# Patient Record
Sex: MALE | Race: WHITE | NOT HISPANIC OR LATINO | Employment: OTHER | URBAN - NONMETROPOLITAN AREA
[De-identification: names, ages, dates, MRNs, and addresses within clinical notes are randomized per-mention and may not be internally consistent; named-entity substitution may affect disease eponyms.]

---

## 2017-01-19 ENCOUNTER — OFFICE VISIT (OUTPATIENT)
Dept: FAMILY MEDICINE CLINIC | Facility: CLINIC | Age: 57
End: 2017-01-19

## 2017-01-19 VITALS
DIASTOLIC BLOOD PRESSURE: 70 MMHG | HEART RATE: 94 BPM | OXYGEN SATURATION: 96 % | BODY MASS INDEX: 29.78 KG/M2 | TEMPERATURE: 98.4 F | HEIGHT: 70 IN | WEIGHT: 208 LBS | SYSTOLIC BLOOD PRESSURE: 121 MMHG

## 2017-01-19 DIAGNOSIS — M15.9 PRIMARY OSTEOARTHRITIS INVOLVING MULTIPLE JOINTS: ICD-10-CM

## 2017-01-19 DIAGNOSIS — F41.1 GENERALIZED ANXIETY DISORDER: Primary | ICD-10-CM

## 2017-01-19 DIAGNOSIS — E53.8 VITAMIN B 12 DEFICIENCY: ICD-10-CM

## 2017-01-19 DIAGNOSIS — Z13.9 SCREENING: ICD-10-CM

## 2017-01-19 DIAGNOSIS — IMO0001 UNCONTROLLED TYPE 2 DIABETES MELLITUS WITHOUT COMPLICATION, WITHOUT LONG-TERM CURRENT USE OF INSULIN: ICD-10-CM

## 2017-01-19 DIAGNOSIS — I10 ESSENTIAL HYPERTENSION: ICD-10-CM

## 2017-01-19 DIAGNOSIS — I25.10 CORONARY ARTERY DISEASE INVOLVING NATIVE CORONARY ARTERY OF NATIVE HEART, ANGINA PRESENCE UNSPECIFIED: ICD-10-CM

## 2017-01-19 DIAGNOSIS — N40.0 BENIGN NON-NODULAR PROSTATIC HYPERPLASIA WITHOUT LOWER URINARY TRACT SYMPTOMS: ICD-10-CM

## 2017-01-19 DIAGNOSIS — R79.89 ABNORMAL THYROID BLOOD TEST: ICD-10-CM

## 2017-01-19 LAB
25(OH)D3 SERPL-MCNC: 17 NG/ML
ALBUMIN SERPL-MCNC: 4.6 G/DL (ref 3.5–5)
ALBUMIN UR-MCNC: 14.9 MG/L
ALBUMIN/GLOB SERPL: 1.5 G/DL (ref 1.5–2.5)
ALP SERPL-CCNC: 102 U/L (ref 46–116)
ALT SERPL W P-5'-P-CCNC: 25 U/L (ref 10–44)
ANION GAP SERPL CALCULATED.3IONS-SCNC: 11.3 MMOL/L (ref 3.6–11.2)
AST SERPL-CCNC: 23 U/L (ref 10–34)
BASOPHILS # BLD AUTO: 0.01 10*3/MM3 (ref 0–0.3)
BASOPHILS NFR BLD AUTO: 0.2 % (ref 0–2)
BILIRUB SERPL-MCNC: 0.7 MG/DL (ref 0.2–1.8)
BUN BLD-MCNC: 16 MG/DL (ref 7–21)
BUN/CREAT SERPL: 16 (ref 7–25)
CALCIUM SPEC-SCNC: 9.4 MG/DL (ref 7.7–10)
CHLORIDE SERPL-SCNC: 104 MMOL/L (ref 99–112)
CHOLEST SERPL-MCNC: 165 MG/DL (ref 0–200)
CO2 SERPL-SCNC: 29.7 MMOL/L (ref 24.3–31.9)
CREAT BLD-MCNC: 1 MG/DL (ref 0.43–1.29)
DEPRECATED RDW RBC AUTO: 42.5 FL (ref 37–54)
EOSINOPHIL # BLD AUTO: 0.29 10*3/MM3 (ref 0–0.7)
EOSINOPHIL NFR BLD AUTO: 4.8 % (ref 0–5)
ERYTHROCYTE [DISTWIDTH] IN BLOOD BY AUTOMATED COUNT: 13.6 % (ref 11.5–14.5)
GFR SERPL CREATININE-BSD FRML MDRD: 78 ML/MIN/1.73
GLOBULIN UR ELPH-MCNC: 3 GM/DL
GLUCOSE BLD-MCNC: 125 MG/DL (ref 70–110)
HAV IGM SERPL QL IA: NORMAL
HBA1C MFR BLD: 6.8 % (ref 4.5–5.7)
HBV CORE IGM SERPL QL IA: NORMAL
HBV SURFACE AG SERPL QL IA: NORMAL
HCT VFR BLD AUTO: 45.3 % (ref 42–52)
HCV AB SER DONR QL: NORMAL
HDLC SERPL-MCNC: 46 MG/DL (ref 60–100)
HGB BLD-MCNC: 14.8 G/DL (ref 14–18)
IMM GRANULOCYTES # BLD: 0.02 10*3/MM3 (ref 0–0.03)
IMM GRANULOCYTES NFR BLD: 0.3 % (ref 0–0.5)
LDLC SERPL CALC-MCNC: 92 MG/DL (ref 0–100)
LDLC/HDLC SERPL: 1.99 {RATIO}
LYMPHOCYTES # BLD AUTO: 1.74 10*3/MM3 (ref 1–3)
LYMPHOCYTES NFR BLD AUTO: 28.7 % (ref 21–51)
MCH RBC QN AUTO: 28.1 PG (ref 27–33)
MCHC RBC AUTO-ENTMCNC: 32.7 G/DL (ref 33–37)
MCV RBC AUTO: 86.1 FL (ref 80–94)
MONOCYTES # BLD AUTO: 0.88 10*3/MM3 (ref 0.1–0.9)
MONOCYTES NFR BLD AUTO: 14.5 % (ref 0–10)
NEUTROPHILS # BLD AUTO: 3.13 10*3/MM3 (ref 1.4–6.5)
NEUTROPHILS NFR BLD AUTO: 51.5 % (ref 30–70)
OSMOLALITY SERPL CALC.SUM OF ELEC: 291.4 MOSM/KG (ref 273–305)
PLATELET # BLD AUTO: 380 10*3/MM3 (ref 130–400)
PMV BLD AUTO: 10 FL (ref 6–10)
POTASSIUM BLD-SCNC: 3.6 MMOL/L (ref 3.5–5.3)
PROT SERPL-MCNC: 7.6 G/DL (ref 6–8)
PSA SERPL-MCNC: 0.32 NG/ML (ref 0–4)
RBC # BLD AUTO: 5.26 10*6/MM3 (ref 4.7–6.1)
SODIUM BLD-SCNC: 145 MMOL/L (ref 135–153)
TRIGL SERPL-MCNC: 137 MG/DL (ref 0–150)
TSH SERPL DL<=0.05 MIU/L-ACNC: <0.01 MIU/ML (ref 0.55–4.78)
VIT B12 BLD-MCNC: >2000 PG/ML (ref 211–911)
VLDLC SERPL-MCNC: 27.4 MG/DL
WBC NRBC COR # BLD: 6.07 10*3/MM3 (ref 4.5–12.5)

## 2017-01-19 PROCEDURE — 36415 COLL VENOUS BLD VENIPUNCTURE: CPT | Performed by: NURSE PRACTITIONER

## 2017-01-19 PROCEDURE — 96372 THER/PROPH/DIAG INJ SC/IM: CPT | Performed by: NURSE PRACTITIONER

## 2017-01-19 PROCEDURE — 80053 COMPREHEN METABOLIC PANEL: CPT | Performed by: NURSE PRACTITIONER

## 2017-01-19 PROCEDURE — 80061 LIPID PANEL: CPT | Performed by: NURSE PRACTITIONER

## 2017-01-19 PROCEDURE — G0103 PSA SCREENING: HCPCS | Performed by: NURSE PRACTITIONER

## 2017-01-19 PROCEDURE — 84443 ASSAY THYROID STIM HORMONE: CPT | Performed by: NURSE PRACTITIONER

## 2017-01-19 PROCEDURE — 99213 OFFICE O/P EST LOW 20 MIN: CPT | Performed by: NURSE PRACTITIONER

## 2017-01-19 PROCEDURE — 85025 COMPLETE CBC W/AUTO DIFF WBC: CPT | Performed by: NURSE PRACTITIONER

## 2017-01-19 PROCEDURE — 82607 VITAMIN B-12: CPT | Performed by: NURSE PRACTITIONER

## 2017-01-19 PROCEDURE — 83036 HEMOGLOBIN GLYCOSYLATED A1C: CPT | Performed by: NURSE PRACTITIONER

## 2017-01-19 PROCEDURE — 82043 UR ALBUMIN QUANTITATIVE: CPT | Performed by: NURSE PRACTITIONER

## 2017-01-19 PROCEDURE — 82306 VITAMIN D 25 HYDROXY: CPT | Performed by: NURSE PRACTITIONER

## 2017-01-19 PROCEDURE — 36415 COLL VENOUS BLD VENIPUNCTURE: CPT

## 2017-01-19 PROCEDURE — 80074 ACUTE HEPATITIS PANEL: CPT | Performed by: NURSE PRACTITIONER

## 2017-01-19 PROCEDURE — 99396 PREV VISIT EST AGE 40-64: CPT | Performed by: NURSE PRACTITIONER

## 2017-01-19 RX ORDER — FINASTERIDE 5 MG/1
5 TABLET, FILM COATED ORAL DAILY
Qty: 30 TABLET | Refills: 5 | Status: SHIPPED | OUTPATIENT
Start: 2017-01-19 | End: 2017-04-19 | Stop reason: SDUPTHER

## 2017-01-19 RX ORDER — CYANOCOBALAMIN 1000 UG/ML
1000 INJECTION, SOLUTION INTRAMUSCULAR; SUBCUTANEOUS
Status: DISCONTINUED | OUTPATIENT
Start: 2017-01-19 | End: 2020-12-12 | Stop reason: HOSPADM

## 2017-01-19 RX ORDER — ISOSORBIDE MONONITRATE 30 MG/1
30 TABLET, EXTENDED RELEASE ORAL 3 TIMES DAILY
Qty: 90 TABLET | Refills: 5
Start: 2017-01-19 | End: 2017-04-19 | Stop reason: SDUPTHER

## 2017-01-19 RX ORDER — RANITIDINE 150 MG/1
150 TABLET ORAL 2 TIMES DAILY
Qty: 60 TABLET | Refills: 5 | Status: SHIPPED | OUTPATIENT
Start: 2017-01-19 | End: 2017-07-19 | Stop reason: SDUPTHER

## 2017-01-19 RX ORDER — CARVEDILOL 12.5 MG/1
12.5 TABLET ORAL 2 TIMES DAILY
Qty: 60 TABLET | Refills: 5 | Status: SHIPPED | OUTPATIENT
Start: 2017-01-19 | End: 2017-04-19 | Stop reason: SDUPTHER

## 2017-01-19 RX ORDER — AMITRIPTYLINE HYDROCHLORIDE 50 MG/1
50 TABLET, FILM COATED ORAL DAILY
Qty: 30 TABLET | Refills: 5 | Status: SHIPPED | OUTPATIENT
Start: 2017-01-19 | End: 2017-04-19 | Stop reason: SDUPTHER

## 2017-01-19 RX ORDER — AMOXICILLIN 500 MG/1
500 CAPSULE ORAL 3 TIMES DAILY
Qty: 30 CAPSULE | Refills: 0 | Status: SHIPPED | OUTPATIENT
Start: 2017-01-19 | End: 2017-04-19

## 2017-01-19 RX ORDER — CITALOPRAM 40 MG/1
40 TABLET ORAL DAILY
Qty: 30 TABLET | Refills: 5 | Status: SHIPPED | OUTPATIENT
Start: 2017-01-19 | End: 2017-04-19 | Stop reason: SDUPTHER

## 2017-01-19 RX ORDER — TRAMADOL HYDROCHLORIDE 50 MG/1
50 TABLET ORAL EVERY 6 HOURS PRN
Qty: 90 TABLET | Refills: 2 | Status: SHIPPED | OUTPATIENT
Start: 2017-01-19 | End: 2017-04-19 | Stop reason: SDUPTHER

## 2017-01-19 RX ORDER — GABAPENTIN 100 MG/1
100 CAPSULE ORAL 2 TIMES DAILY
Qty: 60 CAPSULE | Refills: 5 | Status: SHIPPED | OUTPATIENT
Start: 2017-01-19 | End: 2017-04-19 | Stop reason: SDUPTHER

## 2017-01-19 RX ORDER — ASPIRIN 325 MG
325 TABLET ORAL DAILY
Qty: 30 TABLET | Refills: 5 | Status: SHIPPED | OUTPATIENT
Start: 2017-01-19 | End: 2017-04-19 | Stop reason: SDUPTHER

## 2017-01-19 RX ORDER — LANCETS
EACH MISCELLANEOUS
Qty: 100 EACH | Refills: 12 | Status: SHIPPED | OUTPATIENT
Start: 2017-01-19 | End: 2017-04-19 | Stop reason: SDUPTHER

## 2017-01-19 RX ORDER — SIMVASTATIN 40 MG
40 TABLET ORAL NIGHTLY
Qty: 30 TABLET | Refills: 5 | Status: SHIPPED | OUTPATIENT
Start: 2017-01-19 | End: 2017-07-19 | Stop reason: SDUPTHER

## 2017-01-19 RX ADMIN — CYANOCOBALAMIN 1000 MCG: 1000 INJECTION, SOLUTION INTRAMUSCULAR; SUBCUTANEOUS at 09:18

## 2017-01-19 NOTE — MR AVS SNAPSHOT
Damaso Gerberley   1/19/2017 8:40 AM   Office Visit    Dept Phone:  [phone]   Encounter #:  [id number]    Provider:  YOUNG Toscano   Department:  [facility]                Your Full Care Plan              Today's Medication Changes          These changes are accurate as of: 1/19/17  9:26 AM.  If you have any questions, ask your nurse or doctor.               New Medication(s)Ordered:     amoxicillin 500 MG capsule   Commonly known as:  AMOXIL   Take 1 capsule by mouth 3 (Three) Times a Day.   Started by:  YOUNG Toscano         Stop taking medication(s)listed here:     levothyroxine 75 MCG tablet   Commonly known as:  SYNTHROID   Stopped by:  YOUNG Toscano                Where to Get Your Medications      These medications were sent to [facility] [address] [place] [address] [phone] [facility] [phone] [place] [address]     Phone:  [phone]     amitriptyline 50 MG tablet    amoxicillin 500 MG capsule    aspirin 325 MG tablet    carvedilol 12.5 MG tablet    citalopram 40 MG tablet    finasteride 5 MG tablet    gabapentin 100 MG capsule    metFORMIN 500 MG tablet    onetouch ultrasoft lancets    raNITIdine 150 MG tablet    simvastatin 40 MG tablet         You can get these medications from any pharmacy     Bring a paper prescription for each of these medications     traMADol 50 MG tablet         Information about where to get these medications is not yet available     ! Ask your nurse or doctor about these medications     isosorbide mononitrate 30 MG 24 hr tablet                  Your Updated Medication List          This list is accurate as of: 1/19/17  9:26 AM.  Always use your most recent med list.                amitriptyline 50 MG tablet   Commonly known as:  ELAVIL   Take 1 tablet by mouth Daily.       amoxicillin 500 MG capsule   Commonly  known as:  AMOXIL   Take 1 capsule by mouth 3 (Three) Times a Day.       aspirin 325 MG tablet   Take 1 tablet by mouth Daily.       carvedilol 12.5 MG tablet   Commonly known as:  COREG   Take 1 tablet by mouth 2 (Two) Times a Day.       citalopram 40 MG tablet   Commonly known as:  CeleXA   Take 1 tablet by mouth Daily.       cyanocobalamin 1000 MCG/ML injection   Inject 1 mL into the shoulder, thigh, or buttocks Every 28 (Twenty-Eight) Days.       finasteride 5 MG tablet   Commonly known as:  PROSCAR   Take 1 tablet by mouth Daily.       gabapentin 100 MG capsule   Commonly known as:  NEURONTIN   Take 1 capsule by mouth 2 (Two) Times a Day.       glucose blood test strip   Use as instructed       glucose monitor monitoring kit   1 each 3 (Three) Times a Day As Needed (diabetes).       isosorbide mononitrate 30 MG 24 hr tablet   Commonly known as:  IMDUR   Take 1 tablet by mouth 3 (Three) Times a Day.       metFORMIN 500 MG tablet   Commonly known as:  GLUCOPHAGE   Take 1 tablet by mouth 2 (Two) Times a Day.       onetouch ultrasoft lancets   Use as instructed       raNITIdine 150 MG tablet   Commonly known as:  ZANTAC   Take 1 tablet by mouth 2 (Two) Times a Day.       simvastatin 40 MG tablet   Commonly known as:  ZOCOR   Take 1 tablet by mouth Every Night.       traMADol 50 MG tablet   Commonly known as:  ULTRAM   Take 1 tablet by mouth Every 6 (Six) Hours As Needed for moderate pain (4-6).               We Performed the Following     CBC & Differential     CBC Auto Differential     Comprehensive Metabolic Panel     Hemoglobin A1c     Hepatitis Panel, Acute     Lipid Panel     MicroAlbumin, Urine, Random     PSA Screen     TSH     Urine Drug Screen     Vitamin B12     Vitamin D 25 Hydroxy       You Were Diagnosed With        Codes Comments    Generalized anxiety disorder    -  Primary ICD-10-CM: F41.1  ICD-9-CM: 300.02     Primary osteoarthritis involving multiple joints     ICD-10-CM: M15.0  ICD-9-CM: 715.09      Essential hypertension     ICD-10-CM: I10  ICD-9-CM: 401.9     Uncontrolled type 2 diabetes mellitus without complication, without long-term current use of insulin     ICD-10-CM: E11.65  ICD-9-CM: 250.02     Coronary artery disease involving native coronary artery of native heart, angina presence unspecified     ICD-10-CM: I25.10  ICD-9-CM: 414.01     Benign non-nodular prostatic hyperplasia without lower urinary tract symptoms     ICD-10-CM: N40.0  ICD-9-CM: 600.90     Abnormal thyroid blood test     ICD-10-CM: R94.6  ICD-9-CM: 794.5     Screening     ICD-10-CM: Z13.9  ICD-9-CM: V82.9     Vitamin B 12 deficiency     ICD-10-CM: E53.8  ICD-9-CM: 266.2       Medications to be Given to You by a Medical Professional     Due       Frequency    (none) cyanocobalamin injection 1,000 mcg  Every 28 Days      Instructions     None    Patient Instructions History      Upcoming Appointments     Visit Type Date Time Department    OFFICE VISIT 1/19/2017  8:40 AM [facility]    FOLLOW UP 2/9/2017  2:20 PM [facility]ORE[facility]ARD[facility]IYA    OFFICE VISIT 4/12/2017  2:00 PM [facility]    OFFICE VISIT 4/19/2017  8:40 AM [facility]      MyChart Signup     Our records indicate that you have declined [facility] [employer] signup. If you would like to sign up for [employer], please email [email or web address] or call [phone] to obtain an activation code.             Other Info from Your Visit           Your Appointments     Feb 09, 2017  2:20 PM EST   Follow Up with Usman Drake DO   [facility] CARDIOLOGY (--)    [address]  [facility] [address] [phone]           Arrive 15 minutes prior to appointment.            Apr 12, 2017  2:00 PM EDT   Office Visit with YOUNG Sanches   [facility] FAM[facility] MEDICINE (--)    [address] [facility] [address] [phone]           Arrive 15 minutes prior to appointment.            Apr 19, 2017  8:40  "AM EDT   Office Visit with YOUNG Toscano   [facility] MEDICINE (--)    [address] [place] [address] [phone]           Please arrive 10 minutes early, bring a complete list of all medications and bring any previous records or diagnostic testing results.              Allergies     No Known Allergies      Reason for Visit     Hypertension     Diabetes     Depression     Osteoarthritis           Vital Signs     Blood Pressure Pulse Temperature Height Weight Oxygen Saturation    121/70 (BP Location: Right arm, Patient Position: Sitting, Cuff Size: Adult) 94 98.4 °F (36.9 °C) (Oral) 70\" (177.8 cm) 208 lb (94.3 kg) 96%    Body Mass Index Smoking Status                29.84 kg/m2 Never Smoker          Problems and Diagnoses Noted     Coronary artery disease involving native coronary artery of native heart    Uncontrolled diabetes mellitus type 2 without complications    High blood pressure    Generalized anxiety disorder    Primary osteoarthritis involving multiple joints        Benign non-nodular prostatic hyperplasia without lower urinary tract symptoms        Abnormal thyroid blood test        Screening        Vitamin B12 deficiency          Medications Administered     cyanocobalamin injection 1,000 mcg                    Results         "

## 2017-01-19 NOTE — PROGRESS NOTES
Subjective   Damaso Leonard is a 56 y.o. male.     History of Present Illness   Mr. Leonard is here today for a preventive visit. We will also be addressing his chronic conditions.   Diabetes mellitus Type II, under adequate control.   Discussed general issues about diabetes pathophysiology and management.  Reviewed ADA diet.  Counseling at today's visit: discussed the need for weight loss, set a weight loss goal of five pounds lbs over the next 3 months and discussed the advantages of a diet low in carbohydrates.  Discussed foot care.  Reminded to get yearly retinal exam.      Abnormal tsh   Pt had recent abnormal high tsh level. No recent hypothyroidism though it is dominant in his family. He was started on synthroid which he stopped due to abdominal pain and cramps.      CAD  Pt is under the are of cardiology for his CAD. He reports compliance with his medication and follow up visits.   [facility] care of [facility].     Hypertension  Stable, denies side effects from current medications. Monitors his blood pressure at home and community randomly with normal readings.      Osteoarthritis/ joint pain in his back, knees, right shoulder and hand  Damaso Leonard rates pain today as 5 on a scale of 0-10. Patient states that pain is reduced by at least one half at times with current Medication/Treatment. Pain does interfere with ability to perform daily activities as well as enjoyment of life. Patient reports quality of life and mobility has been stable as a result of current treatment. Patient describes pain as aching, stabbing, stiffness and tingling. Pain is made worse from going up and down stairs, lifting any weight, bending, rising after sitting and any activity with his right arm. Pain is eased by medication, rest, Tramadol and Neurontin      No history of substance abuse or addictions. Danny and UDS are on file as consistent.     The following portions of the patient's history were reviewed and updated  as appropriate: allergies, current medications, past family history, past medical history, past social history, past surgical history and problem list.    Review of Systems   Constitutional: Negative for activity change, appetite change, chills, fatigue and fever.   HENT: Positive for congestion and sinus pressure. Negative for ear pain, facial swelling, hearing loss, sore throat, trouble swallowing and voice change.    Eyes: Negative for pain, discharge and visual disturbance.   Respiratory: Positive for shortness of breath (climbing steps ). Negative for apnea, cough, chest tightness and wheezing.    Cardiovascular: Negative for chest pain, palpitations and leg swelling.   Gastrointestinal: Negative for abdominal pain, blood in stool, constipation, diarrhea, nausea and vomiting.   Endocrine: Negative.    Genitourinary: Negative for dysuria.   Musculoskeletal: Positive for arthralgias, back pain, myalgias and neck pain.   Skin: Negative for color change and rash.   Allergic/Immunologic: Negative for environmental allergies, food allergies and immunocompromised state.   Neurological: Negative.  Negative for headaches.   Hematological: Negative.    Psychiatric/Behavioral: Negative for confusion, self-injury and suicidal ideas. The patient is not nervous/anxious.    All other systems reviewed and are negative.      Objective   Physical Exam   Constitutional: He is oriented to person, place, and time. He appears well-developed and well-nourished. No distress.   HENT:   Head: Normocephalic and atraumatic.   Right Ear: External ear normal.   Left Ear: External ear normal.   Nose: Nose normal.   Mouth/Throat: Oropharynx is clear and moist. No oropharyngeal exudate.   Eyes: EOM are normal. Pupils are equal, round, and reactive to light.   Neck: Normal range of motion. Neck supple. No thyromegaly present.   Cardiovascular: Normal rate, regular rhythm, normal heart sounds and intact distal pulses.    No murmur  heard.  Pulmonary/Chest: Effort normal and breath sounds normal. No respiratory distress. He has no wheezes. He has no rales. He exhibits no tenderness.   Abdominal: Soft. Bowel sounds are normal. He exhibits no distension and no mass. There is no tenderness. There is no rebound and no guarding.   Musculoskeletal:        Lumbar back: He exhibits decreased range of motion, tenderness, pain and spasm.   Decreased lumbar curvature, there is pain with forward flexion. DTR's +2. No edema.   Ulnar deviation bilateral hands.  General stiffness in hands with weak  bilateral .    Lymphadenopathy:     He has no cervical adenopathy.   Neurological: He is alert and oriented to person, place, and time. He has normal reflexes.   Skin: Skin is warm and dry. No rash noted. No erythema. No pallor.   Psychiatric: He has a normal mood and affect. His speech is normal and behavior is normal. Judgment and thought content normal. His affect is not inappropriate. He is not actively hallucinating. Cognition and memory are normal.   Denies thoughts of hurting self or others. He is attentive.   Nursing note and vitals reviewed.  Depression screening is negative today.     Assessment/Plan   Damaso was seen today for hypertension, diabetes, depression and osteoarthritis.    Diagnoses and all orders for this visit:    Generalized anxiety disorder  -     citalopram (CeleXA) 40 MG tablet; Take 1 tablet by mouth Daily.  -     amitriptyline (ELAVIL) 50 MG tablet; Take 1 tablet by mouth Daily.    Primary osteoarthritis involving multiple joints  -     traMADol (ULTRAM) 50 MG tablet; Take 1 tablet by mouth Every 6 (Six) Hours As Needed for moderate pain (4-6).  -     Vitamin D 25 Hydroxy  -     Urine Drug Screen    Essential hypertension  -     aspirin 325 MG tablet; Take 1 tablet by mouth Daily.  -     carvedilol (COREG) 12.5 MG tablet; Take 1 tablet by mouth 2 (Two) Times a Day.  -     isosorbide mononitrate (IMDUR) 30 MG 24 hr tablet; Take 1  tablet by mouth 3 (Three) Times a Day.    Uncontrolled type 2 diabetes mellitus without complication, without long-term current use of insulin  -     Lancets (ONETOUCH ULTRASOFT) lancets; Use as instructed  -     gabapentin (NEURONTIN) 100 MG capsule; Take 1 capsule by mouth 2 (Two) Times a Day.  -     metFORMIN (GLUCOPHAGE) 500 MG tablet; Take 1 tablet by mouth 2 (Two) Times a Day.  -     simvastatin (ZOCOR) 40 MG tablet; Take 1 tablet by mouth Every Night.  -     Lipid Panel  -     CBC & Differential  -     Comprehensive Metabolic Panel  -     TSH  -     Hemoglobin A1c  -     MicroAlbumin, Urine, Random  -     CBC Auto Differential    Coronary artery disease involving native coronary artery of native heart, angina presence unspecified    Benign non-nodular prostatic hyperplasia without lower urinary tract symptoms  -     finasteride (PROSCAR) 5 MG tablet; Take 1 tablet by mouth Daily.    Abnormal thyroid blood test  -     isosorbide mononitrate (IMDUR) 30 MG 24 hr tablet; Take 1 tablet by mouth 3 (Three) Times a Day.    Screening  -     PSA Screen  -     Vitamin B12  -     Hepatitis Panel, Acute    Vitamin B 12 deficiency  -     cyanocobalamin injection 1,000 mcg; Inject 1 mL into the shoulder, thigh, or buttocks Every 28 (Twenty-Eight) Days.    Other orders  -     raNITIdine (ZANTAC) 150 MG tablet; Take 1 tablet by mouth 2 (Two) Times a Day.  -     amoxicillin (AMOXIL) 500 MG capsule; Take 1 capsule by mouth 3 (Three) Times a Day.      I have discussed diagnosis in detail today allowing time for questions and answers. Pt is aware of reasons to seek urgent or emergent medical care as well as reasons to return to the clinic for evaluation. Possible side effects, interactions and progression of symptoms discussed as well. Pt / family states understanding.   Will treat sinus drainage with Amoxicillin. RTC 2-5 days if not improved, sooner if condition worsens/changes. Symptomatic care advised as well as reasons for  urgent or emergent care. Pt / family state understanding.   Counseling / anticipatory guidance provided regarding nutritions, physical activity, healthy weight , injury prevention , tobacco avoidance, alcohol and drug avoidance, sexual behavior, dental health exam recommended yearly, Mental health screening , immunizations and screenings.   Labs: screening labs today, will repeat tsh. Colonoscopy is up to date.   Follow up in 3 months, sooner if needed.

## 2017-02-09 ENCOUNTER — OFFICE VISIT (OUTPATIENT)
Dept: CARDIOLOGY | Facility: CLINIC | Age: 57
End: 2017-02-09

## 2017-02-09 VITALS
SYSTOLIC BLOOD PRESSURE: 129 MMHG | HEIGHT: 70 IN | HEART RATE: 83 BPM | WEIGHT: 213.2 LBS | BODY MASS INDEX: 30.52 KG/M2 | OXYGEN SATURATION: 98 % | DIASTOLIC BLOOD PRESSURE: 90 MMHG

## 2017-02-09 DIAGNOSIS — I10 ESSENTIAL HYPERTENSION: Primary | ICD-10-CM

## 2017-02-09 DIAGNOSIS — I25.10 CORONARY ARTERY DISEASE INVOLVING NATIVE CORONARY ARTERY OF NATIVE HEART, ANGINA PRESENCE UNSPECIFIED: ICD-10-CM

## 2017-02-09 DIAGNOSIS — G47.33 OBSTRUCTIVE SLEEP APNEA SYNDROME: ICD-10-CM

## 2017-02-09 DIAGNOSIS — IMO0001 UNCONTROLLED TYPE 2 DIABETES MELLITUS WITHOUT COMPLICATION, WITHOUT LONG-TERM CURRENT USE OF INSULIN: ICD-10-CM

## 2017-02-09 DIAGNOSIS — G47.34 OXYGEN DESATURATION DURING SLEEP: ICD-10-CM

## 2017-02-09 PROCEDURE — 99213 OFFICE O/P EST LOW 20 MIN: CPT | Performed by: INTERNAL MEDICINE

## 2017-02-09 NOTE — PROGRESS NOTES
Subjective   NAME:    Damaso Leonard   :      1960  DATE:    2017    Damaso Leonard is a 56-year-old  male who I suspect has sleep apnea.  We are unable to get a sleep study done because his insurance company will not pay for it.  He does use oxygen at night and states that he's feeling well.  He has no active complaints at this time.    REASON FOR VISIT:  Chief Complaint   Patient presents with   • Follow-up   • Hypertension   • Coronary Artery Disease       HISTORY:  PAST MEDICAL HISTORY:   Past Medical History   Diagnosis Date   • Anxiety    • ASCVD (arteriosclerotic cardiovascular disease)    • DM (diabetes mellitus)    • GERD (gastroesophageal reflux disease)    • History of EKG 04/15/2015     NORMAL   • HTN (hypertension)    • Hyperlipidemia        SURGICAL HISTORY:   Past Surgical History   Procedure Laterality Date   • Shoulder surgery     • Tonsillectomy     • Tympanoplasty     • Cholecystectomy     • Hernia repair     • Cardiac surgery       stenting   • Vasectomy     • Upper gastrointestinal endoscopy     • Colonoscopy         SOCIAL HISTORY:   Social History     Social History   • Marital status:      Spouse name: N/A   • Number of children: N/A   • Years of education: N/A     Social History Main Topics   • Smoking status: Never Smoker   • Smokeless tobacco: Never Used   • Alcohol use No   • Drug use: No   • Sexual activity: Not Asked     Other Topics Concern   • None     Social History Narrative       FAMILY HISTORY:   Family History   Problem Relation Age of Onset   • Heart attack Father    • Heart disease Father    • Hypertension Father    • Heart attack Sister    • Diabetes Sister    • Heart disease Sister    • Hypertension Sister    • Thyroid disease Sister    • Heart attack Brother    • Diabetes Brother    • Thyroid disease Brother    • Arthritis Daughter    • COPD Daughter    • Asthma Daughter    • Depression Daughter    • Heart disease Brother    • Hypertension  Brother        REVIEW OF SYSTEMS:  Review of Systems   Constitutional: Negative for activity change, appetite change and fatigue.   HENT: Positive for tinnitus. Negative for congestion.    Eyes: Negative for visual disturbance.   Respiratory: Positive for shortness of breath. Negative for cough and chest tightness.    Cardiovascular: Negative for chest pain, palpitations and leg swelling.   Gastrointestinal: Negative for blood in stool, nausea and vomiting.   Endocrine: Positive for heat intolerance. Negative for cold intolerance and polyuria.   Genitourinary: Negative for dysuria.   Musculoskeletal: Positive for myalgias and neck pain.   Skin: Negative for rash.   Neurological: Negative for dizziness, syncope, weakness and light-headedness.   Hematological: Does not bruise/bleed easily.   Psychiatric/Behavioral: Negative for confusion and sleep disturbance.       Objective       PHYSICAL EXAMINATION:  Physical Exam   Constitutional: He is oriented to person, place, and time. He appears well-developed and well-nourished.   HENT:   Head: Normocephalic and atraumatic.   Eyes: Conjunctivae and EOM are normal. Pupils are equal, round, and reactive to light.   Neck: Normal range of motion. Neck supple. No JVD present. No tracheal deviation present. No thyromegaly present.   Cardiovascular: Normal rate, regular rhythm, normal heart sounds and intact distal pulses.  Exam reveals no gallop and no friction rub.    No murmur heard.  Pulmonary/Chest: Effort normal and breath sounds normal. No respiratory distress. He has no wheezes. He has no rales. He exhibits no tenderness.   Abdominal: Soft. Bowel sounds are normal. He exhibits no distension and no mass. There is no tenderness.   Musculoskeletal: Normal range of motion. He exhibits no edema, tenderness or deformity.   Lymphadenopathy:     He has no cervical adenopathy.   Neurological: He is alert and oriented to person, place, and time. He has normal reflexes. He displays  "normal reflexes. No cranial nerve deficit. He exhibits normal muscle tone. Coordination normal.   Skin: Skin is warm and dry.   Psychiatric: He has a normal mood and affect. His behavior is normal. Judgment and thought content normal.       VITAL SIGNS:   Visit Vitals   • /90 (BP Location: Left arm, Patient Position: Sitting)   • Pulse 83   • Ht 70\" (177.8 cm)   • Wt 213 lb 3.2 oz (96.7 kg)   • SpO2 98%   • BMI 30.59 kg/m2       Procedure   Procedures         Assessment/Plan     Problems Addressed this Visit        Cardiovascular and Mediastinum    Essential hypertension - Primary    Coronary artery disease involving native coronary artery of native heart       Endocrine    Diabetes mellitus type 2, uncontrolled, without complications       Other    Sleep apnea    Oxygen desaturation during sleep               Return in about 6 months (around 8/9/2017).    Current Outpatient Prescriptions:   •  amitriptyline (ELAVIL) 50 MG tablet, Take 1 tablet by mouth Daily., Disp: 30 tablet, Rfl: 5  •  amoxicillin (AMOXIL) 500 MG capsule, Take 1 capsule by mouth 3 (Three) Times a Day., Disp: 30 capsule, Rfl: 0  •  aspirin 325 MG tablet, Take 1 tablet by mouth Daily., Disp: 30 tablet, Rfl: 5  •  carvedilol (COREG) 12.5 MG tablet, Take 1 tablet by mouth 2 (Two) Times a Day., Disp: 60 tablet, Rfl: 5  •  citalopram (CeleXA) 40 MG tablet, Take 1 tablet by mouth Daily., Disp: 30 tablet, Rfl: 5  •  cyanocobalamin 1000 MCG/ML injection, Inject 1 mL into the shoulder, thigh, or buttocks Every 28 (Twenty-Eight) Days., Disp: 1 mL, Rfl: 3  •  finasteride (PROSCAR) 5 MG tablet, Take 1 tablet by mouth Daily., Disp: 30 tablet, Rfl: 5  •  gabapentin (NEURONTIN) 100 MG capsule, Take 1 capsule by mouth 2 (Two) Times a Day., Disp: 60 capsule, Rfl: 5  •  glucose blood test strip, Use as instructed, Disp: 100 each, Rfl: 12  •  glucose monitor monitoring kit, 1 each 3 (Three) Times a Day As Needed (diabetes)., Disp: 1 each, Rfl: 0  •  isosorbide " mononitrate (IMDUR) 30 MG 24 hr tablet, Take 1 tablet by mouth 3 (Three) Times a Day., Disp: 90 tablet, Rfl: 5  •  Lancets (ONETOUCH ULTRASOFT) lancets, Use as instructed, Disp: 100 each, Rfl: 12  •  metFORMIN (GLUCOPHAGE) 500 MG tablet, Take 1 tablet by mouth 2 (Two) Times a Day., Disp: 60 tablet, Rfl: 5  •  raNITIdine (ZANTAC) 150 MG tablet, Take 1 tablet by mouth 2 (Two) Times a Day., Disp: 60 tablet, Rfl: 5  •  simvastatin (ZOCOR) 40 MG tablet, Take 1 tablet by mouth Every Night., Disp: 30 tablet, Rfl: 5  •  traMADol (ULTRAM) 50 MG tablet, Take 1 tablet by mouth Every 6 (Six) Hours As Needed for moderate pain (4-6)., Disp: 90 tablet, Rfl: 2    Current Facility-Administered Medications:   •  cyanocobalamin injection 1,000 mcg, 1,000 mcg, Intramuscular, Q28 Days, YOUGN Toscano, 1,000 mcg at 01/19/17 0918                 Usman Drake DO, [facility], FACC, FACOI, FCCP

## 2017-04-19 ENCOUNTER — OFFICE VISIT (OUTPATIENT)
Dept: FAMILY MEDICINE CLINIC | Facility: CLINIC | Age: 57
End: 2017-04-19

## 2017-04-19 VITALS
WEIGHT: 215 LBS | HEART RATE: 84 BPM | BODY MASS INDEX: 30.78 KG/M2 | TEMPERATURE: 97.5 F | SYSTOLIC BLOOD PRESSURE: 140 MMHG | DIASTOLIC BLOOD PRESSURE: 100 MMHG | OXYGEN SATURATION: 96 % | HEIGHT: 70 IN

## 2017-04-19 DIAGNOSIS — G47.33 OBSTRUCTIVE SLEEP APNEA SYNDROME: ICD-10-CM

## 2017-04-19 DIAGNOSIS — R79.89 ABNORMAL THYROID BLOOD TEST: ICD-10-CM

## 2017-04-19 DIAGNOSIS — H61.23 EXCESSIVE CERUMEN IN BOTH EAR CANALS: ICD-10-CM

## 2017-04-19 DIAGNOSIS — I10 ESSENTIAL HYPERTENSION: ICD-10-CM

## 2017-04-19 DIAGNOSIS — M15.9 OSTEOARTHRITIS INVOLVING MULTIPLE JOINTS ON BOTH SIDES OF BODY: ICD-10-CM

## 2017-04-19 DIAGNOSIS — E55.9 VITAMIN D DEFICIENCY DISEASE: ICD-10-CM

## 2017-04-19 DIAGNOSIS — Z29.9 PREVENTIVE MEASURE: ICD-10-CM

## 2017-04-19 DIAGNOSIS — IMO0001 UNCONTROLLED TYPE 2 DIABETES MELLITUS WITHOUT COMPLICATION, WITHOUT LONG-TERM CURRENT USE OF INSULIN: Primary | ICD-10-CM

## 2017-04-19 DIAGNOSIS — E53.8 VITAMIN B 12 DEFICIENCY: ICD-10-CM

## 2017-04-19 DIAGNOSIS — N40.0 BENIGN NON-NODULAR PROSTATIC HYPERPLASIA WITHOUT LOWER URINARY TRACT SYMPTOMS: ICD-10-CM

## 2017-04-19 DIAGNOSIS — I25.10 CORONARY ARTERY DISEASE INVOLVING NATIVE CORONARY ARTERY OF NATIVE HEART WITHOUT ANGINA PECTORIS: ICD-10-CM

## 2017-04-19 DIAGNOSIS — M15.9 PRIMARY OSTEOARTHRITIS INVOLVING MULTIPLE JOINTS: ICD-10-CM

## 2017-04-19 DIAGNOSIS — F41.1 GENERALIZED ANXIETY DISORDER: ICD-10-CM

## 2017-04-19 PROCEDURE — 69210 REMOVE IMPACTED EAR WAX UNI: CPT | Performed by: NURSE PRACTITIONER

## 2017-04-19 PROCEDURE — 99214 OFFICE O/P EST MOD 30 MIN: CPT | Performed by: NURSE PRACTITIONER

## 2017-04-19 PROCEDURE — 90715 TDAP VACCINE 7 YRS/> IM: CPT | Performed by: NURSE PRACTITIONER

## 2017-04-19 RX ORDER — FINASTERIDE 5 MG/1
5 TABLET, FILM COATED ORAL DAILY
Qty: 30 TABLET | Refills: 5 | Status: SHIPPED | OUTPATIENT
Start: 2017-04-19 | End: 2017-07-19 | Stop reason: SDUPTHER

## 2017-04-19 RX ORDER — TRAMADOL HYDROCHLORIDE 50 MG/1
50 TABLET ORAL EVERY 6 HOURS PRN
Qty: 90 TABLET | Refills: 2 | Status: SHIPPED | OUTPATIENT
Start: 2017-04-19 | End: 2017-04-19 | Stop reason: DRUGHIGH

## 2017-04-19 RX ORDER — ISOSORBIDE MONONITRATE 30 MG/1
30 TABLET, EXTENDED RELEASE ORAL 2 TIMES DAILY
Qty: 60 TABLET | Refills: 5 | Status: SHIPPED | OUTPATIENT
Start: 2017-04-19 | End: 2017-07-19 | Stop reason: SDUPTHER

## 2017-04-19 RX ORDER — GABAPENTIN 100 MG/1
100 CAPSULE ORAL 2 TIMES DAILY
Qty: 60 CAPSULE | Refills: 2
Start: 2017-04-19 | End: 2017-07-19 | Stop reason: SDUPTHER

## 2017-04-19 RX ORDER — PANTOPRAZOLE SODIUM 40 MG/1
40 TABLET, DELAYED RELEASE ORAL DAILY
Qty: 30 TABLET | Refills: 5 | Status: SHIPPED | OUTPATIENT
Start: 2017-04-19 | End: 2017-07-19 | Stop reason: SDUPTHER

## 2017-04-19 RX ORDER — CITALOPRAM 40 MG/1
40 TABLET ORAL DAILY
Qty: 30 TABLET | Refills: 5 | Status: SHIPPED | OUTPATIENT
Start: 2017-04-19 | End: 2017-07-19 | Stop reason: SDUPTHER

## 2017-04-19 RX ORDER — ERGOCALCIFEROL 1.25 MG/1
50000 CAPSULE ORAL
Qty: 4 CAPSULE | Refills: 5 | Status: SHIPPED | OUTPATIENT
Start: 2017-04-19 | End: 2017-07-19 | Stop reason: SDUPTHER

## 2017-04-19 RX ORDER — POLYETHYLENE GLYCOL 3350 17 G/17G
17 POWDER, FOR SOLUTION ORAL DAILY
Qty: 1 EACH | Refills: 5 | Status: SHIPPED | OUTPATIENT
Start: 2017-04-19 | End: 2017-07-19 | Stop reason: SDUPTHER

## 2017-04-19 RX ORDER — AMITRIPTYLINE HYDROCHLORIDE 50 MG/1
50 TABLET, FILM COATED ORAL DAILY
Qty: 30 TABLET | Refills: 5 | Status: SHIPPED | OUTPATIENT
Start: 2017-04-19 | End: 2017-07-19 | Stop reason: SDUPTHER

## 2017-04-19 RX ORDER — ASPIRIN 325 MG
325 TABLET ORAL DAILY
Qty: 30 TABLET | Refills: 5 | Status: SHIPPED | OUTPATIENT
Start: 2017-04-19 | End: 2017-07-19 | Stop reason: SDUPTHER

## 2017-04-19 RX ORDER — CARVEDILOL 12.5 MG/1
12.5 TABLET ORAL 2 TIMES DAILY
Qty: 60 TABLET | Refills: 5 | Status: SHIPPED | OUTPATIENT
Start: 2017-04-19 | End: 2017-07-19 | Stop reason: SDUPTHER

## 2017-04-19 RX ORDER — LANCETS
EACH MISCELLANEOUS
Qty: 100 EACH | Refills: 12 | Status: SHIPPED | OUTPATIENT
Start: 2017-04-19 | End: 2018-01-29 | Stop reason: SDUPTHER

## 2017-04-19 NOTE — PROGRESS NOTES
Subjective   Damaso Leonard is a 56 y.o. male.     History of Present Illness   Follow up on multiple disease processes.     Hypertension  Home blood pressure readings: not doing. Associated signs and symptoms: none. Patient denies: chest pain, palpitations, dyspnea, orthopnea, paroxysmal nocturnal dyspnea and peripheral edema. Current antihypertensive medications includes carvedilol and Imdur. Medication compliance: not taking coreg as prescribed, reports he takes twice daily not three times daily as previously ordered by cardiology. Most recent creatinine   Lab Results   Component Value Date    CREATININE 1.00 01/19/2017     Not taken his blood pressure medication this morning.     GERD  Patient complains of heartburn. Symptoms have been present for several years . Symptoms include no other symptoms. The patient denies no other symptoms. Symptoms appear to be worsened by large meals, lying down, tomatoes and onions. Risk factors present for GERD include obesity. Risk factors absent for GERD are alcohol use, oral corticosteroid use, theophylline use and tight fitting clothing. Studies performed so far include upper GI, result: negative. Treatments tried so far include lifestyle change including caffeine reduction, dietary changes, elevate HOB, weight loss, H2 blocker zantac, proton pump inhibitor: Protonix in the past was helpful. Results of treatment: some improvement in symptom severity. Currently, the symptoms are moderate and occur approximately 3 times per week. Not currently on PPI, reports taking an extra Zantac at times as well as otc antacids.     CAD  Remains under the care of cardiology. He reports failure to take his blood pressure medication this morning. He does report that he takes his coreg twice daily not three times as directed and does not wish to increase due to fatigue with higher dose.     Diabetes  Current symptoms include paresthesia of the feet. Patient denies visual disturbances,  polydipsia, polyuria, hypoglycemia and foot ulcerations. Evaluation to date has been: fasting blood sugar, fasting lipid panel, hemoglobin A1C and microalbuminuria. Home sugars: BGs are running  consistent with Hgb A1C. Current treatments: metformin. Last dilated eye exam 2016. Most recent hemoglobin A1c   Lab Results   Component Value Date    HGBA1C 6.80 (H) 01/19/2017    HGBA1C 6.80 (H) 10/13/2016    HGBA1C 6.7 (H) 04/11/2016    HGBA1C 6.7 (H) 10/15/2015    HGBA1C 6.1 (H) 04/14/2015      Anxiety/Depression  Denies thoughts of hurting self or others. States that he feels symptoms are controlled with current medications.     Osteoarthritis  Pt states that he has daily stiffness in most of his joints. He has stopped the ultram as he does not wish to be on daily pain medication. Tries to remain active around his home.     Constipation  At times has constipation. Hard dry stools. Has tried miralax otc with good results.     Reports no complication from sleep apnea.     The following portions of the patient's history were reviewed and updated as appropriate: allergies, current medications, past family history, past medical history, past social history, past surgical history and problem list.    Review of Systems   Constitutional: Negative for activity change, appetite change, chills, fatigue and fever.   HENT: Negative for ear pain, facial swelling, hearing loss, sinus pressure, sore throat, trouble swallowing and voice change.    Eyes: Negative for pain, discharge and visual disturbance.   Respiratory: Negative for apnea, cough, chest tightness, shortness of breath and wheezing.    Cardiovascular: Negative for chest pain, palpitations and leg swelling.   Gastrointestinal: Positive for constipation. Negative for abdominal pain, blood in stool, diarrhea, nausea and vomiting.   Endocrine: Negative.    Genitourinary: Negative for dysuria.   Musculoskeletal: Positive for arthralgias, back pain and myalgias. Negative for  neck stiffness.   Skin: Negative for color change and rash.   Allergic/Immunologic: Negative.    Neurological: Negative for headaches.   Hematological: Negative.    Psychiatric/Behavioral: Negative for confusion and suicidal ideas. The patient is not nervous/anxious.    All other systems reviewed and are negative.      Objective   Physical Exam   Constitutional: He is oriented to person, place, and time. He appears well-developed and well-nourished. No distress.   HENT:   Head: Normocephalic.   Right Ear: Hearing and external ear normal. There is drainage.   Left Ear: Hearing, tympanic membrane and external ear normal. There is drainage.   Nose: Nose normal.   Mouth/Throat: Oropharynx is clear and moist.   Bilateral ear canals impacted with cerumen, removed via provider with flexi-loop. Honey colored cerumen removed, tolerated well.      Eyes: Pupils are equal, round, and reactive to light.   Neck: Normal range of motion. Neck supple. No tracheal deviation present. No thyromegaly present.   Cardiovascular: Normal rate, regular rhythm and normal heart sounds.  Exam reveals no gallop and no friction rub.    No murmur heard.  Pulmonary/Chest: Effort normal and breath sounds normal. No respiratory distress. He has no wheezes. He has no rales. He exhibits no tenderness.   Abdominal: Soft. Bowel sounds are normal. He exhibits no distension and no mass. There is no tenderness. There is no rebound and no guarding.   Musculoskeletal:        Right knee: Tenderness found.        Left knee: Tenderness found.        Lumbar back: He exhibits tenderness.        Right hand: He exhibits decreased range of motion and deformity.        Left hand: He exhibits decreased range of motion and deformity.   Neurological: He is alert and oriented to person, place, and time. He has normal reflexes.   CN 2-12 grossly intact    Skin: Skin is warm and dry. No rash noted. He is not diaphoretic. No erythema.   Psychiatric: He has a normal mood and  affect. His speech is normal and behavior is normal. Judgment and thought content normal. Cognition and memory are normal.   Vitals reviewed.      Assessment/Plan      Diagnoses and all orders for this visit:    Uncontrolled type 2 diabetes mellitus without complication, without long-term current use of insulin  -     gabapentin (NEURONTIN) 100 MG capsule; Take 1 capsule by mouth 2 (Two) Times a Day.  -     Lancets (ONETOUCH ULTRASOFT) lancets; Use as instructed  -     metFORMIN (GLUCOPHAGE) 500 MG tablet; Take 1 tablet by mouth 2 (Two) Times a Day.  -     CBC & Differential; Future  -     Comprehensive Metabolic Panel; Future  -     TSH; Future  -     Hemoglobin A1c; Future  -     Lipid Panel; Future    Primary osteoarthritis involving multiple joints  -     Discontinue: traMADol (ULTRAM) 50 MG tablet; Take 1 tablet by mouth Every 6 (Six) Hours As Needed for Moderate Pain (4-6).    Essential hypertension  -     aspirin 325 MG tablet; Take 1 tablet by mouth Daily.  -     carvedilol (COREG) 12.5 MG tablet; Take 1 tablet by mouth 2 (Two) Times a Day.  -     isosorbide mononitrate (IMDUR) 30 MG 24 hr tablet; Take 1 tablet by mouth 2 (Two) Times a Day.  -     MicroAlbumin, Urine, Random; Future    Coronary artery disease involving native coronary artery of native heart without angina pectoris    Osteoarthritis involving multiple joints on both sides of body    Obstructive sleep apnea syndrome    Vitamin D deficiency disease  -     vitamin D (ERGOCALCIFEROL) 75623 UNITS capsule capsule; Take 1 capsule by mouth Every 7 (Seven) Days.  -     Vitamin D 25 Hydroxy; Future    Generalized anxiety disorder  -     amitriptyline (ELAVIL) 50 MG tablet; Take 1 tablet by mouth Daily.  -     citalopram (CeleXA) 40 MG tablet; Take 1 tablet by mouth Daily.    Benign non-nodular prostatic hyperplasia without lower urinary tract symptoms  -     finasteride (PROSCAR) 5 MG tablet; Take 1 tablet by mouth Daily.    Abnormal thyroid blood  test  -     isosorbide mononitrate (IMDUR) 30 MG 24 hr tablet; Take 1 tablet by mouth 2 (Two) Times a Day.    Excessive cerumen in both ear canals    Preventive measure  -     Tdap Vaccine Greater Than or Equal To 6yo IM    Vitamin B 12 deficiency  -     Vitamin B12; Future    Other orders  -     polyethylene glycol (MIRALAX) packet; Take 17 g by mouth Daily.  -     pantoprazole (PROTONIX) 40 MG EC tablet; Take 1 tablet by mouth Daily.    Bilateral ear canals impacted with cerumen, removed via provider with flexi-loop. Honey colored cerumen removed, tolerated well.   Labs have been reviewed and discussed.   Pt had a b12 the day his most recent labs had been drawn. He continues to them monthly which helps with his energy level.  Discussed compliance with medication, risks of uncontrolled blood pressure and encouraged random in office blood pressure checks. Continue under the care of cardiology.  I have discussed diagnosis in detail today allowing time for questions and answers. Pt is aware of reasons to seek urgent or emergent medical care as well as reasons to return to the clinic for evaluation. Possible side effects, interactions and progression of symptoms discussed as well. Pt / family states understanding.   Stop ultram. Will continue neurontin as this has been helpful with his foot and leg pain. Written on green script today as will soon be controlled medication.  Follow up in 3 months, sooner if needed. Fasting labs one week prior then at least q 6 months.

## 2017-06-06 RX ORDER — CYANOCOBALAMIN 1000 UG/ML
INJECTION, SOLUTION INTRAMUSCULAR; SUBCUTANEOUS
Qty: 1 ML | Refills: 5 | Status: SHIPPED | OUTPATIENT
Start: 2017-06-06 | End: 2017-07-19 | Stop reason: SDUPTHER

## 2017-07-11 ENCOUNTER — LAB (OUTPATIENT)
Dept: FAMILY MEDICINE CLINIC | Facility: CLINIC | Age: 57
End: 2017-07-11

## 2017-07-11 DIAGNOSIS — M15.9 OSTEOARTHRITIS INVOLVING MULTIPLE JOINTS ON BOTH SIDES OF BODY: ICD-10-CM

## 2017-07-11 DIAGNOSIS — IMO0001 UNCONTROLLED TYPE 2 DIABETES MELLITUS WITHOUT COMPLICATION, WITHOUT LONG-TERM CURRENT USE OF INSULIN: ICD-10-CM

## 2017-07-11 DIAGNOSIS — E55.9 VITAMIN D DEFICIENCY: ICD-10-CM

## 2017-07-11 DIAGNOSIS — I10 ESSENTIAL HYPERTENSION: ICD-10-CM

## 2017-07-11 DIAGNOSIS — F41.1 GENERALIZED ANXIETY DISORDER: ICD-10-CM

## 2017-07-11 DIAGNOSIS — G47.33 OBSTRUCTIVE SLEEP APNEA SYNDROME: ICD-10-CM

## 2017-07-11 DIAGNOSIS — F32.9 REACTIVE DEPRESSION: ICD-10-CM

## 2017-07-11 DIAGNOSIS — E55.9 VITAMIN D DEFICIENCY DISEASE: ICD-10-CM

## 2017-07-11 DIAGNOSIS — I25.10 CORONARY ARTERY DISEASE INVOLVING NATIVE CORONARY ARTERY OF NATIVE HEART WITHOUT ANGINA PECTORIS: ICD-10-CM

## 2017-07-11 DIAGNOSIS — G47.34 OXYGEN DESATURATION DURING SLEEP: ICD-10-CM

## 2017-07-11 DIAGNOSIS — E53.8 VITAMIN B 12 DEFICIENCY: ICD-10-CM

## 2017-07-11 DIAGNOSIS — G47.34 IDIOPATHIC SLEEP RELATED NONOBSTRUCTIVE ALVEOLAR HYPOVENTILATION: ICD-10-CM

## 2017-07-11 LAB
25(OH)D3 SERPL-MCNC: 88 NG/ML
ALBUMIN SERPL-MCNC: 4.5 G/DL (ref 3.5–5)
ALBUMIN UR-MCNC: 19 MG/L
ALBUMIN/GLOB SERPL: 1.5 G/DL (ref 1.5–2.5)
ALP SERPL-CCNC: 113 U/L (ref 40–129)
ALT SERPL W P-5'-P-CCNC: 28 U/L (ref 10–44)
ANION GAP SERPL CALCULATED.3IONS-SCNC: 8.6 MMOL/L (ref 3.6–11.2)
AST SERPL-CCNC: 20 U/L (ref 10–34)
BILIRUB SERPL-MCNC: 0.6 MG/DL (ref 0.2–1.8)
BUN BLD-MCNC: 13 MG/DL (ref 7–21)
BUN/CREAT SERPL: 12.9 (ref 7–25)
CALCIUM SPEC-SCNC: 9.7 MG/DL (ref 7.7–10)
CHLORIDE SERPL-SCNC: 102 MMOL/L (ref 99–112)
CHOLEST SERPL-MCNC: 170 MG/DL (ref 0–200)
CO2 SERPL-SCNC: 28.4 MMOL/L (ref 24.3–31.9)
CREAT BLD-MCNC: 1.01 MG/DL (ref 0.43–1.29)
GFR SERPL CREATININE-BSD FRML MDRD: 77 ML/MIN/1.73
GLOBULIN UR ELPH-MCNC: 3.1 GM/DL
GLUCOSE BLD-MCNC: 122 MG/DL (ref 70–110)
HBA1C MFR BLD: 7.2 % (ref 4.5–5.7)
HDLC SERPL-MCNC: 45 MG/DL (ref 60–100)
LDLC SERPL CALC-MCNC: 92 MG/DL (ref 0–100)
LDLC/HDLC SERPL: 2.05 {RATIO}
OSMOLALITY SERPL CALC.SUM OF ELEC: 279 MOSM/KG (ref 273–305)
POTASSIUM BLD-SCNC: 4 MMOL/L (ref 3.5–5.3)
PROT SERPL-MCNC: 7.6 G/DL (ref 6–8)
SODIUM BLD-SCNC: 139 MMOL/L (ref 135–153)
TRIGL SERPL-MCNC: 163 MG/DL (ref 0–150)
TSH SERPL DL<=0.05 MIU/L-ACNC: 5.74 MIU/ML (ref 0.55–4.78)
VIT B12 BLD-MCNC: 376 PG/ML (ref 211–911)
VLDLC SERPL-MCNC: 32.6 MG/DL

## 2017-07-11 PROCEDURE — 80061 LIPID PANEL: CPT | Performed by: NURSE PRACTITIONER

## 2017-07-11 PROCEDURE — 83036 HEMOGLOBIN GLYCOSYLATED A1C: CPT | Performed by: NURSE PRACTITIONER

## 2017-07-11 PROCEDURE — 82607 VITAMIN B-12: CPT | Performed by: NURSE PRACTITIONER

## 2017-07-11 PROCEDURE — 84443 ASSAY THYROID STIM HORMONE: CPT | Performed by: NURSE PRACTITIONER

## 2017-07-11 PROCEDURE — 82306 VITAMIN D 25 HYDROXY: CPT | Performed by: NURSE PRACTITIONER

## 2017-07-11 PROCEDURE — 80053 COMPREHEN METABOLIC PANEL: CPT | Performed by: NURSE PRACTITIONER

## 2017-07-11 PROCEDURE — 82043 UR ALBUMIN QUANTITATIVE: CPT | Performed by: NURSE PRACTITIONER

## 2017-07-11 PROCEDURE — 36415 COLL VENOUS BLD VENIPUNCTURE: CPT | Performed by: NURSE PRACTITIONER

## 2017-07-19 ENCOUNTER — OFFICE VISIT (OUTPATIENT)
Dept: FAMILY MEDICINE CLINIC | Facility: CLINIC | Age: 57
End: 2017-07-19

## 2017-07-19 VITALS
HEART RATE: 83 BPM | WEIGHT: 220 LBS | HEIGHT: 70 IN | TEMPERATURE: 97.4 F | DIASTOLIC BLOOD PRESSURE: 90 MMHG | SYSTOLIC BLOOD PRESSURE: 120 MMHG | BODY MASS INDEX: 31.5 KG/M2 | OXYGEN SATURATION: 97 %

## 2017-07-19 DIAGNOSIS — N40.0 BENIGN NON-NODULAR PROSTATIC HYPERPLASIA WITHOUT LOWER URINARY TRACT SYMPTOMS: ICD-10-CM

## 2017-07-19 DIAGNOSIS — I25.10 CORONARY ARTERY DISEASE INVOLVING NATIVE CORONARY ARTERY OF NATIVE HEART WITHOUT ANGINA PECTORIS: Primary | ICD-10-CM

## 2017-07-19 DIAGNOSIS — E55.9 VITAMIN D DEFICIENCY: ICD-10-CM

## 2017-07-19 DIAGNOSIS — G47.33 OBSTRUCTIVE SLEEP APNEA SYNDROME: ICD-10-CM

## 2017-07-19 DIAGNOSIS — IMO0001 UNCONTROLLED TYPE 2 DIABETES MELLITUS WITHOUT COMPLICATION, WITHOUT LONG-TERM CURRENT USE OF INSULIN: ICD-10-CM

## 2017-07-19 DIAGNOSIS — F41.1 GENERALIZED ANXIETY DISORDER: ICD-10-CM

## 2017-07-19 DIAGNOSIS — I11.9: ICD-10-CM

## 2017-07-19 DIAGNOSIS — E08.59 DIABETES MELLITUS DUE TO UNDERLYING CONDITION WITH OTHER CIRCULATORY COMPLICATION, WITHOUT LONG-TERM CURRENT USE OF INSULIN (HCC): ICD-10-CM

## 2017-07-19 DIAGNOSIS — E53.8 VITAMIN B 12 DEFICIENCY: ICD-10-CM

## 2017-07-19 DIAGNOSIS — E55.9 VITAMIN D DEFICIENCY DISEASE: ICD-10-CM

## 2017-07-19 DIAGNOSIS — I10 ESSENTIAL HYPERTENSION: ICD-10-CM

## 2017-07-19 DIAGNOSIS — R79.89 ABNORMAL THYROID BLOOD TEST: ICD-10-CM

## 2017-07-19 DIAGNOSIS — H61.23 EXCESSIVE CERUMEN IN BOTH EAR CANALS: ICD-10-CM

## 2017-07-19 DIAGNOSIS — M15.9 OSTEOARTHRITIS INVOLVING MULTIPLE JOINTS ON BOTH SIDES OF BODY: ICD-10-CM

## 2017-07-19 DIAGNOSIS — F32.A DEPRESSION, UNSPECIFIED DEPRESSION TYPE: ICD-10-CM

## 2017-07-19 PROCEDURE — 99214 OFFICE O/P EST MOD 30 MIN: CPT | Performed by: NURSE PRACTITIONER

## 2017-07-19 PROCEDURE — 69210 REMOVE IMPACTED EAR WAX UNI: CPT | Performed by: NURSE PRACTITIONER

## 2017-07-19 RX ORDER — PANTOPRAZOLE SODIUM 40 MG/1
40 TABLET, DELAYED RELEASE ORAL DAILY
Qty: 30 TABLET | Refills: 5 | Status: SHIPPED | OUTPATIENT
Start: 2017-07-19 | End: 2018-01-29 | Stop reason: SDUPTHER

## 2017-07-19 RX ORDER — CARVEDILOL 12.5 MG/1
12.5 TABLET ORAL 2 TIMES DAILY
Qty: 60 TABLET | Refills: 5 | Status: SHIPPED | OUTPATIENT
Start: 2017-07-19 | End: 2018-01-29 | Stop reason: SDUPTHER

## 2017-07-19 RX ORDER — BLOOD-GLUCOSE METER
1 KIT MISCELLANEOUS 3 TIMES DAILY PRN
Qty: 1 EACH | Refills: 0 | Status: SHIPPED | OUTPATIENT
Start: 2017-07-19

## 2017-07-19 RX ORDER — RANITIDINE 150 MG/1
150 TABLET ORAL 2 TIMES DAILY
Qty: 60 TABLET | Refills: 5 | Status: SHIPPED | OUTPATIENT
Start: 2017-07-19 | End: 2018-01-29 | Stop reason: SDUPTHER

## 2017-07-19 RX ORDER — ASPIRIN 325 MG
325 TABLET ORAL DAILY
Qty: 30 TABLET | Refills: 5 | Status: SHIPPED | OUTPATIENT
Start: 2017-07-19 | End: 2018-01-29 | Stop reason: SDUPTHER

## 2017-07-19 RX ORDER — GABAPENTIN 100 MG/1
100 CAPSULE ORAL 2 TIMES DAILY
Qty: 60 CAPSULE | Refills: 2
Start: 2017-07-19 | End: 2017-07-19 | Stop reason: SDUPTHER

## 2017-07-19 RX ORDER — ERGOCALCIFEROL 1.25 MG/1
50000 CAPSULE ORAL
Qty: 4 CAPSULE | Refills: 5 | Status: SHIPPED | OUTPATIENT
Start: 2017-07-19 | End: 2018-01-29 | Stop reason: SDUPTHER

## 2017-07-19 RX ORDER — CITALOPRAM 40 MG/1
40 TABLET ORAL DAILY
Qty: 30 TABLET | Refills: 5 | Status: SHIPPED | OUTPATIENT
Start: 2017-07-19 | End: 2018-01-29 | Stop reason: SDUPTHER

## 2017-07-19 RX ORDER — POLYETHYLENE GLYCOL 3350 17 G/17G
17 POWDER, FOR SOLUTION ORAL DAILY
Qty: 1 EACH | Refills: 5 | Status: SHIPPED | OUTPATIENT
Start: 2017-07-19 | End: 2018-04-10 | Stop reason: SDUPTHER

## 2017-07-19 RX ORDER — SIMVASTATIN 40 MG
40 TABLET ORAL NIGHTLY
Qty: 30 TABLET | Refills: 5 | Status: SHIPPED | OUTPATIENT
Start: 2017-07-19 | End: 2018-01-29 | Stop reason: SDUPTHER

## 2017-07-19 RX ORDER — CYANOCOBALAMIN 1000 UG/ML
1000 INJECTION, SOLUTION INTRAMUSCULAR; SUBCUTANEOUS
Qty: 1 ML | Refills: 11 | Status: SHIPPED | OUTPATIENT
Start: 2017-07-19 | End: 2018-01-29 | Stop reason: SDUPTHER

## 2017-07-19 RX ORDER — AMITRIPTYLINE HYDROCHLORIDE 50 MG/1
50 TABLET, FILM COATED ORAL DAILY
Qty: 30 TABLET | Refills: 5 | Status: SHIPPED | OUTPATIENT
Start: 2017-07-19 | End: 2018-01-29 | Stop reason: SDUPTHER

## 2017-07-19 RX ORDER — OMEGA-3 FATTY ACIDS/FISH OIL 300-1000MG
CAPSULE ORAL
Qty: 120 EACH | Refills: 5 | Status: SHIPPED | OUTPATIENT
Start: 2017-07-19 | End: 2018-01-29 | Stop reason: SDUPTHER

## 2017-07-19 RX ORDER — FINASTERIDE 5 MG/1
5 TABLET, FILM COATED ORAL DAILY
Qty: 30 TABLET | Refills: 5 | Status: SHIPPED | OUTPATIENT
Start: 2017-07-19 | End: 2018-01-29 | Stop reason: SDUPTHER

## 2017-07-19 RX ORDER — ISOSORBIDE MONONITRATE 30 MG/1
30 TABLET, EXTENDED RELEASE ORAL 2 TIMES DAILY
Qty: 60 TABLET | Refills: 5 | Status: SHIPPED | OUTPATIENT
Start: 2017-07-19 | End: 2018-01-29 | Stop reason: SDUPTHER

## 2017-07-19 RX ORDER — GABAPENTIN 100 MG/1
100 CAPSULE ORAL 2 TIMES DAILY
Qty: 60 CAPSULE | Refills: 2 | Status: SHIPPED | OUTPATIENT
Start: 2017-07-19 | End: 2017-10-18 | Stop reason: SDUPTHER

## 2017-07-19 NOTE — PROGRESS NOTES
Subjective   Damaso Leonard is a 56 y.o. male.     Chief Complaint   Patient presents with   • Depression   • Hypertension   • Hyperlipidemia   • Heartburn   • Coronary Artery Disease   • Diabetes       History of Present Illness     Diabetes with circulatory problems - patient reports compliance with diet and medication.  He does monitor his blood sugar routinely with ranges in acceptable levels.  He does monitor his diet.  Denies any hypoglycemic or hyperglycemic episodes.  He has a diabetic foot and leg pain which is currently controlled with Neurontin.    Coronary artery disease - patient has been under the care of [facility] cardiology.  His cardiologist has moved away and he has not scheduled a follow-up.  Denies any exacerbation or new onset of symptoms.  Continues to be compliant with his cardiac maintenance.  He has been given twice this morning and to follow-up with a Dr. Jack who was in the office with his former cardiologist or with any cardiologist of his choice.    B12 deficiency - monthly B12 injections at home provided by family.  He reports she is helping with his fatigue.    Hypertension with circulatory complications - monitors his blood pressure randomly.  Denies any recent elevations.    GERD - symptoms are controlled with Protonix.     Hyperlipidemia - recent lab assessment on file to review today.  Patient is taking Zocor 40 mg nightly.  He is not currently on omega-3.    BPH - patient has been seen by urology for BPH.  He states that his symptoms are currently controlled.    Osteoarthritis - chronic joint pain in multiple sites.  Neck, low back, hips, knees and upper extremity pain.  Described as stiffness and aching.  Patient states that the Neurontin he is taking for his diabetic foot and leg pain is also helpful with his other pain.      The following portions of the patient's history were reviewed and updated as appropriate: allergies, current medications, past family history, past medical  "history, past social history, past surgical history and problem list.    Review of Systems   Constitutional: Negative for activity change, appetite change, chills, fever and unexpected weight change.   HENT: Positive for ear discharge. Negative for drooling, facial swelling, mouth sores, rhinorrhea, sinus pressure, sore throat, trouble swallowing and voice change.    Eyes: Positive for visual disturbance (Wears glasses, has routine eye exams).   Respiratory: Positive for apnea. Negative for cough, chest tightness, shortness of breath and wheezing.         Patient does have oxygen at home for when necessary use.  He wears at night due to apnea   Cardiovascular: Negative for chest pain, palpitations and leg swelling.   Gastrointestinal: Negative for abdominal pain, blood in stool, constipation, diarrhea, nausea and vomiting.   Endocrine: Negative.    Genitourinary: Negative for difficulty urinating, dysuria and urgency.   Musculoskeletal: Positive for arthralgias, back pain, myalgias and neck pain. Negative for neck stiffness.   Skin: Negative for color change.   Allergic/Immunologic: Negative.    Neurological: Negative.    Hematological: Negative.    Psychiatric/Behavioral: Negative for agitation, behavioral problems, dysphoric mood (Depression is controlled with current medication), self-injury, sleep disturbance and suicidal ideas. The patient is not nervous/anxious.    All other systems reviewed and are negative.      Objective     /90 (BP Location: Right arm, Patient Position: Sitting, Cuff Size: Adult)  Pulse 83  Temp 97.4 °F (36.3 °C) (Tympanic)   Ht 70\" (177.8 cm)  Wt 220 lb (99.8 kg)  SpO2 97%  BMI 31.57 kg/m2  Orders Only on 07/11/2017   Component Date Value Ref Range Status   • Glucose 07/11/2017 122* 70 - 110 mg/dL Final   • BUN 07/11/2017 13  7 - 21 mg/dL Final   • Creatinine 07/11/2017 1.01  0.43 - 1.29 mg/dL Final   • Sodium 07/11/2017 139  135 - 153 mmol/L Final   • Potassium 07/11/2017 4.0 "  3.5 - 5.3 mmol/L Final   • Chloride 07/11/2017 102  99 - 112 mmol/L Final   • CO2 07/11/2017 28.4  24.3 - 31.9 mmol/L Final   • Calcium 07/11/2017 9.7  7.7 - 10.0 mg/dL Final   • Total Protein 07/11/2017 7.6  6.0 - 8.0 g/dL Final   • Albumin 07/11/2017 4.50  3.50 - 5.00 g/dL Final   • ALT (SGPT) 07/11/2017 28  10 - 44 U/L Final   • AST (SGOT) 07/11/2017 20  10 - 34 U/L Final   • Alkaline Phosphatase 07/11/2017 113  40 - 129 U/L Final   • Total Bilirubin 07/11/2017 0.6  0.2 - 1.8 mg/dL Final   • eGFR Non African Amer 07/11/2017 77  >60 mL/min/1.73 Final   • Globulin 07/11/2017 3.1  gm/dL Final   • A/G Ratio 07/11/2017 1.5  1.5 - 2.5 g/dL Final   • BUN/Creatinine Ratio 07/11/2017 12.9  7.0 - 25.0 Final   • Anion Gap 07/11/2017 8.6  3.6 - 11.2 mmol/L Final   • TSH 07/11/2017 5.736* 0.550 - 4.780 mIU/mL Final   • Hemoglobin A1C 07/11/2017 7.20* 4.50 - 5.70 % Final   • 25 Hydroxy, Vitamin D 07/11/2017 88.0  ng/ml Final   • Total Cholesterol 07/11/2017 170  0 - 200 mg/dL Final   • Triglycerides 07/11/2017 163* 0 - 150 mg/dL Final   • HDL Cholesterol 07/11/2017 45* 60 - 100 mg/dL Final   • LDL Cholesterol  07/11/2017 92  0 - 100 mg/dL Final   • VLDL Cholesterol 07/11/2017 32.6  mg/dL Final   • LDL/HDL Ratio 07/11/2017 2.05   Final   • Microalbumin, Urine 07/11/2017 19.0  mg/L Final   • Vitamin B-12 07/11/2017 376  211 - 911 pg/mL Final   • Osmolality Calc 07/11/2017 279.0  273.0 - 305.0 mOsm/kg Final       Physical Exam   Constitutional: He is oriented to person, place, and time. He appears well-developed and well-nourished. No distress.   HENT:   Head: Normocephalic.   Right Ear: Hearing and external ear normal. There is drainage.   Left Ear: Hearing, tympanic membrane and external ear normal. There is drainage.   Nose: Nose normal.   Mouth/Throat: Oropharynx is clear and moist.   Bilateral ear canals impacted with cerumen, removed via provider with flexi-loop. Honey colored cerumen removed, tolerated well.      Eyes:  Pupils are equal, round, and reactive to light.   Neck: Normal range of motion. Neck supple. No tracheal deviation present. No thyromegaly present.   Cardiovascular: Normal rate, regular rhythm and normal heart sounds.  Exam reveals no gallop and no friction rub.    No murmur heard.  Pulmonary/Chest: Effort normal and breath sounds normal. No respiratory distress. He has no wheezes. He has no rales. He exhibits no tenderness.   Abdominal: Soft. Bowel sounds are normal. He exhibits no distension and no mass. There is no tenderness. There is no rebound and no guarding.   Musculoskeletal:        Right knee: Tenderness found.        Left knee: Tenderness found.        Cervical back: He exhibits spasm.        Lumbar back: He exhibits tenderness.        Right hand: He exhibits decreased range of motion and deformity.        Left hand: He exhibits decreased range of motion and deformity.   Neurological: He is alert and oriented to person, place, and time. He has normal reflexes.   CN 2-12 grossly intact    Skin: Skin is warm and dry. No rash noted. He is not diaphoretic. No erythema.   Psychiatric: He has a normal mood and affect. His speech is normal and behavior is normal. Judgment and thought content normal. Cognition and memory are normal.   Vitals reviewed.      Assessment/Plan     Problem List Items Addressed This Visit        Cardiovascular and Mediastinum    Malignant hypertension with heart disease, without congestive heart failure    Relevant Medications    isosorbide mononitrate (IMDUR) 30 MG 24 hr tablet    carvedilol (COREG) 12.5 MG tablet    aspirin 325 MG tablet    Coronary artery disease involving native coronary artery of native heart - Primary    Relevant Medications    isosorbide mononitrate (IMDUR) 30 MG 24 hr tablet    carvedilol (COREG) 12.5 MG tablet       Digestive    Vitamin D deficiency    Vitamin B 12 deficiency       Nervous and Auditory    Excessive cerumen in both ear canals       Other     Generalized anxiety disorder    Relevant Medications    amitriptyline (ELAVIL) 50 MG tablet    citalopram (CeleXA) 40 MG tablet    Depression    Relevant Medications    amitriptyline (ELAVIL) 50 MG tablet    citalopram (CeleXA) 40 MG tablet    Diabetes mellitus with circulatory complication, without long-term current use of insulin    Relevant Medications    metFORMIN (GLUCOPHAGE) 500 MG tablet    gabapentin (NEURONTIN) 100 MG capsule    glucose blood test strip    glucose monitor monitoring kit    simvastatin (ZOCOR) 40 MG tablet    Sleep apnea    Osteoarthritis involving multiple joints on both sides of body      Other Visit Diagnoses     Abnormal thyroid blood test        Relevant Medications    isosorbide mononitrate (IMDUR) 30 MG 24 hr tablet    Benign non-nodular prostatic hyperplasia without lower urinary tract symptoms        Relevant Medications    finasteride (PROSCAR) 5 MG tablet    Vitamin D deficiency disease        Relevant Medications    vitamin D (ERGOCALCIFEROL) 17771 UNITS capsule capsule        Most recent lab results have been reviewed and discussed.  We will add omega 3.  Continue Zocor.  Decrease fat in diet.  Refill routine medications  Cerumen has been removed bilateral ear canals by provider with a flexible loop instrument.  Tolerated well.  I have requested that our referral clerk call and schedule patient for follow-up with his cardiology office for routine follow-up.  He was seeing Dr. Drake prior to him leaving [facility] and just has not made a follow-up appointment.  We will continue Neurontin at this time for his diabetic foot pain and leg pain.  A UDS will be obtained today.  Danny and UDS are on file.  I have discussed diagnosis in detail today allowing time for questions and answers. Pt is aware of reasons to seek urgent or emergent medical care as well as reasons to return to the clinic for evaluation. Possible side effects, interactions and progression of symptoms discussed as  well. Pt / family states understanding.   Goal: Improved quality of life and reduction in pain as evidenced by pt report.   Follow-up in 3 months, sooner if needed.         This document has been electronically signed by:  YOUNG Conway, NP-C

## 2017-07-24 DIAGNOSIS — I25.10 CORONARY ARTERY DISEASE INVOLVING NATIVE CORONARY ARTERY OF NATIVE HEART WITHOUT ANGINA PECTORIS: ICD-10-CM

## 2017-07-24 DIAGNOSIS — I11.9: Primary | ICD-10-CM

## 2017-08-28 ENCOUNTER — OFFICE VISIT (OUTPATIENT)
Dept: CARDIOLOGY | Facility: CLINIC | Age: 57
End: 2017-08-28

## 2017-08-28 VITALS
HEART RATE: 87 BPM | OXYGEN SATURATION: 96 % | WEIGHT: 224.2 LBS | HEIGHT: 70 IN | DIASTOLIC BLOOD PRESSURE: 84 MMHG | BODY MASS INDEX: 32.1 KG/M2 | SYSTOLIC BLOOD PRESSURE: 122 MMHG

## 2017-08-28 DIAGNOSIS — I11.9: ICD-10-CM

## 2017-08-28 DIAGNOSIS — I20.9 ANGINAL PAIN (HCC): Primary | ICD-10-CM

## 2017-08-28 DIAGNOSIS — E08.59 DIABETES MELLITUS DUE TO UNDERLYING CONDITION WITH OTHER CIRCULATORY COMPLICATION, WITHOUT LONG-TERM CURRENT USE OF INSULIN (HCC): ICD-10-CM

## 2017-08-28 DIAGNOSIS — F41.1 GENERALIZED ANXIETY DISORDER: ICD-10-CM

## 2017-08-28 DIAGNOSIS — I25.10 CORONARY ARTERY DISEASE INVOLVING NATIVE CORONARY ARTERY OF NATIVE HEART WITHOUT ANGINA PECTORIS: ICD-10-CM

## 2017-08-28 PROBLEM — E78.00 HYPERCHOLESTEROLEMIA: Status: ACTIVE | Noted: 2017-08-28

## 2017-08-28 PROCEDURE — 99213 OFFICE O/P EST LOW 20 MIN: CPT | Performed by: NURSE PRACTITIONER

## 2017-08-28 NOTE — PROGRESS NOTES
"Subjective     Chief Complaint: Follow-up; Coronary Artery Disease; and Hypertension    History of Present Illness      Damaso Leonard is a 56 y.o. male who presents for follow up for CAD S/P stenting in 2006 and 2010 at [facility], hypertension, hyperlipidemia.  He also has Type 2 diabetes and generalized anxiety disorder.      He presents today with complaint of chest discomfort/chest pain which occurs \"about every other day\" now.  He reports a substernal sharp pain that lasts just a few seconds or up to 5 minutes.  It has radiated to his back; but has not done so for about two months.  The pain comes with and without exertion.  He states that the chest discomfort worsens because of the heat.   He rests and the pain resolves.  He reports that he has experienced palpitations with the chest discomfort, but does not always have the palpitations.  He reports that he gets dizzy sometimes when he raises up fast.  It does not happen daily.  He has not had any syncopal episodes.        See ROS    Current Outpatient Prescriptions:   •  amitriptyline (ELAVIL) 50 MG tablet, Take 1 tablet by mouth Daily., Disp: 30 tablet, Rfl: 5  •  aspirin 325 MG tablet, Take 1 tablet by mouth Daily., Disp: 30 tablet, Rfl: 5  •  carvedilol (COREG) 12.5 MG tablet, Take 1 tablet by mouth 2 (Two) Times a Day., Disp: 60 tablet, Rfl: 5  •  citalopram (CeleXA) 40 MG tablet, Take 1 tablet by mouth Daily., Disp: 30 tablet, Rfl: 5  •  cyanocobalamin 1000 MCG/ML injection, Inject 1 mL into the shoulder, thigh, or buttocks Every 28 (Twenty-Eight) Days., Disp: 1 mL, Rfl: 11  •  finasteride (PROSCAR) 5 MG tablet, Take 1 tablet by mouth Daily., Disp: 30 tablet, Rfl: 5  •  gabapentin (NEURONTIN) 100 MG capsule, Take 1 capsule by mouth 2 (Two) Times a Day., Disp: 60 capsule, Rfl: 2  •  glucose blood test strip, Use as instructed, Disp: 100 each, Rfl: 12  •  glucose monitor monitoring kit, 1 each 3 (Three) Times a Day As Needed (diabetes)., Disp: 1 " each, Rfl: 0  •  isosorbide mononitrate (IMDUR) 30 MG 24 hr tablet, Take 1 tablet by mouth 2 (Two) Times a Day., Disp: 60 tablet, Rfl: 5  •  Lancets (ONETOUCH ULTRASOFT) lancets, Use as instructed, Disp: 100 each, Rfl: 12  •  metFORMIN (GLUCOPHAGE) 500 MG tablet, Take 1 tablet by mouth 2 (Two) Times a Day., Disp: 60 tablet, Rfl: 5  •  Omega 3 1000 MG capsule, 2 twice daily, Disp: 120 each, Rfl: 5  •  pantoprazole (PROTONIX) 40 MG EC tablet, Take 1 tablet by mouth Daily., Disp: 30 tablet, Rfl: 5  •  polyethylene glycol (MIRALAX) packet, Take 17 g by mouth Daily., Disp: 1 each, Rfl: 5  •  raNITIdine (ZANTAC) 150 MG tablet, Take 1 tablet by mouth 2 (Two) Times a Day., Disp: 60 tablet, Rfl: 5  •  simvastatin (ZOCOR) 40 MG tablet, Take 1 tablet by mouth Every Night., Disp: 30 tablet, Rfl: 5  •  vitamin D (ERGOCALCIFEROL) 57165 UNITS capsule capsule, Take 1 capsule by mouth Every 7 (Seven) Days., Disp: 4 capsule, Rfl: 5    Current Facility-Administered Medications:   •  cyanocobalamin injection 1,000 mcg, 1,000 mcg, Intramuscular, Q28 Days, YOUNG Toscano, 1,000 mcg at 01/19/17 0918     The following portions of the patient's history were reviewed and updated as appropriate: allergies, current medications, past family history, past medical history, past social history, past surgical history and problem list.    Review of Systems   Constitutional: Positive for fatigue. Negative for activity change and appetite change.   HENT: Positive for tinnitus. Negative for congestion.    Eyes: Negative for visual disturbance.   Respiratory: Positive for cough and shortness of breath. Negative for chest tightness.    Cardiovascular: Positive for chest pain. Negative for palpitations and leg swelling.   Gastrointestinal: Negative for blood in stool, nausea and vomiting.   Endocrine: Positive for heat intolerance. Negative for cold intolerance and polyuria.   Genitourinary: Negative for dysuria.   Musculoskeletal:  "Positive for myalgias and neck pain.   Skin: Negative for rash.   Neurological: Positive for light-headedness. Negative for dizziness, syncope and weakness.   Hematological: Does not bruise/bleed easily.   Psychiatric/Behavioral: Positive for sleep disturbance. Negative for confusion.       Objective      /84 (BP Location: Right arm, Patient Position: Sitting)  Pulse 87  Ht 70\" (177.8 cm)  Wt 224 lb 3.2 oz (102 kg)  SpO2 96%  BMI 32.17 kg/m2    Physical Exam   Constitutional: He appears well-developed and well-nourished.   HENT:   Head: Normocephalic and atraumatic.   Eyes: Pupils are equal, round, and reactive to light.   Neck: No JVD present.   Cardiovascular: Normal rate, regular rhythm and intact distal pulses.  Exam reveals no gallop and no friction rub.    No murmur heard.  Pulmonary/Chest: Effort normal and breath sounds normal. No respiratory distress. He has no wheezes. He has no rales.   Abdominal: Soft. He exhibits no mass. There is no tenderness. No hernia.   Skin: Skin is warm and dry.   Psychiatric: He has a normal mood and affect.       Procedures     8/9/2017  Stress Test with Myocardial Perfusion    · Low probability Lexiscan nuclear stress test  · Myocardial perfusion shows a small to moderate defect at rest that nearly resolved with stress  · Normal wall motion is noted atrest and with exercise  · Left ventricular ejection fraction is hyperdynamic (Calculated EF > 70%).    Assessment/Plan       Damaso was seen today for follow-up, coronary artery disease and hypertension.    Diagnoses and all orders for this visit:    Anginal pain    Coronary artery disease involving native coronary artery of native heart without angina pectoris        In regard to Mr Leonard's anginal pain and coronary artery disease.  His symptoms have both typical and atypical components.  Since he is mostly sedentary, I have recommended that he participate in cardiac rehab.  Perhaps we can then determine if the chest " discomfort that he describes is exercise induced or more atypical.  If he does not have anginal symptoms with exercise, he will increase his overall physical conditioning.  If he does have anginal symptoms, Dr Jack will consider cardiac cath for evaluation.     Malignant hypertension with heart disease, without congestive heart failure          His hypertension is controlled at this time.  I have discussed orthostatic hypotension with him today and recommended that he raise from a sitting position slowly to decrease these episodes.  We will continue his current medication regimen.      Diabetes mellitus due to underlying condition with other circulatory complication, without long-term current use of insulin                   Mr Leonard's most recent A1c was 7.2.  I have talked with him regarding the need for good blood sugar control and his overall heart health.  If we can get him to exercise more regularly his control may improve.      Risk Factor Modification             I have discussed weight loss with .  His BMI is currently Body mass index is 32.17 kg/(m^2)..  I have recommended that he reduce the amount of saturated fat in their diet. I have encouraged him to participate in a regular exercise program.  I have referred him to cardiac rehab, recommended water aerobics.

## 2017-09-07 ENCOUNTER — TREATMENT (OUTPATIENT)
Dept: CARDIAC REHAB | Facility: HOSPITAL | Age: 57
End: 2017-09-07

## 2017-09-07 VITALS
DIASTOLIC BLOOD PRESSURE: 88 MMHG | RESPIRATION RATE: 20 BRPM | BODY MASS INDEX: 32.13 KG/M2 | WEIGHT: 224.4 LBS | SYSTOLIC BLOOD PRESSURE: 132 MMHG | OXYGEN SATURATION: 98 % | HEIGHT: 70 IN | HEART RATE: 71 BPM

## 2017-09-07 DIAGNOSIS — I20.8 STABLE ANGINA (HCC): Primary | ICD-10-CM

## 2017-09-07 PROCEDURE — 93798 PHYS/QHP OP CAR RHAB W/ECG: CPT

## 2017-09-07 NOTE — PROGRESS NOTES
"PHQ score 25.  Pt circled that he has \"thoughts that he would be better off dead, or hurting himself\" several days out of the last 2 weeks.  When questioned, pt clarified that he does not have thoughts about hurting himself, states that he would never do so.  States that he \"sometimes feels useless.\" Mainly due to the fact that he isn't able to work any longer due to an injury he sustained, per pt.  Pt does take anti depressants/anxiety medication, Denies that symptoms are getting worse, states that the medications helped initially and that he feels like he is just maintaining at this point.  Left message with Clare Quintero's MA in regards to PHQ results, contact information for our office also left.  Will fax PHQ and score card to her office for review.  Offered to get pt an appointment with our IOP program, pt declined, stated that he would like to see if CR helps with symptoms but will consider.    "

## 2017-09-07 NOTE — PATIENT INSTRUCTIONS
Pre-Exercise Meal  Eating the right foods and drinking fluids before exercising or physical activity can give you more energy and can help your muscles recover afterward. Your pre-exercise meal depends on your personal needs. Think about the length of time you will be exercising and any health conditions you have that may guide what you can and cannot eat. The number of calories in your pre-exercise meal should reflect the amount of time between the meal and your training session or performance. The earlier your meal, the more calories it should contain. If your pre-exercise meal does not have enough calories, you may become hungry, and your performance may decline.   WHEN SHOULD I EAT MY PRE-EXERCISE MEAL?  The best time to eat a pre-exercise meal will vary depending on your personal needs and the timing of the exercise. Again, the size of the meal should reflect the amount of time before your training session or performance begins. In general, you should eat your pre-exercise meal 3-4 hours before the exercise session. Within 30-60 minutes of the session, you may want to limit what you consume to fluids (such as water or sports drinks) or easily digestible carbohydrates (such as sports gels, fruit, or fruit juice).  WHAT SHOULD MY PRE-EXERCISE MEAL INCLUDE?  Your pre-exercise meal should include:  · Carbohydrates. Carbohydrates should be the primary type of food in your meal. Some examples of carbohydrate-rich meals include:      Pasta.    A bagel or toast with peanut butter and honey.    A turkey and Swiss cheese sandwich with fruit and skim milk.    A low-fat tuna melt sandwich with a fruit cup.    Oatmeal with fruit and almonds and skim milk.  · Fluids. Drink enough fluids to keep you well hydrated. Water, sports drinks, and juice are good choices.  · Lean protein. Include moderate amounts of lean protein foods. Examples of these foods include fish, poultry (such as chicken or turkey), and lean meat (such as  pork loin).    · Foods that you are familiar with and tolerate well.  WHAT SHOULD MY PRE-EXERCISE MEAL NOT INCLUDE?  Your pre-exercise meal should not include:  · Carbonated drinks, such as soda and sparkling water. These may cause stomach discomfort.  · Foods that are high in sugar. These foods may cause your blood sugar levels to quickly rise, followed by a rapid drop to very low levels. This rapid drop to low levels can leave you feeling tired, light-headed, anxious, or irritable. As a result, it can interfere with your exercise routine or athletic performance.  · Salty foods, such as chips, crackers, or soups. These foods can increase your thirst during exercise.  · Foods that are high in fiber, such as bran products, dried beans or legumes, and vegetables such as onions, cauliflower, or cabbage. These types of foods can cause bloating or gas.  · Foods that are high in fat, including fried foods. Because you cannot digest fat as quickly, it may cause bloating and stomach discomfort.  · Caffeinated drinks, if caffeine affects you negatively. For some people, caffeine can cause nausea and anxiousness. Drinking a large amount of caffeine can lead to dehydration, because it causes your body to lose water.     This information is not intended to replace advice given to you by your health care provider. Make sure you discuss any questions you have with your health care provider.     Document Released: 12/18/2006 Document Revised: 01/08/2016 Document Reviewed: 05/19/2015  [employer] Interactive Patient Education ©2017 [employer]    Diabetes and Exercise  Diabetes mellitus is a common, chronic disease in which a person's blood sugar (glucose) levels are often too high. Getting exercise on a regular basis is an important part of diabetes treatment.  WHY SHOULD I EXERCISE REGULARLY IF I HAVE DIABETES?  Exercising regularly provides many benefits for people who have diabetes. Some of these benefits include:  · Lowering  your blood glucose level.  · Maintaining a healthy body weight and structure.  · Reducing your risk of heart disease by:    Lowering your LDL cholesterol levels. LDL is sometimes called bad cholesterol.    Lowering your triglyceride levels.    Raising your HDL cholesterol levels. HDL is sometimes called good cholesterol.    Decreasing your blood pressure.  · Lowering your hemoglobin A1C levels. A1C is a blood test that determines your average blood glucose level over a 3-month period. Doing resistance exercises on a regular basis can help to lower these levels.  · Improving your body's ability to use insulin and glucose.  WHAT TYPES OF EXERCISE ARE RECOMMENDED FOR PEOPLE WHO HAVE DIABETES?  A combination of resistance exercises and aerobic exercise is recommended for people who have diabetes. Resistance exercises are those that you do with free weights or weight machines. Aerobic exercise includes any exercise that raises your heart rate and breathing rate for a sustained amount of time. This can include activities such as:  · Brisk walking.  · Water or dry-land aerobics.  · Bicycling or riding a stationary bike.  · Jogging.  · Dancing.  · Hiking.  · Moderate to vigorous gardening.  Ask your health care provider what types of exercise are safe for you.  HOW LONG AND HOW OFTEN SHOULD I EXERCISE?  The American Diabetes Association and the [place] Department of Health recommend the following:  · Children who have diabetes or are at risk of developing diabetes (have prediabetes) should get 1 hour or more of exercise every day.  · Adults who have diabetes should exercise with moderate intensity for 150 minutes or more per week. Moderate-intensity exercise is any exercise that raises your heart rate to 50-70% of your maximum heart rate. Ask your health care provider how to calculate this.  · Adults who have diabetes should spread exercise over at least 3 days per week, but they should not go more than 2 days in a row without  exercising.  · Adults who have diabetes should do resistance exercise at least 2 days per week.  · Adults who have diabetes should avoid sitting for more than 90 minutes at a time.  WHAT SHOULD I DO BEFORE I START AN EXERCISE PROGRAM?  · Talk with your health care provider before you start a new exercise program. If you have, or are at risk for, certain medical conditions, you may need to have a physical exam and testing. Testing may include:    A chest X-ray.    An electrocardiogram (ECG). This test checks the electrical functioning of your heart.    Blood tests.  · If you have type 1 diabetes, talk with your health care provider about managing your blood glucose levels with insulin before, during, and after exercise.  · Understand that exercise affects blood glucose levels differently depending on how long you exercise and depending on other factors such as whether you are sick. Be ready to treat high blood glucose (hyperglycemia) and low blood glucose (hypoglycemia) right away if either occurs during or after exercise.  · Talk with your health care provider about diabetes-related problems that can occur during exercise. Your health care provider can help you to decide on an exercise plan that allows you to avoid these problems. This plan might include instructions about choosing the proper footwear for exercise and staying well hydrated during and after exercise.  HOW CAN I MANAGE MY BLOOD GLUCOSE LEVEL WHILE I EXERCISE?  · Eat 1-3 hours before you exercise.  · Check your blood glucose level immediately before and after exercise.  · Do not start exercising if:    Your blood glucose is more than 250 mg/dL and you do not feel well.    You have ketones in your urine.    Your blood glucose is less than 100 mg/dL.  · If you do not feel well while exercising, take a break and check your blood glucose.  · Stop exercising if you find that your blood glucose has dropped below 100 mg/dL. If this occurs, do the following:     Eat a 15-gram to 20-gram carbohydrate-rich snack.    Recheck your blood glucose level.    If your blood glucose level does not rise above 100 mg/dL, have another 15-gram to 20-gram carbohydrate snack.  · Stop exercising if you find that your blood glucose has risen above 250 mg/dL while exercising.  · Do not go on with your workout until:    Your blood glucose level rises above 100 mg/dL if it was below this level.    Your blood glucose level drops below 250 mg/dL if it was above this level.  · If you take insulin, you may need to alter your insulin schedule on the days that you exercise. This depends on how your blood glucose responds to exercise. Ask your health care provider how to do this.  · Drink fluids during and after exercise to avoid dehydration. For exercise that lasts a long time, use a sports drink to maintain your glucose level.  SEEK MEDICAL CARE IF:  · You have stopped exercising because of hypoglycemia and your blood glucose does not rise above 100 mg/dL after you have two 15-gram to 20-gram carbohydrate-rich snacks.  · You notice a loss of sensation in your feet during or after exercise.  · You have increased numbness, tingling, or pins-and-needles sensations after exercise.  · You have a fast, irregular heartbeat (palpitations) during or after exercise.  · Your exercise tolerance gets worse.  · You have dizzy spells or feel faint for brief periods during or after exercise.  SEEK IMMEDIATE MEDICAL CARE IF:  · You have chest pain during or after exercise.  · You pass out (lose consciousness) during or after exercise.  · You have the following in addition to hyperglycemia:    Fatigue.    Dry or flushed skin.    Difficulty breathing.    Confusion.    Nausea, vomiting, or pain in the abdomen.    Fruity-smelling breath.     This information is not intended to replace advice given to you by your health care provider. Make sure you discuss any questions you have with your health care provider.      Document Released: 12/18/2006 Document Revised: 09/07/2016 Document Reviewed: 02/16/2016  [employer] Interactive Patient Education ©2017 [employer]  Generalized Anxiety Disorder  Generalized anxiety disorder (BECKY) is a mental disorder. It interferes with life functions, including relationships, work, and school.  BECKY is different from normal anxiety, which everyone experiences at some point in their lives in response to specific life events and activities. Normal anxiety actually helps us prepare for and get through these life events and activities. Normal anxiety goes away after the event or activity is over.   BECKY causes anxiety that is not necessarily related to specific events or activities. It also causes excess anxiety in proportion to specific events or activities. The anxiety associated with BECKY is also difficult to control. BECKY can vary from mild to severe. People with severe BECKY can have intense waves of anxiety with physical symptoms (panic attacks).   SYMPTOMS  The anxiety and worry associated with BECKY are difficult to control. This anxiety and worry are related to many life events and activities and also occur more days than not for 6 months or longer. People with BECKY also have three or more of the following symptoms (one or more in children):  · Restlessness.    · Fatigue.  · Difficulty concentrating.    · Irritability.  · Muscle tension.  · Difficulty sleeping or unsatisfying sleep.  DIAGNOSIS  BEKCY is diagnosed through an assessment by your health care provider. Your health care provider will ask you questions about your mood, physical symptoms, and events in your life. Your health care provider may ask you about your medical history and use of alcohol or drugs, including prescription medicines. Your health care provider may also do a physical exam and blood tests. Certain medical conditions and the use of certain substances can cause symptoms similar to those associated with BECKY. Your health care  provider may refer you to a mental health specialist for further evaluation.  TREATMENT  The following therapies are usually used to treat BECKY:   · Medication. Antidepressant medication usually is prescribed for long-term daily control. Antianxiety medicines may be added in severe cases, especially when panic attacks occur.    · Talk therapy (psychotherapy). Certain types of talk therapy can be helpful in treating BECKY by providing support, education, and guidance. A form of talk therapy called cognitive behavioral therapy can teach you healthy ways to think about and react to daily life events and activities.  · Stress management techniques. These include yoga, meditation, and exercise and can be very helpful when they are practiced regularly.  A mental health specialist can help determine which treatment is best for you. Some people see improvement with one therapy. However, other people require a combination of therapies.     This information is not intended to replace advice given to you by your health care provider. Make sure you discuss any questions you have with your health care provider.     Document Released: 04/14/2014 Document Revised: 01/08/2016 Document Reviewed: 04/14/2014  [employer] Interactive Patient Education ©2017 [employer]    Stress and Stress Management  Stress is a normal reaction to life events. It is what you feel when life demands more than you are used to or more than you can handle. Some stress can be useful. For example, the stress reaction can help you catch the last bus of the day, study for a test, or meet a deadline at work. But stress that occurs too often or for too long can cause problems. It can affect your emotional health and interfere with relationships and normal daily activities. Too much stress can weaken your immune system and increase your risk for physical illness. If you already have a medical problem, stress can make it worse.  CAUSES   All sorts of life events may  cause stress. An event that causes stress for one person may not be stressful for another person. Major life events commonly cause stress. These may be positive or negative. Examples include losing your job, moving into a new home, getting , having a baby, or losing a loved one. Less obvious life events may also cause stress, especially if they occur day after day or in combination. Examples include working long hours, driving in traffic, caring for children, being in debt, or being in a difficult relationship.  SIGNS AND SYMPTOMS  Stress may cause emotional symptoms including, the following:  · Anxiety. This is feeling worried, afraid, on edge, overwhelmed, or out of control.  · Anger. This is feeling irritated or impatient.  · Depression. This is feeling sad, down, helpless, or guilty.  · Difficulty focusing, remembering, or making decisions.  Stress may cause physical symptoms, including the following:   · Aches and pains. These may affect your head, neck, back, stomach, or other areas of your body.  · Tight muscles or clenched jaw.  · Low energy or trouble sleeping.   Stress may cause unhealthy behaviors, including the following:   · Eating to feel better (overeating) or skipping meals.  · Sleeping too little, too much, or both.  · Working too much or putting off tasks (procrastination).  · Smoking, drinking alcohol, or using drugs to feel better.  DIAGNOSIS   Stress is diagnosed through an assessment by your health care provider. Your health care provider will ask questions about your symptoms and any stressful life events. Your health care provider will also ask about your medical history and may order blood tests or other tests. Certain medical conditions and medicine can cause physical symptoms similar to stress.  Mental illness can cause emotional symptoms and unhealthy behaviors similar to stress. Your health care provider may refer you to a mental health professional for further evaluation.  "  TREATMENT   Stress management is the recommended treatment for stress. The goals of stress management are reducing stressful life events and coping with stress in healthy ways.   Techniques for reducing stressful life events include the following:  · Stress identification. Self-monitor for stress and identify what causes stress for you. These skills may help you to avoid some stressful events.  · Time management. Set your priorities, keep a calendar of events, and learn to say \"no.\" These tools can help you avoid making too many commitments.  Techniques for coping with stress include the following:  · Rethinking the problem. Try to think realistically about stressful events rather than ignoring them or overreacting. Try to find the positives in a stressful situation rather than focusing on the negatives.  · Exercise. Physical exercise can release both physical and emotional tension. The key is to find a form of exercise you enjoy and do it regularly.  · Relaxation techniques. These relax the body and mind. Examples include yoga, meditation, keaton chi, biofeedback, deep breathing, progressive muscle relaxation, listening to music, being out in nature, journaling, and other hobbies. Again, the key is to find one or more that you enjoy and can do regularly.  · Healthy lifestyle. Eat a balanced diet, get plenty of sleep, and do not smoke. Avoid using alcohol or drugs to relax.  · Strong support network. Spend time with family, friends, or other people you enjoy being around. Express your feelings and talk things over with someone you trust.  Counseling or talk therapy with a mental health professional may be helpful if you are having difficulty managing stress on your own. Medicine is typically not recommended for the treatment of stress. Talk to your health care provider if you think you need medicine for symptoms of stress.  HOME CARE INSTRUCTIONS  · Keep all follow-up visits as directed by your health care " provider.  · Take all medicines as directed by your health care provider.  SEEK MEDICAL CARE IF:  · Your symptoms get worse or you start having new symptoms.  · You feel overwhelmed by your problems and can no longer manage them on your own.  SEEK IMMEDIATE MEDICAL CARE IF:  · You feel like hurting yourself or someone else.     This information is not intended to replace advice given to you by your health care provider. Make sure you discuss any questions you have with your health care provider.     Document Released: 06/13/2002 Document Revised: 04/10/2017 Document Reviewed: 08/12/2014  [employer] Interactive Patient Education ©2017 [employer]

## 2017-09-07 NOTE — PROGRESS NOTES
Cardiac Rehab Initial Assessment      Name: Damaso Leonard  :1960 Allergies:Review of patient's allergies indicates no known allergies.   MRN: [id number] 56 y.o. Physician: YOUNG Toscano   Primary Diagnosis:    Diagnosis Plan   1. Stable angina      Event Date: 2017 Specialist: Marcie    Secondary Diagnosis: NA Risk Stratification:High Risk Note Author: Anna Romero RN     Cardiovascular History: Previous MI and Previous PCI     EXERCISE AT HOME  no    N/A    EF: 70%      Source: Myocardial Perfusion Scan on 16          Ambulatory Status:Independent  Ambulatory Fall Risk Assessed on Initial Visit: yes 6 Minute Walk Pre- Cardiac Rehab:  Distance:1224ft      RPE:11  Max. HR: 101       SPO2:98    MET: 2.8  MPH: na             RPD: na  Resting BP: 132/88 LA, 134/88 RA    Peak BP: 132/96  Recovery BP: 132/96  Comments: Pt instructed to keep BP journal.  Will report BP to Dr. Jack      NUTRITION  Lipids:yes If yes, labs as follows;  Total: No components found for: CHOLESTEROL  HDL:   HDL Cholesterol   Date Value Ref Range Status   2017 45 (L) 60 - 100 mg/dL Final    Lipids continued:  LDL:  LDL Cholesterol    Date Value Ref Range Status   2016 103 (H) 0 - 100 mg/dL Final     Comment:       LDL Reference Range:      Optimal                       <100 mg/dL      Near Optimal               100-129 mg/dL      Borderline High            130-159 mg/dL      High                             160-189 mg/dL      Very High                     >189 mg/dL       Triglyceride: No components found for: TRIGLYCERIDE   Weight Management:                 Weight: 224.4 lbs   Height: 70 in                                    BMI: Body mass index is 32.2 kg/(m^2).  Waist Circumference: 46  inches   Alcohol Use: none Diabetes:Yes,  Monitors BS at home- yes, Frequency: once every few months, Random BS: 116    Last HGBA1C with date if applicable:No components found for: A1C         SOCIAL  "HISTORY  Social History     Social History   • Marital status:      Spouse name: N/A   • Number of children: N/A   • Years of education: N/A     Social History Main Topics   • Smoking status: Never Smoker   • Smokeless tobacco: Never Used   • Alcohol use No   • Drug use: No   • Sexual activity: Defer     Other Topics Concern   • None     Social History Narrative       Educational Level (choose one that applies) high school diploma/GED Learning Barriers:[employer], [employer]    Family Support:yes    Living Arrangement: lives with their spouse    Risk Factors: Stress  Yes, Clinical Depression  Yes, Heredity  Yes If Yes see hx in navigator  and Hyperlipidemia  Yes     Tobacco Adjunct: No        Comorbidities: Diabetes Mellitus and Previous MI     PSYCHOSOCIAL  Clinical Depression: yes    Stress: yes     Assess presence or absence of depression using a valid screening tool: yes      PHYSICAL ASSESSMENT  Influenza vaccine: No   Pneumococcal vaccine: yes          Angina: no    Describe angina scale of 0 - 4: 0 = none    Today are you having incisional pain? No. If, Yes, Scale: 0    States has intermittent sharp chest pain at home, occurs 3 times weekly or more.  Normally last around 2 minutes.      Today are you having any other pain? No. If, Yes, Scale: 0   Diagnosed with Hypertension:yes    Heart Sounds: S1 S2 no S3 S4      Lung Sounds: normal air entry, lungs clear to auscultation         Assessment: Pt tolerated 6MWT well, NAD noted, no c/o's voiced, skin w/p/d, resp nl and even, denies chest discomfort, monitor shows NSR-ST.      Pt educated on warm up, stretching, use of RPE scale, application of heart monitor, home exercise and exercise guidelines.  Cleaning and use of equipment, s/s to report. TBV.     Orthopedic Problems: right shoulder (s/p rotator cuff and ligament injury), arthritis, bilateral knee pain (\"cartlidge\").      Are you being hurt, hit, or frightened by anyone at home or in your life? " no    Are you being neglected by a caregiver? No Shoulder flexibility/Range of motion: Below average     Recommended arm activity: Arm Ergometer    Chair sit and reach within: na Leg flexibility: Below average    Leg Strength/Balance/Five times sit to stand: na       Chose one: Fall Risk    Recommended stretching: Standing    Assessment: see above     Family attends [place]: yes Time of arrival: 1400  Time of departure: 1615     Patient Goals: See Navigator          9/7/2017  4:25 PM  Anna Romero RN

## 2017-09-12 ENCOUNTER — TREATMENT (OUTPATIENT)
Dept: CARDIAC REHAB | Facility: HOSPITAL | Age: 57
End: 2017-09-12

## 2017-09-12 VITALS — SYSTOLIC BLOOD PRESSURE: 134 MMHG | HEART RATE: 104 BPM | DIASTOLIC BLOOD PRESSURE: 87 MMHG

## 2017-09-12 DIAGNOSIS — I20.8 STABLE ANGINA (HCC): Primary | ICD-10-CM

## 2017-09-12 PROCEDURE — 93798 PHYS/QHP OP CAR RHAB W/ECG: CPT

## 2017-09-12 NOTE — PATIENT INSTRUCTIONS
Stress and Stress Management  Stress is a normal reaction to life events. It is what you feel when life demands more than you are used to or more than you can handle. Some stress can be useful. For example, the stress reaction can help you catch the last bus of the day, study for a test, or meet a deadline at work. But stress that occurs too often or for too long can cause problems. It can affect your emotional health and interfere with relationships and normal daily activities. Too much stress can weaken your immune system and increase your risk for physical illness. If you already have a medical problem, stress can make it worse.  CAUSES   All sorts of life events may cause stress. An event that causes stress for one person may not be stressful for another person. Major life events commonly cause stress. These may be positive or negative. Examples include losing your job, moving into a new home, getting , having a baby, or losing a loved one. Less obvious life events may also cause stress, especially if they occur day after day or in combination. Examples include working long hours, driving in traffic, caring for children, being in debt, or being in a difficult relationship.  SIGNS AND SYMPTOMS  Stress may cause emotional symptoms including, the following:  · Anxiety. This is feeling worried, afraid, on edge, overwhelmed, or out of control.  · Anger. This is feeling irritated or impatient.  · Depression. This is feeling sad, down, helpless, or guilty.  · Difficulty focusing, remembering, or making decisions.  Stress may cause physical symptoms, including the following:   · Aches and pains. These may affect your head, neck, back, stomach, or other areas of your body.  · Tight muscles or clenched jaw.  · Low energy or trouble sleeping.   Stress may cause unhealthy behaviors, including the following:   · Eating to feel better (overeating) or skipping meals.  · Sleeping too little, too much, or both.  · Working  "too much or putting off tasks (procrastination).  · Smoking, drinking alcohol, or using drugs to feel better.  DIAGNOSIS   Stress is diagnosed through an assessment by your health care provider. Your health care provider will ask questions about your symptoms and any stressful life events. Your health care provider will also ask about your medical history and may order blood tests or other tests. Certain medical conditions and medicine can cause physical symptoms similar to stress.  Mental illness can cause emotional symptoms and unhealthy behaviors similar to stress. Your health care provider may refer you to a mental health professional for further evaluation.   TREATMENT   Stress management is the recommended treatment for stress. The goals of stress management are reducing stressful life events and coping with stress in healthy ways.   Techniques for reducing stressful life events include the following:  · Stress identification. Self-monitor for stress and identify what causes stress for you. These skills may help you to avoid some stressful events.  · Time management. Set your priorities, keep a calendar of events, and learn to say \"no.\" These tools can help you avoid making too many commitments.  Techniques for coping with stress include the following:  · Rethinking the problem. Try to think realistically about stressful events rather than ignoring them or overreacting. Try to find the positives in a stressful situation rather than focusing on the negatives.  · Exercise. Physical exercise can release both physical and emotional tension. The key is to find a form of exercise you enjoy and do it regularly.  · Relaxation techniques. These relax the body and mind. Examples include yoga, meditation, keaton chi, biofeedback, deep breathing, progressive muscle relaxation, listening to music, being out in nature, journaling, and other hobbies. Again, the key is to find one or more that you enjoy and can do " regularly.  · Healthy lifestyle. Eat a balanced diet, get plenty of sleep, and do not smoke. Avoid using alcohol or drugs to relax.  · Strong support network. Spend time with family, friends, or other people you enjoy being around. Express your feelings and talk things over with someone you trust.  Counseling or talk therapy with a mental health professional may be helpful if you are having difficulty managing stress on your own. Medicine is typically not recommended for the treatment of stress. Talk to your health care provider if you think you need medicine for symptoms of stress.  HOME CARE INSTRUCTIONS  · Keep all follow-up visits as directed by your health care provider.  · Take all medicines as directed by your health care provider.  SEEK MEDICAL CARE IF:  · Your symptoms get worse or you start having new symptoms.  · You feel overwhelmed by your problems and can no longer manage them on your own.  SEEK IMMEDIATE MEDICAL CARE IF:  · You feel like hurting yourself or someone else.     This information is not intended to replace advice given to you by your health care provider. Make sure you discuss any questions you have with your health care provider.     Document Released: 06/13/2002 Document Revised: 04/10/2017 Document Reviewed: 08/12/2014  [employer] Interactive Patient Education ©2017 [employer]  Generalized Anxiety Disorder  Generalized anxiety disorder (BECKY) is a mental disorder. It interferes with life functions, including relationships, work, and school.  BECKY is different from normal anxiety, which everyone experiences at some point in their lives in response to specific life events and activities. Normal anxiety actually helps us prepare for and get through these life events and activities. Normal anxiety goes away after the event or activity is over.   BECKY causes anxiety that is not necessarily related to specific events or activities. It also causes excess anxiety in proportion to specific events  or activities. The anxiety associated with BECKY is also difficult to control. BECKY can vary from mild to severe. People with severe BECKY can have intense waves of anxiety with physical symptoms (panic attacks).   SYMPTOMS  The anxiety and worry associated with BECKY are difficult to control. This anxiety and worry are related to many life events and activities and also occur more days than not for 6 months or longer. People with BECKY also have three or more of the following symptoms (one or more in children):  · Restlessness.    · Fatigue.  · Difficulty concentrating.    · Irritability.  · Muscle tension.  · Difficulty sleeping or unsatisfying sleep.  DIAGNOSIS  BECKY is diagnosed through an assessment by your health care provider. Your health care provider will ask you questions about your mood, physical symptoms, and events in your life. Your health care provider may ask you about your medical history and use of alcohol or drugs, including prescription medicines. Your health care provider may also do a physical exam and blood tests. Certain medical conditions and the use of certain substances can cause symptoms similar to those associated with BECKY. Your health care provider may refer you to a mental health specialist for further evaluation.  TREATMENT  The following therapies are usually used to treat BECKY:   · Medication. Antidepressant medication usually is prescribed for long-term daily control. Antianxiety medicines may be added in severe cases, especially when panic attacks occur.    · Talk therapy (psychotherapy). Certain types of talk therapy can be helpful in treating BECKY by providing support, education, and guidance. A form of talk therapy called cognitive behavioral therapy can teach you healthy ways to think about and react to daily life events and activities.  · Stress management techniques. These include yoga, meditation, and exercise and can be very helpful when they are practiced regularly.  A mental health  specialist can help determine which treatment is best for you. Some people see improvement with one therapy. However, other people require a combination of therapies.     This information is not intended to replace advice given to you by your health care provider. Make sure you discuss any questions you have with your health care provider.     Document Released: 04/14/2014 Document Revised: 01/08/2016 Document Reviewed: 04/14/2014  [employer] Interactive Patient Education ©2017 [employer]    High Cholesterol  High cholesterol refers to having a high level of cholesterol in your blood. Cholesterol is a white, waxy, fat-like protein that your body needs in small amounts. Your liver makes all the cholesterol you need. Excess cholesterol comes from the food you eat.  Cholesterol travels in your bloodstream through your blood vessels. If you have high cholesterol, deposits (plaque) may build up on the walls of your blood vessels. This makes the arteries narrower and stiffer. Plaque increases your risk of heart attack and stroke. Work with your health care provider to keep your cholesterol levels in a healthy range.  RISK FACTORS  Several things can make you more likely to have high cholesterol. These include:   · Eating foods high in animal fat (saturated fat) or cholesterol.  · Being overweight.  · Not getting enough exercise.  · Having a family history of high cholesterol.  SIGNS AND SYMPTOMS  High cholesterol does not cause symptoms.  DIAGNOSIS   Your health care provider can do a blood test to check whether you have high cholesterol. If you are older than 20, your health care provider may check your cholesterol every 4-6 years. You may be checked more often if you already have high cholesterol or other risk factors for heart disease. The blood test for cholesterol measures the following:  · Bad cholesterol (LDL cholesterol). This is the type of cholesterol that causes heart disease. This number should be less than  100.  · Good cholesterol (HDL cholesterol). This type helps protect against heart disease. A healthy level of HDL cholesterol is 60 or higher.  · Total cholesterol. This is the combined number of LDL cholesterol and HDL cholesterol. A healthy number is less than 200.  TREATMENT   High cholesterol can be treated with diet changes, lifestyle changes, and medicine.   · Diet changes may include eating more whole grains, fruits, vegetables, nuts, and fish. You may also have to cut back on red meat and foods with a lot of added sugar.  · Lifestyle changes may include getting at least 40 minutes of aerobic exercise three times a week. Aerobic exercises include walking, biking, and swimming. Aerobic exercise along with a healthy diet can help you maintain a healthy weight. Lifestyle changes may also include quitting smoking.  · If diet and lifestyle changes are not enough to lower your cholesterol, your health care provider may prescribe a statin medicine. This medicine has been shown to lower cholesterol and also lower the risk of heart disease.  HOME CARE INSTRUCTIONS  · Only take over-the-counter or prescription medicines as directed by your health care provider.    · Follow a healthy diet as directed by your health care provider. For instance:      Eat chicken (without skin), fish, veal, shellfish, ground turkey breast, and round or loin cuts of red meat.    Do not eat fried foods and fatty meats, such as hot dogs and salami.      Eat plenty of fruits, such as apples.      Eat plenty of vegetables, such as broccoli, potatoes, and carrots.      Eat beans, peas, and lentils.      Eat grains, such as barley, rice, couscous, and bulgur wheat.      Eat pasta without cream sauces.      Use skim or nonfat milk and low-fat or nonfat yogurt and cheeses. Do not eat or drink whole milk, cream, ice cream, egg yolks, and hard cheeses.      Do not eat stick margarine or tub margarines that contain trans fats (also called partially  hydrogenated oils).      Do not eat cakes, cookies, crackers, or other baked goods that contain trans fats.      Do not eat saturated tropical oils, such as coconut and palm oil.    · Exercise as directed by your health care provider. Increase your activity level with activities such as gardening or walking.    · Keep all follow-up appointments.    SEEK MEDICAL CARE IF:  · You are struggling to maintain a healthy diet or weight.  · You need help starting an exercise program.  · You need help to stop smoking.  SEEK IMMEDIATE MEDICAL CARE IF:  · You have chest pain.  · You have trouble breathing.     This information is not intended to replace advice given to you by your health care provider. Make sure you discuss any questions you have with your health care provider.     Document Released: 12/18/2006 Document Revised: 01/08/2016 Document Reviewed: 10/10/2014  [employer] Interactive Patient Education ©2017 [employer]    High Cholesterol  High cholesterol refers to having a high level of cholesterol in your blood. Cholesterol is a white, waxy, fat-like protein that your body needs in small amounts. Your liver makes all the cholesterol you need. Excess cholesterol comes from the food you eat.  Cholesterol travels in your bloodstream through your blood vessels. If you have high cholesterol, deposits (plaque) may build up on the walls of your blood vessels. This makes the arteries narrower and stiffer. Plaque increases your risk of heart attack and stroke. Work with your health care provider to keep your cholesterol levels in a healthy range.  RISK FACTORS  Several things can make you more likely to have high cholesterol. These include:   · Eating foods high in animal fat (saturated fat) or cholesterol.  · Being overweight.  · Not getting enough exercise.  · Having a family history of high cholesterol.  SIGNS AND SYMPTOMS  High cholesterol does not cause symptoms.  DIAGNOSIS   Your health care provider can do a blood  test to check whether you have high cholesterol. If you are older than 20, your health care provider may check your cholesterol every 4-6 years. You may be checked more often if you already have high cholesterol or other risk factors for heart disease. The blood test for cholesterol measures the following:  · Bad cholesterol (LDL cholesterol). This is the type of cholesterol that causes heart disease. This number should be less than 100.  · Good cholesterol (HDL cholesterol). This type helps protect against heart disease. A healthy level of HDL cholesterol is 60 or higher.  · Total cholesterol. This is the combined number of LDL cholesterol and HDL cholesterol. A healthy number is less than 200.  TREATMENT   High cholesterol can be treated with diet changes, lifestyle changes, and medicine.   · Diet changes may include eating more whole grains, fruits, vegetables, nuts, and fish. You may also have to cut back on red meat and foods with a lot of added sugar.  · Lifestyle changes may include getting at least 40 minutes of aerobic exercise three times a week. Aerobic exercises include walking, biking, and swimming. Aerobic exercise along with a healthy diet can help you maintain a healthy weight. Lifestyle changes may also include quitting smoking.  · If diet and lifestyle changes are not enough to lower your cholesterol, your health care provider may prescribe a statin medicine. This medicine has been shown to lower cholesterol and also lower the risk of heart disease.  HOME CARE INSTRUCTIONS  · Only take over-the-counter or prescription medicines as directed by your health care provider.    · Follow a healthy diet as directed by your health care provider. For instance:      Eat chicken (without skin), fish, veal, shellfish, ground turkey breast, and round or loin cuts of red meat.    Do not eat fried foods and fatty meats, such as hot dogs and salami.      Eat plenty of fruits, such as apples.      Eat plenty of  vegetables, such as broccoli, potatoes, and carrots.      Eat beans, peas, and lentils.      Eat grains, such as barley, rice, couscous, and bulgur wheat.      Eat pasta without cream sauces.      Use skim or nonfat milk and low-fat or nonfat yogurt and cheeses. Do not eat or drink whole milk, cream, ice cream, egg yolks, and hard cheeses.      Do not eat stick margarine or tub margarines that contain trans fats (also called partially hydrogenated oils).      Do not eat cakes, cookies, crackers, or other baked goods that contain trans fats.      Do not eat saturated tropical oils, such as coconut and palm oil.    · Exercise as directed by your health care provider. Increase your activity level with activities such as gardening or walking.    · Keep all follow-up appointments.    SEEK MEDICAL CARE IF:  · You are struggling to maintain a healthy diet or weight.  · You need help starting an exercise program.  · You need help to stop smoking.  SEEK IMMEDIATE MEDICAL CARE IF:  · You have chest pain.  · You have trouble breathing.     This information is not intended to replace advice given to you by your health care provider. Make sure you discuss any questions you have with your health care provider.     Document Released: 12/18/2006 Document Revised: 01/08/2016 Document Reviewed: 10/10/2014  [employer] Interactive Patient Education ©2017 [employer]  Triglycerides Test  Triglycerides are a type of fat in your body. Your body naturally produces some triglycerides. More can accumulate when you eat more food than your body needs.  Most triglycerides are stored in fatty tissues. They are released when the body needs energy. Triglycerides also circulate in your blood as part of a cholesterol particle known as very-low-density lipoprotein (VLDL). Cholesterol and triglycerides are often tested at the same time. That test is called a lipid profile.  You may have this test as part of a routine physical exam. Health care  providers recommend that adults have this test at least once every 5 years. High triglycerides can increase your risk for heart disease. You may need to have your triglycerides checked more often if you have other risk factors for heart disease.  This test requires a sample of blood taken from a vein in your hand or arm. Some lipid profiles can be done on a portable testing device using only a drop of blood taken from your fingertip (finger stick).  PREPARATION FOR TEST  · Do not eat anything for 9-12 hours before the test as directed by your health care provider.  · Do not consume alcohol for at least 24 hours before the test.  · Ask your health care provider if you should stop taking your regular medicines. Many medicines and supplements can interfere with the results of the test.  · Follow any other instructions given by your health care provider.  RESULTS  It is your responsibility to obtain your test results. Ask the lab or department performing the test when and how you will get your results. Contact your health care provider to discuss any questions you have about your results.   Triglycerides are usually measured in milligrams per deciliter (mg/dL).  Range of Normal Values  Ranges for normal values may vary among different labs and hospitals. You should always check with your health care provider after having lab work or other tests done to discuss whether your values are considered within normal limits.  The normal ranges for triglycerides are as follows:  · Adults:    Male:  mg/dL or 0.45-1.81 mmol/L (SI units).    Female:  mg/dL or 0.40-1.52 mmol/L (SI units).  · Children age 5 years or younger:    Male: 30-86 mg/dL.    Female: 32-99 mg/dL.  · Children age 6-11 years:    Male:  mg/dL.    Female:  mg/dL.  · Children age 12-15 years:    Male:  mg/dL.    Female:  mg/dL.  · Children age 16-19 years:    Male:  mg/dL.    Female:  mg/dL.  Meaning of Results Outside  Normal Value Ranges  If your triglyceride level is higher than the normal range, you have an increased risk for heart disease. Triglycerides can also be elevated in certain diseases, such as diabetes.  If you have high triglycerides, your health care provider will likely recommend measures to reduce your triglycerides and your risk of heart disease. This can be done through:  · Diet.  · Exercise.  · Medicines.  · Lifestyle measures such as quitting smoking and limiting how much alcohol you drink.     This information is not intended to replace advice given to you by your health care provider. Make sure you discuss any questions you have with your health care provider.     Document Released: 01/20/2006 Document Revised: 01/08/2016 Document Reviewed: 04/09/2015  [employer] Interactive Patient Education ©2017 [employer]

## 2017-09-12 NOTE — PROGRESS NOTES
Phase II patient, see  [facility] documentation for exercise data documentation.  Dr. Jack physician immediately available. NAD note, skin w/p/d, denies CP, SOA, tolerated exercise well. Pt attended first day of exercise.  Educated on warm up, stretching, use of RPE scale, cleaning and use of equipment, application of cardiac monitor and s/s to report.  Teach back verified.    Pt educated on stress, stress management, cholesterol and triglycerides acceptable limits and steps to take to lower cholesterol.  TBV.

## 2017-09-13 ENCOUNTER — TREATMENT (OUTPATIENT)
Dept: CARDIAC REHAB | Facility: HOSPITAL | Age: 57
End: 2017-09-13

## 2017-09-13 VITALS — SYSTOLIC BLOOD PRESSURE: 114 MMHG | DIASTOLIC BLOOD PRESSURE: 78 MMHG | HEART RATE: 84 BPM

## 2017-09-13 DIAGNOSIS — I20.8 STABLE ANGINA (HCC): Primary | ICD-10-CM

## 2017-09-13 PROCEDURE — 93798 PHYS/QHP OP CAR RHAB W/ECG: CPT

## 2017-09-13 NOTE — PROGRESS NOTES
Phase II patient, see  [place] documentation for exercise data documentation.  Dr. Jack physician immediately available. NAD note, skin w/p/d, denies CP, SOA, tolerated exercise well.

## 2017-09-15 ENCOUNTER — TREATMENT (OUTPATIENT)
Dept: CARDIAC REHAB | Facility: HOSPITAL | Age: 57
End: 2017-09-15

## 2017-09-15 VITALS — HEART RATE: 85 BPM | SYSTOLIC BLOOD PRESSURE: 126 MMHG | DIASTOLIC BLOOD PRESSURE: 90 MMHG

## 2017-09-15 DIAGNOSIS — I20.8 STABLE ANGINA (HCC): Primary | ICD-10-CM

## 2017-09-15 PROCEDURE — 93798 PHYS/QHP OP CAR RHAB W/ECG: CPT

## 2017-09-15 NOTE — PROGRESS NOTES
Phase II patient, see  [facility] documentation for exercise data documentation.  Dr. Jack physician immediately available. NAD note, skin w/p/d, denies CP, SOA, tolerated exercise well.

## 2017-09-15 NOTE — PROGRESS NOTES
CARDIAC/PULMONARY REHAB NUTRITION EDUCATION/ASSESSMENT      56 y.o.         Height: 70  in    Weight: 224  lb     BMI: 32.1 IBW:  176      %IBW: 127      Adj IBW:               Time seen:  930 AM   Diet Survey Score:  Cardiac Risk Factors: DM 2, HTN,s/p stents Weight Assessment: Obese  Weight Change:  weight gain of 25 lbs over the past  1 year(s)    Intentional?  No  Usual Weight: 200 lb  Desired Weight: 200 lb     Current Diet: High saturated fat, high sodium  Appetite: good   Factors limiting PO intake:  None  Taste/smell changes:  No Food records reviewed? Yes     Occupation: disabled  Job Activity Level:NA    Routine Exercise: None     Who does the patient live with: wife  Who does the cooking at home:  Patient and spouse  Spouse/significant other's name:  Delores  Spouse/significant other present for diet instruction today? No  Patient actively receiving lifestyle support from others at home? Yes       Pertinent Lab Values:   Total: No components found for: CHOLESTEROL  HDL:   HDL Cholesterol   Date Value Ref Range Status   07/11/2017 45 (L) 60 - 100 mg/dL Final     LDL:  LDL Cholesterol    Date Value Ref Range Status   04/11/2016 103 (H) 0 - 100 mg/dL Final     Comment:       LDL Reference Range:      Optimal                       <100 mg/dL      Near Optimal               100-129 mg/dL      Borderline High            130-159 mg/dL      High                             160-189 mg/dL      Very High                     >189 mg/dL       Triglyceride: No components found for: TRIGLYCERIDE  Last HGBA1C with date if applicable:No components found for: A1C  Glucose:   Glucose   Date Value Ref Range Status   07/11/2017 122 (H) 70 - 110 mg/dL Final    Nutritional Supplements: None    Pertinent Nutrition-Related Medications:  NA         Stated Problem Areas / Concerns: Pt with no stated concerns related to diet     Assessment / Recommendations: High saturated fat, high sodium, low nutrient density  Motivation level toward  diet compliance: moderate       Education:    Education Level:  HS  Previous cardiac diet education prior to coming to Cardiac Rehab?  No  Instructed on:  Cardiac diet  Diabetic diet  Weight management  1500 mg/day Na restriction  Label reading for heart health  Advised against salt-substitute use  CHO counting  Written materials given:  yes         Goals: Decrease saturated fat in diet primarily via  discontinue use of lard and decrease fried foods , decrease use of salt with goal being to decrease to 1500mg daily, increase intake of fruits and vegetables -  currently at 1 serving daily- goal 6-8 servings hearth healthy fruits and vegetables - carbohydrate counting for blood glucose control. Encouraged weight loss of 10% .                  11:41 AM  9/15/2017  Lizbeth Killian RD

## 2017-09-18 ENCOUNTER — TREATMENT (OUTPATIENT)
Dept: CARDIAC REHAB | Facility: HOSPITAL | Age: 57
End: 2017-09-18

## 2017-09-18 VITALS — HEART RATE: 82 BPM | SYSTOLIC BLOOD PRESSURE: 126 MMHG | DIASTOLIC BLOOD PRESSURE: 84 MMHG

## 2017-09-18 DIAGNOSIS — I20.8 STABLE ANGINA (HCC): Primary | ICD-10-CM

## 2017-09-18 PROCEDURE — 93798 PHYS/QHP OP CAR RHAB W/ECG: CPT

## 2017-09-18 NOTE — PROGRESS NOTES
Phase II patient, see  Olema documentation for exercise data documentation.  Dr. Jack physician immediately available. NAD note, skin w/p/d, denies CP, SOA, tolerated exercise well.

## 2017-09-20 ENCOUNTER — TREATMENT (OUTPATIENT)
Dept: CARDIAC REHAB | Facility: HOSPITAL | Age: 57
End: 2017-09-20

## 2017-09-20 VITALS — SYSTOLIC BLOOD PRESSURE: 122 MMHG | OXYGEN SATURATION: 98 % | HEART RATE: 84 BPM | DIASTOLIC BLOOD PRESSURE: 74 MMHG

## 2017-09-20 DIAGNOSIS — I20.8 STABLE ANGINA (HCC): Primary | ICD-10-CM

## 2017-09-20 PROCEDURE — 93798 PHYS/QHP OP CAR RHAB W/ECG: CPT

## 2017-09-20 NOTE — PROGRESS NOTES
Pt attended phase II visit.  Tolerated exercise well, NAD noted, no complaints voiced, skin warm, pink, dry, resp nl and even, denies chest discomfort,NAD noted, no complaints voiced, skin warm, pink and dry, resp within normal limits and even, denies chest discomfort, chest pressure ,SOA .  Monitor shows NSR -ST , V/S WDL ,see Marty documentation for exercise data.      Educated on managing blood pressure and heart healthy diet. Written information given and verbal teach back verified

## 2017-09-20 NOTE — PATIENT INSTRUCTIONS
Managing Your Hypertension  Blood pressure is a measurement of how forceful your blood is pressing against the walls of the arteries. Arteries are muscular tubes within the circulatory system. Blood pressure does not stay the same. Blood pressure rises when you are active, excited, or nervous; and it lowers during sleep and relaxation. If the numbers measuring your blood pressure stay above normal most of the time, you are at risk for health problems. High blood pressure (hypertension) is a long-term (chronic) condition in which blood pressure is elevated.  A blood pressure reading is recorded as two numbers, such as 120 over 80 (or 120/80). The first, higher number is called the systolic pressure. It is a measure of the pressure in your arteries as the heart beats. The second, lower number is called the diastolic pressure. It is a measure of the pressure in your arteries as the heart relaxes between beats.   Keeping your blood pressure in a normal range is important to your overall health and prevention of health problems, such as heart disease and stroke. When your blood pressure is uncontrolled, your heart has to work harder than normal. High blood pressure is a very common condition in adults because blood pressure tends to rise with age. Men and women are equally likely to have hypertension but at different times in life. Before age 45, men are more likely to have hypertension. After 65 years of age, women are more likely to have it. Hypertension is especially common in  Americans. This condition often has no signs or symptoms. The cause of the condition is usually not known. Your caregiver can help you come up with a plan to keep your blood pressure in a normal, healthy range.  BLOOD PRESSURE STAGES  Blood pressure is classified into four stages: normal, prehypertension, stage 1, and stage 2. Your blood pressure reading will be used to determine what type of treatment, if any, is necessary. Appropriate  treatment options are tied to these four stages:   Normal  · Systolic pressure (mm Hg): below 120.  · Diastolic pressure (mm Hg): below 80.  Prehypertension  · Systolic pressure (mm Hg): 120 to 139.  · Diastolic pressure (mm Hg): 80 to 89.  Stage 1  · Systolic pressure (mm Hg): 140 to 159.  · Diastolic pressure (mm Hg): 90 to 99.  Stage 2  · Systolic pressure (mm Hg): 160 or above.  · Diastolic pressure (mm Hg): 100 or above.  RISKS RELATED TO HIGH BLOOD PRESSURE  Managing your blood pressure is an important responsibility. Uncontrolled high blood pressure can lead to:  · A heart attack.  · A stroke.  · A weakened blood vessel (aneurysm).  · Heart failure.  · Kidney damage.  · Eye damage.  · Metabolic syndrome.  · Memory and concentration problems.  HOW TO MANAGE YOUR BLOOD PRESSURE  Blood pressure can be managed effectively with lifestyle changes and medicines (if needed). Your caregiver will help you come up with a plan to bring your blood pressure within a normal range. Your plan should include the following:  Education  · Read all information provided by your caregivers about how to control blood pressure.  · Educate yourself on the latest guidelines and treatment recommendations. New research is always being done to further define the risks and treatments for high blood pressure.  Lifestyle changes  · Control your weight.  · Avoid smoking.  · Stay physically active.  · Reduce the amount of salt in your diet.  · Reduce stress.  · Control any chronic conditions, such as high cholesterol or diabetes.  · Reduce your alcohol intake.  Medicines  · Several medicines (antihypertensive medicines) are available, if needed, to bring blood pressure within a normal range.  Communication  · Review all the medicines you take with your caregiver because there may be side effects or interactions.  · Talk with your caregiver about your diet, exercise habits, and other lifestyle factors that may be contributing to high blood  pressure.  · See your caregiver regularly. Your caregiver can help you create and adjust your plan for managing high blood pressure.  RECOMMENDATIONS FOR TREATMENT AND FOLLOW-UP   The following recommendations are based on current guidelines for managing high blood pressure in nonpregnant adults. Use these recommendations to identify the proper follow-up period or treatment option based on your blood pressure reading. You can discuss these options with your caregiver.  · Systolic pressure of 120 to 139 or diastolic pressure of 80 to 89: Follow up with your caregiver as directed.  · Systolic pressure of 140 to 160 or diastolic pressure of 90 to 100: Follow up with your caregiver within 2 months.  · Systolic pressure above 160 or diastolic pressure above 100: Follow up with your caregiver within 1 month.  · Systolic pressure above 180 or diastolic pressure above 110: Consider antihypertensive therapy; follow up with your caregiver within 1 week.  · Systolic pressure above 200 or diastolic pressure above 120: Begin antihypertensive therapy; follow up with your caregiver within 1 week.     This information is not intended to replace advice given to you by your health care provider. Make sure you discuss any questions you have with your health care provider.     Document Released: 09/11/2013 Document Reviewed: 09/11/2013  [email or web address] Interactive Patient Education ©2017 [employer].    Heart-Healthy Eating Plan  Heart-healthy meal planning includes:  · Limiting unhealthy fats.  · Increasing healthy fats.  · Making other small dietary changes.  You may need to talk with your doctor or a diet specialist (dietitian) to create an eating plan that is right for you.  WHAT TYPES OF FAT SHOULD I CHOOSE?  · Choose healthy fats. These include olive oil and canola oil, flaxseeds, walnuts, almonds, and seeds.  · Eat more omega-3 fats. These include salmon, mackerel, sardines, tuna, flaxseed oil, and ground flaxseeds. Try to eat fish at  "least twice each week.  · Limit saturated fats.    Saturated fats are often found in animal products, such as meats, butter, and cream.    Plant sources of saturated fats include palm oil, palm kernel oil, and coconut oil.  · Avoid foods with partially hydrogenated oils in them. These include stick margarine, some tub margarines, cookies, crackers, and other baked goods. These contain trans fats.  WHAT GENERAL GUIDELINES DO I NEED TO FOLLOW?  · Check food labels carefully. Identify foods with trans fats or high amounts of saturated fat.  · Fill one half of your plate with vegetables and green salads. Eat 4-5 servings of vegetables per day. A serving of vegetables is:    1 cup of raw leafy vegetables.    ½ cup of raw or cooked cut-up vegetables.    ½ cup of vegetable juice.  · Fill one fourth of your plate with whole grains. Look for the word \"whole\" as the first word in the ingredient list.  · Fill one fourth of your plate with lean protein foods.  · Eat 4-5 servings of fruit per day. A serving of fruit is:    One medium whole fruit.    ¼ cup of dried fruit.    ½ cup of fresh, frozen, or canned fruit.    ½ cup of 100% fruit juice.  · Eat more foods that contain soluble fiber. These include apples, broccoli, carrots, beans, peas, and barley. Try to get 20-30 g of fiber per day.  · Eat more home-cooked food. Eat less restaurant, buffet, and fast food.  · Limit or avoid alcohol.  · Limit foods high in starch and sugar.  · Avoid fried foods.  · Avoid frying your food. Try baking, boiling, grilling, or broiling it instead. You can also reduce fat by:    Removing the skin from poultry.    Removing all visible fats from meats.    Skimming the fat off of stews, soups, and gravies before serving them.    Steaming vegetables in water or broth.  · Lose weight if you are overweight.  · Eat 4-5 servings of nuts, legumes, and seeds per week:    One serving of dried beans or legumes equals ½ cup after being cooked.    One serving " of nuts equals 1½ ounces.    One serving of seeds equals ½ ounce or one tablespoon.  · You may need to keep track of how much salt or sodium you eat. This is especially true if you have high blood pressure. Talk with your doctor or dietitian to get more information.  WHAT FOODS CAN I EAT?  Grains  Breads, including Kazakh, white, tamiko, wheat, raisin, rye, oatmeal, and Italian. Tortillas that are neither fried nor made with lard or trans fat. Low-fat rolls, including hotdog and hamburger buns and English muffins. Biscuits. Muffins. Waffles. Pancakes. Light popcorn. Whole-grain cereals. Flatbread. [place] toast. Pretzels. Breadsticks. Rusks. Low-fat snacks. Low-fat crackers, including oyster, saltine, matzo, danay, animal, and rye. Rice and pasta, including brown rice and pastas that are made with whole wheat.   Vegetables  All vegetables.   Fruits  All fruits, but limit coconut.  Meats and Other Protein Sources  Lean, well-trimmed beef, veal, pork, and lamb. Chicken and turkey without skin. All fish and shellfish. Wild duck, rabbit, pheasant, and venison. Egg whites or low-cholesterol egg substitutes. Dried beans, peas, lentils, and tofu. Seeds and most nuts.  Dairy  Low-fat or nonfat cheeses, including ricotta, string, and mozzarella. Skim or 1% milk that is liquid, powdered, or evaporated. Buttermilk that is made with low-fat milk. Nonfat or low-fat yogurt.  Beverages  Mineral water. Diet carbonated beverages.  Sweets and Desserts  Sherbets and fruit ices. Honey, jam, marmalade, jelly, and syrups. Meringues and gelatins. Pure sugar candy, such as hard candy, jelly beans, gumdrops, mints, marshmallows, and small amounts of dark chocolate. Vasyl food cake.  Eat all sweets and desserts in moderation.  Fats and Oils  Nonhydrogenated (trans-free) margarines. Vegetable oils, including soybean, sesame, sunflower, olive, peanut, safflower, corn, canola, and cottonseed. Salad dressings or mayonnaise made with a vegetable  oil. Limit added fats and oils that you use for cooking, baking, salads, and as spreads.  Other  Cocoa powder. Coffee and tea. All seasonings and condiments.  The items listed above may not be a complete list of recommended foods or beverages. Contact your dietitian for more options.  WHAT FOODS ARE NOT RECOMMENDED?  Grains  Breads that are made with saturated or trans fats, oils, or whole milk. Croissants. Butter rolls. Cheese breads. Sweet rolls. Donuts. Buttered popcorn. Chow mein noodles. High-fat crackers, such as cheese or butter crackers.  Meats and Other Protein Sources  Fatty meats, such as hotdogs, short ribs, sausage, spareribs, gomez, rib eye roast or steak, and mutton. High-fat deli meats, such as salami and bologna. Caviar. Domestic duck and goose. Organ meats, such as kidney, liver, sweetbreads, and heart.  Dairy  Cream, sour cream, cream cheese, and creamed cottage cheese. Whole-milk cheeses, including blue (trino), [place] Yunier, Brie, Igor, American, Havarti, Swiss, cheddar, Camembert, and [place]. Whole or 2% milk that is liquid, evaporated, or condensed. Whole buttermilk. Cream sauce or high-fat cheese sauce. Yogurt that is made from whole milk.  Beverages  Regular sodas and juice drinks with added sugar.  Sweets and Desserts  Frosting. Pudding. Cookies. Cakes other than may food cake. Candy that has milk chocolate or white chocolate, hydrogenated fat, butter, coconut, or unknown ingredients. Buttered syrups. Full-fat ice cream or ice cream drinks.  Fats and Oils  Gravy that has suet, meat fat, or shortening. Cocoa butter, hydrogenated oils, palm oil, coconut oil, palm kernel oil. These can often be found in baked products, candy, fried foods, nondairy creamers, and whipped toppings. Solid fats and shortenings, including gomez fat, salt pork, lard, and butter. Nondairy cream substitutes, such as coffee creamers and sour cream substitutes. Salad dressings that are made of unknown oils, cheese,  or sour cream.  The items listed above may not be a complete list of foods and beverages to avoid. Contact your dietitian for more information.     This information is not intended to replace advice given to you by your health care provider. Make sure you discuss any questions you have with your health care provider.     Document Released: 06/18/2013 Document Revised: 01/08/2016 Document Reviewed: 06/11/2015  [employer] Interactive Patient Education ©2017 [employer]

## 2017-09-21 ENCOUNTER — OFFICE VISIT (OUTPATIENT)
Dept: PSYCHIATRY | Facility: CLINIC | Age: 57
End: 2017-09-21

## 2017-09-21 DIAGNOSIS — F41.3 OTHER MIXED ANXIETY DISORDERS: ICD-10-CM

## 2017-09-21 DIAGNOSIS — F33.1 MAJOR DEPRESSIVE DISORDER, RECURRENT EPISODE, MODERATE (HCC): Primary | ICD-10-CM

## 2017-09-21 PROCEDURE — 90791 PSYCH DIAGNOSTIC EVALUATION: CPT | Performed by: SOCIAL WORKER

## 2017-09-21 NOTE — PROGRESS NOTES
"Date of Service: 2017  Time In: 7:50 AM  Time Out: 8:45 AM        IDENTIFYING INFORMATION:   Damaso Leonard  is a 57 y.o. male who is here today for initial appointment.  Patient reports he currently receives disability benefits as a result of a work injury and continues to live in the home with his wife of 37 years.  Patient reports he has 3 children and 10 grandchildren.    CHIEF COMPLIANT:  \"I have a lot of depression and anxiety\"    HPI: 6 patient reports he has struggled with intermittent periods of depression and anxiety throughout his life but states it was primarily situational in the event of losing a family member.  He reports he had a heart attack in  but successfully return to work.  However, he reports he fell while working outside a lot in 2016 which resulted in extensive damage to his right shoulder.  As a result, he states he was no longer able to work and currently receives disability benefits.  Patient states he has struggled with increased symptoms since this incident in and his inability to continue working including depressed mood, hopelessness, anhedonia, anergia, decreased motivation, low self-esteem, negative self-image, and social isolation.  Patient also reports he continues to struggle with feeling on edge, feeling hypersensitive, becoming easily agitated, increased heart rate, feeling overwhelmed, and a sense of impending doom.  Patient also reports he finds himself ruminating over  family members.  Patient reports no symptoms of matty or hypomania and denies any suicidal ideation.  She also denies any perceptual disturbance.      PAST PSYCHIATRIC HISTORY: The patient continues to be in treatment with [facility].  Patient reports no previous psychiatric hospitalizations and no previous outpatient treatment episodes.  The patient denies any history of suicidal ideation.      SUBSTANCE ABUSE HISTORY: None " reported      MEDICAL HISTORY: Chronic pain post shoulder injury and surgery; history of heart attack with ongoing cardiac rehabilitation      CURRENT MEDICATIONS:  Current Outpatient Prescriptions   Medication Sig Dispense Refill   • amitriptyline (ELAVIL) 50 MG tablet Take 1 tablet by mouth Daily. 30 tablet 5   • aspirin 325 MG tablet Take 1 tablet by mouth Daily. 30 tablet 5   • carvedilol (COREG) 12.5 MG tablet Take 1 tablet by mouth 2 (Two) Times a Day. 60 tablet 5   • citalopram (CeleXA) 40 MG tablet Take 1 tablet by mouth Daily. 30 tablet 5   • cyanocobalamin 1000 MCG/ML injection Inject 1 mL into the shoulder, thigh, or buttocks Every 28 (Twenty-Eight) Days. 1 mL 11   • finasteride (PROSCAR) 5 MG tablet Take 1 tablet by mouth Daily. 30 tablet 5   • gabapentin (NEURONTIN) 100 MG capsule Take 1 capsule by mouth 2 (Two) Times a Day. 60 capsule 2   • glucose blood test strip Use as instructed 100 each 12   • glucose monitor monitoring kit 1 each 3 (Three) Times a Day As Needed (diabetes). 1 each 0   • isosorbide mononitrate (IMDUR) 30 MG 24 hr tablet Take 1 tablet by mouth 2 (Two) Times a Day. 60 tablet 5   • Lancets (ONETOUCH ULTRASOFT) lancets Use as instructed 100 each 12   • metFORMIN (GLUCOPHAGE) 500 MG tablet Take 1 tablet by mouth 2 (Two) Times a Day. 60 tablet 5   • Omega 3 1000 MG capsule 2 twice daily 120 each 5   • pantoprazole (PROTONIX) 40 MG EC tablet Take 1 tablet by mouth Daily. 30 tablet 5   • polyethylene glycol (MIRALAX) packet Take 17 g by mouth Daily. 1 each 5   • raNITIdine (ZANTAC) 150 MG tablet Take 1 tablet by mouth 2 (Two) Times a Day. 60 tablet 5   • simvastatin (ZOCOR) 40 MG tablet Take 1 tablet by mouth Every Night. 30 tablet 5   • vitamin D (ERGOCALCIFEROL) 64897 UNITS capsule capsule Take 1 capsule by mouth Every 7 (Seven) Days. 4 capsule 5     Current Facility-Administered Medications   Medication Dose Route Frequency Provider Last Rate Last Dose   • cyanocobalamin injection 1,000  "mcg  1,000 mcg Intramuscular Q28 Days Clare Quintero, APRN   1,000 mcg at 01/19/17 0918         FAMILY HISTORY: Patient reports positive family history of depression and anxiety on both sides of the family.  He reports no positive family history of substance use.      SOCIAL HISTORY: Patient reports he grew up with his family of origin including his biological parents and 13 siblings and [facility].  He reports he had a good childhood and states \"it couldn't have been any better\".  Patient reports no abuse and no domestic violence.  Patient has worked various jobs throughout his life and recently stopped due to injury.  Patient reports he has been  to his wife for 37 years and has 3 children and 10 grandchildren.  Patient reports he believes in God and identifies as a Hoahaoism and states although he has not attended Hindu for some time he would like to begin.  Patient reports he has a good family life but states he struggles with all of his grandchildren are at his home as he becomes easily irritated.  Patient reports he has no hobbies and states \"all I ever did was work\".  Patient has not been arrested and has not been in the .  Patient reports he is sexually active and considers himself heterosexual.      MENTAL STATUS EXAM:   Hygiene:   good  Cooperation:  Cooperative  Eye Contact:  Good  Psychomotor Behavior:  Appropriate  Affect:  Appropriate  Hopelessness: Denies  Speech:  Normal  Thought Process:  Goal directed  Thought Content:  Normal  Suicidal:  None  Homicidal:  None  Hallucinations:  None  Delusion:  None  Memory:  Intact  Orientation:  Person, Place, Time and Situation  Reliability:  fair  Insight:  Fair  Judgement:  Fair  Impulse Control:  Fair  Physical/Medical Issues:  Yes Chronic pain    PROBLEM LIST:   Depression  Anxiety     STRENGTHS:   Willingness to seek treatment, positive therapeutic relationship with primary care provider, positive support from family, sense of " responsibility to family, stable housing, stable income    WEAKNESSES:  Chronic pain, social isolation, limited coping skills      SHORT-TERM GOALS: Patient will be compliant with clinic appointments.  Patient will be engaged in therapy, medication compliant with minimal side effects. Patient  will report decrease of symptoms and frequency.  Patient will complete Shaw Depression Inventory murdered anxiety inventory and return in next session.    LONG-TERM GOALS: Patient will have cessation of symptoms and be able to function at optimal levels without continued treatment.     PLAN:   Patient will continue in individual outpatient psychotherapy session at [facility] every 3-4 weeks and pharmacotherapy as scheduled.  Patient will adhere to medication regimen as prescribed.       The patient was instructed to contact the clinic, call 911, or present to the nearest emergency room if crisis occurs.       Tha Newton LCSW, LOTUS     This document signed by Tha Newton LCSW, LOTUS September 21, 2017 5:08 PM

## 2017-09-22 ENCOUNTER — APPOINTMENT (OUTPATIENT)
Dept: CARDIAC REHAB | Facility: HOSPITAL | Age: 57
End: 2017-09-22

## 2017-09-22 ENCOUNTER — OFFICE VISIT (OUTPATIENT)
Dept: FAMILY MEDICINE CLINIC | Facility: CLINIC | Age: 57
End: 2017-09-22

## 2017-09-22 VITALS
WEIGHT: 221 LBS | HEART RATE: 108 BPM | BODY MASS INDEX: 31.64 KG/M2 | TEMPERATURE: 99.3 F | HEIGHT: 70 IN | DIASTOLIC BLOOD PRESSURE: 85 MMHG | OXYGEN SATURATION: 97 % | SYSTOLIC BLOOD PRESSURE: 130 MMHG

## 2017-09-22 DIAGNOSIS — M62.838 MUSCLE SPASM: ICD-10-CM

## 2017-09-22 DIAGNOSIS — J30.1 SEASONAL ALLERGIC RHINITIS DUE TO POLLEN: Primary | ICD-10-CM

## 2017-09-22 PROCEDURE — 99213 OFFICE O/P EST LOW 20 MIN: CPT | Performed by: NURSE PRACTITIONER

## 2017-09-22 PROCEDURE — 96372 THER/PROPH/DIAG INJ SC/IM: CPT | Performed by: NURSE PRACTITIONER

## 2017-09-22 RX ORDER — DEXAMETHASONE SODIUM PHOSPHATE 4 MG/ML
8 INJECTION, SOLUTION INTRA-ARTICULAR; INTRALESIONAL; INTRAMUSCULAR; INTRAVENOUS; SOFT TISSUE ONCE
Status: COMPLETED | OUTPATIENT
Start: 2017-09-22 | End: 2017-09-22

## 2017-09-22 RX ORDER — DIPHENHYDRAMINE HCL 25 MG
25 CAPSULE ORAL EVERY 6 HOURS PRN
Qty: 30 CAPSULE | Refills: 0 | Status: SHIPPED | OUTPATIENT
Start: 2017-09-22 | End: 2018-01-29 | Stop reason: SDUPTHER

## 2017-09-22 RX ORDER — AZELASTINE 1 MG/ML
1 SPRAY, METERED NASAL 2 TIMES DAILY
Qty: 1 EACH | Refills: 5 | Status: SHIPPED | OUTPATIENT
Start: 2017-09-22 | End: 2018-01-29

## 2017-09-22 RX ADMIN — DEXAMETHASONE SODIUM PHOSPHATE 8 MG: 4 INJECTION, SOLUTION INTRA-ARTICULAR; INTRALESIONAL; INTRAMUSCULAR; INTRAVENOUS; SOFT TISSUE at 15:32

## 2017-09-22 NOTE — PROGRESS NOTES
"Subjective   Damaso Leonard is a 57 y.o. male.     Chief Complaint   Patient presents with   • Nasal Congestion   • Leg Pain       History of Present Illness     Leg pain-left leg.  He reports has been present for > 1 month.  He reports prior to that he had been having back pain for several days and felt some tingling into his back and upper buttock.  He reports his leg below his buttock area has now become tender.  He denies any numbness.  He reports pressure on the area increases the sensation of muscle pain.  He reports standing at the sink or leaning his head back in the shower also increases the pain.  He has not tried OTC meds.  He does take gabapentin for muscle pain.  He has not had any recent injuries.  He does go to cardiac rehab and goes 3 x's per week.  He reports he will be going till January.  He reports his leg pain is worse with treadmill but does not seem to be exacerbated by bicycle.  The stepper does cause some irritation.  Not at goal.   URI symptoms-\"couple of days\" and seems worse today overall.  He reports nasal rhinorrhea.  PND with cough.  No cough production.  Right ear soreness.  Scratchy throat but \"not sore\".  No SOA with cough.  He is not on any allergy medications.  He reports similar symptoms approx 1 month ago.  Not at goal.     The following portions of the patient's history were reviewed and updated as appropriate: allergies, current medications, past family history, past medical history, past social history, past surgical history and problem list.    Review of Systems   Constitutional: Positive for diaphoresis (not of new onset). Negative for appetite change, fatigue and fever.   HENT: Positive for ear pain, postnasal drip and rhinorrhea. Negative for congestion.    Musculoskeletal: Positive for arthralgias (chronic knee pain), back pain and myalgias (left leg ).   Neurological: Negative for dizziness and headaches.   All other systems reviewed and are negative.      Objective " "    /85 (BP Location: Left arm, Patient Position: Sitting)  Pulse 108  Temp 99.3 °F (37.4 °C) (Tympanic)   Ht 70\" (177.8 cm)  Wt 221 lb (100 kg)  SpO2 97%  BMI 31.71 kg/m2    Physical Exam   Constitutional: He is oriented to person, place, and time. He appears well-developed and well-nourished. No distress.   HENT:   Head: Normocephalic and atraumatic.   Right Ear: External ear normal.   Left Ear: External ear normal.   Nose: Nose normal.   Mouth/Throat: Oropharynx is clear and moist. No oropharyngeal exudate.   Eyes: EOM are normal. Pupils are equal, round, and reactive to light.   Neck: Normal range of motion. Neck supple. No thyromegaly present.   Cardiovascular: Normal rate, regular rhythm, normal heart sounds and intact distal pulses.    No murmur heard.  Pulmonary/Chest: Effort normal and breath sounds normal. No respiratory distress. He has no wheezes. He has no rales. He exhibits no tenderness.   Abdominal: Soft. Bowel sounds are normal. He exhibits no distension and no mass. There is no tenderness. There is no rebound and no guarding.   Musculoskeletal: He exhibits tenderness.        Lumbar back: He exhibits decreased range of motion, tenderness, pain and spasm.        Left upper leg: He exhibits tenderness. He exhibits no swelling and no deformity. Bony tenderness: mid posterior calf.  Muscular appearing.   Decreased lumbar curvature, there is pain with forward flexion. DTR's +2. No edema.   Ulnar deviation bilateral hands.  General stiffness in hands with weak  bilateral .    Lymphadenopathy:     He has no cervical adenopathy.   Neurological: He is alert and oriented to person, place, and time. He has normal reflexes.   Skin: Skin is warm and dry. No rash noted. No erythema. No pallor.   Psychiatric: He has a normal mood and affect. His speech is normal and behavior is normal. Judgment and thought content normal. His affect is not inappropriate. He is not actively hallucinating. Cognition " and memory are normal.   Denies thoughts of hurting self or others. He is attentive.   Nursing note and vitals reviewed.        Assessment/Plan     Problem List Items Addressed This Visit     None      Visit Diagnoses     Seasonal allergic rhinitis due to pollen    -  Primary    Relevant Medications    diphenhydrAMINE (BENADRYL ALLERGY) 25 mg capsule    azelastine (ASTELIN) 0.1 % nasal spray    Muscle spasm        Relevant Medications    dexamethasone (DECADRON) injection 8 mg (Completed)        Heat/Ice alternating.  Do not apply directly to skin.  Gentle stretching of area and massage.  Avoid overuse or activities that exacerbate.  Injections today for acute symptom relief.   Advised to watch blood sugar due to steroid use.    If not improving in 3-5 days report back to office for further work up.         This document has been electronically signed by:  YOUNG Osullivan, ROXANE

## 2017-09-25 ENCOUNTER — APPOINTMENT (OUTPATIENT)
Dept: CARDIAC REHAB | Facility: HOSPITAL | Age: 57
End: 2017-09-25

## 2017-09-27 ENCOUNTER — APPOINTMENT (OUTPATIENT)
Dept: CARDIAC REHAB | Facility: HOSPITAL | Age: 57
End: 2017-09-27

## 2017-09-29 ENCOUNTER — APPOINTMENT (OUTPATIENT)
Dept: CARDIAC REHAB | Facility: HOSPITAL | Age: 57
End: 2017-09-29

## 2017-10-02 ENCOUNTER — APPOINTMENT (OUTPATIENT)
Dept: CARDIAC REHAB | Facility: HOSPITAL | Age: 57
End: 2017-10-02

## 2017-10-04 ENCOUNTER — APPOINTMENT (OUTPATIENT)
Dept: CARDIAC REHAB | Facility: HOSPITAL | Age: 57
End: 2017-10-04

## 2017-10-06 ENCOUNTER — APPOINTMENT (OUTPATIENT)
Dept: CARDIAC REHAB | Facility: HOSPITAL | Age: 57
End: 2017-10-06

## 2017-10-09 ENCOUNTER — APPOINTMENT (OUTPATIENT)
Dept: CARDIAC REHAB | Facility: HOSPITAL | Age: 57
End: 2017-10-09

## 2017-10-11 ENCOUNTER — APPOINTMENT (OUTPATIENT)
Dept: CARDIAC REHAB | Facility: HOSPITAL | Age: 57
End: 2017-10-11

## 2017-10-12 ENCOUNTER — OFFICE VISIT (OUTPATIENT)
Dept: PSYCHIATRY | Facility: CLINIC | Age: 57
End: 2017-10-12

## 2017-10-12 DIAGNOSIS — F33.1 MAJOR DEPRESSIVE DISORDER, RECURRENT EPISODE, MODERATE (HCC): Primary | ICD-10-CM

## 2017-10-12 DIAGNOSIS — F41.3 OTHER MIXED ANXIETY DISORDERS: ICD-10-CM

## 2017-10-12 PROCEDURE — 90837 PSYTX W PT 60 MINUTES: CPT | Performed by: SOCIAL WORKER

## 2017-10-12 NOTE — PROGRESS NOTES
Date of Service: October 12, 2017  Time In: 8:00 AM  Time Out: 9:00 AM      PROGRESS NOTE  Data:  Damaso Leonard is a 57 y.o. male who met with the undersigned for a regularly scheduled individual outpatient psychotherapy session at  [facility] for follow-up of depression and anxiety.  Patient reports he and his family recently went on vacation to [place] and states he enjoyed the trip.  Patient reports he continues to spend the vast majority of his time at home.  He also reports he is been struggling with pain in his left leg which is keeping him from participating in cardiac rehabilitation.     HPI: The patient reports he feels his depression is relatively well controlled and rates current symptoms at 30 scale of 1-10 with 10 being most severe.  However, he states he continues to struggle with anxiety including feeling on edge, feeling overwhelmed, increased heart rate, sweating, difficulty concentrating, and a distinct sense of impending doom.  The patient rates current symptoms of anxiety at 6 on a scale of 1-10 with 10 being most severe.  Patient reports he continues to adhere to medication regimen as prescribed.  The patient adamantly and convincingly denies suicidal ideation vehemently denies any substance use.      Clinical Maneuvering/Intervention:  Assisted patient in processing above session content; acknowledged and normalized patient’s thoughts, feelings, and concerns.  Utilized motivational interviewing techniques to assist the patient in understanding he is currently struggling with the transition from having worked most of his adult life to no longer being able to fulfill this roll.  Also utilized cognitive behavioral therapy to assist the patient in understanding the mutually self-perpetuating nature and social isolation in this patient's symptoms.  Encouraged the patient actively seeking enjoyable activities he can engage in on a regular basis to decrease idle time and  social isolation.  Also challenged the patient to consider his tendency to ruminate about negative issues and encouraged him to practice and utilize thought blocking techniques.  Provided unconditional positive regard in a safe, supportive environment.    Allowed patient to freely discuss issues without interruption or judgment. Provided safe, confidential environment to facilitate the development of positive therapeutic relationship and encourage open, honest communication. Assisted patient in identifying risk factors which would indicate the need for higher level of care including thoughts to harm self or others and/or self-harming behavior and encouraged patient to contact this office, call 911, or present to the nearest emergency room should any of these events occur. Discussed crisis intervention services and means to access.  Patient adamantly and convincingly denies current suicidal or homicidal ideation or perceptual disturbance.    Assessment    Patient appears to maintain relative stability as compared to his baseline.  However, he continues to struggle with the transition from work to receiving disability benefits.  Patient completed manic-depression to inventory with composite score of 23 which indicates moderate depression and burns anxiety inventory with a composite score 33 which indicates moderate/severe anxiety.  As a result, he can be recently connected continue to benefit from treatment and would likely be at increased risk for decompensation in her life.    Diagnoses and all orders for this visit:    Major depressive disorder, recurrent episode, moderate    Other mixed anxiety disorders               Mental Status Exam  Hygiene:  good  Dress:  casual  Attitude:  Cooperative  Motor Activity:  Appropriate  Speech:  Normal  Mood:  anxious  Affect:  calm and pleasant  Thought Processes:  Goal directed  Thought Content:  normal  Suicidal Thoughts:  denies  Homicidal Thoughts:  denies  Crisis Safety  Plan: yes, to come to the emergency room.  Hallucinations:  denies    Patient's Support Network Includes:  wife and children    Progress toward goal: Not at goal    Functional Status: Mild impairment     Prognosis: Fair with Ongoing Treatment     Plan         Patient will continue in individual outpatient psychotherapy sessions every 3-4 weeks at [facility] and pharmacotherapy as scheduled.  Patient will also seek appropriate assessment and treatment for ongoing pain in left leg.  Patient will adhere to medication regimen as prescribed and report any side effects. Patient will contact this office, call 911 or present to the nearest emergency room should suicidal or homicidal ideations occur. Provide Cognitive Behavioral Therapy and Solution Focused Therapy to improve functioning, maintain stability, and avoid decompensation and the need for higher level of care.          Return in about 3 weeks (around 11/02/2017) for Next scheduled follow up.    Tah Newton LCSW, LOTUS October 12, 2017    This document signed by Tha Newton LCSW, LOTUS October 12, 2017 4:56 PM

## 2017-10-13 ENCOUNTER — APPOINTMENT (OUTPATIENT)
Dept: CARDIAC REHAB | Facility: HOSPITAL | Age: 57
End: 2017-10-13

## 2017-10-16 ENCOUNTER — APPOINTMENT (OUTPATIENT)
Dept: CARDIAC REHAB | Facility: HOSPITAL | Age: 57
End: 2017-10-16

## 2017-10-18 ENCOUNTER — OFFICE VISIT (OUTPATIENT)
Dept: FAMILY MEDICINE CLINIC | Facility: CLINIC | Age: 57
End: 2017-10-18

## 2017-10-18 ENCOUNTER — APPOINTMENT (OUTPATIENT)
Dept: CARDIAC REHAB | Facility: HOSPITAL | Age: 57
End: 2017-10-18

## 2017-10-18 VITALS
SYSTOLIC BLOOD PRESSURE: 120 MMHG | HEART RATE: 90 BPM | OXYGEN SATURATION: 96 % | BODY MASS INDEX: 32.07 KG/M2 | TEMPERATURE: 96.7 F | HEIGHT: 70 IN | WEIGHT: 224 LBS | DIASTOLIC BLOOD PRESSURE: 90 MMHG

## 2017-10-18 DIAGNOSIS — E78.00 HYPERCHOLESTEROLEMIA: ICD-10-CM

## 2017-10-18 DIAGNOSIS — E53.8 VITAMIN B 12 DEFICIENCY: ICD-10-CM

## 2017-10-18 DIAGNOSIS — I25.10 CORONARY ARTERY DISEASE INVOLVING NATIVE CORONARY ARTERY OF NATIVE HEART WITHOUT ANGINA PECTORIS: ICD-10-CM

## 2017-10-18 DIAGNOSIS — M54.16 LUMBAR BACK PAIN WITH RADICULOPATHY AFFECTING LEFT LOWER EXTREMITY: ICD-10-CM

## 2017-10-18 DIAGNOSIS — E08.51 DIABETES MELLITUS DUE TO UNDERLYING CONDITION WITH DIABETIC PERIPHERAL ANGIOPATHY WITHOUT GANGRENE, WITHOUT LONG-TERM CURRENT USE OF INSULIN (HCC): ICD-10-CM

## 2017-10-18 DIAGNOSIS — H61.23 EXCESSIVE CERUMEN IN BOTH EAR CANALS: ICD-10-CM

## 2017-10-18 DIAGNOSIS — I11.9: ICD-10-CM

## 2017-10-18 DIAGNOSIS — F33.1 MODERATE EPISODE OF RECURRENT MAJOR DEPRESSIVE DISORDER (HCC): ICD-10-CM

## 2017-10-18 DIAGNOSIS — IMO0001 UNCONTROLLED TYPE 2 DIABETES MELLITUS WITHOUT COMPLICATION, WITHOUT LONG-TERM CURRENT USE OF INSULIN: ICD-10-CM

## 2017-10-18 DIAGNOSIS — Z79.899 HIGH RISK MEDICATION USE: Primary | ICD-10-CM

## 2017-10-18 DIAGNOSIS — E55.9 VITAMIN D DEFICIENCY: ICD-10-CM

## 2017-10-18 LAB
25(OH)D3 SERPL-MCNC: 62 NG/ML
ALBUMIN SERPL-MCNC: 4.4 G/DL (ref 3.5–5)
ALBUMIN/GLOB SERPL: 1.4 G/DL (ref 1.5–2.5)
ALP SERPL-CCNC: 115 U/L (ref 40–129)
ALT SERPL W P-5'-P-CCNC: 35 U/L (ref 10–44)
ANION GAP SERPL CALCULATED.3IONS-SCNC: 7.7 MMOL/L (ref 3.6–11.2)
AST SERPL-CCNC: 23 U/L (ref 10–34)
BILIRUB SERPL-MCNC: 0.6 MG/DL (ref 0.2–1.8)
BUN BLD-MCNC: 12 MG/DL (ref 7–21)
BUN/CREAT SERPL: 11.7 (ref 7–25)
CALCIUM SPEC-SCNC: 9.4 MG/DL (ref 7.7–10)
CHLORIDE SERPL-SCNC: 104 MMOL/L (ref 99–112)
CHOLEST SERPL-MCNC: 203 MG/DL (ref 0–200)
CO2 SERPL-SCNC: 27.3 MMOL/L (ref 24.3–31.9)
CREAT BLD-MCNC: 1.03 MG/DL (ref 0.43–1.29)
GFR SERPL CREATININE-BSD FRML MDRD: 75 ML/MIN/1.73
GLOBULIN UR ELPH-MCNC: 3.1 GM/DL
GLUCOSE BLD-MCNC: 134 MG/DL (ref 70–110)
HBA1C MFR BLD: 7.5 % (ref 4.5–5.7)
HDLC SERPL-MCNC: 47 MG/DL (ref 60–100)
LDLC SERPL CALC-MCNC: 113 MG/DL (ref 0–100)
LDLC/HDLC SERPL: 2.41 {RATIO}
OSMOLALITY SERPL CALC.SUM OF ELEC: 279.3 MOSM/KG (ref 273–305)
POTASSIUM BLD-SCNC: 3.9 MMOL/L (ref 3.5–5.3)
PROT SERPL-MCNC: 7.5 G/DL (ref 6–8)
SODIUM BLD-SCNC: 139 MMOL/L (ref 135–153)
TRIGL SERPL-MCNC: 214 MG/DL (ref 0–150)
TSH SERPL DL<=0.05 MIU/L-ACNC: 5.37 MIU/ML (ref 0.55–4.78)
VIT B12 BLD-MCNC: 520 PG/ML (ref 211–911)
VLDLC SERPL-MCNC: 42.8 MG/DL

## 2017-10-18 PROCEDURE — 80053 COMPREHEN METABOLIC PANEL: CPT | Performed by: NURSE PRACTITIONER

## 2017-10-18 PROCEDURE — 83036 HEMOGLOBIN GLYCOSYLATED A1C: CPT | Performed by: NURSE PRACTITIONER

## 2017-10-18 PROCEDURE — 82306 VITAMIN D 25 HYDROXY: CPT | Performed by: NURSE PRACTITIONER

## 2017-10-18 PROCEDURE — 84443 ASSAY THYROID STIM HORMONE: CPT | Performed by: NURSE PRACTITIONER

## 2017-10-18 PROCEDURE — 80061 LIPID PANEL: CPT | Performed by: NURSE PRACTITIONER

## 2017-10-18 PROCEDURE — 82607 VITAMIN B-12: CPT | Performed by: NURSE PRACTITIONER

## 2017-10-18 PROCEDURE — 99215 OFFICE O/P EST HI 40 MIN: CPT | Performed by: NURSE PRACTITIONER

## 2017-10-18 PROCEDURE — 69210 REMOVE IMPACTED EAR WAX UNI: CPT | Performed by: NURSE PRACTITIONER

## 2017-10-18 RX ORDER — GABAPENTIN 100 MG/1
100 CAPSULE ORAL 2 TIMES DAILY
Qty: 60 CAPSULE | Refills: 2 | Status: SHIPPED | OUTPATIENT
Start: 2017-10-18 | End: 2017-11-29 | Stop reason: SDUPTHER

## 2017-10-18 RX ORDER — CLONAZEPAM 0.5 MG/1
0.5 TABLET ORAL 2 TIMES DAILY PRN
Qty: 45 TABLET | Refills: 0 | Status: SHIPPED | OUTPATIENT
Start: 2017-10-18 | End: 2017-11-29 | Stop reason: SDUPTHER

## 2017-10-18 NOTE — PROGRESS NOTES
Subjective   Damaso Leonard is a 57 y.o. male.     Chief Complaint   Patient presents with   • Med Refill   • Pain   • Anxiety   • Depression       History of Present Illness     Patient is here to follow-up on multiple chronic and acute complaints.    Acute complaints-patient reports that he has drainage in both ears.      Acute complaint never 2-patient reports that he has low back pain with radiation into the left lower extremity.  Pain is a numb tingly tingling heavy feeling.  This has been present for approximately 1-2 months.      d deficiency-needs labs  Vitamin B12 deficiency-needs labs    Diabetes with circulatory complication-patient reports that he has difficulty paying for testing strips and therefore does not always check his blood sugar regularly.  When he does check it has been within acceptable ranges.  He does watch his diet.  Patient is compliant with his current medication regimen.  He denies any symptoms of hyperglycemia or hypoglycemia.  Patient does take Neurontin 100 mg twice daily for his diabetic nerves/foot pain.  He reports this is helpful.  He has no history of substance abuse or addiction.      Cardiovascular disease/stents ×4/history of angina and hypercholesterolemia - patient reports that he is compliant with cardiac diet.  He currently takes and Omega 3 supplement and Zocor.  He is under the care of cardiology with every 6 month follow-up visits.  Patient reports compliance with his current cardiac/hypertensive medications.    GERD/stable with Zantac and Protonix.     Depression with anxiety/patient is currently under the care of Piyush Sanchez certified .  He has been going through counseling for his chronic depression with anxiety.  His and site and depression are partly related to his inability to work and financial difficulties due to his health status.  He denies any thoughts of hurting himself or others.  He does feel down and sad at times due to his health  "condition and inability to work.  He is having some financial difficulties which also creating a mild nerve.  All or something that can help calm him down.  Patient reports that he has failed SSRI treatment in the past.      BPH-stable without symptoms with her medication.    The following portions of the patient's history were reviewed and updated as appropriate: allergies, current medications, past family history, past medical history, past social history, past surgical history and problem list.    Review of Systems   Constitutional: Positive for fatigue. Negative for activity change, appetite change, fever and unexpected weight change.   HENT: Positive for ear discharge.    Eyes: Positive for visual disturbance (Glasses).   Respiratory: Negative for cough, shortness of breath and wheezing.    Cardiovascular: Negative for chest pain, palpitations and leg swelling.   Gastrointestinal: Negative for abdominal pain, blood in stool, constipation, diarrhea, nausea and vomiting.   Endocrine: Negative.    Genitourinary: Negative for dysuria, frequency and hematuria.   Musculoskeletal: Positive for arthralgias, back pain, myalgias and neck pain. Negative for neck stiffness.   Skin: Negative for rash and wound.   Neurological: Negative.    Hematological: Negative.    Psychiatric/Behavioral: Positive for dysphoric mood and sleep disturbance. Negative for suicidal ideas. The patient is nervous/anxious.    All other systems reviewed and are negative.      Objective     /90 (BP Location: Left arm, Patient Position: Sitting, Cuff Size: Adult)  Pulse 90  Temp 96.7 °F (35.9 °C) (Tympanic)   Ht 70\" (177.8 cm)  Wt 224 lb (102 kg)  SpO2 96%  BMI 32.14 kg/m2  Orders Only on 07/11/2017   Component Date Value Ref Range Status   • Glucose 07/11/2017 122* 70 - 110 mg/dL Final   • BUN 07/11/2017 13  7 - 21 mg/dL Final   • Creatinine 07/11/2017 1.01  0.43 - 1.29 mg/dL Final   • Sodium 07/11/2017 139  135 - 153 mmol/L Final   • " Potassium 07/11/2017 4.0  3.5 - 5.3 mmol/L Final   • Chloride 07/11/2017 102  99 - 112 mmol/L Final   • CO2 07/11/2017 28.4  24.3 - 31.9 mmol/L Final   • Calcium 07/11/2017 9.7  7.7 - 10.0 mg/dL Final   • Total Protein 07/11/2017 7.6  6.0 - 8.0 g/dL Final   • Albumin 07/11/2017 4.50  3.50 - 5.00 g/dL Final   • ALT (SGPT) 07/11/2017 28  10 - 44 U/L Final   • AST (SGOT) 07/11/2017 20  10 - 34 U/L Final   • Alkaline Phosphatase 07/11/2017 113  40 - 129 U/L Final   • Total Bilirubin 07/11/2017 0.6  0.2 - 1.8 mg/dL Final   • eGFR Non African Amer 07/11/2017 77  >60 mL/min/1.73 Final   • Globulin 07/11/2017 3.1  gm/dL Final   • A/G Ratio 07/11/2017 1.5  1.5 - 2.5 g/dL Final   • BUN/Creatinine Ratio 07/11/2017 12.9  7.0 - 25.0 Final   • Anion Gap 07/11/2017 8.6  3.6 - 11.2 mmol/L Final   • TSH 07/11/2017 5.736* 0.550 - 4.780 mIU/mL Final   • Hemoglobin A1C 07/11/2017 7.20* 4.50 - 5.70 % Final   • 25 Hydroxy, Vitamin D 07/11/2017 88.0  ng/ml Final   • Total Cholesterol 07/11/2017 170  0 - 200 mg/dL Final   • Triglycerides 07/11/2017 163* 0 - 150 mg/dL Final   • HDL Cholesterol 07/11/2017 45* 60 - 100 mg/dL Final   • LDL Cholesterol  07/11/2017 92  0 - 100 mg/dL Final   • VLDL Cholesterol 07/11/2017 32.6  mg/dL Final   • LDL/HDL Ratio 07/11/2017 2.05   Final   • Microalbumin, Urine 07/11/2017 19.0  mg/L Final   • Vitamin B-12 07/11/2017 376  211 - 911 pg/mL Final   • Osmolality Calc 07/11/2017 279.0  273.0 - 305.0 mOsm/kg Final       Physical Exam   Constitutional: He is oriented to person, place, and time. He appears well-developed and well-nourished. No distress.   HENT:   Head: Normocephalic.   Right Ear: Hearing and external ear normal. There is drainage.   Left Ear: Hearing, tympanic membrane and external ear normal. There is drainage.   Nose: Nose normal.   Mouth/Throat: Oropharynx is clear and moist.   Bilateral ear canals impacted with cerumen, removed via provider with flexi-loop. Honey colored cerumen removed,  tolerated well.      Eyes: Pupils are equal, round, and reactive to light.   Neck: Normal range of motion. Neck supple. No tracheal deviation present. No thyromegaly present.   Cardiovascular: Normal rate, regular rhythm and normal heart sounds.  Exam reveals no gallop and no friction rub.    No murmur heard.  Pulmonary/Chest: Effort normal and breath sounds normal. No respiratory distress. He has no wheezes. He has no rales. He exhibits no tenderness.   Abdominal: Soft. Bowel sounds are normal. He exhibits no distension and no mass. There is no tenderness. There is no rebound and no guarding.   Musculoskeletal:        Right knee: Tenderness found.        Left knee: Tenderness found.        Cervical back: He exhibits spasm.        Lumbar back: He exhibits tenderness.        Right hand: He exhibits decreased range of motion and deformity.        Left hand: He exhibits decreased range of motion and deformity.        Left upper leg: He exhibits tenderness.   Left buttock with tenderness to palpation.  Low back pain with radiation into the left buttock and left thigh.  Negative Homans bilateral.   Neurological: He is alert and oriented to person, place, and time. He has normal reflexes.   Reflex Scores:       Patellar reflexes are 2+ on the right side and 2+ on the left side.  CN 2-12 grossly intact    Skin: Skin is warm and dry. No rash noted. He is not diaphoretic. No erythema.   Psychiatric: He has a normal mood and affect. His speech is normal and behavior is normal. Judgment and thought content normal. Cognition and memory are normal.   Vitals reviewed.      Assessment/Plan     Problem List Items Addressed This Visit        Cardiovascular and Mediastinum    Malignant hypertension with heart disease, without congestive heart failure    Relevant Orders    MicroAlbumin, Urine, Random - Urine, Clean Catch    Coronary artery disease involving native coronary artery of native heart    Hypercholesterolemia    Relevant  Orders    Lipid Panel       Digestive    Vitamin D deficiency    Relevant Orders    Vitamin D 25 Hydroxy    Vitamin B 12 deficiency    Relevant Orders    Vitamin B12       Endocrine    Diabetes mellitus with circulatory complication, without long-term current use of insulin    Relevant Medications    gabapentin (NEURONTIN) 100 MG capsule       Nervous and Auditory    Excessive cerumen in both ear canals       Other    Depression      Other Visit Diagnoses     High risk medication use    -  Primary    Relevant Orders    Urine Drug Screen - Urine, Clean Catch    Uncontrolled type 2 diabetes mellitus without complication, without long-term current use of insulin        Relevant Medications    gabapentin (NEURONTIN) 100 MG capsule    Other Relevant Orders    CBC & Differential    Comprehensive Metabolic Panel    TSH    Hemoglobin A1c    Lumbar back pain with radiculopathy affecting left lower extremity        Relevant Orders    MRI Lumbar Spine Without Contrast          Refill routine medication.  Urine drug screen today.     request Danny today.  Time spent face-to-face with this patient today was 42 minutes.  Approximately 15% this time was spent counseling regarding new medications, anxiety reduction and stress reduction techniques.  Medication of Klonopin 0.5 mg 1 by mouth twice a day when necessary #45 with no refill.  Refill Neurontin 100 mg twice a day #60 with 2 refills.  Rashes been counseled regarding possible misuse and addiction of controlled medication.  Continue therapy with Tha Newton, licensed certified .  Emotional support and active listening provided.   I have discussed diagnosis in detail today allowing time for questions and answers. Pt is aware of reasons to seek urgent or emergent medical care as well as reasons to return to the clinic for evaluation. Possible side effects, interactions and progression of symptoms discussed as well. Pt / family states understanding.   Fasting labs  today.  Schedule MRI of the lumbar spine.  No lifting, twisting or bending.  Recommend patient follow up in 4-6 weeks, sooner if needed.  We will wait on MRI before we refer for physical therapy or neuro/Orth O consult.      Dragon disc patient device was used with this dictation and may be responsible for any grammatic errors.            This document has been electronically signed by:  YOUNG Conway, NP-C

## 2017-10-20 NOTE — PROGRESS NOTES
Called patient and advised of current labs. Advise him to start a low carb and low fat diet. Advised on stricter diet. He said he could not exercise due to his leg pain. I told him to rambo his calendar to come back in for repeat labs in 8 weeks. DIONTE

## 2017-11-02 ENCOUNTER — OFFICE VISIT (OUTPATIENT)
Dept: PSYCHIATRY | Facility: CLINIC | Age: 57
End: 2017-11-02

## 2017-11-02 DIAGNOSIS — F41.3 OTHER MIXED ANXIETY DISORDERS: ICD-10-CM

## 2017-11-02 DIAGNOSIS — F33.1 MAJOR DEPRESSIVE DISORDER, RECURRENT EPISODE, MODERATE (HCC): Primary | ICD-10-CM

## 2017-11-02 PROCEDURE — 90834 PSYTX W PT 45 MINUTES: CPT | Performed by: SOCIAL WORKER

## 2017-11-02 NOTE — PROGRESS NOTES
Date of Service: November 2, 2017  Time In: 11:00 AM  Time Out: 11:40 AM      PROGRESS NOTE  Data:  Damaso Leonard is a 57 y.o. male who met with the undersigned for a regularly scheduled individual outpatient psychotherapy session at  [facility] for follow-up of depression and anxiety.     HPI: Patient reports his symptoms have decreased somewhat since beginning clonazepam.  However, he continues to struggle with depression as evidenced by depressed mood, anhedonia, anergia, periods of hopelessness, feeling useless, low self-esteem, negative self-image, and social isolation.  The patient rates current symptoms of depression at a 4 on a scale of 1-10 with 10 being most severe.  Patient also continues to struggle with anxiety including anxious mood, feeling on edge, feeling overwhelmed, trembling, racing thoughts, and a periodic sense of impending doom.  The patient rates current symptoms of anxiety at a 5 on a scale of 1-10 with 10 being most severe.  Patient also reports he continues to struggle with significant pain in his left leg and states this keeps him from being more active.  Patient reports he continues to adhere to medication regimen as prescribed.  Patient adamantly convincingly denies suicidal ideation and vehemently denies any substance use.      Clinical Maneuvering/Intervention:  Assisted patient in processing above session content; acknowledged and normalized patient’s thoughts, feelings, and concerns.  Utilized motivational techniques including complex reflections to assist the patient in recognizing his tendency to be sedentary in an attempt to avoid pain.  As a result, utilize cognitive behavioral therapy to assist the patient in challenging his perception that being sedentary will improve his pain and overall health.  Also assisted patient in developing appropriate coping mechanisms to decrease severity and frequency of symptoms including actively seeking activities he  can engage in regular basis, participating in daily household chores, positive self talk, and challenging faulty, irrational thoughts with factual and counter arguments.  Provided unconditional positive regard a safe, supportive environment.    Allowed patient to freely discuss issues without interruption or judgment. Provided safe, confidential environment to facilitate the development of positive therapeutic relationship and encourage open, honest communication. Assisted patient in identifying risk factors which would indicate the need for higher level of care including thoughts to harm self or others and/or self-harming behavior and encouraged patient to contact this office, call 911, or present to the nearest emergency room should any of these events occur. Discussed crisis intervention services and means to access.  Patient adamantly and convincingly denies current suicidal or homicidal ideation or perceptual disturbance.    Assessment    Patient's symptoms appear to be somewhat improved.  However, he continues to struggle with depression and anxiety which continue to primarily be precipitated by external factors including ongoing health issues and his inability to work.  As a result, the patient can be reasonably expected to continue to benefit from treatment and would likely be at increased risk for decompensation otherwise.    Diagnoses and all orders for this visit:    Major depressive disorder, recurrent episode, moderate    Other mixed anxiety disorders               Mental Status Exam  Hygiene:  good  Dress:  casual  Attitude:  Cooperative  Motor Activity:  Appropriate  Speech:  Normal  Mood:  anxious  Affect:  calm and pleasant  Thought Processes:  Goal directed  Thought Content:  normal  Suicidal Thoughts:  denies  Homicidal Thoughts:  denies  Crisis Safety Plan: yes, to come to the emergency room.  Hallucinations:  denies    Patient's Support Network Includes:  wife and children    Progress toward  goal: Not at goal    Functional Status: Mild impairment     Prognosis: Fair with Ongoing Treatment     Plan         Patient will continue in individual outpatient psychotherapy session at [facility] every 3 weeks and pharmacotherapy as scheduled with primary care provider in this office.  Patient will adhere to medication regimen as prescribed and report any side effects. Patient will contact this office, call 911 or present to the nearest emergency room should suicidal or homicidal ideations occur. Provide Cognitive Behavioral Therapy and Solution Focused Therapy to improve functioning, maintain stability, and avoid decompensation and the need for higher level of care.          Return in about 3 weeks (around 11/23/2017) for Next scheduled follow up.    Tha Newton LCSW, LOTUS November 2, 2017    This document signed by Tha Newton LCSW, LOTUS November 2, 2017 4:26 PM

## 2017-11-20 ENCOUNTER — OFFICE VISIT (OUTPATIENT)
Dept: PSYCHIATRY | Facility: CLINIC | Age: 57
End: 2017-11-20

## 2017-11-20 DIAGNOSIS — F33.41 MAJOR DEPRESSIVE DISORDER, RECURRENT, IN PARTIAL REMISSION (HCC): Primary | ICD-10-CM

## 2017-11-20 DIAGNOSIS — F41.3 OTHER MIXED ANXIETY DISORDERS: ICD-10-CM

## 2017-11-20 PROCEDURE — 90834 PSYTX W PT 45 MINUTES: CPT | Performed by: SOCIAL WORKER

## 2017-11-20 NOTE — PROGRESS NOTES
Date of Service: November 20, 2017  Time In: 7:45 AM  Time Out: 8:28 AM      PROGRESS NOTE  Data:  Damaso Leonard is a 57 y.o. male who met with the undersigned for a regularly scheduled individual outpatient psychotherapy session at  [facility] for follow-up of depression and anxiety.  Patient reports he is looking forward to the holidays and states his entire family will gather for Thanksgiving.     HPI: Patient reports he feels he is doing significantly better and reports minimal symptoms of depression which she currently rates at a 2/3 on a scale 1-10 with 10 being most severe.  He also reports anxiety has improved states he continues to struggle with periods of feeling on edge, hyperarousal, feeling overwhelmed, and encouraged to escape the situation.  The patient rates current symptoms of anxiety at a 3 on a scale of 1-10 with 10 being most severe.  Patient reports he continues to struggle with ongoing back and leg pain and states this office is working to schedule him for an MRI.  The patient reports he continues to adhere to medication regimen as prescribed.  Patient adamantly convincingly denies suicidal ideation vehemently denies any substance use.      Clinical Maneuvering/Intervention:  Assisted patient in processing above session content; acknowledged and normalized patient’s thoughts, feelings, and concerns.  Utilized motivational interviewing techniques including complex questions to assist the patient in recognizing his progress and encouraged him to acknowledge he is doing better.  Also utilized cognitive behavioral therapy to assist the patient in identifying and acknowledging coping mechanisms he is found to be effective in reducing the severity and frequency of symptoms including positive self talk, relaxation techniques, thought blocking techniques, and focusing on healthy skills of daily living.  Provided unconditional positive regard in a safe, supportive  environment.    Allowed patient to freely discuss issues without interruption or judgment. Provided safe, confidential environment to facilitate the development of positive therapeutic relationship and encourage open, honest communication. Assisted patient in identifying risk factors which would indicate the need for higher level of care including thoughts to harm self or others and/or self-harming behavior and encouraged patient to contact this office, call 911, or present to the nearest emergency room should any of these events occur. Discussed crisis intervention services and means to access.  Patient adamantly and convincingly denies current suicidal or homicidal ideation or perceptual disturbance.    Assessment    Patient appears to have made moderate improvement as evidence by decrease symptoms of depression and anxiety.  As a result, the patient does not require treatment every 3-4 weeks and will therefore be scheduled for follow-up appointment in 2 months.    Diagnoses and all orders for this visit:    Major depressive disorder, recurrent, in partial remission    Other mixed anxiety disorders               Mental Status Exam  Hygiene:  good  Dress:  casual  Attitude:  Cooperative  Motor Activity:  Appropriate  Speech:  Normal  Mood:  euthymic  Affect:  calm and pleasant  Thought Processes:  Goal directed  Thought Content:  normal  Suicidal Thoughts:  denies  Homicidal Thoughts:  denies  Crisis Safety Plan: yes, to come to the emergency room.  Hallucinations:  denies    Patient's Support Network Includes:  wife and children    Progress toward goal: Not at goal    Functional Status: No impairment    Prognosis: Fair with Ongoing Treatment     Plan         Patient will continue in individual outpatient psychotherapy session at [facility] with next Follow-up in 2 months.  Patient will continue in pharmacotherapy with YOUNG Malloy and adhere to medication regimen as prescribed  and report any side effects. Patient will contact this office, call 911 or present to the nearest emergency room should suicidal or homicidal ideations occur. Provide Cognitive Behavioral Therapy and Integrative Therapy to improve functioning, maintain stability, and avoid decompensation and the need for higher level of care.          Return in about 2 months (around 01/20/2018).      This document signed by Tha Newton LCSW, November 20, 2017 3:56 PM

## 2017-11-20 NOTE — TREATMENT PLAN
Multi-Disciplinary Problems (from Behavioral Health Treatment Plan)    Active Problems     Problem: Anxiety (Priority: --)  (Start Date: 11/02/17) (Resolve Date: --)    Problem Details:  The patient self-scales this problem as a 6 with 10 being the worst.         Goal Start Date End Date    Patient will develop and implement behavioral and cognitive strategies to reduce anxiety and irrational fears. 11/02/17 --    Goal Details:  Progress toward goal:  Not appropriate to rate progress toward goal since this is the initial treatment plan.         Goal Intervention Frequency Start Date End Date    Help patient explore past emotional issues in relation to present anxiety. Q Month 11/02/17 11/02/18    Intervention Details:  Duration of treatment until remission of symptoms.         Goal Intervention Frequency Start Date End Date    Help patient develop an awareness of their cognitive and physical responses to anxiety. Q Month 11/02/17 11/02/17    Intervention Details:  Duration of treatment until remission of symptoms.               Problem: Depression (Priority: --)  (Start Date: 11/02/17) (Resolve Date: --)    Problem Details:  The patient self-scales this problem as a 5 with 10 being the worst.         Goal Start Date End Date    Patient will demonstrate the ability to initiate new constructive life skills outside of sessions on a consistent basis. 11/02/17 --    Goal Details:  Progress toward goal:  Not appropriate to rate progress toward goal since this is the initial treatment plan.         Goal Intervention Frequency Start Date End Date    Provide education about depression Q Month 11/02/17 11/02/18    Intervention Details:  Duration of treatment until remission of symptoms.         Goal Intervention Frequency Start Date End Date    Assist patient in developing healthy coping strategies. Q Month 11/02/17 11/02/18    Intervention Details:  Duration of treatment until remission of symptoms.         Goal Intervention  Frequency Start Date End Date    Assist patient in setting attainable activities of daily living goals. Q Month 11/02/17 11/02/17    Intervention Details:  Patient will find enjoyable activities he can engage in a regular basis to decrease idle time and social isolation.  Patient will also focus on eating a healthy diet, getting as much physical activity as possible, and getting adequate sleep.  Patient will take a walk at least 2-3 times weekly as tolerated.                          I have discussed and reviewed this treatment plan with the patient.  It has been printed for signatures.

## 2017-11-29 ENCOUNTER — OFFICE VISIT (OUTPATIENT)
Dept: FAMILY MEDICINE CLINIC | Facility: CLINIC | Age: 57
End: 2017-11-29

## 2017-11-29 VITALS
SYSTOLIC BLOOD PRESSURE: 117 MMHG | DIASTOLIC BLOOD PRESSURE: 85 MMHG | WEIGHT: 226 LBS | HEIGHT: 70 IN | HEART RATE: 97 BPM | OXYGEN SATURATION: 98 % | BODY MASS INDEX: 32.35 KG/M2 | TEMPERATURE: 97.7 F

## 2017-11-29 DIAGNOSIS — I11.9: Primary | ICD-10-CM

## 2017-11-29 DIAGNOSIS — I25.10 CORONARY ARTERY DISEASE INVOLVING NATIVE CORONARY ARTERY OF NATIVE HEART WITHOUT ANGINA PECTORIS: ICD-10-CM

## 2017-11-29 DIAGNOSIS — E78.00 HYPERCHOLESTEROLEMIA: ICD-10-CM

## 2017-11-29 DIAGNOSIS — H61.23 EXCESSIVE CERUMEN IN BOTH EAR CANALS: ICD-10-CM

## 2017-11-29 DIAGNOSIS — M15.9 OSTEOARTHRITIS INVOLVING MULTIPLE JOINTS ON BOTH SIDES OF BODY: ICD-10-CM

## 2017-11-29 DIAGNOSIS — IMO0001 UNCONTROLLED TYPE 2 DIABETES MELLITUS WITHOUT COMPLICATION, WITHOUT LONG-TERM CURRENT USE OF INSULIN: ICD-10-CM

## 2017-11-29 DIAGNOSIS — Z79.899 HIGH RISK MEDICATION USE: ICD-10-CM

## 2017-11-29 DIAGNOSIS — E53.8 VITAMIN B 12 DEFICIENCY: ICD-10-CM

## 2017-11-29 DIAGNOSIS — M54.16 LUMBAR BACK PAIN WITH RADICULOPATHY AFFECTING LEFT LOWER EXTREMITY: ICD-10-CM

## 2017-11-29 DIAGNOSIS — E55.9 VITAMIN D DEFICIENCY: ICD-10-CM

## 2017-11-29 DIAGNOSIS — E08.51 DIABETES MELLITUS DUE TO UNDERLYING CONDITION WITH DIABETIC PERIPHERAL ANGIOPATHY WITHOUT GANGRENE, WITHOUT LONG-TERM CURRENT USE OF INSULIN (HCC): ICD-10-CM

## 2017-11-29 PROCEDURE — 99214 OFFICE O/P EST MOD 30 MIN: CPT | Performed by: NURSE PRACTITIONER

## 2017-11-29 PROCEDURE — 90686 IIV4 VACC NO PRSV 0.5 ML IM: CPT | Performed by: NURSE PRACTITIONER

## 2017-11-29 PROCEDURE — 90471 IMMUNIZATION ADMIN: CPT | Performed by: NURSE PRACTITIONER

## 2017-11-29 PROCEDURE — 69210 REMOVE IMPACTED EAR WAX UNI: CPT | Performed by: NURSE PRACTITIONER

## 2017-11-29 RX ORDER — CLONAZEPAM 0.5 MG/1
0.5 TABLET ORAL 2 TIMES DAILY PRN
Qty: 60 TABLET | Refills: 0 | Status: SHIPPED | OUTPATIENT
Start: 2017-11-29 | End: 2017-12-20 | Stop reason: SDUPTHER

## 2017-11-29 RX ORDER — GABAPENTIN 100 MG/1
100 CAPSULE ORAL 2 TIMES DAILY
Qty: 60 CAPSULE | Refills: 2 | Status: SHIPPED | OUTPATIENT
Start: 2017-11-29 | End: 2018-01-29 | Stop reason: SDUPTHER

## 2017-11-29 RX ORDER — CLONAZEPAM 0.5 MG/1
0.5 TABLET ORAL 2 TIMES DAILY PRN
Qty: 45 TABLET | Refills: 0 | Status: SHIPPED | OUTPATIENT
Start: 2017-11-29 | End: 2017-11-29 | Stop reason: SDUPTHER

## 2017-11-29 RX ORDER — TRAMADOL HYDROCHLORIDE 50 MG/1
50 TABLET ORAL EVERY 8 HOURS PRN
Qty: 90 TABLET | Refills: 2 | Status: SHIPPED | OUTPATIENT
Start: 2017-11-29 | End: 2018-01-29 | Stop reason: SDUPTHER

## 2017-11-29 NOTE — PROGRESS NOTES
Subjective   Damaso Leonard is a 57 y.o. male.     Chief Complaint   Patient presents with   • Osteoarthritis   • Anxiety   • Coronary Artery Disease       History of Present Illness     Diabetes with circulatory complication-patient reports that he has difficulty paying for testing strips and therefore does not always check his blood sugar regularly.  When he does check it has been within acceptable ranges.  He does watch his diet.  Patient is compliant with his current medication regimen.  He denies any symptoms of hyperglycemia or hypoglycemia.  Patient does take Neurontin 100 mg twice daily for his diabetic nerves/foot pain.  He reports this is helpful.  He has no history of substance abuse or addiction.       Cardiovascular disease/stents ×4/history of angina and hypercholesterolemia - patient reports that he is compliant with cardiac diet.  He currently takes and Omega 3 supplement and Zocor.  He is under the care of cardiology with every 6 month follow-up visits.  Patient reports compliance with his current cardiac/hypertensive medications.     GERD/stable with Zantac and Protonix.      Depression with anxiety/patient is currently under the care of Piyush Sanchez certified .  He has been going through counseling for his chronic depression with anxiety.  His and site and depression are partly related to his inability to work and financial difficulties due to his health status.  He denies any thoughts of hurting himself or others.  He does feel down and sad at times due to his health condition and inability to work.  He is having some financial difficulties which also creating a mild nerve.  All or something that can help calm him down.  Patient reports that he has failed SSRI treatment in the past.  Pt reports an improvement in his anxiety with use of Klonopin. He denies substance abuse or addiction.      BPH-stable without symptoms with her medication.      Chronic osteoarthritis and joint  pain which is present greater than 6 months-patient has low back pain with radiation into the left lower extremity.  He is having numbness radiating from his left buttock into his left lower extremity.  Left leg feels numb and tingles.  Patient hasn't been doing cardiac rehabilitation as his physical therapy up until the past few weeks.  He reports that he has not had any improvement in his symptoms.  Patient rates his low back pain as 8 on a pain scale  of 0-10.  Pain is sharp and burning.  He also has pain in his hands, wrists, hips and knees.  This pain is rated 7 on pain scale rating of 0-10.  Patient at times takes Ultram to decrease his pain and stiffness.  His pain in multiple joints as described as aching, throbbing and stiffness.  This pain does interfere with ability to perform activities of daily living and have enjoyment of life.  He denies any substance abuse or addiction.      The following portions of the patient's history were reviewed and updated as appropriate: allergies, current medications, past family history, past medical history, past social history, past surgical history and problem list.       The following portions of the patient's history were reviewed and updated as appropriate: allergies, current medications, past family history, past medical history, past social history, past surgical history and problem list.    Review of Systems   Constitutional: Positive for activity change. Negative for appetite change, chills, fatigue, fever and unexpected weight change.   Eyes: Positive for visual disturbance (glasses).   Respiratory: Negative for cough, chest tightness, shortness of breath and wheezing.    Cardiovascular: Negative for chest pain, palpitations and leg swelling.   Gastrointestinal: Negative for abdominal pain, blood in stool, constipation, diarrhea, nausea and vomiting.   Endocrine: Negative.    Genitourinary: Positive for difficulty urinating (bph). Negative for flank pain.  "  Allergic/Immunologic: Negative.    Neurological: Negative.    Hematological: Negative.    Psychiatric/Behavioral: Negative for dysphoric mood, self-injury and suicidal ideas. The patient is nervous/anxious.    All other systems reviewed and are negative.      Objective     /85 (BP Location: Left arm, Patient Position: Sitting, Cuff Size: Adult)  Pulse 97  Temp 97.7 °F (36.5 °C) (Oral)   Ht 70\" (177.8 cm)  Wt 226 lb (103 kg)  SpO2 98%  BMI 32.43 kg/m2  Office Visit on 10/18/2017   Component Date Value Ref Range Status   • Glucose 10/18/2017 134* 70 - 110 mg/dL Final   • BUN 10/18/2017 12  7 - 21 mg/dL Final   • Creatinine 10/18/2017 1.03  0.43 - 1.29 mg/dL Final   • Sodium 10/18/2017 139  135 - 153 mmol/L Final   • Potassium 10/18/2017 3.9  3.5 - 5.3 mmol/L Final   • Chloride 10/18/2017 104  99 - 112 mmol/L Final   • CO2 10/18/2017 27.3  24.3 - 31.9 mmol/L Final   • Calcium 10/18/2017 9.4  7.7 - 10.0 mg/dL Final   • Total Protein 10/18/2017 7.5  6.0 - 8.0 g/dL Final   • Albumin 10/18/2017 4.40  3.50 - 5.00 g/dL Final   • ALT (SGPT) 10/18/2017 35  10 - 44 U/L Final   • AST (SGOT) 10/18/2017 23  10 - 34 U/L Final   • Alkaline Phosphatase 10/18/2017 115  40 - 129 U/L Final   • Total Bilirubin 10/18/2017 0.6  0.2 - 1.8 mg/dL Final   • eGFR Non  Amer 10/18/2017 75  >60 mL/min/1.73 Final   • Globulin 10/18/2017 3.1  gm/dL Final   • A/G Ratio 10/18/2017 1.4* 1.5 - 2.5 g/dL Final   • BUN/Creatinine Ratio 10/18/2017 11.7  7.0 - 25.0 Final   • Anion Gap 10/18/2017 7.7  3.6 - 11.2 mmol/L Final   • Total Cholesterol 10/18/2017 203* 0 - 200 mg/dL Final   • Triglycerides 10/18/2017 214* 0 - 150 mg/dL Final   • HDL Cholesterol 10/18/2017 47* 60 - 100 mg/dL Final   • LDL Cholesterol  10/18/2017 113* 0 - 100 mg/dL Final   • VLDL Cholesterol 10/18/2017 42.8  mg/dL Final   • LDL/HDL Ratio 10/18/2017 2.41   Final   • TSH 10/18/2017 5.369* 0.550 - 4.780 mIU/mL Final   • Hemoglobin A1C 10/18/2017 7.50* 4.50 - 5.70 % " Final   • 25 Hydroxy, Vitamin D 10/18/2017 62.0  ng/ml Final   • Vitamin B-12 10/18/2017 520  211 - 911 pg/mL Final   • Osmolality Calc 10/18/2017 279.3  273.0 - 305.0 mOsm/kg Final       Physical Exam   Constitutional: He is oriented to person, place, and time. Vital signs are normal. He appears well-developed and well-nourished. No distress.   Very pleasant adult male.   HENT:   Head: Normocephalic.   Right Ear: Hearing and external ear normal. There is drainage.   Left Ear: Hearing, tympanic membrane and external ear normal. There is drainage.   Nose: Nose normal.   Mouth/Throat: Oropharynx is clear and moist.   Bilateral ear canals impacted with cerumen, removed via provider with flexi-loop. Honey colored cerumen removed, tolerated well.      Eyes: Pupils are equal, round, and reactive to light.   Neck: Normal range of motion. Neck supple. No tracheal deviation present. No thyromegaly present.   Cardiovascular: Normal rate, regular rhythm and normal heart sounds.  Exam reveals no gallop and no friction rub.    No murmur heard.  Pulmonary/Chest: Effort normal and breath sounds normal. No respiratory distress. He has no wheezes. He has no rales. He exhibits no tenderness.   Abdominal: Soft. Bowel sounds are normal. He exhibits no distension and no mass. There is no tenderness. There is no rebound and no guarding.   Musculoskeletal:        Right knee: Tenderness found.        Left knee: Tenderness found.        Cervical back: He exhibits spasm.        Lumbar back: He exhibits tenderness.        Right hand: He exhibits decreased range of motion and deformity.        Left hand: He exhibits decreased range of motion and deformity.        Left upper leg: He exhibits tenderness.   Left buttock with tenderness to palpation.  Low back pain with radiation into the left buttock and left thigh.  Negative Homans bilateral.   Neurological: He is alert and oriented to person, place, and time. He has normal reflexes.   Reflex  Scores:       Patellar reflexes are 2+ on the right side and 2+ on the left side.  CN 2-12 grossly intact    Skin: Skin is warm and dry. No rash noted. He is not diaphoretic. No erythema.   Psychiatric: He has a normal mood and affect. His speech is normal and behavior is normal. Judgment and thought content normal. Cognition and memory are normal.   Vitals reviewed.      Assessment/Plan     Problem List Items Addressed This Visit        Cardiovascular and Mediastinum    Malignant hypertension with heart disease, without congestive heart failure - Primary    Coronary artery disease involving native coronary artery of native heart    Hypercholesterolemia       Digestive    Vitamin D deficiency    Vitamin B 12 deficiency       Endocrine    Diabetes mellitus with circulatory complication, without long-term current use of insulin    Relevant Medications    gabapentin (NEURONTIN) 100 MG capsule       Nervous and Auditory    Excessive cerumen in both ear canals    Lumbar back pain with radiculopathy affecting left lower extremity    Relevant Orders    XR Spine Lumbar 2 or 3 View    Ambulatory Referral to Physical Therapy (Completed)       Musculoskeletal and Integument    Osteoarthritis involving multiple joints on both sides of body    Relevant Medications    traMADol (ULTRAM) 50 MG tablet      Other Visit Diagnoses     High risk medication use        Relevant Orders    Urine Drug Screen - Urine, Clean Catch    Uncontrolled type 2 diabetes mellitus without complication, without long-term current use of insulin        Relevant Medications    gabapentin (NEURONTIN) 100 MG capsule        I have discussed diagnosis in detail today allowing time for questions and answers. Pt is aware of reasons to seek urgent or emergent medical care as well as reasons to return to the clinic for evaluation. Possible side effects, interactions and progression of symptoms discussed as well. Pt / family states understanding.   Danny has been  reviewed as consistent.  Uds is on file.   Goal: pt will report improved anxiety symptoms.   Goal: Improved quality of life and reduction in pain as evidenced by pt report.   Patient has been instructed and counseled regarding opioid misuse and risk of addiction.  We have discussed proper storage and disposal of controlled medication.  As part of this patient's treatment plan they are being prescribed controlled substance/substances with potential for abuse. This patient has been made aware of the appropriate use of such medications, including potential risk of somnolence, limited ability to drive and / or work safely, and potential for overdose. It has also been made clear that these medications are for use by this patient only, without concomitant use of alcohol or other substances unless prescribed/advised by medical provider. Patient has completed controlled substance agreement detailing terms of continued prescribing of controlled substances including monitoring Danny reports, drug screens and pill counts. The patient was asked and states they are not receiving narcotic medication from any there provider or any provider that this office has not been made aware of. History and physical exam exhibit continued safe and appropriate use of controlled substances.   Body mechanics.   After obtaining informed consent, the immunization is given by staff. Flu vaccine.  Ultram prescription provided with 2 refills.  Refill Klonopin 0.5 mg one po bid prn # 60 with no refills.  Emotional support and active listening provided.   Follow up in 1-2 months, fasting labs the week prior.                This document has been electronically signed by:  YOUNG Conway, NP-C

## 2017-12-04 ENCOUNTER — TELEPHONE (OUTPATIENT)
Dept: FAMILY MEDICINE CLINIC | Facility: CLINIC | Age: 57
End: 2017-12-04

## 2017-12-04 ENCOUNTER — HOSPITAL ENCOUNTER (EMERGENCY)
Facility: HOSPITAL | Age: 57
Discharge: HOME | End: 2017-12-04
Attending: FAMILY MEDICINE | Admitting: FAMILY MEDICINE

## 2017-12-04 ENCOUNTER — APPOINTMENT (OUTPATIENT)
Dept: CT IMAGING | Facility: HOSPITAL | Age: 57
End: 2017-12-04

## 2017-12-04 VITALS
WEIGHT: 226 LBS | TEMPERATURE: 97.9 F | HEART RATE: 84 BPM | HEIGHT: 70 IN | RESPIRATION RATE: 16 BRPM | BODY MASS INDEX: 32.35 KG/M2 | SYSTOLIC BLOOD PRESSURE: 128 MMHG | DIASTOLIC BLOOD PRESSURE: 84 MMHG | OXYGEN SATURATION: 96 %

## 2017-12-04 DIAGNOSIS — M54.30 BACK PAIN WITH SCIATICA: ICD-10-CM

## 2017-12-04 DIAGNOSIS — M51.36 DEGENERATIVE DISC DISEASE, LUMBAR: Primary | ICD-10-CM

## 2017-12-04 DIAGNOSIS — M54.9 BACK PAIN WITH SCIATICA: ICD-10-CM

## 2017-12-04 PROCEDURE — 25010000002 ORPHENADRINE CITRATE PER 60 MG: Performed by: PHYSICIAN ASSISTANT

## 2017-12-04 PROCEDURE — 25010000002 METHYLPREDNISOLONE PER 125 MG: Performed by: PHYSICIAN ASSISTANT

## 2017-12-04 PROCEDURE — 72131 CT LUMBAR SPINE W/O DYE: CPT | Performed by: RADIOLOGY

## 2017-12-04 PROCEDURE — 72131 CT LUMBAR SPINE W/O DYE: CPT

## 2017-12-04 PROCEDURE — 99283 EMERGENCY DEPT VISIT LOW MDM: CPT

## 2017-12-04 PROCEDURE — 96372 THER/PROPH/DIAG INJ SC/IM: CPT

## 2017-12-04 PROCEDURE — 25010000002 KETOROLAC TROMETHAMINE PER 15 MG: Performed by: PHYSICIAN ASSISTANT

## 2017-12-04 RX ORDER — PREDNISONE 20 MG/1
20 TABLET ORAL 3 TIMES DAILY
Qty: 15 TABLET | Refills: 0 | Status: SHIPPED | OUTPATIENT
Start: 2017-12-04 | End: 2018-01-29

## 2017-12-04 RX ORDER — KETOROLAC TROMETHAMINE 30 MG/ML
60 INJECTION, SOLUTION INTRAMUSCULAR; INTRAVENOUS ONCE
Status: COMPLETED | OUTPATIENT
Start: 2017-12-04 | End: 2017-12-04

## 2017-12-04 RX ORDER — ORPHENADRINE CITRATE 30 MG/ML
60 INJECTION INTRAMUSCULAR; INTRAVENOUS ONCE
Status: COMPLETED | OUTPATIENT
Start: 2017-12-04 | End: 2017-12-04

## 2017-12-04 RX ORDER — ORPHENADRINE CITRATE 100 MG/1
100 TABLET, EXTENDED RELEASE ORAL 2 TIMES DAILY
Qty: 20 TABLET | Refills: 0 | Status: SHIPPED | OUTPATIENT
Start: 2017-12-04 | End: 2018-01-29

## 2017-12-04 RX ORDER — INDOMETHACIN 25 MG/1
25 CAPSULE ORAL 3 TIMES DAILY PRN
Qty: 15 CAPSULE | Refills: 0 | Status: SHIPPED | OUTPATIENT
Start: 2017-12-04 | End: 2018-01-29

## 2017-12-04 RX ORDER — METHYLPREDNISOLONE SODIUM SUCCINATE 125 MG/2ML
125 INJECTION, POWDER, LYOPHILIZED, FOR SOLUTION INTRAMUSCULAR; INTRAVENOUS ONCE
Status: COMPLETED | OUTPATIENT
Start: 2017-12-04 | End: 2017-12-04

## 2017-12-04 RX ADMIN — METHYLPREDNISOLONE SODIUM SUCCINATE 125 MG: 125 INJECTION, POWDER, FOR SOLUTION INTRAMUSCULAR; INTRAVENOUS at 11:42

## 2017-12-04 RX ADMIN — ORPHENADRINE CITRATE 60 MG: 30 INJECTION INTRAMUSCULAR; INTRAVENOUS at 11:42

## 2017-12-04 RX ADMIN — KETOROLAC TROMETHAMINE 60 MG: 30 INJECTION, SOLUTION INTRAMUSCULAR at 11:41

## 2017-12-04 NOTE — ED PROVIDER NOTES
"Subjective   HPI Comments: 56 y/o male that comes in with c/c \"Back pain that radiates down left leg\" X 2 days. Patient states that he was getting out of car when felt sudden sharp pain in lumbar spine. Does state that pain radiates down his left posterior leg. Reports initial pain similar to this several months ago while climbing steps. Denies any previous back trauma or surgery. No reported fecal or urinary incontinence.     Patient is a 57 y.o. male presenting with back pain.   History provided by:  Patient   used: No    Back Pain   Location:  Lumbar spine  Quality:  Aching and stabbing  Radiates to:  L posterior upper leg and L foot  Pain severity:  Moderate  Onset quality:  Sudden  Timing:  Constant  Progression:  Worsening  Chronicity:  New  Context: recent illness and recent injury    Context: not emotional stress, not falling, not jumping from heights, not MCA, not MVA and not physical stress    Relieved by:  Nothing  Worsened by:  Movement and lying down  Ineffective treatments:  None tried  Associated symptoms: leg pain, tingling and weakness    Associated symptoms: no abdominal pain, no abdominal swelling, no bladder incontinence, no dysuria, no fever, no headaches, no pelvic pain and no perianal numbness    Risk factors: no hx of cancer, no hx of osteoporosis, no menopause, not obese and no recent surgery        Review of Systems   Constitutional: Negative for fever.   Gastrointestinal: Negative for abdominal pain.   Genitourinary: Negative for bladder incontinence, dysuria and pelvic pain.   Musculoskeletal: Positive for back pain.   Neurological: Positive for tingling and weakness. Negative for headaches.   All other systems reviewed and are negative.      Past Medical History:   Diagnosis Date   • Anxiety    • ASCVD (arteriosclerotic cardiovascular disease)    • DM (diabetes mellitus)    • GERD (gastroesophageal reflux disease)    • History of EKG 04/15/2015    NORMAL   • HTN " (hypertension)    • Hyperlipidemia    • Injury of back        No Known Allergies    Past Surgical History:   Procedure Laterality Date   • CARDIAC SURGERY      stenting   • CHOLECYSTECTOMY     • COLONOSCOPY  2007   • HERNIA REPAIR     • SHOULDER SURGERY     • TONSILLECTOMY     • TYMPANOPLASTY     • UPPER GASTROINTESTINAL ENDOSCOPY     • VASECTOMY         Family History   Problem Relation Age of Onset   • Heart attack Father    • Heart disease Father    • Hypertension Father    • Heart attack Sister    • Diabetes Sister    • Heart disease Sister    • Hypertension Sister    • Thyroid disease Sister    • Heart attack Brother    • Diabetes Brother    • Thyroid disease Brother    • Arthritis Daughter    • COPD Daughter    • Asthma Daughter    • Depression Daughter    • Heart disease Sister    • Hypertension Sister        Social History     Social History   • Marital status:      Spouse name: N/A   • Number of children: N/A   • Years of education: N/A     Social History Main Topics   • Smoking status: Never Smoker   • Smokeless tobacco: Never Used   • Alcohol use No   • Drug use: No   • Sexual activity: Defer     Other Topics Concern   • None     Social History Narrative   • None           Objective   Physical Exam   Constitutional: He is oriented to person, place, and time. He appears well-developed and well-nourished.   HENT:   Head: Normocephalic and atraumatic.   Right Ear: External ear normal.   Left Ear: External ear normal.   Nose: Nose normal.   Mouth/Throat: Oropharynx is clear and moist.   Eyes: Conjunctivae and EOM are normal. Pupils are equal, round, and reactive to light.   Neck: Normal range of motion. Neck supple.   Cardiovascular: Normal rate, regular rhythm, normal heart sounds and intact distal pulses.    Pulmonary/Chest: Effort normal and breath sounds normal.   Abdominal: Soft. Bowel sounds are normal.   Musculoskeletal: He exhibits tenderness.        Right shoulder: He exhibits no pain.         Lumbar back: He exhibits decreased range of motion, tenderness, swelling, pain and spasm. He exhibits no deformity.   Neurological: He is alert and oriented to person, place, and time. He has normal reflexes.   Skin: Skin is warm and dry.   Psychiatric: He has a normal mood and affect. His behavior is normal. Judgment and thought content normal.   Nursing note and vitals reviewed.      Procedures         ED Course  ED Course   Comment By Time   57-year-old male that presents with lumbar back pain that radiates down his left leg.  Patient have CT scan did not show any acute findings at this time.  Patient does report he feels better after steroid, anti-inflammatory muscle relaxer injection.  Will be discharged with by mouth medications for pain relief. Advised to return if condition worsens. Kirill Fine PA-C 12/04 1249                  MDM  Number of Diagnoses or Management Options  Back pain with sciatica: new and requires workup  Degenerative disc disease, lumbar: new and requires workup     Amount and/or Complexity of Data Reviewed  Independent visualization of images, tracings, or specimens: yes    Risk of Complications, Morbidity, and/or Mortality  Presenting problems: moderate  Diagnostic procedures: moderate  Management options: moderate    Patient Progress  Patient progress: stable      Final diagnoses:   Degenerative disc disease, lumbar   Back pain with sciatica            Kirill Fine PA-C  12/04/17 1251

## 2017-12-04 NOTE — TELEPHONE ENCOUNTER
Spoke with Flower this morning...         ----- Message from YOUNG Toscano sent at 2017  2:15 PM EST -----  Notify pt that he must go to PT, and set him up. After 4 weeks try again.     ----- Message -----     From: Radha Gutiérrez MA     Sent: 2017  11:00 AM       To: YOUNG Toscano    Denied....    Based on [facility], we are unable to approve the requested procedure. MRI might be supported in the evaluation of suspected or known spinal disease with one of the followin) failure to improve after a recent (within 3 months) 6 week trial of physician-guided clinical care (treatment or observation) with clinical re-evaluation, or 2) any signs or symptoms such as significant motor weakness, recent malignancy or infection, cauda equina syndrome, for which conservative treatment is not needed. The clinical information received fails to support meeting these requirements and, therefore, the requested procedure is not indicated at this time.      ----- Message -----     From: YOUNG Toscano     Sent: 2017   9:28 AM       To: Radha Gutiérrez MA    Check on MRI order from last month

## 2017-12-04 NOTE — ED NOTES
Patient has had past back injury in the summer. It began to get better until about a week ago, when he got out of his car, and it began hurting again. It radiates down his left leg.     Rolf Coleman RN  12/04/17 4818

## 2017-12-05 ENCOUNTER — OFFICE VISIT (OUTPATIENT)
Dept: FAMILY MEDICINE CLINIC | Facility: CLINIC | Age: 57
End: 2017-12-05

## 2017-12-05 VITALS
HEART RATE: 95 BPM | SYSTOLIC BLOOD PRESSURE: 120 MMHG | TEMPERATURE: 98.9 F | DIASTOLIC BLOOD PRESSURE: 70 MMHG | OXYGEN SATURATION: 96 % | HEIGHT: 70 IN

## 2017-12-05 DIAGNOSIS — F41.1 GENERALIZED ANXIETY DISORDER: ICD-10-CM

## 2017-12-05 DIAGNOSIS — E78.00 HYPERCHOLESTEROLEMIA: ICD-10-CM

## 2017-12-05 DIAGNOSIS — E53.8 VITAMIN B 12 DEFICIENCY: ICD-10-CM

## 2017-12-05 DIAGNOSIS — I11.9: ICD-10-CM

## 2017-12-05 DIAGNOSIS — I25.10 CORONARY ARTERY DISEASE INVOLVING NATIVE CORONARY ARTERY OF NATIVE HEART WITHOUT ANGINA PECTORIS: ICD-10-CM

## 2017-12-05 DIAGNOSIS — F32.A DEPRESSION, UNSPECIFIED DEPRESSION TYPE: ICD-10-CM

## 2017-12-05 DIAGNOSIS — M54.16 LUMBAR BACK PAIN WITH RADICULOPATHY AFFECTING LEFT LOWER EXTREMITY: Primary | ICD-10-CM

## 2017-12-05 DIAGNOSIS — E55.9 VITAMIN D DEFICIENCY: ICD-10-CM

## 2017-12-05 DIAGNOSIS — I20.9 ANGINAL PAIN (HCC): ICD-10-CM

## 2017-12-05 DIAGNOSIS — M15.9 OSTEOARTHRITIS INVOLVING MULTIPLE JOINTS ON BOTH SIDES OF BODY: ICD-10-CM

## 2017-12-05 DIAGNOSIS — E08.51 DIABETES MELLITUS DUE TO UNDERLYING CONDITION WITH DIABETIC PERIPHERAL ANGIOPATHY WITHOUT GANGRENE, WITHOUT LONG-TERM CURRENT USE OF INSULIN (HCC): ICD-10-CM

## 2017-12-05 DIAGNOSIS — G47.33 OBSTRUCTIVE SLEEP APNEA SYNDROME: ICD-10-CM

## 2017-12-05 PROCEDURE — 99215 OFFICE O/P EST HI 40 MIN: CPT | Performed by: NURSE PRACTITIONER

## 2017-12-05 RX ORDER — ONDANSETRON HYDROCHLORIDE 8 MG/1
8 TABLET, FILM COATED ORAL EVERY 8 HOURS PRN
Qty: 20 TABLET | Refills: 2 | Status: SHIPPED | OUTPATIENT
Start: 2017-12-05 | End: 2018-01-29 | Stop reason: SDUPTHER

## 2017-12-05 RX ORDER — HYDROCODONE BITARTRATE AND ACETAMINOPHEN 7.5; 325 MG/1; MG/1
1 TABLET ORAL EVERY 6 HOURS PRN
Qty: 45 TABLET | Refills: 0 | Status: SHIPPED | OUTPATIENT
Start: 2017-12-05 | End: 2017-12-20 | Stop reason: SDUPTHER

## 2017-12-05 NOTE — PROGRESS NOTES
Subjective   Damaso Leonard is a 57 y.o. male.     Chief Complaint   Patient presents with   • Back Pain   • ER follow up       History of Present Illness     Patient is here today for an ER follow up with a low back pain with radiculopathy affecting the left lower extremity.  Patient was seen in the [facility] emergency room on 12/04/2017 due to severe low back pain radiating in to the left lower extremity.  CT of the lumbar spine was performed on 12/04/2017 with findings of degenerative disc disease and recommendation for MRI to rule out lumbar herniation.  Patient continues to have low back pain that radiates from of the lumbar region into the buttock and into the left lower extremity.  Lumbar pain is sharp.  He reports a numbness and tingling in his left foot and toes.  Pain is worse when he tries to stand or change positions.  He rates this pain as 9 on a pain scale rating of 0-10 with movement.  This pain is rated as a 4 on a pain scale rating of 0-10 at rest.  Patient currently receives Ultram which is not helping decrease his pain.  He has no history of substance abuse or addiction.    Patient's medical history is significant for malignant hypertension with heart disease, coronary artery disease, hyperlipidemia, vitamin D deficiency, vitamin B12 deficiency, diabetes with circulatory complication, osteoarthritis of multiple joints, generalized anxiety with depression, sleep apnea.        The following portions of the patient's history were reviewed and updated as appropriate: allergies, current medications, past family history, past medical history, past social history, past surgical history and problem list.    Review of Systems   Constitutional: Positive for activity change and appetite change.   HENT: Negative.    Eyes: Positive for visual disturbance (wears glasses).   Respiratory: Negative.    Cardiovascular: Negative for chest pain and palpitations.   Gastrointestinal: Negative for abdominal pain and  "blood in stool.   Endocrine: Negative.    Genitourinary: Negative for dysuria and flank pain.   Musculoskeletal: Positive for arthralgias, back pain and gait problem.   Skin: Negative for rash.   Allergic/Immunologic: Negative.    Neurological: Positive for numbness (left foot and leg ).   Hematological: Negative.    Psychiatric/Behavioral: Positive for sleep disturbance (due to pain ). Negative for agitation, behavioral problems, confusion, decreased concentration, dysphoric mood, self-injury and suicidal ideas.   All other systems reviewed and are negative.      Objective     /70 (BP Location: Left arm, Patient Position: Sitting, Cuff Size: Adult)  Pulse 95  Temp 98.9 °F (37.2 °C) (Tympanic)   Ht 177.8 cm (70\")  SpO2 96%  Office Visit on 10/18/2017   Component Date Value Ref Range Status   • Glucose 10/18/2017 134* 70 - 110 mg/dL Final   • BUN 10/18/2017 12  7 - 21 mg/dL Final   • Creatinine 10/18/2017 1.03  0.43 - 1.29 mg/dL Final   • Sodium 10/18/2017 139  135 - 153 mmol/L Final   • Potassium 10/18/2017 3.9  3.5 - 5.3 mmol/L Final   • Chloride 10/18/2017 104  99 - 112 mmol/L Final   • CO2 10/18/2017 27.3  24.3 - 31.9 mmol/L Final   • Calcium 10/18/2017 9.4  7.7 - 10.0 mg/dL Final   • Total Protein 10/18/2017 7.5  6.0 - 8.0 g/dL Final   • Albumin 10/18/2017 4.40  3.50 - 5.00 g/dL Final   • ALT (SGPT) 10/18/2017 35  10 - 44 U/L Final   • AST (SGOT) 10/18/2017 23  10 - 34 U/L Final   • Alkaline Phosphatase 10/18/2017 115  40 - 129 U/L Final   • Total Bilirubin 10/18/2017 0.6  0.2 - 1.8 mg/dL Final   • eGFR Non  Amer 10/18/2017 75  >60 mL/min/1.73 Final   • Globulin 10/18/2017 3.1  gm/dL Final   • A/G Ratio 10/18/2017 1.4* 1.5 - 2.5 g/dL Final   • BUN/Creatinine Ratio 10/18/2017 11.7  7.0 - 25.0 Final   • Anion Gap 10/18/2017 7.7  3.6 - 11.2 mmol/L Final   • Total Cholesterol 10/18/2017 203* 0 - 200 mg/dL Final   • Triglycerides 10/18/2017 214* 0 - 150 mg/dL Final   • HDL Cholesterol 10/18/2017 47* " 60 - 100 mg/dL Final   • LDL Cholesterol  10/18/2017 113* 0 - 100 mg/dL Final   • VLDL Cholesterol 10/18/2017 42.8  mg/dL Final   • LDL/HDL Ratio 10/18/2017 2.41   Final   • TSH 10/18/2017 5.369* 0.550 - 4.780 mIU/mL Final   • Hemoglobin A1C 10/18/2017 7.50* 4.50 - 5.70 % Final   • 25 Hydroxy, Vitamin D 10/18/2017 62.0  ng/ml Final   • Vitamin B-12 10/18/2017 520  211 - 911 pg/mL Final   • Osmolality Calc 10/18/2017 279.3  273.0 - 305.0 mOsm/kg Final       Physical Exam   Constitutional: He is oriented to person, place, and time. Vital signs are normal. He appears well-developed and well-nourished. No distress.   Patient appears to be in pain with any movement. Gait is uneven and unsteady.    HENT:   Head: Atraumatic.   Right Ear: External ear normal.   Left Ear: External ear normal.   Nose: Nose normal.   Mouth/Throat: Oropharynx is clear and moist. No oropharyngeal exudate.   Eyes: EOM are normal. Pupils are equal, round, and reactive to light.   Neck: Neck supple. No thyromegaly present.   Cardiovascular: Normal rate, regular rhythm and normal heart sounds.    Pulmonary/Chest: Effort normal and breath sounds normal. No respiratory distress. He has no wheezes. He exhibits no tenderness.   Abdominal: Soft. Bowel sounds are normal. He exhibits no distension. There is no tenderness. There is no guarding.   Musculoskeletal:        Lumbar back: He exhibits decreased range of motion, tenderness, bony tenderness, pain and spasm.   Lymphadenopathy:     He has no cervical adenopathy.   Neurological: He is alert and oriented to person, place, and time.   Reflex Scores:       Patellar reflexes are 2+ on the right side and 0 on the left side.  Skin: Skin is warm and dry. He is not diaphoretic.   Psychiatric: He has a normal mood and affect. His speech is normal and behavior is normal. Judgment and thought content normal. Cognition and memory are normal.   Vitals reviewed.      Assessment/Plan     Problem List Items Addressed  This Visit        Cardiovascular and Mediastinum    Malignant hypertension with heart disease, without congestive heart failure    Coronary artery disease involving native coronary artery of native heart    Hypercholesterolemia    Anginal pain       Digestive    Vitamin D deficiency    Vitamin B 12 deficiency       Endocrine    Diabetes mellitus with circulatory complication, without long-term current use of insulin       Nervous and Auditory    Lumbar back pain with radiculopathy affecting left lower extremity - Primary    Relevant Orders    Ambulatory Referral to Physical Therapy Evaluate and treat    Ambulatory Referral to Neurosurgery    MRI Lumbar Spine Without Contrast       Musculoskeletal and Integument    Osteoarthritis involving multiple joints on both sides of body       Other    Generalized anxiety disorder    Depression    Sleep apnea          Current Outpatient Prescriptions:   •  amitriptyline (ELAVIL) 50 MG tablet, Take 1 tablet by mouth Daily., Disp: 30 tablet, Rfl: 5  •  aspirin 325 MG tablet, Take 1 tablet by mouth Daily., Disp: 30 tablet, Rfl: 5  •  azelastine (ASTELIN) 0.1 % nasal spray, 1 spray into each nostril 2 (Two) Times a Day., Disp: 1 each, Rfl: 5  •  carvedilol (COREG) 12.5 MG tablet, Take 1 tablet by mouth 2 (Two) Times a Day., Disp: 60 tablet, Rfl: 5  •  citalopram (CeleXA) 40 MG tablet, Take 1 tablet by mouth Daily., Disp: 30 tablet, Rfl: 5  •  clonazePAM (KlonoPIN) 0.5 MG tablet, Take 1 tablet by mouth 2 (Two) Times a Day As Needed for Seizures., Disp: 60 tablet, Rfl: 0  •  cyanocobalamin 1000 MCG/ML injection, Inject 1 mL into the shoulder, thigh, or buttocks Every 28 (Twenty-Eight) Days., Disp: 1 mL, Rfl: 11  •  diphenhydrAMINE (BENADRYL ALLERGY) 25 mg capsule, Take 1 capsule by mouth Every 6 (Six) Hours As Needed for Itching., Disp: 30 capsule, Rfl: 0  •  finasteride (PROSCAR) 5 MG tablet, Take 1 tablet by mouth Daily., Disp: 30 tablet, Rfl: 5  •  gabapentin (NEURONTIN) 100 MG  capsule, Take 1 capsule by mouth 2 (Two) Times a Day., Disp: 60 capsule, Rfl: 2  •  glucose blood test strip, Use as instructed, Disp: 100 each, Rfl: 12  •  glucose monitor monitoring kit, 1 each 3 (Three) Times a Day As Needed (diabetes)., Disp: 1 each, Rfl: 0  •  indomethacin (INDOCIN) 25 MG capsule, Take 1 capsule by mouth 3 (Three) Times a Day As Needed for Mild Pain ., Disp: 15 capsule, Rfl: 0  •  isosorbide mononitrate (IMDUR) 30 MG 24 hr tablet, Take 1 tablet by mouth 2 (Two) Times a Day., Disp: 60 tablet, Rfl: 5  •  Lancets (ONETOUCH ULTRASOFT) lancets, Use as instructed, Disp: 100 each, Rfl: 12  •  metFORMIN (GLUCOPHAGE) 500 MG tablet, Take 1 tablet by mouth 2 (Two) Times a Day., Disp: 60 tablet, Rfl: 5  •  Omega 3 1000 MG capsule, 2 twice daily, Disp: 120 each, Rfl: 5  •  orphenadrine (NORFLEX) 100 MG 12 hr tablet, Take 1 tablet by mouth 2 (Two) Times a Day., Disp: 20 tablet, Rfl: 0  •  pantoprazole (PROTONIX) 40 MG EC tablet, Take 1 tablet by mouth Daily., Disp: 30 tablet, Rfl: 5  •  polyethylene glycol (MIRALAX) packet, Take 17 g by mouth Daily., Disp: 1 each, Rfl: 5  •  predniSONE (DELTASONE) 20 MG tablet, Take 1 tablet by mouth 3 (Three) Times a Day., Disp: 15 tablet, Rfl: 0  •  raNITIdine (ZANTAC) 150 MG tablet, Take 1 tablet by mouth 2 (Two) Times a Day., Disp: 60 tablet, Rfl: 5  •  simvastatin (ZOCOR) 40 MG tablet, Take 1 tablet by mouth Every Night., Disp: 30 tablet, Rfl: 5  •  traMADol (ULTRAM) 50 MG tablet, Take 1 tablet by mouth Every 8 (Eight) Hours As Needed for Moderate Pain ., Disp: 90 tablet, Rfl: 2  •  vitamin D (ERGOCALCIFEROL) 65240 UNITS capsule capsule, Take 1 capsule by mouth Every 7 (Seven) Days., Disp: 4 capsule, Rfl: 5  •  HYDROcodone-acetaminophen (NORCO) 7.5-325 MG per tablet, Take 1 tablet by mouth Every 6 (Six) Hours As Needed for Moderate Pain  or Severe Pain ., Disp: 45 tablet, Rfl: 0  •  ondansetron (ZOFRAN) 8 MG tablet, Take 1 tablet by mouth Every 8 (Eight) Hours As Needed  for Nausea., Disp: 20 tablet, Rfl: 2    Current Facility-Administered Medications:   •  cyanocobalamin injection 1,000 mcg, 1,000 mcg, Intramuscular, Q28 Days, Clare AnetteYOUNG Smith, 1,000 mcg at 01/19/17 0918    Patient has been through cardiac rehabilitation and physical therapy in the past.  He did go through physical therapy a few years ago when off for a Worker's Comp. injury.  12/04/2017 [facility] emergency department notes and imaging have been reviewed and discussed with patient.  I do feel that it is necessary for patient have an MRI to rule out lumbar bulge or herniation.  Patient may do mild physical therapy for heat and massage but is to avoid lifting or straining as well as twisting and bending or any painful movement until MRI can be obtained for definitive diagnoses.  Patient is present with his daughter Flower today and both state understanding of instructions.  I have discussed diagnosis in detail today allowing time for questions and answers. Pt is aware of reasons to seek urgent or emergent medical care as well as reasons to return to the clinic for evaluation. Possible side effects, interactions and progression of symptoms discussed as well. Pt / family states understanding.   Emotional support and active listening provided.   Danny has been reviewed as consistent.  Uds is on file.   Goal: Improved quality of life and reduction in pain as evidenced by pt report.   Patient has been instructed and counseled regarding opioid misuse and risk of addiction.  We have discussed proper storage and disposal of controlled medication.  As part of this patient's treatment plan they are being prescribed controlled substance/substances with potential for abuse. This patient has been made aware of the appropriate use of such medications, including potential risk of somnolence, limited ability to drive and / or work safely, and potential for overdose. It has also been made clear that these  medications are for use by this patient only, without concomitant use of alcohol or other substances unless prescribed/advised by medical provider. Patient has completed controlled substance agreement detailing terms of continued prescribing of controlled substances including monitoring Danny reports, drug screens and pill counts. The patient was asked and states they are not receiving narcotic medication from any there provider or any provider that this office has not been made aware of. History and physical exam exhibit continued safe and appropriate use of controlled substances.     I will request referral to neurosurgeon for consult.  I will request that staff send copy of CT.  We will attempt to obtain an MRI to rule out bulging disc or herniation due to severity of radiculopathy.  MRI will not be performed if contradicted with his metallic implants.    Patient has been prescribed Norco 7.5-325 one by mouth every 6 hours when necessary #45 with no refills.  He may continue with his Ultram if this is helpful with his stiffness.           This document has been electronically signed by:  YOUNG Conway, NP-C

## 2017-12-20 ENCOUNTER — OFFICE VISIT (OUTPATIENT)
Dept: FAMILY MEDICINE CLINIC | Facility: CLINIC | Age: 57
End: 2017-12-20

## 2017-12-20 VITALS
BODY MASS INDEX: 31.64 KG/M2 | HEART RATE: 96 BPM | DIASTOLIC BLOOD PRESSURE: 70 MMHG | WEIGHT: 221 LBS | HEIGHT: 70 IN | OXYGEN SATURATION: 95 % | SYSTOLIC BLOOD PRESSURE: 120 MMHG | TEMPERATURE: 99 F

## 2017-12-20 DIAGNOSIS — M54.16 LUMBAR BACK PAIN WITH RADICULOPATHY AFFECTING LEFT LOWER EXTREMITY: Primary | ICD-10-CM

## 2017-12-20 DIAGNOSIS — F41.1 GENERALIZED ANXIETY DISORDER: ICD-10-CM

## 2017-12-20 DIAGNOSIS — M15.9 OSTEOARTHRITIS INVOLVING MULTIPLE JOINTS ON BOTH SIDES OF BODY: ICD-10-CM

## 2017-12-20 DIAGNOSIS — G47.30 SLEEP APNEA, UNSPECIFIED TYPE: ICD-10-CM

## 2017-12-20 DIAGNOSIS — E08.51 DIABETES MELLITUS DUE TO UNDERLYING CONDITION WITH DIABETIC PERIPHERAL ANGIOPATHY WITHOUT GANGRENE, WITHOUT LONG-TERM CURRENT USE OF INSULIN (HCC): ICD-10-CM

## 2017-12-20 PROCEDURE — 99214 OFFICE O/P EST MOD 30 MIN: CPT | Performed by: NURSE PRACTITIONER

## 2017-12-20 RX ORDER — HYDROCODONE BITARTRATE AND ACETAMINOPHEN 7.5; 325 MG/1; MG/1
1 TABLET ORAL EVERY 6 HOURS PRN
Qty: 45 TABLET | Refills: 0 | Status: SHIPPED | OUTPATIENT
Start: 2017-12-20 | End: 2018-01-29 | Stop reason: SDUPTHER

## 2017-12-20 RX ORDER — CLONAZEPAM 0.5 MG/1
0.5 TABLET ORAL 2 TIMES DAILY PRN
Qty: 60 TABLET | Refills: 0 | Status: SHIPPED | OUTPATIENT
Start: 2017-12-20 | End: 2018-01-29 | Stop reason: SDUPTHER

## 2017-12-20 NOTE — PROGRESS NOTES
Subjective   Damaso Leonard is a 57 y.o. male.     Chief Complaint   Patient presents with   • Back Pain       History of Present Illness     Anxiety is much improved with addition of Klonopin. He reports feeling much more calm, denies any panic attacks. Denies any thoughts of hurting self or others.        Low back pain with radiation into the left lower extremity - pt has been going to physical therapy. He reports that the PT has helped lossen the tightness out and has helped him to find some relief. He states he does not need any additional Norco at this time as he has several remaining from his last prescription. He rates his pain as 2 on psr today. Pain is 8 at its worse this week. Pain is a sharp, catching pain that radiates into his left leg causing numbness in his left foot and toes. He reports that his pain is worse with prolonged sitting, standing or bending. Remaining in one position worsens his pain. He attends physical therapy twice weekly. He denies any substance abuse or addiction.    DM- stable     CAD - stable     Sleep apnea - wearing cpap       The following portions of the patient's history were reviewed and updated as appropriate: allergies, current medications, past family history, past medical history, past social history, past surgical history and problem list.    Review of Systems   Constitutional: Positive for activity change. Negative for appetite change, chills, diaphoresis, fatigue, fever and unexpected weight change.   HENT: Negative.    Respiratory: Negative for apnea, cough, choking, shortness of breath and wheezing.    Cardiovascular: Negative for chest pain and palpitations.   Gastrointestinal: Negative for abdominal pain, constipation, diarrhea, nausea and vomiting.   Endocrine: Negative.    Genitourinary: Negative for dysuria.   Musculoskeletal: Positive for arthralgias and back pain. Negative for neck pain and neck stiffness.   Skin: Negative for rash.   Allergic/Immunologic:  "Negative.    Neurological: Positive for weakness and numbness.        Left leg    Hematological: Negative.    Psychiatric/Behavioral: Positive for sleep disturbance (due to pain ). Negative for agitation and suicidal ideas. The patient is not nervous/anxious.        Objective     /70  Pulse 96  Temp 99 °F (37.2 °C) (Tympanic)   Ht 177.8 cm (70\")  Wt 100 kg (221 lb)  SpO2 95%  BMI 31.71 kg/m2  Office Visit on 10/18/2017   Component Date Value Ref Range Status   • Glucose 10/18/2017 134* 70 - 110 mg/dL Final   • BUN 10/18/2017 12  7 - 21 mg/dL Final   • Creatinine 10/18/2017 1.03  0.43 - 1.29 mg/dL Final   • Sodium 10/18/2017 139  135 - 153 mmol/L Final   • Potassium 10/18/2017 3.9  3.5 - 5.3 mmol/L Final   • Chloride 10/18/2017 104  99 - 112 mmol/L Final   • CO2 10/18/2017 27.3  24.3 - 31.9 mmol/L Final   • Calcium 10/18/2017 9.4  7.7 - 10.0 mg/dL Final   • Total Protein 10/18/2017 7.5  6.0 - 8.0 g/dL Final   • Albumin 10/18/2017 4.40  3.50 - 5.00 g/dL Final   • ALT (SGPT) 10/18/2017 35  10 - 44 U/L Final   • AST (SGOT) 10/18/2017 23  10 - 34 U/L Final   • Alkaline Phosphatase 10/18/2017 115  40 - 129 U/L Final   • Total Bilirubin 10/18/2017 0.6  0.2 - 1.8 mg/dL Final   • eGFR Non  Amer 10/18/2017 75  >60 mL/min/1.73 Final   • Globulin 10/18/2017 3.1  gm/dL Final   • A/G Ratio 10/18/2017 1.4* 1.5 - 2.5 g/dL Final   • BUN/Creatinine Ratio 10/18/2017 11.7  7.0 - 25.0 Final   • Anion Gap 10/18/2017 7.7  3.6 - 11.2 mmol/L Final   • Total Cholesterol 10/18/2017 203* 0 - 200 mg/dL Final   • Triglycerides 10/18/2017 214* 0 - 150 mg/dL Final   • HDL Cholesterol 10/18/2017 47* 60 - 100 mg/dL Final   • LDL Cholesterol  10/18/2017 113* 0 - 100 mg/dL Final   • VLDL Cholesterol 10/18/2017 42.8  mg/dL Final   • LDL/HDL Ratio 10/18/2017 2.41   Final   • TSH 10/18/2017 5.369* 0.550 - 4.780 mIU/mL Final   • Hemoglobin A1C 10/18/2017 7.50* 4.50 - 5.70 % Final   • 25 Hydroxy, Vitamin D 10/18/2017 62.0  ng/ml Final "   • Vitamin B-12 10/18/2017 520  211 - 911 pg/mL Final   • Osmolality Calc 10/18/2017 279.3  273.0 - 305.0 mOsm/kg Final       Physical Exam   Constitutional: He is oriented to person, place, and time. He appears well-developed and well-nourished.   HENT:   Head: Normocephalic and atraumatic.   Right Ear: External ear normal.   Left Ear: External ear normal.   Nose: Nose normal.   Mouth/Throat: Oropharynx is clear and moist. No oropharyngeal exudate.   Eyes: EOM are normal. Pupils are equal, round, and reactive to light.   Neck: Normal range of motion. Neck supple.   Cardiovascular: Normal rate, regular rhythm, normal heart sounds and intact distal pulses.    No murmur heard.  Pulmonary/Chest: Effort normal and breath sounds normal. No respiratory distress. He has no wheezes. He has no rales. He exhibits no tenderness.   Abdominal: Soft. Bowel sounds are normal. He exhibits no distension and no mass. There is no tenderness. There is no rebound and no guarding.   Musculoskeletal:        Lumbar back: He exhibits decreased range of motion, tenderness, pain and spasm.   Decreased lumbar curvature, there is pain with forward flexion. DTR's +2. No edema.    Neurological: He is alert and oriented to person, place, and time. He has normal reflexes.   Skin: Skin is warm and dry. No rash noted. No erythema. No pallor.   Psychiatric: He has a normal mood and affect. His behavior is normal. Judgment and thought content normal. His affect is not inappropriate. Cognition and memory are normal.   Denies thoughts of hurting self or others.   Nursing note and vitals reviewed.      Assessment/Plan     Problem List Items Addressed This Visit        Endocrine    Diabetes mellitus with circulatory complication, without long-term current use of insulin       Nervous and Auditory    Lumbar back pain with radiculopathy affecting left lower extremity - Primary       Musculoskeletal and Integument    Osteoarthritis involving multiple joints  on both sides of body       Other    Generalized anxiety disorder    Sleep apnea    Current Assessment & Plan     Wears cpap                Refill Klonopin for prn use.  Body mechanics reviewed.  Emotional support and active listening provided.   I have discussed diagnosis in detail today allowing time for questions and answers. Pt is aware of reasons to seek urgent or emergent medical care as well as reasons to return to the clinic for evaluation. Possible side effects, interactions and progression of symptoms discussed as well. Pt / family states understanding.   Danny has been reviewed as consistent.  Uds is on file.   Goal: pt will report improved anxiety symptoms.   Goal: Improved quality of life and reduction in pain as evidenced by pt report.   Patient has been instructed and counseled regarding opioid misuse and risk of addiction.  We have discussed proper storage and disposal of controlled medication.  As part of this patient's treatment plan they are being prescribed controlled substance/substances with potential for abuse. This patient has been made aware of the appropriate use of such medications, including potential risk of somnolence, limited ability to drive and / or work safely, and potential for overdose. It has also been made clear that these medications are for use by this patient only, without concomitant use of alcohol or other substances unless prescribed/advised by medical provider. Patient has completed controlled substance agreement detailing terms of continued prescribing of controlled substances including monitoring Danny reports, drug screens and pill counts. The patient was asked and states they are not receiving narcotic medication from any there provider or any provider that this office has not been made aware of. History and physical exam exhibit continued safe and appropriate use of controlled substances.   Follow up in one month, sooner if needed. Remain in physical therapy and  keep upcoming neurology consult.        Errors in dictation may reflect use of voice recognition software and not all errors in transcription may have been detected prior to signing.       This document has been electronically signed by:  YOUNG Conway, NP-C

## 2018-01-18 ENCOUNTER — OFFICE VISIT (OUTPATIENT)
Dept: PSYCHIATRY | Facility: CLINIC | Age: 58
End: 2018-01-18

## 2018-01-18 DIAGNOSIS — F33.41 MAJOR DEPRESSIVE DISORDER, RECURRENT, IN PARTIAL REMISSION (HCC): Primary | ICD-10-CM

## 2018-01-18 DIAGNOSIS — F41.3 OTHER MIXED ANXIETY DISORDERS: ICD-10-CM

## 2018-01-18 PROCEDURE — 90832 PSYTX W PT 30 MINUTES: CPT | Performed by: SOCIAL WORKER

## 2018-01-18 NOTE — PROGRESS NOTES
Date of Service: January 18, 2018  Time In: 7:55 AM  Time Out: 8:30 AM      PROGRESS NOTE  Data:  Damaso Leonard is a 57 y.o. male who met with the undersigned for a regularly scheduled individual outpatient psychotherapy session at  [facility] for follow-up of depression and anxiety.  Patient reports he continues to struggle with ongoing back pain and states he is currently engaging in physical therapy.  He reports he continues to struggle with daily pain and states periods of significant exacerbation.  In fact, he states approximately 2 weeks ago he was unable to get out of bed without the help of his wife.  She reports he has an appointment with neurosurgery on 2/15/2018.     HPI: Patient reports he feels his symptoms of anxiety have improved significantly since beginning clonazepam.  He reports no significant symptoms at this time.  Patient also reports he feels his depression continues to be relatively mild states he continues to have periods of depressed mood, anhedonia, anergia, feeling hopeless, feeling useless, and social isolation.  Patient rates current symptoms of depression at a 2 on a scale of 1-10 with 10 being most severe.  Patient reports he continues to adhere to medication regimen as prescribed.  The patient adamantly convincingly denies suicidal ideation and vehemently denies any substance use.      Clinical Maneuvering/Intervention:  Assisted patient in processing above session content; acknowledged and normalized patient’s thoughts, feelings, and concerns.  Utilized motivational interviewing techniques including complex reflections to assist the patient in recognizing his improvement and praised him for taking positive steps to bring about change.  Also utilized cognitive behavioral therapy to assist the patient in identifying appropriate coping mechanisms to decrease severity and frequency of symptoms including positive self talk, relaxation techniques, and strongly  encouraged the patient to find activities he can and engage in on a regular basis to decrease idle time.  This the patient in identifying specific activities he can engage into services thought blocking technique including playing games on his computer and spending time with his grandchildren.  Strongly urged the patient to keep appointment with neurosurgery for further assessment.  Provided unconditional positive regard in a safe, supportive environment.    Allowed patient to freely discuss issues without interruption or judgment. Provided safe, confidential environment to facilitate the development of positive therapeutic relationship and encourage open, honest communication. Assisted patient in identifying risk factors which would indicate the need for higher level of care including thoughts to harm self or others and/or self-harming behavior and encouraged patient to contact this office, call 911, or present to the nearest emergency room should any of these events occur. Discussed crisis intervention services and means to access.  Patient adamantly and convincingly denies current suicidal or homicidal ideation or perceptual disturbance.    Assessment    Patient appears to have made some improvement since beginning treatment.  However, he continues to struggle with periods of depression which continues to cause impairment.  Patient's symptoms of anxiety was also likely exacerbated without ongoing pharmacotherapy.  As a result, the patient can be recently expected to continue to benefit of treatment and would likely be at increased risk for decompensation otherwise.    Diagnoses and all orders for this visit:    Major depressive disorder, recurrent, in partial remission    Other mixed anxiety disorders               Mental Status Exam  Hygiene:  good  Dress:  casual  Attitude:  Cooperative  Motor Activity:  Appropriate  Speech:  Normal  Mood:  within normal limits  Affect:  calm and pleasant  Thought Processes:   Goal directed  Thought Content:  normal  Suicidal Thoughts:  denies  Homicidal Thoughts:  denies  Crisis Safety Plan: yes, to come to the emergency room.  Hallucinations:  denies    Patient's Support Network Includes:  wife, children and extended family    Progress toward goal: Not at goal    Functional Status: Mild impairment     Prognosis: Fair with Ongoing Treatment     Plan         Patient will continue in individual outpatient psychotherapy sessions at [facility] in 4 weeks.  We'll reassess the need for ongoing treatment.  Patient will attend with primary care provider in this office and adhere to medication regimen as prescribed and report any side effects. Patient will contact this office, call 911 or present to the nearest emergency room should suicidal or homicidal ideations occur. Provide Cognitive Behavioral Therapy and Integrative Therapy to improve functioning, maintain stability, and avoid decompensation and the need for higher level of care.          Return in about 4 weeks (around 02/15/2018) for Next scheduled follow up.      This document signed by Tha Newton LCSW, LOTUS January 18, 2018 12:00 PM

## 2018-01-22 ENCOUNTER — LAB (OUTPATIENT)
Dept: FAMILY MEDICINE CLINIC | Facility: CLINIC | Age: 58
End: 2018-01-22

## 2018-01-22 DIAGNOSIS — E53.8 VITAMIN B 12 DEFICIENCY: ICD-10-CM

## 2018-01-22 DIAGNOSIS — M15.9 OSTEOARTHRITIS INVOLVING MULTIPLE JOINTS ON BOTH SIDES OF BODY: ICD-10-CM

## 2018-01-22 DIAGNOSIS — E55.9 VITAMIN D DEFICIENCY: ICD-10-CM

## 2018-01-22 DIAGNOSIS — E08.51 DIABETES MELLITUS DUE TO UNDERLYING CONDITION WITH DIABETIC PERIPHERAL ANGIOPATHY WITHOUT GANGRENE, WITHOUT LONG-TERM CURRENT USE OF INSULIN (HCC): Primary | ICD-10-CM

## 2018-01-22 DIAGNOSIS — F41.1 GENERALIZED ANXIETY DISORDER: ICD-10-CM

## 2018-01-22 LAB
25(OH)D3 SERPL-MCNC: 42 NG/ML
ALBUMIN SERPL-MCNC: 4.1 G/DL (ref 3.5–5)
ALBUMIN/GLOB SERPL: 1.6 G/DL (ref 1.5–2.5)
ALP SERPL-CCNC: 93 U/L (ref 40–129)
ALT SERPL W P-5'-P-CCNC: 25 U/L (ref 10–44)
ANION GAP SERPL CALCULATED.3IONS-SCNC: 4.6 MMOL/L (ref 3.6–11.2)
AST SERPL-CCNC: 19 U/L (ref 10–34)
BASOPHILS # BLD AUTO: 0.02 10*3/MM3 (ref 0–0.3)
BASOPHILS NFR BLD AUTO: 0.3 % (ref 0–2)
BILIRUB SERPL-MCNC: 0.5 MG/DL (ref 0.2–1.8)
BUN BLD-MCNC: 9 MG/DL (ref 7–21)
BUN/CREAT SERPL: 8.7 (ref 7–25)
CALCIUM SPEC-SCNC: 8.8 MG/DL (ref 7.7–10)
CHLORIDE SERPL-SCNC: 104 MMOL/L (ref 99–112)
CHOLEST SERPL-MCNC: 153 MG/DL (ref 0–200)
CO2 SERPL-SCNC: 30.4 MMOL/L (ref 24.3–31.9)
CREAT BLD-MCNC: 1.04 MG/DL (ref 0.43–1.29)
DEPRECATED RDW RBC AUTO: 44 FL (ref 37–54)
EOSINOPHIL # BLD AUTO: 0.38 10*3/MM3 (ref 0–0.7)
EOSINOPHIL NFR BLD AUTO: 6.2 % (ref 0–5)
ERYTHROCYTE [DISTWIDTH] IN BLOOD BY AUTOMATED COUNT: 14.2 % (ref 11.5–14.5)
GFR SERPL CREATININE-BSD FRML MDRD: 74 ML/MIN/1.73
GLOBULIN UR ELPH-MCNC: 2.6 GM/DL
GLUCOSE BLD-MCNC: 122 MG/DL (ref 70–110)
HBA1C MFR BLD: 7.7 % (ref 4.5–5.7)
HCT VFR BLD AUTO: 41.7 % (ref 42–52)
HDLC SERPL-MCNC: 38 MG/DL (ref 60–100)
HGB BLD-MCNC: 13.6 G/DL (ref 14–18)
IMM GRANULOCYTES # BLD: 0.02 10*3/MM3 (ref 0–0.03)
IMM GRANULOCYTES NFR BLD: 0.3 % (ref 0–0.5)
LDLC SERPL CALC-MCNC: 75 MG/DL (ref 0–100)
LDLC/HDLC SERPL: 1.98 {RATIO}
LYMPHOCYTES # BLD AUTO: 1.92 10*3/MM3 (ref 1–3)
LYMPHOCYTES NFR BLD AUTO: 31.4 % (ref 21–51)
MCH RBC QN AUTO: 28.4 PG (ref 27–33)
MCHC RBC AUTO-ENTMCNC: 32.6 G/DL (ref 33–37)
MCV RBC AUTO: 87.1 FL (ref 80–94)
MONOCYTES # BLD AUTO: 0.81 10*3/MM3 (ref 0.1–0.9)
MONOCYTES NFR BLD AUTO: 13.2 % (ref 0–10)
NEUTROPHILS # BLD AUTO: 2.97 10*3/MM3 (ref 1.4–6.5)
NEUTROPHILS NFR BLD AUTO: 48.6 % (ref 30–70)
OSMOLALITY SERPL CALC.SUM OF ELEC: 277.5 MOSM/KG (ref 273–305)
PLATELET # BLD AUTO: 378 10*3/MM3 (ref 130–400)
PMV BLD AUTO: 9.9 FL (ref 6–10)
POTASSIUM BLD-SCNC: 3.6 MMOL/L (ref 3.5–5.3)
PROT SERPL-MCNC: 6.7 G/DL (ref 6–8)
RBC # BLD AUTO: 4.79 10*6/MM3 (ref 4.7–6.1)
SODIUM BLD-SCNC: 139 MMOL/L (ref 135–153)
TRIGL SERPL-MCNC: 199 MG/DL (ref 0–150)
TSH SERPL DL<=0.05 MIU/L-ACNC: 8.69 MIU/ML (ref 0.55–4.78)
VIT B12 BLD-MCNC: 472 PG/ML (ref 211–911)
VLDLC SERPL-MCNC: 39.8 MG/DL
WBC NRBC COR # BLD: 6.12 10*3/MM3 (ref 4.5–12.5)

## 2018-01-22 PROCEDURE — 82607 VITAMIN B-12: CPT | Performed by: NURSE PRACTITIONER

## 2018-01-22 PROCEDURE — 84443 ASSAY THYROID STIM HORMONE: CPT | Performed by: NURSE PRACTITIONER

## 2018-01-22 PROCEDURE — 36415 COLL VENOUS BLD VENIPUNCTURE: CPT

## 2018-01-22 PROCEDURE — 82306 VITAMIN D 25 HYDROXY: CPT | Performed by: NURSE PRACTITIONER

## 2018-01-22 PROCEDURE — 80053 COMPREHEN METABOLIC PANEL: CPT | Performed by: NURSE PRACTITIONER

## 2018-01-22 PROCEDURE — 85025 COMPLETE CBC W/AUTO DIFF WBC: CPT | Performed by: NURSE PRACTITIONER

## 2018-01-22 PROCEDURE — 83036 HEMOGLOBIN GLYCOSYLATED A1C: CPT | Performed by: NURSE PRACTITIONER

## 2018-01-22 PROCEDURE — 80061 LIPID PANEL: CPT | Performed by: NURSE PRACTITIONER

## 2018-01-26 NOTE — TELEPHONE ENCOUNTER
Called in [facility] for patient.,  Called pt and let him know that he can  med at the pharmacy. PKF

## 2018-01-29 ENCOUNTER — OFFICE VISIT (OUTPATIENT)
Dept: FAMILY MEDICINE CLINIC | Facility: CLINIC | Age: 58
End: 2018-01-29

## 2018-01-29 VITALS
HEART RATE: 89 BPM | DIASTOLIC BLOOD PRESSURE: 70 MMHG | WEIGHT: 222 LBS | TEMPERATURE: 97.9 F | OXYGEN SATURATION: 95 % | HEIGHT: 70 IN | SYSTOLIC BLOOD PRESSURE: 120 MMHG | BODY MASS INDEX: 31.78 KG/M2

## 2018-01-29 DIAGNOSIS — E55.9 VITAMIN D DEFICIENCY: ICD-10-CM

## 2018-01-29 DIAGNOSIS — IMO0001 UNCONTROLLED TYPE 2 DIABETES MELLITUS WITHOUT COMPLICATION, WITHOUT LONG-TERM CURRENT USE OF INSULIN: ICD-10-CM

## 2018-01-29 DIAGNOSIS — Z79.899 HIGH RISK MEDICATION USE: ICD-10-CM

## 2018-01-29 DIAGNOSIS — N40.0 BENIGN NON-NODULAR PROSTATIC HYPERPLASIA WITHOUT LOWER URINARY TRACT SYMPTOMS: ICD-10-CM

## 2018-01-29 DIAGNOSIS — M54.16 LUMBAR BACK PAIN WITH RADICULOPATHY AFFECTING LEFT LOWER EXTREMITY: ICD-10-CM

## 2018-01-29 DIAGNOSIS — Z12.5 SCREENING FOR PROSTATE CANCER: ICD-10-CM

## 2018-01-29 DIAGNOSIS — I25.10 CORONARY ARTERY DISEASE INVOLVING NATIVE CORONARY ARTERY OF NATIVE HEART WITHOUT ANGINA PECTORIS: ICD-10-CM

## 2018-01-29 DIAGNOSIS — E78.00 HYPERCHOLESTEROLEMIA: ICD-10-CM

## 2018-01-29 DIAGNOSIS — E11.51 TYPE 2 DIABETES MELLITUS WITH DIABETIC PERIPHERAL ANGIOPATHY WITHOUT GANGRENE, WITHOUT LONG-TERM CURRENT USE OF INSULIN (HCC): ICD-10-CM

## 2018-01-29 DIAGNOSIS — J30.1 SEASONAL ALLERGIC RHINITIS DUE TO POLLEN, UNSPECIFIED CHRONICITY: ICD-10-CM

## 2018-01-29 DIAGNOSIS — G47.33 OBSTRUCTIVE SLEEP APNEA SYNDROME: ICD-10-CM

## 2018-01-29 DIAGNOSIS — E53.8 VITAMIN B 12 DEFICIENCY: ICD-10-CM

## 2018-01-29 DIAGNOSIS — K21.9 GASTROESOPHAGEAL REFLUX DISEASE WITHOUT ESOPHAGITIS: ICD-10-CM

## 2018-01-29 DIAGNOSIS — R21 RASH AND NONSPECIFIC SKIN ERUPTION: Primary | ICD-10-CM

## 2018-01-29 DIAGNOSIS — R79.89 ABNORMAL THYROID BLOOD TEST: ICD-10-CM

## 2018-01-29 DIAGNOSIS — I10 ESSENTIAL HYPERTENSION: ICD-10-CM

## 2018-01-29 DIAGNOSIS — F41.1 GENERALIZED ANXIETY DISORDER: ICD-10-CM

## 2018-01-29 DIAGNOSIS — E55.9 VITAMIN D DEFICIENCY DISEASE: ICD-10-CM

## 2018-01-29 DIAGNOSIS — M15.9 OSTEOARTHRITIS INVOLVING MULTIPLE JOINTS ON BOTH SIDES OF BODY: ICD-10-CM

## 2018-01-29 DIAGNOSIS — I11.9: ICD-10-CM

## 2018-01-29 PROCEDURE — 99214 OFFICE O/P EST MOD 30 MIN: CPT | Performed by: NURSE PRACTITIONER

## 2018-01-29 RX ORDER — HYDROCODONE BITARTRATE AND ACETAMINOPHEN 7.5; 325 MG/1; MG/1
1 TABLET ORAL EVERY 6 HOURS PRN
Qty: 30 TABLET | Refills: 0 | Status: SHIPPED | OUTPATIENT
Start: 2018-01-29 | End: 2018-04-10

## 2018-01-29 RX ORDER — FINASTERIDE 5 MG/1
5 TABLET, FILM COATED ORAL DAILY
Qty: 30 TABLET | Refills: 5 | Status: SHIPPED | OUTPATIENT
Start: 2018-01-29 | End: 2018-04-10 | Stop reason: SDUPTHER

## 2018-01-29 RX ORDER — CARVEDILOL 12.5 MG/1
12.5 TABLET ORAL 2 TIMES DAILY WITH MEALS
Qty: 60 TABLET | Refills: 5 | Status: SHIPPED | OUTPATIENT
Start: 2018-01-29 | End: 2018-07-11 | Stop reason: SDUPTHER

## 2018-01-29 RX ORDER — TRAMADOL HYDROCHLORIDE 50 MG/1
50 TABLET ORAL EVERY 8 HOURS PRN
Qty: 90 TABLET | Refills: 2 | Status: SHIPPED | OUTPATIENT
Start: 2018-01-29 | End: 2018-04-10 | Stop reason: SDUPTHER

## 2018-01-29 RX ORDER — LANCETS
EACH MISCELLANEOUS
Qty: 100 EACH | Refills: 12 | Status: SHIPPED | OUTPATIENT
Start: 2018-01-29 | End: 2018-07-11 | Stop reason: SDUPTHER

## 2018-01-29 RX ORDER — GABAPENTIN 100 MG/1
100 CAPSULE ORAL 2 TIMES DAILY
Qty: 60 CAPSULE | Refills: 2 | Status: SHIPPED | OUTPATIENT
Start: 2018-01-29 | End: 2018-04-10 | Stop reason: SDUPTHER

## 2018-01-29 RX ORDER — SIMVASTATIN 40 MG
40 TABLET ORAL NIGHTLY
Qty: 30 TABLET | Refills: 5 | Status: SHIPPED | OUTPATIENT
Start: 2018-01-29 | End: 2018-04-10 | Stop reason: SDUPTHER

## 2018-01-29 RX ORDER — LEVOTHYROXINE AND LIOTHYRONINE 19; 4.5 UG/1; UG/1
30 TABLET ORAL DAILY
Qty: 30 TABLET | Refills: 5
Start: 2018-01-29 | End: 2018-07-11 | Stop reason: SDUPTHER

## 2018-01-29 RX ORDER — ONDANSETRON HYDROCHLORIDE 8 MG/1
8 TABLET, FILM COATED ORAL EVERY 8 HOURS PRN
Qty: 20 TABLET | Refills: 2 | Status: SHIPPED | OUTPATIENT
Start: 2018-01-29 | End: 2018-07-11 | Stop reason: SDUPTHER

## 2018-01-29 RX ORDER — PANTOPRAZOLE SODIUM 40 MG/1
40 TABLET, DELAYED RELEASE ORAL DAILY
Qty: 30 TABLET | Refills: 5 | Status: SHIPPED | OUTPATIENT
Start: 2018-01-29 | End: 2018-04-10 | Stop reason: SDUPTHER

## 2018-01-29 RX ORDER — CYANOCOBALAMIN 1000 UG/ML
1000 INJECTION, SOLUTION INTRAMUSCULAR; SUBCUTANEOUS
Qty: 1 ML | Refills: 11 | Status: SHIPPED | OUTPATIENT
Start: 2018-01-29 | End: 2018-04-10 | Stop reason: SDUPTHER

## 2018-01-29 RX ORDER — OMEGA-3 FATTY ACIDS/FISH OIL 300-1000MG
CAPSULE ORAL
Qty: 120 EACH | Refills: 5 | Status: SHIPPED | OUTPATIENT
Start: 2018-01-29 | End: 2018-04-10 | Stop reason: SDDI

## 2018-01-29 RX ORDER — ASPIRIN 325 MG
325 TABLET ORAL DAILY
Qty: 30 TABLET | Refills: 5 | Status: SHIPPED | OUTPATIENT
Start: 2018-01-29 | End: 2018-02-13 | Stop reason: DRUGHIGH

## 2018-01-29 RX ORDER — CITALOPRAM 40 MG/1
40 TABLET ORAL DAILY
Qty: 30 TABLET | Refills: 5 | Status: SHIPPED | OUTPATIENT
Start: 2018-01-29 | End: 2018-04-10 | Stop reason: SDUPTHER

## 2018-01-29 RX ORDER — CLONAZEPAM 0.5 MG/1
0.5 TABLET ORAL 2 TIMES DAILY PRN
Qty: 60 TABLET | Refills: 0 | Status: SHIPPED | OUTPATIENT
Start: 2018-01-29 | End: 2018-04-10 | Stop reason: SDUPTHER

## 2018-01-29 RX ORDER — DIPHENHYDRAMINE HCL 25 MG
25 CAPSULE ORAL EVERY 6 HOURS PRN
Qty: 30 CAPSULE | Refills: 0 | Status: SHIPPED | OUTPATIENT
Start: 2018-01-29 | End: 2018-04-10 | Stop reason: SDUPTHER

## 2018-01-29 RX ORDER — ERGOCALCIFEROL 1.25 MG/1
50000 CAPSULE ORAL
Qty: 4 CAPSULE | Refills: 5 | Status: SHIPPED | OUTPATIENT
Start: 2018-01-29 | End: 2018-04-10 | Stop reason: SDUPTHER

## 2018-01-29 RX ORDER — DIAPER,BRIEF,INFANT-TODD,DISP
EACH MISCELLANEOUS 2 TIMES DAILY PRN
Qty: 1 EACH | Refills: 5 | Status: SHIPPED | OUTPATIENT
Start: 2018-01-29 | End: 2019-08-29

## 2018-01-29 RX ORDER — RANITIDINE 150 MG/1
150 TABLET ORAL 2 TIMES DAILY
Qty: 60 TABLET | Refills: 5 | Status: SHIPPED | OUTPATIENT
Start: 2018-01-29 | End: 2018-04-10 | Stop reason: SDUPTHER

## 2018-01-29 RX ORDER — ISOSORBIDE MONONITRATE 30 MG/1
30 TABLET, EXTENDED RELEASE ORAL 2 TIMES DAILY
Qty: 60 TABLET | Refills: 5 | Status: SHIPPED | OUTPATIENT
Start: 2018-01-29 | End: 2018-04-10 | Stop reason: SDUPTHER

## 2018-01-29 RX ORDER — AMITRIPTYLINE HYDROCHLORIDE 50 MG/1
100 TABLET, FILM COATED ORAL NIGHTLY PRN
Qty: 60 TABLET | Refills: 5 | Status: SHIPPED | OUTPATIENT
Start: 2018-01-29 | End: 2018-07-11 | Stop reason: SDUPTHER

## 2018-01-29 NOTE — PROGRESS NOTES
Subjective   Damaso Leonard is a 57 y.o. male.     Chief Complaint   Patient presents with   • Osteoarthritis     go over labs    • Anxiety     need refills       History of Present Illness     Rash-tiny, itchy rash that is intermittent over the past year.  Rash comes and goes on his upper and lower extremities as well as his trunk.  We will appear, itch and then resolved.  He has not noticed any pattern but admits that he really has not given much thought to changes in personal products such as laundry detergent, lotion or bath/personal products.    Lumbar pain with radiculopathy affecting the left lower extremity/osteoarthritis involving multiple joints-chronic in nature.  Patient is currently in physical therapy.  He rates his low back and joint pain as 7 on a scale of  0 -10.  Pain is worse with movement. He does have radiology on file. He reports that physical therapy has greatly improved his mobility and ability to perform daily routine. He now only takes the Norco on a prn basis. He does take ultram routinely. Neurontin is helpful with the lumbar radiculopathy and left lower extremity pain as well as foot pain that is described as sharp and stinging.     Insomnia- pt states that amitriptyline was helpful in the beginning to help him sleep.  Now he can take his amitriptyline and not go to sleep for many hours if all.  He does report that he has quit coffee.  He does however drink caffeinated sodas as late as 8 PM.    Depression with anxiety-patient reports that he is much calmer now that he is taking the Klonopin.  He reports that his depression is in remission.  He feel Celexa has helped him tremendously with his depression.  No recent panic attack.  He is more able to be around his family and not feel nervous now that he is using the Klonopin.    No history of substance abuse or addiction.    Type 2 diabetes-patient reports that his blood sugars have been within acceptable ranges.  He does at times cheat on   "his diet but for the most part tries to be compliant.      Constipation-controlled with MiraLAX daily.    Hyperlipidemia-patient currently takes omega-3 and Zocor.  He is under the care of cardiology with his next cardiology appointment scheduled there were 13th 2018.    Cardio vascular disease - patient is under the care of cardiology.  Cardiology history has been thoroughly reviewed.      The following portions of the patient's history were reviewed and updated as appropriate: allergies, current medications, past family history, past medical history, past social history, past surgical history and problem list.    Review of Systems   HENT: Positive for sinus pressure. Negative for ear pain, sinus pain and sore throat.    Eyes: Positive for redness, itching and visual disturbance (wears glasses ).   Respiratory: Negative.    Cardiovascular: Positive for palpitations (not of new onset, has been seen by cardiology and has follow up scheduled 2/13/18 ).   Gastrointestinal: Positive for abdominal pain and constipation.   Endocrine: Positive for heat intolerance.   Genitourinary: Negative.  Negative for difficulty urinating and dysuria.   Musculoskeletal: Positive for arthralgias, back pain, joint swelling and neck pain. Negative for neck stiffness.   Skin: Positive for rash (sometimes has little blisters all over and they itch).   Allergic/Immunologic: Negative.    Neurological: Positive for tremors and weakness. Negative for seizures, facial asymmetry, light-headedness and numbness.   Hematological: Negative.    Psychiatric/Behavioral: Negative for dysphoric mood, self-injury and suicidal ideas.   All other systems reviewed and are negative.      Objective     /70 (BP Location: Right arm, Patient Position: Sitting, Cuff Size: Adult)  Pulse 89  Temp 97.9 °F (36.6 °C) (Tympanic)   Ht 177.8 cm (70\")  Wt 101 kg (222 lb)  SpO2 95%  BMI 31.85 kg/m2  Lab on 01/22/2018   Component Date Value Ref Range Status   • " Glucose 01/22/2018 122* 70 - 110 mg/dL Final   • BUN 01/22/2018 9  7 - 21 mg/dL Final   • Creatinine 01/22/2018 1.04  0.43 - 1.29 mg/dL Final   • Sodium 01/22/2018 139  135 - 153 mmol/L Final   • Potassium 01/22/2018 3.6  3.5 - 5.3 mmol/L Final   • Chloride 01/22/2018 104  99 - 112 mmol/L Final   • CO2 01/22/2018 30.4  24.3 - 31.9 mmol/L Final   • Calcium 01/22/2018 8.8  7.7 - 10.0 mg/dL Final   • Total Protein 01/22/2018 6.7  6.0 - 8.0 g/dL Final   • Albumin 01/22/2018 4.10  3.50 - 5.00 g/dL Final   • ALT (SGPT) 01/22/2018 25  10 - 44 U/L Final   • AST (SGOT) 01/22/2018 19  10 - 34 U/L Final   • Alkaline Phosphatase 01/22/2018 93  40 - 129 U/L Final   • Total Bilirubin 01/22/2018 0.5  0.2 - 1.8 mg/dL Final   • eGFR Non African Amer 01/22/2018 74  >60 mL/min/1.73 Final   • Globulin 01/22/2018 2.6  gm/dL Final   • A/G Ratio 01/22/2018 1.6  1.5 - 2.5 g/dL Final   • BUN/Creatinine Ratio 01/22/2018 8.7  7.0 - 25.0 Final   • Anion Gap 01/22/2018 4.6  3.6 - 11.2 mmol/L Final   • Total Cholesterol 01/22/2018 153  0 - 200 mg/dL Final   • Triglycerides 01/22/2018 199* 0 - 150 mg/dL Final   • HDL Cholesterol 01/22/2018 38* 60 - 100 mg/dL Final   • LDL Cholesterol  01/22/2018 75  0 - 100 mg/dL Final   • VLDL Cholesterol 01/22/2018 39.8  mg/dL Final   • LDL/HDL Ratio 01/22/2018 1.98   Final   • TSH 01/22/2018 8.691* 0.550 - 4.780 mIU/mL Final   • Hemoglobin A1C 01/22/2018 7.70* 4.50 - 5.70 % Final   • Vitamin B-12 01/22/2018 472  211 - 911 pg/mL Final   • 25 Hydroxy, Vitamin D 01/22/2018 42.0  ng/ml Final   • Osmolality Calc 01/22/2018 277.5  273.0 - 305.0 mOsm/kg Final       Physical Exam   Constitutional: He is oriented to person, place, and time. He appears well-developed and well-nourished.   HENT:   Head: Normocephalic.   Right Ear: External ear normal.   Left Ear: External ear normal.   Nose: Nose normal.   Mouth/Throat: Oropharynx is clear and moist.   Eyes: Pupils are equal, round, and reactive to light.   Neck: Normal  range of motion. Neck supple. No tracheal deviation present. No thyromegaly present.   Cardiovascular: Normal rate, regular rhythm and normal heart sounds.  Exam reveals no gallop and no friction rub.    No murmur heard.  Pulmonary/Chest: Effort normal and breath sounds normal. No respiratory distress. He has no wheezes. He has no rales. He exhibits no tenderness.   Abdominal: Soft. Bowel sounds are normal. He exhibits no distension and no mass. There is no tenderness. There is no rebound and no guarding.   Musculoskeletal:        Lumbar back: He exhibits decreased range of motion and tenderness.    Damaso had a diabetic foot exam performed today.   During the foot exam he had a monofilament test not performed.    Vascular Status -  His exam exhibits right foot vasculature normal. His exam exhibits no right foot edema. His exam exhibits left foot vasculature normal. His exam exhibits no left foot edema.   Skin Integrity  -  His right foot skin is intact.     Damaso 's left foot skin is intact. .   Foot Structure and Biomechanics -  His right foot has no hallux valgus, no pes cavus, no claw toes, no hammer toes and no cavovarus present. His left foot has no hallux valgus, no pes cavus, no claw toes, no hammer toes and no cavovarus.      Neurological: He is alert and oriented to person, place, and time. He has normal reflexes.   CN 2-12 grossly intact    Skin: Skin is warm and dry. No rash noted. No erythema.   Psychiatric: He has a normal mood and affect. His speech is normal and behavior is normal. Judgment and thought content normal. Cognition and memory are normal.       Assessment/Plan     Problem List Items Addressed This Visit        Cardiovascular and Mediastinum    Malignant hypertension with heart disease, without congestive heart failure    Relevant Medications    isosorbide mononitrate (IMDUR) 30 MG 24 hr tablet    carvedilol (COREG) 12.5 MG tablet    aspirin 325 MG tablet    Coronary artery disease  involving native coronary artery of native heart    Relevant Medications    isosorbide mononitrate (IMDUR) 30 MG 24 hr tablet    carvedilol (COREG) 12.5 MG tablet    Hypercholesterolemia    Relevant Medications    simvastatin (ZOCOR) 40 MG tablet    Essential hypertension    Relevant Medications    isosorbide mononitrate (IMDUR) 30 MG 24 hr tablet    carvedilol (COREG) 12.5 MG tablet    aspirin 325 MG tablet    Other Relevant Orders    CBC & Differential    Comprehensive Metabolic Panel    TSH    Hemoglobin A1c    Vitamin D 25 Hydroxy    MicroAlbumin, Urine, Random - Urine, Clean Catch    Lipid Panel       Respiratory    Seasonal allergic rhinitis due to pollen    Relevant Medications    diphenhydrAMINE (BENADRYL ALLERGY) 25 mg capsule       Digestive    Vitamin D deficiency    Vitamin B 12 deficiency    Relevant Medications    cyanocobalamin 1000 MCG/ML injection    Other Relevant Orders    Vitamin B12    Vitamin D deficiency disease    Relevant Medications    vitamin D (ERGOCALCIFEROL) 36706 units capsule capsule    Gastroesophageal reflux disease without esophagitis    Relevant Medications    raNITIdine (ZANTAC) 150 MG tablet    pantoprazole (PROTONIX) 40 MG EC tablet       Endocrine    Type 2 diabetes mellitus with diabetic peripheral angiopathy without gangrene, without long-term current use of insulin    Relevant Medications    simvastatin (ZOCOR) 40 MG tablet    Omega 3 1000 MG capsule    metFORMIN (GLUCOPHAGE) 500 MG tablet    Lancets (ONETOUCH ULTRASOFT) lancets    glucose blood test strip    gabapentin (NEURONTIN) 100 MG capsule    Uncontrolled type 2 diabetes mellitus without complication, without long-term current use of insulin    Relevant Medications    simvastatin (ZOCOR) 40 MG tablet    Omega 3 1000 MG capsule    metFORMIN (GLUCOPHAGE) 500 MG tablet    Lancets (ONETOUCH ULTRASOFT) lancets    glucose blood test strip    gabapentin (NEURONTIN) 100 MG capsule    Other Relevant Orders    Ambulatory  Referral to Optometry (Completed)    CBC & Differential    Comprehensive Metabolic Panel    TSH    Hemoglobin A1c    Vitamin D 25 Hydroxy    MicroAlbumin, Urine, Random - Urine, Clean Catch    Lipid Panel       Nervous and Auditory    Lumbar back pain with radiculopathy affecting left lower extremity       Musculoskeletal and Integument    Osteoarthritis involving multiple joints on both sides of body    Relevant Medications    traMADol (ULTRAM) 50 MG tablet    Rash and nonspecific skin eruption - Primary    Relevant Medications    hydrocortisone 1 % cream    raNITIdine (ZANTAC) 150 MG tablet    pantoprazole (PROTONIX) 40 MG EC tablet    ondansetron (ZOFRAN) 8 MG tablet       Genitourinary    Benign non-nodular prostatic hyperplasia without lower urinary tract symptoms    Relevant Medications    finasteride (PROSCAR) 5 MG tablet    Other Relevant Orders    PSA Screen       Other    Generalized anxiety disorder    Relevant Medications    clonazePAM (KlonoPIN) 0.5 MG tablet    citalopram (CeleXA) 40 MG tablet    amitriptyline (ELAVIL) 50 MG tablet    Sleep apnea    High risk medication use    Relevant Orders    Urine Drug Screen - Urine, Clean Catch    Abnormal thyroid blood test    Relevant Medications    isosorbide mononitrate (IMDUR) 30 MG 24 hr tablet      Other Visit Diagnoses     Screening for prostate cancer                Current Outpatient Prescriptions:   •  amitriptyline (ELAVIL) 50 MG tablet, Take 2 tablets by mouth At Night As Needed for Sleep., Disp: 60 tablet, Rfl: 5  •  aspirin 325 MG tablet, Take 1 tablet by mouth Daily., Disp: 30 tablet, Rfl: 5  •  carvedilol (COREG) 12.5 MG tablet, Take 1 tablet by mouth 2 (Two) Times a Day With Meals., Disp: 60 tablet, Rfl: 5  •  citalopram (CeleXA) 40 MG tablet, Take 1 tablet by mouth Daily., Disp: 30 tablet, Rfl: 5  •  clonazePAM (KlonoPIN) 0.5 MG tablet, Take 1 tablet by mouth 2 (Two) Times a Day As Needed for Seizures., Disp: 60 tablet, Rfl: 0  •   cyanocobalamin 1000 MCG/ML injection, Inject 1 mL into the shoulder, thigh, or buttocks Every 28 (Twenty-Eight) Days., Disp: 1 mL, Rfl: 11  •  diphenhydrAMINE (BENADRYL ALLERGY) 25 mg capsule, Take 1 capsule by mouth Every 6 (Six) Hours As Needed for Itching., Disp: 30 capsule, Rfl: 0  •  finasteride (PROSCAR) 5 MG tablet, Take 1 tablet by mouth Daily., Disp: 30 tablet, Rfl: 5  •  gabapentin (NEURONTIN) 100 MG capsule, Take 1 capsule by mouth 2 (Two) Times a Day., Disp: 60 capsule, Rfl: 2  •  glucose blood test strip, Use as instructed, Disp: 100 each, Rfl: 12  •  glucose monitor monitoring kit, 1 each 3 (Three) Times a Day As Needed (diabetes)., Disp: 1 each, Rfl: 0  •  HYDROcodone-acetaminophen (NORCO) 7.5-325 MG per tablet, Take 1 tablet by mouth Every 6 (Six) Hours As Needed for Moderate Pain  or Severe Pain ., Disp: 30 tablet, Rfl: 0  •  isosorbide mononitrate (IMDUR) 30 MG 24 hr tablet, Take 1 tablet by mouth 2 (Two) Times a Day., Disp: 60 tablet, Rfl: 5  •  Lancets (ONETOUCH ULTRASOFT) lancets, Use as instructed, Disp: 100 each, Rfl: 12  •  metFORMIN (GLUCOPHAGE) 500 MG tablet, Take 1 tablet by mouth 2 (Two) Times a Day With Meals., Disp: 60 tablet, Rfl: 5  •  Omega 3 1000 MG capsule, 2 twice daily, Disp: 120 each, Rfl: 5  •  ondansetron (ZOFRAN) 8 MG tablet, Take 1 tablet by mouth Every 8 (Eight) Hours As Needed for Nausea., Disp: 20 tablet, Rfl: 2  •  pantoprazole (PROTONIX) 40 MG EC tablet, Take 1 tablet by mouth Daily., Disp: 30 tablet, Rfl: 5  •  polyethylene glycol (MIRALAX) packet, Take 17 g by mouth Daily., Disp: 1 each, Rfl: 5  •  raNITIdine (ZANTAC) 150 MG tablet, Take 1 tablet by mouth 2 (Two) Times a Day., Disp: 60 tablet, Rfl: 5  •  simvastatin (ZOCOR) 40 MG tablet, Take 1 tablet by mouth Every Night., Disp: 30 tablet, Rfl: 5  •  traMADol (ULTRAM) 50 MG tablet, Take 1 tablet by mouth Every 8 (Eight) Hours As Needed for Moderate Pain ., Disp: 90 tablet, Rfl: 2  •  vitamin D (ERGOCALCIFEROL) 27849  units capsule capsule, Take 1 capsule by mouth Every 7 (Seven) Days., Disp: 4 capsule, Rfl: 5  •  hydrocortisone 1 % cream, Apply  topically 2 (Two) Times a Day As Needed for Rash., Disp: 1 each, Rfl: 5  •  Thyroid 30 MG PO tablet, Take 1 tablet by mouth Daily., Disp: 30 tablet, Rfl: 5    Current Facility-Administered Medications:   •  cyanocobalamin injection 1,000 mcg, 1,000 mcg, Intramuscular, Q28 Days, YOUNG Toscano, 1,000 mcg at 01/19/17 0918    Uds today.   I have discussed diagnosis in detail today allowing time for questions and answers. Pt is aware of reasons to seek urgent or emergent medical care as well as reasons to return to the clinic for evaluation. Possible side effects, interactions and progression of symptoms discussed as well. Pt / family states understanding.   Emotional support and active listening provided.   Danny has been reviewed as consistent.  Uds is on file.   Goal: pt will report improved anxiety symptoms.   Goal: Improved quality of life and reduction in pain as evidenced by pt report.   As part of this patient's treatment plan they are being prescribed controlled substance/substances with potential for abuse. This patient has been made aware of the appropriate use of such medications, including potential risk of somnolence, limited ability to drive and / or work safely, and potential for overdose. It has also been made clear that these medications are for use by this patient only, without concomitant use of alcohol or other substances unless prescribed/advised by medical provider. Patient has completed controlled substance agreement detailing terms of continued prescribing of controlled substances including monitoring Danny reports, drug screens and pill counts. The patient was asked and states they are not receiving narcotic medication from any there provider or any provider that this office has not been made aware of. History and physical exam exhibit continued  safe and appropriate use of controlled substances.   Patient has been instructed and counseled regarding opioid misuse and risk of addiction.  We have discussed proper storage and disposal of controlled medication.  Diabetic foot exam performed  Refer to optometry for exam.  Keep rash diary listing when outbreaks occur and if any new products had been tried for the previous 2-4 weeks such as laundry detergents, lotions, soaps or other personal items.  Steroid cream as directed.  Refill routine medications including controlled medications.  I have asked staff to attempt preauthorization on Ultram.  Continue physical therapy as ordered.  Fasting labs one week prior to next appointment. Will obtain PSA at next office visit.   Follow-up in 3 months, sooner if needed.           Errors in dictation may reflect use of voice recognition software and not all errors in transcription may have been detected prior to signing.       This document has been electronically signed by:  YOUNG Conway, NP-C

## 2018-01-31 ENCOUNTER — TELEPHONE (OUTPATIENT)
Dept: FAMILY MEDICINE CLINIC | Facility: CLINIC | Age: 58
End: 2018-01-31

## 2018-01-31 ENCOUNTER — PRIOR AUTHORIZATION (OUTPATIENT)
Dept: FAMILY MEDICINE CLINIC | Facility: CLINIC | Age: 58
End: 2018-01-31

## 2018-01-31 NOTE — TELEPHONE ENCOUNTER
----- Message from Maris Ashraf MA sent at 1/31/2018  2:52 PM EST -----  Sent in request through cover my meds.   ----- Message -----     From: YOUNG Toscano     Sent: 1/29/2018  10:32 AM       To: Maris Ashraf MA    Please work on PA for ultram.

## 2018-02-05 ENCOUNTER — TELEPHONE (OUTPATIENT)
Dept: FAMILY MEDICINE CLINIC | Facility: CLINIC | Age: 58
End: 2018-02-05

## 2018-02-05 NOTE — TELEPHONE ENCOUNTER
----- Message from YOUNG Toscano sent at 2/5/2018  9:57 AM EST -----  Let patient know and ask if he has been able to afford/obtain the med.   ----- Message -----     From: Maris Ashraf MA     Sent: 2/5/2018   8:39 AM       To: YOUNG Toscano    Sent in PA request, received a decision today & they have denied the medication.   ----- Message -----     From: YOUNG Toscano     Sent: 1/29/2018  10:32 AM       To: Maris Ashraf MA    Please work on PA for ultram.

## 2018-02-09 DIAGNOSIS — IMO0001 UNCONTROLLED TYPE 2 DIABETES MELLITUS WITHOUT COMPLICATION, WITHOUT LONG-TERM CURRENT USE OF INSULIN: ICD-10-CM

## 2018-02-13 ENCOUNTER — OFFICE VISIT (OUTPATIENT)
Dept: CARDIOLOGY | Facility: CLINIC | Age: 58
End: 2018-02-13

## 2018-02-13 VITALS
OXYGEN SATURATION: 96 % | SYSTOLIC BLOOD PRESSURE: 124 MMHG | BODY MASS INDEX: 31.9 KG/M2 | HEART RATE: 97 BPM | WEIGHT: 222.8 LBS | DIASTOLIC BLOOD PRESSURE: 92 MMHG | HEIGHT: 70 IN

## 2018-02-13 DIAGNOSIS — E78.00 HYPERCHOLESTEROLEMIA: ICD-10-CM

## 2018-02-13 DIAGNOSIS — I25.10 CORONARY ARTERY DISEASE INVOLVING NATIVE CORONARY ARTERY OF NATIVE HEART WITHOUT ANGINA PECTORIS: ICD-10-CM

## 2018-02-13 DIAGNOSIS — I11.9: Primary | ICD-10-CM

## 2018-02-13 DIAGNOSIS — I10 ESSENTIAL HYPERTENSION: ICD-10-CM

## 2018-02-13 DIAGNOSIS — I20.9 ANGINAL PAIN (HCC): ICD-10-CM

## 2018-02-13 PROCEDURE — 99214 OFFICE O/P EST MOD 30 MIN: CPT | Performed by: NURSE PRACTITIONER

## 2018-02-13 PROCEDURE — 93000 ELECTROCARDIOGRAM COMPLETE: CPT | Performed by: NURSE PRACTITIONER

## 2018-02-13 NOTE — PROGRESS NOTES
Subjective     Chief Complaint: Coronary Artery Disease; Hypertension; and Diabetes    History of Present Illness   Damaso Leonard is a 57 y.o. male who presents with coronary artery disease, hyperlipidemia, hypertension and type 2 diabetes.  Mr. Leonard also has generalized anxiety disorder.  He initially presented in 2006 at [facility] with an MI and underwent staged intervention per his description.  He received 3 stents.  He presented again in 2010 and received one stent at that time.    Ace visit he presents with complaint of chest discomfort and palpitations.  He reports that the chest discomfort has decreased in occurrence since having been placed on a general anxiety disorder medication.  Palpitations have decreased since stopping caffeine products.  He reports that this chest discomfort comes while he is at rest, lasting at most 5 minutes.  The pain is sharp precordial pain that sometimes radiates across the shoulders.  He reports occasional dizziness and lightheadedness when he rises quickly.  He denies syncope or near syncopal events.    He recently had blood work completed and was found to be hypothyroid.  He has been placed on Synthroid.      Current Outpatient Prescriptions:   •  amitriptyline (ELAVIL) 50 MG tablet, Take 2 tablets by mouth At Night As Needed for Sleep., Disp: 60 tablet, Rfl: 5  •  aspirin 325 MG tablet, Take 1 tablet by mouth Daily., Disp: 30 tablet, Rfl: 5  •  carvedilol (COREG) 12.5 MG tablet, Take 1 tablet by mouth 2 (Two) Times a Day With Meals., Disp: 60 tablet, Rfl: 5  •  citalopram (CeleXA) 40 MG tablet, Take 1 tablet by mouth Daily., Disp: 30 tablet, Rfl: 5  •  cyanocobalamin 1000 MCG/ML injection, Inject 1 mL into the shoulder, thigh, or buttocks Every 28 (Twenty-Eight) Days., Disp: 1 mL, Rfl: 11  •  finasteride (PROSCAR) 5 MG tablet, Take 1 tablet by mouth Daily., Disp: 30 tablet, Rfl: 5  •  gabapentin (NEURONTIN) 100 MG capsule, Take 1 capsule by mouth 2 (Two) Times  a Day., Disp: 60 capsule, Rfl: 2  •  isosorbide mononitrate (IMDUR) 30 MG 24 hr tablet, Take 1 tablet by mouth 2 (Two) Times a Day., Disp: 60 tablet, Rfl: 5  •  metFORMIN (GLUCOPHAGE) 500 MG tablet, Take 1 tablet by mouth 2 (Two) Times a Day With Meals., Disp: 60 tablet, Rfl: 5  •  ondansetron (ZOFRAN) 8 MG tablet, Take 1 tablet by mouth Every 8 (Eight) Hours As Needed for Nausea., Disp: 20 tablet, Rfl: 2  •  pantoprazole (PROTONIX) 40 MG EC tablet, Take 1 tablet by mouth Daily., Disp: 30 tablet, Rfl: 5  •  raNITIdine (ZANTAC) 150 MG tablet, Take 1 tablet by mouth 2 (Two) Times a Day., Disp: 60 tablet, Rfl: 5  •  simvastatin (ZOCOR) 40 MG tablet, Take 1 tablet by mouth Every Night., Disp: 30 tablet, Rfl: 5  •  Thyroid 30 MG PO tablet, Take 1 tablet by mouth Daily., Disp: 30 tablet, Rfl: 5  •  traMADol (ULTRAM) 50 MG tablet, Take 1 tablet by mouth Every 8 (Eight) Hours As Needed for Moderate Pain ., Disp: 90 tablet, Rfl: 2  •  vitamin D (ERGOCALCIFEROL) 48867 units capsule capsule, Take 1 capsule by mouth Every 7 (Seven) Days., Disp: 4 capsule, Rfl: 5  •  clonazePAM (KlonoPIN) 0.5 MG tablet, Take 1 tablet by mouth 2 (Two) Times a Day As Needed for Seizures., Disp: 60 tablet, Rfl: 0  •  diphenhydrAMINE (BENADRYL ALLERGY) 25 mg capsule, Take 1 capsule by mouth Every 6 (Six) Hours As Needed for Itching., Disp: 30 capsule, Rfl: 0  •  glucose blood test strip, Check blood sugar 3x times., Disp: 100 each, Rfl: 12  •  glucose monitor monitoring kit, 1 each 3 (Three) Times a Day As Needed (diabetes)., Disp: 1 each, Rfl: 0  •  HYDROcodone-acetaminophen (NORCO) 7.5-325 MG per tablet, Take 1 tablet by mouth Every 6 (Six) Hours As Needed for Moderate Pain  or Severe Pain ., Disp: 30 tablet, Rfl: 0  •  hydrocortisone 1 % cream, Apply  topically 2 (Two) Times a Day As Needed for Rash., Disp: 1 each, Rfl: 5  •  Lancets (ONETOUCH ULTRASOFT) lancets, Use as instructed, Disp: 100 each, Rfl: 12  •  Omega 3 1000 MG capsule, 2 twice  "daily, Disp: 120 each, Rfl: 5  •  polyethylene glycol (MIRALAX) packet, Take 17 g by mouth Daily., Disp: 1 each, Rfl: 5    Current Facility-Administered Medications:   •  cyanocobalamin injection 1,000 mcg, 1,000 mcg, Intramuscular, Q28 Days, Clare Quintero, APRN, 1,000 mcg at 01/19/17 0918     The following portions of the patient's history were reviewed and updated as appropriate: allergies, current medications, past family history, past medical history, past social history, past surgical history and problem list.    Review of Systems   Constitutional: Positive for fatigue. Negative for activity change and appetite change.   HENT: Negative for congestion and tinnitus.    Eyes: Negative for visual disturbance.   Respiratory: Positive for cough, chest tightness and shortness of breath.    Cardiovascular: Positive for chest pain and palpitations. Negative for leg swelling.   Gastrointestinal: Negative for blood in stool, nausea and vomiting.   Endocrine: Negative for cold intolerance, heat intolerance and polyuria.   Genitourinary: Negative for dysuria.   Musculoskeletal: Negative for myalgias and neck pain.   Skin: Negative for rash.   Neurological: Positive for dizziness and light-headedness. Negative for syncope and weakness.   Hematological: Bruises/bleeds easily.   Psychiatric/Behavioral: Negative for confusion and sleep disturbance.       Objective     /92 (BP Location: Left arm)  Pulse 97  Ht 177.8 cm (70\")  Wt 101 kg (222 lb 12.8 oz)  SpO2 96%  BMI 31.97 kg/m2    Physical Exam   Constitutional: He appears well-developed and well-nourished.   HENT:   Head: Normocephalic and atraumatic.   Eyes: Pupils are equal, round, and reactive to light.   Neck: No JVD present.   Cardiovascular: Normal rate, regular rhythm and intact distal pulses.  Exam reveals no gallop and no friction rub.    No murmur heard.  Pulses:       Dorsalis pedis pulses are 2+ on the right side, and 2+ on the left side.     "    Posterior tibial pulses are 2+ on the right side   No lower extremity edema    Emilio's test: Normal   Pulmonary/Chest: Effort normal and breath sounds normal. No respiratory distress. He has no wheezes. He has no rales.   Abdominal: Soft. He exhibits no mass. There is no tenderness. No hernia.   Skin: Skin is warm and dry.   Psychiatric: He has a normal mood and affect.   Vitals reviewed.        ECG 12 Lead  Date/Time: 2/13/2018 10:18 AM  Performed by: KAILEE HORN  Authorized by: KAILEE HORN   Comparison: not compared with previous ECG   Comments: ECG today shows sinus rhythm with normal intervals and duration.  There is low QRS voltage in the precordial leads.  Heart rate is 90.  QTc 400              Assessment/Plan       Damaso was seen today for coronary artery disease, hypertension and diabetes.    Diagnoses and all orders for this visit:    Malignant hypertension with heart disease, without congestive heart failure    Coronary artery disease involving native coronary artery of native heart without angina pectoris  -     Adult Transthoracic Echo Complete W/ Cont if Necessary Per Protocol; Future  -     Stress Test With Myocardial Perfusion One Day; Future     Chest discomfort is somewhat atypical though difficult to separate this chest discomfort from BECKY, stress test ordered   May need to consider cardiac catheter versus increase Imdur   Decreased aspirin to 81 mg daily   Risk factor modification is indicated    Hypercholesterolemia     Most recent lipid panel shows adequate control, LDL 75   Continue statin as prescribed    Anginal pain  -     ECG 12 Lead        -     Stress Test With Myocardial Perfusion One Day; Future       Essential hypertension     Stable.   Continue carvedilol as prescribed    Return to clinic in one month for test results.           YOUNG Martinez

## 2018-02-15 ENCOUNTER — OFFICE VISIT (OUTPATIENT)
Dept: NEUROSURGERY | Facility: CLINIC | Age: 58
End: 2018-02-15

## 2018-02-15 VITALS
HEIGHT: 70 IN | DIASTOLIC BLOOD PRESSURE: 75 MMHG | BODY MASS INDEX: 31.78 KG/M2 | OXYGEN SATURATION: 96 % | RESPIRATION RATE: 16 BRPM | WEIGHT: 222 LBS | SYSTOLIC BLOOD PRESSURE: 115 MMHG | TEMPERATURE: 98.2 F | HEART RATE: 90 BPM

## 2018-02-15 DIAGNOSIS — M51.36 DEGENERATIVE DISC DISEASE, LUMBAR: ICD-10-CM

## 2018-02-15 DIAGNOSIS — M50.30 DEGENERATIVE DISC DISEASE, CERVICAL: Primary | ICD-10-CM

## 2018-02-15 PROCEDURE — 99203 OFFICE O/P NEW LOW 30 MIN: CPT | Performed by: NEUROLOGICAL SURGERY

## 2018-02-15 NOTE — PROGRESS NOTES
Damaso Leonard  1960  [id number]      Chief Complaint   Patient presents with   • Back Pain   • Leg Pain       HISTORY OF PRESENT ILLNESS:  [This is a 57-year-old male referred for evaluation of pain in his low back primarily.  He also has pain in the cervical region.  He sustained a work-related injury 2 years ago and is been deemed disabled because of injury to his right shoulder.  The symptoms that he has in his neck and back are axial and nonradicular.  I am unable to elicit a history that would suggest nerve root or spinal cord compression.    Currently he is engaged in physical therapy and improving.  Predisposition for degenerative osteoarthritis. ]    Past Medical History:   Diagnosis Date   • Anxiety    • ASCVD (arteriosclerotic cardiovascular disease)    • DM (diabetes mellitus)    • GERD (gastroesophageal reflux disease)    • History of EKG 04/15/2015    NORMAL   • HTN (hypertension)    • Hyperlipidemia    • Injury of back    • Low back pain    • Myocardial infarction        Past Surgical History:   Procedure Laterality Date   • CARDIAC SURGERY      stenting   • CHOLECYSTECTOMY     • COLONOSCOPY  2007   • HERNIA REPAIR     • SHOULDER SURGERY     • TONSILLECTOMY     • TYMPANOPLASTY     • UPPER GASTROINTESTINAL ENDOSCOPY     • VASECTOMY         Family History   Problem Relation Age of Onset   • Heart attack Father    • Heart disease Father    • Hypertension Father    • Heart attack Sister    • Diabetes Sister    • Heart disease Sister    • Hypertension Sister    • Thyroid disease Sister    • Heart attack Brother    • Diabetes Brother    • Thyroid disease Brother    • Arthritis Daughter    • COPD Daughter    • Asthma Daughter    • Depression Daughter    • Heart disease Sister    • Hypertension Sister        Social History     Social History   • Marital status:      Spouse name: N/A   • Number of children: N/A   • Years of education: N/A     Occupational History   • Not on file.     Social History  Main Topics   • Smoking status: Never Smoker   • Smokeless tobacco: Never Used   • Alcohol use No   • Drug use: No   • Sexual activity: Defer     Other Topics Concern   • Not on file     Social History Narrative       No Known Allergies      Current Outpatient Prescriptions:   •  amitriptyline (ELAVIL) 50 MG tablet, Take 2 tablets by mouth At Night As Needed for Sleep., Disp: 60 tablet, Rfl: 5  •  aspirin 81 MG tablet, Take 1 tablet by mouth Daily., Disp: 30 tablet, Rfl: 11  •  carvedilol (COREG) 12.5 MG tablet, Take 1 tablet by mouth 2 (Two) Times a Day With Meals., Disp: 60 tablet, Rfl: 5  •  citalopram (CeleXA) 40 MG tablet, Take 1 tablet by mouth Daily., Disp: 30 tablet, Rfl: 5  •  clonazePAM (KlonoPIN) 0.5 MG tablet, Take 1 tablet by mouth 2 (Two) Times a Day As Needed for Seizures., Disp: 60 tablet, Rfl: 0  •  cyanocobalamin 1000 MCG/ML injection, Inject 1 mL into the shoulder, thigh, or buttocks Every 28 (Twenty-Eight) Days., Disp: 1 mL, Rfl: 11  •  diphenhydrAMINE (BENADRYL ALLERGY) 25 mg capsule, Take 1 capsule by mouth Every 6 (Six) Hours As Needed for Itching., Disp: 30 capsule, Rfl: 0  •  finasteride (PROSCAR) 5 MG tablet, Take 1 tablet by mouth Daily., Disp: 30 tablet, Rfl: 5  •  gabapentin (NEURONTIN) 100 MG capsule, Take 1 capsule by mouth 2 (Two) Times a Day., Disp: 60 capsule, Rfl: 2  •  glucose blood test strip, Check blood sugar 3x times., Disp: 100 each, Rfl: 12  •  glucose monitor monitoring kit, 1 each 3 (Three) Times a Day As Needed (diabetes)., Disp: 1 each, Rfl: 0  •  HYDROcodone-acetaminophen (NORCO) 7.5-325 MG per tablet, Take 1 tablet by mouth Every 6 (Six) Hours As Needed for Moderate Pain  or Severe Pain ., Disp: 30 tablet, Rfl: 0  •  hydrocortisone 1 % cream, Apply  topically 2 (Two) Times a Day As Needed for Rash., Disp: 1 each, Rfl: 5  •  isosorbide mononitrate (IMDUR) 30 MG 24 hr tablet, Take 1 tablet by mouth 2 (Two) Times a Day., Disp: 60 tablet, Rfl: 5  •  Lancets (ONETOUCH  ULTRASOFT) lancets, Use as instructed, Disp: 100 each, Rfl: 12  •  metFORMIN (GLUCOPHAGE) 500 MG tablet, Take 1 tablet by mouth 2 (Two) Times a Day With Meals., Disp: 60 tablet, Rfl: 5  •  Omega 3 1000 MG capsule, 2 twice daily, Disp: 120 each, Rfl: 5  •  ondansetron (ZOFRAN) 8 MG tablet, Take 1 tablet by mouth Every 8 (Eight) Hours As Needed for Nausea., Disp: 20 tablet, Rfl: 2  •  pantoprazole (PROTONIX) 40 MG EC tablet, Take 1 tablet by mouth Daily., Disp: 30 tablet, Rfl: 5  •  polyethylene glycol (MIRALAX) packet, Take 17 g by mouth Daily., Disp: 1 each, Rfl: 5  •  raNITIdine (ZANTAC) 150 MG tablet, Take 1 tablet by mouth 2 (Two) Times a Day., Disp: 60 tablet, Rfl: 5  •  simvastatin (ZOCOR) 40 MG tablet, Take 1 tablet by mouth Every Night., Disp: 30 tablet, Rfl: 5  •  Thyroid 30 MG PO tablet, Take 1 tablet by mouth Daily., Disp: 30 tablet, Rfl: 5  •  traMADol (ULTRAM) 50 MG tablet, Take 1 tablet by mouth Every 8 (Eight) Hours As Needed for Moderate Pain ., Disp: 90 tablet, Rfl: 2  •  vitamin D (ERGOCALCIFEROL) 85321 units capsule capsule, Take 1 capsule by mouth Every 7 (Seven) Days., Disp: 4 capsule, Rfl: 5    Current Facility-Administered Medications:   •  cyanocobalamin injection 1,000 mcg, 1,000 mcg, Intramuscular, Q28 Days, YOUNG Toscano, 1,000 mcg at 01/19/17 0918    Review of Systems   Constitutional: Positive for diaphoresis and fatigue.   HENT: Positive for mouth sores and tinnitus.    Eyes: Positive for itching.   Gastrointestinal: Positive for abdominal pain.   Endocrine: Positive for heat intolerance.   Genitourinary: Positive for flank pain.   Musculoskeletal: Positive for arthralgias, back pain, joint swelling, neck pain and neck stiffness.   Neurological: Positive for light-headedness and numbness.   Psychiatric/Behavioral: Positive for decreased concentration. The patient is nervous/anxious.    All other systems reviewed and are negative.      Vitals:    02/15/18 1113   BP:  "115/75   BP Location: Left arm   Patient Position: Sitting   Pulse: 90   Resp: 16   Temp: 98.2 °F (36.8 °C)   SpO2: 96%   Weight: 101 kg (222 lb)   Height: 177.8 cm (70\")       Neurological Examination:      Mental status/speech: The patient is alert and oriented.  Speech is clear without aphysia or dysarthria.  No overt cognitive deficits.    Cranial nerve examination:    Olfaction: Smell is intact.  Vision: Vision is intact without visual field abnormalities.  Funduscopic examination is normal.  No pupillary irregularity.  Ocular motor examination: The extraocular muscles are intact.  There is no diplopia.  The pupil is round and reactive to both light and accommodation.  There is no nystagmus.  Facial movement/sensation: There is no facial weakness.  Sensation is intact in the first, second, and third divisions of the trigeminal nerve.  The corneal reflex is intact.  Auditory: Hearing is intact to finger rub bilaterally.  Cranial nerves IX, X, XI, XII: Phonation is normal.  No dysphagia.  Tongue is protruded in the midline without atrophy.  The gag reflex is intact.  Shoulder shrug is normal.    Musculoligamentous ligamentous examination: [He has decreased range of motion of his right shoulder.  He has diminished range of motion of the cervical lumbar spine as well.  However straight leg raising, [place] and flip test are negative.  I am unable to document any weakness sensory loss or reflex asymmetry.  His gait is normal. ]      Medical Decision Making:     Diagnostic Data Set:  CT of the lumbar spine shows degenerative osteoarthritic changes unassociated with spinal stenosis lateral recess closure or disc herniation.      Assessment:  Symptomatic degenerative osteoarthritis of the lumbar spine          Recommendations:  Neurosurgical intervention is not indicated or warranted.  He does not have neurological dysfunction related to lumbar spine which is degenerative and genetic in nature.        I greatly " appreciate the opportunity to see and evaluate this individual.  If you have questions or concerns regarding issues that I may have overlooked please call me at any time: [phone].  Ramana Mcgrath M.D.  [facility]  [address].  [facility]  [id number]

## 2018-02-19 ENCOUNTER — OFFICE VISIT (OUTPATIENT)
Dept: PSYCHIATRY | Facility: CLINIC | Age: 58
End: 2018-02-19

## 2018-02-19 DIAGNOSIS — F33.41 MAJOR DEPRESSIVE DISORDER, RECURRENT, IN PARTIAL REMISSION (HCC): Primary | ICD-10-CM

## 2018-02-19 DIAGNOSIS — F41.3 OTHER MIXED ANXIETY DISORDERS: ICD-10-CM

## 2018-02-19 PROCEDURE — 90837 PSYTX W PT 60 MINUTES: CPT | Performed by: SOCIAL WORKER

## 2018-02-19 NOTE — PROGRESS NOTES
Date of Service: February 19, 2018  Time In: 7:45 AM  Time Out: 8:40 AM      PROGRESS NOTE  Data:  Damaso Leonard is a 57 y.o. male who met with the undersigned for a regularly scheduled individual outpatient psychotherapy session at [facility] for follow-up of depression and anxiety.  Patient reports he recently began physical therapy and states he is doing much better with his back.  As a result, he states he is making an effort to be more physically active.      HPI: Patient reports he feels he is doing much better and reports minimal symptoms of depression at this time.  Patient rates current symptoms of depression at a 2 on a scale of 1-10 with 10 being most severe.  Patient also reports significantly decreased symptoms of anxiety but states he continues to struggle on occasion with feeling on edge, feeling overwhelmed, and social isolation.  The patient rates current symptoms of anxiety at a 2 on a scale of 1-10 with 10 being most severe.  Patient reports he is sleeping relatively well and states he normally sleeps 6-8 hours nightly.  Patient reports he continues to adhere to medication regimen as prescribed.  Patient adamantly convincingly denies suicidal ideation and vehemently denies any substance use.      Clinical Maneuvering/Intervention:  Assisted patient in processing above session content; acknowledged and normalized patient’s thoughts, feelings, and concerns.  Acknowledged the patient's progress and utilized motivational interviewing techniques including complex reflections to assist the patient in recognizing the steps he has taken to bring about positive change.  Also strongly encouraged patient to continue in physical therapy and to develop a specific plan to remain active following completion.  Utilized cognitive behavioral therapy to assist the patient in developing appropriate coping mechanisms for any increase symptoms.  Provided unconditional positive regard in a  safe, supportive environment.    Allowed patient to freely discuss issues without interruption or judgment. Provided safe, confidential environment to facilitate the development of positive therapeutic relationship and encourage open, honest communication. Assisted patient in identifying risk factors which would indicate the need for higher level of care including thoughts to harm self or others and/or self-harming behavior and encouraged patient to contact this office, call 911, or present to the nearest emergency room should any of these events occur. Discussed crisis intervention services and means to access.  Patient adamantly and convincingly denies current suicidal or homicidal ideation or perceptual disturbance.    Assessment    Patient appears to be maintaining of relative stability as compared to his baseline.  Patient appears to have made significant progress since beginning treatment and continues to report minimal symptoms.  The patient also reports he is doing much better with his back pain which has likely contributed to his progress.    Diagnoses and all orders for this visit:    Major depressive disorder, recurrent, in partial remission    Other mixed anxiety disorders               Mental Status Exam  Hygiene:  good  Dress:  casual  Attitude:  Cooperative  Motor Activity:  Appropriate  Speech:  Normal  Mood:  within normal limits  Affect:  calm and pleasant  Thought Processes:  Goal directed  Thought Content:  normal  Suicidal Thoughts:  denies  Homicidal Thoughts:  denies  Crisis Safety Plan: yes, to come to the emergency room.  Hallucinations:  denies    Patient's Support Network Includes:  wife and children    Progress toward goal: Not at goal    Functional Status: Mild impairment     Prognosis: Good with Ongoing Treatment     Plan         He shouldn't will be rescheduled for follow-up appointment in approximately 3 months at [facility] to determine the need for ongoing  treatment.  Patient will adhere to medication regimen as prescribed and report any side effects. Patient will contact this office, call 911 or present to the nearest emergency room should suicidal or homicidal ideations occur. Provide Cognitive Behavioral Therapy and Integrative Therapy to improve functioning, maintain stability, and avoid decompensation and the need for higher level of care.          Return in about 3 months (around 05/19/2018) for Next scheduled follow up.      This document signed by Tha Newton LCSW, LOTUS February 19, 2018 1:18 PM

## 2018-02-27 ENCOUNTER — HOSPITAL ENCOUNTER (OUTPATIENT)
Dept: NUCLEAR MEDICINE | Facility: HOSPITAL | Age: 58
Discharge: HOME | End: 2018-02-27

## 2018-02-27 ENCOUNTER — HOSPITAL ENCOUNTER (OUTPATIENT)
Dept: CARDIOLOGY | Facility: HOSPITAL | Age: 58
Discharge: HOME | End: 2018-02-27

## 2018-02-27 DIAGNOSIS — I25.10 CORONARY ARTERY DISEASE INVOLVING NATIVE CORONARY ARTERY OF NATIVE HEART WITHOUT ANGINA PECTORIS: ICD-10-CM

## 2018-02-27 DIAGNOSIS — I20.9 ANGINAL PAIN (HCC): ICD-10-CM

## 2018-02-27 LAB
BH CV ECHO MEAS - % IVS THICK: 38.1 %
BH CV ECHO MEAS - % LVPW THICK: 23.8 %
BH CV ECHO MEAS - ACS: 2.4 CM
BH CV ECHO MEAS - AO ROOT AREA (BSA CORRECTED): 1.8
BH CV ECHO MEAS - AO ROOT AREA: 11.7 CM^2
BH CV ECHO MEAS - AO ROOT DIAM: 3.9 CM
BH CV ECHO MEAS - BSA(HAYCOCK): 2.3 M^2
BH CV ECHO MEAS - BSA: 2.2 M^2
BH CV ECHO MEAS - BZI_BMI: 31.9 KILOGRAMS/M^2
BH CV ECHO MEAS - BZI_METRIC_HEIGHT: 177.8 CM
BH CV ECHO MEAS - BZI_METRIC_WEIGHT: 100.7 KG
BH CV ECHO MEAS - CONTRAST EF 4CH: 62 ML/M^2
BH CV ECHO MEAS - EDV(CUBED): 63.8 ML
BH CV ECHO MEAS - EDV(MOD-SP4): 71 ML
BH CV ECHO MEAS - EDV(TEICH): 69.8 ML
BH CV ECHO MEAS - EF(CUBED): 53.2 %
BH CV ECHO MEAS - EF(MOD-SP4): 62 %
BH CV ECHO MEAS - EF(TEICH): 45.6 %
BH CV ECHO MEAS - ESV(CUBED): 29.9 ML
BH CV ECHO MEAS - ESV(MOD-SP4): 27 ML
BH CV ECHO MEAS - ESV(TEICH): 38 ML
BH CV ECHO MEAS - FS: 22.3 %
BH CV ECHO MEAS - IVS/LVPW: 0.95
BH CV ECHO MEAS - IVSD: 1.2 CM
BH CV ECHO MEAS - IVSS: 1.6 CM
BH CV ECHO MEAS - LA DIMENSION: 3.6 CM
BH CV ECHO MEAS - LA/AO: 0.93
BH CV ECHO MEAS - LV DIASTOLIC VOL/BSA (35-75): 32.5 ML/M^2
BH CV ECHO MEAS - LV MASS(C)D: 165 GRAMS
BH CV ECHO MEAS - LV MASS(C)DI: 75.6 GRAMS/M^2
BH CV ECHO MEAS - LV MASS(C)S: 177.9 GRAMS
BH CV ECHO MEAS - LV MASS(C)SI: 81.5 GRAMS/M^2
BH CV ECHO MEAS - LV SYSTOLIC VOL/BSA (12-30): 12.4 ML/M^2
BH CV ECHO MEAS - LVIDD: 4 CM
BH CV ECHO MEAS - LVIDS: 3.1 CM
BH CV ECHO MEAS - LVLD AP4: 8 CM
BH CV ECHO MEAS - LVLS AP4: 7.4 CM
BH CV ECHO MEAS - LVOT AREA (M): 4.5 CM^2
BH CV ECHO MEAS - LVOT AREA: 4.7 CM^2
BH CV ECHO MEAS - LVOT DIAM: 2.4 CM
BH CV ECHO MEAS - LVPWD: 1.2 CM
BH CV ECHO MEAS - LVPWS: 1.5 CM
BH CV ECHO MEAS - MV A MAX VEL: 64.2 CM/SEC
BH CV ECHO MEAS - MV E MAX VEL: 41.5 CM/SEC
BH CV ECHO MEAS - MV E/A: 0.65
BH CV ECHO MEAS - PA ACC SLOPE: 1160 CM/SEC^2
BH CV ECHO MEAS - PA ACC TIME: 0.1 SEC
BH CV ECHO MEAS - PA PR(ACCEL): 34.6 MMHG
BH CV ECHO MEAS - RVDD: 2 CM
BH CV ECHO MEAS - SI(CUBED): 15.5 ML/M^2
BH CV ECHO MEAS - SI(MOD-SP4): 20.2 ML/M^2
BH CV ECHO MEAS - SI(TEICH): 14.6 ML/M^2
BH CV ECHO MEAS - SV(CUBED): 33.9 ML
BH CV ECHO MEAS - SV(MOD-SP4): 44 ML
BH CV ECHO MEAS - SV(TEICH): 31.8 ML
BH CV NUCLEAR PRIOR STUDY: 3
BH CV STRESS BP STAGE 1: NORMAL
BH CV STRESS BP STAGE 2: NORMAL
BH CV STRESS COMMENTS STAGE 1: NORMAL
BH CV STRESS COMMENTS STAGE 2: NORMAL
BH CV STRESS DOSE REGADENOSON STAGE 1: 0.4
BH CV STRESS DURATION MIN STAGE 1: 0
BH CV STRESS DURATION MIN STAGE 2: 4
BH CV STRESS DURATION SEC STAGE 1: 10
BH CV STRESS DURATION SEC STAGE 2: 0
BH CV STRESS HR STAGE 1: 114
BH CV STRESS HR STAGE 2: 115
BH CV STRESS PROTOCOL 1: NORMAL
BH CV STRESS RECOVERY BP: NORMAL MMHG
BH CV STRESS RECOVERY HR: 93 BPM
BH CV STRESS STAGE 1: 1
BH CV STRESS STAGE 2: 2
LV EF 2D ECHO EST: 60 %
MAXIMAL PREDICTED HEART RATE: 165 BPM
MAXIMAL PREDICTED HEART RATE: 165 BPM
PERCENT MAX PREDICTED HR: 69.7 %
STRESS BASELINE BP: NORMAL MMHG
STRESS BASELINE HR: 90 BPM
STRESS PERCENT HR: 82 %
STRESS POST PEAK BP: NORMAL MMHG
STRESS POST PEAK HR: 115 BPM
STRESS TARGET HR: 140 BPM
STRESS TARGET HR: 140 BPM

## 2018-02-27 PROCEDURE — 93306 TTE W/DOPPLER COMPLETE: CPT

## 2018-02-27 PROCEDURE — 78452 HT MUSCLE IMAGE SPECT MULT: CPT | Performed by: INTERNAL MEDICINE

## 2018-02-27 PROCEDURE — 93018 CV STRESS TEST I&R ONLY: CPT | Performed by: INTERNAL MEDICINE

## 2018-02-27 PROCEDURE — 93017 CV STRESS TEST TRACING ONLY: CPT

## 2018-02-27 PROCEDURE — 78452 HT MUSCLE IMAGE SPECT MULT: CPT

## 2018-02-27 PROCEDURE — 25010000002 REGADENOSON 0.4 MG/5ML SOLUTION: Performed by: NURSE PRACTITIONER

## 2018-02-27 PROCEDURE — 0 TECHNETIUM SESTAMIBI: Performed by: NURSE PRACTITIONER

## 2018-02-27 PROCEDURE — 93306 TTE W/DOPPLER COMPLETE: CPT | Performed by: INTERNAL MEDICINE

## 2018-02-27 PROCEDURE — A9500 TC99M SESTAMIBI: HCPCS | Performed by: NURSE PRACTITIONER

## 2018-02-27 RX ADMIN — TECHNETIUM TC 99M SESTAMIBI 1 DOSE: 1 INJECTION INTRAVENOUS at 13:05

## 2018-02-27 RX ADMIN — REGADENOSON 0.4 MG: 0.08 INJECTION, SOLUTION INTRAVENOUS at 13:05

## 2018-02-27 RX ADMIN — TECHNETIUM TC 99M SESTAMIBI 1 DOSE: 1 INJECTION INTRAVENOUS at 11:55

## 2018-03-01 ENCOUNTER — TELEPHONE (OUTPATIENT)
Dept: CARDIOLOGY | Facility: CLINIC | Age: 58
End: 2018-03-01

## 2018-03-01 NOTE — TELEPHONE ENCOUNTER
----- Message from YOUNG Macedo sent at 2/28/2018 11:10 AM EST -----  Please call  Leonard.  Echo was normal.    Also had an stress which was normal.    Keep appointment in March with Dr CATINA Yu, Meg

## 2018-03-01 NOTE — TELEPHONE ENCOUNTER
Called patient to let him know that per Meg Gonzalez, his echo and stress were both normal. Keep appointment with Dr. Quevedo in March.    Patient aware and understands.

## 2018-03-01 NOTE — TELEPHONE ENCOUNTER
----- Message from YOUNG Macedo sent at 2/28/2018 11:09 AM EST -----  Please call Mr Leonard.  Stress test was normal.    Also had an echo which was normal.    Thanks, Meg

## 2018-03-19 ENCOUNTER — OFFICE VISIT (OUTPATIENT)
Dept: CARDIOLOGY | Facility: CLINIC | Age: 58
End: 2018-03-19

## 2018-03-19 VITALS
HEIGHT: 70 IN | OXYGEN SATURATION: 94 % | SYSTOLIC BLOOD PRESSURE: 116 MMHG | WEIGHT: 225.8 LBS | HEART RATE: 89 BPM | BODY MASS INDEX: 32.33 KG/M2 | DIASTOLIC BLOOD PRESSURE: 79 MMHG

## 2018-03-19 DIAGNOSIS — I11.9: Primary | ICD-10-CM

## 2018-03-19 DIAGNOSIS — I25.10 CORONARY ARTERY DISEASE INVOLVING NATIVE CORONARY ARTERY OF NATIVE HEART WITHOUT ANGINA PECTORIS: ICD-10-CM

## 2018-03-19 DIAGNOSIS — I10 ESSENTIAL HYPERTENSION: ICD-10-CM

## 2018-03-19 PROCEDURE — 99213 OFFICE O/P EST LOW 20 MIN: CPT | Performed by: INTERNAL MEDICINE

## 2018-03-19 RX ORDER — ASPIRIN 325 MG
325 TABLET ORAL DAILY
COMMUNITY
End: 2018-04-10

## 2018-03-19 NOTE — PROGRESS NOTES
[facility] CARDIOLOGY  [address] [facility] [address] [place] [address]  Phone: [phone]  Fax: [phone]    03/19/2018    Chief Complaint: Routine followup of , CAD    History:   Damaso Leonard is a 57 y.o. male seen in followup, Nml echo and stress test recently. Palpitations improved with less caffeine. Pt continues with occasionaly SSCP and right sided chest pain lasting 10-15 seconds. Achey type pain. May occur several time per day and some days doesn't have it. With exercise, cardiac rehab last fall. Pains back then were not initiated by exercise. Non smoker. Lipids recently checked with LDL 75. Ocassional palpitations.              Damaso Leonard is a 57 y.o. male who presents with coronary artery disease, hyperlipidemia, hypertension and type 2 diabetes.  Mr. Leonard also has generalized anxiety disorder.  He initially presented in 2006 at [facility] with an MI and underwent staged intervention per his description.  He received 3 stents.  He presented again in 2010 and received one stent at that time.     Ace visit he presents with complaint of chest discomfort and palpitations.  He reports that the chest discomfort has decreased in occurrence since having been placed on a general anxiety disorder medication.  Palpitations have decreased since stopping caffeine products.  He reports that this chest discomfort comes while he is at rest, lasting at most 5 minutes.  The pain is sharp precordial pain that sometimes radiates across the shoulders.  He reports occasional dizziness and lightheadedness when he rises quickly.  He denies syncope or near syncopal events.    Past Medical History:   Diagnosis Date   • Anxiety    • ASCVD (arteriosclerotic cardiovascular disease)    • DM (diabetes mellitus)    • GERD (gastroesophageal reflux disease)    • History of EKG 04/15/2015    NORMAL   • HTN (hypertension)    • Hyperlipidemia    • Injury of back    • Low back pain    • Myocardial  infarction        Past Social History:  Social History     Social History   • Marital status:      Social History Main Topics   • Smoking status: Never Smoker   • Smokeless tobacco: Never Used   • Alcohol use No   • Drug use: No   • Sexual activity: Defer     Other Topics Concern   • Not on file       Past Family History:  Family History   Problem Relation Age of Onset   • Heart attack Father    • Heart disease Father    • Hypertension Father    • Heart attack Sister    • Diabetes Sister    • Heart disease Sister    • Hypertension Sister    • Thyroid disease Sister    • Heart attack Brother    • Diabetes Brother    • Thyroid disease Brother    • Arthritis Daughter    • COPD Daughter    • Asthma Daughter    • Depression Daughter    • Heart disease Sister    • Hypertension Sister        Review of Systems:   Please see HPI  Constitution: No chills, no rigors, no unexplained weight loss or weight gain  Eyes:  No diplopia, no blurred vision, no loss of vision, conjunctiva is pink and sclera is anicteric  ENT:  No tinnitus, no otorrhea, no epistaxis, no sore throat   Respiratory: No cough, no hemoptysis  Cardiovascular: see HPI  Gastrointestinal: No nausea, no vomiting, no hematemesis, no diarrhea or constipation, no melena  Genitourinary: No frequency of dysuria no hematuria  Integument: No pruritis and  no skin rash  Hematologic / Lymphatic: No excessive bleeding, easy bruising, fatigue, lymphadenopathy and petechiae  Musculoskeletal: No joint pain, joint stiffness, joint swelling, muscle pain, muscle weakness and neck pain  Neurological: No dizziness, headaches, light headedness, seizures and vertigo  Endocrine: No frequent urination and nocturia, temperature intolerance, weight gain, unintended and weight loss, unintended      Current Outpatient Prescriptions on File Prior to Visit   Medication Sig Dispense Refill   • amitriptyline (ELAVIL) 50 MG tablet Take 2 tablets by mouth At Night As Needed for Sleep.  (Patient taking differently: Take 100 mg by mouth At Night As Needed for Sleep. 1 daily) 60 tablet 5   • carvedilol (COREG) 12.5 MG tablet Take 1 tablet by mouth 2 (Two) Times a Day With Meals. 60 tablet 5   • citalopram (CeleXA) 40 MG tablet Take 1 tablet by mouth Daily. 30 tablet 5   • clonazePAM (KlonoPIN) 0.5 MG tablet Take 1 tablet by mouth 2 (Two) Times a Day As Needed for Seizures. 60 tablet 0   • cyanocobalamin 1000 MCG/ML injection Inject 1 mL into the shoulder, thigh, or buttocks Every 28 (Twenty-Eight) Days. 1 mL 11   • diphenhydrAMINE (BENADRYL ALLERGY) 25 mg capsule Take 1 capsule by mouth Every 6 (Six) Hours As Needed for Itching. 30 capsule 0   • finasteride (PROSCAR) 5 MG tablet Take 1 tablet by mouth Daily. 30 tablet 5   • gabapentin (NEURONTIN) 100 MG capsule Take 1 capsule by mouth 2 (Two) Times a Day. (Patient taking differently: Take 100 mg by mouth 2 (Two) Times a Day. 1 daily) 60 capsule 2   • glucose blood test strip Check blood sugar 3x times. 100 each 12   • glucose monitor monitoring kit 1 each 3 (Three) Times a Day As Needed (diabetes). 1 each 0   • hydrocortisone 1 % cream Apply  topically 2 (Two) Times a Day As Needed for Rash. 1 each 5   • isosorbide mononitrate (IMDUR) 30 MG 24 hr tablet Take 1 tablet by mouth 2 (Two) Times a Day. 60 tablet 5   • Lancets (ONETOUCH ULTRASOFT) lancets Use as instructed 100 each 12   • metFORMIN (GLUCOPHAGE) 500 MG tablet Take 1 tablet by mouth 2 (Two) Times a Day With Meals. 60 tablet 5   • ondansetron (ZOFRAN) 8 MG tablet Take 1 tablet by mouth Every 8 (Eight) Hours As Needed for Nausea. (Patient taking differently: Take 8 mg by mouth Every 8 (Eight) Hours As Needed for Nausea. 1 daily) 20 tablet 2   • pantoprazole (PROTONIX) 40 MG EC tablet Take 1 tablet by mouth Daily. 30 tablet 5   • polyethylene glycol (MIRALAX) packet Take 17 g by mouth Daily. 1 each 5   • raNITIdine (ZANTAC) 150 MG tablet Take 1 tablet by mouth 2 (Two) Times a Day. 60 tablet 5    • simvastatin (ZOCOR) 40 MG tablet Take 1 tablet by mouth Every Night. 30 tablet 5   • Thyroid 30 MG PO tablet Take 1 tablet by mouth Daily. 30 tablet 5   • traMADol (ULTRAM) 50 MG tablet Take 1 tablet by mouth Every 8 (Eight) Hours As Needed for Moderate Pain . 90 tablet 2   • vitamin D (ERGOCALCIFEROL) 90780 units capsule capsule Take 1 capsule by mouth Every 7 (Seven) Days. 4 capsule 5   • aspirin 81 MG tablet Take 1 tablet by mouth Daily. 30 tablet 11   • HYDROcodone-acetaminophen (NORCO) 7.5-325 MG per tablet Take 1 tablet by mouth Every 6 (Six) Hours As Needed for Moderate Pain  or Severe Pain . 30 tablet 0   • Omega 3 1000 MG capsule 2 twice daily 120 each 5     Current Facility-Administered Medications on File Prior to Visit   Medication Dose Route Frequency Provider Last Rate Last Dose   • cyanocobalamin injection 1,000 mcg  1,000 mcg Intramuscular Q28 Days YOUNG Toscano   1,000 mcg at 01/19/17 0918       No Known Allergies    Objective:  Vitals:    03/19/18 0958   BP: 116/79   Pulse: 89   SpO2: 94%     Comfortable NAD  PERRL, conjunctiva clear  Neck supple, no JVD or thyromegaly appreciated  S1/S2 RRR, no m/r/g  Chest wall tender over area indicated.  Lungs CTA B, normal effort  Abdomen S/NT/ND (+) BS, no HSM appreciated  Extremities warm, no clubbing, cyanosis, or edema  Normal gait  No visible or palpable skin lesions  A/Ox4, mood and affect appropriate  Pulse exam: Emilio's:    DATA:       Results for orders placed during the hospital encounter of 02/27/18   Adult Transthoracic Echo Complete W/ Cont if Necessary Per Protocol    Narrative · Estimated EF = 60%.  · Left ventricular diastolic dysfunction (grade I) consistent with   impaired relaxation.  · Normal right ventricular cavity size and systolic function noted.  · Normal left atrial size noted.  · No aortic valve stenosis is present.  · Trace tricuspid valve regurgitation is present  · There is no evidence of pericardial  effusion          Results for orders placed during the hospital encounter of 02/27/18   SCANNED - NUCLEAR STRESS       Results for orders placed during the hospital encounter of 02/27/18   SCANNED - NUCLEAR STRESS                  A/P:CAD: stable. No anginal quality chest pain. Reassuringly nml testing. Risk factors controlled. Continue med rx.    F/u 1 yr.     Thank you for allowing me to participate in the care of Damaso Leonard. Feel free to contact me directly with any further questions or concerns.

## 2018-03-26 DIAGNOSIS — IMO0001 UNCONTROLLED TYPE 2 DIABETES MELLITUS WITHOUT COMPLICATION, WITHOUT LONG-TERM CURRENT USE OF INSULIN: ICD-10-CM

## 2018-03-26 DIAGNOSIS — F41.1 GENERALIZED ANXIETY DISORDER: ICD-10-CM

## 2018-04-01 RX ORDER — GABAPENTIN 100 MG/1
CAPSULE ORAL
Qty: 60 CAPSULE | Refills: 2 | OUTPATIENT
Start: 2018-04-01

## 2018-04-01 RX ORDER — CLONAZEPAM 0.5 MG/1
TABLET ORAL
Qty: 60 TABLET | Refills: 0 | OUTPATIENT
Start: 2018-04-01

## 2018-04-05 ENCOUNTER — LAB (OUTPATIENT)
Dept: FAMILY MEDICINE CLINIC | Facility: CLINIC | Age: 58
End: 2018-04-05

## 2018-04-05 DIAGNOSIS — I10 ESSENTIAL HYPERTENSION: ICD-10-CM

## 2018-04-05 DIAGNOSIS — IMO0001 UNCONTROLLED TYPE 2 DIABETES MELLITUS WITHOUT COMPLICATION, WITHOUT LONG-TERM CURRENT USE OF INSULIN: ICD-10-CM

## 2018-04-05 DIAGNOSIS — E53.8 VITAMIN B 12 DEFICIENCY: ICD-10-CM

## 2018-04-05 DIAGNOSIS — N40.0 BENIGN NON-NODULAR PROSTATIC HYPERPLASIA WITHOUT LOWER URINARY TRACT SYMPTOMS: ICD-10-CM

## 2018-04-05 LAB
25(OH)D3 SERPL-MCNC: 73 NG/ML
ALBUMIN SERPL-MCNC: 4.1 G/DL (ref 3.5–5)
ALBUMIN/GLOB SERPL: 1.5 G/DL (ref 1.5–2.5)
ALP SERPL-CCNC: 101 U/L (ref 40–129)
ALT SERPL W P-5'-P-CCNC: 37 U/L (ref 10–44)
ANION GAP SERPL CALCULATED.3IONS-SCNC: 9 MMOL/L (ref 3.6–11.2)
AST SERPL-CCNC: 22 U/L (ref 10–34)
BASOPHILS # BLD AUTO: 0.01 10*3/MM3 (ref 0–0.3)
BASOPHILS NFR BLD AUTO: 0.1 % (ref 0–2)
BILIRUB SERPL-MCNC: 0.5 MG/DL (ref 0.2–1.8)
BUN BLD-MCNC: <5 MG/DL (ref 7–21)
BUN/CREAT SERPL: ABNORMAL (ref 7–25)
CALCIUM SPEC-SCNC: 9.2 MG/DL (ref 7.7–10)
CHLORIDE SERPL-SCNC: 104 MMOL/L (ref 99–112)
CHOLEST SERPL-MCNC: 147 MG/DL (ref 0–200)
CO2 SERPL-SCNC: 29 MMOL/L (ref 24.3–31.9)
CREAT BLD-MCNC: 1.07 MG/DL (ref 0.43–1.29)
DEPRECATED RDW RBC AUTO: 43.3 FL (ref 37–54)
EOSINOPHIL # BLD AUTO: 0.46 10*3/MM3 (ref 0–0.7)
EOSINOPHIL NFR BLD AUTO: 6.6 % (ref 0–5)
ERYTHROCYTE [DISTWIDTH] IN BLOOD BY AUTOMATED COUNT: 13.8 % (ref 11.5–14.5)
GFR SERPL CREATININE-BSD FRML MDRD: 72 ML/MIN/1.73
GLOBULIN UR ELPH-MCNC: 2.8 GM/DL
GLUCOSE BLD-MCNC: 115 MG/DL (ref 70–110)
HBA1C MFR BLD: 7.4 % (ref 4.5–5.7)
HCT VFR BLD AUTO: 43.8 % (ref 42–52)
HDLC SERPL-MCNC: 40 MG/DL (ref 60–100)
HGB BLD-MCNC: 14.2 G/DL (ref 14–18)
IMM GRANULOCYTES # BLD: 0.01 10*3/MM3 (ref 0–0.03)
IMM GRANULOCYTES NFR BLD: 0.1 % (ref 0–0.5)
LDLC SERPL CALC-MCNC: 80 MG/DL (ref 0–100)
LDLC/HDLC SERPL: 2 {RATIO}
LYMPHOCYTES # BLD AUTO: 2.26 10*3/MM3 (ref 1–3)
LYMPHOCYTES NFR BLD AUTO: 32.6 % (ref 21–51)
MCH RBC QN AUTO: 28.1 PG (ref 27–33)
MCHC RBC AUTO-ENTMCNC: 32.4 G/DL (ref 33–37)
MCV RBC AUTO: 86.7 FL (ref 80–94)
MONOCYTES # BLD AUTO: 0.81 10*3/MM3 (ref 0.1–0.9)
MONOCYTES NFR BLD AUTO: 11.7 % (ref 0–10)
NEUTROPHILS # BLD AUTO: 3.39 10*3/MM3 (ref 1.4–6.5)
NEUTROPHILS NFR BLD AUTO: 48.9 % (ref 30–70)
OSMOLALITY SERPL CALC.SUM OF ELEC: NORMAL MOSM/KG (ref 273–305)
PLATELET # BLD AUTO: 396 10*3/MM3 (ref 130–400)
PMV BLD AUTO: 9.8 FL (ref 6–10)
POTASSIUM BLD-SCNC: 3.7 MMOL/L (ref 3.5–5.3)
PROT SERPL-MCNC: 6.9 G/DL (ref 6–8)
PSA SERPL-MCNC: 0.3 NG/ML (ref 0–4)
RBC # BLD AUTO: 5.05 10*6/MM3 (ref 4.7–6.1)
SODIUM BLD-SCNC: 142 MMOL/L (ref 135–153)
TRIGL SERPL-MCNC: 135 MG/DL (ref 0–150)
TSH SERPL DL<=0.05 MIU/L-ACNC: 6.66 MIU/ML (ref 0.55–4.78)
VIT B12 BLD-MCNC: 390 PG/ML (ref 211–911)
VLDLC SERPL-MCNC: 27 MG/DL
WBC NRBC COR # BLD: 6.94 10*3/MM3 (ref 4.5–12.5)

## 2018-04-05 PROCEDURE — 83036 HEMOGLOBIN GLYCOSYLATED A1C: CPT | Performed by: NURSE PRACTITIONER

## 2018-04-05 PROCEDURE — 80053 COMPREHEN METABOLIC PANEL: CPT | Performed by: NURSE PRACTITIONER

## 2018-04-05 PROCEDURE — 36415 COLL VENOUS BLD VENIPUNCTURE: CPT

## 2018-04-05 PROCEDURE — 82607 VITAMIN B-12: CPT | Performed by: NURSE PRACTITIONER

## 2018-04-05 PROCEDURE — 85025 COMPLETE CBC W/AUTO DIFF WBC: CPT | Performed by: NURSE PRACTITIONER

## 2018-04-05 PROCEDURE — G0103 PSA SCREENING: HCPCS | Performed by: NURSE PRACTITIONER

## 2018-04-05 PROCEDURE — 82306 VITAMIN D 25 HYDROXY: CPT | Performed by: NURSE PRACTITIONER

## 2018-04-05 PROCEDURE — 80061 LIPID PANEL: CPT | Performed by: NURSE PRACTITIONER

## 2018-04-05 PROCEDURE — 84443 ASSAY THYROID STIM HORMONE: CPT | Performed by: NURSE PRACTITIONER

## 2018-04-10 ENCOUNTER — OFFICE VISIT (OUTPATIENT)
Dept: FAMILY MEDICINE CLINIC | Facility: CLINIC | Age: 58
End: 2018-04-10

## 2018-04-10 VITALS
WEIGHT: 223 LBS | BODY MASS INDEX: 31.92 KG/M2 | HEIGHT: 70 IN | HEART RATE: 95 BPM | TEMPERATURE: 98 F | OXYGEN SATURATION: 97 % | DIASTOLIC BLOOD PRESSURE: 78 MMHG | SYSTOLIC BLOOD PRESSURE: 124 MMHG

## 2018-04-10 DIAGNOSIS — N40.0 BENIGN NON-NODULAR PROSTATIC HYPERPLASIA WITHOUT LOWER URINARY TRACT SYMPTOMS: ICD-10-CM

## 2018-04-10 DIAGNOSIS — F41.1 GENERALIZED ANXIETY DISORDER: ICD-10-CM

## 2018-04-10 DIAGNOSIS — E55.9 VITAMIN D DEFICIENCY DISEASE: ICD-10-CM

## 2018-04-10 DIAGNOSIS — E78.00 HYPERCHOLESTEROLEMIA: ICD-10-CM

## 2018-04-10 DIAGNOSIS — E11.51 TYPE 2 DIABETES MELLITUS WITH DIABETIC PERIPHERAL ANGIOPATHY WITHOUT GANGRENE, WITHOUT LONG-TERM CURRENT USE OF INSULIN (HCC): ICD-10-CM

## 2018-04-10 DIAGNOSIS — K21.9 GASTROESOPHAGEAL REFLUX DISEASE WITHOUT ESOPHAGITIS: ICD-10-CM

## 2018-04-10 DIAGNOSIS — I11.9: ICD-10-CM

## 2018-04-10 DIAGNOSIS — G47.33 OBSTRUCTIVE SLEEP APNEA: ICD-10-CM

## 2018-04-10 DIAGNOSIS — IMO0001 UNCONTROLLED TYPE 2 DIABETES MELLITUS WITHOUT COMPLICATION, WITHOUT LONG-TERM CURRENT USE OF INSULIN: ICD-10-CM

## 2018-04-10 DIAGNOSIS — R79.89 ABNORMAL THYROID BLOOD TEST: ICD-10-CM

## 2018-04-10 DIAGNOSIS — Z79.899 HIGH RISK MEDICATION USE: Primary | ICD-10-CM

## 2018-04-10 DIAGNOSIS — M54.16 LUMBAR BACK PAIN WITH RADICULOPATHY AFFECTING LEFT LOWER EXTREMITY: ICD-10-CM

## 2018-04-10 DIAGNOSIS — R21 RASH AND NONSPECIFIC SKIN ERUPTION: ICD-10-CM

## 2018-04-10 DIAGNOSIS — E53.8 VITAMIN B 12 DEFICIENCY: ICD-10-CM

## 2018-04-10 DIAGNOSIS — J30.1 SEASONAL ALLERGIC RHINITIS DUE TO POLLEN, UNSPECIFIED CHRONICITY: ICD-10-CM

## 2018-04-10 DIAGNOSIS — I25.10 CORONARY ARTERY DISEASE INVOLVING NATIVE CORONARY ARTERY OF NATIVE HEART WITHOUT ANGINA PECTORIS: ICD-10-CM

## 2018-04-10 DIAGNOSIS — F32.4 MAJOR DEPRESSIVE DISORDER WITH SINGLE EPISODE, IN PARTIAL REMISSION (HCC): ICD-10-CM

## 2018-04-10 DIAGNOSIS — M15.9 OSTEOARTHRITIS INVOLVING MULTIPLE JOINTS ON BOTH SIDES OF BODY: ICD-10-CM

## 2018-04-10 DIAGNOSIS — I10 ESSENTIAL HYPERTENSION: ICD-10-CM

## 2018-04-10 PROCEDURE — 99214 OFFICE O/P EST MOD 30 MIN: CPT | Performed by: NURSE PRACTITIONER

## 2018-04-10 PROCEDURE — 96372 THER/PROPH/DIAG INJ SC/IM: CPT | Performed by: NURSE PRACTITIONER

## 2018-04-10 RX ORDER — RANITIDINE 150 MG/1
150 TABLET ORAL 2 TIMES DAILY
Qty: 60 TABLET | Refills: 5 | Status: SHIPPED | OUTPATIENT
Start: 2018-04-10 | End: 2018-07-11 | Stop reason: SDUPTHER

## 2018-04-10 RX ORDER — TRAMADOL HYDROCHLORIDE 50 MG/1
50 TABLET ORAL EVERY 8 HOURS PRN
Qty: 90 TABLET | Refills: 2 | Status: SHIPPED | OUTPATIENT
Start: 2018-04-10 | End: 2018-07-11

## 2018-04-10 RX ORDER — POLYETHYLENE GLYCOL 3350 17 G/17G
17 POWDER, FOR SOLUTION ORAL DAILY
Qty: 1 EACH | Refills: 5 | Status: SHIPPED | OUTPATIENT
Start: 2018-04-10 | End: 2018-07-11 | Stop reason: SDUPTHER

## 2018-04-10 RX ORDER — FINASTERIDE 5 MG/1
5 TABLET, FILM COATED ORAL DAILY
Qty: 30 TABLET | Refills: 5 | Status: SHIPPED | OUTPATIENT
Start: 2018-04-10 | End: 2018-07-11 | Stop reason: SDUPTHER

## 2018-04-10 RX ORDER — DIPHENHYDRAMINE HCL 25 MG
25 CAPSULE ORAL EVERY 6 HOURS PRN
Qty: 30 CAPSULE | Refills: 0 | Status: SHIPPED | OUTPATIENT
Start: 2018-04-10 | End: 2018-07-11 | Stop reason: SDUPTHER

## 2018-04-10 RX ORDER — CITALOPRAM 40 MG/1
40 TABLET ORAL DAILY
Qty: 30 TABLET | Refills: 5 | Status: SHIPPED | OUTPATIENT
Start: 2018-04-10 | End: 2018-07-11 | Stop reason: SDUPTHER

## 2018-04-10 RX ORDER — CLONAZEPAM 0.5 MG/1
0.5 TABLET ORAL 3 TIMES DAILY PRN
Qty: 90 TABLET | Refills: 0 | Status: SHIPPED | OUTPATIENT
Start: 2018-04-10 | End: 2018-07-11 | Stop reason: SDUPTHER

## 2018-04-10 RX ORDER — ISOSORBIDE MONONITRATE 30 MG/1
30 TABLET, EXTENDED RELEASE ORAL 2 TIMES DAILY
Qty: 60 TABLET | Refills: 5 | Status: SHIPPED | OUTPATIENT
Start: 2018-04-10 | End: 2018-07-11 | Stop reason: SDUPTHER

## 2018-04-10 RX ORDER — CYANOCOBALAMIN 1000 UG/ML
1000 INJECTION, SOLUTION INTRAMUSCULAR; SUBCUTANEOUS
Qty: 1 ML | Refills: 11 | Status: SHIPPED | OUTPATIENT
Start: 2018-04-10 | End: 2018-07-11 | Stop reason: SDUPTHER

## 2018-04-10 RX ORDER — ERGOCALCIFEROL 1.25 MG/1
50000 CAPSULE ORAL
Qty: 4 CAPSULE | Refills: 5 | Status: SHIPPED | OUTPATIENT
Start: 2018-04-10 | End: 2018-07-11 | Stop reason: SDUPTHER

## 2018-04-10 RX ORDER — GABAPENTIN 100 MG/1
100 CAPSULE ORAL 2 TIMES DAILY
Qty: 60 CAPSULE | Refills: 2 | Status: SHIPPED | OUTPATIENT
Start: 2018-04-10 | End: 2018-07-11 | Stop reason: SDUPTHER

## 2018-04-10 RX ORDER — SIMVASTATIN 40 MG
40 TABLET ORAL NIGHTLY
Qty: 30 TABLET | Refills: 5 | Status: SHIPPED | OUTPATIENT
Start: 2018-04-10 | End: 2018-07-11 | Stop reason: SDUPTHER

## 2018-04-10 RX ORDER — PANTOPRAZOLE SODIUM 40 MG/1
40 TABLET, DELAYED RELEASE ORAL DAILY
Qty: 30 TABLET | Refills: 5 | Status: SHIPPED | OUTPATIENT
Start: 2018-04-10 | End: 2018-07-11 | Stop reason: SDUPTHER

## 2018-04-10 RX ADMIN — CYANOCOBALAMIN 1000 MCG: 1000 INJECTION, SOLUTION INTRAMUSCULAR; SUBCUTANEOUS at 08:27

## 2018-04-10 NOTE — PROGRESS NOTES
Subjective   Damaso Leonard is a 57 y.o. male.     Chief Complaint   Patient presents with   • Hyperlipidemia   • Hypertension   • Hypothyroidism       History of Present Illness      b-12 def - taking monthly b-12 at home and in office when family not available .    CAD  with malignant hypertension with heart disease-under the care of [facility] in [place].  Denies chest pain.  Compliant with medication.    High cholesterol-keeps forgetting his fish oil.  States has not taken it in several months.  Is taking Zocor and watching his diet.  Has lipid panel to review today.     Sleep apnea - stable    Allergic rhinitis-stable    GERD-stable with current medication.  Under the care of GI.  Has had EGD.    Vitamin D deficiency-stable    Diabetes - Pt denies any symptomatic hypo-or hyperglycemia.  Reports compliance with diet and medication regimen.  Currently receives a statin.  Currently receives aspirin.  Currently receives ACE/ARB.  Most recent hemoglobin A1c > 7  available on file and reviewed today.    Lumbar back pain with radiculopathy affecting the left lower extremity/osteoarthritis involving multiple sites - patient has been seen by Dr. Mcgrath and orthopedic consult.  He has pain in his lumbar spine as well as cervical spine.  Pain does radiate down into lower extremities.  He has attended physical therapy.  He does report improvement with current use of Ultram.  Christofer his pain as 6 (covering of 0-10 prior to taking Ultram and around day 3 with Ultram.  Pain is described as aching and stiffness.  Pain is worse with activity and overuse.  Radiology is on file.  Patient reports improvement in quality of life with use of Ultram.  He reports he has not taking any Norco in several weeks and does not need a refill.    Anxiety- stable, has been to counseling with Tha Newton LCSW.       The following portions of the patient's history were reviewed and updated as appropriate: allergies, current medications, past  "family history, past medical history, past social history, past surgical history and problem list.    Review of Systems   Constitutional: Negative.    HENT: Negative.    Eyes: Positive for pain and itching.   Respiratory: Positive for shortness of breath. Negative for cough, chest tightness and wheezing.    Cardiovascular: Negative.  Negative for chest pain, palpitations and leg swelling.   Gastrointestinal: Negative.    Endocrine: Negative.    Genitourinary: Negative.    Musculoskeletal: Positive for arthralgias, back pain and joint swelling.   Skin: Negative.    Allergic/Immunologic: Positive for environmental allergies.   Neurological: Negative.    Psychiatric/Behavioral: Negative for suicidal ideas. The patient is not nervous/anxious.        Objective     /78   Pulse 95   Temp 98 °F (36.7 °C) (Oral)   Ht 177.8 cm (70\")   Wt 101 kg (223 lb)   SpO2 97%   BMI 32.00 kg/m²   Lab on 04/05/2018   Component Date Value Ref Range Status   • Glucose 04/05/2018 115* 70 - 110 mg/dL Final   • BUN 04/05/2018 <5* 7 - 21 mg/dL Final   • Creatinine 04/05/2018 1.07  0.43 - 1.29 mg/dL Final   • Sodium 04/05/2018 142  135 - 153 mmol/L Final   • Potassium 04/05/2018 3.7  3.5 - 5.3 mmol/L Final   • Chloride 04/05/2018 104  99 - 112 mmol/L Final   • CO2 04/05/2018 29.0  24.3 - 31.9 mmol/L Final   • Calcium 04/05/2018 9.2  7.7 - 10.0 mg/dL Final   • Total Protein 04/05/2018 6.9  6.0 - 8.0 g/dL Final   • Albumin 04/05/2018 4.10  3.50 - 5.00 g/dL Final   • ALT (SGPT) 04/05/2018 37  10 - 44 U/L Final   • AST (SGOT) 04/05/2018 22  10 - 34 U/L Final   • Alkaline Phosphatase 04/05/2018 101  40 - 129 U/L Final   • Total Bilirubin 04/05/2018 0.5  0.2 - 1.8 mg/dL Final   • eGFR Non African Amer 04/05/2018 72  >60 mL/min/1.73 Final   • Globulin 04/05/2018 2.8  gm/dL Final   • A/G Ratio 04/05/2018 1.5  1.5 - 2.5 g/dL Final   • BUN/Creatinine Ratio 04/05/2018   7.0 - 25.0 Final   • Anion Gap 04/05/2018 9.0  3.6 - 11.2 mmol/L Final   • " TSH 04/05/2018 6.657* 0.550 - 4.780 mIU/mL Final   • Hemoglobin A1C 04/05/2018 7.40* 4.50 - 5.70 % Final   • 25 Hydroxy, Vitamin D 04/05/2018 73.0  ng/ml Final   • Total Cholesterol 04/05/2018 147  0 - 200 mg/dL Final   • Triglycerides 04/05/2018 135  0 - 150 mg/dL Final   • HDL Cholesterol 04/05/2018 40* 60 - 100 mg/dL Final   • LDL Cholesterol  04/05/2018 80  0 - 100 mg/dL Final   • VLDL Cholesterol 04/05/2018 27  mg/dL Final   • LDL/HDL Ratio 04/05/2018 2.00   Final   • PSA 04/05/2018 0.300  0.000 - 4.000 ng/mL Final   • Vitamin B-12 04/05/2018 390  211 - 911 pg/mL Final   • WBC 04/05/2018 6.94  4.50 - 12.50 10*3/mm3 Final   • RBC 04/05/2018 5.05  4.70 - 6.10 10*6/mm3 Final   • Hemoglobin 04/05/2018 14.2  14.0 - 18.0 g/dL Final   • Hematocrit 04/05/2018 43.8  42.0 - 52.0 % Final   • MCV 04/05/2018 86.7  80.0 - 94.0 fL Final   • MCH 04/05/2018 28.1  27.0 - 33.0 pg Final   • MCHC 04/05/2018 32.4* 33.0 - 37.0 g/dL Final   • RDW 04/05/2018 13.8  11.5 - 14.5 % Final   • RDW-SD 04/05/2018 43.3  37.0 - 54.0 fl Final   • MPV 04/05/2018 9.8  6.0 - 10.0 fL Final   • Platelets 04/05/2018 396  130 - 400 10*3/mm3 Final   • Neutrophil % 04/05/2018 48.9  30.0 - 70.0 % Final   • Lymphocyte % 04/05/2018 32.6  21.0 - 51.0 % Final   • Monocyte % 04/05/2018 11.7* 0.0 - 10.0 % Final   • Eosinophil % 04/05/2018 6.6* 0.0 - 5.0 % Final   • Basophil % 04/05/2018 0.1  0.0 - 2.0 % Final   • Immature Grans % 04/05/2018 0.1  0.0 - 0.5 % Final   • Neutrophils, Absolute 04/05/2018 3.39  1.40 - 6.50 10*3/mm3 Final   • Lymphocytes, Absolute 04/05/2018 2.26  1.00 - 3.00 10*3/mm3 Final   • Monocytes, Absolute 04/05/2018 0.81  0.10 - 0.90 10*3/mm3 Final   • Eosinophils, Absolute 04/05/2018 0.46  0.00 - 0.70 10*3/mm3 Final   • Basophils, Absolute 04/05/2018 0.01  0.00 - 0.30 10*3/mm3 Final   • Immature Grans, Absolute 04/05/2018 0.01  0.00 - 0.03 10*3/mm3 Final   • Osmolality Calc 04/05/2018   273.0 - 305.0 mOsm/kg Final       Physical Exam    Constitutional: He is oriented to person, place, and time. Vital signs are normal. He appears well-developed and well-nourished.   Pleasant and friendly    HENT:   Head: Normocephalic and atraumatic.   Right Ear: External ear normal.   Left Ear: External ear normal.   Nose: Nose normal.   Mouth/Throat: Oropharynx is clear and moist.   Eyes: EOM are normal. Pupils are equal, round, and reactive to light.   Neck: Normal range of motion. Neck supple. No tracheal deviation present. No thyromegaly present.   Cardiovascular: Normal rate, regular rhythm, normal heart sounds and intact distal pulses.  Exam reveals no gallop and no friction rub.    No murmur heard.  Pulmonary/Chest: Effort normal and breath sounds normal. No respiratory distress. He has no wheezes. He has no rales. He exhibits no tenderness.   Abdominal: Soft. Bowel sounds are normal. He exhibits no distension and no mass. There is no tenderness. There is no rebound and no guarding.   Musculoskeletal:        Cervical back: He exhibits decreased range of motion and tenderness.        Lumbar back: He exhibits tenderness, pain and spasm.   Decreased lumbar curvature, there is pain with forward flexion. DTR's +2. No edema.    Neurological: He is alert and oriented to person, place, and time. He has normal reflexes.   CN 2-12 grossly intact    Skin: Skin is warm and dry. No rash noted. No erythema. No pallor.   Psychiatric: He has a normal mood and affect. His speech is normal and behavior is normal. Judgment and thought content normal. His affect is not inappropriate. Cognition and memory are normal.   Nursing note and vitals reviewed.      Assessment/Plan     Problem List Items Addressed This Visit        Cardiovascular and Mediastinum    Malignant hypertension with heart disease, without congestive heart failure    Relevant Medications    isosorbide mononitrate (IMDUR) 30 MG 24 hr tablet    Coronary artery disease involving native coronary artery of native  heart    Relevant Medications    isosorbide mononitrate (IMDUR) 30 MG 24 hr tablet    Hypercholesterolemia    Relevant Medications    simvastatin (ZOCOR) 40 MG tablet    Essential hypertension    Relevant Medications    isosorbide mononitrate (IMDUR) 30 MG 24 hr tablet    Other Relevant Orders    CBC & Differential    Comprehensive Metabolic Panel    TSH    Hemoglobin A1c    Lipid Panel    Vitamin B12       Respiratory    Obstructive sleep apnea    Seasonal allergic rhinitis due to pollen    Relevant Medications    diphenhydrAMINE (BENADRYL ALLERGY) 25 mg capsule       Digestive    Vitamin B 12 deficiency    Relevant Medications    cyanocobalamin 1000 MCG/ML injection    Other Relevant Orders    CBC & Differential    Comprehensive Metabolic Panel    TSH    Hemoglobin A1c    Lipid Panel    Vitamin B12    Vitamin D deficiency disease    Relevant Medications    vitamin D (ERGOCALCIFEROL) 06920 units capsule capsule    Other Relevant Orders    CBC & Differential    Comprehensive Metabolic Panel    TSH    Hemoglobin A1c    Lipid Panel    Vitamin B12    Gastroesophageal reflux disease without esophagitis    Relevant Medications    pantoprazole (PROTONIX) 40 MG EC tablet    raNITIdine (ZANTAC) 150 MG tablet       Endocrine    Type 2 diabetes mellitus with diabetic peripheral angiopathy without gangrene, without long-term current use of insulin    Relevant Medications    gabapentin (NEURONTIN) 100 MG capsule    metFORMIN (GLUCOPHAGE) 500 MG tablet    simvastatin (ZOCOR) 40 MG tablet    Uncontrolled type 2 diabetes mellitus without complication, without long-term current use of insulin    Relevant Medications    gabapentin (NEURONTIN) 100 MG capsule    metFORMIN (GLUCOPHAGE) 500 MG tablet    simvastatin (ZOCOR) 40 MG tablet       Nervous and Auditory    Lumbar back pain with radiculopathy affecting left lower extremity       Musculoskeletal and Integument    Osteoarthritis involving multiple joints on both sides of body     Relevant Medications    traMADol (ULTRAM) 50 MG tablet    Rash and nonspecific skin eruption    Relevant Medications    pantoprazole (PROTONIX) 40 MG EC tablet    raNITIdine (ZANTAC) 150 MG tablet       Genitourinary    Benign non-nodular prostatic hyperplasia without lower urinary tract symptoms    Relevant Medications    finasteride (PROSCAR) 5 MG tablet       Other    Generalized anxiety disorder    Relevant Medications    clonazePAM (KlonoPIN) 0.5 MG tablet    citalopram (CeleXA) 40 MG tablet    Major depressive disorder in partial remission    Relevant Medications    citalopram (CeleXA) 40 MG tablet    High risk medication use - Primary    Relevant Orders    Urine Drug Screen - Urine, Clean Catch    Abnormal thyroid blood test    Relevant Medications    isosorbide mononitrate (IMDUR) 30 MG 24 hr tablet      Other Visit Diagnoses    None.       TSH improved at 6.6, was 8.6 last results. Now on armour thyroid and reports feeling more energy.  A1C 7.4, some improved. Will work on diet.   I have discussed diagnosis in detail today allowing time for questions and answers. Pt is aware of reasons to seek urgent or emergent medical care as well as reasons to return to the clinic for evaluation. Possible side effects, interactions and progression of symptoms discussed as well. Pt / family states understanding.   Emotional support and active listening provided.   This months lab results reviewed and discussed.   Danny has been reviewed as consistent.  Uds is on file.   Goal: pt will report improved anxiety symptoms.   Goal: Improved quality of life and reduction in pain as evidenced by pt report.   As part of this patient's treatment plan they are being prescribed controlled substance/substances with potential for abuse. This patient has been made aware of the appropriate use of such medications, including potential risk of somnolence, limited ability to drive and / or work safely, and potential for overdose. It has  also been made clear that these medications are for use by this patient only, without concomitant use of alcohol or other substances unless prescribed/advised by medical provider. Patient has completed controlled substance agreement detailing terms of continued prescribing of controlled substances including monitoring Danny reports, drug screens and pill counts. The patient was asked and states they are not receiving narcotic medication from any there provider or any provider that this office has not been made aware of. History and physical exam exhibit continued safe and appropriate use of controlled substances.     Follow up in 3 months. Routine labs fasting one week prior to next office visit. Return sooner if needed.       Errors in dictation may reflect use of voice recognition software and not all errors in transcription may have been detected prior to signing.                  This document has been electronically signed by:  YOUNG Conway, NP-C

## 2018-05-17 ENCOUNTER — OFFICE VISIT (OUTPATIENT)
Dept: PSYCHIATRY | Facility: CLINIC | Age: 58
End: 2018-05-17

## 2018-05-17 DIAGNOSIS — F33.42 RECURRENT MAJOR DEPRESSIVE DISORDER, IN FULL REMISSION (HCC): Primary | ICD-10-CM

## 2018-05-17 PROCEDURE — 90837 PSYTX W PT 60 MINUTES: CPT | Performed by: SOCIAL WORKER

## 2018-05-17 NOTE — PROGRESS NOTES
Date of Service: May 17, 2018  Time In: 8:00 AM  Time Out: 8:50 AM      PROGRESS NOTE  Data:  Damaso Leonard is a 57 y.o. male who met with the undersigned for a regularly scheduled individual outpatient psychotherapy session at [facility] for follow-up of depression and anxiety.       HPI: Patient reports he feels he is doing much better and reports no significant symptoms of depression at this time.  The patient rates current symptoms of depression at 10 scale of 1-10 with 10 being most severe.  He reports he is becoming much more active and states he feels recent physical therapy was very helpful with his pain levels.  Patient also reports he is be active within the family and states he spends time regularly with his grandchildren.  Patient denies feeling hopeless and in fact states he is looking forward to going fishing with a friend this coming weekend.  Patient also reports no significant symptoms of anxiety.  He reports he continues to adhere to medication regimen as prescribed and states he feels it has been effective.  Patient adamantly convincingly deny suicidal ideation and vehemently denies any substance use.    Clinical Maneuvering/Intervention:  Assisted patient in processing above session content; acknowledged and normalized patient’s thoughts, feelings, and concerns.  Acknowledged the patient's progress and utilized motivational interviewing techniques including complex reflections to assist the patient in recognizing the steps he has taken to bring about positive change.  We will utilized cognitive behavioral therapy to assist the patient in identifying coping mechanisms he is found to be effective in ranging symptoms including positive self talk, daily affirmations, utilizing positive sources of support, and actively seeking in activities he can engage in regular basis to decrease idle time.  Also make patient aware of the availability a future treatment if symptoms return.   Provided unconditional positive regard in a safe, supportive environment.    Allowed patient to freely discuss issues without interruption or judgment. Provided safe, confidential environment to facilitate the development of positive therapeutic relationship and encourage open, honest communication. Assisted patient in identifying risk factors which would indicate the need for higher level of care including thoughts to harm self or others and/or self-harming behavior and encouraged patient to contact this office, call 911, or present to the nearest emergency room should any of these events occur. Discussed crisis intervention services and means to access.  Patient adamantly and convincingly denies current suicidal or homicidal ideation or perceptual disturbance.    Assessment    Patient appears to be maintaining stability at this time with pharmacotherapy.  The patient reports no significant symptoms or impairment at this time.  As a result, there is no clinical indication for the need for ongoing psychotherapy.    Diagnoses and all orders for this visit:    Recurrent major depressive disorder, in full remission               Mental Status Exam  Hygiene:  good  Dress:  casual  Attitude:  Cooperative  Motor Activity:  Appropriate  Speech:  Normal  Mood:  within normal limits  Affect:  calm and pleasant  Thought Processes:  Goal directed  Thought Content:  normal  Suicidal Thoughts:  denies  Homicidal Thoughts:  denies  Crisis Safety Plan: yes, to come to the emergency room.  Hallucinations:  denies    Patient's Support Network Includes:  wife and children    Progress toward goal: At goal    Functional Status: No impairment    Prognosis: Good with Ongoing Treatment     Plan         Patient will not continue in psychotherapy at this time with the understanding of the availability future services as necessary.  The patient will continue with other providers including primary care provider YOUNG Conway and  follow all recommendations.         No Follow-up on file.      This document signed by Tha Newton LCSW, [facility] Center[facility] May 17, 2018 10:47 AM

## 2018-05-17 NOTE — PROGRESS NOTES
DISCHARGE SUMMARY     NAME:     Damaso Leonard  MEDICAL RECORD NUMBER: [id number]  VISIT NUMBER:     ATTENDING PHYSICIAN:   N/A  THERAPIST:    Tha Newton LCSW, LCADC  YOB: 1960      IDENTIFYING INFORMATION: Patient is a 57-year-old  male who currently lives with his wife in [facility] and receive disability benefits.        REASON FOR ADMISSION: Patient was referred for treatment by his primary care provider due to depression and anxiety.      SUMMARY OF CARE, TREATMENT AND SERVICES PROVIDED:  Patient participated in individual outpatient psychotherapy sessions and monthly at [facility] which was decreased as he continued to improve.  Patient so continues and pharmacotherapy with his primary care provider in this office, YOUNG Conway.      DISCHARGE RECOMMENDATIONS AND REFERRALS:  Patient is to continue pharmacotherapy as scheduled with YOUNG Conway.      DISCHARGE MEDICATIONS:       PROGNOSIS: Good with ongoing treatment      DISCHARGE DIAGNOSES:    Assessment/Plan   Diagnoses and all orders for this visit:    Recurrent major depressive disorder, in full remission      No Follow-up on file.          This document signed by Tha Newton LCSW, LCADC May 17, 2018 11:03 AM

## 2018-06-27 ENCOUNTER — HOSPITAL ENCOUNTER (EMERGENCY)
Facility: HOSPITAL | Age: 58
Discharge: HOME | End: 2018-06-27
Attending: INTERNAL MEDICINE | Admitting: INTERNAL MEDICINE

## 2018-06-27 ENCOUNTER — APPOINTMENT (OUTPATIENT)
Dept: CT IMAGING | Facility: HOSPITAL | Age: 58
End: 2018-06-27

## 2018-06-27 VITALS
SYSTOLIC BLOOD PRESSURE: 141 MMHG | BODY MASS INDEX: 32.21 KG/M2 | DIASTOLIC BLOOD PRESSURE: 97 MMHG | RESPIRATION RATE: 20 BRPM | HEART RATE: 70 BPM | HEIGHT: 70 IN | TEMPERATURE: 98 F | OXYGEN SATURATION: 97 % | WEIGHT: 225 LBS

## 2018-06-27 DIAGNOSIS — S16.1XXA STRAIN OF NECK MUSCLE, INITIAL ENCOUNTER: Primary | ICD-10-CM
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
## 2018-06-27 LAB
ALBUMIN SERPL-MCNC: 4.5 G/DL (ref 3.5–5)
ALBUMIN/GLOB SERPL: 1.6 G/DL (ref 1.5–2.5)
ALP SERPL-CCNC: 110 U/L (ref 40–129)
ALT SERPL W P-5'-P-CCNC: 22 U/L (ref 10–44)
ANION GAP SERPL CALCULATED.3IONS-SCNC: 6.9 MMOL/L (ref 3.6–11.2)
AST SERPL-CCNC: 19 U/L (ref 10–34)
BASOPHILS # BLD AUTO: 0.02 10*3/MM3 (ref 0–0.3)
BASOPHILS NFR BLD AUTO: 0.3 % (ref 0–2)
BILIRUB SERPL-MCNC: 0.4 MG/DL (ref 0.2–1.8)
BUN BLD-MCNC: 10 MG/DL (ref 7–21)
BUN/CREAT SERPL: 9.9 (ref 7–25)
CALCIUM SPEC-SCNC: 9.3 MG/DL (ref 7.7–10)
CHLORIDE SERPL-SCNC: 103 MMOL/L (ref 99–112)
CO2 SERPL-SCNC: 30.1 MMOL/L (ref 24.3–31.9)
CREAT BLD-MCNC: 1.01 MG/DL (ref 0.43–1.29)
DEPRECATED RDW RBC AUTO: 42.5 FL (ref 37–54)
EOSINOPHIL # BLD AUTO: 0.48 10*3/MM3 (ref 0–0.7)
EOSINOPHIL NFR BLD AUTO: 6.3 % (ref 0–5)
ERYTHROCYTE [DISTWIDTH] IN BLOOD BY AUTOMATED COUNT: 14 % (ref 11.5–14.5)
GFR SERPL CREATININE-BSD FRML MDRD: 77 ML/MIN/1.73
GLOBULIN UR ELPH-MCNC: 2.9 GM/DL
GLUCOSE BLD-MCNC: 103 MG/DL (ref 70–110)
HCT VFR BLD AUTO: 41.8 % (ref 42–52)
HGB BLD-MCNC: 14 G/DL (ref 14–18)
IMM GRANULOCYTES # BLD: 0.01 10*3/MM3 (ref 0–0.03)
IMM GRANULOCYTES NFR BLD: 0.1 % (ref 0–0.5)
LYMPHOCYTES # BLD AUTO: 2.4 10*3/MM3 (ref 1–3)
LYMPHOCYTES NFR BLD AUTO: 31.4 % (ref 21–51)
MCH RBC QN AUTO: 28.5 PG (ref 27–33)
MCHC RBC AUTO-ENTMCNC: 33.5 G/DL (ref 33–37)
MCV RBC AUTO: 85 FL (ref 80–94)
MONOCYTES # BLD AUTO: 1.21 10*3/MM3 (ref 0.1–0.9)
MONOCYTES NFR BLD AUTO: 15.8 % (ref 0–10)
NEUTROPHILS # BLD AUTO: 3.53 10*3/MM3 (ref 1.4–6.5)
NEUTROPHILS NFR BLD AUTO: 46.1 % (ref 30–70)
OSMOLALITY SERPL CALC.SUM OF ELEC: 278.7 MOSM/KG (ref 273–305)
PLATELET # BLD AUTO: 355 10*3/MM3 (ref 130–400)
PMV BLD AUTO: 9.3 FL (ref 6–10)
POTASSIUM BLD-SCNC: 3.7 MMOL/L (ref 3.5–5.3)
PROT SERPL-MCNC: 7.4 G/DL (ref 6–8)
RBC # BLD AUTO: 4.92 10*6/MM3 (ref 4.7–6.1)
SODIUM BLD-SCNC: 140 MMOL/L (ref 135–153)
WBC NRBC COR # BLD: 7.65 10*3/MM3 (ref 4.5–12.5)

## 2018-06-27 PROCEDURE — 74177 CT ABD & PELVIS W/CONTRAST: CPT

## 2018-06-27 PROCEDURE — 96360 HYDRATION IV INFUSION INIT: CPT

## 2018-06-27 PROCEDURE — 36415 COLL VENOUS BLD VENIPUNCTURE: CPT

## 2018-06-27 PROCEDURE — 72125 CT NECK SPINE W/O DYE: CPT

## 2018-06-27 PROCEDURE — 70450 CT HEAD/BRAIN W/O DYE: CPT

## 2018-06-27 PROCEDURE — 71275 CT ANGIOGRAPHY CHEST: CPT | Performed by: RADIOLOGY

## 2018-06-27 PROCEDURE — 99283 EMERGENCY DEPT VISIT LOW MDM: CPT

## 2018-06-27 PROCEDURE — 71275 CT ANGIOGRAPHY CHEST: CPT

## 2018-06-27 PROCEDURE — 70450 CT HEAD/BRAIN W/O DYE: CPT | Performed by: RADIOLOGY

## 2018-06-27 PROCEDURE — 80053 COMPREHEN METABOLIC PANEL: CPT | Performed by: NURSE PRACTITIONER

## 2018-06-27 PROCEDURE — 74177 CT ABD & PELVIS W/CONTRAST: CPT | Performed by: RADIOLOGY

## 2018-06-27 PROCEDURE — 85025 COMPLETE CBC W/AUTO DIFF WBC: CPT | Performed by: NURSE PRACTITIONER

## 2018-06-27 PROCEDURE — 0 IOPAMIDOL PER 1 ML: Performed by: INTERNAL MEDICINE

## 2018-06-27 PROCEDURE — 72125 CT NECK SPINE W/O DYE: CPT | Performed by: RADIOLOGY

## 2018-06-27 RX ORDER — CYCLOBENZAPRINE HCL 10 MG
10 TABLET ORAL 3 TIMES DAILY PRN
Qty: 12 TABLET | Refills: 0 | Status: SHIPPED | OUTPATIENT
Start: 2018-06-27 | End: 2018-10-16

## 2018-06-27 RX ORDER — TRAMADOL HYDROCHLORIDE 50 MG/1
50 TABLET ORAL EVERY 6 HOURS PRN
Qty: 12 TABLET | Refills: 0 | Status: SHIPPED | OUTPATIENT
Start: 2018-06-27 | End: 2018-07-11 | Stop reason: SDUPTHER

## 2018-06-27 RX ORDER — SODIUM CHLORIDE 9 MG/ML
INJECTION, SOLUTION INTRAVENOUS
Status: COMPLETED
Start: 2018-06-27 | End: 2018-06-27

## 2018-06-27 RX ORDER — SODIUM CHLORIDE 0.9 % (FLUSH) 0.9 %
10 SYRINGE (ML) INJECTION AS NEEDED
Status: DISCONTINUED | OUTPATIENT
Start: 2018-06-27 | End: 2018-06-27 | Stop reason: HOSPADM

## 2018-06-27 RX ADMIN — IOPAMIDOL 90 ML: 755 INJECTION, SOLUTION INTRAVENOUS at 19:09

## 2018-06-27 RX ADMIN — SODIUM CHLORIDE 1000 ML: 9 INJECTION, SOLUTION INTRAVENOUS at 19:12

## 2018-07-06 ENCOUNTER — LAB (OUTPATIENT)
Dept: FAMILY MEDICINE CLINIC | Facility: CLINIC | Age: 58
End: 2018-07-06

## 2018-07-06 DIAGNOSIS — E53.8 VITAMIN B 12 DEFICIENCY: ICD-10-CM

## 2018-07-06 DIAGNOSIS — I10 ESSENTIAL HYPERTENSION: ICD-10-CM

## 2018-07-06 DIAGNOSIS — E55.9 VITAMIN D DEFICIENCY DISEASE: ICD-10-CM

## 2018-07-06 LAB
ALBUMIN SERPL-MCNC: 4.4 G/DL (ref 3.5–5)
ALBUMIN/GLOB SERPL: 1.6 G/DL (ref 1.5–2.5)
ALP SERPL-CCNC: 97 U/L (ref 40–129)
ALT SERPL W P-5'-P-CCNC: 22 U/L (ref 10–44)
ANION GAP SERPL CALCULATED.3IONS-SCNC: 8.5 MMOL/L (ref 3.6–11.2)
AST SERPL-CCNC: 19 U/L (ref 10–34)
BASOPHILS # BLD AUTO: 0.02 10*3/MM3 (ref 0–0.3)
BASOPHILS NFR BLD AUTO: 0.3 % (ref 0–2)
BILIRUB SERPL-MCNC: 0.6 MG/DL (ref 0.2–1.8)
BUN BLD-MCNC: 15 MG/DL (ref 7–21)
BUN/CREAT SERPL: 13.8 (ref 7–25)
CALCIUM SPEC-SCNC: 9.2 MG/DL (ref 7.7–10)
CHLORIDE SERPL-SCNC: 102 MMOL/L (ref 99–112)
CHOLEST SERPL-MCNC: 189 MG/DL (ref 0–200)
CO2 SERPL-SCNC: 28.5 MMOL/L (ref 24.3–31.9)
CREAT BLD-MCNC: 1.09 MG/DL (ref 0.43–1.29)
DEPRECATED RDW RBC AUTO: 44 FL (ref 37–54)
EOSINOPHIL # BLD AUTO: 0.57 10*3/MM3 (ref 0–0.7)
EOSINOPHIL NFR BLD AUTO: 8 % (ref 0–5)
ERYTHROCYTE [DISTWIDTH] IN BLOOD BY AUTOMATED COUNT: 14.3 % (ref 11.5–14.5)
GFR SERPL CREATININE-BSD FRML MDRD: 70 ML/MIN/1.73
GLOBULIN UR ELPH-MCNC: 2.7 GM/DL
GLUCOSE BLD-MCNC: 131 MG/DL (ref 70–110)
HBA1C MFR BLD: 7.4 % (ref 4.5–5.7)
HCT VFR BLD AUTO: 41.2 % (ref 42–52)
HDLC SERPL-MCNC: 41 MG/DL (ref 60–100)
HGB BLD-MCNC: 13.6 G/DL (ref 14–18)
IMM GRANULOCYTES # BLD: 0.01 10*3/MM3 (ref 0–0.03)
IMM GRANULOCYTES NFR BLD: 0.1 % (ref 0–0.5)
LDLC SERPL CALC-MCNC: 106 MG/DL (ref 0–100)
LDLC/HDLC SERPL: 2.59 {RATIO}
LYMPHOCYTES # BLD AUTO: 2.22 10*3/MM3 (ref 1–3)
LYMPHOCYTES NFR BLD AUTO: 31.3 % (ref 21–51)
MCH RBC QN AUTO: 28.6 PG (ref 27–33)
MCHC RBC AUTO-ENTMCNC: 33 G/DL (ref 33–37)
MCV RBC AUTO: 86.6 FL (ref 80–94)
MONOCYTES # BLD AUTO: 1.07 10*3/MM3 (ref 0.1–0.9)
MONOCYTES NFR BLD AUTO: 15.1 % (ref 0–10)
NEUTROPHILS # BLD AUTO: 3.2 10*3/MM3 (ref 1.4–6.5)
NEUTROPHILS NFR BLD AUTO: 45.2 % (ref 30–70)
OSMOLALITY SERPL CALC.SUM OF ELEC: 280.2 MOSM/KG (ref 273–305)
PLATELET # BLD AUTO: 366 10*3/MM3 (ref 130–400)
PMV BLD AUTO: 9.6 FL (ref 6–10)
POTASSIUM BLD-SCNC: 4.3 MMOL/L (ref 3.5–5.3)
PROT SERPL-MCNC: 7.1 G/DL (ref 6–8)
RBC # BLD AUTO: 4.76 10*6/MM3 (ref 4.7–6.1)
SODIUM BLD-SCNC: 139 MMOL/L (ref 135–153)
TRIGL SERPL-MCNC: 210 MG/DL (ref 0–150)
TSH SERPL DL<=0.05 MIU/L-ACNC: 6.24 MIU/ML (ref 0.55–4.78)
VIT B12 BLD-MCNC: 285 PG/ML (ref 211–911)
VLDLC SERPL-MCNC: 42 MG/DL
WBC NRBC COR # BLD: 7.09 10*3/MM3 (ref 4.5–12.5)

## 2018-07-06 PROCEDURE — 85025 COMPLETE CBC W/AUTO DIFF WBC: CPT | Performed by: NURSE PRACTITIONER

## 2018-07-06 PROCEDURE — 36415 COLL VENOUS BLD VENIPUNCTURE: CPT

## 2018-07-06 PROCEDURE — 80053 COMPREHEN METABOLIC PANEL: CPT | Performed by: NURSE PRACTITIONER

## 2018-07-06 PROCEDURE — 80061 LIPID PANEL: CPT | Performed by: NURSE PRACTITIONER

## 2018-07-06 PROCEDURE — 82607 VITAMIN B-12: CPT | Performed by: NURSE PRACTITIONER

## 2018-07-06 PROCEDURE — 83036 HEMOGLOBIN GLYCOSYLATED A1C: CPT | Performed by: NURSE PRACTITIONER

## 2018-07-06 PROCEDURE — 84443 ASSAY THYROID STIM HORMONE: CPT | Performed by: NURSE PRACTITIONER

## 2018-07-11 ENCOUNTER — OFFICE VISIT (OUTPATIENT)
Dept: FAMILY MEDICINE CLINIC | Facility: CLINIC | Age: 58
End: 2018-07-11

## 2018-07-11 VITALS
DIASTOLIC BLOOD PRESSURE: 76 MMHG | WEIGHT: 223 LBS | BODY MASS INDEX: 31.92 KG/M2 | TEMPERATURE: 98.4 F | HEIGHT: 70 IN | OXYGEN SATURATION: 95 % | HEART RATE: 79 BPM | SYSTOLIC BLOOD PRESSURE: 128 MMHG

## 2018-07-11 DIAGNOSIS — I25.10 CORONARY ARTERY DISEASE INVOLVING NATIVE CORONARY ARTERY OF NATIVE HEART WITHOUT ANGINA PECTORIS: ICD-10-CM

## 2018-07-11 DIAGNOSIS — R21 RASH AND NONSPECIFIC SKIN ERUPTION: ICD-10-CM

## 2018-07-11 DIAGNOSIS — N40.0 BENIGN NON-NODULAR PROSTATIC HYPERPLASIA WITHOUT LOWER URINARY TRACT SYMPTOMS: ICD-10-CM

## 2018-07-11 DIAGNOSIS — E55.9 VITAMIN D DEFICIENCY DISEASE: ICD-10-CM

## 2018-07-11 DIAGNOSIS — E53.8 VITAMIN B 12 DEFICIENCY: ICD-10-CM

## 2018-07-11 DIAGNOSIS — I10 ESSENTIAL HYPERTENSION: ICD-10-CM

## 2018-07-11 DIAGNOSIS — E55.9 VITAMIN D DEFICIENCY: Primary | ICD-10-CM

## 2018-07-11 DIAGNOSIS — F32.4 MAJOR DEPRESSIVE DISORDER WITH SINGLE EPISODE, IN PARTIAL REMISSION (HCC): ICD-10-CM

## 2018-07-11 DIAGNOSIS — IMO0001 UNCONTROLLED TYPE 2 DIABETES MELLITUS WITHOUT COMPLICATION, WITHOUT LONG-TERM CURRENT USE OF INSULIN: ICD-10-CM

## 2018-07-11 DIAGNOSIS — F41.1 GENERALIZED ANXIETY DISORDER: ICD-10-CM

## 2018-07-11 DIAGNOSIS — H61.23 EXCESSIVE CERUMEN IN BOTH EAR CANALS: ICD-10-CM

## 2018-07-11 DIAGNOSIS — J30.1 SEASONAL ALLERGIC RHINITIS DUE TO POLLEN, UNSPECIFIED CHRONICITY: ICD-10-CM

## 2018-07-11 DIAGNOSIS — K21.9 GASTROESOPHAGEAL REFLUX DISEASE WITHOUT ESOPHAGITIS: ICD-10-CM

## 2018-07-11 DIAGNOSIS — R79.89 ABNORMAL THYROID BLOOD TEST: ICD-10-CM

## 2018-07-11 PROCEDURE — 96372 THER/PROPH/DIAG INJ SC/IM: CPT | Performed by: NURSE PRACTITIONER

## 2018-07-11 PROCEDURE — 99214 OFFICE O/P EST MOD 30 MIN: CPT | Performed by: NURSE PRACTITIONER

## 2018-07-11 PROCEDURE — 69210 REMOVE IMPACTED EAR WAX UNI: CPT | Performed by: NURSE PRACTITIONER

## 2018-07-11 RX ORDER — SIMVASTATIN 40 MG
40 TABLET ORAL NIGHTLY
Qty: 30 TABLET | Refills: 5 | Status: SHIPPED | OUTPATIENT
Start: 2018-07-11 | End: 2019-01-16 | Stop reason: SDUPTHER

## 2018-07-11 RX ORDER — PANTOPRAZOLE SODIUM 40 MG/1
40 TABLET, DELAYED RELEASE ORAL DAILY
Qty: 30 TABLET | Refills: 5 | Status: SHIPPED | OUTPATIENT
Start: 2018-07-11 | End: 2019-01-16 | Stop reason: SDUPTHER

## 2018-07-11 RX ORDER — AMITRIPTYLINE HYDROCHLORIDE 50 MG/1
100 TABLET, FILM COATED ORAL NIGHTLY PRN
Qty: 60 TABLET | Refills: 5 | Status: SHIPPED | OUTPATIENT
Start: 2018-07-11 | End: 2019-01-16 | Stop reason: SDUPTHER

## 2018-07-11 RX ORDER — ISOSORBIDE MONONITRATE 30 MG/1
30 TABLET, EXTENDED RELEASE ORAL 2 TIMES DAILY
Qty: 60 TABLET | Refills: 5 | Status: SHIPPED | OUTPATIENT
Start: 2018-07-11 | End: 2019-01-16 | Stop reason: SDUPTHER

## 2018-07-11 RX ORDER — CARVEDILOL 12.5 MG/1
12.5 TABLET ORAL 2 TIMES DAILY WITH MEALS
Qty: 60 TABLET | Refills: 5 | Status: SHIPPED | OUTPATIENT
Start: 2018-07-11 | End: 2019-01-16 | Stop reason: SDUPTHER

## 2018-07-11 RX ORDER — ERGOCALCIFEROL 1.25 MG/1
50000 CAPSULE ORAL
Qty: 4 CAPSULE | Refills: 5 | Status: SHIPPED | OUTPATIENT
Start: 2018-07-11 | End: 2019-01-16 | Stop reason: SDUPTHER

## 2018-07-11 RX ORDER — CLONAZEPAM 0.5 MG/1
0.5 TABLET ORAL 3 TIMES DAILY PRN
Qty: 90 TABLET | Refills: 0 | Status: SHIPPED | OUTPATIENT
Start: 2018-07-11 | End: 2018-10-16 | Stop reason: SDUPTHER

## 2018-07-11 RX ORDER — POLYETHYLENE GLYCOL 3350 17 G/17G
17 POWDER, FOR SOLUTION ORAL DAILY
Qty: 1 EACH | Refills: 5 | Status: SHIPPED | OUTPATIENT
Start: 2018-07-11 | End: 2021-03-16 | Stop reason: SDUPTHER

## 2018-07-11 RX ORDER — LANCETS
EACH MISCELLANEOUS
Qty: 100 EACH | Refills: 12 | Status: SHIPPED | OUTPATIENT
Start: 2018-07-11 | End: 2018-07-17 | Stop reason: SDUPTHER

## 2018-07-11 RX ORDER — RANITIDINE 150 MG/1
150 TABLET ORAL 2 TIMES DAILY
Qty: 60 TABLET | Refills: 5 | Status: SHIPPED | OUTPATIENT
Start: 2018-07-11 | End: 2019-01-16 | Stop reason: SDUPTHER

## 2018-07-11 RX ORDER — FINASTERIDE 5 MG/1
5 TABLET, FILM COATED ORAL DAILY
Qty: 30 TABLET | Refills: 5 | Status: SHIPPED | OUTPATIENT
Start: 2018-07-11 | End: 2019-01-16 | Stop reason: SDUPTHER

## 2018-07-11 RX ORDER — DIPHENHYDRAMINE HCL 25 MG
25 CAPSULE ORAL EVERY 6 HOURS PRN
Qty: 30 CAPSULE | Refills: 0 | Status: SHIPPED | OUTPATIENT
Start: 2018-07-11 | End: 2019-01-16 | Stop reason: SDUPTHER

## 2018-07-11 RX ORDER — CYANOCOBALAMIN 1000 UG/ML
1000 INJECTION, SOLUTION INTRAMUSCULAR; SUBCUTANEOUS
Qty: 1 ML | Refills: 11 | Status: SHIPPED | OUTPATIENT
Start: 2018-07-11 | End: 2019-05-28 | Stop reason: SDUPTHER

## 2018-07-11 RX ORDER — LEVOTHYROXINE AND LIOTHYRONINE 19; 4.5 UG/1; UG/1
30 TABLET ORAL DAILY
Qty: 30 TABLET | Refills: 5
Start: 2018-07-11 | End: 2018-08-13 | Stop reason: SDUPTHER

## 2018-07-11 RX ORDER — TRAMADOL HYDROCHLORIDE 50 MG/1
50 TABLET ORAL EVERY 8 HOURS PRN
Qty: 90 TABLET | Refills: 2 | Status: SHIPPED | OUTPATIENT
Start: 2018-07-11 | End: 2018-10-16 | Stop reason: SDUPTHER

## 2018-07-11 RX ORDER — ONDANSETRON HYDROCHLORIDE 8 MG/1
8 TABLET, FILM COATED ORAL EVERY 8 HOURS PRN
Qty: 20 TABLET | Refills: 2 | Status: SHIPPED | OUTPATIENT
Start: 2018-07-11 | End: 2019-01-16 | Stop reason: SDUPTHER

## 2018-07-11 RX ORDER — CITALOPRAM 40 MG/1
40 TABLET ORAL DAILY
Qty: 30 TABLET | Refills: 5 | Status: SHIPPED | OUTPATIENT
Start: 2018-07-11 | End: 2019-01-16 | Stop reason: SDUPTHER

## 2018-07-11 RX ORDER — GABAPENTIN 100 MG/1
100 CAPSULE ORAL 2 TIMES DAILY
Qty: 60 CAPSULE | Refills: 2 | Status: SHIPPED | OUTPATIENT
Start: 2018-07-11 | End: 2019-01-16

## 2018-07-11 RX ADMIN — CYANOCOBALAMIN 1000 MCG: 1000 INJECTION, SOLUTION INTRAMUSCULAR; SUBCUTANEOUS at 08:43

## 2018-07-11 NOTE — PROGRESS NOTES
"Subjective   Damaso Leonard is a 57 y.o. male.     Chief Complaint   Patient presents with   • Foot Swelling       History of Present Illness     The following portions of the patient's history were reviewed and updated as appropriate: allergies, current medications, past family history, past medical history, past social history, past surgical history and problem list.    Review of Systems   Constitutional: Negative.    HENT: Positive for ear pain.    Eyes: Positive for pain, redness and itching.   Respiratory: Positive for shortness of breath.    Cardiovascular: Positive for chest pain.   Gastrointestinal: Negative.    Endocrine: Negative.    Genitourinary: Negative.    Musculoskeletal: Positive for back pain, joint swelling and neck pain.   Skin: Positive for wound.   Neurological: Positive for dizziness.       Objective     /76   Pulse 79   Temp 98.4 °F (36.9 °C) (Oral)   Ht 177.8 cm (70\")   Wt 101 kg (223 lb)   SpO2 95%   BMI 32.00 kg/m²   Lab on 07/06/2018   Component Date Value Ref Range Status   • Glucose 07/06/2018 131* 70 - 110 mg/dL Final   • BUN 07/06/2018 15  7 - 21 mg/dL Final   • Creatinine 07/06/2018 1.09  0.43 - 1.29 mg/dL Final   • Sodium 07/06/2018 139  135 - 153 mmol/L Final   • Potassium 07/06/2018 4.3  3.5 - 5.3 mmol/L Final   • Chloride 07/06/2018 102  99 - 112 mmol/L Final   • CO2 07/06/2018 28.5  24.3 - 31.9 mmol/L Final   • Calcium 07/06/2018 9.2  7.7 - 10.0 mg/dL Final   • Total Protein 07/06/2018 7.1  6.0 - 8.0 g/dL Final   • Albumin 07/06/2018 4.40  3.50 - 5.00 g/dL Final   • ALT (SGPT) 07/06/2018 22  10 - 44 U/L Final   • AST (SGOT) 07/06/2018 19  10 - 34 U/L Final   • Alkaline Phosphatase 07/06/2018 97  40 - 129 U/L Final   • Total Bilirubin 07/06/2018 0.6  0.2 - 1.8 mg/dL Final   • eGFR Non African Amer 07/06/2018 70  >60 mL/min/1.73 Final   • Globulin 07/06/2018 2.7  gm/dL Final   • A/G Ratio 07/06/2018 1.6  1.5 - 2.5 g/dL Final   • BUN/Creatinine Ratio 07/06/2018 13.8  7.0 " - 25.0 Final   • Anion Gap 07/06/2018 8.5  3.6 - 11.2 mmol/L Final   • TSH 07/06/2018 6.239* 0.550 - 4.780 mIU/mL Final   • Hemoglobin A1C 07/06/2018 7.40* 4.50 - 5.70 % Final   • Total Cholesterol 07/06/2018 189  0 - 200 mg/dL Final   • Triglycerides 07/06/2018 210* 0 - 150 mg/dL Final   • HDL Cholesterol 07/06/2018 41* 60 - 100 mg/dL Final   • LDL Cholesterol  07/06/2018 106* 0 - 100 mg/dL Final   • VLDL Cholesterol 07/06/2018 42  mg/dL Final   • LDL/HDL Ratio 07/06/2018 2.59   Final   • Vitamin B-12 07/06/2018 285  211 - 911 pg/mL Final   • WBC 07/06/2018 7.09  4.50 - 12.50 10*3/mm3 Final   • RBC 07/06/2018 4.76  4.70 - 6.10 10*6/mm3 Final   • Hemoglobin 07/06/2018 13.6* 14.0 - 18.0 g/dL Final   • Hematocrit 07/06/2018 41.2* 42.0 - 52.0 % Final   • MCV 07/06/2018 86.6  80.0 - 94.0 fL Final   • MCH 07/06/2018 28.6  27.0 - 33.0 pg Final   • MCHC 07/06/2018 33.0  33.0 - 37.0 g/dL Final   • RDW 07/06/2018 14.3  11.5 - 14.5 % Final   • RDW-SD 07/06/2018 44.0  37.0 - 54.0 fl Final   • MPV 07/06/2018 9.6  6.0 - 10.0 fL Final   • Platelets 07/06/2018 366  130 - 400 10*3/mm3 Final   • Neutrophil % 07/06/2018 45.2  30.0 - 70.0 % Final   • Lymphocyte % 07/06/2018 31.3  21.0 - 51.0 % Final   • Monocyte % 07/06/2018 15.1* 0.0 - 10.0 % Final   • Eosinophil % 07/06/2018 8.0* 0.0 - 5.0 % Final   • Basophil % 07/06/2018 0.3  0.0 - 2.0 % Final   • Immature Grans % 07/06/2018 0.1  0.0 - 0.5 % Final   • Neutrophils, Absolute 07/06/2018 3.20  1.40 - 6.50 10*3/mm3 Final   • Lymphocytes, Absolute 07/06/2018 2.22  1.00 - 3.00 10*3/mm3 Final   • Monocytes, Absolute 07/06/2018 1.07* 0.10 - 0.90 10*3/mm3 Final   • Eosinophils, Absolute 07/06/2018 0.57  0.00 - 0.70 10*3/mm3 Final   • Basophils, Absolute 07/06/2018 0.02  0.00 - 0.30 10*3/mm3 Final   • Immature Grans, Absolute 07/06/2018 0.01  0.00 - 0.03 10*3/mm3 Final   • Osmolality Calc 07/06/2018 280.2  273.0 - 305.0 mOsm/kg Final       Physical Exam    Assessment/Plan     Problem List  Items Addressed This Visit     None                   Patient's Body mass index is 32 kg/m². BMI is above normal parameters. Recommendations include: exercise counseling.    I advised [place] of the risks of continuing to use tobacco, and I provided him with tobacco cessation educational materials in the After Visit Summary.     During this visit, I spent *** minutes counseling the patient regarding tobacco cessation.    I have discussed diagnosis in detail today allowing time for questions and answers. Pt is aware of reasons to seek urgent or emergent medical care as well as reasons to return to the clinic for evaluation. Possible side effects, interactions and progression of symptoms discussed as well. Pt / family states understanding.   Emotional support and active listening provided.         This document has been electronically signed by:  YOUNG Conway, NP-C

## 2018-07-11 NOTE — PROGRESS NOTES
Subjective   Damaso Leonard is a 57 y.o. male.     Chief Complaint   Patient presents with   • Foot Pain       History of Present Illness     b 12 def -  He usually takes them at home but has been unable to see family member recently that injects him.    Vitamin D deficiency-stable with supplement    Type 2 diabetes-patient reports his blood sugars have been mildly elevated as he is remodeling his kitchen and forced to eat out.  Receives metformin.  Receives Neurontin for his diabetic foot pain.    GERD is stable with PPI and occasional H2 blocker    Excessive cerumen in both ears-patient reports that he has earwax drainage which she has tried to get out with a Q-tip but this seems to make the problem worse.    Lumbar back pain with radiculopathy of the left lower extremity, osteoarthritis involving multiple joints on both side of the body-patient has attended physical therapy in the past with little improvement.  Radiology is on file.  He has no history of substance abuse or addiction.  Patient rates his pain as 3 on pain scale rating of 0-10 today.  He reports that as his day goes on his pain is usually is around 8 on this pain scale.  Pain is worse with activity and movement.  Currently receives Ultram which is helpful with his pain.  His pain is described as aching, throbbing and stiffness.  Pain is located in his low back, hips, lower extremities and knees.    Anxiety/insomnia-patient takes Elavil at night which is helpful for sleep.  He also receives Klonopin on an as-needed basis which helped him control his chronic anxiety.        The following portions of the patient's history were reviewed and updated as appropriate: allergies, current medications, past family history, past medical history, past social history, past surgical history and problem list.    Review of Systems   Constitutional: Negative for activity change, appetite change, chills, fatigue and unexpected weight change.   HENT: Negative for  "congestion, ear pain, facial swelling, sinus pain, sinus pressure, sore throat and trouble swallowing.    Eyes: Negative.    Respiratory: Negative for cough, chest tightness, shortness of breath and wheezing.    Cardiovascular: Negative for chest pain, palpitations and leg swelling.   Gastrointestinal: Negative for abdominal pain, blood in stool, constipation, diarrhea, nausea and vomiting.   Endocrine: Negative.    Genitourinary: Negative for dysuria.   Musculoskeletal: Positive for arthralgias, back pain, joint swelling and myalgias. Negative for neck pain and neck stiffness.   Allergic/Immunologic: Negative.    Neurological: Negative.    Hematological: Negative.    Psychiatric/Behavioral: Positive for sleep disturbance. Negative for decreased concentration, dysphoric mood and self-injury.       Objective     /76   Pulse 79   Temp 98.4 °F (36.9 °C) (Oral)   Ht 177.8 cm (70\")   Wt 101 kg (223 lb)   SpO2 95%   BMI 32.00 kg/m²   Lab on 07/06/2018   Component Date Value Ref Range Status   • Glucose 07/06/2018 131* 70 - 110 mg/dL Final   • BUN 07/06/2018 15  7 - 21 mg/dL Final   • Creatinine 07/06/2018 1.09  0.43 - 1.29 mg/dL Final   • Sodium 07/06/2018 139  135 - 153 mmol/L Final   • Potassium 07/06/2018 4.3  3.5 - 5.3 mmol/L Final   • Chloride 07/06/2018 102  99 - 112 mmol/L Final   • CO2 07/06/2018 28.5  24.3 - 31.9 mmol/L Final   • Calcium 07/06/2018 9.2  7.7 - 10.0 mg/dL Final   • Total Protein 07/06/2018 7.1  6.0 - 8.0 g/dL Final   • Albumin 07/06/2018 4.40  3.50 - 5.00 g/dL Final   • ALT (SGPT) 07/06/2018 22  10 - 44 U/L Final   • AST (SGOT) 07/06/2018 19  10 - 34 U/L Final   • Alkaline Phosphatase 07/06/2018 97  40 - 129 U/L Final   • Total Bilirubin 07/06/2018 0.6  0.2 - 1.8 mg/dL Final   • eGFR Non African Amer 07/06/2018 70  >60 mL/min/1.73 Final   • Globulin 07/06/2018 2.7  gm/dL Final   • A/G Ratio 07/06/2018 1.6  1.5 - 2.5 g/dL Final   • BUN/Creatinine Ratio 07/06/2018 13.8  7.0 - 25.0 Final "   • Anion Gap 07/06/2018 8.5  3.6 - 11.2 mmol/L Final   • TSH 07/06/2018 6.239* 0.550 - 4.780 mIU/mL Final   • Hemoglobin A1C 07/06/2018 7.40* 4.50 - 5.70 % Final   • Total Cholesterol 07/06/2018 189  0 - 200 mg/dL Final   • Triglycerides 07/06/2018 210* 0 - 150 mg/dL Final   • HDL Cholesterol 07/06/2018 41* 60 - 100 mg/dL Final   • LDL Cholesterol  07/06/2018 106* 0 - 100 mg/dL Final   • VLDL Cholesterol 07/06/2018 42  mg/dL Final   • LDL/HDL Ratio 07/06/2018 2.59   Final   • Vitamin B-12 07/06/2018 285  211 - 911 pg/mL Final   • WBC 07/06/2018 7.09  4.50 - 12.50 10*3/mm3 Final   • RBC 07/06/2018 4.76  4.70 - 6.10 10*6/mm3 Final   • Hemoglobin 07/06/2018 13.6* 14.0 - 18.0 g/dL Final   • Hematocrit 07/06/2018 41.2* 42.0 - 52.0 % Final   • MCV 07/06/2018 86.6  80.0 - 94.0 fL Final   • MCH 07/06/2018 28.6  27.0 - 33.0 pg Final   • MCHC 07/06/2018 33.0  33.0 - 37.0 g/dL Final   • RDW 07/06/2018 14.3  11.5 - 14.5 % Final   • RDW-SD 07/06/2018 44.0  37.0 - 54.0 fl Final   • MPV 07/06/2018 9.6  6.0 - 10.0 fL Final   • Platelets 07/06/2018 366  130 - 400 10*3/mm3 Final   • Neutrophil % 07/06/2018 45.2  30.0 - 70.0 % Final   • Lymphocyte % 07/06/2018 31.3  21.0 - 51.0 % Final   • Monocyte % 07/06/2018 15.1* 0.0 - 10.0 % Final   • Eosinophil % 07/06/2018 8.0* 0.0 - 5.0 % Final   • Basophil % 07/06/2018 0.3  0.0 - 2.0 % Final   • Immature Grans % 07/06/2018 0.1  0.0 - 0.5 % Final   • Neutrophils, Absolute 07/06/2018 3.20  1.40 - 6.50 10*3/mm3 Final   • Lymphocytes, Absolute 07/06/2018 2.22  1.00 - 3.00 10*3/mm3 Final   • Monocytes, Absolute 07/06/2018 1.07* 0.10 - 0.90 10*3/mm3 Final   • Eosinophils, Absolute 07/06/2018 0.57  0.00 - 0.70 10*3/mm3 Final   • Basophils, Absolute 07/06/2018 0.02  0.00 - 0.30 10*3/mm3 Final   • Immature Grans, Absolute 07/06/2018 0.01  0.00 - 0.03 10*3/mm3 Final   • Osmolality Calc 07/06/2018 280.2  273.0 - 305.0 mOsm/kg Final       Physical Exam   Constitutional: He is oriented to person, place,  and time. Vital signs are normal. He appears well-developed and well-nourished.   Pleasant and friendly    HENT:   Head: Normocephalic and atraumatic.   Right Ear: External ear normal. There is drainage.   Left Ear: External ear normal. There is drainage.   Nose: Nose normal.   Mouth/Throat: Oropharynx is clear and moist.   Bilateral ear canals impacted with cerumen, removed via provider with flexi-loop. Honey colored cerumen removed, tolerated well.      Eyes: EOM are normal. Pupils are equal, round, and reactive to light.   Neck: Normal range of motion. Neck supple. No tracheal deviation present. No thyromegaly present.   Cardiovascular: Normal rate, regular rhythm, normal heart sounds and intact distal pulses.  Exam reveals no gallop and no friction rub.    No murmur heard.  Pulmonary/Chest: Effort normal and breath sounds normal. No respiratory distress. He has no wheezes. He has no rales. He exhibits no tenderness.   Abdominal: Soft. Bowel sounds are normal. He exhibits no distension and no mass. There is no tenderness. There is no rebound and no guarding.   Musculoskeletal:        Right knee: He exhibits decreased range of motion.        Left knee: He exhibits decreased range of motion.        Cervical back: He exhibits decreased range of motion and tenderness.        Lumbar back: He exhibits tenderness, pain and spasm.   Decreased lumbar curvature, there is pain with forward flexion. DTR's +2. No edema.    Neurological: He is alert and oriented to person, place, and time. He has normal reflexes.   CN 2-12 grossly intact    Skin: Skin is warm and dry. No rash noted. No erythema. No pallor.   Psychiatric: He has a normal mood and affect. His speech is normal and behavior is normal. Judgment and thought content normal. His affect is not inappropriate. Cognition and memory are normal.   Nursing note and vitals reviewed.      Assessment/Plan     Problem List Items Addressed This Visit        Cardiovascular and  Mediastinum    Coronary artery disease involving native coronary artery of native heart    Relevant Medications    aspirin 81 MG tablet    carvedilol (COREG) 12.5 MG tablet    isosorbide mononitrate (IMDUR) 30 MG 24 hr tablet    Essential hypertension    Relevant Medications    carvedilol (COREG) 12.5 MG tablet    isosorbide mononitrate (IMDUR) 30 MG 24 hr tablet       Respiratory    Seasonal allergic rhinitis due to pollen    Relevant Medications    diphenhydrAMINE (BENADRYL ALLERGY) 25 mg capsule       Digestive    Vitamin D deficiency - Primary    Vitamin B 12 deficiency    Relevant Medications    cyanocobalamin 1000 MCG/ML injection    Vitamin D deficiency disease    Relevant Medications    vitamin D (ERGOCALCIFEROL) 85488 units capsule capsule    Gastroesophageal reflux disease without esophagitis    Relevant Medications    pantoprazole (PROTONIX) 40 MG EC tablet    raNITIdine (ZANTAC) 150 MG tablet       Endocrine    Uncontrolled type 2 diabetes mellitus without complication, without long-term current use of insulin (CMS/[facility]AC, located within [facility])    Relevant Medications    gabapentin (NEURONTIN) 100 MG capsule    glucose blood test strip    Lancets (ONETOUCH ULTRASOFT) lancets    metFORMIN (GLUCOPHAGE) 500 MG tablet    simvastatin (ZOCOR) 40 MG tablet       Nervous and Auditory    Excessive cerumen in both ear canals       Musculoskeletal and Integument    Rash and nonspecific skin eruption    Relevant Medications    ondansetron (ZOFRAN) 8 MG tablet    pantoprazole (PROTONIX) 40 MG EC tablet    raNITIdine (ZANTAC) 150 MG tablet       Genitourinary    Benign non-nodular prostatic hyperplasia without lower urinary tract symptoms    Relevant Medications    finasteride (PROSCAR) 5 MG tablet       Other    Generalized anxiety disorder    Relevant Medications    clonazePAM (KlonoPIN) 0.5 MG tablet    amitriptyline (ELAVIL) 50 MG tablet    citalopram (CeleXA) 40 MG tablet    Other Relevant Orders    Urine Drug Screen - Urine, Clean Catch     Major depressive disorder in partial remission (CMS/HCC)    Relevant Medications    amitriptyline (ELAVIL) 50 MG tablet    citalopram (CeleXA) 40 MG tablet    Abnormal thyroid blood test    Relevant Medications    isosorbide mononitrate (IMDUR) 30 MG 24 hr tablet        Current Outpatient Prescriptions:   •  amitriptyline (ELAVIL) 50 MG tablet, Take 2 tablets by mouth At Night As Needed for Sleep. 1 daily, Disp: 60 tablet, Rfl: 5  •  aspirin 81 MG tablet, Take 1 tablet by mouth Daily., Disp: 30 tablet, Rfl: 11  •  carvedilol (COREG) 12.5 MG tablet, Take 1 tablet by mouth 2 (Two) Times a Day With Meals., Disp: 60 tablet, Rfl: 5  •  citalopram (CeleXA) 40 MG tablet, Take 1 tablet by mouth Daily., Disp: 30 tablet, Rfl: 5  •  clonazePAM (KlonoPIN) 0.5 MG tablet, Take 1 tablet by mouth 3 (Three) Times a Day As Needed for Anxiety., Disp: 90 tablet, Rfl: 0  •  cyanocobalamin 1000 MCG/ML injection, Inject 1 mL into the shoulder, thigh, or buttocks Every 28 (Twenty-Eight) Days., Disp: 1 mL, Rfl: 11  •  cyclobenzaprine (FLEXERIL) 10 MG tablet, Take 1 tablet by mouth 3 (Three) Times a Day As Needed for Muscle Spasms., Disp: 12 tablet, Rfl: 0  •  diphenhydrAMINE (BENADRYL ALLERGY) 25 mg capsule, Take 1 capsule by mouth Every 6 (Six) Hours As Needed for Itching., Disp: 30 capsule, Rfl: 0  •  finasteride (PROSCAR) 5 MG tablet, Take 1 tablet by mouth Daily., Disp: 30 tablet, Rfl: 5  •  gabapentin (NEURONTIN) 100 MG capsule, Take 1 capsule by mouth 2 (Two) Times a Day. 1 daily, Disp: 60 capsule, Rfl: 2  •  glucose blood test strip, Check blood sugar 3x times., Disp: 100 each, Rfl: 12  •  glucose monitor monitoring kit, 1 each 3 (Three) Times a Day As Needed (diabetes)., Disp: 1 each, Rfl: 0  •  hydrocortisone 1 % cream, Apply  topically 2 (Two) Times a Day As Needed for Rash., Disp: 1 each, Rfl: 5  •  isosorbide mononitrate (IMDUR) 30 MG 24 hr tablet, Take 1 tablet by mouth 2 (Two) Times a Day., Disp: 60 tablet, Rfl: 5  •   Lancets (ONETOUCH ULTRASOFT) lancets, Use as instructed, Disp: 100 each, Rfl: 12  •  metFORMIN (GLUCOPHAGE) 500 MG tablet, Take 1 tablet by mouth 2 (Two) Times a Day With Meals., Disp: 60 tablet, Rfl: 5  •  ondansetron (ZOFRAN) 8 MG tablet, Take 1 tablet by mouth Every 8 (Eight) Hours As Needed for Nausea., Disp: 20 tablet, Rfl: 2  •  pantoprazole (PROTONIX) 40 MG EC tablet, Take 1 tablet by mouth Daily., Disp: 30 tablet, Rfl: 5  •  polyethylene glycol (MIRALAX) packet, Take 17 g by mouth Daily., Disp: 1 each, Rfl: 5  •  raNITIdine (ZANTAC) 150 MG tablet, Take 1 tablet by mouth 2 (Two) Times a Day., Disp: 60 tablet, Rfl: 5  •  simvastatin (ZOCOR) 40 MG tablet, Take 1 tablet by mouth Every Night., Disp: 30 tablet, Rfl: 5  •  Thyroid 30 MG PO tablet, Take 1 tablet by mouth Daily., Disp: 30 tablet, Rfl: 5  •  traMADol (ULTRAM) 50 MG tablet, Take 1 tablet by mouth Every 8 (Eight) Hours As Needed for Moderate Pain ., Disp: 90 tablet, Rfl: 2  •  vitamin D (ERGOCALCIFEROL) 29936 units capsule capsule, Take 1 capsule by mouth Every 7 (Seven) Days., Disp: 4 capsule, Rfl: 5    Current Facility-Administered Medications:   •  cyanocobalamin injection 1,000 mcg, 1,000 mcg, Intramuscular, Q28 Days, YOUNG Toscano, 1,000 mcg at 07/11/18 0843    Clonazepam 0.5 mg 1 by mouth every 8 hours when necessary #90 no refill.  Ultram 50 mg 1 by mouth every 8 hours when necessary #90 with 2 refills.  B-12 injection today for standing order.  Neurontin prescription provided today with 3 refills.      Danny has been reviewed as consistent.  Uds is on file.   Goal: pt will report improved anxiety symptoms.   Goal: Improved quality of life and reduction in pain as evidenced by pt report.   Proper body mechanics has been reviewed and discussed today.      Controlled substance agreement has been reviewed today.   July 6, 2018 labs reviewed and discussed.  Patient will work harder on diet and exercise to help bring his lipid  level down.    Patient's Body mass index is 32 kg/m². BMI is above normal parameters. Recommendations include: exercise counseling and nutrition counseling.    II have discussed diagnosis in detail today allowing time for questions and answers. Pt is aware of reasons to seek urgent or emergent medical care as well as reasons to return to the clinic for evaluation. Possible side effects, interactions and progression of symptoms discussed as well. Pt / family states understanding.   Emotional support and active listening provided.       Follow up in 2 weeks for joint injection of knees, xray from 2016 reviewed with pt today.    Routine visit in 3 months, sooner if needed.          Errors in dictation may reflect use of voice recognition software and not all errors in transcription may have been detected prior to signing.       This document has been electronically signed by:  YOUNG Conway, NP-C

## 2018-07-17 DIAGNOSIS — IMO0001 UNCONTROLLED TYPE 2 DIABETES MELLITUS WITHOUT COMPLICATION, WITHOUT LONG-TERM CURRENT USE OF INSULIN: ICD-10-CM

## 2018-07-17 RX ORDER — LANCETS
EACH MISCELLANEOUS
Qty: 100 EACH | Refills: 12 | Status: SHIPPED | OUTPATIENT
Start: 2018-07-17

## 2018-07-30 ENCOUNTER — PROCEDURE VISIT (OUTPATIENT)
Dept: FAMILY MEDICINE CLINIC | Facility: CLINIC | Age: 58
End: 2018-07-30

## 2018-07-30 VITALS
BODY MASS INDEX: 31.64 KG/M2 | WEIGHT: 221 LBS | HEIGHT: 70 IN | TEMPERATURE: 98.5 F | HEART RATE: 92 BPM | DIASTOLIC BLOOD PRESSURE: 70 MMHG | SYSTOLIC BLOOD PRESSURE: 100 MMHG | OXYGEN SATURATION: 96 %

## 2018-07-30 DIAGNOSIS — M25.561 CHRONIC PAIN OF BOTH KNEES: ICD-10-CM

## 2018-07-30 DIAGNOSIS — R05.9 COUGH: Primary | ICD-10-CM

## 2018-07-30 DIAGNOSIS — G89.29 CHRONIC PAIN OF BOTH KNEES: ICD-10-CM

## 2018-07-30 DIAGNOSIS — M25.562 CHRONIC PAIN OF BOTH KNEES: ICD-10-CM

## 2018-07-30 PROCEDURE — 20610 DRAIN/INJ JOINT/BURSA W/O US: CPT | Performed by: NURSE PRACTITIONER

## 2018-07-30 PROCEDURE — 99214 OFFICE O/P EST MOD 30 MIN: CPT | Performed by: NURSE PRACTITIONER

## 2018-07-30 RX ORDER — GUAIFENESIN 600 MG/1
1200 TABLET, EXTENDED RELEASE ORAL 2 TIMES DAILY PRN
Qty: 20 TABLET | Refills: 1 | Status: SHIPPED | OUTPATIENT
Start: 2018-07-30 | End: 2018-10-16

## 2018-07-30 NOTE — PROGRESS NOTES
Subjective   Damaso Leonard is a 57 y.o. male.     Chief Complaint   Patient presents with   • bilateral knee injection       History of Present Illness     Bilateral knee pain-osteoarthritis in both knees.  History of joint injections performed by orthopedic Department.  He has tried Rooster comb , injections.  He is requesting steroid injections today.  Describes his pain as aching grinding.  Worse with standing or weightbearing.  Rates this pain as 7 on pain scale rating 0-10.  Current medication helps mildly.      Productive cough present ×1 week.  No fever or chills.  Does feel mildly congested.        The following portions of the patient's history were reviewed and updated as appropriate: allergies, current medications, past family history, past medical history, past social history, past surgical history and problem list.    Review of Systems   Constitutional: Negative for appetite change, fatigue and unexpected weight change.   HENT: Negative for congestion, ear pain, nosebleeds, postnasal drip, rhinorrhea, sore throat, trouble swallowing and voice change.    Eyes: Negative for pain and visual disturbance.   Respiratory: Positive for cough. Negative for chest tightness, shortness of breath and wheezing.    Cardiovascular: Negative for chest pain and palpitations.   Gastrointestinal: Negative for abdominal pain, blood in stool, constipation and diarrhea.   Endocrine: Negative for cold intolerance and polydipsia.   Genitourinary: Negative for difficulty urinating, flank pain and hematuria.   Musculoskeletal: Positive for arthralgias, joint swelling and neck pain. Negative for back pain, gait problem, myalgias and neck stiffness.   Skin: Negative for color change and rash.   Allergic/Immunologic: Negative.    Neurological: Negative for syncope, numbness and headaches.   Hematological: Negative.    Psychiatric/Behavioral: Negative for dysphoric mood, self-injury, sleep disturbance and suicidal ideas.   All other  "systems reviewed and are negative.      Objective     /70   Pulse 92   Temp 98.5 °F (36.9 °C) (Temporal Artery )   Ht 177.8 cm (70\")   Wt 100 kg (221 lb)   SpO2 96%   BMI 31.71 kg/m²   Lab on 07/06/2018   Component Date Value Ref Range Status   • Glucose 07/06/2018 131* 70 - 110 mg/dL Final   • BUN 07/06/2018 15  7 - 21 mg/dL Final   • Creatinine 07/06/2018 1.09  0.43 - 1.29 mg/dL Final   • Sodium 07/06/2018 139  135 - 153 mmol/L Final   • Potassium 07/06/2018 4.3  3.5 - 5.3 mmol/L Final   • Chloride 07/06/2018 102  99 - 112 mmol/L Final   • CO2 07/06/2018 28.5  24.3 - 31.9 mmol/L Final   • Calcium 07/06/2018 9.2  7.7 - 10.0 mg/dL Final   • Total Protein 07/06/2018 7.1  6.0 - 8.0 g/dL Final   • Albumin 07/06/2018 4.40  3.50 - 5.00 g/dL Final   • ALT (SGPT) 07/06/2018 22  10 - 44 U/L Final   • AST (SGOT) 07/06/2018 19  10 - 34 U/L Final   • Alkaline Phosphatase 07/06/2018 97  40 - 129 U/L Final   • Total Bilirubin 07/06/2018 0.6  0.2 - 1.8 mg/dL Final   • eGFR Non African Amer 07/06/2018 70  >60 mL/min/1.73 Final   • Globulin 07/06/2018 2.7  gm/dL Final   • A/G Ratio 07/06/2018 1.6  1.5 - 2.5 g/dL Final   • BUN/Creatinine Ratio 07/06/2018 13.8  7.0 - 25.0 Final   • Anion Gap 07/06/2018 8.5  3.6 - 11.2 mmol/L Final   • TSH 07/06/2018 6.239* 0.550 - 4.780 mIU/mL Final   • Hemoglobin A1C 07/06/2018 7.40* 4.50 - 5.70 % Final   • Total Cholesterol 07/06/2018 189  0 - 200 mg/dL Final   • Triglycerides 07/06/2018 210* 0 - 150 mg/dL Final   • HDL Cholesterol 07/06/2018 41* 60 - 100 mg/dL Final   • LDL Cholesterol  07/06/2018 106* 0 - 100 mg/dL Final   • VLDL Cholesterol 07/06/2018 42  mg/dL Final   • LDL/HDL Ratio 07/06/2018 2.59   Final   • Vitamin B-12 07/06/2018 285  211 - 911 pg/mL Final   • WBC 07/06/2018 7.09  4.50 - 12.50 10*3/mm3 Final   • RBC 07/06/2018 4.76  4.70 - 6.10 10*6/mm3 Final   • Hemoglobin 07/06/2018 13.6* 14.0 - 18.0 g/dL Final   • Hematocrit 07/06/2018 41.2* 42.0 - 52.0 % Final   • MCV " 07/06/2018 86.6  80.0 - 94.0 fL Final   • MCH 07/06/2018 28.6  27.0 - 33.0 pg Final   • MCHC 07/06/2018 33.0  33.0 - 37.0 g/dL Final   • RDW 07/06/2018 14.3  11.5 - 14.5 % Final   • RDW-SD 07/06/2018 44.0  37.0 - 54.0 fl Final   • MPV 07/06/2018 9.6  6.0 - 10.0 fL Final   • Platelets 07/06/2018 366  130 - 400 10*3/mm3 Final   • Neutrophil % 07/06/2018 45.2  30.0 - 70.0 % Final   • Lymphocyte % 07/06/2018 31.3  21.0 - 51.0 % Final   • Monocyte % 07/06/2018 15.1* 0.0 - 10.0 % Final   • Eosinophil % 07/06/2018 8.0* 0.0 - 5.0 % Final   • Basophil % 07/06/2018 0.3  0.0 - 2.0 % Final   • Immature Grans % 07/06/2018 0.1  0.0 - 0.5 % Final   • Neutrophils, Absolute 07/06/2018 3.20  1.40 - 6.50 10*3/mm3 Final   • Lymphocytes, Absolute 07/06/2018 2.22  1.00 - 3.00 10*3/mm3 Final   • Monocytes, Absolute 07/06/2018 1.07* 0.10 - 0.90 10*3/mm3 Final   • Eosinophils, Absolute 07/06/2018 0.57  0.00 - 0.70 10*3/mm3 Final   • Basophils, Absolute 07/06/2018 0.02  0.00 - 0.30 10*3/mm3 Final   • Immature Grans, Absolute 07/06/2018 0.01  0.00 - 0.03 10*3/mm3 Final   • Osmolality Calc 07/06/2018 280.2  273.0 - 305.0 mOsm/kg Final       Physical Exam   Constitutional: He is oriented to person, place, and time. Vital signs are normal. He appears well-developed and well-nourished.   Pleasant and friendly    HENT:   Head: Normocephalic and atraumatic.   Right Ear: External ear normal.   Left Ear: External ear normal.   Nose: Nose normal.   Mouth/Throat: Oropharynx is clear and moist.   Eyes: Pupils are equal, round, and reactive to light. Conjunctivae and EOM are normal. Right eye exhibits no discharge. Left eye exhibits no discharge.   Neck: Normal range of motion. Neck supple. No tracheal deviation present. No thyromegaly present.   Cardiovascular: Normal rate, regular rhythm, normal heart sounds and intact distal pulses.  Exam reveals no gallop and no friction rub.    No murmur heard.  Pulmonary/Chest: Effort normal and breath sounds  normal. No respiratory distress. He has no wheezes. He has no rales. He exhibits no tenderness.   Abdominal: Soft. Bowel sounds are normal. He exhibits no distension and no mass. There is no tenderness. There is no rebound and no guarding.   Musculoskeletal:        Right knee: He exhibits decreased range of motion. He exhibits no swelling. No tenderness found.        Left knee: He exhibits decreased range of motion. Tenderness found. Lateral joint line tenderness noted.        Cervical back: He exhibits decreased range of motion and tenderness.        Lumbar back: He exhibits tenderness, pain and spasm.   Procedure: Large joint injection of both the right and left knees intra-articularly today  The procedure risks and benefits were explained to patient and patient gave verbal consent to have the procedure performed.  Indication:  Bilateral knee  osteoarthritis  Provider: YOUNG Conway   Description: The right and left knees  was prepped and draped in sterile fashion.  An injection was given into the joint intramuscularly using 3 cc of 1% lidocaine, 1 cc Decadron, and 1 cc of Kenalog administered to both knees.  Pressure was held and sterile dressings were placed.  No complications.   Assessment blood loss: Minimal  Patient tolerated the procedure well.     1% lidocaine 500 mg per 50 mL NDC #[phone]  Expiration 3-20 20  Kenalog 40 mg per 1 mL NDC # [id number] expiration December 2019  Decadron 4 mg per 1 mL NDC # [phone], expiration 9/19   Lymphadenopathy:     He has no cervical adenopathy.   Neurological: He is alert and oriented to person, place, and time. He has normal reflexes.   CN 2-12 grossly intact    Skin: Skin is warm and dry. No rash noted. No erythema. No pallor.   Psychiatric: He has a normal mood and affect. His speech is normal and behavior is normal. Judgment and thought content normal. His affect is not inappropriate. Cognition and memory are normal.   Nursing note and vitals  "reviewed.      Assessment/Plan     Problem List Items Addressed This Visit        Respiratory    Cough - Primary    Relevant Medications    guaiFENesin (MUCINEX) 600 MG 12 hr tablet       Musculoskeletal and Integument    Chronic pain of both knees    Overview     Osteoarthritis  Patient has been educated today regarding procedure.  We have discussed the risk versus benefits of this procedure including \"red man\" syndrome, infection, injury, discomfort and allergic reaction.  Patient is aware that this is not an all inclusive list of possible side effects.  We have discussed alternative treatments.  The patient verbalizes understanding of this information.  Their questions were sought and answered.  Patient verbalizes consent to proceed with procedure today.  I have discussed with the patient that full benefit of joint injection may not be felt for several weeks.  They may experience soreness but should not experience increased pain.  Remain mobile and perform range of motion to the affected joint often throughout the day but avoid overuse for the next several days.  Return to usual activity in one week.  Patient has been advised to call the office or return to the office with any questions.  We have also discussed reasons to seek urgent or emergent medical care.  Patient states understanding of all instructions.                          Patient's Body mass index is 31.71 kg/m². BMI is above normal parameters. Recommendations include: exercise counseling and nutrition counseling.    I have discussed diagnosis in detail today allowing time for questions and answers. Pt is aware of reasons to seek urgent or emergent medical care as well as reasons to return to the clinic for evaluation. Possible side effects, interactions and progression of symptoms discussed as well. Pt / family states understanding.   Emotional support and active listening provided.       RTC 2-5 days if not improved, sooner if condition " worsens/changes. Symptomatic care advised as well as reasons for urgent or emergent care. Pt / family state understanding.         This document has been electronically signed by:  YOUNG Conway, NP-C

## 2018-08-15 RX ORDER — LEVOTHYROXINE, LIOTHYRONINE 19; 4.5 UG/1; UG/1
TABLET ORAL
Qty: 30 TABLET | Refills: 5 | Status: SHIPPED | OUTPATIENT
Start: 2018-08-15 | End: 2019-01-16 | Stop reason: SDUPTHER

## 2018-10-16 ENCOUNTER — OFFICE VISIT (OUTPATIENT)
Dept: FAMILY MEDICINE CLINIC | Facility: CLINIC | Age: 58
End: 2018-10-16

## 2018-10-16 VITALS
SYSTOLIC BLOOD PRESSURE: 120 MMHG | DIASTOLIC BLOOD PRESSURE: 80 MMHG | WEIGHT: 217 LBS | BODY MASS INDEX: 31.07 KG/M2 | OXYGEN SATURATION: 97 % | HEIGHT: 70 IN | HEART RATE: 89 BPM | TEMPERATURE: 97.6 F

## 2018-10-16 DIAGNOSIS — F32.4 MAJOR DEPRESSIVE DISORDER WITH SINGLE EPISODE, IN PARTIAL REMISSION (HCC): ICD-10-CM

## 2018-10-16 DIAGNOSIS — Z79.4 TYPE 2 DIABETES MELLITUS WITH OTHER CIRCULATORY COMPLICATION, WITH LONG-TERM CURRENT USE OF INSULIN (HCC): ICD-10-CM

## 2018-10-16 DIAGNOSIS — J01.00 ACUTE NON-RECURRENT MAXILLARY SINUSITIS: ICD-10-CM

## 2018-10-16 DIAGNOSIS — I25.10 CORONARY ARTERY DISEASE INVOLVING NATIVE CORONARY ARTERY OF NATIVE HEART WITHOUT ANGINA PECTORIS: ICD-10-CM

## 2018-10-16 DIAGNOSIS — K21.9 GASTROESOPHAGEAL REFLUX DISEASE WITHOUT ESOPHAGITIS: ICD-10-CM

## 2018-10-16 DIAGNOSIS — E11.59 TYPE 2 DIABETES MELLITUS WITH OTHER CIRCULATORY COMPLICATION, WITH LONG-TERM CURRENT USE OF INSULIN (HCC): ICD-10-CM

## 2018-10-16 DIAGNOSIS — E78.00 HYPERCHOLESTEROLEMIA: ICD-10-CM

## 2018-10-16 DIAGNOSIS — H61.23 EXCESSIVE CERUMEN IN BOTH EAR CANALS: ICD-10-CM

## 2018-10-16 DIAGNOSIS — G47.33 OBSTRUCTIVE SLEEP APNEA: ICD-10-CM

## 2018-10-16 DIAGNOSIS — F41.1 GENERALIZED ANXIETY DISORDER: ICD-10-CM

## 2018-10-16 DIAGNOSIS — M54.16 LUMBAR BACK PAIN WITH RADICULOPATHY AFFECTING LEFT LOWER EXTREMITY: Primary | ICD-10-CM

## 2018-10-16 DIAGNOSIS — I10 ESSENTIAL HYPERTENSION: ICD-10-CM

## 2018-10-16 DIAGNOSIS — Z79.899 HIGH RISK MEDICATION USE: ICD-10-CM

## 2018-10-16 PROCEDURE — 80053 COMPREHEN METABOLIC PANEL: CPT | Performed by: NURSE PRACTITIONER

## 2018-10-16 PROCEDURE — 69210 REMOVE IMPACTED EAR WAX UNI: CPT | Performed by: NURSE PRACTITIONER

## 2018-10-16 PROCEDURE — 99214 OFFICE O/P EST MOD 30 MIN: CPT | Performed by: NURSE PRACTITIONER

## 2018-10-16 PROCEDURE — 96372 THER/PROPH/DIAG INJ SC/IM: CPT | Performed by: NURSE PRACTITIONER

## 2018-10-16 PROCEDURE — 85025 COMPLETE CBC W/AUTO DIFF WBC: CPT | Performed by: NURSE PRACTITIONER

## 2018-10-16 PROCEDURE — 80061 LIPID PANEL: CPT | Performed by: NURSE PRACTITIONER

## 2018-10-16 PROCEDURE — 82607 VITAMIN B-12: CPT | Performed by: NURSE PRACTITIONER

## 2018-10-16 PROCEDURE — 99396 PREV VISIT EST AGE 40-64: CPT | Performed by: NURSE PRACTITIONER

## 2018-10-16 PROCEDURE — 83036 HEMOGLOBIN GLYCOSYLATED A1C: CPT | Performed by: NURSE PRACTITIONER

## 2018-10-16 PROCEDURE — 84443 ASSAY THYROID STIM HORMONE: CPT | Performed by: NURSE PRACTITIONER

## 2018-10-16 PROCEDURE — 82306 VITAMIN D 25 HYDROXY: CPT | Performed by: NURSE PRACTITIONER

## 2018-10-16 RX ORDER — CLONAZEPAM 0.5 MG/1
0.5 TABLET ORAL 3 TIMES DAILY PRN
Qty: 90 TABLET | Refills: 0 | Status: SHIPPED | OUTPATIENT
Start: 2018-10-16 | End: 2019-02-05 | Stop reason: SDUPTHER

## 2018-10-16 RX ORDER — CEFTRIAXONE 1 G/1
1 INJECTION, POWDER, FOR SOLUTION INTRAMUSCULAR; INTRAVENOUS ONCE
Status: COMPLETED | OUTPATIENT
Start: 2018-10-16 | End: 2018-10-16

## 2018-10-16 RX ORDER — TRAMADOL HYDROCHLORIDE 50 MG/1
50 TABLET ORAL EVERY 8 HOURS PRN
Qty: 90 TABLET | Refills: 2 | Status: SHIPPED | OUTPATIENT
Start: 2018-10-16 | End: 2019-01-16 | Stop reason: SDUPTHER

## 2018-10-16 RX ADMIN — CEFTRIAXONE 1 G: 1 INJECTION, POWDER, FOR SOLUTION INTRAMUSCULAR; INTRAVENOUS at 16:10

## 2018-10-16 NOTE — PROGRESS NOTES
Subjective   Damaso Leonard is a 58 y.o. male.     Chief Complaint   Patient presents with   • URI     annual physical       History of Present Illness        Here for routine annual exam with acute complaint of uti symptoms and for lab result review.     Cold/allergy symptoms - patient reports cold-like symptoms for the past few days.  See the review of systems for further history of present illness.    DM type 2 - not controlled on current medication regimen. Some episodes of hyperglycemia. Not really been watching his diet as closely as he should. Has been on vacation recently which interfered with his diet. Has routine eye exams. Does foot exams. Is taking low dose aspirin, statin. Fasting labs this morning which are available for review today.      BECKY - improved . No thoughts of hurting self of others. Controlled with Klonopin.    Lumbar back pain with radiculopathy affecting the left lower limb. Pt receives ultram which is helpful with his pain. He has attended physical therapy with some improvement. He describes his pain as sharp and stinging in the low back radiating into the left lower extremity. Activity increases the pain. Rest helps very little. He does rate this pain as 7 on psr prior to taking his medication each morning and 2 on psr with his current medication regimen. He has no history of substance addiction or abuse. Radiology is on file .    gerd - stable with ppi    Hyperlipidemia - currently on statin    Sleep apnea - stable     coronary artery disease involving native artery of native heart - under the care of cardiology. Current medication regimen is controlling his symptoms.     Thyroid disorder - labs to review today. Can not tolerate synthroid due to extreme stomach pain.       The following portions of the patient's history were reviewed and updated as appropriate: allergies, current medications, past family history, past medical history, past social history, past surgical history and  problem list.    Review of Systems   Constitutional: Positive for appetite change and fatigue. Negative for unexpected weight change.   HENT: Positive for ear discharge. Negative for congestion, ear pain, nosebleeds, postnasal drip, rhinorrhea, sinus pain, sinus pressure, sore throat, trouble swallowing and voice change.    Eyes: Negative for pain and visual disturbance.   Respiratory: Positive for cough. Negative for chest tightness, shortness of breath and wheezing.    Cardiovascular: Negative for chest pain, palpitations and leg swelling.   Gastrointestinal: Positive for abdominal pain, constipation (improved ) and nausea. Negative for blood in stool and diarrhea.   Endocrine: Negative for cold intolerance and polydipsia.   Genitourinary: Negative for difficulty urinating, dysuria, flank pain and hematuria.   Musculoskeletal: Positive for arthralgias and back pain. Negative for gait problem, joint swelling and myalgias.   Skin: Negative for color change and rash.   Allergic/Immunologic: Negative.    Neurological: Negative for syncope, numbness and headaches.   Hematological: Negative.    Psychiatric/Behavioral: Negative for decreased concentration, sleep disturbance and suicidal ideas.   All other systems reviewed and are negative.     Visual Acuity Screening    Right eye Left eye Both eyes   Without correction:      With correction: 20/20 20/20 20/20     Fall Risk Assessment  Fallen in past 6 months: 0--> No  Mental Status: 1--> confused  Mobility: 0--> No mobility issues  Medications: 1--> Narcotics  Total Fall Risk Score: 2    PHQ-9 Depression Screening  Little interest or pleasure in doing things? 2   Feeling down, depressed, or hopeless? 1   Trouble falling or staying asleep, or sleeping too much? 3   Feeling tired or having little energy? 3   Poor appetite or overeating? 2   Feeling bad about yourself - or that you are a failure or have let yourself or your family down? 0   Trouble concentrating on things,  "such as reading the newspaper or watching television? 1   Moving or speaking so slowly that other people could have noticed? Or the opposite - being so fidgety or restless that you have been moving around a lot more than usual? 2   Thoughts that you would be better off dead, or of hurting yourself in some way? 0   PHQ-9 Total Score 14   If you checked off any problems, how difficult have these problems made it for you to do your work, take care of things at home, or get along with other people? Somewhat difficult     Objective     /80   Pulse 89   Temp 97.6 °F (36.4 °C) (Tympanic)   Ht 177.8 cm (70\")   Wt 98.4 kg (217 lb)   SpO2 97%   BMI 31.14 kg/m²   Lab on 10/01/2018   Component Date Value Ref Range Status   • Glucose 10/16/2018 111* 70 - 110 mg/dL Final   • BUN 10/16/2018 13  7 - 21 mg/dL Final   • Creatinine 10/16/2018 0.97  0.43 - 1.29 mg/dL Final   • Sodium 10/16/2018 141  135 - 153 mmol/L Final   • Potassium 10/16/2018 3.8  3.5 - 5.3 mmol/L Final   • Chloride 10/16/2018 105  99 - 112 mmol/L Final   • CO2 10/16/2018 25.3  24.3 - 31.9 mmol/L Final   • Calcium 10/16/2018 9.0  7.7 - 10.0 mg/dL Final   • Total Protein 10/16/2018 7.0  6.0 - 8.0 g/dL Final   • Albumin 10/16/2018 4.30  3.50 - 5.00 g/dL Final   • ALT (SGPT) 10/16/2018 21  10 - 44 U/L Final   • AST (SGOT) 10/16/2018 13  10 - 34 U/L Final   • Alkaline Phosphatase 10/16/2018 99  40 - 129 U/L Final   • Total Bilirubin 10/16/2018 0.6  0.2 - 1.8 mg/dL Final   • eGFR Non African Amer 10/16/2018 80  >60 mL/min/1.73 Final   • Globulin 10/16/2018 2.7  gm/dL Final   • A/G Ratio 10/16/2018 1.6  1.5 - 2.5 g/dL Final   • BUN/Creatinine Ratio 10/16/2018 13.4  7.0 - 25.0 Final   • Anion Gap 10/16/2018 10.7  3.6 - 11.2 mmol/L Final   • TSH 10/16/2018 7.018* 0.550 - 4.780 mIU/mL Final   • Hemoglobin A1C 10/16/2018 8.00* 4.50 - 5.70 % Final   • Total Cholesterol 10/16/2018 153  0 - 200 mg/dL Final   • Triglycerides 10/16/2018 132  0 - 150 mg/dL Final   • " HDL Cholesterol 10/16/2018 39* 60 - 100 mg/dL Final   • LDL Cholesterol  10/16/2018 88  0 - 100 mg/dL Final   • VLDL Cholesterol 10/16/2018 26.4  mg/dL Final   • LDL/HDL Ratio 10/16/2018 2.25   Final   • Vitamin B-12 10/16/2018 375  211 - 911 pg/mL Final   • 25 Hydroxy, Vitamin D 10/16/2018 101.0  ng/ml Final   • WBC 10/16/2018 7.51  4.50 - 12.50 10*3/mm3 Final   • RBC 10/16/2018 4.74  4.70 - 6.10 10*6/mm3 Final   • Hemoglobin 10/16/2018 13.5* 14.0 - 18.0 g/dL Final   • Hematocrit 10/16/2018 40.9* 42.0 - 52.0 % Final   • MCV 10/16/2018 86.3  80.0 - 94.0 fL Final   • MCH 10/16/2018 28.5  27.0 - 33.0 pg Final   • MCHC 10/16/2018 33.0  33.0 - 37.0 g/dL Final   • RDW 10/16/2018 14.0  11.5 - 14.5 % Final   • RDW-SD 10/16/2018 43.4  37.0 - 54.0 fl Final   • MPV 10/16/2018 10.1* 6.0 - 10.0 fL Final   • Platelets 10/16/2018 346  130 - 400 10*3/mm3 Final   • Neutrophil % 10/16/2018 51.8  30.0 - 70.0 % Final   • Lymphocyte % 10/16/2018 32.8  21.0 - 51.0 % Final   • Monocyte % 10/16/2018 12.0* 0.0 - 10.0 % Final   • Eosinophil % 10/16/2018 3.2  0.0 - 5.0 % Final   • Basophil % 10/16/2018 0.1  0.0 - 2.0 % Final   • Immature Grans % 10/16/2018 0.1  0.0 - 0.5 % Final   • Neutrophils, Absolute 10/16/2018 3.89  1.40 - 6.50 10*3/mm3 Final   • Lymphocytes, Absolute 10/16/2018 2.46  1.00 - 3.00 10*3/mm3 Final   • Monocytes, Absolute 10/16/2018 0.90  0.10 - 0.90 10*3/mm3 Final   • Eosinophils, Absolute 10/16/2018 0.24  0.00 - 0.70 10*3/mm3 Final   • Basophils, Absolute 10/16/2018 0.01  0.00 - 0.30 10*3/mm3 Final   • Immature Grans, Absolute 10/16/2018 0.01  0.00 - 0.03 10*3/mm3 Final   • Osmolality Calc 10/16/2018 282.1  273.0 - 305.0 mOsm/kg Final       Physical Exam   Constitutional: He is oriented to person, place, and time. Vital signs are normal. He appears well-developed and well-nourished. No distress.   Pleasant and friendly    HENT:   Head: Normocephalic and atraumatic.   Right Ear: External ear normal. There is drainage.    Left Ear: External ear normal. There is drainage.   Nose: Nose normal.   Mouth/Throat: Oropharyngeal exudate present.   Bilateral ear canals impacted with cerumen, removed via provider with flexi-loop. Honey colored cerumen removed, tolerated well.      Eyes: Pupils are equal, round, and reactive to light. EOM are normal. Right eye exhibits no discharge. Left eye exhibits no discharge.   Neck: Normal range of motion. Neck supple. No tracheal deviation present. No thyromegaly present.   Cardiovascular: Normal rate, regular rhythm, normal heart sounds and intact distal pulses.  Exam reveals no gallop and no friction rub.    No murmur heard.  Pulmonary/Chest: Effort normal and breath sounds normal. No respiratory distress. He has no wheezes. He has no rales. He exhibits no tenderness.   Abdominal: Soft. Bowel sounds are normal. He exhibits no distension and no mass. There is no tenderness. There is no rebound and no guarding.   Musculoskeletal:        Right knee: He exhibits decreased range of motion.        Left knee: He exhibits decreased range of motion.        Cervical back: He exhibits decreased range of motion and tenderness.        Lumbar back: He exhibits tenderness, pain and spasm.   Decreased lumbar curvature, there is pain with forward flexion. DTR's +2. No edema.    Neurological: He is alert and oriented to person, place, and time. He has normal reflexes.   CN 2-12 grossly intact    Skin: Skin is warm and dry. Capillary refill takes less than 2 seconds. No rash noted. He is not diaphoretic. No erythema. No pallor.   Psychiatric: He has a normal mood and affect. His speech is normal and behavior is normal. Judgment and thought content normal. His affect is not inappropriate. Cognition and memory are normal.   Nursing note and vitals reviewed.      Assessment/Plan     Problem List Items Addressed This Visit        Cardiovascular and Mediastinum    Coronary artery disease involving native coronary artery of  native heart    Hypercholesterolemia    Essential hypertension    Type 2 diabetes mellitus with circulatory disorder, with long-term current use of insulin (CMS/[facility])    Relevant Medications    exenatide er (BYDUREON BCISE) 2 MG/0.85ML auto-injector injection       Respiratory    Obstructive sleep apnea    Overview     cpap at night             Digestive    Gastroesophageal reflux disease without esophagitis       Nervous and Auditory    Lumbar back pain with radiculopathy affecting left lower extremity - Primary    Excessive cerumen in both ear canals       Other    Generalized anxiety disorder    Relevant Medications    clonazePAM (KlonoPIN) 0.5 MG tablet    Major depressive disorder in partial remission (CMS/HCC)    High risk medication use    Relevant Orders    Urine Drug Screen - Urine, Clean Catch      Other Visit Diagnoses     Acute non-recurrent maxillary sinusitis        Relevant Medications    cefTRIAXone (ROCEPHIN) injection 1 g (Completed)        Add bydureon weekly. Pt will come in tomorrow for instruction and demonstration of use and start bydureon at that visit.   Pt has been educated/instructed on diabetic care and protocols.  Diabetic diet instructions provided.  Medication regimen reviewed.  Discussed possible side effects and interactions of current medications.  Sick day rules reviewed. Continue to monitor blood sugar and report abnormal readings as discussed today. Understanding stated by patient.              Patient's Body mass index is 31.14 kg/m². BMI is above normal parameters. Recommendations include: exercise counseling and nutrition counseling.      Current Outpatient Prescriptions:   •  amitriptyline (ELAVIL) 50 MG tablet, Take 2 tablets by mouth At Night As Needed for Sleep. 1 daily, Disp: 60 tablet, Rfl: 5  •  aspirin 81 MG tablet, Take 1 tablet by mouth Daily., Disp: 30 tablet, Rfl: 11  •  carvedilol (COREG) 12.5 MG tablet, Take 1 tablet by mouth 2 (Two) Times a Day With Meals., Disp:  60 tablet, Rfl: 5  •  citalopram (CeleXA) 40 MG tablet, Take 1 tablet by mouth Daily., Disp: 30 tablet, Rfl: 5  •  clonazePAM (KlonoPIN) 0.5 MG tablet, Take 1 tablet by mouth 3 (Three) Times a Day As Needed for Anxiety., Disp: 90 tablet, Rfl: 0  •  cyanocobalamin 1000 MCG/ML injection, Inject 1 mL into the shoulder, thigh, or buttocks Every 28 (Twenty-Eight) Days., Disp: 1 mL, Rfl: 11  •  diphenhydrAMINE (BENADRYL ALLERGY) 25 mg capsule, Take 1 capsule by mouth Every 6 (Six) Hours As Needed for Itching., Disp: 30 capsule, Rfl: 0  •  finasteride (PROSCAR) 5 MG tablet, Take 1 tablet by mouth Daily., Disp: 30 tablet, Rfl: 5  •  gabapentin (NEURONTIN) 100 MG capsule, Take 1 capsule by mouth 2 (Two) Times a Day. 1 daily, Disp: 60 capsule, Rfl: 2  •  glucose blood test strip, Check blood sugar 3x times., Disp: 100 each, Rfl: 12  •  glucose monitor monitoring kit, 1 each 3 (Three) Times a Day As Needed (diabetes)., Disp: 1 each, Rfl: 0  •  hydrocortisone 1 % cream, Apply  topically 2 (Two) Times a Day As Needed for Rash., Disp: 1 each, Rfl: 5  •  isosorbide mononitrate (IMDUR) 30 MG 24 hr tablet, Take 1 tablet by mouth 2 (Two) Times a Day., Disp: 60 tablet, Rfl: 5  •  Lancets (ONETOUCH ULTRASOFT) lancets, Check blood sugar 3 times daily, Disp: 100 each, Rfl: 12  •  metFORMIN (GLUCOPHAGE) 500 MG tablet, Take 1 tablet by mouth 2 (Two) Times a Day With Meals., Disp: 60 tablet, Rfl: 5  •  NP THYROID 30 MG tablet, TAKE ONE TABLET BY MOUTH ONCE DAILY, Disp: 30 tablet, Rfl: 5  •  ondansetron (ZOFRAN) 8 MG tablet, Take 1 tablet by mouth Every 8 (Eight) Hours As Needed for Nausea., Disp: 20 tablet, Rfl: 2  •  pantoprazole (PROTONIX) 40 MG EC tablet, Take 1 tablet by mouth Daily., Disp: 30 tablet, Rfl: 5  •  polyethylene glycol (MIRALAX) packet, Take 17 g by mouth Daily., Disp: 1 each, Rfl: 5  •  raNITIdine (ZANTAC) 150 MG tablet, Take 1 tablet by mouth 2 (Two) Times a Day., Disp: 60 tablet, Rfl: 5  •  simvastatin (ZOCOR) 40 MG  tablet, Take 1 tablet by mouth Every Night., Disp: 30 tablet, Rfl: 5  •  traMADol (ULTRAM) 50 MG tablet, Take 1 tablet by mouth Every 8 (Eight) Hours As Needed for Moderate Pain ., Disp: 90 tablet, Rfl: 2  •  vitamin D (ERGOCALCIFEROL) 02980 units capsule capsule, Take 1 capsule by mouth Every 7 (Seven) Days., Disp: 4 capsule, Rfl: 5  •  exenatide er (BYDUREON BCISE) 2 MG/0.85ML auto-injector injection, Inject 0.85 mL under the skin into the appropriate area as directed 1 (One) Time Per Week., Disp: 4 pen, Rfl: 5    Current Facility-Administered Medications:   •  cyanocobalamin injection 1,000 mcg, 1,000 mcg, Intramuscular, Q28 Days, Clare Quintero APRN, 1,000 mcg at 07/11/18 0843    Refill and review routine medication.   Danny has been reviewed as consistent.  Uds is on file.   Goal: pt will report improved anxiety symptoms.   Goal: Improved quality of life and reduction in pain as evidenced by pt report.   Proper body mechanics has been reviewed and discussed today.    Fluids, rest, symptomatic treatment advised. Steam therapy. Dispose of tooth bush after 24 hours of starting antibiotics if ordered. Complete antibiotics as ordered.  Discussed possible side effects/interactions of medication. Discussed symptoms to report as well as reasons to seek urgent or emergent medical attention. Understanding stated.   Recommend follow up in 2-3 days if not improved, sooner if needed.     As part of this patient's treatment plan they are being prescribed controlled substance/substances with potential for abuse. This patient has been made aware of the appropriate use of such medications, including potential risk of somnolence, limited ability to drive and / or work safely, and potential for overdose. It has also been made clear that these medications are for use by this patient only, without concomitant use of alcohol or other substances unless prescribed/advised by medical provider. Patient has completed  controlled substance agreement detailing terms of continued prescribing of controlled substances including monitoring Danny reports, drug screens and pill counts. The patient was asked and states they are not receiving narcotic medication from any there provider or any provider that this office has not been made aware of. History and physical exam exhibit continued safe and appropriate use of controlled substances.       I have discussed diagnosis in detail today allowing time for questions and answers. Pt is aware of reasons to seek urgent or emergent medical care as well as reasons to return to the clinic for evaluation. Possible side effects, interactions and progression of symptoms discussed as well. Pt / family states understanding.   Emotional support and active listening provided.     Labs reviewed and discussed with patient from today's fasting labs.   .   Medication adjustments made.      Counseling/anticipatory guidance: Nutrition, family planning/contraception, physical activity, healthy weight, injury prevention, misuse of tobacco, alcohol and drugs, sexual behavior and STDs, dental health, mental health, immunizations and sex/age appropriate screenings.     Labs/ diagnostic: Cholesterol, diabetes, colorectal cancer screening beginning at age 50 years.       Errors in dictation may reflect use of voice recognition software and not all errors in transcription may have been detected prior to signing.       This document has been electronically signed by:  YOUNG Conway, NP-C

## 2018-10-17 ENCOUNTER — OFFICE VISIT (OUTPATIENT)
Dept: FAMILY MEDICINE CLINIC | Facility: CLINIC | Age: 58
End: 2018-10-17

## 2018-10-17 VITALS
BODY MASS INDEX: 31.07 KG/M2 | HEART RATE: 52 BPM | HEIGHT: 70 IN | TEMPERATURE: 97.5 F | DIASTOLIC BLOOD PRESSURE: 80 MMHG | OXYGEN SATURATION: 95 % | WEIGHT: 217 LBS | SYSTOLIC BLOOD PRESSURE: 120 MMHG

## 2018-10-17 DIAGNOSIS — E55.9 VITAMIN D DEFICIENCY: ICD-10-CM

## 2018-10-17 DIAGNOSIS — I11.9: ICD-10-CM

## 2018-10-17 DIAGNOSIS — E53.8 VITAMIN B 12 DEFICIENCY: ICD-10-CM

## 2018-10-17 DIAGNOSIS — E11.51 TYPE 2 DIABETES MELLITUS WITH DIABETIC PERIPHERAL ANGIOPATHY WITHOUT GANGRENE, WITHOUT LONG-TERM CURRENT USE OF INSULIN (HCC): Primary | ICD-10-CM

## 2018-10-17 DIAGNOSIS — Z79.899 HIGH RISK MEDICATION USE: ICD-10-CM

## 2018-10-17 PROBLEM — E11.59 TYPE 2 DIABETES MELLITUS WITH CIRCULATORY DISORDER, WITH LONG-TERM CURRENT USE OF INSULIN: Status: RESOLVED | Noted: 2018-01-29 | Resolved: 2018-10-17

## 2018-10-17 PROBLEM — Z79.4 TYPE 2 DIABETES MELLITUS WITH CIRCULATORY DISORDER, WITH LONG-TERM CURRENT USE OF INSULIN: Status: RESOLVED | Noted: 2018-01-29 | Resolved: 2018-10-17

## 2018-10-17 PROCEDURE — 99214 OFFICE O/P EST MOD 30 MIN: CPT | Performed by: NURSE PRACTITIONER

## 2018-10-17 NOTE — PROGRESS NOTES
"Subjective   Damaso Leonard is a 58 y.o. male.     No chief complaint on file.    Learn to administer self injections     History of Present Illness     New medication of Bydureon ordered yesterday. Pt is here for medication eduction and instruction on administration. He has obtained his medication and supplies from the pharmacy.     Most recent A1c 8.0  Fasting glucose yesterday 111    Comorbid conditions include cardiovascular disease, hyperlipidemia, hypertension, GERD and joint pain.      The following portions of the patient's history were reviewed and updated as appropriate: allergies, current medications, past family history, past medical history, past social history, past surgical history and problem list.    Review of Systems   Constitutional: Negative for appetite change, fatigue and unexpected weight change.   HENT: Negative for congestion, ear pain, nosebleeds, postnasal drip, rhinorrhea, sore throat, trouble swallowing and voice change.    Eyes: Negative for pain and visual disturbance.   Respiratory: Negative for cough, shortness of breath and wheezing.    Cardiovascular: Negative for chest pain and palpitations.   Gastrointestinal: Negative for abdominal pain, blood in stool, constipation and diarrhea.   Endocrine: Negative for cold intolerance and polydipsia.   Genitourinary: Negative for difficulty urinating, flank pain and hematuria.   Musculoskeletal: Positive for arthralgias and back pain. Negative for gait problem, joint swelling and myalgias.   Skin: Negative for color change and rash.   Allergic/Immunologic: Negative.    Neurological: Negative for syncope, numbness and headaches.   Hematological: Negative.    Psychiatric/Behavioral: Negative for sleep disturbance and suicidal ideas.   All other systems reviewed and are negative.      Objective     /80   Pulse 52   Temp 97.5 °F (36.4 °C) (Tympanic)   Ht 177.8 cm (70\")   Wt 98.4 kg (217 lb)   SpO2 95%   BMI 31.14 kg/m²   Lab on " 10/01/2018   Component Date Value Ref Range Status   • Glucose 10/16/2018 111* 70 - 110 mg/dL Final   • BUN 10/16/2018 13  7 - 21 mg/dL Final   • Creatinine 10/16/2018 0.97  0.43 - 1.29 mg/dL Final   • Sodium 10/16/2018 141  135 - 153 mmol/L Final   • Potassium 10/16/2018 3.8  3.5 - 5.3 mmol/L Final   • Chloride 10/16/2018 105  99 - 112 mmol/L Final   • CO2 10/16/2018 25.3  24.3 - 31.9 mmol/L Final   • Calcium 10/16/2018 9.0  7.7 - 10.0 mg/dL Final   • Total Protein 10/16/2018 7.0  6.0 - 8.0 g/dL Final   • Albumin 10/16/2018 4.30  3.50 - 5.00 g/dL Final   • ALT (SGPT) 10/16/2018 21  10 - 44 U/L Final   • AST (SGOT) 10/16/2018 13  10 - 34 U/L Final   • Alkaline Phosphatase 10/16/2018 99  40 - 129 U/L Final   • Total Bilirubin 10/16/2018 0.6  0.2 - 1.8 mg/dL Final   • eGFR Non African Amer 10/16/2018 80  >60 mL/min/1.73 Final   • Globulin 10/16/2018 2.7  gm/dL Final   • A/G Ratio 10/16/2018 1.6  1.5 - 2.5 g/dL Final   • BUN/Creatinine Ratio 10/16/2018 13.4  7.0 - 25.0 Final   • Anion Gap 10/16/2018 10.7  3.6 - 11.2 mmol/L Final   • TSH 10/16/2018 7.018* 0.550 - 4.780 mIU/mL Final   • Hemoglobin A1C 10/16/2018 8.00* 4.50 - 5.70 % Final   • Total Cholesterol 10/16/2018 153  0 - 200 mg/dL Final   • Triglycerides 10/16/2018 132  0 - 150 mg/dL Final   • HDL Cholesterol 10/16/2018 39* 60 - 100 mg/dL Final   • LDL Cholesterol  10/16/2018 88  0 - 100 mg/dL Final   • VLDL Cholesterol 10/16/2018 26.4  mg/dL Final   • LDL/HDL Ratio 10/16/2018 2.25   Final   • Vitamin B-12 10/16/2018 375  211 - 911 pg/mL Final   • 25 Hydroxy, Vitamin D 10/16/2018 101.0  ng/ml Final   • WBC 10/16/2018 7.51  4.50 - 12.50 10*3/mm3 Final   • RBC 10/16/2018 4.74  4.70 - 6.10 10*6/mm3 Final   • Hemoglobin 10/16/2018 13.5* 14.0 - 18.0 g/dL Final   • Hematocrit 10/16/2018 40.9* 42.0 - 52.0 % Final   • MCV 10/16/2018 86.3  80.0 - 94.0 fL Final   • MCH 10/16/2018 28.5  27.0 - 33.0 pg Final   • MCHC 10/16/2018 33.0  33.0 - 37.0 g/dL Final   • RDW 10/16/2018  14.0  11.5 - 14.5 % Final   • RDW-SD 10/16/2018 43.4  37.0 - 54.0 fl Final   • MPV 10/16/2018 10.1* 6.0 - 10.0 fL Final   • Platelets 10/16/2018 346  130 - 400 10*3/mm3 Final   • Neutrophil % 10/16/2018 51.8  30.0 - 70.0 % Final   • Lymphocyte % 10/16/2018 32.8  21.0 - 51.0 % Final   • Monocyte % 10/16/2018 12.0* 0.0 - 10.0 % Final   • Eosinophil % 10/16/2018 3.2  0.0 - 5.0 % Final   • Basophil % 10/16/2018 0.1  0.0 - 2.0 % Final   • Immature Grans % 10/16/2018 0.1  0.0 - 0.5 % Final   • Neutrophils, Absolute 10/16/2018 3.89  1.40 - 6.50 10*3/mm3 Final   • Lymphocytes, Absolute 10/16/2018 2.46  1.00 - 3.00 10*3/mm3 Final   • Monocytes, Absolute 10/16/2018 0.90  0.10 - 0.90 10*3/mm3 Final   • Eosinophils, Absolute 10/16/2018 0.24  0.00 - 0.70 10*3/mm3 Final   • Basophils, Absolute 10/16/2018 0.01  0.00 - 0.30 10*3/mm3 Final   • Immature Grans, Absolute 10/16/2018 0.01  0.00 - 0.03 10*3/mm3 Final   • Osmolality Calc 10/16/2018 282.1  273.0 - 305.0 mOsm/kg Final       Physical Exam   Constitutional: He is oriented to person, place, and time. Vital signs are normal. He appears well-developed and well-nourished. No distress.   Pleasant and friendly.   Reviewed possible side effects of Bydureon. Demonstration of administration provided with pt able to demonstrate use as well as self administer his first injection.    HENT:   Head: Normocephalic and atraumatic.   Right Ear: External ear and ear canal normal. No drainage.   Left Ear: External ear and ear canal normal. No drainage.   Nose: Nose normal.   Mouth/Throat: No oropharyngeal exudate.   Eyes: Pupils are equal, round, and reactive to light. EOM are normal. Right eye exhibits no discharge. Left eye exhibits no discharge.   Neck: Normal range of motion. Neck supple. No tracheal deviation present. No thyromegaly present.   Cardiovascular: Normal rate, regular rhythm, normal heart sounds and intact distal pulses.  Exam reveals no gallop and no friction rub.    No murmur  heard.  Pulmonary/Chest: Effort normal and breath sounds normal. No respiratory distress. He has no wheezes. He has no rales. He exhibits no tenderness.   Abdominal: Soft. Bowel sounds are normal. He exhibits no distension and no mass. There is no tenderness. There is no rebound and no guarding.   Musculoskeletal:        Right knee: He exhibits decreased range of motion.        Left knee: He exhibits decreased range of motion.        Cervical back: He exhibits decreased range of motion and tenderness.        Lumbar back: He exhibits tenderness, pain and spasm.   Decreased lumbar curvature, there is pain with forward flexion. DTR's +2. No edema.    Neurological: He is alert and oriented to person, place, and time. He has normal reflexes.   CN 2-12 grossly intact    Skin: Skin is warm and dry. Capillary refill takes less than 2 seconds. No rash noted. He is not diaphoretic. No erythema. No pallor.   Psychiatric: He has a normal mood and affect. His speech is normal and behavior is normal. Judgment and thought content normal. His affect is not inappropriate. Cognition and memory are normal.   Nursing note and vitals reviewed.      Assessment/Plan     Problem List Items Addressed This Visit        Cardiovascular and Mediastinum    Malignant hypertension with heart disease, without congestive heart failure    Relevant Orders    CBC & Differential    Comprehensive Metabolic Panel    TSH    Hemoglobin A1c    Vitamin D 25 Hydroxy    Lipid Panel    Vitamin B12    Type 2 diabetes mellitus with diabetic peripheral angiopathy without gangrene, without long-term current use of insulin (CMS/[facility]) - Primary    Relevant Orders    CBC & Differential    Comprehensive Metabolic Panel    TSH    Hemoglobin A1c    Vitamin D 25 Hydroxy    Lipid Panel    Vitamin B12       Digestive    Vitamin D deficiency    Relevant Orders    CBC & Differential    Comprehensive Metabolic Panel    TSH    Hemoglobin A1c    Vitamin D 25 Hydroxy    Lipid Panel     Vitamin B12    Vitamin B 12 deficiency    Relevant Orders    CBC & Differential    Comprehensive Metabolic Panel    TSH    Hemoglobin A1c    Vitamin D 25 Hydroxy    Lipid Panel    Vitamin B12        Patient understands possible side effects and symptoms to report to provider.  Discussed reasons to stop medication.  Patient states understanding of all education and instruction.           Patient's Body mass index is 31.14 kg/m². BMI is above normal parameters. Recommendations include: exercise counseling and nutrition counseling.    I have discussed diagnosis in detail today allowing time for questions and answers. Pt is aware of reasons to seek urgent or emergent medical care as well as reasons to return to the clinic for evaluation. Possible side effects, interactions and progression of symptoms discussed as well. Pt / family states understanding.   Emotional support and active listening provided.     Injections will be due every Wednesday.  Patient has been instructed that if he does not WILL given he is on injection next week to come in and staff will continue to help/instruct him as needed.  Patient states understanding.    Pt has been educated/instructed on diabetic care and protocols.  Diabetic diet instructions provided.  Medication regimen reviewed.  Discussed possible side effects and interactions of current medications.  Sick day rules reviewed. Continue to monitor blood sugar and report abnormal readings as discussed today. Understanding stated by patient.       Follow up in 3 months. Routine labs fasting one week prior to next office visit. Return sooner if needed.       Errors in dictation may reflect use of voice recognition software and not all errors in transcription may have been detected prior to signing.           This document has been electronically signed by:  YOUNG Conway, NP-C

## 2019-01-15 ENCOUNTER — TRANSITIONAL CARE MANAGEMENT TELEPHONE ENCOUNTER (OUTPATIENT)
Dept: FAMILY MEDICINE CLINIC | Facility: CLINIC | Age: 59
End: 2019-01-15

## 2019-01-15 NOTE — OUTREACH NOTE
DIEGO call completed. Please see flow sheet for additional details.  Pt stable. No abd pain or n/v since sunday.  Diarrhea conts. No fever. Tolerated chicken noodle soup last night. Encouraged sml freq meals & good hydration. He verb understanding. Obtained all d/c meds & is taking Amitiza, cipro & flagyl as directed..  No questions or complaints other than cont diarrhea.  Pt confirms 1/16 DIEGO & 1/28 f/u w Dr Lima.

## 2019-01-16 ENCOUNTER — OFFICE VISIT (OUTPATIENT)
Dept: FAMILY MEDICINE CLINIC | Facility: CLINIC | Age: 59
End: 2019-01-16

## 2019-01-16 VITALS
HEIGHT: 70 IN | BODY MASS INDEX: 29.06 KG/M2 | SYSTOLIC BLOOD PRESSURE: 110 MMHG | DIASTOLIC BLOOD PRESSURE: 80 MMHG | TEMPERATURE: 98.4 F | WEIGHT: 203 LBS | OXYGEN SATURATION: 98 % | HEART RATE: 108 BPM

## 2019-01-16 DIAGNOSIS — R21 RASH AND NONSPECIFIC SKIN ERUPTION: ICD-10-CM

## 2019-01-16 DIAGNOSIS — J30.1 SEASONAL ALLERGIC RHINITIS DUE TO POLLEN: ICD-10-CM

## 2019-01-16 DIAGNOSIS — E11.59 TYPE 2 DIABETES MELLITUS WITH OTHER CIRCULATORY COMPLICATION, WITH LONG-TERM CURRENT USE OF INSULIN (HCC): ICD-10-CM

## 2019-01-16 DIAGNOSIS — E55.9 VITAMIN D DEFICIENCY DISEASE: ICD-10-CM

## 2019-01-16 DIAGNOSIS — K52.9 COLITIS: Primary | ICD-10-CM

## 2019-01-16 DIAGNOSIS — IMO0001 UNCONTROLLED TYPE 2 DIABETES MELLITUS WITHOUT COMPLICATION, WITHOUT LONG-TERM CURRENT USE OF INSULIN: ICD-10-CM

## 2019-01-16 DIAGNOSIS — N40.0 BENIGN NON-NODULAR PROSTATIC HYPERPLASIA WITHOUT LOWER URINARY TRACT SYMPTOMS: ICD-10-CM

## 2019-01-16 DIAGNOSIS — R79.89 ABNORMAL THYROID BLOOD TEST: ICD-10-CM

## 2019-01-16 DIAGNOSIS — I25.10 CORONARY ARTERY DISEASE INVOLVING NATIVE CORONARY ARTERY OF NATIVE HEART WITHOUT ANGINA PECTORIS: ICD-10-CM

## 2019-01-16 DIAGNOSIS — F41.1 GENERALIZED ANXIETY DISORDER: ICD-10-CM

## 2019-01-16 DIAGNOSIS — Z79.4 TYPE 2 DIABETES MELLITUS WITH OTHER CIRCULATORY COMPLICATION, WITH LONG-TERM CURRENT USE OF INSULIN (HCC): ICD-10-CM

## 2019-01-16 DIAGNOSIS — I10 ESSENTIAL HYPERTENSION: ICD-10-CM

## 2019-01-16 PROCEDURE — 99496 TRANSJ CARE MGMT HIGH F2F 7D: CPT | Performed by: NURSE PRACTITIONER

## 2019-01-16 RX ORDER — METRONIDAZOLE 500 MG/1
500 TABLET ORAL 3 TIMES DAILY
COMMUNITY
End: 2019-02-27

## 2019-01-16 RX ORDER — DIPHENHYDRAMINE HCL 25 MG
25 CAPSULE ORAL EVERY 6 HOURS PRN
Qty: 30 CAPSULE | Refills: 0 | Status: SHIPPED | OUTPATIENT
Start: 2019-01-16 | End: 2019-05-28 | Stop reason: SDUPTHER

## 2019-01-16 RX ORDER — FINASTERIDE 5 MG/1
5 TABLET, FILM COATED ORAL DAILY
Qty: 30 TABLET | Refills: 5 | Status: SHIPPED | OUTPATIENT
Start: 2019-01-16 | End: 2019-08-29 | Stop reason: SDUPTHER

## 2019-01-16 RX ORDER — AMITRIPTYLINE HYDROCHLORIDE 50 MG/1
100 TABLET, FILM COATED ORAL NIGHTLY PRN
Qty: 60 TABLET | Refills: 5 | Status: SHIPPED | OUTPATIENT
Start: 2019-01-16 | End: 2019-05-28 | Stop reason: SDUPTHER

## 2019-01-16 RX ORDER — CITALOPRAM 40 MG/1
40 TABLET ORAL DAILY
Qty: 30 TABLET | Refills: 5 | Status: SHIPPED | OUTPATIENT
Start: 2019-01-16 | End: 2019-05-28 | Stop reason: SDUPTHER

## 2019-01-16 RX ORDER — ONDANSETRON HYDROCHLORIDE 8 MG/1
8 TABLET, FILM COATED ORAL EVERY 8 HOURS PRN
Qty: 20 TABLET | Refills: 2 | Status: SHIPPED | OUTPATIENT
Start: 2019-01-16 | End: 2020-07-08 | Stop reason: SDUPTHER

## 2019-01-16 RX ORDER — CIPROFLOXACIN 500 MG/1
TABLET, FILM COATED ORAL
COMMUNITY
Start: 2019-01-09 | End: 2019-02-27

## 2019-01-16 RX ORDER — LUBIPROSTONE 24 UG/1
CAPSULE, GELATIN COATED ORAL
COMMUNITY
Start: 2019-01-12 | End: 2019-02-27

## 2019-01-16 RX ORDER — RANITIDINE 150 MG/1
150 TABLET ORAL 2 TIMES DAILY
Qty: 60 TABLET | Refills: 5 | Status: SHIPPED | OUTPATIENT
Start: 2019-01-16 | End: 2019-08-29 | Stop reason: SDUPTHER

## 2019-01-16 RX ORDER — ERGOCALCIFEROL 1.25 MG/1
50000 CAPSULE ORAL
Qty: 4 CAPSULE | Refills: 5 | Status: SHIPPED | OUTPATIENT
Start: 2019-01-16 | End: 2019-08-29 | Stop reason: SDUPTHER

## 2019-01-16 RX ORDER — SIMVASTATIN 40 MG
40 TABLET ORAL NIGHTLY
Qty: 30 TABLET | Refills: 5 | Status: SHIPPED | OUTPATIENT
Start: 2019-01-16 | End: 2019-08-29 | Stop reason: SDUPTHER

## 2019-01-16 RX ORDER — ISOSORBIDE MONONITRATE 30 MG/1
30 TABLET, EXTENDED RELEASE ORAL 2 TIMES DAILY
Qty: 60 TABLET | Refills: 5 | Status: SHIPPED | OUTPATIENT
Start: 2019-01-16 | End: 2019-08-29 | Stop reason: SDUPTHER

## 2019-01-16 RX ORDER — LEVOTHYROXINE AND LIOTHYRONINE 19; 4.5 UG/1; UG/1
30 TABLET ORAL DAILY
Qty: 30 TABLET | Refills: 5 | Status: SHIPPED | OUTPATIENT
Start: 2019-01-16 | End: 2019-02-27 | Stop reason: SDUPTHER

## 2019-01-16 RX ORDER — CARVEDILOL 12.5 MG/1
12.5 TABLET ORAL 2 TIMES DAILY WITH MEALS
Qty: 60 TABLET | Refills: 5 | Status: SHIPPED | OUTPATIENT
Start: 2019-01-16 | End: 2019-05-28 | Stop reason: SDUPTHER

## 2019-01-16 RX ORDER — PANTOPRAZOLE SODIUM 40 MG/1
40 TABLET, DELAYED RELEASE ORAL DAILY
Qty: 30 TABLET | Refills: 5 | Status: SHIPPED | OUTPATIENT
Start: 2019-01-16 | End: 2019-08-29 | Stop reason: SDUPTHER

## 2019-01-16 RX ORDER — GUAIFENESIN AND DEXTROMETHORPHAN HYDROBROMIDE 100; 10 MG/5ML; MG/5ML
5 SOLUTION ORAL EVERY 12 HOURS
Qty: 236 ML | Refills: 3 | Status: SHIPPED | OUTPATIENT
Start: 2019-01-16 | End: 2019-02-27

## 2019-01-16 RX ORDER — TRAMADOL HYDROCHLORIDE 50 MG/1
50 TABLET ORAL EVERY 8 HOURS PRN
Qty: 90 TABLET | Refills: 2 | Status: SHIPPED | OUTPATIENT
Start: 2019-01-16 | End: 2019-01-16 | Stop reason: SDUPTHER

## 2019-01-16 RX ORDER — TRAMADOL HYDROCHLORIDE 50 MG/1
50 TABLET ORAL EVERY 8 HOURS PRN
Qty: 90 TABLET | Refills: 2 | Status: SHIPPED | OUTPATIENT
Start: 2019-01-16 | End: 2019-02-27 | Stop reason: SDUPTHER

## 2019-01-16 NOTE — PROGRESS NOTES
Transitional Care Follow Up Visit  Subjective     Damaso Leonard is a 58 y.o. male who presents for a transitional care management visit.    Within 48 business hours after discharge our office contacted him via telephone to coordinate his care and needs.      I reviewed and discussed the details of that call along with the discharge summary, hospital problems, inpatient lab results, inpatient diagnostic studies, and consultation reports with Damaso.     Current outpatient and discharge medications have been reconciled for the patient.    Date of TCM Phone Call 1/15/2019   [facility]   Date of Admission 1/9/2019   Date of Discharge 1/11/2019   Discharge Disposition Home or Self Care     Risk for Readmission (LACE) No Data Recorded    History of Present Illness   Course During Hospital Stay:  Patient was admitted [facility] on 01/09/2019 following several days of diarrhea and abdominal cramps.  He was treated by Dr. Pepito Wright.  Patient was treated with IV fluids and IV antibiotics as well as diet restriction.  Colonoscopy was performed with the findings of colitis.  Patient has been started on Cipro and Flagyl as well as Amitiza for bowel regulation.  He continues to have diarrhea.  He is drinking and not eating due to fear of stomach pain.  Patient reports that he's been drinking Sprite 0 but nothing else.  He does feel fatigued.  Patient is having several loose/diarrhea stools and hour.  He does have A follow-up scheduled with GI January 28 in [place].  His daughter is with him today to discuss hospital test findings.  Patient and his daughter state that they were not aware that his diagnosis was colitis.    Osteoarthritis and chronic low back pain-patient rates his low back pain as 7 on pain score rating of 0-10.  In the past he was taking Ultram which did help decrease his pain.  Patient reports that at this time he is trying to hold the Ultram due to stomach irritation.  He is  requesting a refill today as it is due.  He has no history of substance abuse or addiction.  His low back pains described as aching and throbbing with radiation into the bilateral lower extremities at times.    Type 2 diabetes-very little appetite.  Fasting glucose today 130.  Following sick day rules.  Currently taking bydureon and metformin. Has not had any side effects of these medications in the past.     Acute cough and runny nose.  Currently taking Flagyl and ciprofloxacin.     The following portions of the patient's history were reviewed and updated as appropriate: allergies, current medications, past family history, past medical history, past social history, past surgical history and problem list.    Review of Systems   Constitutional: Positive for activity change and fatigue. Negative for chills, diaphoresis and fever.   HENT: Positive for congestion and rhinorrhea. Negative for ear discharge, sinus pressure, sinus pain, sneezing, sore throat and trouble swallowing.    Eyes: Negative.    Respiratory: Positive for cough. Negative for choking, shortness of breath and wheezing.    Gastrointestinal: Positive for abdominal pain and diarrhea.   Endocrine: Negative.    Genitourinary: Negative.    Musculoskeletal: Positive for arthralgias and back pain. Negative for neck stiffness.   Skin: Negative.    Allergic/Immunologic: Negative.    Neurological: Negative for tremors and headaches.   Psychiatric/Behavioral: Positive for sleep disturbance (due to acute illness ). Negative for dysphoric mood, self-injury and suicidal ideas.       Objective   Physical Exam   Constitutional: He is oriented to person, place, and time. Vital signs are normal. He appears well-developed and well-nourished.   58-year-old adult male in no acute distress.  His daughter Flower's present today for visit.   HENT:   Head: Normocephalic and atraumatic.   Right Ear: External ear normal.   Left Ear: External ear normal.   Nose: Nose normal.    Mouth/Throat: Oropharynx is clear and moist.   Eyes: EOM are normal. Pupils are equal, round, and reactive to light.   Neck: Normal range of motion. Neck supple.   Cardiovascular: Normal rate, regular rhythm, normal heart sounds and intact distal pulses.   No murmur heard.  Pulmonary/Chest: Effort normal and breath sounds normal. No respiratory distress. He has no wheezes. He has no rales. He exhibits no tenderness.   Abdominal: Soft. Normal appearance. He exhibits no distension and no mass. Bowel sounds are increased. There is no hepatosplenomegaly. There is no tenderness. There is no rebound and no guarding.   Musculoskeletal:        Lumbar back: He exhibits decreased range of motion, tenderness, pain and spasm.   Decreased lumbar curvature, there is pain with forward flexion. DTR's +2. No edema.    Neurological: He is alert and oriented to person, place, and time. He has normal reflexes.   Skin: Skin is warm, dry and intact. Capillary refill takes less than 2 seconds. No rash noted. He is not diaphoretic. No erythema. No pallor.   Psychiatric: He has a normal mood and affect. His speech is normal and behavior is normal. Judgment and thought content normal. His affect is not inappropriate. Cognition and memory are normal.   Denies thoughts of hurting self or others.   Nursing note and vitals reviewed.      Assessment/Plan   Damaso was seen today for hospital follow up.    Diagnoses and all orders for this visit:    Colitis  -     Comprehensive Metabolic Panel; Future  -     TSH; Future  -     Hemoglobin A1c; Future  -     Vitamin D 25 Hydroxy; Future  -     CBC & Differential; Future  -     Lipid Panel; Future  -     Vitamin B12; Future  -     Urine Drug Screen - Urine, Clean Catch; Future    Generalized anxiety disorder  -     amitriptyline (ELAVIL) 50 MG tablet; Take 2 tablets by mouth At Night As Needed for Sleep. 1 daily  -     citalopram (CeleXA) 40 MG tablet; Take 1 tablet by mouth Daily.  -      Comprehensive Metabolic Panel; Future  -     TSH; Future  -     Hemoglobin A1c; Future  -     Vitamin D 25 Hydroxy; Future  -     CBC & Differential; Future  -     Lipid Panel; Future  -     Vitamin B12; Future  -     Urine Drug Screen - Urine, Clean Catch; Future    Coronary artery disease involving native coronary artery of native heart without angina pectoris  -     aspirin 81 MG tablet; Take 1 tablet by mouth Daily.  -     Comprehensive Metabolic Panel; Future  -     TSH; Future  -     Hemoglobin A1c; Future  -     Vitamin D 25 Hydroxy; Future  -     CBC & Differential; Future  -     Lipid Panel; Future  -     Vitamin B12; Future  -     Urine Drug Screen - Urine, Clean Catch; Future    Essential hypertension  -     carvedilol (COREG) 12.5 MG tablet; Take 1 tablet by mouth 2 (Two) Times a Day With Meals.  -     isosorbide mononitrate (IMDUR) 30 MG 24 hr tablet; Take 1 tablet by mouth 2 (Two) Times a Day.  -     Comprehensive Metabolic Panel; Future  -     TSH; Future  -     Hemoglobin A1c; Future  -     Vitamin D 25 Hydroxy; Future  -     CBC & Differential; Future  -     Lipid Panel; Future  -     Vitamin B12; Future  -     Urine Drug Screen - Urine, Clean Catch; Future    Seasonal allergic rhinitis due to pollen  -     diphenhydrAMINE (BENADRYL ALLERGY) 25 mg capsule; Take 1 capsule by mouth Every 6 (Six) Hours As Needed for Itching.    Benign non-nodular prostatic hyperplasia without lower urinary tract symptoms  -     finasteride (PROSCAR) 5 MG tablet; Take 1 tablet by mouth Daily.    Type 2 diabetes mellitus with other circulatory complication, with long-term current use of insulin (CMS/[facility])  -     exenatide er (BYDUREON BCISE) 2 MG/0.85ML auto-injector injection; Inject 0.85 mL under the skin into the appropriate area as directed 1 (One) Time Per Week.    Abnormal thyroid blood test  -     isosorbide mononitrate (IMDUR) 30 MG 24 hr tablet; Take 1 tablet by mouth 2 (Two) Times a Day.  -     Comprehensive  Metabolic Panel; Future  -     TSH; Future  -     Hemoglobin A1c; Future  -     Vitamin D 25 Hydroxy; Future  -     CBC & Differential; Future  -     Lipid Panel; Future  -     Vitamin B12; Future  -     Urine Drug Screen - Urine, Clean Catch; Future    Uncontrolled type 2 diabetes mellitus without complication, without long-term current use of insulin (CMS/[facility])  -     metFORMIN (GLUCOPHAGE) 500 MG tablet; Take 1 tablet by mouth 2 (Two) Times a Day With Meals.  -     simvastatin (ZOCOR) 40 MG tablet; Take 1 tablet by mouth Every Night.  -     Comprehensive Metabolic Panel; Future  -     TSH; Future  -     Hemoglobin A1c; Future  -     Vitamin D 25 Hydroxy; Future  -     CBC & Differential; Future  -     Lipid Panel; Future  -     Vitamin B12; Future  -     Urine Drug Screen - Urine, Clean Catch; Future    Rash and nonspecific skin eruption  -     ondansetron (ZOFRAN) 8 MG tablet; Take 1 tablet by mouth Every 8 (Eight) Hours As Needed for Nausea.  -     pantoprazole (PROTONIX) 40 MG EC tablet; Take 1 tablet by mouth Daily.  -     raNITIdine (ZANTAC) 150 MG tablet; Take 1 tablet by mouth 2 (Two) Times a Day.    Vitamin D deficiency disease  -     vitamin D (ERGOCALCIFEROL) 16382 units capsule capsule; Take 1 capsule by mouth Every 7 (Seven) Days.  -     Comprehensive Metabolic Panel; Future  -     TSH; Future  -     Hemoglobin A1c; Future  -     Vitamin D 25 Hydroxy; Future  -     CBC & Differential; Future  -     Lipid Panel; Future  -     Vitamin B12; Future  -     Urine Drug Screen - Urine, Clean Catch; Future    Other orders  -     Thyroid (NP THYROID) 30 MG PO tablet; Take 1 tablet by mouth Daily.  -     Discontinue: traMADol (ULTRAM) 50 MG tablet; Take 1 tablet by mouth Every 8 (Eight) Hours As Needed for Moderate Pain .  -     dextromethorphan-guaifenesin (ROBITUSSIN SUGAR FREE)  MG/5ML syrup; Take 5 mL by mouth Every 12 (Twelve) Hours.  -     traMADol (ULTRAM) 50 MG tablet; Take 1 tablet by mouth Every 8  (Eight) Hours As Needed for Moderate Pain .      Current outpatient and discharge medications have been reconciled for the patient.  Reviewed by: YOUNG Toscano    I have discussed diagnosis in detail today allowing time for questions and answers. Pt is aware of reasons to seek urgent or emergent medical care as well as reasons to return to the clinic for evaluation. Possible side effects, interactions and progression of symptoms discussed as well. Pt / family states understanding.   Emotional support and active listening provided.   Hospital records have been reviewed and discussed with patient.  Transitional care visit has been completed.  Refill routine medications.  No urine drug screen today as patient is in the hospital and UDS would not be accurate.  Encourage fluid intake.  Pedialyte or sugar-free Gatorade to help maintain electrolyte balance.  Slowly advance to bland foods as tolerated.  Fasting labs next week.  Discussed possible signs and symptoms of consultation such as dehydration which should be monitored for.  Sick day rules should be observed for diabetes.  I do recommend that he hold the Amitiza for the next 3 or 4 days and see if this helps decrease the diarrhea.  Discussed signs and symptoms of C. difficile which he should report immediately.  Patient is keep his follow-up with GI scheduled in 2 weeks.  He should follow up with GI sooner if symptoms are not improving.

## 2019-01-21 ENCOUNTER — LAB (OUTPATIENT)
Dept: FAMILY MEDICINE CLINIC | Facility: CLINIC | Age: 59
End: 2019-01-21

## 2019-01-21 DIAGNOSIS — I10 ESSENTIAL HYPERTENSION: ICD-10-CM

## 2019-01-21 DIAGNOSIS — IMO0001 UNCONTROLLED TYPE 2 DIABETES MELLITUS WITHOUT COMPLICATION, WITHOUT LONG-TERM CURRENT USE OF INSULIN: ICD-10-CM

## 2019-01-21 DIAGNOSIS — E55.9 VITAMIN D DEFICIENCY DISEASE: ICD-10-CM

## 2019-01-21 DIAGNOSIS — I25.10 CORONARY ARTERY DISEASE INVOLVING NATIVE CORONARY ARTERY OF NATIVE HEART WITHOUT ANGINA PECTORIS: ICD-10-CM

## 2019-01-21 DIAGNOSIS — R79.89 ABNORMAL THYROID BLOOD TEST: ICD-10-CM

## 2019-01-21 DIAGNOSIS — F41.1 GENERALIZED ANXIETY DISORDER: ICD-10-CM

## 2019-01-21 DIAGNOSIS — K52.9 COLITIS: ICD-10-CM

## 2019-01-21 LAB
25(OH)D3+25(OH)D2 SERPL-MCNC: 82 NG/ML
ALBUMIN SERPL-MCNC: 4 G/DL (ref 3.5–5)
ALBUMIN/GLOB SERPL: 1.7 G/DL (ref 1.5–2.5)
ALP SERPL-CCNC: 102 U/L (ref 40–129)
ALT SERPL-CCNC: 14 U/L (ref 10–44)
AST SERPL-CCNC: 16 U/L (ref 10–34)
BASOPHILS # BLD AUTO: 0.02 10*3/MM3 (ref 0–0.3)
BASOPHILS NFR BLD AUTO: 0.2 % (ref 0–2)
BILIRUB SERPL-MCNC: 0.5 MG/DL (ref 0.2–1.8)
BUN SERPL-MCNC: 12 MG/DL (ref 7–21)
BUN/CREAT SERPL: 12.1 (ref 7–25)
CALCIUM SERPL-MCNC: 8.9 MG/DL (ref 7.7–10)
CHLORIDE SERPL-SCNC: 101 MMOL/L (ref 99–112)
CHOLEST SERPL-MCNC: 128 MG/DL (ref 0–200)
CO2 SERPL-SCNC: 28.9 MMOL/L (ref 24.3–31.9)
CREAT SERPL-MCNC: 0.99 MG/DL (ref 0.43–1.29)
EOSINOPHIL # BLD AUTO: 4.05 10*3/MM3 (ref 0–0.7)
EOSINOPHIL NFR BLD AUTO: 33.8 % (ref 0–5)
ERYTHROCYTE [DISTWIDTH] IN BLOOD BY AUTOMATED COUNT: 15 % (ref 11.5–14.5)
GLOBULIN SER CALC-MCNC: 2.3 GM/DL
GLUCOSE SERPL-MCNC: 94 MG/DL (ref 70–110)
HBA1C MFR BLD: 6.8 % (ref 4.5–5.7)
HCT VFR BLD AUTO: 44.7 % (ref 42–52)
HDLC SERPL-MCNC: 32 MG/DL (ref 60–100)
HGB BLD-MCNC: 14.2 G/DL (ref 14–18)
IMM GRANULOCYTES # BLD AUTO: 0.03 10*3/MM3 (ref 0–0.03)
IMM GRANULOCYTES NFR BLD AUTO: 0.3 % (ref 0–0.5)
LDLC SERPL CALC-MCNC: 69 MG/DL (ref 0–100)
LYMPHOCYTES # BLD AUTO: 2.86 10*3/MM3 (ref 1–3)
LYMPHOCYTES NFR BLD AUTO: 23.8 % (ref 21–51)
MCH RBC QN AUTO: 27.3 PG (ref 27–33)
MCHC RBC AUTO-ENTMCNC: 31.8 G/DL (ref 33–37)
MCV RBC AUTO: 85.8 FL (ref 80–94)
MONOCYTES # BLD AUTO: 0.74 10*3/MM3 (ref 0.1–0.9)
MONOCYTES NFR BLD AUTO: 6.2 % (ref 0–10)
NEUTROPHILS # BLD AUTO: 4.3 10*3/MM3 (ref 1.4–6.5)
NEUTROPHILS NFR BLD AUTO: 35.7 % (ref 30–70)
PLATELET # BLD AUTO: 395 10*3/MM3 (ref 130–400)
PLATELET BLD QL SMEAR: NORMAL
POTASSIUM SERPL-SCNC: 3.6 MMOL/L (ref 3.5–5.3)
PROT SERPL-MCNC: 6.3 G/DL (ref 6–8)
RBC # BLD AUTO: 5.21 10*6/MM3 (ref 4.7–6.1)
RBC MORPH BLD: NORMAL
SODIUM SERPL-SCNC: 139 MMOL/L (ref 135–153)
TRIGL SERPL-MCNC: 137 MG/DL (ref 0–150)
TSH SERPL DL<=0.005 MIU/L-ACNC: 1.71 MIU/ML (ref 0.55–4.78)
VIT B12 SERPL-MCNC: 545 PG/ML (ref 211–911)
VLDLC SERPL CALC-MCNC: 27.4 MG/DL
WBC # BLD AUTO: 12 10*3/MM3 (ref 4.5–12.5)

## 2019-01-22 ENCOUNTER — TELEPHONE (OUTPATIENT)
Dept: FAMILY MEDICINE CLINIC | Facility: CLINIC | Age: 59
End: 2019-01-22

## 2019-01-22 NOTE — TELEPHONE ENCOUNTER
----- Message from YOUNG Toscano sent at 1/22/2019 10:55 AM EST -----  Let patient know that his labs look much improved.      Spoke with pat

## 2019-02-05 DIAGNOSIS — F41.1 GENERALIZED ANXIETY DISORDER: ICD-10-CM

## 2019-02-06 RX ORDER — CLONAZEPAM 0.5 MG/1
0.5 TABLET ORAL 3 TIMES DAILY PRN
Qty: 90 TABLET | Refills: 0 | Status: SHIPPED | OUTPATIENT
Start: 2019-02-06 | End: 2019-02-27 | Stop reason: SDUPTHER

## 2019-02-27 ENCOUNTER — OFFICE VISIT (OUTPATIENT)
Dept: FAMILY MEDICINE CLINIC | Facility: CLINIC | Age: 59
End: 2019-02-27

## 2019-02-27 VITALS
WEIGHT: 211 LBS | HEIGHT: 70 IN | TEMPERATURE: 97.1 F | HEART RATE: 89 BPM | SYSTOLIC BLOOD PRESSURE: 120 MMHG | OXYGEN SATURATION: 97 % | BODY MASS INDEX: 30.21 KG/M2 | DIASTOLIC BLOOD PRESSURE: 80 MMHG

## 2019-02-27 DIAGNOSIS — E78.00 HYPERCHOLESTEROLEMIA: ICD-10-CM

## 2019-02-27 DIAGNOSIS — M54.16 LUMBAR BACK PAIN WITH RADICULOPATHY AFFECTING LEFT LOWER EXTREMITY: Primary | ICD-10-CM

## 2019-02-27 DIAGNOSIS — G47.33 OBSTRUCTIVE SLEEP APNEA: ICD-10-CM

## 2019-02-27 DIAGNOSIS — R79.89 ABNORMAL THYROID BLOOD TEST: ICD-10-CM

## 2019-02-27 DIAGNOSIS — N40.0 BENIGN NON-NODULAR PROSTATIC HYPERPLASIA WITHOUT LOWER URINARY TRACT SYMPTOMS: ICD-10-CM

## 2019-02-27 DIAGNOSIS — F41.1 GENERALIZED ANXIETY DISORDER: ICD-10-CM

## 2019-02-27 DIAGNOSIS — I25.10 CORONARY ARTERY DISEASE INVOLVING NATIVE CORONARY ARTERY OF NATIVE HEART WITHOUT ANGINA PECTORIS: ICD-10-CM

## 2019-02-27 DIAGNOSIS — E11.51 TYPE 2 DIABETES MELLITUS WITH DIABETIC PERIPHERAL ANGIOPATHY WITHOUT GANGRENE, WITHOUT LONG-TERM CURRENT USE OF INSULIN (HCC): ICD-10-CM

## 2019-02-27 DIAGNOSIS — F32.4 MAJOR DEPRESSIVE DISORDER WITH SINGLE EPISODE, IN PARTIAL REMISSION (HCC): ICD-10-CM

## 2019-02-27 DIAGNOSIS — M15.9 OSTEOARTHRITIS INVOLVING MULTIPLE JOINTS ON BOTH SIDES OF BODY: ICD-10-CM

## 2019-02-27 PROCEDURE — 99214 OFFICE O/P EST MOD 30 MIN: CPT | Performed by: NURSE PRACTITIONER

## 2019-02-27 RX ORDER — CLONAZEPAM 0.5 MG/1
0.5 TABLET ORAL 3 TIMES DAILY PRN
Qty: 90 TABLET | Refills: 0 | Status: SHIPPED | OUTPATIENT
Start: 2019-02-27 | End: 2019-05-28 | Stop reason: SDUPTHER

## 2019-02-27 RX ORDER — LEVOTHYROXINE AND LIOTHYRONINE 19; 4.5 UG/1; UG/1
30 TABLET ORAL DAILY
Qty: 90 TABLET | Refills: 3 | Status: SHIPPED | OUTPATIENT
Start: 2019-02-27 | End: 2020-04-16

## 2019-02-27 RX ORDER — TRAMADOL HYDROCHLORIDE 50 MG/1
50 TABLET ORAL EVERY 8 HOURS PRN
Qty: 90 TABLET | Refills: 2 | Status: SHIPPED | OUTPATIENT
Start: 2019-02-27 | End: 2019-05-28 | Stop reason: SDUPTHER

## 2019-02-27 NOTE — PROGRESS NOTES
Subjective   Damaso Leonard is a 58 y.o. male.     Chief Complaint   Patient presents with   • Hypertension   • Diabetes       History of Present Illness:    Hypertension -Stable with Coreg and Imdur    BPH- currently taking Proscar.  patient has noticed he is also having some decreased erectile function.  He has not been seen by urology.    BECKY - improved . No thoughts of hurting self of others. Controlled with Klonopin.     Lumbar back pain with radiculopathy affecting the left lower limb. Pt receives ultram which is helpful with his pain. He has attended physical therapy with some improvement. He describes his pain as sharp and stinging in the low back radiating into the left lower extremity. Activity increases the pain. Rest helps very little. He does rate this pain as 7 on psr prior to taking his medication each morning and 2 on psr with his current medication regimen. He has no history of substance addiction or abuse. Radiology is on file .     gerd - stable with ppi, no further abdominal pain following polyp removal.  He is under the care of Dr. Lima for GI.       Hyperlipidemia - currently on statin     Sleep apnea - stable     DM 2-patient is working harder on diet and lifestyle changes.  He reports that now that he is feeling some better it is easier to focus on his diabetic diet.         coronary artery disease involving native artery of native heart - under the care of cardiology. Current medication regimen is controlling his symptoms.     Patient does state that he feels better and has more energy as well as a better appetite following her recent polyp removal.  Patient states that he feels better than he has in the past 10 years.       The following portions of the patient's history were reviewed and updated as appropriate:  Allergies, current medications, past family history, past medical history, past social history, past surgical history and problem list.    Review of Systems   Constitutional:  "Negative for appetite change, fatigue and unexpected weight change.   HENT: Negative for congestion, ear pain, nosebleeds, postnasal drip, rhinorrhea, sore throat, trouble swallowing and voice change.    Eyes: Negative for pain and visual disturbance.   Respiratory: Positive for apnea. Negative for cough, shortness of breath and wheezing.    Cardiovascular: Negative for chest pain and palpitations.   Gastrointestinal: Negative for abdominal pain, blood in stool, constipation and diarrhea.   Endocrine: Negative for cold intolerance and polydipsia.   Genitourinary: Positive for difficulty urinating (at times ). Negative for flank pain and hematuria.   Musculoskeletal: Positive for arthralgias and back pain. Negative for gait problem, joint swelling and myalgias.   Skin: Negative for color change and rash.   Allergic/Immunologic: Negative.    Neurological: Negative for syncope, numbness and headaches.   Hematological: Negative.    Psychiatric/Behavioral: Negative for behavioral problems, dysphoric mood, sleep disturbance and suicidal ideas. The patient is nervous/anxious.    All other systems reviewed and are negative.      Objective     /80   Pulse 89   Temp 97.1 °F (36.2 °C) (Tympanic)   Ht 177.8 cm (70\")   Wt 95.7 kg (211 lb)   SpO2 97%   BMI 30.28 kg/m²   Lab on 01/21/2019   Component Date Value Ref Range Status   • Vitamin B-12 01/21/2019 545  211 - 911 pg/mL Final   • Total Cholesterol 01/21/2019 128  0 - 200 mg/dL Final   • Triglycerides 01/21/2019 137  0 - 150 mg/dL Final   • HDL Cholesterol 01/21/2019 32* 60 - 100 mg/dL Final   • VLDL Cholesterol 01/21/2019 27.4  mg/dL Final   • LDL Cholesterol  01/21/2019 69  0 - 100 mg/dL Final   • WBC 01/21/2019 12.00  4.50 - 12.50 10*3/mm3 Final   • RBC 01/21/2019 5.21  4.70 - 6.10 10*6/mm3 Final   • Hemoglobin 01/21/2019 14.2  14.0 - 18.0 g/dL Final   • Hematocrit 01/21/2019 44.7  42.0 - 52.0 % Final   • MCV 01/21/2019 85.8  80.0 - 94.0 fL Final   • MCH " 01/21/2019 27.3  27.0 - 33.0 pg Final   • MCHC 01/21/2019 31.8* 33.0 - 37.0 g/dL Final   • RDW 01/21/2019 15.0* 11.5 - 14.5 % Final   • Platelets 01/21/2019 395  130 - 400 10*3/mm3 Final   • Neutrophil Rel % 01/21/2019 35.7  30.0 - 70.0 % Final   • Lymphocyte Rel % 01/21/2019 23.8  21.0 - 51.0 % Final   • Monocyte Rel % 01/21/2019 6.2  0.0 - 10.0 % Final   • Eosinophil Rel % 01/21/2019 33.8* 0.0 - 5.0 % Final   • Basophil Rel % 01/21/2019 0.2  0.0 - 2.0 % Final   • Neutrophils Absolute 01/21/2019 4.30  1.40 - 6.50 10*3/mm3 Final   • Lymphocytes Absolute 01/21/2019 2.86  1.00 - 3.00 10*3/mm3 Final   • Monocytes Absolute 01/21/2019 0.74  0.10 - 0.90 10*3/mm3 Final   • Eosinophils Absolute 01/21/2019 4.05* 0.00 - 0.70 10*3/mm3 Final   • Basophils Absolute 01/21/2019 0.02  0.00 - 0.30 10*3/mm3 Final   • Immature Granulocyte Rel % 01/21/2019 0.3  0.0 - 0.5 % Final   • Immature Grans Absolute 01/21/2019 0.03  0.00 - 0.03 10*3/mm3 Final   • 25 Hydroxy, Vitamin D 01/21/2019 82.0  ng/mL Final   • Hemoglobin A1C 01/21/2019 6.80* 4.50 - 5.70 % Final   • TSH 01/21/2019 1.707  0.550 - 4.780 mIU/mL Final   • Glucose 01/21/2019 94  70 - 110 mg/dL Final   • BUN 01/21/2019 12  7 - 21 mg/dL Final   • Creatinine 01/21/2019 0.99  0.43 - 1.29 mg/dL Final   • eGFR Non African Am 01/21/2019 78  >60 mL/min/1.73 Final   • eGFR African Am 01/21/2019 95  >60 mL/min/1.73 Final   • BUN/Creatinine Ratio 01/21/2019 12.1  7.0 - 25.0 Final   • Sodium 01/21/2019 139  135 - 153 mmol/L Final   • Potassium 01/21/2019 3.6  3.5 - 5.3 mmol/L Final   • Chloride 01/21/2019 101  99 - 112 mmol/L Final   • Total CO2 01/21/2019 28.9  24.3 - 31.9 mmol/L Final   • Calcium 01/21/2019 8.9  7.7 - 10.0 mg/dL Final   • Total Protein 01/21/2019 6.3  6.0 - 8.0 g/dL Final   • Albumin 01/21/2019 4.00  3.50 - 5.00 g/dL Final   • Globulin 01/21/2019 2.3  gm/dL Final   • A/G Ratio 01/21/2019 1.7  1.5 - 2.5 g/dL Final   • Total Bilirubin 01/21/2019 0.5  0.2 - 1.8 mg/dL Final    • Alkaline Phosphatase 01/21/2019 102  40 - 129 U/L Final   • AST (SGOT) 01/21/2019 16  10 - 34 U/L Final   • ALT (SGPT) 01/21/2019 14  10 - 44 U/L Final   • Comment 01/21/2019 Comment   Final   • Plt Comment 01/21/2019 Comment   Final       Physical Exam   Constitutional: He is oriented to person, place, and time. Vital signs are normal. He appears well-developed and well-nourished. No distress.   HENT:   Head: Normocephalic and atraumatic.   Right Ear: Tympanic membrane, external ear and ear canal normal.   Left Ear: Tympanic membrane, external ear and ear canal normal.   Nose: Nose normal.   Mouth/Throat: Oropharynx is clear and moist and mucous membranes are normal. No oropharyngeal exudate.   Eyes: Conjunctivae, EOM and lids are normal. Pupils are equal, round, and reactive to light. Right eye exhibits no discharge. Left eye exhibits no discharge.   Neck: Normal range of motion. Neck supple. No tracheal deviation present. No thyromegaly present.   Cardiovascular: Normal rate, regular rhythm, normal heart sounds and intact distal pulses. Exam reveals no gallop and no friction rub.   No murmur heard.  Pulmonary/Chest: Effort normal and breath sounds normal. No respiratory distress. He has no wheezes. He has no rales. He exhibits no tenderness.   Abdominal: Soft. Normal appearance and bowel sounds are normal. He exhibits no distension and no mass. There is no tenderness. There is no rebound and no guarding.   Musculoskeletal: Normal range of motion.        Lumbar back: He exhibits tenderness, pain and spasm.   Decreased lumbar curvature, there is pain with forward flexion. DTR's +2. No edema.     Dmaaso had a diabetic foot exam performed today.  Vascular Status -  His right foot exhibits no edema. His left foot exhibits no edema.  Skin Integrity  -  His right foot skin is intact.His left foot skin is intact..  Lymphadenopathy:     He has no cervical adenopathy.   Neurological: He is alert and oriented to person,  place, and time. He has normal reflexes.   CN 2-12 grossly intact    Skin: Skin is warm and dry. Capillary refill takes less than 2 seconds. No rash noted. He is not diaphoretic. No erythema. No pallor.   Psychiatric: He has a normal mood and affect. His speech is normal and behavior is normal. Judgment and thought content normal. His affect is not inappropriate. Cognition and memory are normal.   Nursing note and vitals reviewed.      Assessment/Plan     Problem List Items Addressed This Visit        Cardiovascular and Mediastinum    Type 2 diabetes mellitus with diabetic peripheral angiopathy without gangrene, without long-term current use of insulin (CMS/HCC)    Coronary artery disease involving native coronary artery of native heart    Hypercholesterolemia       Respiratory    Obstructive sleep apnea    Overview     cpap at night             Nervous and Auditory    Lumbar back pain with radiculopathy affecting left lower extremity - Primary    Relevant Orders    Urine Drug Screen - Urine, Clean Catch       Musculoskeletal and Integument    Osteoarthritis involving multiple joints on both sides of body       Genitourinary    Benign non-nodular prostatic hyperplasia without lower urinary tract symptoms    Relevant Orders    Ambulatory Referral to Urology       Other    Generalized anxiety disorder    Relevant Medications    clonazePAM (KlonoPIN) 0.5 MG tablet    Major depressive disorder in partial remission (CMS/HCC)          We will continue current medications.  Patient reminded on Klonopin as this is been helpful with his depression and anxiety.  He will also remain on Celexa at this time.  Refer to urology for consult regarding BPH symptoms and continued erectile dysfunction symptoms.  I have discussed the likelihood that he has a chronic illnesses along with current medications that can be worsening the symptoms.  Proper body mechanics has been reviewed and discussed today.      As part of this patient's  treatment plan they are being prescribed controlled substance/substances with potential for abuse. This patient has been made aware of the appropriate use of such medications, including potential risk of somnolence, limited ability to drive and / or work safely, and potential for overdose. It has also been made clear that these medications are for use by this patient only, without concomitant use of alcohol or other substances unless prescribed/advised by medical provider. Patient has completed controlled substance agreement detailing terms of continued prescribing of controlled substances including monitoring Danny reports, drug screens and pill counts. The patient was asked and states they are not receiving narcotic medication from any there provider or any provider that this office has not been made aware of. History and physical exam exhibit continued safe and appropriate use of controlled substances.   Goal: Improved quality of life and reduction in pain as evidenced by pt report.   Danny #[id number] has been reviewed as consistent.  UDS is on file.   Pt is at low risk for substance abuse or addiction. Pt will be monitored closely for compliance and the need to continue plan of care.  Patient has been instructed and counseled regarding opioid misuse and risk of addiction.  We have discussed proper storage and disposal of controlled medication.  On open prescription provided.  Tramadol prescription provided.    Refill routine medication.  Her recent lab values have been reviewed and discussed.    Staff call his GI provider for recent biopsy results as well as most recent follow-up/consult notes.     Patient's Body mass index is 30.28 kg/m². BMI is above normal parameters. Recommendations include: exercise counseling and nutrition counseling.        I have discussed diagnosis in detail today allowing time for questions and answers. Patient is aware of reasons to seek urgent or emergent medical care as well as  reasons to return to the clinic for evaluation. Possible side effects, interactions and progression of symptoms discussed as well. Patient / family states understanding.   Emotional support and active listening provided.       Current Outpatient Medications:   •  amitriptyline (ELAVIL) 50 MG tablet, Take 2 tablets by mouth At Night As Needed for Sleep. 1 daily, Disp: 60 tablet, Rfl: 5  •  aspirin 81 MG tablet, Take 1 tablet by mouth Daily., Disp: 30 tablet, Rfl: 11  •  carvedilol (COREG) 12.5 MG tablet, Take 1 tablet by mouth 2 (Two) Times a Day With Meals., Disp: 60 tablet, Rfl: 5  •  citalopram (CeleXA) 40 MG tablet, Take 1 tablet by mouth Daily., Disp: 30 tablet, Rfl: 5  •  clonazePAM (KlonoPIN) 0.5 MG tablet, Take 1 tablet by mouth 3 (Three) Times a Day As Needed for Anxiety., Disp: 90 tablet, Rfl: 0  •  cyanocobalamin 1000 MCG/ML injection, Inject 1 mL into the shoulder, thigh, or buttocks Every 28 (Twenty-Eight) Days., Disp: 1 mL, Rfl: 11  •  diphenhydrAMINE (BENADRYL ALLERGY) 25 mg capsule, Take 1 capsule by mouth Every 6 (Six) Hours As Needed for Itching., Disp: 30 capsule, Rfl: 0  •  exenatide er (BYDUREON BCISE) 2 MG/0.85ML auto-injector injection, Inject 0.85 mL under the skin into the appropriate area as directed 1 (One) Time Per Week., Disp: 4 pen, Rfl: 5  •  finasteride (PROSCAR) 5 MG tablet, Take 1 tablet by mouth Daily., Disp: 30 tablet, Rfl: 5  •  glucose blood test strip, Check blood sugar 3x times., Disp: 100 each, Rfl: 12  •  glucose monitor monitoring kit, 1 each 3 (Three) Times a Day As Needed (diabetes)., Disp: 1 each, Rfl: 0  •  hydrocortisone 1 % cream, Apply  topically 2 (Two) Times a Day As Needed for Rash., Disp: 1 each, Rfl: 5  •  isosorbide mononitrate (IMDUR) 30 MG 24 hr tablet, Take 1 tablet by mouth 2 (Two) Times a Day., Disp: 60 tablet, Rfl: 5  •  Lancets (ONETOUCH ULTRASOFT) lancets, Check blood sugar 3 times daily, Disp: 100 each, Rfl: 12  •  metFORMIN (GLUCOPHAGE) 500 MG tablet,  Take 1 tablet by mouth 2 (Two) Times a Day With Meals., Disp: 60 tablet, Rfl: 5  •  ondansetron (ZOFRAN) 8 MG tablet, Take 1 tablet by mouth Every 8 (Eight) Hours As Needed for Nausea., Disp: 20 tablet, Rfl: 2  •  pantoprazole (PROTONIX) 40 MG EC tablet, Take 1 tablet by mouth Daily., Disp: 30 tablet, Rfl: 5  •  polyethylene glycol (MIRALAX) packet, Take 17 g by mouth Daily., Disp: 1 each, Rfl: 5  •  raNITIdine (ZANTAC) 150 MG tablet, Take 1 tablet by mouth 2 (Two) Times a Day., Disp: 60 tablet, Rfl: 5  •  simvastatin (ZOCOR) 40 MG tablet, Take 1 tablet by mouth Every Night., Disp: 30 tablet, Rfl: 5  •  Thyroid (NP THYROID) 30 MG PO tablet, Take 1 tablet by mouth Daily., Disp: 90 tablet, Rfl: 3  •  traMADol (ULTRAM) 50 MG tablet, Take 1 tablet by mouth Every 8 (Eight) Hours As Needed for Moderate Pain ., Disp: 90 tablet, Rfl: 2  •  vitamin D (ERGOCALCIFEROL) 23749 units capsule capsule, Take 1 capsule by mouth Every 7 (Seven) Days., Disp: 4 capsule, Rfl: 5    Current Facility-Administered Medications:   •  cyanocobalamin injection 1,000 mcg, 1,000 mcg, Intramuscular, Q28 Days, Clare Quitnero APRN, 1,000 mcg at 07/11/18 0843    Follow up in 3 months. Routine labs fasting one week prior to next office visit. Return sooner if needed.     Dictated utilizing Dragon dictation        This document has been electronically signed by:  YOUNG Conway, NP-C

## 2019-03-06 ENCOUNTER — OFFICE VISIT (OUTPATIENT)
Dept: FAMILY MEDICINE CLINIC | Facility: CLINIC | Age: 59
End: 2019-03-06

## 2019-03-06 VITALS
SYSTOLIC BLOOD PRESSURE: 110 MMHG | TEMPERATURE: 97.6 F | OXYGEN SATURATION: 97 % | WEIGHT: 212 LBS | HEIGHT: 70 IN | DIASTOLIC BLOOD PRESSURE: 80 MMHG | BODY MASS INDEX: 30.35 KG/M2 | HEART RATE: 91 BPM

## 2019-03-06 DIAGNOSIS — M15.9 OSTEOARTHRITIS INVOLVING MULTIPLE JOINTS ON BOTH SIDES OF BODY: ICD-10-CM

## 2019-03-06 DIAGNOSIS — M54.16 LUMBAR BACK PAIN WITH RADICULOPATHY AFFECTING LEFT LOWER EXTREMITY: Primary | ICD-10-CM

## 2019-03-06 PROBLEM — R29.898 RIGHT HAND WEAKNESS: Status: ACTIVE | Noted: 2019-03-06

## 2019-03-06 PROCEDURE — 99214 OFFICE O/P EST MOD 30 MIN: CPT | Performed by: NURSE PRACTITIONER

## 2019-03-06 NOTE — PROGRESS NOTES
Subjective   Damaso Leonard is a 58 y.o. male.     Chief Complaint   Patient presents with   • paper work       History of Present Illness:    Patient is here today to have disability forms filled out.  Patient was injured on February 8, 2016 while working at [employer].  This injury resulted in right shoulder injury with weakness in his right arm.  He was seen by orthopedic provider.  He has been unable to return to work and remains disabled.  Patient also has lumbar back pain with radiculopathy which affects his left lower extremity.      The following portions of the patient's history were reviewed and updated as appropriate:  Allergies, current medications, past family history, past medical history, past social history, past surgical history and problem list.    Review of Systems   Constitutional: Negative for activity change, appetite change, fatigue and unexpected weight change.   HENT: Negative for congestion, ear pain, nosebleeds, postnasal drip, rhinorrhea, sore throat, trouble swallowing and voice change.    Eyes: Negative for pain and visual disturbance.   Respiratory: Negative for cough, shortness of breath and wheezing.    Cardiovascular: Negative for chest pain and palpitations.   Gastrointestinal: Negative for abdominal pain, blood in stool, constipation and diarrhea.   Endocrine: Negative for cold intolerance and polydipsia.   Genitourinary: Negative for difficulty urinating, flank pain and hematuria.   Musculoskeletal: Positive for arthralgias, back pain and neck pain. Negative for gait problem, joint swelling, myalgias and neck stiffness.   Skin: Negative for color change and rash.   Allergic/Immunologic: Negative.    Neurological: Positive for numbness (right hand at times ). Negative for syncope and headaches.   Hematological: Negative.    Psychiatric/Behavioral: Negative for dysphoric mood, self-injury, sleep disturbance and suicidal ideas.   All other systems reviewed and are  "negative.      Objective     /80   Pulse 91   Temp 97.6 °F (36.4 °C) (Tympanic)   Ht 177.8 cm (70\")   Wt 96.2 kg (212 lb)   SpO2 97%   BMI 30.42 kg/m²   Lab on 01/21/2019   Component Date Value Ref Range Status   • Vitamin B-12 01/21/2019 545  211 - 911 pg/mL Final   • Total Cholesterol 01/21/2019 128  0 - 200 mg/dL Final   • Triglycerides 01/21/2019 137  0 - 150 mg/dL Final   • HDL Cholesterol 01/21/2019 32* 60 - 100 mg/dL Final   • VLDL Cholesterol 01/21/2019 27.4  mg/dL Final   • LDL Cholesterol  01/21/2019 69  0 - 100 mg/dL Final   • WBC 01/21/2019 12.00  4.50 - 12.50 10*3/mm3 Final   • RBC 01/21/2019 5.21  4.70 - 6.10 10*6/mm3 Final   • Hemoglobin 01/21/2019 14.2  14.0 - 18.0 g/dL Final   • Hematocrit 01/21/2019 44.7  42.0 - 52.0 % Final   • MCV 01/21/2019 85.8  80.0 - 94.0 fL Final   • MCH 01/21/2019 27.3  27.0 - 33.0 pg Final   • MCHC 01/21/2019 31.8* 33.0 - 37.0 g/dL Final   • RDW 01/21/2019 15.0* 11.5 - 14.5 % Final   • Platelets 01/21/2019 395  130 - 400 10*3/mm3 Final   • Neutrophil Rel % 01/21/2019 35.7  30.0 - 70.0 % Final   • Lymphocyte Rel % 01/21/2019 23.8  21.0 - 51.0 % Final   • Monocyte Rel % 01/21/2019 6.2  0.0 - 10.0 % Final   • Eosinophil Rel % 01/21/2019 33.8* 0.0 - 5.0 % Final   • Basophil Rel % 01/21/2019 0.2  0.0 - 2.0 % Final   • Neutrophils Absolute 01/21/2019 4.30  1.40 - 6.50 10*3/mm3 Final   • Lymphocytes Absolute 01/21/2019 2.86  1.00 - 3.00 10*3/mm3 Final   • Monocytes Absolute 01/21/2019 0.74  0.10 - 0.90 10*3/mm3 Final   • Eosinophils Absolute 01/21/2019 4.05* 0.00 - 0.70 10*3/mm3 Final   • Basophils Absolute 01/21/2019 0.02  0.00 - 0.30 10*3/mm3 Final   • Immature Granulocyte Rel % 01/21/2019 0.3  0.0 - 0.5 % Final   • Immature Grans Absolute 01/21/2019 0.03  0.00 - 0.03 10*3/mm3 Final   • 25 Hydroxy, Vitamin D 01/21/2019 82.0  ng/mL Final   • Hemoglobin A1C 01/21/2019 6.80* 4.50 - 5.70 % Final   • TSH 01/21/2019 1.707  0.550 - 4.780 mIU/mL Final   • Glucose 01/21/2019 " 94  70 - 110 mg/dL Final   • BUN 01/21/2019 12  7 - 21 mg/dL Final   • Creatinine 01/21/2019 0.99  0.43 - 1.29 mg/dL Final   • eGFR Non African Am 01/21/2019 78  >60 mL/min/1.73 Final   • eGFR African Am 01/21/2019 95  >60 mL/min/1.73 Final   • BUN/Creatinine Ratio 01/21/2019 12.1  7.0 - 25.0 Final   • Sodium 01/21/2019 139  135 - 153 mmol/L Final   • Potassium 01/21/2019 3.6  3.5 - 5.3 mmol/L Final   • Chloride 01/21/2019 101  99 - 112 mmol/L Final   • Total CO2 01/21/2019 28.9  24.3 - 31.9 mmol/L Final   • Calcium 01/21/2019 8.9  7.7 - 10.0 mg/dL Final   • Total Protein 01/21/2019 6.3  6.0 - 8.0 g/dL Final   • Albumin 01/21/2019 4.00  3.50 - 5.00 g/dL Final   • Globulin 01/21/2019 2.3  gm/dL Final   • A/G Ratio 01/21/2019 1.7  1.5 - 2.5 g/dL Final   • Total Bilirubin 01/21/2019 0.5  0.2 - 1.8 mg/dL Final   • Alkaline Phosphatase 01/21/2019 102  40 - 129 U/L Final   • AST (SGOT) 01/21/2019 16  10 - 34 U/L Final   • ALT (SGPT) 01/21/2019 14  10 - 44 U/L Final   • Comment 01/21/2019 Comment   Final   • Plt Comment 01/21/2019 Comment   Final       Physical Exam   Constitutional: He is oriented to person, place, and time. Vital signs are normal. He appears well-developed and well-nourished. No distress.   HENT:   Head: Normocephalic.   Right Ear: External ear normal.   Left Ear: External ear normal.   Nose: Nose normal.   Mouth/Throat: Oropharynx is clear and moist. No oropharyngeal exudate.   Eyes: Conjunctivae, EOM and lids are normal. Pupils are equal, round, and reactive to light. Right eye exhibits no discharge. Left eye exhibits no discharge.   Neck: Normal range of motion. Neck supple. No tracheal deviation present. No thyromegaly present.   Cardiovascular: Normal rate, regular rhythm and normal heart sounds. Exam reveals no gallop and no friction rub.   No murmur heard.  Pulmonary/Chest: Effort normal and breath sounds normal. No respiratory distress. He has no wheezes. He has no rales. He exhibits no  tenderness.   Abdominal: Soft. Normal appearance and bowel sounds are normal. He exhibits no distension and no mass. There is no tenderness. There is no rebound and no guarding.   Musculoskeletal:        Right shoulder: He exhibits decreased range of motion and crepitus.        Lumbar back: He exhibits decreased range of motion and tenderness.   Lymphadenopathy:     He has no cervical adenopathy.   Neurological: He is alert and oriented to person, place, and time. He has normal reflexes. He displays atrophy (right hand ).   Reflex Scores:       Brachioradialis reflexes are 2+ on the right side and 2+ on the left side.  CN 2-12 grossly intact    Skin: Skin is warm and dry. Capillary refill takes less than 2 seconds. No rash noted. He is not diaphoretic. No erythema.   Psychiatric: He has a normal mood and affect. His speech is normal and behavior is normal. Judgment and thought content normal. Cognition and memory are normal.   Vitals reviewed.      Assessment/Plan     Problem List Items Addressed This Visit        Nervous and Auditory    Lumbar back pain with radiculopathy affecting left lower extremity - Primary       Musculoskeletal and Integument    Osteoarthritis involving multiple joints on both sides of body          Disability paper filled out.  See the copy in chart.  Body mechanics reviewed.  Face to face  with patient for 23 minutes.       Patient's Body mass index is 30.42 kg/m². BMI is above normal parameters. Recommendations include: exercise counseling and nutrition counseling.      I have discussed diagnosis in detail today allowing time for questions and answers. Patient is aware of reasons to seek urgent or emergent medical care as well as reasons to return to the clinic for evaluation. Possible side effects, interactions and progression of symptoms discussed as well. Patient / family states understanding.   Emotional support and active listening provided.     Follow up 3 months, sooner if needed.    Routine labs every 3-6 months.       This document has been electronically signed by:  YOUNG Conway, NP-C

## 2019-04-01 ENCOUNTER — OFFICE VISIT (OUTPATIENT)
Dept: UROLOGY | Facility: CLINIC | Age: 59
End: 2019-04-01

## 2019-04-01 VITALS — BODY MASS INDEX: 30.35 KG/M2 | WEIGHT: 212 LBS | HEIGHT: 70 IN

## 2019-04-01 DIAGNOSIS — N52.9 ED (ERECTILE DYSFUNCTION) OF ORGANIC ORIGIN: Primary | ICD-10-CM

## 2019-04-01 DIAGNOSIS — R53.83 OTHER FATIGUE: ICD-10-CM

## 2019-04-01 DIAGNOSIS — N42.9 DISORDER OF PROSTATE: ICD-10-CM

## 2019-04-01 PROCEDURE — 99203 OFFICE O/P NEW LOW 30 MIN: CPT | Performed by: UROLOGY

## 2019-04-01 RX ORDER — SILDENAFIL CITRATE 20 MG/1
20 TABLET ORAL DAILY
Qty: 24 TABLET | Refills: 6 | Status: SHIPPED | OUTPATIENT
Start: 2019-04-01 | End: 2019-05-19

## 2019-04-01 NOTE — PROGRESS NOTES
Chief Complaint:          Chief Complaint   Patient presents with   • Benign Prostatic Hypertrophy       HPI:   58 y.o. male.  58-year-old white male referred by Clare Quintero in [place] who has not had sexual intercourse for years he gets up twice at night with a good urinary stream he dribbles he has no interest in sex is hard to achieve and maintain an erection he has diabetes with an A1c of 8 chronic depression he is on amitriptyline and clonazepam because he recently lost his job and is quite depressed as a consequence of this.  He has no thoughts regarding sexual function no ability to achieve or maintain an erection and does not ejaculate.  I am going to initiate a workup including a testosterone level and I am going to empirically try him on a course of sildenafil while we are completing the workup    Past Medical History:        Past Medical History:   Diagnosis Date   • Anxiety    • ASCVD (arteriosclerotic cardiovascular disease)    • DM (diabetes mellitus) (CMS/[facility])    • GERD (gastroesophageal reflux disease)    • History of EKG 04/15/2015    NORMAL   • HTN (hypertension)    • Hyperlipidemia    • Injury of back    • Low back pain    • Myocardial infarction (CMS/[facility])          Current Meds:     Current Outpatient Medications   Medication Sig Dispense Refill   • amitriptyline (ELAVIL) 50 MG tablet Take 2 tablets by mouth At Night As Needed for Sleep. 1 daily 60 tablet 5   • aspirin 81 MG tablet Take 1 tablet by mouth Daily. 30 tablet 11   • carvedilol (COREG) 12.5 MG tablet Take 1 tablet by mouth 2 (Two) Times a Day With Meals. 60 tablet 5   • citalopram (CeleXA) 40 MG tablet Take 1 tablet by mouth Daily. 30 tablet 5   • clonazePAM (KlonoPIN) 0.5 MG tablet Take 1 tablet by mouth 3 (Three) Times a Day As Needed for Anxiety. 90 tablet 0   • cyanocobalamin 1000 MCG/ML injection Inject 1 mL into the shoulder, thigh, or buttocks Every 28 (Twenty-Eight) Days. 1 mL 11   • diphenhydrAMINE (BENADRYL  ALLERGY) 25 mg capsule Take 1 capsule by mouth Every 6 (Six) Hours As Needed for Itching. 30 capsule 0   • exenatide er (BYDUREON BCISE) 2 MG/0.85ML auto-injector injection Inject 0.85 mL under the skin into the appropriate area as directed 1 (One) Time Per Week. 4 pen 5   • finasteride (PROSCAR) 5 MG tablet Take 1 tablet by mouth Daily. 30 tablet 5   • glucose blood test strip Check blood sugar 3x times. 100 each 12   • glucose monitor monitoring kit 1 each 3 (Three) Times a Day As Needed (diabetes). 1 each 0   • hydrocortisone 1 % cream Apply  topically 2 (Two) Times a Day As Needed for Rash. 1 each 5   • isosorbide mononitrate (IMDUR) 30 MG 24 hr tablet Take 1 tablet by mouth 2 (Two) Times a Day. 60 tablet 5   • Lancets (ONETOUCH ULTRASOFT) lancets Check blood sugar 3 times daily 100 each 12   • metFORMIN (GLUCOPHAGE) 500 MG tablet Take 1 tablet by mouth 2 (Two) Times a Day With Meals. 60 tablet 5   • ondansetron (ZOFRAN) 8 MG tablet Take 1 tablet by mouth Every 8 (Eight) Hours As Needed for Nausea. 20 tablet 2   • pantoprazole (PROTONIX) 40 MG EC tablet Take 1 tablet by mouth Daily. 30 tablet 5   • polyethylene glycol (MIRALAX) packet Take 17 g by mouth Daily. 1 each 5   • raNITIdine (ZANTAC) 150 MG tablet Take 1 tablet by mouth 2 (Two) Times a Day. 60 tablet 5   • simvastatin (ZOCOR) 40 MG tablet Take 1 tablet by mouth Every Night. 30 tablet 5   • Thyroid (NP THYROID) 30 MG PO tablet Take 1 tablet by mouth Daily. 90 tablet 3   • traMADol (ULTRAM) 50 MG tablet Take 1 tablet by mouth Every 8 (Eight) Hours As Needed for Moderate Pain . 90 tablet 2   • vitamin D (ERGOCALCIFEROL) 42795 units capsule capsule Take 1 capsule by mouth Every 7 (Seven) Days. 4 capsule 5     Current Facility-Administered Medications   Medication Dose Route Frequency Provider Last Rate Last Dose   • cyanocobalamin injection 1,000 mcg  1,000 mcg Intramuscular Q28 Days Clare Quintero APRN   1,000 mcg at 07/11/18 08         Allergies:      No Known Allergies     Past Surgical History:     Past Surgical History:   Procedure Laterality Date   • CARDIAC SURGERY      stenting   • CHOLECYSTECTOMY     • COLONOSCOPY  2007   • HERNIA REPAIR     • SHOULDER SURGERY     • TONSILLECTOMY     • TYMPANOPLASTY     • UPPER GASTROINTESTINAL ENDOSCOPY     • VASECTOMY           Social History:     Social History     Socioeconomic History   • Marital status:      Spouse name: ruben   • Number of children: 3   • Years of education: 12   • Highest education level: Not on file   Occupational History   • Occupation: retired   Tobacco Use   • Smoking status: Never Smoker   • Smokeless tobacco: Never Used   Substance and Sexual Activity   • Alcohol use: No   • Drug use: No   • Sexual activity: Defer       Family History:     Family History   Problem Relation Age of Onset   • Heart attack Father    • Heart disease Father    • Hypertension Father    • Heart attack Sister    • Diabetes Sister    • Heart disease Sister    • Hypertension Sister    • Thyroid disease Sister    • Heart attack Brother    • Diabetes Brother    • Thyroid disease Brother    • Arthritis Daughter    • COPD Daughter    • Asthma Daughter    • Depression Daughter    • Heart disease Sister    • Hypertension Sister        Review of Systems:     Review of Systems   Constitutional: Negative.    HENT: Negative.    Eyes: Negative.    Respiratory: Negative.    Cardiovascular: Negative.    Gastrointestinal: Negative.    Endocrine: Negative.    Musculoskeletal: Negative.    Allergic/Immunologic: Negative.    Neurological: Negative.    Hematological: Negative.    Psychiatric/Behavioral: Negative.        Physical Exam:     Physical Exam   Constitutional: He is oriented to person, place, and time. He appears well-developed and well-nourished.   HENT:   Head: Normocephalic and atraumatic.   Eyes: Conjunctivae and EOM are normal. Pupils are equal, round, and reactive to light.   Neck: Normal range of  motion.   Cardiovascular: Normal rate, regular rhythm, normal heart sounds and intact distal pulses.   Pulmonary/Chest: Effort normal and breath sounds normal.   Abdominal: Soft. Bowel sounds are normal.   Genitourinary: Rectum normal, prostate normal and penis normal.   Musculoskeletal: Normal range of motion.   Neurological: He is alert and oriented to person, place, and time. He has normal reflexes.   Skin: Skin is warm and dry.   Psychiatric: He has a normal mood and affect. His behavior is normal. Judgment and thought content normal.   Nursing note and vitals reviewed.      I have reviewed the following portions of the patient's history: allergies, current medications, past family history, past medical history, past social history, past surgical history, problem list and ROS and confirm it's accurate.      Procedure:       Assessment/Plan:   Erectile dysfunction-we discussed the anatomy and physiology of the penis and the endothelium.  We discussed the various forms of erectile dysfunction including peripheral vascular occlusive disease, postoperative, secondary to radiation treatments of the prostate, and arterial inflow.  We discussed the various treatment options available including oral medication and its various forms.  We discussed the use of both generic and non-generic Viagra.  We discussed Cialis and a longer half-life of 17 hours as well as the other 2 medications.  We discussed cost involved with this including the fact that the generic is much cheaper but is taken has multiple pills because they are 20 mg dosages.  We did discuss the other alternatives including Penile injections, vacuum erection devices and surgical intervention reserved for only the most severe cases.  We discussed the need for testosterone in about 20% of cases of erectile dysfunction.  Going to initiate a workup including a serum testosterone and initiate therapy with sildenafil    Patient's Body mass index is 30.42 kg/m². BMI  is above normal parameters. Recommendations include: educational material.          This document has been electronically signed by TEJAL VICTORIA MD April 1, 2019 1:27 PM

## 2019-04-02 PROBLEM — N52.9 ED (ERECTILE DYSFUNCTION) OF ORGANIC ORIGIN: Status: ACTIVE | Noted: 2019-04-02

## 2019-04-02 LAB
ERYTHROCYTE [DISTWIDTH] IN BLOOD BY AUTOMATED COUNT: 15.1 % (ref 12.3–15.4)
HCT VFR BLD AUTO: 39.1 % (ref 37.5–51)
HGB BLD-MCNC: 12.9 G/DL (ref 13–17.7)
MCH RBC QN AUTO: 27.7 PG (ref 26.6–33)
MCHC RBC AUTO-ENTMCNC: 33 G/DL (ref 31.5–35.7)
MCV RBC AUTO: 84 FL (ref 79–97)
PLATELET # BLD AUTO: 380 X10E3/UL (ref 150–379)
PSA SERPL-MCNC: 0.3 NG/ML (ref 0–4)
RBC # BLD AUTO: 4.65 X10E6/UL (ref 4.14–5.8)
TESTOST SERPL-MCNC: 602 NG/DL (ref 264–916)
WBC # BLD AUTO: 6 X10E3/UL (ref 3.4–10.8)

## 2019-05-07 ENCOUNTER — OFFICE VISIT (OUTPATIENT)
Dept: UROLOGY | Facility: CLINIC | Age: 59
End: 2019-05-07

## 2019-05-07 VITALS — BODY MASS INDEX: 30.35 KG/M2 | WEIGHT: 212 LBS | HEIGHT: 70 IN

## 2019-05-07 DIAGNOSIS — N52.9 ED (ERECTILE DYSFUNCTION) OF ORGANIC ORIGIN: Primary | ICD-10-CM

## 2019-05-07 PROCEDURE — 99213 OFFICE O/P EST LOW 20 MIN: CPT | Performed by: UROLOGY

## 2019-05-07 NOTE — PROGRESS NOTES
Chief Complaint:          Chief Complaint   Patient presents with   • Benign Prostatic Hypertrophy     4 week f/u       HPI:   58 y.o. male  referred by Clare Quintero in [place] who has not had sexual intercourse for years he gets up twice at night with a good urinary stream he dribbles he has no interest in sex is hard to achieve and maintain an erection he has diabetes with an A1c of 8 chronic depression he is on amitriptyline and clonazepam because he recently lost his job and is quite depressed as a consequence of this.  He has no thoughts regarding sexual function no ability to achieve or maintain an erection and does not ejaculate.  I am going to initiate a workup including a testosterone level and I am going to empirically try him on a course of sildenafil while we are completing the workup.  He returns today the Viagra helped his PSA is 0.3, testosterone 602, hemoglobin 12.9, and his creatinine 0.99 he does 40 mg of Viagra I told him to go to 100.  We also discussed penile injections I will see him back on an as needed basis unless he wants to proceed with penile injections      Past Medical History:        Past Medical History:   Diagnosis Date   • Anxiety    • ASCVD (arteriosclerotic cardiovascular disease)    • DM (diabetes mellitus) (CMS/[facility])    • GERD (gastroesophageal reflux disease)    • History of EKG 04/15/2015    NORMAL   • HTN (hypertension)    • Hyperlipidemia    • Injury of back    • Low back pain    • Myocardial infarction (CMS/[facility])          Current Meds:     Current Outpatient Medications   Medication Sig Dispense Refill   • amitriptyline (ELAVIL) 50 MG tablet Take 2 tablets by mouth At Night As Needed for Sleep. 1 daily 60 tablet 5   • aspirin 81 MG tablet Take 1 tablet by mouth Daily. 30 tablet 11   • carvedilol (COREG) 12.5 MG tablet Take 1 tablet by mouth 2 (Two) Times a Day With Meals. 60 tablet 5   • citalopram (CeleXA) 40 MG tablet Take 1 tablet by mouth Daily. 30 tablet 5   •  clonazePAM (KlonoPIN) 0.5 MG tablet Take 1 tablet by mouth 3 (Three) Times a Day As Needed for Anxiety. 90 tablet 0   • cyanocobalamin 1000 MCG/ML injection Inject 1 mL into the shoulder, thigh, or buttocks Every 28 (Twenty-Eight) Days. 1 mL 11   • diphenhydrAMINE (BENADRYL ALLERGY) 25 mg capsule Take 1 capsule by mouth Every 6 (Six) Hours As Needed for Itching. 30 capsule 0   • exenatide er (BYDUREON BCISE) 2 MG/0.85ML auto-injector injection Inject 0.85 mL under the skin into the appropriate area as directed 1 (One) Time Per Week. 4 pen 5   • finasteride (PROSCAR) 5 MG tablet Take 1 tablet by mouth Daily. 30 tablet 5   • glucose blood test strip Check blood sugar 3x times. 100 each 12   • glucose monitor monitoring kit 1 each 3 (Three) Times a Day As Needed (diabetes). 1 each 0   • hydrocortisone 1 % cream Apply  topically 2 (Two) Times a Day As Needed for Rash. 1 each 5   • isosorbide mononitrate (IMDUR) 30 MG 24 hr tablet Take 1 tablet by mouth 2 (Two) Times a Day. 60 tablet 5   • Lancets (ONETOUCH ULTRASOFT) lancets Check blood sugar 3 times daily 100 each 12   • metFORMIN (GLUCOPHAGE) 500 MG tablet Take 1 tablet by mouth 2 (Two) Times a Day With Meals. 60 tablet 5   • ondansetron (ZOFRAN) 8 MG tablet Take 1 tablet by mouth Every 8 (Eight) Hours As Needed for Nausea. 20 tablet 2   • pantoprazole (PROTONIX) 40 MG EC tablet Take 1 tablet by mouth Daily. 30 tablet 5   • polyethylene glycol (MIRALAX) packet Take 17 g by mouth Daily. 1 each 5   • raNITIdine (ZANTAC) 150 MG tablet Take 1 tablet by mouth 2 (Two) Times a Day. 60 tablet 5   • sildenafil (REVATIO) 20 MG tablet Take 1 tablet by mouth Daily for 48 doses. May take up to 5 by mouth one hour prior to intercourse on an empty stomach 24 tablet 6   • simvastatin (ZOCOR) 40 MG tablet Take 1 tablet by mouth Every Night. 30 tablet 5   • Thyroid (NP THYROID) 30 MG PO tablet Take 1 tablet by mouth Daily. 90 tablet 3   • traMADol (ULTRAM) 50 MG tablet Take 1 tablet by  mouth Every 8 (Eight) Hours As Needed for Moderate Pain . 90 tablet 2   • vitamin D (ERGOCALCIFEROL) 46385 units capsule capsule Take 1 capsule by mouth Every 7 (Seven) Days. 4 capsule 5     Current Facility-Administered Medications   Medication Dose Route Frequency Provider Last Rate Last Dose   • cyanocobalamin injection 1,000 mcg  1,000 mcg Intramuscular Q28 Days Clare Quintero APRYANNI   1,000 mcg at 07/11/18 0843        Allergies:      No Known Allergies     Past Surgical History:     Past Surgical History:   Procedure Laterality Date   • CARDIAC SURGERY      stenting   • CHOLECYSTECTOMY     • COLONOSCOPY  2007   • HERNIA REPAIR     • SHOULDER SURGERY     • TONSILLECTOMY     • TYMPANOPLASTY     • UPPER GASTROINTESTINAL ENDOSCOPY     • VASECTOMY           Social History:     Social History     Socioeconomic History   • Marital status:      Spouse name: ruben   • Number of children: 3   • Years of education: 12   • Highest education level: Not on file   Occupational History   • Occupation: retired   Tobacco Use   • Smoking status: Never Smoker   • Smokeless tobacco: Never Used   Substance and Sexual Activity   • Alcohol use: No   • Drug use: No   • Sexual activity: Defer       Family History:     Family History   Problem Relation Age of Onset   • Heart attack Father    • Heart disease Father    • Hypertension Father    • Heart attack Sister    • Diabetes Sister    • Heart disease Sister    • Hypertension Sister    • Thyroid disease Sister    • Heart attack Brother    • Diabetes Brother    • Thyroid disease Brother    • Arthritis Daughter    • COPD Daughter    • Asthma Daughter    • Depression Daughter    • Heart disease Sister    • Hypertension Sister        Review of Systems:     Review of Systems   Constitutional: Negative.    HENT: Negative.    Eyes: Negative.    Respiratory: Negative.    Cardiovascular: Negative.    Gastrointestinal: Negative.    Endocrine: Negative.    Musculoskeletal:  Negative.    Allergic/Immunologic: Negative.    Neurological: Negative.    Hematological: Negative.    Psychiatric/Behavioral: Negative.        Physical Exam:     Physical Exam   Constitutional: He is oriented to person, place, and time. He appears well-developed and well-nourished.   HENT:   Head: Normocephalic and atraumatic.   Eyes: Conjunctivae and EOM are normal. Pupils are equal, round, and reactive to light.   Neck: Normal range of motion.   Cardiovascular: Normal rate, regular rhythm, normal heart sounds and intact distal pulses.   Pulmonary/Chest: Effort normal and breath sounds normal.   Abdominal: Soft. Bowel sounds are normal.   Musculoskeletal: Normal range of motion.   Neurological: He is alert and oriented to person, place, and time. He has normal reflexes.   Skin: Skin is warm and dry.   Psychiatric: He has a normal mood and affect. His behavior is normal. Judgment and thought content normal.   Nursing note and vitals reviewed.      I have reviewed the following portions of the patient's history: allergies, current medications, past family history, past medical history, past social history, past surgical history, problem list and ROS and confirm it's accurate.      Procedure:       Assessment/Plan:   Erectile dysfunction-we discussed the anatomy and physiology of the penis and the endothelium.  We discussed the various forms of erectile dysfunction including peripheral vascular occlusive disease, postoperative, secondary to radiation treatments of the prostate, and arterial inflow.  We discussed the various treatment options available including oral medication and its various forms.  We discussed the use of both generic and non-generic Viagra.  We discussed Cialis and a longer half-life of 17 hours as well as the other 2 medications.  We discussed cost involved with this including the fact that the generic is much cheaper but is taken has multiple pills because they are 20 mg dosages.  We did discuss  the other alternatives including Penile injections, vacuum erection devices and surgical intervention reserved for only the most severe cases.  We discussed the need for testosterone in about 20% of cases of erectile dysfunction.  He is doing well with the Viagra he is not interested in penile injections I will see him back on an as-needed basis    Patient's Body mass index is 30.42 kg/m². BMI is above normal parameters. Recommendations include: educational material.          This document has been electronically signed by TEJAL VICTORIA MD May 7, 2019 2:37 PM

## 2019-05-08 DIAGNOSIS — F41.1 GENERALIZED ANXIETY DISORDER: ICD-10-CM

## 2019-05-12 RX ORDER — CLONAZEPAM 0.5 MG/1
TABLET ORAL
Qty: 90 TABLET | Refills: 0 | OUTPATIENT
Start: 2019-05-12

## 2019-05-24 ENCOUNTER — LAB (OUTPATIENT)
Dept: FAMILY MEDICINE CLINIC | Facility: CLINIC | Age: 59
End: 2019-05-24

## 2019-05-24 DIAGNOSIS — E53.8 VITAMIN B 12 DEFICIENCY: ICD-10-CM

## 2019-05-24 DIAGNOSIS — E55.9 VITAMIN D DEFICIENCY: ICD-10-CM

## 2019-05-24 DIAGNOSIS — I11.9: ICD-10-CM

## 2019-05-24 DIAGNOSIS — E11.51 TYPE 2 DIABETES MELLITUS WITH DIABETIC PERIPHERAL ANGIOPATHY WITHOUT GANGRENE, WITHOUT LONG-TERM CURRENT USE OF INSULIN (HCC): ICD-10-CM

## 2019-05-25 LAB
25(OH)D3+25(OH)D2 SERPL-MCNC: 30.2 NG/ML (ref 30–100)
ALBUMIN SERPL-MCNC: 4 G/DL (ref 3.5–5.2)
ALBUMIN/GLOB SERPL: 1.7 G/DL
ALP SERPL-CCNC: 97 U/L (ref 39–117)
ALT SERPL-CCNC: 12 U/L (ref 1–41)
AST SERPL-CCNC: 10 U/L (ref 1–40)
BASOPHILS # BLD AUTO: 0.02 10*3/MM3 (ref 0–0.2)
BASOPHILS NFR BLD AUTO: 0.3 % (ref 0–1.5)
BILIRUB SERPL-MCNC: 0.7 MG/DL (ref 0.2–1.2)
BUN SERPL-MCNC: 7 MG/DL (ref 6–20)
BUN/CREAT SERPL: 8.1 (ref 7–25)
CALCIUM SERPL-MCNC: 9.1 MG/DL (ref 8.6–10.5)
CHLORIDE SERPL-SCNC: 100 MMOL/L (ref 98–107)
CHOLEST SERPL-MCNC: 124 MG/DL (ref 0–200)
CO2 SERPL-SCNC: 27.4 MMOL/L (ref 22–29)
CREAT SERPL-MCNC: 0.86 MG/DL (ref 0.76–1.27)
EOSINOPHIL # BLD AUTO: 0.33 10*3/MM3 (ref 0–0.4)
EOSINOPHIL NFR BLD AUTO: 4.3 % (ref 0.3–6.2)
ERYTHROCYTE [DISTWIDTH] IN BLOOD BY AUTOMATED COUNT: 14.2 % (ref 12.3–15.4)
GLOBULIN SER CALC-MCNC: 2.3 GM/DL
GLUCOSE SERPL-MCNC: 114 MG/DL (ref 65–99)
HBA1C MFR BLD: 6.3 % (ref 4.8–5.6)
HCT VFR BLD AUTO: 40.8 % (ref 37.5–51)
HDLC SERPL-MCNC: 42 MG/DL (ref 40–60)
HGB BLD-MCNC: 12.6 G/DL (ref 13–17.7)
IMM GRANULOCYTES # BLD AUTO: 0.01 10*3/MM3 (ref 0–0.05)
IMM GRANULOCYTES NFR BLD AUTO: 0.1 % (ref 0–0.5)
LDLC SERPL CALC-MCNC: 58 MG/DL (ref 0–100)
LYMPHOCYTES # BLD AUTO: 1.51 10*3/MM3 (ref 0.7–3.1)
LYMPHOCYTES NFR BLD AUTO: 19.7 % (ref 19.6–45.3)
MCH RBC QN AUTO: 27.5 PG (ref 26.6–33)
MCHC RBC AUTO-ENTMCNC: 30.9 G/DL (ref 31.5–35.7)
MCV RBC AUTO: 88.9 FL (ref 79–97)
MONOCYTES # BLD AUTO: 0.89 10*3/MM3 (ref 0.1–0.9)
MONOCYTES NFR BLD AUTO: 11.6 % (ref 5–12)
NEUTROPHILS # BLD AUTO: 4.89 10*3/MM3 (ref 1.7–7)
NEUTROPHILS NFR BLD AUTO: 64 % (ref 42.7–76)
NRBC BLD AUTO-RTO: 0 /100 WBC (ref 0–0.2)
PLATELET # BLD AUTO: 358 10*3/MM3 (ref 140–450)
POTASSIUM SERPL-SCNC: 3.6 MMOL/L (ref 3.5–5.2)
PROT SERPL-MCNC: 6.3 G/DL (ref 6–8.5)
RBC # BLD AUTO: 4.59 10*6/MM3 (ref 4.14–5.8)
SODIUM SERPL-SCNC: 140 MMOL/L (ref 136–145)
TRIGL SERPL-MCNC: 118 MG/DL (ref 0–150)
TSH SERPL DL<=0.005 MIU/L-ACNC: 4.85 UIU/ML (ref 0.45–4.5)
VIT B12 SERPL-MCNC: 423 PG/ML (ref 211–946)
VLDLC SERPL CALC-MCNC: 23.6 MG/DL
WBC # BLD AUTO: 7.65 10*3/MM3 (ref 3.4–10.8)

## 2019-05-28 ENCOUNTER — OFFICE VISIT (OUTPATIENT)
Dept: FAMILY MEDICINE CLINIC | Facility: CLINIC | Age: 59
End: 2019-05-28

## 2019-05-28 VITALS
HEART RATE: 105 BPM | SYSTOLIC BLOOD PRESSURE: 115 MMHG | DIASTOLIC BLOOD PRESSURE: 80 MMHG | BODY MASS INDEX: 30.35 KG/M2 | HEIGHT: 70 IN | TEMPERATURE: 97.5 F | WEIGHT: 212 LBS | OXYGEN SATURATION: 95 %

## 2019-05-28 DIAGNOSIS — F41.1 GENERALIZED ANXIETY DISORDER: ICD-10-CM

## 2019-05-28 DIAGNOSIS — Z29.9 PREVENTIVE MEASURE: ICD-10-CM

## 2019-05-28 DIAGNOSIS — E53.8 VITAMIN B 12 DEFICIENCY: ICD-10-CM

## 2019-05-28 DIAGNOSIS — J30.1 SEASONAL ALLERGIC RHINITIS DUE TO POLLEN: ICD-10-CM

## 2019-05-28 DIAGNOSIS — I25.10 CORONARY ARTERY DISEASE INVOLVING NATIVE CORONARY ARTERY OF NATIVE HEART WITHOUT ANGINA PECTORIS: ICD-10-CM

## 2019-05-28 DIAGNOSIS — I10 ESSENTIAL HYPERTENSION: ICD-10-CM

## 2019-05-28 DIAGNOSIS — M54.16 LUMBAR BACK PAIN WITH RADICULOPATHY AFFECTING LEFT LOWER EXTREMITY: Primary | ICD-10-CM

## 2019-05-28 PROCEDURE — 99214 OFFICE O/P EST MOD 30 MIN: CPT | Performed by: NURSE PRACTITIONER

## 2019-05-28 PROCEDURE — 90471 IMMUNIZATION ADMIN: CPT | Performed by: NURSE PRACTITIONER

## 2019-05-28 PROCEDURE — 90632 HEPA VACCINE ADULT IM: CPT | Performed by: NURSE PRACTITIONER

## 2019-05-28 RX ORDER — CITALOPRAM 40 MG/1
40 TABLET ORAL DAILY
Qty: 30 TABLET | Refills: 5 | Status: SHIPPED | OUTPATIENT
Start: 2019-05-28 | End: 2019-08-29 | Stop reason: SDUPTHER

## 2019-05-28 RX ORDER — CARVEDILOL 12.5 MG/1
12.5 TABLET ORAL 2 TIMES DAILY WITH MEALS
Qty: 60 TABLET | Refills: 5 | Status: SHIPPED | OUTPATIENT
Start: 2019-05-28 | End: 2019-08-29 | Stop reason: SDUPTHER

## 2019-05-28 RX ORDER — TRAMADOL HYDROCHLORIDE 50 MG/1
50 TABLET ORAL EVERY 8 HOURS PRN
Qty: 90 TABLET | Refills: 2 | Status: SHIPPED | OUTPATIENT
Start: 2019-05-28 | End: 2019-08-29 | Stop reason: SDUPTHER

## 2019-05-28 RX ORDER — AMITRIPTYLINE HYDROCHLORIDE 50 MG/1
100 TABLET, FILM COATED ORAL NIGHTLY PRN
Qty: 60 TABLET | Refills: 5 | Status: SHIPPED | OUTPATIENT
Start: 2019-05-28 | End: 2019-08-29 | Stop reason: SDUPTHER

## 2019-05-28 RX ORDER — CLONAZEPAM 0.5 MG/1
0.5 TABLET ORAL 3 TIMES DAILY PRN
Qty: 90 TABLET | Refills: 0 | Status: SHIPPED | OUTPATIENT
Start: 2019-05-28 | End: 2019-08-29 | Stop reason: SDUPTHER

## 2019-05-28 RX ORDER — DIPHENHYDRAMINE HCL 25 MG
25 CAPSULE ORAL EVERY 6 HOURS PRN
Qty: 30 CAPSULE | Refills: 0 | Status: SHIPPED | OUTPATIENT
Start: 2019-05-28 | End: 2019-08-29 | Stop reason: SDUPTHER

## 2019-05-28 RX ORDER — CYANOCOBALAMIN 1000 UG/ML
1000 INJECTION, SOLUTION INTRAMUSCULAR; SUBCUTANEOUS
Qty: 1 ML | Refills: 11 | Status: SHIPPED | OUTPATIENT
Start: 2019-05-28 | End: 2019-08-29 | Stop reason: SDUPTHER

## 2019-05-28 RX ORDER — NALOXONE HYDROCHLORIDE 4 MG/.1ML
1 SPRAY NASAL AS NEEDED
Qty: 1 EACH | Refills: 0 | Status: SHIPPED | OUTPATIENT
Start: 2019-05-28 | End: 2020-10-19 | Stop reason: SDUPTHER

## 2019-05-28 NOTE — ASSESSMENT & PLAN NOTE
Hepatitis A vaccine administered today after education and consent received.  Prescription for zoster vaccine provided which patient will have his pharmacy.

## 2019-05-28 NOTE — ASSESSMENT & PLAN NOTE
Proper body mechanics has been reviewed and discussed today.      As part of this patient's treatment plan they are being prescribed controlled substance/substances with potential for abuse. This patient has been made aware of the appropriate use of such medications, including potential risk of somnolence, limited ability to drive and / or work safely, and potential for overdose. It has also been made clear that these medications are for use by this patient only, without concomitant use of alcohol or other substances unless prescribed/advised by medical provider. Patient has completed controlled substance agreement detailing terms of continued prescribing of controlled substances including monitoring Danny reports, drug screens and pill counts. The patient was asked and states they are not receiving narcotic medication from any there provider or any provider that this office has not been made aware of. History and physical exam exhibit continued safe and appropriate use of controlled substances.   Goal: Improved quality of life and reduction in pain as evidenced by pt report.   Danny #[id number] has been reviewed as consistent.  UDS is on file.   Continue Ultram.  Narcan prescription provided with education performed.  Patient has been instructed and counseled regarding opioid misuse and risk of addiction.  We have discussed proper storage and disposal of controlled medication.  Pt is at low risk for substance abuse or addiction. Pt will be monitored closely for compliance and the need to continue plan of care.

## 2019-05-28 NOTE — PROGRESS NOTES
Subjective   Damaso Leonard is a 58 y.o. male.     Chief Complaint   Patient presents with   • Med Refill       History of Present Illness:     Lab review-labs are much improved.  Type 2 diabetes-stable.  A1c is 6.3 which is improved from 6.8    B12 deficiency-stable B12 is 423 with monthly B12 injections.    CBC is relatively normal with hemoglobin of 12.6, MCHC 309 all other readings normal.    TSH 4.8- taking thyroid 30 mg tablet    Vitamin D stable with supplementation, level 30    Anxiety with insomnia - stable with celexa and Klonopin, tries to take Klonopin only as needed. No substance abuse or addiction.     Lumbar back pain with radiculopathy affecting the left lower limb. Pt receives ultram which is helpful with his pain. He has attended physical therapy with some improvement. He describes his pain as sharp and stinging in the low back radiating into the left lower extremity. Activity increases the pain. Rest helps very little. He does rate this pain as 7 on psr prior to taking his medication each morning and 2 on psr with his current medication regimen. He has no history of substance addiction or abuse. Radiology is on file .    Reflux is stable with PPI.    Preventative health -patient will have a zoster vaccine at his pharmacy.  He would like information on hepatitis A vaccine today.    The following portions of the patient's history were reviewed and updated as appropriate:  Allergies, current medications, past family history, past medical history, past social history, past surgical history and problem list.    Review of Systems   Constitutional: Positive for fatigue. Negative for appetite change and unexpected weight change.   HENT: Negative for congestion, ear pain, nosebleeds, postnasal drip, rhinorrhea, sore throat, trouble swallowing and voice change.    Eyes: Negative for pain and visual disturbance.   Respiratory: Negative for cough, shortness of breath and wheezing.    Cardiovascular: Negative  "for chest pain and palpitations.   Gastrointestinal: Negative for abdominal pain, blood in stool, constipation and diarrhea.   Endocrine: Negative for cold intolerance and polydipsia.   Genitourinary: Negative for difficulty urinating, flank pain and hematuria.   Musculoskeletal: Positive for back pain. Negative for arthralgias, gait problem, joint swelling and myalgias.   Skin: Negative for color change and rash.   Allergic/Immunologic: Negative.    Neurological: Positive for dizziness (history of , no recent ). Negative for syncope, numbness and headaches.   Hematological: Negative.    Psychiatric/Behavioral: Negative for sleep disturbance and suicidal ideas.   All other systems reviewed and are negative.      Objective     /80   Pulse 105   Temp 97.5 °F (36.4 °C) (Temporal)   Ht 177.8 cm (70\")   Wt 96.2 kg (212 lb)   SpO2 95%   BMI 30.42 kg/m²   Lab on 05/24/2019   Component Date Value Ref Range Status   • 25 Hydroxy, Vitamin D 05/24/2019 30.2  30.0 - 100.0 ng/ml Final   • Vitamin B-12 05/24/2019 423  211 - 946 pg/mL Final   • Total Cholesterol 05/24/2019 124  0 - 200 mg/dL Final   • Triglycerides 05/24/2019 118  0 - 150 mg/dL Final   • HDL Cholesterol 05/24/2019 42  40 - 60 mg/dL Final   • VLDL Cholesterol 05/24/2019 23.6  mg/dL Final   • LDL Cholesterol  05/24/2019 58  0 - 100 mg/dL Final   • Hemoglobin A1C 05/24/2019 6.30* 4.80 - 5.60 % Final   • TSH 05/24/2019 4.850* 0.450 - 4.500 uIU/mL Final   • Glucose 05/24/2019 114* 65 - 99 mg/dL Final   • BUN 05/24/2019 7  6 - 20 mg/dL Final   • Creatinine 05/24/2019 0.86  0.76 - 1.27 mg/dL Final   • eGFR Non  Am 05/24/2019 92  >60 mL/min/1.73 Final   • eGFR African Am 05/24/2019 111  >60 mL/min/1.73 Final   • BUN/Creatinine Ratio 05/24/2019 8.1  7.0 - 25.0 Final   • Sodium 05/24/2019 140  136 - 145 mmol/L Final   • Potassium 05/24/2019 3.6  3.5 - 5.2 mmol/L Final   • Chloride 05/24/2019 100  98 - 107 mmol/L Final   • Total CO2 05/24/2019 27.4  22.0 - " 29.0 mmol/L Final   • Calcium 05/24/2019 9.1  8.6 - 10.5 mg/dL Final   • Total Protein 05/24/2019 6.3  6.0 - 8.5 g/dL Final   • Albumin 05/24/2019 4.00  3.50 - 5.20 g/dL Final   • Globulin 05/24/2019 2.3  gm/dL Final   • A/G Ratio 05/24/2019 1.7  g/dL Final   • Total Bilirubin 05/24/2019 0.7  0.2 - 1.2 mg/dL Final   • Alkaline Phosphatase 05/24/2019 97  39 - 117 U/L Final   • AST (SGOT) 05/24/2019 10  1 - 40 U/L Final   • ALT (SGPT) 05/24/2019 12  1 - 41 U/L Final   • WBC 05/24/2019 7.65  3.40 - 10.80 10*3/mm3 Final   • RBC 05/24/2019 4.59  4.14 - 5.80 10*6/mm3 Final   • Hemoglobin 05/24/2019 12.6* 13.0 - 17.7 g/dL Final   • Hematocrit 05/24/2019 40.8  37.5 - 51.0 % Final   • MCV 05/24/2019 88.9  79.0 - 97.0 fL Final   • MCH 05/24/2019 27.5  26.6 - 33.0 pg Final   • MCHC 05/24/2019 30.9* 31.5 - 35.7 g/dL Final   • RDW 05/24/2019 14.2  12.3 - 15.4 % Final   • Platelets 05/24/2019 358  140 - 450 10*3/mm3 Final   • Neutrophil Rel % 05/24/2019 64.0  42.7 - 76.0 % Final   • Lymphocyte Rel % 05/24/2019 19.7  19.6 - 45.3 % Final   • Monocyte Rel % 05/24/2019 11.6  5.0 - 12.0 % Final   • Eosinophil Rel % 05/24/2019 4.3  0.3 - 6.2 % Final   • Basophil Rel % 05/24/2019 0.3  0.0 - 1.5 % Final   • Neutrophils Absolute 05/24/2019 4.89  1.70 - 7.00 10*3/mm3 Final   • Lymphocytes Absolute 05/24/2019 1.51  0.70 - 3.10 10*3/mm3 Final   • Monocytes Absolute 05/24/2019 0.89  0.10 - 0.90 10*3/mm3 Final   • Eosinophils Absolute 05/24/2019 0.33  0.00 - 0.40 10*3/mm3 Final   • Basophils Absolute 05/24/2019 0.02  0.00 - 0.20 10*3/mm3 Final   • Immature Granulocyte Rel % 05/24/2019 0.1  0.0 - 0.5 % Final   • Immature Grans Absolute 05/24/2019 0.01  0.00 - 0.05 10*3/mm3 Final   • nRBC 05/24/2019 0.0  0.0 - 0.2 /100 WBC Final       Physical Exam   Constitutional: He is oriented to person, place, and time. Vital signs are normal. He appears well-developed and well-nourished. No distress.   HENT:   Head: Normocephalic and atraumatic.   Right  Ear: Tympanic membrane, external ear and ear canal normal.   Left Ear: Tympanic membrane, external ear and ear canal normal.   Nose: Nose normal.   Mouth/Throat: Oropharynx is clear and moist and mucous membranes are normal. No oropharyngeal exudate.   Eyes: Conjunctivae, EOM and lids are normal. Pupils are equal, round, and reactive to light. Right eye exhibits no discharge. Left eye exhibits no discharge.   Neck: Normal range of motion. Neck supple. No tracheal deviation present. No thyromegaly present.   Cardiovascular: Normal rate, regular rhythm, normal heart sounds and intact distal pulses. Exam reveals no gallop and no friction rub.   No murmur heard.  Pulmonary/Chest: Effort normal and breath sounds normal. No respiratory distress. He has no wheezes. He has no rales. He exhibits no tenderness.   Abdominal: Soft. Normal appearance and bowel sounds are normal. He exhibits no distension and no mass. There is no tenderness. There is no rebound and no guarding.   Musculoskeletal: Normal range of motion.        Lumbar back: He exhibits tenderness, pain and spasm.   Decreased lumbar curvature, there is pain with forward flexion. DTR's +2. No edema.      Vascular Status -  His right foot exhibits no edema. His left foot exhibits no edema.  Skin Integrity  -  His right foot skin is intact.His left foot skin is intact..  Lymphadenopathy:     He has no cervical adenopathy.   Neurological: He is alert and oriented to person, place, and time. He has normal reflexes.   CN 2-12 grossly intact    Skin: Skin is warm and dry. Capillary refill takes less than 2 seconds. No rash noted. He is not diaphoretic. No erythema. No pallor.   Psychiatric: He has a normal mood and affect. His speech is normal and behavior is normal. Judgment and thought content normal. His affect is not inappropriate. Cognition and memory are normal.   Nursing note and vitals reviewed.      Assessment/Plan     Problem List Items Addressed This Visit         Cardiovascular and Mediastinum    Coronary artery disease involving native coronary artery of native heart    Relevant Medications    aspirin 81 MG tablet    carvedilol (COREG) 12.5 MG tablet    Essential hypertension    Relevant Medications    carvedilol (COREG) 12.5 MG tablet       Respiratory    Seasonal allergic rhinitis due to pollen    Relevant Medications    diphenhydrAMINE (BENADRYL ALLERGY) 25 mg capsule       Digestive    Vitamin B 12 deficiency    Relevant Medications    cyanocobalamin 1000 MCG/ML injection       Nervous and Auditory    Lumbar back pain with radiculopathy affecting left lower extremity - Primary    Current Assessment & Plan     Proper body mechanics has been reviewed and discussed today.      As part of this patient's treatment plan they are being prescribed controlled substance/substances with potential for abuse. This patient has been made aware of the appropriate use of such medications, including potential risk of somnolence, limited ability to drive and / or work safely, and potential for overdose. It has also been made clear that these medications are for use by this patient only, without concomitant use of alcohol or other substances unless prescribed/advised by medical provider. Patient has completed controlled substance agreement detailing terms of continued prescribing of controlled substances including monitoring Danny reports, drug screens and pill counts. The patient was asked and states they are not receiving narcotic medication from any there provider or any provider that this office has not been made aware of. History and physical exam exhibit continued safe and appropriate use of controlled substances.   Goal: Improved quality of life and reduction in pain as evidenced by pt report.   Danny #[id number] has been reviewed as consistent.  UDS is on file.   Continue Ultram.  Narcan prescription provided with education performed.  Patient has been instructed and counseled  regarding opioid misuse and risk of addiction.  We have discussed proper storage and disposal of controlled medication.  Pt is at low risk for substance abuse or addiction. Pt will be monitored closely for compliance and the need to continue plan of care.          Relevant Medications    traMADol (ULTRAM) 50 MG tablet    Other Relevant Orders    Urine Drug Screen - Urine, Clean Catch       Other    Generalized anxiety disorder    Current Assessment & Plan      Klonopin on an as-needed basis, #90 with no refill.  Celexa will be continued.         Relevant Medications    amitriptyline (ELAVIL) 50 MG tablet    citalopram (CeleXA) 40 MG tablet    clonazePAM (KlonoPIN) 0.5 MG tablet    Preventive measure    Current Assessment & Plan     Hepatitis A vaccine administered today after education and consent received.  Prescription for zoster vaccine provided which patient will have his pharmacy.               Labs reviewed and discussed.  Praised patient for improved labs.  Lipid panel is completely normal.  A1c is improved, B12 is normal with injections, vitamin D is stable with supplementation, CBC is relatively normal.           Patient's Body mass index is 30.42 kg/m². BMI is above normal parameters. Recommendations include: exercise counseling and nutrition counseling.      I have discussed diagnosis in detail today allowing time for questions and answers. Patient is aware of reasons to seek urgent or emergent medical care as well as reasons to return to the clinic for evaluation. Possible side effects, interactions and progression of symptoms discussed as well. Patient / family states understanding.   Emotional support and active listening provided.       Follow up in 3 months. Routine labs fasting one week prior to next office visit. Return sooner if needed.             This document has been electronically signed by:  YOUNG Conway, NP-C

## 2019-08-29 ENCOUNTER — OFFICE VISIT (OUTPATIENT)
Dept: FAMILY MEDICINE CLINIC | Facility: CLINIC | Age: 59
End: 2019-08-29

## 2019-08-29 ENCOUNTER — RESULTS ENCOUNTER (OUTPATIENT)
Dept: FAMILY MEDICINE CLINIC | Facility: CLINIC | Age: 59
End: 2019-08-29

## 2019-08-29 VITALS
WEIGHT: 215 LBS | BODY MASS INDEX: 30.78 KG/M2 | SYSTOLIC BLOOD PRESSURE: 130 MMHG | HEART RATE: 82 BPM | HEIGHT: 70 IN | OXYGEN SATURATION: 94 % | DIASTOLIC BLOOD PRESSURE: 90 MMHG | TEMPERATURE: 98.7 F

## 2019-08-29 DIAGNOSIS — I25.10 CORONARY ARTERY DISEASE INVOLVING NATIVE CORONARY ARTERY OF NATIVE HEART WITHOUT ANGINA PECTORIS: ICD-10-CM

## 2019-08-29 DIAGNOSIS — K21.9 GASTROESOPHAGEAL REFLUX DISEASE WITHOUT ESOPHAGITIS: ICD-10-CM

## 2019-08-29 DIAGNOSIS — E53.8 VITAMIN B 12 DEFICIENCY: ICD-10-CM

## 2019-08-29 DIAGNOSIS — Z12.5 ENCOUNTER FOR SCREENING FOR MALIGNANT NEOPLASM OF PROSTATE: ICD-10-CM

## 2019-08-29 DIAGNOSIS — I11.9: Primary | ICD-10-CM

## 2019-08-29 DIAGNOSIS — IMO0001 UNCONTROLLED TYPE 2 DIABETES MELLITUS WITHOUT COMPLICATION, WITHOUT LONG-TERM CURRENT USE OF INSULIN: ICD-10-CM

## 2019-08-29 DIAGNOSIS — E11.59 TYPE 2 DIABETES MELLITUS WITH OTHER CIRCULATORY COMPLICATION, WITH LONG-TERM CURRENT USE OF INSULIN (HCC): ICD-10-CM

## 2019-08-29 DIAGNOSIS — E11.51 TYPE 2 DIABETES MELLITUS WITH DIABETIC PERIPHERAL ANGIOPATHY WITHOUT GANGRENE, WITHOUT LONG-TERM CURRENT USE OF INSULIN (HCC): ICD-10-CM

## 2019-08-29 DIAGNOSIS — I10 ESSENTIAL HYPERTENSION: ICD-10-CM

## 2019-08-29 DIAGNOSIS — F41.1 GENERALIZED ANXIETY DISORDER: ICD-10-CM

## 2019-08-29 DIAGNOSIS — M54.16 LUMBAR BACK PAIN WITH RADICULOPATHY AFFECTING LEFT LOWER EXTREMITY: ICD-10-CM

## 2019-08-29 DIAGNOSIS — E66.9 OBESITY (BMI 30.0-34.9): ICD-10-CM

## 2019-08-29 DIAGNOSIS — E55.9 VITAMIN D DEFICIENCY DISEASE: ICD-10-CM

## 2019-08-29 DIAGNOSIS — I20.9 ANGINAL PAIN (HCC): Primary | ICD-10-CM

## 2019-08-29 DIAGNOSIS — Z79.4 TYPE 2 DIABETES MELLITUS WITH OTHER CIRCULATORY COMPLICATION, WITH LONG-TERM CURRENT USE OF INSULIN (HCC): ICD-10-CM

## 2019-08-29 DIAGNOSIS — R79.89 ABNORMAL THYROID BLOOD TEST: ICD-10-CM

## 2019-08-29 DIAGNOSIS — N40.0 BENIGN NON-NODULAR PROSTATIC HYPERPLASIA WITHOUT LOWER URINARY TRACT SYMPTOMS: ICD-10-CM

## 2019-08-29 DIAGNOSIS — J30.1 SEASONAL ALLERGIC RHINITIS DUE TO POLLEN: ICD-10-CM

## 2019-08-29 PROBLEM — E66.811 OBESITY (BMI 30.0-34.9): Status: ACTIVE | Noted: 2019-08-29

## 2019-08-29 PROCEDURE — 99214 OFFICE O/P EST MOD 30 MIN: CPT | Performed by: NURSE PRACTITIONER

## 2019-08-29 RX ORDER — ISOSORBIDE MONONITRATE 30 MG/1
30 TABLET, EXTENDED RELEASE ORAL 2 TIMES DAILY
Qty: 60 TABLET | Refills: 5 | Status: SHIPPED | OUTPATIENT
Start: 2019-08-29 | End: 2020-07-16

## 2019-08-29 RX ORDER — ERGOCALCIFEROL 1.25 MG/1
50000 CAPSULE ORAL
Qty: 12 CAPSULE | Refills: 3 | Status: SHIPPED | OUTPATIENT
Start: 2019-08-29 | End: 2020-09-29

## 2019-08-29 RX ORDER — FINASTERIDE 5 MG/1
5 TABLET, FILM COATED ORAL DAILY
Qty: 90 TABLET | Refills: 3 | Status: SHIPPED | OUTPATIENT
Start: 2019-08-29 | End: 2020-10-19 | Stop reason: SDUPTHER

## 2019-08-29 RX ORDER — CLONAZEPAM 0.5 MG/1
0.5 TABLET ORAL 3 TIMES DAILY PRN
Qty: 90 TABLET | Refills: 0 | Status: SHIPPED | OUTPATIENT
Start: 2019-08-29 | End: 2019-12-09 | Stop reason: SDUPTHER

## 2019-08-29 RX ORDER — CARVEDILOL 12.5 MG/1
12.5 TABLET ORAL 2 TIMES DAILY WITH MEALS
Qty: 180 TABLET | Refills: 3 | Status: SHIPPED | OUTPATIENT
Start: 2019-08-29 | End: 2020-10-19 | Stop reason: SDUPTHER

## 2019-08-29 RX ORDER — CYANOCOBALAMIN 1000 UG/ML
1000 INJECTION, SOLUTION INTRAMUSCULAR; SUBCUTANEOUS
Qty: 1 ML | Refills: 11 | Status: SHIPPED | OUTPATIENT
Start: 2019-08-29 | End: 2020-09-29

## 2019-08-29 RX ORDER — CITALOPRAM 40 MG/1
40 TABLET ORAL DAILY
Qty: 30 TABLET | Refills: 5 | Status: SHIPPED | OUTPATIENT
Start: 2019-08-29 | End: 2020-06-17

## 2019-08-29 RX ORDER — TRAMADOL HYDROCHLORIDE 50 MG/1
50 TABLET ORAL EVERY 8 HOURS PRN
Qty: 90 TABLET | Refills: 2 | Status: SHIPPED | OUTPATIENT
Start: 2019-08-29 | End: 2019-12-09 | Stop reason: SDUPTHER

## 2019-08-29 RX ORDER — PANTOPRAZOLE SODIUM 40 MG/1
40 TABLET, DELAYED RELEASE ORAL DAILY
Qty: 90 TABLET | Refills: 3 | Status: SHIPPED | OUTPATIENT
Start: 2019-08-29 | End: 2020-10-19 | Stop reason: SDUPTHER

## 2019-08-29 RX ORDER — SIMVASTATIN 40 MG
40 TABLET ORAL NIGHTLY
Qty: 90 TABLET | Refills: 3 | Status: SHIPPED | OUTPATIENT
Start: 2019-08-29 | End: 2020-10-19

## 2019-08-29 RX ORDER — DIPHENHYDRAMINE HCL 25 MG
25 CAPSULE ORAL EVERY 6 HOURS PRN
Qty: 90 CAPSULE | Refills: 3 | Status: SHIPPED | OUTPATIENT
Start: 2019-08-29 | End: 2020-10-19 | Stop reason: SDUPTHER

## 2019-08-29 RX ORDER — RANITIDINE 150 MG/1
150 TABLET ORAL 2 TIMES DAILY
Qty: 180 TABLET | Refills: 3 | Status: SHIPPED | OUTPATIENT
Start: 2019-08-29 | End: 2019-11-21

## 2019-08-29 RX ORDER — AMITRIPTYLINE HYDROCHLORIDE 50 MG/1
100 TABLET, FILM COATED ORAL NIGHTLY PRN
Qty: 60 TABLET | Refills: 5 | Status: SHIPPED | OUTPATIENT
Start: 2019-08-29 | End: 2020-07-16

## 2019-08-29 NOTE — PROGRESS NOTES
Subjective   Damaso Leonard is a 58 y.o. male.     Chief Complaint   Patient presents with   • get refills     Vit d def  Anxiety  Joint pain  Diabetes    History of Present Illness:    B 12 def - stable with current medication     Hypertension with chf - not been to cardiology for over a year. Denies any recent chest pain or shortness of breath.    Type 2 dm - improved with lifestyle and medication.   No hyperglycemia or hypoglycemia.     Hyperlipidemia - working on diet and lifestyle , compliant with medications.     Lumbar back pain with radiculopathy affecting the left lower limb. Pt receives ultram which is helpful with his pain. He has attended physical therapy with some improvement. He describes his pain as sharp and stinging in the low back radiating into the left lower extremity. Activity increases the pain. Rest helps very little. He does rate this pain as 7 on psr prior to taking his medication each morning and 2 on psr with his current medication regimen. He has no history of substance addiction or abuse. Radiology is on file .     Reflux is stable with PPI. Avoids certain foods .      BECKY - stable with current medication regimen. Receives Klonopin  on an as needed basis. He also receives Celexa.            The following portions of the patient's history were reviewed and updated as appropriate:  Allergies, current medications, past family history, past medical history, past social history, past surgical history and problem list.    Review of Systems   Constitutional: Negative for appetite change, fatigue and unexpected weight change.   HENT: Negative for congestion, ear pain, nosebleeds, postnasal drip, rhinorrhea, sore throat, trouble swallowing and voice change.    Eyes: Negative for pain and visual disturbance.   Respiratory: Negative for cough, shortness of breath and wheezing.    Cardiovascular: Negative for chest pain and palpitations.   Gastrointestinal: Negative for abdominal pain, blood in  "stool, constipation and diarrhea.   Endocrine: Negative for cold intolerance and polydipsia.   Genitourinary: Negative for difficulty urinating, flank pain and hematuria.   Musculoskeletal: Positive for arthralgias and back pain. Negative for gait problem, joint swelling and myalgias.   Skin: Negative for color change and rash.   Allergic/Immunologic: Negative.    Neurological: Negative for syncope, numbness and headaches.   Hematological: Negative.    Psychiatric/Behavioral: Negative for dysphoric mood, self-injury, sleep disturbance and suicidal ideas. The patient is not nervous/anxious.    All other systems reviewed and are negative.      Objective     /90   Pulse 82   Temp 98.7 °F (37.1 °C) (Tympanic)   Ht 177.8 cm (70\")   Wt 97.5 kg (215 lb)   SpO2 94%   BMI 30.85 kg/m²   Lab on 05/24/2019   Component Date Value Ref Range Status   • 25 Hydroxy, Vitamin D 05/24/2019 30.2  30.0 - 100.0 ng/ml Final   • Vitamin B-12 05/24/2019 423  211 - 946 pg/mL Final   • Total Cholesterol 05/24/2019 124  0 - 200 mg/dL Final   • Triglycerides 05/24/2019 118  0 - 150 mg/dL Final   • HDL Cholesterol 05/24/2019 42  40 - 60 mg/dL Final   • VLDL Cholesterol 05/24/2019 23.6  mg/dL Final   • LDL Cholesterol  05/24/2019 58  0 - 100 mg/dL Final   • Hemoglobin A1C 05/24/2019 6.30* 4.80 - 5.60 % Final   • TSH 05/24/2019 4.850* 0.450 - 4.500 uIU/mL Final   • Glucose 05/24/2019 114* 65 - 99 mg/dL Final   • BUN 05/24/2019 7  6 - 20 mg/dL Final   • Creatinine 05/24/2019 0.86  0.76 - 1.27 mg/dL Final   • eGFR Non  Am 05/24/2019 92  >60 mL/min/1.73 Final   • eGFR African Am 05/24/2019 111  >60 mL/min/1.73 Final   • BUN/Creatinine Ratio 05/24/2019 8.1  7.0 - 25.0 Final   • Sodium 05/24/2019 140  136 - 145 mmol/L Final   • Potassium 05/24/2019 3.6  3.5 - 5.2 mmol/L Final   • Chloride 05/24/2019 100  98 - 107 mmol/L Final   • Total CO2 05/24/2019 27.4  22.0 - 29.0 mmol/L Final   • Calcium 05/24/2019 9.1  8.6 - 10.5 mg/dL Final   • " Total Protein 05/24/2019 6.3  6.0 - 8.5 g/dL Final   • Albumin 05/24/2019 4.00  3.50 - 5.20 g/dL Final   • Globulin 05/24/2019 2.3  gm/dL Final   • A/G Ratio 05/24/2019 1.7  g/dL Final   • Total Bilirubin 05/24/2019 0.7  0.2 - 1.2 mg/dL Final   • Alkaline Phosphatase 05/24/2019 97  39 - 117 U/L Final   • AST (SGOT) 05/24/2019 10  1 - 40 U/L Final   • ALT (SGPT) 05/24/2019 12  1 - 41 U/L Final   • WBC 05/24/2019 7.65  3.40 - 10.80 10*3/mm3 Final   • RBC 05/24/2019 4.59  4.14 - 5.80 10*6/mm3 Final   • Hemoglobin 05/24/2019 12.6* 13.0 - 17.7 g/dL Final   • Hematocrit 05/24/2019 40.8  37.5 - 51.0 % Final   • MCV 05/24/2019 88.9  79.0 - 97.0 fL Final   • MCH 05/24/2019 27.5  26.6 - 33.0 pg Final   • MCHC 05/24/2019 30.9* 31.5 - 35.7 g/dL Final   • RDW 05/24/2019 14.2  12.3 - 15.4 % Final   • Platelets 05/24/2019 358  140 - 450 10*3/mm3 Final   • Neutrophil Rel % 05/24/2019 64.0  42.7 - 76.0 % Final   • Lymphocyte Rel % 05/24/2019 19.7  19.6 - 45.3 % Final   • Monocyte Rel % 05/24/2019 11.6  5.0 - 12.0 % Final   • Eosinophil Rel % 05/24/2019 4.3  0.3 - 6.2 % Final   • Basophil Rel % 05/24/2019 0.3  0.0 - 1.5 % Final   • Neutrophils Absolute 05/24/2019 4.89  1.70 - 7.00 10*3/mm3 Final   • Lymphocytes Absolute 05/24/2019 1.51  0.70 - 3.10 10*3/mm3 Final   • Monocytes Absolute 05/24/2019 0.89  0.10 - 0.90 10*3/mm3 Final   • Eosinophils Absolute 05/24/2019 0.33  0.00 - 0.40 10*3/mm3 Final   • Basophils Absolute 05/24/2019 0.02  0.00 - 0.20 10*3/mm3 Final   • Immature Granulocyte Rel % 05/24/2019 0.1  0.0 - 0.5 % Final   • Immature Grans Absolute 05/24/2019 0.01  0.00 - 0.05 10*3/mm3 Final   • nRBC 05/24/2019 0.0  0.0 - 0.2 /100 WBC Final       Physical Exam   Constitutional: He is oriented to person, place, and time. Vital signs are normal. He appears well-developed and well-nourished. No distress.   Talkative and friendly 58 year old male    HENT:   Head: Normocephalic and atraumatic.   Right Ear: Tympanic membrane, external  ear and ear canal normal.   Left Ear: Tympanic membrane, external ear and ear canal normal.   Nose: Nose normal.   Mouth/Throat: Oropharynx is clear and moist and mucous membranes are normal. No oropharyngeal exudate.   Eyes: Conjunctivae, EOM and lids are normal. Pupils are equal, round, and reactive to light. Right eye exhibits no discharge. Left eye exhibits no discharge.   Neck: Normal range of motion. Neck supple. No tracheal deviation present. No thyromegaly present.   Cardiovascular: Normal rate, regular rhythm, normal heart sounds and intact distal pulses. Exam reveals no gallop and no friction rub.   No murmur heard.  Pulmonary/Chest: Effort normal and breath sounds normal. No respiratory distress. He has no wheezes. He has no rales. He exhibits no tenderness.   Abdominal: Soft. Normal appearance and bowel sounds are normal. He exhibits no distension and no mass. There is no tenderness. There is no rebound and no guarding.   Musculoskeletal: Normal range of motion.        Lumbar back: He exhibits tenderness, pain and spasm.   Decreased lumbar curvature, there is pain with forward flexion. DTR's +2. No edema.      Vascular Status -  His right foot exhibits no edema. His left foot exhibits no edema.  Skin Integrity  -  His right foot skin is intact.His left foot skin is intact..  Lymphadenopathy:     He has no cervical adenopathy.   Neurological: He is alert and oriented to person, place, and time. He has normal reflexes.   CN 2-12 grossly intact    Skin: Skin is warm and dry. Capillary refill takes less than 2 seconds. No rash noted. He is not diaphoretic. No erythema. No pallor.   Psychiatric: He has a normal mood and affect. His speech is normal and behavior is normal. Judgment and thought content normal. His affect is not inappropriate. Cognition and memory are normal.   Nursing note and vitals reviewed.      Assessment/Plan     Problem List Items Addressed This Visit        Cardiovascular and Mediastinum     Malignant hypertension with heart disease, without congestive heart failure - Primary    Current Assessment & Plan     Recommended pt return for his cardiology,  I requested staff make him an appointment. Pt states he will make an appointment with his cardiology provide if he does not hear from us with an appointment.          Relevant Medications    carvedilol (COREG) 12.5 MG tablet    isosorbide mononitrate (IMDUR) 30 MG 24 hr tablet    Type 2 diabetes mellitus with diabetic peripheral angiopathy without gangrene, without long-term current use of insulin (CMS/[facility])    Relevant Medications    exenatide er (BYDUREON BCISE) 2 MG/0.85ML auto-injector injection    glucose blood test strip    metFORMIN (GLUCOPHAGE) 500 MG tablet    simvastatin (ZOCOR) 40 MG tablet    Coronary artery disease involving native coronary artery of native heart    Relevant Medications    aspirin 81 MG tablet    carvedilol (COREG) 12.5 MG tablet    isosorbide mononitrate (IMDUR) 30 MG 24 hr tablet    Other Relevant Orders    CBC & Differential    Comprehensive Metabolic Panel    TSH    Hemoglobin A1c    Vitamin D 25 Hydroxy    Vitamin B12    Lipid Panel    PSA Screen    Microalbumin / Creatinine Urine Ratio - Urine, Clean Catch    Essential hypertension    Relevant Medications    carvedilol (COREG) 12.5 MG tablet    isosorbide mononitrate (IMDUR) 30 MG 24 hr tablet    Other Relevant Orders    CBC & Differential    Comprehensive Metabolic Panel    TSH    Hemoglobin A1c    Vitamin D 25 Hydroxy    Vitamin B12    Lipid Panel    PSA Screen    Microalbumin / Creatinine Urine Ratio - Urine, Clean Catch       Respiratory    Seasonal allergic rhinitis due to pollen    Relevant Medications    diphenhydrAMINE (BENADRYL ALLERGY) 25 mg capsule       Digestive    Vitamin B 12 deficiency    Relevant Medications    cyanocobalamin 1000 MCG/ML injection    Other Relevant Orders    CBC & Differential    Comprehensive Metabolic Panel    TSH    Hemoglobin A1c     Vitamin D 25 Hydroxy    Vitamin B12    Lipid Panel    PSA Screen    Microalbumin / Creatinine Urine Ratio - Urine, Clean Catch    Vitamin D deficiency disease    Relevant Medications    vitamin D (ERGOCALCIFEROL) 66309 units capsule capsule    Other Relevant Orders    CBC & Differential    Comprehensive Metabolic Panel    TSH    Hemoglobin A1c    Vitamin D 25 Hydroxy    Vitamin B12    Lipid Panel    PSA Screen    Microalbumin / Creatinine Urine Ratio - Urine, Clean Catch    Gastroesophageal reflux disease without esophagitis    Relevant Medications    pantoprazole (PROTONIX) 40 MG EC tablet    raNITIdine (ZANTAC) 150 MG tablet       Nervous and Auditory    Lumbar back pain with radiculopathy affecting left lower extremity    Relevant Medications    traMADol (ULTRAM) 50 MG tablet    Other Relevant Orders    Urine Drug Screen - Urine, Clean Catch       Genitourinary    Benign non-nodular prostatic hyperplasia without lower urinary tract symptoms    Relevant Medications    finasteride (PROSCAR) 5 MG tablet       Other    Generalized anxiety disorder    Relevant Medications    clonazePAM (KlonoPIN) 0.5 MG tablet    amitriptyline (ELAVIL) 50 MG tablet    citalopram (CeleXA) 40 MG tablet    Abnormal thyroid blood test    Relevant Medications    isosorbide mononitrate (IMDUR) 30 MG 24 hr tablet    Other Relevant Orders    CBC & Differential    Comprehensive Metabolic Panel    TSH    Hemoglobin A1c    Vitamin D 25 Hydroxy    Vitamin B12    Lipid Panel    PSA Screen    Microalbumin / Creatinine Urine Ratio - Urine, Clean Catch    Obesity (BMI 30.0-34.9)    Current Assessment & Plan     Obesity is improving with lifestyle modifications.  Discussed the patient's BMI.  The BMI is above average; BMI management plan is completed.  General weight loss/lifestyle modification strategies discussed (elicit support from others; identify saboteurs; non-food rewards, etc).  Informal exercise measures discussed, e.g. taking stairs instead  of elevator.           Other Visit Diagnoses     Type 2 diabetes mellitus with other circulatory complication, with long-term current use of insulin (CMS/HCC)        Relevant Medications    exenatide er (BYDUREON BCISE) 2 MG/0.85ML auto-injector injection    metFORMIN (GLUCOPHAGE) 500 MG tablet    Other Relevant Orders    CBC & Differential    Comprehensive Metabolic Panel    TSH    Hemoglobin A1c    Vitamin D 25 Hydroxy    Vitamin B12    Lipid Panel    PSA Screen    Microalbumin / Creatinine Urine Ratio - Urine, Clean Catch    Uncontrolled type 2 diabetes mellitus without complication, without long-term current use of insulin (CMS/HCC)        Relevant Medications    exenatide er (BYDUREON BCISE) 2 MG/0.85ML auto-injector injection    glucose blood test strip    metFORMIN (GLUCOPHAGE) 500 MG tablet    simvastatin (ZOCOR) 40 MG tablet    Other Relevant Orders    CBC & Differential    Comprehensive Metabolic Panel    TSH    Hemoglobin A1c    Vitamin D 25 Hydroxy    Vitamin B12    Lipid Panel    PSA Screen    Microalbumin / Creatinine Urine Ratio - Urine, Clean Catch    Encounter for screening for malignant neoplasm of prostate         Relevant Orders    PSA Screen             Patient's Body mass index is 30.85 kg/m². BMI is above normal parameters. Recommendations include: exercise counseling and nutrition counseling.      I have discussed diagnosis in detail today allowing time for questions and answers. Patient is aware of reasons to seek urgent or emergent medical care as well as reasons to return to the clinic for evaluation. Possible side effects, interactions and progression of symptoms discussed as well. Patient / family states understanding.   Emotional support and active listening provided.       As part of this patient's treatment plan they are being prescribed controlled substance/substances with potential for abuse. This patient has been made aware of the appropriate use of such medications, including  potential risk of somnolence, limited ability to drive and / or work safely, and potential for overdose. It has also been made clear that these medications are for use by this patient only, without concomitant use of alcohol or other substances unless prescribed/advised by medical provider. Patient has completed controlled substance agreement detailing terms of continued prescribing of controlled substances including monitoring Danny reports, drug screens and pill counts. The patient was asked and states they are not receiving narcotic medication from any there provider or any provider that this office has not been made aware of. History and physical exam exhibit continued safe and appropriate use of controlled substances.   Danny  has been reviewed as consistent.  UDS is on file.     Continue current medication regimen      Current Outpatient Medications:   •  amitriptyline (ELAVIL) 50 MG tablet, Take 2 tablets by mouth At Night As Needed for Sleep. 1 daily, Disp: 60 tablet, Rfl: 5  •  aspirin 81 MG tablet, Take 1 tablet by mouth Daily., Disp: 90 tablet, Rfl: 3  •  carvedilol (COREG) 12.5 MG tablet, Take 1 tablet by mouth 2 (Two) Times a Day With Meals., Disp: 180 tablet, Rfl: 3  •  citalopram (CeleXA) 40 MG tablet, Take 1 tablet by mouth Daily., Disp: 30 tablet, Rfl: 5  •  clonazePAM (KlonoPIN) 0.5 MG tablet, Take 1 tablet by mouth 3 (Three) Times a Day As Needed for Anxiety., Disp: 90 tablet, Rfl: 0  •  cyanocobalamin 1000 MCG/ML injection, Inject 1 mL into the appropriate muscle as directed by prescriber Every 28 (Twenty-Eight) Days., Disp: 1 mL, Rfl: 11  •  diphenhydrAMINE (BENADRYL ALLERGY) 25 mg capsule, Take 1 capsule by mouth Every 6 (Six) Hours As Needed for Itching., Disp: 90 capsule, Rfl: 3  •  exenatide er (BYDUREON BCISE) 2 MG/0.85ML auto-injector injection, Inject 0.85 mL under the skin into the appropriate area as directed 1 (One) Time Per Week., Disp: 12 pen, Rfl: 3  •  finasteride (PROSCAR) 5 MG  tablet, Take 1 tablet by mouth Daily., Disp: 90 tablet, Rfl: 3  •  glucose blood test strip, Check blood sugar 3x times., Disp: 100 each, Rfl: 12  •  glucose monitor monitoring kit, 1 each 3 (Three) Times a Day As Needed (diabetes)., Disp: 1 each, Rfl: 0  •  isosorbide mononitrate (IMDUR) 30 MG 24 hr tablet, Take 1 tablet by mouth 2 (Two) Times a Day., Disp: 60 tablet, Rfl: 5  •  Lancets (ONETOUCH ULTRASOFT) lancets, Check blood sugar 3 times daily, Disp: 100 each, Rfl: 12  •  metFORMIN (GLUCOPHAGE) 500 MG tablet, Take 1 tablet by mouth 2 (Two) Times a Day With Meals., Disp: 180 tablet, Rfl: 3  •  naloxone (NARCAN) 4 MG/0.1ML nasal spray, 1 spray into the nostril(s) as directed by provider As Needed (overdose)., Disp: 1 each, Rfl: 0  •  ondansetron (ZOFRAN) 8 MG tablet, Take 1 tablet by mouth Every 8 (Eight) Hours As Needed for Nausea., Disp: 20 tablet, Rfl: 2  •  pantoprazole (PROTONIX) 40 MG EC tablet, Take 1 tablet by mouth Daily., Disp: 90 tablet, Rfl: 3  •  polyethylene glycol (MIRALAX) packet, Take 17 g by mouth Daily., Disp: 1 each, Rfl: 5  •  raNITIdine (ZANTAC) 150 MG tablet, Take 1 tablet by mouth 2 (Two) Times a Day., Disp: 180 tablet, Rfl: 3  •  simvastatin (ZOCOR) 40 MG tablet, Take 1 tablet by mouth Every Night., Disp: 90 tablet, Rfl: 3  •  Thyroid (NP THYROID) 30 MG PO tablet, Take 1 tablet by mouth Daily., Disp: 90 tablet, Rfl: 3  •  traMADol (ULTRAM) 50 MG tablet, Take 1 tablet by mouth Every 8 (Eight) Hours As Needed for Moderate Pain ., Disp: 90 tablet, Rfl: 2  •  vitamin D (ERGOCALCIFEROL) 65132 units capsule capsule, Take 1 capsule by mouth Every 7 (Seven) Days., Disp: 12 capsule, Rfl: 3    Current Facility-Administered Medications:   •  cyanocobalamin injection 1,000 mcg, 1,000 mcg, Intramuscular, Q28 Days, Clare Quintero, YOUNG, 1,000 mcg at 07/11/18 0843      Follow up 3 months, sooner if needed.   Routine labs every 3-6 months.       Dictated utilizing Dragon dictation. Errors in  dictation may reflect use of voice recognition software and not all errors in transcription may have been detected prior to signing.           This document has been electronically signed by:  YOUNG Conway, NP-C

## 2019-08-29 NOTE — ASSESSMENT & PLAN NOTE
Recommended pt return for his cardiology,  I requested staff make him an appointment. Pt states he will make an appointment with his cardiology provide if he does not hear from us with an appointment.

## 2019-08-29 NOTE — ASSESSMENT & PLAN NOTE
Obesity is improving with lifestyle modifications.  Discussed the patient's BMI.  The BMI is above average; BMI management plan is completed.  General weight loss/lifestyle modification strategies discussed (elicit support from others; identify saboteurs; non-food rewards, etc).  Informal exercise measures discussed, e.g. taking stairs instead of elevator.

## 2019-08-30 DIAGNOSIS — IMO0001 UNCONTROLLED TYPE 2 DIABETES MELLITUS WITHOUT COMPLICATION, WITHOUT LONG-TERM CURRENT USE OF INSULIN: ICD-10-CM

## 2019-10-14 DIAGNOSIS — M79.605 PAIN IN BOTH LOWER EXTREMITIES: Primary | ICD-10-CM

## 2019-10-14 DIAGNOSIS — M79.604 PAIN IN BOTH LOWER EXTREMITIES: Primary | ICD-10-CM

## 2019-10-19 DIAGNOSIS — Z79.4 TYPE 2 DIABETES MELLITUS WITH OTHER CIRCULATORY COMPLICATION, WITH LONG-TERM CURRENT USE OF INSULIN (HCC): ICD-10-CM

## 2019-10-19 DIAGNOSIS — E11.59 TYPE 2 DIABETES MELLITUS WITH OTHER CIRCULATORY COMPLICATION, WITH LONG-TERM CURRENT USE OF INSULIN (HCC): ICD-10-CM

## 2019-10-21 RX ORDER — EXENATIDE 2 MG/.65ML
INJECTION, SUSPENSION, EXTENDED RELEASE SUBCUTANEOUS
Qty: 5 PEN | Refills: 5 | Status: SHIPPED | OUTPATIENT
Start: 2019-10-21 | End: 2020-07-16

## 2019-10-23 DIAGNOSIS — M79.605 PAIN IN BOTH LOWER EXTREMITIES: Primary | ICD-10-CM

## 2019-10-23 DIAGNOSIS — M79.604 PAIN IN BOTH LOWER EXTREMITIES: Primary | ICD-10-CM

## 2019-11-06 ENCOUNTER — HOSPITAL ENCOUNTER (OUTPATIENT)
Dept: GENERAL RADIOLOGY | Facility: HOSPITAL | Age: 59
Discharge: HOME | End: 2019-11-06
Admitting: ORTHOPAEDIC SURGERY

## 2019-11-06 ENCOUNTER — OFFICE VISIT (OUTPATIENT)
Dept: ORTHOPEDIC SURGERY | Facility: CLINIC | Age: 59
End: 2019-11-06

## 2019-11-06 VITALS
HEART RATE: 88 BPM | SYSTOLIC BLOOD PRESSURE: 134 MMHG | WEIGHT: 214 LBS | HEIGHT: 70 IN | DIASTOLIC BLOOD PRESSURE: 85 MMHG | BODY MASS INDEX: 30.64 KG/M2

## 2019-11-06 DIAGNOSIS — M25.561 PAIN IN BOTH KNEES, UNSPECIFIED CHRONICITY: ICD-10-CM

## 2019-11-06 DIAGNOSIS — M25.562 PAIN IN BOTH KNEES, UNSPECIFIED CHRONICITY: ICD-10-CM

## 2019-11-06 DIAGNOSIS — M17.0 PRIMARY LOCALIZED OSTEOARTHRITIS OF KNEES, BILATERAL: Primary | ICD-10-CM

## 2019-11-06 PROCEDURE — 73562 X-RAY EXAM OF KNEE 3: CPT

## 2019-11-06 PROCEDURE — 99203 OFFICE O/P NEW LOW 30 MIN: CPT | Performed by: ORTHOPAEDIC SURGERY

## 2019-11-06 PROCEDURE — 73562 X-RAY EXAM OF KNEE 3: CPT | Performed by: RADIOLOGY

## 2019-11-06 NOTE — PROGRESS NOTES
New Patient Visit      Patient: Damaso Leonard  YOB: 1960  Date of Encounter: 11/06/2019        Chief Complaint:   Chief Complaint   Patient presents with   • Left Knee - Pain, Initial Evaluation   • Right Knee - Pain, Initial Evaluation         HPI:   Damaso Leonard, 59 y.o. male, referred by Clare Quintero APRN presents for evaluation of bilateral knee pain right worse than the left.  He has had no actual injury reports approximately 1 month ago while standing he stepped away from the sink and had severe pain medial aspect of his right knee.  Since then he has had continued right knee pain with difficulty sleeping difficulty walking.  He reports moderate pain in his left knee but insists that his right knee pain is much worse than his right.  He has previously had steroid injections elsewhere and reports they did not help.  He has not experienced giving way or locking of his right knee.      Active Problem List:  Patient Active Problem List   Diagnosis   • Generalized anxiety disorder   • Malignant hypertension with heart disease, without congestive heart failure   • Major depressive disorder in partial remission (CMS/HCC)   • Type 2 diabetes mellitus with diabetic peripheral angiopathy without gangrene, without long-term current use of insulin (CMS/HCC)   • Coronary artery disease involving native coronary artery of native heart   • Sleep apnea   • Obstructive sleep apnea   • Osteoarthritis involving multiple joints on both sides of body   • Vitamin D deficiency   • Vitamin B 12 deficiency   • Excessive cerumen in both ear canals   • Hypercholesterolemia   • Anginal pain (CMS/HCC)   • Lumbar back pain with radiculopathy affecting left lower extremity   • Rash and nonspecific skin eruption   • Vitamin D deficiency disease   • High risk medication use   • Gastroesophageal reflux disease without esophagitis   • Seasonal allergic rhinitis due to pollen   • Benign non-nodular prostatic  hyperplasia without lower urinary tract symptoms   • Abnormal thyroid blood test   • Essential hypertension   • Excessive cerumen in both ear canals   • Cough   • Chronic pain of both knees   • Colitis   • Right hand weakness   • ED (erectile dysfunction) of organic origin   • Preventive measure   • Obesity (BMI 30.0-34.9)         Past Medical History:  Past Medical History:   Diagnosis Date   • Anxiety    • ASCVD (arteriosclerotic cardiovascular disease)    • DM (diabetes mellitus) (CMS/HCC)    • GERD (gastroesophageal reflux disease)    • History of EKG 04/15/2015    NORMAL   • HTN (hypertension)    • Hyperlipidemia    • Injury of back    • Low back pain    • Myocardial infarction (CMS/HCC)          Past Surgical History:  Past Surgical History:   Procedure Laterality Date   • CARDIAC SURGERY      stenting   • CHOLECYSTECTOMY     • COLONOSCOPY  2007   • HERNIA REPAIR     • SHOULDER SURGERY     • TONSILLECTOMY     • TYMPANOPLASTY     • UPPER GASTROINTESTINAL ENDOSCOPY     • VASECTOMY           Family History:  Family History   Problem Relation Age of Onset   • Heart attack Father    • Heart disease Father    • Hypertension Father    • Heart attack Sister    • Diabetes Sister    • Heart disease Sister    • Hypertension Sister    • Thyroid disease Sister    • Heart attack Brother    • Diabetes Brother    • Thyroid disease Brother    • Arthritis Daughter    • COPD Daughter    • Asthma Daughter    • Depression Daughter    • Heart disease Sister    • Hypertension Sister          Social History:  Social History     Socioeconomic History   • Marital status:      Spouse name: ruben   • Number of children: 3   • Years of education: 12   • Highest education level: Not on file   Occupational History   • Occupation: retired   Tobacco Use   • Smoking status: Never Smoker   • Smokeless tobacco: Never Used   Substance and Sexual Activity   • Alcohol use: No   • Drug use: No   • Sexual activity: Defer     Body mass index is  30.71 kg/m².      Medications:  Current Outpatient Medications   Medication Sig Dispense Refill   • amitriptyline (ELAVIL) 50 MG tablet Take 2 tablets by mouth At Night As Needed for Sleep. 1 daily 60 tablet 5   • aspirin 81 MG tablet Take 1 tablet by mouth Daily. 90 tablet 3   • BYDUREON 2 MG pen-injector injection INJECT CONTENTS OF 1 PEN SUBCUTANEOUSLY INTO THE APPROPRIATE AREA AS DIRECTED ONCE WEEKLY 5 pen 5   • carvedilol (COREG) 12.5 MG tablet Take 1 tablet by mouth 2 (Two) Times a Day With Meals. 180 tablet 3   • citalopram (CeleXA) 40 MG tablet Take 1 tablet by mouth Daily. 30 tablet 5   • clonazePAM (KlonoPIN) 0.5 MG tablet Take 1 tablet by mouth 3 (Three) Times a Day As Needed for Anxiety. 90 tablet 0   • cyanocobalamin 1000 MCG/ML injection Inject 1 mL into the appropriate muscle as directed by prescriber Every 28 (Twenty-Eight) Days. 1 mL 11   • diphenhydrAMINE (BENADRYL ALLERGY) 25 mg capsule Take 1 capsule by mouth Every 6 (Six) Hours As Needed for Itching. 90 capsule 3   • finasteride (PROSCAR) 5 MG tablet Take 1 tablet by mouth Daily. 90 tablet 3   • glucose blood test strip Check blood sugar 3x times a day. 100 each 12   • glucose monitor monitoring kit 1 each 3 (Three) Times a Day As Needed (diabetes). 1 each 0   • isosorbide mononitrate (IMDUR) 30 MG 24 hr tablet Take 1 tablet by mouth 2 (Two) Times a Day. 60 tablet 5   • Lancets (ONETOUCH ULTRASOFT) lancets Check blood sugar 3 times daily 100 each 12   • metFORMIN (GLUCOPHAGE) 500 MG tablet Take 1 tablet by mouth 2 (Two) Times a Day With Meals. 180 tablet 3   • naloxone (NARCAN) 4 MG/0.1ML nasal spray 1 spray into the nostril(s) as directed by provider As Needed (overdose). 1 each 0   • ondansetron (ZOFRAN) 8 MG tablet Take 1 tablet by mouth Every 8 (Eight) Hours As Needed for Nausea. 20 tablet 2   • pantoprazole (PROTONIX) 40 MG EC tablet Take 1 tablet by mouth Daily. 90 tablet 3   • polyethylene glycol (MIRALAX) packet Take 17 g by mouth Daily.  "1 each 5   • raNITIdine (ZANTAC) 150 MG tablet Take 1 tablet by mouth 2 (Two) Times a Day. 180 tablet 3   • simvastatin (ZOCOR) 40 MG tablet Take 1 tablet by mouth Every Night. 90 tablet 3   • Thyroid (NP THYROID) 30 MG PO tablet Take 1 tablet by mouth Daily. 90 tablet 3   • traMADol (ULTRAM) 50 MG tablet Take 1 tablet by mouth Every 8 (Eight) Hours As Needed for Moderate Pain . 90 tablet 2   • vitamin D (ERGOCALCIFEROL) 41626 units capsule capsule Take 1 capsule by mouth Every 7 (Seven) Days. 12 capsule 3     Current Facility-Administered Medications   Medication Dose Route Frequency Provider Last Rate Last Dose   • cyanocobalamin injection 1,000 mcg  1,000 mcg Intramuscular Q28 Days Clare Quintero APRN   1,000 mcg at 07/11/18 0843         Allergies:  No Known Allergies      Review of Systems:   Review of Systems   Constitutional: Positive for activity change and fatigue.   HENT: Positive for hearing loss and sinus pain.    Eyes: Positive for pain, redness and itching.   Respiratory: Positive for apnea and shortness of breath.    Cardiovascular: Negative.    Gastrointestinal: Positive for nausea and vomiting.   Endocrine: Positive for heat intolerance and polydipsia.   Genitourinary: Negative.    Musculoskeletal: Positive for arthralgias and myalgias.   Skin: Negative.    Allergic/Immunologic: Negative.    Neurological: Negative.    Hematological: Negative.    Psychiatric/Behavioral: Positive for dysphoric mood and sleep disturbance. The patient is nervous/anxious.          Physical Exam:   Physical Exam  GENERAL: 59 y.o. male, alert and oriented X 3 in no acute distress.   Visit Vitals  /85   Pulse 88   Ht 177.8 cm (70\")   Wt 97.1 kg (214 lb)   BMI 30.71 kg/m²     Musculoskeletal:   Right knee evaluation reveals minimal effusion with moderate medial joint line tenderness, he demonstrates no gross instability with varus valgus stressing Lachman or drawer, he has full extension flexes to 130 " degrees Andrew's test is negative neurovascular examination grossly intact.    Knee evaluation reveals no effusion moderate medial joint line tenderness no gross instability with varus valgus stressing Lockman or drawer neurovascular grossly intact he has full mobility with negative Andrew's.    Radiology/Labs:     Radiographs obtained bilateral knees today AP lateral weightbearing shows complete loss of joint space medial compartment bilaterally with subchondral sclerosis and mild varus deformity.      Assessment & Plan:   59 y.o. male with bilateral knee osteoarthritis primarily medial compartment bilaterally with mild varus alignment.  We discussed his options today he insists that he wishes to proceed with knee replacement surgery.  He may be candidate for unicompartmental knee replacement.  At his request he is referred to Dr. Barba [facility] or consideration of the above.      ICD-10-CM ICD-9-CM   1. Primary localized osteoarthritis of knees, bilateral M17.0 715.16   2. Pain in both knees, unspecified chronicity M25.561 719.46    M25.562            Cc:   Clare Quintero APRN                This document has been electronically signed by Charan Culver MD   November 8, 2019 1:51 PM

## 2019-11-21 ENCOUNTER — OFFICE VISIT (OUTPATIENT)
Dept: ORTHOPEDIC SURGERY | Facility: CLINIC | Age: 59
End: 2019-11-21

## 2019-11-21 VITALS — HEART RATE: 105 BPM | OXYGEN SATURATION: 98 % | WEIGHT: 214.07 LBS | HEIGHT: 70 IN | BODY MASS INDEX: 30.65 KG/M2

## 2019-11-21 DIAGNOSIS — M17.0 PRIMARY OSTEOARTHRITIS OF BOTH KNEES: Primary | ICD-10-CM

## 2019-11-21 PROCEDURE — 99214 OFFICE O/P EST MOD 30 MIN: CPT | Performed by: ORTHOPAEDIC SURGERY

## 2019-11-21 RX ORDER — OXYCODONE HCL 10 MG/1
10 TABLET, FILM COATED, EXTENDED RELEASE ORAL ONCE
Status: CANCELLED | OUTPATIENT
Start: 2019-11-21 | End: 2019-11-21

## 2019-11-21 RX ORDER — HYDROGEN PEROXIDE 2.65 ML/100ML
81 LIQUID ORAL; TOPICAL DAILY
Refills: 3 | COMMUNITY
Start: 2019-08-29 | End: 2019-11-21 | Stop reason: SDUPTHER

## 2019-11-21 RX ORDER — ACETAMINOPHEN 325 MG/1
1000 TABLET ORAL ONCE
Status: CANCELLED | OUTPATIENT
Start: 2019-11-21 | End: 2019-11-21

## 2019-11-21 RX ORDER — MELOXICAM 7.5 MG/1
15 TABLET ORAL ONCE
Status: CANCELLED | OUTPATIENT
Start: 2019-11-21 | End: 2019-11-21

## 2019-11-21 NOTE — PROGRESS NOTES
Orthopaedic Clinic Note: Knee New Patient    Chief Complaint   Patient presents with   • Right Knee - Pain   • Left Knee - Pain        HPI  Consult from: Clare Cheema Brought*    Damaso Leonard is a 59 y.o. male who presents with bilateral knee pain for 12 year(s). Onset has been atraumatic and gradual nature.  Patient is complaining of a constant aching throbbing sensation in the bilateral knees, right worse than left that is been ongoing for 12 years but is gotten progressively worse over the past 2 months.  Walking weightbearing activities increases pain.  His pain is currently a 2/10 on the pain scale when sitting and resting but increases to a 10/10 on the pain scale when walking.  He is describing the pain as a constant ache and throb with associated swelling and stiffness.  Prior treatments have included ibuprofen and tramadol for the past 3 months with only minimal improvement.  He is also attempted treatment with physical therapy and bracing.  Despite these interventions, his pain is persisting and causing limitations in daily activities.  He is here to discuss treatment options due to his ongoing bilateral knee pain which is causing severe limitations in daily activities.    Past Medical History:   Diagnosis Date   • Anxiety    • ASCVD (arteriosclerotic cardiovascular disease)    • DM (diabetes mellitus) (CMS/HCC)    • GERD (gastroesophageal reflux disease)    • History of EKG 04/15/2015    NORMAL   • HTN (hypertension)    • Hyperlipidemia    • Injury of back    • Low back pain    • Myocardial infarction (CMS/HCC)       Past Surgical History:   Procedure Laterality Date   • CARDIAC SURGERY      stenting   • CHOLECYSTECTOMY     • COLONOSCOPY  2007   • HERNIA REPAIR     • SHOULDER SURGERY     • TONSILLECTOMY     • TYMPANOPLASTY     • UPPER GASTROINTESTINAL ENDOSCOPY     • VASECTOMY        Family History   Problem Relation Age of Onset   • Heart attack Father    • Heart disease Father    • Hypertension  Father    • Heart attack Sister    • Diabetes Sister    • Heart disease Sister    • Hypertension Sister    • Thyroid disease Sister    • Heart attack Brother    • Diabetes Brother    • Thyroid disease Brother    • Arthritis Daughter    • COPD Daughter    • Asthma Daughter    • Depression Daughter    • Heart disease Sister    • Hypertension Sister      Social History     Socioeconomic History   • Marital status:      Spouse name: ruben   • Number of children: 3   • Years of education: 12   • Highest education level: Not on file   Occupational History   • Occupation: retired   Tobacco Use   • Smoking status: Never Smoker   • Smokeless tobacco: Never Used   Substance and Sexual Activity   • Alcohol use: No   • Drug use: No   • Sexual activity: Defer      Current Outpatient Medications on File Prior to Visit   Medication Sig Dispense Refill   • amitriptyline (ELAVIL) 50 MG tablet Take 2 tablets by mouth At Night As Needed for Sleep. 1 daily 60 tablet 5   • aspirin 81 MG tablet Take 1 tablet by mouth Daily. 90 tablet 3   • BYDUREON 2 MG pen-injector injection INJECT CONTENTS OF 1 PEN SUBCUTANEOUSLY INTO THE APPROPRIATE AREA AS DIRECTED ONCE WEEKLY 5 pen 5   • carvedilol (COREG) 12.5 MG tablet Take 1 tablet by mouth 2 (Two) Times a Day With Meals. 180 tablet 3   • citalopram (CeleXA) 40 MG tablet Take 1 tablet by mouth Daily. 30 tablet 5   • clonazePAM (KlonoPIN) 0.5 MG tablet Take 1 tablet by mouth 3 (Three) Times a Day As Needed for Anxiety. 90 tablet 0   • cyanocobalamin 1000 MCG/ML injection Inject 1 mL into the appropriate muscle as directed by prescriber Every 28 (Twenty-Eight) Days. 1 mL 11   • diphenhydrAMINE (BENADRYL ALLERGY) 25 mg capsule Take 1 capsule by mouth Every 6 (Six) Hours As Needed for Itching. 90 capsule 3   • finasteride (PROSCAR) 5 MG tablet Take 1 tablet by mouth Daily. 90 tablet 3   • glucose blood test strip Check blood sugar 3x times a day. 100 each 12   • glucose monitor monitoring kit 1  each 3 (Three) Times a Day As Needed (diabetes). 1 each 0   • isosorbide mononitrate (IMDUR) 30 MG 24 hr tablet Take 1 tablet by mouth 2 (Two) Times a Day. 60 tablet 5   • Lancets (ONETOUCH ULTRASOFT) lancets Check blood sugar 3 times daily 100 each 12   • metFORMIN (GLUCOPHAGE) 500 MG tablet Take 1 tablet by mouth 2 (Two) Times a Day With Meals. 180 tablet 3   • naloxone (NARCAN) 4 MG/0.1ML nasal spray 1 spray into the nostril(s) as directed by provider As Needed (overdose). 1 each 0   • ondansetron (ZOFRAN) 8 MG tablet Take 1 tablet by mouth Every 8 (Eight) Hours As Needed for Nausea. 20 tablet 2   • pantoprazole (PROTONIX) 40 MG EC tablet Take 1 tablet by mouth Daily. 90 tablet 3   • polyethylene glycol (MIRALAX) packet Take 17 g by mouth Daily. 1 each 5   • simvastatin (ZOCOR) 40 MG tablet Take 1 tablet by mouth Every Night. 90 tablet 3   • Thyroid (NP THYROID) 30 MG PO tablet Take 1 tablet by mouth Daily. 90 tablet 3   • traMADol (ULTRAM) 50 MG tablet Take 1 tablet by mouth Every 8 (Eight) Hours As Needed for Moderate Pain . 90 tablet 2   • vitamin D (ERGOCALCIFEROL) 23810 units capsule capsule Take 1 capsule by mouth Every 7 (Seven) Days. 12 capsule 3   • [DISCONTINUED] raNITIdine (ZANTAC) 150 MG tablet Take 1 tablet by mouth 2 (Two) Times a Day. 180 tablet 3   • [DISCONTINUED] EQ ASPIRIN ADULT LOW DOSE 81 MG EC tablet Take 81 mg by mouth Daily.  3     Current Facility-Administered Medications on File Prior to Visit   Medication Dose Route Frequency Provider Last Rate Last Dose   • cyanocobalamin injection 1,000 mcg  1,000 mcg Intramuscular Q28 Days Clare Quintero APRN   1,000 mcg at 07/11/18 0843      No Known Allergies     Review of Systems   Constitutional: Positive for activity change and fatigue.   HENT: Negative.    Eyes: Positive for discharge and itching.   Respiratory: Positive for chest tightness and shortness of breath.    Cardiovascular: Positive for chest pain and leg swelling.  "  Gastrointestinal: Positive for abdominal pain, constipation, diarrhea and nausea.   Endocrine: Positive for heat intolerance.   Genitourinary: Negative.    Musculoskeletal: Positive for arthralgias (knee pain), back pain, joint swelling, neck pain and neck stiffness.   Skin: Negative.    Allergic/Immunologic: Negative.    Neurological: Positive for dizziness, weakness and light-headedness.   Hematological: Negative.    Psychiatric/Behavioral: Positive for agitation and sleep disturbance. The patient is nervous/anxious.         The patient's Review of Systems was personally reviewed and confirmed as accurate.    The following portions of the patient's history were reviewed and updated as appropriate: allergies, current medications, past family history, past medical history, past social history, past surgical history and problem list.    Physical Exam  Pulse 105, height 177.8 cm (70\"), weight 97.1 kg (214 lb 1.1 oz), SpO2 98 %.    Body mass index is 30.72 kg/m².    GENERAL APPEARANCE: awake, alert & oriented x 3, in no acute distress and well developed, well nourished  PSYCH: normal affect  LUNGS:  breathing nonlabored  EYES: sclera anicteric  CARDIOVASCULAR: palpable dorsalis pedis, palpable posterior tibial bilaterally. Capillary refill less than 2 seconds  EXTREMITIES: no clubbing, cyanosis  GAIT:  Antalgic            Right Lower Extremity Exam:   ----------  Hip Exam  ----------  FLEXION CONTRACTURE: None  FLEXION: 110 degrees  INTERNAL ROTATION: 20 degrees at 90 degrees of flexion   EXTERNAL ROTATION: 40 degrees at 90 degrees of flexion    PAIN WITH HIP MOTION: no  ----------  Knee Exam  ----------  ALIGNMENT: severe varus, correctable to neutral    RANGE OF MOTION:  Decreased (5 - 115 degrees) with no extensor lag  LIGAMENTOUS STABILITY:   stable to varus and valgus stress at terminal extension and 30 degrees; retensioning of the MCL is appreciated with valgus stress at 30 degrees consistent with medial " compartment degeneration     STRENGTH:  5/5 knee flexion, extension. 5/5 ankle dorsiflexion and plantarflexion.     PAIN WITH PALPATION: global  KNEE EFFUSION: yes, trace effusion  PAIN WITH KNEE ROM: yes, global but primarily along medial knee  PATELLAR CREPITUS: yes, painful and symptomatic  SPECIAL EXAM FINDINGS:  painful patellar compression    REFLEXES:  PATELLAR 2+/4  ACHILLES 2+/4    CLONUS: no  STRAIGHT LEG TEST:   negative    SENSATION TO LIGHT TOUCH:  DEEP PERONEAL/SUPERFICIAL PERONEAL/SURAL/SAPHENOUS/TIBIAL:   intact    EDEMA:  no  ERYTHEMA:  no  WOUNDS/INCISIONS:  no        Left Lower Extremity Exam:   ----------  Hip Exam  ----------  FLEXION CONTRACTURE: None  FLEXION: 110 degrees  INTERNAL ROTATION: 20 degrees at 90 degrees of flexion   EXTERNAL ROTATION: 40 degrees at 90 degrees of flexion    PAIN WITH HIP MOTION: no  ----------  Knee Exam  ----------  ALIGNMENT: severe varus, correctable to neutral    RANGE OF MOTION:  Decreased (5 - 115 degrees) with no extensor lag  LIGAMENTOUS STABILITY:   stable to varus and valgus stress at terminal extension and 30 degrees; retensioning of the MCL is appreciated with valgus stress at 30 degrees consistent with medial compartment degeneration     STRENGTH:  5/5 knee flexion, extension. 5/5 ankle dorsiflexion and plantarflexion.     PAIN WITH PALPATION: global  KNEE EFFUSION: yes, trace effusion  PAIN WITH KNEE ROM: yes, global but primarily along medial knee  PATELLAR CREPITUS: yes, painful and symptomatic  SPECIAL EXAM FINDINGS:  painful patellar compression    REFLEXES:  PATELLAR 2+/4  ACHILLES 2+/4    CLONUS: no  STRAIGHT LEG TEST:   negative    SENSATION TO LIGHT TOUCH:  DEEP PERONEAL/SUPERFICIAL PERONEAL/SURAL/SAPHENOUS/TIBIAL:   intact    EDEMA:  no  ERYTHEMA:  no  WOUNDS/INCISIONS:  no    ______________________________________________________________________  ______________________________________________________________________    RADIOGRAPHIC FINDINGS:    Indication: Bilateral knee pain    Comparison: Todays xrays were compared to previous xrays from 11/6/2019    Bilateral knee(s) 4 views: moderate to severe tricompartmental arthritis with genu varum alignment, periarticular osteophytes visualized in all compartments and No significant changes compared to prior radiographs.      Assessment/Plan:   Diagnosis Plan   1. Primary osteoarthritis of both knees  XR Knee 4+ View Bilateral    Case Request    CBC and Differential    Basic metabolic panel    Protime-INR    APTT    Hemoglobin A1c    Urinalysis With Culture If Indicated -    ECG 12 Lead    Nicotine & Metabolite, Quant    Tranexamic Acid 1,000 mg in sodium chloride 0.9 % 100 mL    Tranexamic Acid 1,000 mg in sodium chloride 0.9 % 100 mL    ceFAZolin (ANCEF) 2 g in sodium chloride 0.9 % 100 mL IVPB    acetaminophen (TYLENOL) tablet 975 mg    meloxicam (MOBIC) tablet 15 mg    mupirocin (BACTROBAN) 2 % nasal ointment 1 application    oxyCODONE (oxyCONTIN) 12 hr tablet 10 mg    Case Request     Patient is evidence of bilateral knee osteoarthritis.  Patient is having symptoms as well as radiographic findings consistent with tricompartmental disease and therefore I believe he would benefit from total knee arthroplasty versus partial knee arthroplasty.  I discussed the risk and benefits of both options and patient elected to proceed with total knee arthroplasty.  The right knee is most symptomatic we will proceed with right knee arthroplasty first.  He will need cardiac clearance prior to proceeding to surgery.  We will plan on right knee arthroplasty in January.    The patient has clinical and radiographic evidence of end-stage right knee joint degeneration. Conservative measures have been tried for 3 months or longer, but have failed to adequately treat or improve the patient's symptoms. Pain is restricting the patient's daily activities as well as quality of life. The recommendation at this time is to proceed with a  right total knee arthroplasty with the goal to improve patient function and pain. The risks, benefits, potential complications, and alternatives were discussed with the patient in detail. Risks included but were not limited to bleeding, infection, anesthesia risks, damage to neurovascular structures, osteolysis, aseptic loosening, instability, dislocation, pain, continued pain, iatrogenic fracture, possible need for future surgery including the potential for amputation, blood clots, myocardial infarction, stroke, and death. Carly-operative blood management and the potential for blood transfusion were discussed with risks and options clearly outlined. Specific details of the surgical procedure, hospitalization, recovery, rehabilitation, and long-term precautions were also presented. Pre-operative teaching was provided. Implant/prosthesis selection was outlined, and the many options available were explained; the final choice will be made at the time of the procedure to match the anatomy and condition of the bone, ligaments, tendons, and muscles. Given this instruction, the patient elected to proceed with the right total knee arthroplasty. The patient will be seen by pre-admission testing for pre-operative optimization and risk assessment and will be scheduled for surgery once this is completed.    The patient is considered standard risk for DVT based on patient risk factors and will be placed on aspirin postoperatively for DVT prophylaxis.      Ortiz Barba MD  11/21/19  3:05 PM

## 2019-12-06 ENCOUNTER — LAB (OUTPATIENT)
Dept: FAMILY MEDICINE CLINIC | Facility: CLINIC | Age: 59
End: 2019-12-06

## 2019-12-06 DIAGNOSIS — I10 ESSENTIAL HYPERTENSION: ICD-10-CM

## 2019-12-06 DIAGNOSIS — Z12.5 ENCOUNTER FOR SCREENING FOR MALIGNANT NEOPLASM OF PROSTATE: ICD-10-CM

## 2019-12-06 DIAGNOSIS — E11.59 TYPE 2 DIABETES MELLITUS WITH OTHER CIRCULATORY COMPLICATION, WITH LONG-TERM CURRENT USE OF INSULIN (HCC): ICD-10-CM

## 2019-12-06 DIAGNOSIS — Z79.4 TYPE 2 DIABETES MELLITUS WITH OTHER CIRCULATORY COMPLICATION, WITH LONG-TERM CURRENT USE OF INSULIN (HCC): ICD-10-CM

## 2019-12-06 DIAGNOSIS — E53.8 VITAMIN B 12 DEFICIENCY: ICD-10-CM

## 2019-12-06 DIAGNOSIS — IMO0001 UNCONTROLLED TYPE 2 DIABETES MELLITUS WITHOUT COMPLICATION, WITHOUT LONG-TERM CURRENT USE OF INSULIN: ICD-10-CM

## 2019-12-06 DIAGNOSIS — I25.10 CORONARY ARTERY DISEASE INVOLVING NATIVE CORONARY ARTERY OF NATIVE HEART WITHOUT ANGINA PECTORIS: ICD-10-CM

## 2019-12-06 DIAGNOSIS — R79.89 ABNORMAL THYROID BLOOD TEST: ICD-10-CM

## 2019-12-06 DIAGNOSIS — E55.9 VITAMIN D DEFICIENCY DISEASE: ICD-10-CM

## 2019-12-06 PROCEDURE — 36415 COLL VENOUS BLD VENIPUNCTURE: CPT | Performed by: NURSE PRACTITIONER

## 2019-12-07 LAB
25(OH)D3+25(OH)D2 SERPL-MCNC: 43.3 NG/ML (ref 30–100)
ALBUMIN SERPL-MCNC: 4.4 G/DL (ref 3.5–5.2)
ALBUMIN/GLOB SERPL: 1.5 G/DL
ALP SERPL-CCNC: 98 U/L (ref 39–117)
ALT SERPL-CCNC: 12 U/L (ref 1–41)
AST SERPL-CCNC: 5 U/L (ref 1–40)
BASOPHILS # BLD AUTO: 0.03 10*3/MM3 (ref 0–0.2)
BASOPHILS NFR BLD AUTO: 0.4 % (ref 0–1.5)
BILIRUB SERPL-MCNC: 0.4 MG/DL (ref 0.2–1.2)
BUN SERPL-MCNC: 13 MG/DL (ref 6–20)
BUN/CREAT SERPL: 14 (ref 7–25)
CALCIUM SERPL-MCNC: 9.5 MG/DL (ref 8.6–10.5)
CHLORIDE SERPL-SCNC: 98 MMOL/L (ref 98–107)
CHOLEST SERPL-MCNC: 161 MG/DL (ref 0–200)
CO2 SERPL-SCNC: 26.7 MMOL/L (ref 22–29)
CREAT SERPL-MCNC: 0.93 MG/DL (ref 0.76–1.27)
EOSINOPHIL # BLD AUTO: 0.39 10*3/MM3 (ref 0–0.4)
EOSINOPHIL NFR BLD AUTO: 5.1 % (ref 0.3–6.2)
ERYTHROCYTE [DISTWIDTH] IN BLOOD BY AUTOMATED COUNT: 14.1 % (ref 12.3–15.4)
GLOBULIN SER CALC-MCNC: 2.9 GM/DL
GLUCOSE SERPL-MCNC: 125 MG/DL (ref 65–99)
HBA1C MFR BLD: 7.73 % (ref 4.8–5.6)
HCT VFR BLD AUTO: 43.3 % (ref 37.5–51)
HDLC SERPL-MCNC: 47 MG/DL (ref 40–60)
HGB BLD-MCNC: 14 G/DL (ref 13–17.7)
IMM GRANULOCYTES # BLD AUTO: 0.02 10*3/MM3 (ref 0–0.05)
IMM GRANULOCYTES NFR BLD AUTO: 0.3 % (ref 0–0.5)
LDLC SERPL CALC-MCNC: 74 MG/DL (ref 0–100)
LYMPHOCYTES # BLD AUTO: 2.44 10*3/MM3 (ref 0.7–3.1)
LYMPHOCYTES NFR BLD AUTO: 31.8 % (ref 19.6–45.3)
MCH RBC QN AUTO: 27.2 PG (ref 26.6–33)
MCHC RBC AUTO-ENTMCNC: 32.3 G/DL (ref 31.5–35.7)
MCV RBC AUTO: 84.1 FL (ref 79–97)
MONOCYTES # BLD AUTO: 0.72 10*3/MM3 (ref 0.1–0.9)
MONOCYTES NFR BLD AUTO: 9.4 % (ref 5–12)
NEUTROPHILS # BLD AUTO: 4.07 10*3/MM3 (ref 1.7–7)
NEUTROPHILS NFR BLD AUTO: 53 % (ref 42.7–76)
NRBC BLD AUTO-RTO: 0 /100 WBC (ref 0–0.2)
PLATELET # BLD AUTO: 387 10*3/MM3 (ref 140–450)
POTASSIUM SERPL-SCNC: 3.8 MMOL/L (ref 3.5–5.2)
PROT SERPL-MCNC: 7.3 G/DL (ref 6–8.5)
PSA SERPL-MCNC: 0.42 NG/ML (ref 0–4)
RBC # BLD AUTO: 5.15 10*6/MM3 (ref 4.14–5.8)
SODIUM SERPL-SCNC: 141 MMOL/L (ref 136–145)
TRIGL SERPL-MCNC: 198 MG/DL (ref 0–150)
TSH SERPL DL<=0.005 MIU/L-ACNC: 4.13 UIU/ML (ref 0.27–4.2)
VIT B12 SERPL-MCNC: 452 PG/ML (ref 211–946)
VLDLC SERPL CALC-MCNC: 39.6 MG/DL
WBC # BLD AUTO: 7.67 10*3/MM3 (ref 3.4–10.8)

## 2019-12-09 ENCOUNTER — RESULTS ENCOUNTER (OUTPATIENT)
Dept: FAMILY MEDICINE CLINIC | Facility: CLINIC | Age: 59
End: 2019-12-09

## 2019-12-09 ENCOUNTER — TELEPHONE (OUTPATIENT)
Dept: FAMILY MEDICINE CLINIC | Facility: CLINIC | Age: 59
End: 2019-12-09

## 2019-12-09 ENCOUNTER — OFFICE VISIT (OUTPATIENT)
Dept: FAMILY MEDICINE CLINIC | Facility: CLINIC | Age: 59
End: 2019-12-09

## 2019-12-09 VITALS
DIASTOLIC BLOOD PRESSURE: 80 MMHG | HEIGHT: 70 IN | HEART RATE: 85 BPM | OXYGEN SATURATION: 94 % | WEIGHT: 217 LBS | SYSTOLIC BLOOD PRESSURE: 130 MMHG | TEMPERATURE: 98.3 F | BODY MASS INDEX: 31.07 KG/M2

## 2019-12-09 DIAGNOSIS — E66.9 OBESITY (BMI 30.0-34.9): ICD-10-CM

## 2019-12-09 DIAGNOSIS — M54.16 LUMBAR BACK PAIN WITH RADICULOPATHY AFFECTING LEFT LOWER EXTREMITY: ICD-10-CM

## 2019-12-09 DIAGNOSIS — G47.33 OBSTRUCTIVE SLEEP APNEA SYNDROME: ICD-10-CM

## 2019-12-09 DIAGNOSIS — K21.9 GASTROESOPHAGEAL REFLUX DISEASE WITHOUT ESOPHAGITIS: ICD-10-CM

## 2019-12-09 DIAGNOSIS — E11.51 TYPE 2 DIABETES MELLITUS WITH DIABETIC PERIPHERAL ANGIOPATHY WITHOUT GANGRENE, WITHOUT LONG-TERM CURRENT USE OF INSULIN (HCC): ICD-10-CM

## 2019-12-09 DIAGNOSIS — H61.23 EXCESSIVE CERUMEN IN BOTH EAR CANALS: ICD-10-CM

## 2019-12-09 DIAGNOSIS — M15.9 OSTEOARTHRITIS INVOLVING MULTIPLE JOINTS ON BOTH SIDES OF BODY: ICD-10-CM

## 2019-12-09 DIAGNOSIS — F41.1 GENERALIZED ANXIETY DISORDER: ICD-10-CM

## 2019-12-09 DIAGNOSIS — I25.10 CORONARY ARTERY DISEASE INVOLVING NATIVE CORONARY ARTERY OF NATIVE HEART WITHOUT ANGINA PECTORIS: ICD-10-CM

## 2019-12-09 DIAGNOSIS — E55.9 VITAMIN D DEFICIENCY DISEASE: ICD-10-CM

## 2019-12-09 DIAGNOSIS — I10 ESSENTIAL HYPERTENSION: ICD-10-CM

## 2019-12-09 DIAGNOSIS — N40.0 BENIGN NON-NODULAR PROSTATIC HYPERPLASIA WITHOUT LOWER URINARY TRACT SYMPTOMS: ICD-10-CM

## 2019-12-09 DIAGNOSIS — Z23 ENCOUNTER FOR VACCINATION: Primary | ICD-10-CM

## 2019-12-09 DIAGNOSIS — E53.8 VITAMIN B 12 DEFICIENCY: ICD-10-CM

## 2019-12-09 DIAGNOSIS — G47.33 OBSTRUCTIVE SLEEP APNEA: ICD-10-CM

## 2019-12-09 DIAGNOSIS — F32.4 MAJOR DEPRESSIVE DISORDER WITH SINGLE EPISODE, IN PARTIAL REMISSION (HCC): ICD-10-CM

## 2019-12-09 DIAGNOSIS — I11.9: ICD-10-CM

## 2019-12-09 DIAGNOSIS — I20.9 ANGINAL PAIN (HCC): ICD-10-CM

## 2019-12-09 DIAGNOSIS — I50.42 CHRONIC COMBINED SYSTOLIC AND DIASTOLIC HEART FAILURE (HCC): ICD-10-CM

## 2019-12-09 PROCEDURE — G0009 ADMIN PNEUMOCOCCAL VACCINE: HCPCS | Performed by: NURSE PRACTITIONER

## 2019-12-09 PROCEDURE — 96160 PT-FOCUSED HLTH RISK ASSMT: CPT | Performed by: NURSE PRACTITIONER

## 2019-12-09 PROCEDURE — 69210 REMOVE IMPACTED EAR WAX UNI: CPT | Performed by: NURSE PRACTITIONER

## 2019-12-09 PROCEDURE — 90670 PCV13 VACCINE IM: CPT | Performed by: NURSE PRACTITIONER

## 2019-12-09 PROCEDURE — G0439 PPPS, SUBSEQ VISIT: HCPCS | Performed by: NURSE PRACTITIONER

## 2019-12-09 RX ORDER — ICOSAPENT ETHYL 1000 MG/1
CAPSULE ORAL
Qty: 120 CAPSULE | Refills: 5 | Status: SHIPPED | OUTPATIENT
Start: 2019-12-09 | End: 2020-10-19 | Stop reason: SDUPTHER

## 2019-12-09 RX ORDER — TRAMADOL HYDROCHLORIDE 50 MG/1
50 TABLET ORAL EVERY 8 HOURS PRN
Qty: 90 TABLET | Refills: 2 | Status: SHIPPED | OUTPATIENT
Start: 2019-12-09 | End: 2020-01-28 | Stop reason: HOSPADM

## 2019-12-09 RX ORDER — DICYCLOMINE HYDROCHLORIDE 10 MG/1
10 CAPSULE ORAL
Qty: 120 CAPSULE | Refills: 5 | Status: SHIPPED | OUTPATIENT
Start: 2019-12-09 | End: 2020-04-21 | Stop reason: SDUPTHER

## 2019-12-09 RX ORDER — CLONAZEPAM 0.5 MG/1
0.5 TABLET ORAL 3 TIMES DAILY PRN
Qty: 90 TABLET | Refills: 0 | Status: SHIPPED | OUTPATIENT
Start: 2019-12-09 | End: 2020-03-09 | Stop reason: SDUPTHER

## 2019-12-09 NOTE — PROGRESS NOTES
The ABCs of the Annual Wellness Visit  Subsequent Medicare Wellness Visit    Chief Complaint   Patient presents with   • Medicare Wellness-subsequent     Knee pain-going to have knee surgery    B 12 def - stable with current medication      Hypertension with chf - not been to cardiology for over a year. Denies any recent chest pain or shortness of breath.     Type 2 dm - improved with lifestyle and medication.   No hyperglycemia or hypoglycemia.      Hyperlipidemia - working on diet and lifestyle , compliant with medications.      Lumbar back pain with radiculopathy affecting the left lower limb. Pt receives ultram which is helpful with his pain. He has attended physical therapy with some improvement. He describes his pain as sharp and stinging in the low back radiating into the left lower extremity. Activity increases the pain. Rest helps very little. He does rate this pain as 8 on psr prior to taking his medication each morning and 4 on psr with his current medication regimen. He has no history of substance addiction or abuse. Radiology is on file .     Chronic knee pain - rates as 8 on psr. Scheduled for knee replacement in January. Will do one knee at a time.     Reflux is stable with PPI. Avoids certain foods .    Sleep apnea-requires CPAP      Malignant hypertension with heart disease/coronary artery disease/congestive heart failure/angina-under the care of cardiology.  Patient reports that he has not had a recent cardiology visit.  He does state that he has not had chest pain in a very long time.  He is compliant with his medications.     BECKY - stable with current medication regimen. Receives Klonopin  on an as needed basis. He also receives Celexa.                        Subjective      History of Present Illness:    See above for HPI      Damaso Leonard is a 59 y.o. male who presents for a Subsequent Medicare Wellness Visit.    HEALTH RISK ASSESSMENT    Recent Hospitalizations:  Recently treated at the  following:  Other: [facility] January 2019 GI infection     Current Medical Providers:  Patient Care Team:  Clare Quintero APRN as PCP - General  Clare Quintero APRN as PCP - Family Medicine    Smoking Status:  Social History     Tobacco Use   Smoking Status Never Smoker   Smokeless Tobacco Never Used       Alcohol Consumption:  Social History     Substance and Sexual Activity   Alcohol Use No      Visual Acuity Screening    Right eye Left eye Both eyes   Without correction:      With correction: 20/20 20/20 20/20   Hearing is adequate per whisper test bilateral.     Depression Screen:   PHQ-2/PHQ-9 Depression Screening 12/9/2019   Little interest or pleasure in doing things 0   Feeling down, depressed, or hopeless 0   Trouble falling or staying asleep, or sleeping too much 1   Feeling tired or having little energy 0   Poor appetite or overeating 0   Feeling bad about yourself - or that you are a failure or have let yourself or your family down 0   Trouble concentrating on things, such as reading the newspaper or watching television 0   Moving or speaking so slowly that other people could have noticed. Or the opposite - being so fidgety or restless that you have been moving around a lot more than usual 0   Thoughts that you would be better off dead, or of hurting yourself in some way 0   Total Score 1   If you checked off any problems, how difficult have these problems made it for you to do your work, take care of things at home, or get along with other people? Not difficult at all         Fall Risk Screen:  I have discussed fall risk screening with the patient.  He is at moderate risk for falls.  Health Habits and Functional and Cognitive Screening:  Functional & Cognitive Status 12/9/2019   Do you have difficulty preparing food and eating? No   Do you have difficulty bathing yourself, getting dressed or grooming yourself? No   Do you have difficulty using the toilet? No   Do you have difficulty  moving around from place to place? No   Do you have trouble with steps or getting out of a bed or a chair? No   Current Diet Well Balanced Diet   Dental Exam Not up to date   Eye Exam Up to date   Exercise (times per week) 1 times per week   Current Exercise Activities Include Walking   Do you need help using the phone?  No   Are you deaf or do you have serious difficulty hearing?  No   Do you need help with transportation? No   Do you need help shopping? No   Do you need help preparing meals?  No   Do you need help with housework?  No   Do you need help with laundry? No   Do you need help taking your medications? No   Do you need help managing money? No   Do you ever drive or ride in a car without wearing a seat belt? No   Have you felt unusual stress, anger or loneliness in the last month? No   Who do you live with? Spouse   If you need help, do you have trouble finding someone available to you? No   Have you been bothered in the last four weeks by sexual problems? No   Do you have difficulty concentrating, remembering or making decisions? No         Does the patient have evidence of cognitive impairment? No    Asprin use counseling:Taking ASA appropriately as indicated    Age-appropriate Screening Schedule:  Refer to the list below for future screening recommendations based on patient's age, sex and/or medical conditions. Orders for these recommended tests are listed in the plan section. The patient has been provided with a written plan.    Health Maintenance   Topic Date Due   • URINE MICROALBUMIN  07/11/2018   • ZOSTER VACCINE (2 of 3) 08/01/2019   • DXA SCAN  12/14/2020 (Originally 1/19/2019)   • PNEUMOCOCCAL VACCINE (19-64 HIGHEST RISK) (2 of 3 - PPSV23) 02/03/2020   • DIABETIC FOOT EXAM  02/27/2020   • DIABETIC EYE EXAM  05/20/2020   • HEMOGLOBIN A1C  06/06/2020   • PROSTATE CANCER SCREENING  12/06/2020   • LIPID PANEL  12/06/2020   • COLONOSCOPY  11/19/2024   • TDAP/TD VACCINES (3 - Td) 04/19/2027   •  INFLUENZA VACCINE  Completed          The following portions of the patient's history were reviewed and updated as appropriate: allergies, current medications, past family history, past medical history, past social history, past surgical history and problem list.    Outpatient Medications Prior to Visit   Medication Sig Dispense Refill   • amitriptyline (ELAVIL) 50 MG tablet Take 2 tablets by mouth At Night As Needed for Sleep. 1 daily 60 tablet 5   • aspirin 81 MG tablet Take 1 tablet by mouth Daily. 90 tablet 3   • BYDUREON 2 MG pen-injector injection INJECT CONTENTS OF 1 PEN SUBCUTANEOUSLY INTO THE APPROPRIATE AREA AS DIRECTED ONCE WEEKLY 5 pen 5   • carvedilol (COREG) 12.5 MG tablet Take 1 tablet by mouth 2 (Two) Times a Day With Meals. 180 tablet 3   • citalopram (CeleXA) 40 MG tablet Take 1 tablet by mouth Daily. 30 tablet 5   • cyanocobalamin 1000 MCG/ML injection Inject 1 mL into the appropriate muscle as directed by prescriber Every 28 (Twenty-Eight) Days. 1 mL 11   • diphenhydrAMINE (BENADRYL ALLERGY) 25 mg capsule Take 1 capsule by mouth Every 6 (Six) Hours As Needed for Itching. 90 capsule 3   • finasteride (PROSCAR) 5 MG tablet Take 1 tablet by mouth Daily. 90 tablet 3   • glucose blood test strip Check blood sugar 3x times a day. 100 each 12   • glucose monitor monitoring kit 1 each 3 (Three) Times a Day As Needed (diabetes). 1 each 0   • isosorbide mononitrate (IMDUR) 30 MG 24 hr tablet Take 1 tablet by mouth 2 (Two) Times a Day. 60 tablet 5   • Lancets (ONETOUCH ULTRASOFT) lancets Check blood sugar 3 times daily 100 each 12   • metFORMIN (GLUCOPHAGE) 500 MG tablet Take 1 tablet by mouth 2 (Two) Times a Day With Meals. 180 tablet 3   • naloxone (NARCAN) 4 MG/0.1ML nasal spray 1 spray into the nostril(s) as directed by provider As Needed (overdose). 1 each 0   • ondansetron (ZOFRAN) 8 MG tablet Take 1 tablet by mouth Every 8 (Eight) Hours As Needed for Nausea. 20 tablet 2   • pantoprazole (PROTONIX) 40  MG EC tablet Take 1 tablet by mouth Daily. 90 tablet 3   • polyethylene glycol (MIRALAX) packet Take 17 g by mouth Daily. 1 each 5   • simvastatin (ZOCOR) 40 MG tablet Take 1 tablet by mouth Every Night. 90 tablet 3   • Thyroid (NP THYROID) 30 MG PO tablet Take 1 tablet by mouth Daily. 90 tablet 3   • vitamin D (ERGOCALCIFEROL) 83980 units capsule capsule Take 1 capsule by mouth Every 7 (Seven) Days. 12 capsule 3   • clonazePAM (KlonoPIN) 0.5 MG tablet Take 1 tablet by mouth 3 (Three) Times a Day As Needed for Anxiety. 90 tablet 0   • traMADol (ULTRAM) 50 MG tablet Take 1 tablet by mouth Every 8 (Eight) Hours As Needed for Moderate Pain . 90 tablet 2     Facility-Administered Medications Prior to Visit   Medication Dose Route Frequency Provider Last Rate Last Dose   • cyanocobalamin injection 1,000 mcg  1,000 mcg Intramuscular Q28 Days Clare Quintero APRN   1,000 mcg at 07/11/18 0843       Patient Active Problem List   Diagnosis   • Generalized anxiety disorder   • Malignant hypertension with heart disease, without congestive heart failure   • Major depressive disorder in partial remission (CMS/HCC)   • Type 2 diabetes mellitus with diabetic peripheral angiopathy without gangrene, without long-term current use of insulin (CMS/HCC)   • Coronary artery disease involving native coronary artery of native heart   • Sleep apnea   • Obstructive sleep apnea   • Osteoarthritis involving multiple joints on both sides of body   • Vitamin D deficiency   • Vitamin B 12 deficiency   • Excessive cerumen in both ear canals   • Hypercholesterolemia   • Anginal pain (CMS/HCC)   • Lumbar back pain with radiculopathy affecting left lower extremity   • Vitamin D deficiency disease   • High risk medication use   • Gastroesophageal reflux disease without esophagitis   • Seasonal allergic rhinitis due to pollen   • Benign non-nodular prostatic hyperplasia without lower urinary tract symptoms   • Abnormal thyroid blood test   •  Essential hypertension   • Excessive cerumen in both ear canals   • Chronic pain of both knees   • Colitis   • Right hand weakness   • ED (erectile dysfunction) of organic origin   • Obesity (BMI 30.0-34.9)   • Primary osteoarthritis of both knees   • Chronic combined systolic and diastolic heart failure (CMS/[facility])       Advanced Care Planning:  Patient does not have an advance directive - not interested in additional information    Review of Systems   Constitutional: Positive for activity change and unexpected weight change. Negative for appetite change, diaphoresis and fever.   HENT: Positive for ear discharge. Negative for sinus pressure, sinus pain, sore throat and trouble swallowing.    Eyes: Negative.    Respiratory: Positive for shortness of breath (at times with exertion ). Negative for cough, chest tightness and wheezing.    Cardiovascular: Negative.    Gastrointestinal: Positive for diarrhea. Negative for abdominal distention, abdominal pain, blood in stool, nausea and vomiting.   Endocrine: Negative.    Genitourinary: Negative for dysuria, frequency and urgency.   Musculoskeletal: Positive for arthralgias, back pain and gait problem. Negative for neck pain and neck stiffness.   Skin: Negative.    Allergic/Immunologic: Negative for environmental allergies, food allergies and immunocompromised state.   Neurological: Negative for dizziness, seizures, weakness and headaches.   Hematological: Negative.    Psychiatric/Behavioral: Positive for sleep disturbance. Negative for agitation, confusion, dysphoric mood, self-injury and suicidal ideas.       Compared to one year ago, the patient feels his physical health is better.  Compared to one year ago, the patient feels his mental health is the same.    Reviewed chart for potential of high risk medication in the elderly: yes  Reviewed chart for potential of harmful drug interactions in the elderly:yes    Objective         Vitals:    12/09/19 0800   BP: 130/80  "  Pulse: 85   Temp: 98.3 °F (36.8 °C)   TempSrc: Temporal   SpO2: 94%   Weight: 98.4 kg (217 lb)   Height: 177.8 cm (70\")       Body mass index is 31.14 kg/m².  Discussed the patient's BMI with him. The BMI is above average; BMI management plan is completed.    Physical Exam   Constitutional: He is oriented to person, place, and time. Vital signs are normal. He appears well-developed and well-nourished. No distress.   Very pleasant 59-year-old male appearing older than stated age.  He is looking forward to a [employer] with his daughters and grandchildren.   HENT:   Head: Normocephalic and atraumatic.   Right Ear: Tympanic membrane, external ear and ear canal normal. There is drainage.   Left Ear: Tympanic membrane, external ear and ear canal normal. There is drainage.   Nose: Nose normal.   Mouth/Throat: Uvula is midline, oropharynx is clear and moist and mucous membranes are normal. No oropharyngeal exudate.   Bilateral ear canals impacted with cerumen, removed via provider with flexi-loop. Honey colored cerumen removed, tolerated well.      Eyes: Pupils are equal, round, and reactive to light. Conjunctivae are normal. Right eye exhibits no discharge. Left eye exhibits no discharge.   Neck: Normal range of motion. Neck supple. No thyromegaly present.   Cardiovascular: Normal rate, regular rhythm and normal heart sounds.   No murmur heard.  Pulmonary/Chest: Effort normal and breath sounds normal. No respiratory distress. He has no wheezes. He exhibits no tenderness.   Abdominal: Soft. Bowel sounds are normal.   Musculoskeletal:        Right knee: He exhibits decreased range of motion.        Left knee: He exhibits decreased range of motion.        Lumbar back: He exhibits decreased range of motion.   Lymphadenopathy:     He has no cervical adenopathy.   Neurological: He is alert and oriented to person, place, and time.   Skin: Skin is warm and dry. Capillary refill takes less than 2 seconds. No rash noted. He " is not diaphoretic. No erythema. No pallor.   Psychiatric: He has a normal mood and affect. His speech is normal and behavior is normal. Judgment and thought content normal. Cognition and memory are normal.   Vitals reviewed.    Lab Results   Component Value Date    GLUCOSE 111 (H) 10/16/2018    BUN 13 12/06/2019    CREATININE 0.93 12/06/2019    EGFRIFNONA 84 12/06/2019    EGFRIFAFRI 102 12/06/2019    BCR 14.0 12/06/2019    K 3.8 12/06/2019    CO2 26.7 12/06/2019    CALCIUM 9.5 12/06/2019    PROTENTOTREF 7.3 12/06/2019    ALBUMIN 4.40 12/06/2019    LABIL2 1.5 12/06/2019    AST 5 12/06/2019    ALT 12 12/06/2019     Lab Results   Component Value Date    WBC 7.67 12/06/2019    HGB 14.0 12/06/2019    HCT 43.3 12/06/2019     12/06/2019    CHOL 153 10/16/2018    TRIG 198 (H) 12/06/2019    HDL 47 12/06/2019    ALT 12 12/06/2019     Lab Results   Component Value Date    CHOL 153 10/16/2018    CHLPL 161 12/06/2019    TRIG 198 (H) 12/06/2019    HDL 47 12/06/2019    LDL 74 12/06/2019         Lab Results   Component Value Date     (H) 12/06/2019    CHLPL 161 12/06/2019    TRIG 198 (H) 12/06/2019    HDL 47 12/06/2019    LDL 74 12/06/2019    VLDL 39.6 12/06/2019    HGBA1C 7.73 (H) 12/06/2019        Assessment/Plan   Medicare Risks and Personalized Health Plan  CMS Preventative Services Quick Reference  Advance Directive Discussion  Cardiovascular risk  Chronic Pain   Colon Cancer Screening  Depression/Dysphoria  Diabetic Lab Screening   Fall Risk  Immunizations Discussed/Encouraged (specific immunizations; Influenza, Pneumococcal 23 and Shingrix )  Inactivity/Sedentary  Obesity/Overweight   Osteoprorosis Risk  Prostate Cancer Screening     The above risks/problems have been discussed with the patient.  Pertinent information has been shared with the patient in the After Visit Summary.  Follow up plans and orders are seen below in the Assessment/Plan Section.    Diagnoses and all orders for this visit:    1. Encounter  for vaccination (Primary)  -     pneumococcal conj. 13-valent (PREVNAR-13) vaccine 0.5 mL  -     zoster vaccine live (ZOSTAVAX) 02806 UNT/0.65ML reconstituted suspension; Inject 1 dose under the skin into the appropriate area as directed 1 (One) Time for 1 dose.  Dispense: 1 each; Refill: 0    2. Lumbar back pain with radiculopathy affecting left lower extremity  Assessment & Plan:  Proper body mechanics has been reviewed and discussed today.      As part of this patient's treatment plan they are being prescribed controlled substance/substances with potential for abuse. This patient has been made aware of the appropriate use of such medications, including potential risk of somnolence, limited ability to drive and / or work safely, and potential for overdose. It has also been made clear that these medications are for use by this patient only, without concomitant use of alcohol or other substances unless prescribed/advised by medical provider. Patient has completed controlled substance agreement detailing terms of continued prescribing of controlled substances including monitoring Danny reports, drug screens and pill counts. The patient was asked and states they are not receiving narcotic medication from any there provider or any provider that this office has not been made aware of. History and physical exam exhibit continued safe and appropriate use of controlled substances.   Goal: Improved quality of life and reduction in pain as evidenced by pt report.   Danny has been reviewed as consistent.  UDS is on file.   Ultram prescription provided     patient has been instructed and counseled regarding opioid misuse and risk of addiction.  We have discussed proper storage and disposal of controlled medication.  Pt is at low risk for substance abuse or addiction. Pt will be monitored closely for compliance and the need to continue plan of care.     Orders:  -     traMADol (ULTRAM) 50 MG tablet; Take 1 tablet by mouth  Every 8 (Eight) Hours As Needed for Moderate Pain .  Dispense: 90 tablet; Refill: 2  -     Urine Drug Screen - Urine, Clean Catch; Future  -     CBC & Differential; Future  -     Comprehensive Metabolic Panel; Future  -     TSH; Future  -     Vitamin D 25 Hydroxy; Future  -     Lipid Panel; Future  -     Vitamin B12; Future    3. Generalized anxiety disorder  Comments:  continue klonopin and celexa  Assessment & Plan:  Psychological condition is improving with treatment.  Continue current treatment regimen.  Psychological condition  will be reassessed at the next regular appointment.  Continue clonazepam on as-needed basis.  Avoid taking clonazepam within 4 hours of any controlled or sedating medication.  States understanding    Orders:  -     clonazePAM (KlonoPIN) 0.5 MG tablet; Take 1 tablet by mouth 3 (Three) Times a Day As Needed for Anxiety.  Dispense: 90 tablet; Refill: 0  -     Urine Drug Screen - Urine, Clean Catch; Future  -     CBC & Differential; Future  -     Comprehensive Metabolic Panel; Future  -     TSH; Future  -     Vitamin D 25 Hydroxy; Future  -     Lipid Panel; Future  -     Vitamin B12; Future    4. Excessive cerumen in both ear canals  Assessment & Plan:  Avoid q tips     Orders:  -     CBC & Differential; Future  -     Comprehensive Metabolic Panel; Future  -     TSH; Future  -     Vitamin D 25 Hydroxy; Future  -     Lipid Panel; Future  -     Vitamin B12; Future    5. Malignant hypertension with heart disease, without congestive heart failure  Assessment & Plan:  Remain under the care of cardiology.    Orders:  -     CBC & Differential; Future  -     Comprehensive Metabolic Panel; Future  -     TSH; Future  -     Vitamin D 25 Hydroxy; Future  -     Lipid Panel; Future  -     Vitamin B12; Future    6. Type 2 diabetes mellitus with diabetic peripheral angiopathy without gangrene, without long-term current use of insulin (CMS/[facility])  -     CBC & Differential; Future  -     Comprehensive Metabolic  Panel; Future  -     TSH; Future  -     Vitamin D 25 Hydroxy; Future  -     Lipid Panel; Future  -     Vitamin B12; Future    7. Coronary artery disease involving native coronary artery of native heart without angina pectoris  Assessment & Plan:  Remain under the care of cardiology.    Orders:  -     icosapent ethyl (VASCEPA) 1 g capsule capsule; 2 twice daily  Dispense: 120 capsule; Refill: 5  -     CBC & Differential; Future  -     Comprehensive Metabolic Panel; Future  -     TSH; Future  -     Vitamin D 25 Hydroxy; Future  -     Lipid Panel; Future  -     Vitamin B12; Future    8. Obstructive sleep apnea  Assessment & Plan:  CPAP at night    Orders:  -     CBC & Differential; Future  -     Comprehensive Metabolic Panel; Future  -     TSH; Future  -     Vitamin D 25 Hydroxy; Future  -     Lipid Panel; Future  -     Vitamin B12; Future    9. Osteoarthritis involving multiple joints on both sides of body  -     CBC & Differential; Future  -     Comprehensive Metabolic Panel; Future  -     TSH; Future  -     Vitamin D 25 Hydroxy; Future  -     Lipid Panel; Future  -     Vitamin B12; Future    10. Major depressive disorder with single episode, in partial remission (CMS/[facility])  Assessment & Plan:  Psychological condition is improving with treatment.  Continue current treatment regimen.  Psychological condition  will be reassessed in 3 months.    Orders:  -     CBC & Differential; Future  -     Comprehensive Metabolic Panel; Future  -     TSH; Future  -     Vitamin D 25 Hydroxy; Future  -     Lipid Panel; Future  -     Vitamin B12; Future    11. Obesity (BMI 30.0-34.9)  Assessment & Plan:  Heart healthy diet recommended.  Gradual weight loss is recommended.    Orders:  -     CBC & Differential; Future  -     Comprehensive Metabolic Panel; Future  -     TSH; Future  -     Vitamin D 25 Hydroxy; Future  -     Lipid Panel; Future  -     Vitamin B12; Future    12. Chronic combined systolic and diastolic heart failure  (CMS/[facility])  Assessment & Plan:  Remain under the care of cardiology.    Orders:  -     CBC & Differential; Future  -     Comprehensive Metabolic Panel; Future  -     TSH; Future  -     Vitamin D 25 Hydroxy; Future  -     Lipid Panel; Future  -     Vitamin B12; Future    13. Anginal pain (CMS/[facility])  Assessment & Plan:  Coronary artery disease is Stable with medication and lifestyle changes.  Continue current treatment regimen.  Dietary sodium restriction.  Weight loss.  Cardiac status will be reassessed in 3 months.  Patient has been instructed to follow-up with cardiology in the next few months.    Orders:  -     CBC & Differential; Future  -     Comprehensive Metabolic Panel; Future  -     TSH; Future  -     Vitamin D 25 Hydroxy; Future  -     Lipid Panel; Future  -     Vitamin B12; Future    14. Obstructive sleep apnea syndrome  -     CBC & Differential; Future  -     Comprehensive Metabolic Panel; Future  -     TSH; Future  -     Vitamin D 25 Hydroxy; Future  -     Lipid Panel; Future  -     Vitamin B12; Future    15. Gastroesophageal reflux disease without esophagitis  Assessment & Plan:  GERD precautions discussed    Orders:  -     dicyclomine (BENTYL) 10 MG capsule; Take 1 capsule by mouth 4 (Four) Times a Day Before Meals & at Bedtime.  Dispense: 120 capsule; Refill: 5    16. Vitamin B 12 deficiency  Assessment & Plan:  Monthly injections    Orders:  -     CBC & Differential; Future  -     Comprehensive Metabolic Panel; Future  -     TSH; Future  -     Vitamin D 25 Hydroxy; Future  -     Lipid Panel; Future  -     Vitamin B12; Future    17. Benign non-nodular prostatic hyperplasia without lower urinary tract symptoms    18. Essential hypertension  -     CBC & Differential; Future  -     Comprehensive Metabolic Panel; Future  -     TSH; Future  -     Vitamin D 25 Hydroxy; Future  -     Lipid Panel; Future  -     Vitamin B12; Future    19. Vitamin D deficiency disease  -     CBC & Differential; Future  -      Comprehensive Metabolic Panel; Future  -     TSH; Future  -     Vitamin D 25 Hydroxy; Future  -     Lipid Panel; Future  -     Vitamin B12; Future  Medicare wellness exam has been performed today.  Medication list has been reviewed and discussed with patient.  Recommended preventative and screenings has been discussed with patient.    I have discussed diagnosis in detail today allowing time for questions and answers. Pt is aware of reasons to seek urgent or emergent medical care as well as reasons to return to the clinic for evaluation. Possible side effects, interactions and progression of symptoms discussed as well. Pt / family states understanding.   Emotional support and active listening provided.   Reviewed disease process, possible complications, reasons for urgent care and possible side effects of medications. Pt/family state understanding.     Age appropriate education and safety instruction has been provided. Preventive education/recommendation > 15 minutes. Safety, accident prevention, vaccines, health maintenance concerns, nutrition, diet, exercise and social activity.   Proper body mechanics has been reviewed and discussed today.      Goal: pt will report improved anxiety symptoms.   Goal: Improved quality of life and reduction in pain as evidenced by pt report.     Follow up in 3 months. Routine labs fasting one week prior to next office visit. Return sooner if needed.     Dictated utilizing Dragon dictation. Errors in dictation may reflect use of voice recognition software and not all errors in transcription may have been detected prior to signing.           Follow Up:  Return in about 3 months (around 3/9/2020) for labs one week prior, CM 30 min.     An After Visit Summary and PPPS were given to the patient.

## 2019-12-09 NOTE — TELEPHONE ENCOUNTER
----- Message from YOUNG Toscano sent at 12/9/2019  8:16 AM EST -----  Get shingles vac info from [facility] and update htl main     Called [facility] and put in chart

## 2019-12-10 PROBLEM — Z29.9 PREVENTIVE MEASURE: Status: RESOLVED | Noted: 2019-05-28 | Resolved: 2019-12-10

## 2019-12-10 PROBLEM — R21 RASH AND NONSPECIFIC SKIN ERUPTION: Status: RESOLVED | Noted: 2018-01-29 | Resolved: 2019-12-10

## 2019-12-10 PROBLEM — I50.42 CHRONIC COMBINED SYSTOLIC AND DIASTOLIC HEART FAILURE (HCC): Status: ACTIVE | Noted: 2019-12-10

## 2019-12-10 PROBLEM — R05.9 COUGH: Status: RESOLVED | Noted: 2018-07-30 | Resolved: 2019-12-10

## 2019-12-10 NOTE — ASSESSMENT & PLAN NOTE
Psychological condition is improving with treatment.  Continue current treatment regimen.  Psychological condition  will be reassessed at the next regular appointment.  Continue clonazepam on as-needed basis.  Avoid taking clonazepam within 4 hours of any controlled or sedating medication.  States understanding

## 2019-12-10 NOTE — ASSESSMENT & PLAN NOTE
Proper body mechanics has been reviewed and discussed today.      As part of this patient's treatment plan they are being prescribed controlled substance/substances with potential for abuse. This patient has been made aware of the appropriate use of such medications, including potential risk of somnolence, limited ability to drive and / or work safely, and potential for overdose. It has also been made clear that these medications are for use by this patient only, without concomitant use of alcohol or other substances unless prescribed/advised by medical provider. Patient has completed controlled substance agreement detailing terms of continued prescribing of controlled substances including monitoring Danny reports, drug screens and pill counts. The patient was asked and states they are not receiving narcotic medication from any there provider or any provider that this office has not been made aware of. History and physical exam exhibit continued safe and appropriate use of controlled substances.   Goal: Improved quality of life and reduction in pain as evidenced by pt report.   Danny has been reviewed as consistent.  UDS is on file.   Ultram prescription provided     patient has been instructed and counseled regarding opioid misuse and risk of addiction.  We have discussed proper storage and disposal of controlled medication.  Pt is at low risk for substance abuse or addiction. Pt will be monitored closely for compliance and the need to continue plan of care.

## 2019-12-10 NOTE — ASSESSMENT & PLAN NOTE
Coronary artery disease is Stable with medication and lifestyle changes.  Continue current treatment regimen.  Dietary sodium restriction.  Weight loss.  Cardiac status will be reassessed in 3 months.  Patient has been instructed to follow-up with cardiology in the next few months.

## 2019-12-12 ENCOUNTER — OFFICE VISIT (OUTPATIENT)
Dept: CARDIOLOGY | Facility: CLINIC | Age: 59
End: 2019-12-12

## 2019-12-12 VITALS
SYSTOLIC BLOOD PRESSURE: 114 MMHG | HEART RATE: 92 BPM | WEIGHT: 217 LBS | OXYGEN SATURATION: 95 % | DIASTOLIC BLOOD PRESSURE: 77 MMHG | HEIGHT: 70 IN | BODY MASS INDEX: 31.07 KG/M2

## 2019-12-12 DIAGNOSIS — I25.10 CORONARY ARTERY DISEASE INVOLVING NATIVE CORONARY ARTERY OF NATIVE HEART WITHOUT ANGINA PECTORIS: Primary | ICD-10-CM

## 2019-12-12 DIAGNOSIS — E78.00 HYPERCHOLESTEROLEMIA: ICD-10-CM

## 2019-12-12 DIAGNOSIS — I10 ESSENTIAL HYPERTENSION: ICD-10-CM

## 2019-12-12 PROCEDURE — 99214 OFFICE O/P EST MOD 30 MIN: CPT | Performed by: INTERNAL MEDICINE

## 2019-12-12 PROCEDURE — 93000 ELECTROCARDIOGRAM COMPLETE: CPT | Performed by: INTERNAL MEDICINE

## 2019-12-12 NOTE — PROGRESS NOTES
[facility] CARDIOLOGY  [address]  [place] [address]  Phone: [phone]  Fax: [phone]    12/12/2019    Chief Complaint   Patient presents with   • Hypertension   • Coronary Artery Disease        History:   Damaso Leonard is a 59 y.o. male presents today for surgical clearance.  His is planning to have TKA.  He has a history of hypertension, DM, dyslipidemia, and CAD with stent placement in 2006 at [facility]. He has complaints today of dizziness, and shortness of breath.  He denies any chest pain or palpitations. Last stress test in 2018 was negative for ischemia and normal EF.  He is low risk for surgery.     The chart and medications were reviewed today.     Past Medical History:   Diagnosis Date   • Anxiety    • ASCVD (arteriosclerotic cardiovascular disease)    • DM (diabetes mellitus) (CMS/HCC)    • GERD (gastroesophageal reflux disease)    • History of EKG 04/15/2015    NORMAL   • HTN (hypertension)    • Hyperlipidemia    • Injury of back    • Low back pain    • Myocardial infarction (CMS/HCC)        Past Surgical History:   Procedure Laterality Date   • CARDIAC SURGERY      stenting   • CHOLECYSTECTOMY     • COLONOSCOPY  2007   • HERNIA REPAIR     • SHOULDER SURGERY     • TONSILLECTOMY     • TYMPANOPLASTY     • UPPER GASTROINTESTINAL ENDOSCOPY     • VASECTOMY          Past Social History:  Social History     Socioeconomic History   • Marital status:      Spouse name: ruben   • Number of children: 3   • Years of education: 12   • Highest education level: Not on file   Occupational History   • Occupation: retired   Tobacco Use   • Smoking status: Never Smoker   • Smokeless tobacco: Never Used   Substance and Sexual Activity   • Alcohol use: No   • Drug use: No   • Sexual activity: Defer       Past Family History:  Family History   Problem Relation Age of Onset   • Heart attack Father    • Heart disease Father    • Hypertension Father    • Heart attack  Sister    • Diabetes Sister    • Heart disease Sister    • Hypertension Sister    • Thyroid disease Sister    • Heart attack Brother    • Diabetes Brother    • Thyroid disease Brother    • Arthritis Daughter    • COPD Daughter    • Asthma Daughter    • Depression Daughter    • Heart disease Sister    • Hypertension Sister        Review of Systems:   Please see HPI  Constitution: No chills, no rigors, no unexplained weight loss or weight gain  Eyes:  No diplopia, no blurred vision, no loss of vision, conjunctiva is pink and sclera is anicteric  ENT:  No tinnitus, no otorrhea, no epistaxis, no sore throat   Respiratory: No cough, no hemoptysis  Cardiovascular: see HPI  Gastrointestinal: No nausea, no vomiting, no hematemesis, no diarrhea or constipation, no melena  Genitourinary: No frequency of dysuria no hematuria  Integument: No pruritis and  no skin rash  Hematologic / Lymphatic: No excessive bleeding, easy bruising, fatigue, lymphadenopathy and petechiae  Musculoskeletal: No joint pain, joint stiffness, joint swelling, muscle pain, muscle weakness and neck pain  Neurological: No dizziness, headaches, light headedness, seizures and vertigo  Endocrine: No frequent urination and nocturia, temperature intolerance, weight gain, unintended and weight loss, unintended      Current Outpatient Medications   Medication Sig Dispense Refill   • amitriptyline (ELAVIL) 50 MG tablet Take 2 tablets by mouth At Night As Needed for Sleep. 1 daily 60 tablet 5   • aspirin 81 MG tablet Take 1 tablet by mouth Daily. 90 tablet 3   • BYDUREON 2 MG pen-injector injection INJECT CONTENTS OF 1 PEN SUBCUTANEOUSLY INTO THE APPROPRIATE AREA AS DIRECTED ONCE WEEKLY 5 pen 5   • carvedilol (COREG) 12.5 MG tablet Take 1 tablet by mouth 2 (Two) Times a Day With Meals. 180 tablet 3   • citalopram (CeleXA) 40 MG tablet Take 1 tablet by mouth Daily. 30 tablet 5   • clonazePAM (KlonoPIN) 0.5 MG tablet Take 1 tablet by mouth 3 (Three) Times a Day As  Needed for Anxiety. 90 tablet 0   • cyanocobalamin 1000 MCG/ML injection Inject 1 mL into the appropriate muscle as directed by prescriber Every 28 (Twenty-Eight) Days. 1 mL 11   • dicyclomine (BENTYL) 10 MG capsule Take 1 capsule by mouth 4 (Four) Times a Day Before Meals & at Bedtime. 120 capsule 5   • diphenhydrAMINE (BENADRYL ALLERGY) 25 mg capsule Take 1 capsule by mouth Every 6 (Six) Hours As Needed for Itching. 90 capsule 3   • finasteride (PROSCAR) 5 MG tablet Take 1 tablet by mouth Daily. 90 tablet 3   • glucose blood test strip Check blood sugar 3x times a day. 100 each 12   • glucose monitor monitoring kit 1 each 3 (Three) Times a Day As Needed (diabetes). 1 each 0   • icosapent ethyl (VASCEPA) 1 g capsule capsule 2 twice daily 120 capsule 5   • isosorbide mononitrate (IMDUR) 30 MG 24 hr tablet Take 1 tablet by mouth 2 (Two) Times a Day. 60 tablet 5   • Lancets (ONETOUCH ULTRASOFT) lancets Check blood sugar 3 times daily 100 each 12   • metFORMIN (GLUCOPHAGE) 500 MG tablet Take 1 tablet by mouth 2 (Two) Times a Day With Meals. 180 tablet 3   • naloxone (NARCAN) 4 MG/0.1ML nasal spray 1 spray into the nostril(s) as directed by provider As Needed (overdose). 1 each 0   • ondansetron (ZOFRAN) 8 MG tablet Take 1 tablet by mouth Every 8 (Eight) Hours As Needed for Nausea. 20 tablet 2   • pantoprazole (PROTONIX) 40 MG EC tablet Take 1 tablet by mouth Daily. 90 tablet 3   • polyethylene glycol (MIRALAX) packet Take 17 g by mouth Daily. 1 each 5   • simvastatin (ZOCOR) 40 MG tablet Take 1 tablet by mouth Every Night. 90 tablet 3   • Thyroid (NP THYROID) 30 MG PO tablet Take 1 tablet by mouth Daily. 90 tablet 3   • traMADol (ULTRAM) 50 MG tablet Take 1 tablet by mouth Every 8 (Eight) Hours As Needed for Moderate Pain . 90 tablet 2   • vitamin D (ERGOCALCIFEROL) 11025 units capsule capsule Take 1 capsule by mouth Every 7 (Seven) Days. 12 capsule 3     Current Facility-Administered Medications   Medication Dose  Route Frequency Provider Last Rate Last Dose   • cyanocobalamin injection 1,000 mcg  1,000 mcg Intramuscular Q28 Days Clare Quintero, YOUNG   1,000 mcg at 07/11/18 0843        No Known Allergies    Objective:  Vitals:    12/12/19 1558   BP: 114/77   Pulse: 92   SpO2: 95%     Comfortable NAD  PERRL, conjunctiva clear  Neck supple, no JVD or thyromegaly appreciated  S1/S2 RRR, no m/r/g  Lungs CTA B, normal effort  Abdomen S/NT/ND (+) BS, no HSM appreciated  Extremities warm, no clubbing, cyanosis, or edema  Normal gait  No visible or palpable skin lesions  A/Ox4, mood and affect appropriate  Pulse exam:    Feet are warm bilateral  Negative edema bilateral  Capillary refill is normal  No evidence of ulceration or color change of the toes  PULSES  Right DP and PT are 1+ and Left DP and PT are 2+    DATA:      Results for orders placed during the hospital encounter of 02/27/18   Adult Transthoracic Echo Complete W/ Cont if Necessary Per Protocol    Narrative · Estimated EF = 60%.  · Left ventricular diastolic dysfunction (grade I) consistent with   impaired relaxation.  · Normal right ventricular cavity size and systolic function noted.  · Normal left atrial size noted.  · No aortic valve stenosis is present.  · Trace tricuspid valve regurgitation is present  · There is no evidence of pericardial effusion         Results for orders placed during the hospital encounter of 02/27/18   SCANNED - NUCLEAR STRESS      Results for orders placed during the hospital encounter of 02/27/18   SCANNED - NUCLEAR STRESS         ECG 12 Lead  Date/Time: 12/12/2019 4:34 PM  Performed by: Arron Riley MD  Authorized by: Arron Riley MD   Rhythm: sinus rhythm  Rate: normal  BPM: 92    Clinical impression: normal ECG  Comments: FL int  135 ms  QRS dur  89 ms  Qt/QTc  331/380  P-R-T axis 61  -2   58````````````````````````````````````````````               Assessment    Low-Moderate risk for proposed surgery  CAD S/P stent x 4  per patient in 2006  Essential HTN controlled  Dyslipidemia      Plan   He may proceed with surgery for right TKA with low risk. He may hold EC ASA 5-7 days prior to procedure.  I will see him back in 6 months or sooner if needed.     Patient's Body mass index is 31.14 kg/m². BMI is above normal parameters. Recommendations include: exercise counseling.         ICD-10-CM ICD-9-CM   1. Coronary artery disease involving native coronary artery of native heart without angina pectoris I25.10 414.01   2. Essential hypertension I10 401.9   3. Hypercholesterolemia E78.00 272.0        Thank you for allowing me to participate in the care of Damaso Leonard. Feel free to contact me directly with any further questions or concerns.        Director, Cardiac Cath Lab    JANET Rosa, acting as scribe for Arron Riley MD, [facility], [facility].   12/12/19  4:52 PM    I have read and agree the documentation that has been completed regarding this visit.  By signing this record, I attest that the documentation was completed by my physical presence and is an accurate record of the encounter.  Voice recognition / transcription technology used for some documentation in this chart in attempt to mitigate substantial inefficiencies created by this electronic health record technology.  As a result, there may be some typos and.or nonsensical language introduced into the chart that either overlooked in editing/review and/or that I am unable to correct as patient care needs require me to prioritize my attention to bedside patient care rather than electronic documentation. MA

## 2020-01-14 ENCOUNTER — APPOINTMENT (OUTPATIENT)
Dept: PREADMISSION TESTING | Facility: HOSPITAL | Age: 60
End: 2020-01-14

## 2020-01-14 VITALS — WEIGHT: 221.2 LBS | BODY MASS INDEX: 31.67 KG/M2 | HEIGHT: 70 IN

## 2020-01-14 DIAGNOSIS — M17.0 PRIMARY OSTEOARTHRITIS OF BOTH KNEES: ICD-10-CM

## 2020-01-14 LAB
ANION GAP SERPL CALCULATED.3IONS-SCNC: 12 MMOL/L (ref 5–15)
APTT PPP: 27.6 SECONDS (ref 24–37)
BASOPHILS # BLD AUTO: 0.03 10*3/MM3 (ref 0–0.2)
BASOPHILS NFR BLD AUTO: 0.4 % (ref 0–1.5)
BILIRUB UR QL STRIP: NEGATIVE
BUN BLD-MCNC: 13 MG/DL (ref 6–20)
BUN/CREAT SERPL: 11.3 (ref 7–25)
CALCIUM SPEC-SCNC: 10.1 MG/DL (ref 8.6–10.5)
CHLORIDE SERPL-SCNC: 100 MMOL/L (ref 98–107)
CLARITY UR: CLEAR
CO2 SERPL-SCNC: 27 MMOL/L (ref 22–29)
COLOR UR: YELLOW
CREAT BLD-MCNC: 1.15 MG/DL (ref 0.76–1.27)
DEPRECATED RDW RBC AUTO: 44.7 FL (ref 37–54)
EOSINOPHIL # BLD AUTO: 0.51 10*3/MM3 (ref 0–0.4)
EOSINOPHIL NFR BLD AUTO: 6.6 % (ref 0.3–6.2)
ERYTHROCYTE [DISTWIDTH] IN BLOOD BY AUTOMATED COUNT: 14 % (ref 12.3–15.4)
GFR SERPL CREATININE-BSD FRML MDRD: 66 ML/MIN/1.73
GLUCOSE BLD-MCNC: 186 MG/DL (ref 65–99)
GLUCOSE UR STRIP-MCNC: NEGATIVE MG/DL
HBA1C MFR BLD: 7.2 % (ref 4.8–5.6)
HCT VFR BLD AUTO: 42.1 % (ref 37.5–51)
HGB BLD-MCNC: 13 G/DL (ref 13–17.7)
HGB UR QL STRIP.AUTO: NEGATIVE
IMM GRANULOCYTES # BLD AUTO: 0.02 10*3/MM3 (ref 0–0.05)
IMM GRANULOCYTES NFR BLD AUTO: 0.3 % (ref 0–0.5)
INR PPP: 1.11 (ref 0.85–1.16)
KETONES UR QL STRIP: ABNORMAL
LEUKOCYTE ESTERASE UR QL STRIP.AUTO: NEGATIVE
LYMPHOCYTES # BLD AUTO: 2.83 10*3/MM3 (ref 0.7–3.1)
LYMPHOCYTES NFR BLD AUTO: 36.7 % (ref 19.6–45.3)
MCH RBC QN AUTO: 27 PG (ref 26.6–33)
MCHC RBC AUTO-ENTMCNC: 30.9 G/DL (ref 31.5–35.7)
MCV RBC AUTO: 87.3 FL (ref 79–97)
MONOCYTES # BLD AUTO: 0.92 10*3/MM3 (ref 0.1–0.9)
MONOCYTES NFR BLD AUTO: 11.9 % (ref 5–12)
NEUTROPHILS # BLD AUTO: 3.4 10*3/MM3 (ref 1.7–7)
NEUTROPHILS NFR BLD AUTO: 44.1 % (ref 42.7–76)
NITRITE UR QL STRIP: NEGATIVE
NRBC BLD AUTO-RTO: 0 /100 WBC (ref 0–0.2)
PH UR STRIP.AUTO: <=5 [PH] (ref 5–8)
PLATELET # BLD AUTO: 329 10*3/MM3 (ref 140–450)
PMV BLD AUTO: 9.1 FL (ref 6–12)
POTASSIUM BLD-SCNC: 4.1 MMOL/L (ref 3.5–5.2)
PROT UR QL STRIP: ABNORMAL
PROTHROMBIN TIME: 13.7 SECONDS (ref 11.2–14.3)
RBC # BLD AUTO: 4.82 10*6/MM3 (ref 4.14–5.8)
SODIUM BLD-SCNC: 139 MMOL/L (ref 136–145)
SP GR UR STRIP: 1.03 (ref 1–1.03)
UROBILINOGEN UR QL STRIP: ABNORMAL
WBC NRBC COR # BLD: 7.71 10*3/MM3 (ref 3.4–10.8)

## 2020-01-14 PROCEDURE — 81003 URINALYSIS AUTO W/O SCOPE: CPT | Performed by: ORTHOPAEDIC SURGERY

## 2020-01-14 PROCEDURE — 80048 BASIC METABOLIC PNL TOTAL CA: CPT | Performed by: ORTHOPAEDIC SURGERY

## 2020-01-14 PROCEDURE — 85730 THROMBOPLASTIN TIME PARTIAL: CPT | Performed by: ORTHOPAEDIC SURGERY

## 2020-01-14 PROCEDURE — 85610 PROTHROMBIN TIME: CPT | Performed by: ORTHOPAEDIC SURGERY

## 2020-01-14 PROCEDURE — 83036 HEMOGLOBIN GLYCOSYLATED A1C: CPT | Performed by: ORTHOPAEDIC SURGERY

## 2020-01-14 PROCEDURE — 36415 COLL VENOUS BLD VENIPUNCTURE: CPT

## 2020-01-14 PROCEDURE — G0480 DRUG TEST DEF 1-7 CLASSES: HCPCS | Performed by: ORTHOPAEDIC SURGERY

## 2020-01-14 PROCEDURE — 85025 COMPLETE CBC W/AUTO DIFF WBC: CPT | Performed by: ORTHOPAEDIC SURGERY

## 2020-01-14 ASSESSMENT — KOOS JR
KOOS JR SCORE: 28.251
KOOS JR SCORE: 23

## 2020-01-19 LAB
COTININE UR-MCNC: 1.7 NG/ML
NICOTINE SERPL-MCNC: NORMAL NG/ML

## 2020-01-26 ENCOUNTER — ANESTHESIA EVENT (OUTPATIENT)
Dept: PERIOP | Facility: HOSPITAL | Age: 60
End: 2020-01-26

## 2020-01-26 RX ORDER — FAMOTIDINE 10 MG/ML
20 INJECTION, SOLUTION INTRAVENOUS ONCE
Status: CANCELLED | OUTPATIENT
Start: 2020-01-26 | End: 2020-01-26

## 2020-01-27 ENCOUNTER — ANESTHESIA (OUTPATIENT)
Dept: PERIOP | Facility: HOSPITAL | Age: 60
End: 2020-01-27

## 2020-01-27 ENCOUNTER — HOSPITAL ENCOUNTER (OUTPATIENT)
Facility: HOSPITAL | Age: 60
LOS: 1 days | Discharge: HOME HEALTH | End: 2020-01-28
Attending: ORTHOPAEDIC SURGERY | Admitting: ORTHOPAEDIC SURGERY

## 2020-01-27 ENCOUNTER — APPOINTMENT (OUTPATIENT)
Dept: GENERAL RADIOLOGY | Facility: HOSPITAL | Age: 60
End: 2020-01-27

## 2020-01-27 DIAGNOSIS — Z96.651 STATUS POST TOTAL RIGHT KNEE REPLACEMENT: Primary | ICD-10-CM

## 2020-01-27 DIAGNOSIS — I25.10 CORONARY ARTERY DISEASE INVOLVING NATIVE CORONARY ARTERY OF NATIVE HEART WITHOUT ANGINA PECTORIS: ICD-10-CM

## 2020-01-27 DIAGNOSIS — M17.0 PRIMARY OSTEOARTHRITIS OF BOTH KNEES: ICD-10-CM

## 2020-01-27 PROBLEM — E11.9 DM (DIABETES MELLITUS): Status: ACTIVE | Noted: 2020-01-27

## 2020-01-27 PROBLEM — Z99.81 ON HOME O2: Status: ACTIVE | Noted: 2020-01-27

## 2020-01-27 PROBLEM — E03.9 HYPOTHYROID: Status: ACTIVE | Noted: 2020-01-27

## 2020-01-27 LAB
GLUCOSE BLDC GLUCOMTR-MCNC: 115 MG/DL (ref 70–130)
GLUCOSE BLDC GLUCOMTR-MCNC: 267 MG/DL (ref 70–130)
GLUCOSE BLDC GLUCOMTR-MCNC: 285 MG/DL (ref 70–130)
GLUCOSE BLDC GLUCOMTR-MCNC: 332 MG/DL (ref 70–130)

## 2020-01-27 PROCEDURE — 25010000003 CEFAZOLIN IN DEXTROSE 2-4 GM/100ML-% SOLUTION: Performed by: ORTHOPAEDIC SURGERY

## 2020-01-27 PROCEDURE — 27447 TOTAL KNEE ARTHROPLASTY: CPT | Performed by: PHYSICIAN ASSISTANT

## 2020-01-27 PROCEDURE — 25010000002 PROPOFOL 10 MG/ML EMULSION: Performed by: NURSE ANESTHETIST, CERTIFIED REGISTERED

## 2020-01-27 PROCEDURE — C1713 ANCHOR/SCREW BN/BN,TIS/BN: HCPCS | Performed by: ORTHOPAEDIC SURGERY

## 2020-01-27 PROCEDURE — 63710000001 INSULIN LISPRO (HUMAN) PER 5 UNITS: Performed by: NURSE PRACTITIONER

## 2020-01-27 PROCEDURE — 25010000002 DEXAMETHASONE PER 1 MG: Performed by: NURSE ANESTHETIST, CERTIFIED REGISTERED

## 2020-01-27 PROCEDURE — 25010000002 ROPIVACAINE PER 1 MG: Performed by: NURSE ANESTHETIST, CERTIFIED REGISTERED

## 2020-01-27 PROCEDURE — 27447 TOTAL KNEE ARTHROPLASTY: CPT | Performed by: ORTHOPAEDIC SURGERY

## 2020-01-27 PROCEDURE — 25010000002 MORPHINE PER 10 MG: Performed by: ORTHOPAEDIC SURGERY

## 2020-01-27 PROCEDURE — 73560 X-RAY EXAM OF KNEE 1 OR 2: CPT

## 2020-01-27 PROCEDURE — 82962 GLUCOSE BLOOD TEST: CPT

## 2020-01-27 PROCEDURE — 97116 GAIT TRAINING THERAPY: CPT | Performed by: PHYSICAL THERAPIST

## 2020-01-27 PROCEDURE — 97162 PT EVAL MOD COMPLEX 30 MIN: CPT | Performed by: PHYSICAL THERAPIST

## 2020-01-27 PROCEDURE — C1776 JOINT DEVICE (IMPLANTABLE): HCPCS | Performed by: ORTHOPAEDIC SURGERY

## 2020-01-27 PROCEDURE — 25010000002 ROPIVACAINE PER 1 MG: Performed by: ORTHOPAEDIC SURGERY

## 2020-01-27 PROCEDURE — 25010000002 KETOROLAC TROMETHAMINE PER 15 MG: Performed by: ORTHOPAEDIC SURGERY

## 2020-01-27 DEVICE — CMT BONE SIMPLEX/P FULL DOSE 10/PK: Type: IMPLANTABLE DEVICE | Site: KNEE | Status: FUNCTIONAL

## 2020-01-27 DEVICE — IMPLANTABLE DEVICE: Type: IMPLANTABLE DEVICE | Site: KNEE | Status: FUNCTIONAL

## 2020-01-27 DEVICE — BASEPLT TIB TRIATH CMT NO5: Type: IMPLANTABLE DEVICE | Site: KNEE | Status: FUNCTIONAL

## 2020-01-27 DEVICE — TOTL KN HI DEMAND STRYKER: Type: IMPLANTABLE DEVICE | Site: KNEE | Status: FUNCTIONAL

## 2020-01-27 DEVICE — COMP FEM TRIATH CR NO 5 RT: Type: IMPLANTABLE DEVICE | Site: KNEE | Status: FUNCTIONAL

## 2020-01-27 RX ORDER — PROMETHAZINE HYDROCHLORIDE 25 MG/ML
6.25 INJECTION, SOLUTION INTRAMUSCULAR; INTRAVENOUS ONCE AS NEEDED
Status: DISCONTINUED | OUTPATIENT
Start: 2020-01-27 | End: 2020-01-27 | Stop reason: HOSPADM

## 2020-01-27 RX ORDER — MELOXICAM 7.5 MG/1
15 TABLET ORAL DAILY
Status: DISCONTINUED | OUTPATIENT
Start: 2020-01-27 | End: 2020-01-28 | Stop reason: HOSPADM

## 2020-01-27 RX ORDER — ATORVASTATIN CALCIUM 20 MG/1
20 TABLET, FILM COATED ORAL DAILY
Status: DISCONTINUED | OUTPATIENT
Start: 2020-01-27 | End: 2020-01-28 | Stop reason: HOSPADM

## 2020-01-27 RX ORDER — PROMETHAZINE HYDROCHLORIDE 25 MG/1
25 TABLET ORAL ONCE AS NEEDED
Status: DISCONTINUED | OUTPATIENT
Start: 2020-01-27 | End: 2020-01-27 | Stop reason: HOSPADM

## 2020-01-27 RX ORDER — DICYCLOMINE HYDROCHLORIDE 10 MG/1
10 CAPSULE ORAL
Status: DISCONTINUED | OUTPATIENT
Start: 2020-01-27 | End: 2020-01-28 | Stop reason: HOSPADM

## 2020-01-27 RX ORDER — CITALOPRAM 40 MG/1
40 TABLET ORAL DAILY
Status: DISCONTINUED | OUTPATIENT
Start: 2020-01-27 | End: 2020-01-28 | Stop reason: HOSPADM

## 2020-01-27 RX ORDER — ONDANSETRON 4 MG/1
4 TABLET, FILM COATED ORAL EVERY 6 HOURS PRN
Status: DISCONTINUED | OUTPATIENT
Start: 2020-01-27 | End: 2020-01-28 | Stop reason: HOSPADM

## 2020-01-27 RX ORDER — OXYCODONE HCL 10 MG/1
10 TABLET, FILM COATED, EXTENDED RELEASE ORAL ONCE
Status: COMPLETED | OUTPATIENT
Start: 2020-01-27 | End: 2020-01-27

## 2020-01-27 RX ORDER — SODIUM CHLORIDE 0.9 % (FLUSH) 0.9 %
10 SYRINGE (ML) INJECTION EVERY 12 HOURS SCHEDULED
Status: DISCONTINUED | OUTPATIENT
Start: 2020-01-27 | End: 2020-01-27 | Stop reason: HOSPADM

## 2020-01-27 RX ORDER — LABETALOL HYDROCHLORIDE 5 MG/ML
10 INJECTION, SOLUTION INTRAVENOUS EVERY 4 HOURS PRN
Status: DISCONTINUED | OUTPATIENT
Start: 2020-01-27 | End: 2020-01-28 | Stop reason: HOSPADM

## 2020-01-27 RX ORDER — DEXTROSE MONOHYDRATE 25 G/50ML
25 INJECTION, SOLUTION INTRAVENOUS
Status: DISCONTINUED | OUTPATIENT
Start: 2020-01-27 | End: 2020-01-28 | Stop reason: HOSPADM

## 2020-01-27 RX ORDER — PANTOPRAZOLE SODIUM 40 MG/1
40 TABLET, DELAYED RELEASE ORAL DAILY
Status: DISCONTINUED | OUTPATIENT
Start: 2020-01-27 | End: 2020-01-28 | Stop reason: HOSPADM

## 2020-01-27 RX ORDER — CEFAZOLIN SODIUM 2 G/100ML
2 INJECTION, SOLUTION INTRAVENOUS EVERY 8 HOURS
Status: COMPLETED | OUTPATIENT
Start: 2020-01-27 | End: 2020-01-28

## 2020-01-27 RX ORDER — CLONAZEPAM 0.5 MG/1
0.5 TABLET ORAL 3 TIMES DAILY PRN
Status: DISCONTINUED | OUTPATIENT
Start: 2020-01-27 | End: 2020-01-28 | Stop reason: HOSPADM

## 2020-01-27 RX ORDER — DIPHENHYDRAMINE HCL 25 MG
25 CAPSULE ORAL EVERY 6 HOURS PRN
Status: DISCONTINUED | OUTPATIENT
Start: 2020-01-27 | End: 2020-01-28 | Stop reason: HOSPADM

## 2020-01-27 RX ORDER — LIDOCAINE HYDROCHLORIDE 10 MG/ML
0.5 INJECTION, SOLUTION EPIDURAL; INFILTRATION; INTRACAUDAL; PERINEURAL ONCE AS NEEDED
Status: COMPLETED | OUTPATIENT
Start: 2020-01-27 | End: 2020-01-27

## 2020-01-27 RX ORDER — PROMETHAZINE HYDROCHLORIDE 25 MG/1
25 SUPPOSITORY RECTAL ONCE AS NEEDED
Status: DISCONTINUED | OUTPATIENT
Start: 2020-01-27 | End: 2020-01-27 | Stop reason: HOSPADM

## 2020-01-27 RX ORDER — CARVEDILOL 12.5 MG/1
12.5 TABLET ORAL 2 TIMES DAILY WITH MEALS
Status: DISCONTINUED | OUTPATIENT
Start: 2020-01-27 | End: 2020-01-28 | Stop reason: HOSPADM

## 2020-01-27 RX ORDER — MELOXICAM 15 MG/1
15 TABLET ORAL ONCE
Status: COMPLETED | OUTPATIENT
Start: 2020-01-27 | End: 2020-01-27

## 2020-01-27 RX ORDER — MEPERIDINE HYDROCHLORIDE 25 MG/ML
12.5 INJECTION INTRAMUSCULAR; INTRAVENOUS; SUBCUTANEOUS
Status: DISCONTINUED | OUTPATIENT
Start: 2020-01-27 | End: 2020-01-27 | Stop reason: HOSPADM

## 2020-01-27 RX ORDER — SODIUM CHLORIDE 0.9 % (FLUSH) 0.9 %
10 SYRINGE (ML) INJECTION AS NEEDED
Status: DISCONTINUED | OUTPATIENT
Start: 2020-01-27 | End: 2020-01-27 | Stop reason: HOSPADM

## 2020-01-27 RX ORDER — LEVOTHYROXINE AND LIOTHYRONINE 19; 4.5 UG/1; UG/1
30 TABLET ORAL DAILY
Status: DISCONTINUED | OUTPATIENT
Start: 2020-01-27 | End: 2020-01-28 | Stop reason: HOSPADM

## 2020-01-27 RX ORDER — SODIUM CHLORIDE, SODIUM LACTATE, POTASSIUM CHLORIDE, CALCIUM CHLORIDE 600; 310; 30; 20 MG/100ML; MG/100ML; MG/100ML; MG/100ML
9 INJECTION, SOLUTION INTRAVENOUS CONTINUOUS
Status: DISCONTINUED | OUTPATIENT
Start: 2020-01-27 | End: 2020-01-28 | Stop reason: HOSPADM

## 2020-01-27 RX ORDER — HYDROMORPHONE HYDROCHLORIDE 1 MG/ML
0.5 INJECTION, SOLUTION INTRAMUSCULAR; INTRAVENOUS; SUBCUTANEOUS
Status: DISCONTINUED | OUTPATIENT
Start: 2020-01-27 | End: 2020-01-28 | Stop reason: HOSPADM

## 2020-01-27 RX ORDER — BUPIVACAINE HYDROCHLORIDE 5 MG/ML
INJECTION, SOLUTION PERINEURAL
Status: COMPLETED | OUTPATIENT
Start: 2020-01-27 | End: 2020-01-27

## 2020-01-27 RX ORDER — AMITRIPTYLINE HYDROCHLORIDE 50 MG/1
100 TABLET, FILM COATED ORAL NIGHTLY PRN
Status: DISCONTINUED | OUTPATIENT
Start: 2020-01-27 | End: 2020-01-28 | Stop reason: HOSPADM

## 2020-01-27 RX ORDER — ACETAMINOPHEN 500 MG
1000 TABLET ORAL EVERY 6 HOURS
Status: DISCONTINUED | OUTPATIENT
Start: 2020-01-27 | End: 2020-01-28 | Stop reason: HOSPADM

## 2020-01-27 RX ORDER — FINASTERIDE 5 MG/1
5 TABLET, FILM COATED ORAL DAILY
Status: DISCONTINUED | OUTPATIENT
Start: 2020-01-27 | End: 2020-01-28 | Stop reason: HOSPADM

## 2020-01-27 RX ORDER — POLYETHYLENE GLYCOL 3350 17 G/17G
17 POWDER, FOR SOLUTION ORAL DAILY
Status: DISCONTINUED | OUTPATIENT
Start: 2020-01-27 | End: 2020-01-28 | Stop reason: HOSPADM

## 2020-01-27 RX ORDER — LABETALOL HYDROCHLORIDE 5 MG/ML
5 INJECTION, SOLUTION INTRAVENOUS
Status: DISCONTINUED | OUTPATIENT
Start: 2020-01-27 | End: 2020-01-27 | Stop reason: HOSPADM

## 2020-01-27 RX ORDER — SODIUM CHLORIDE 0.9 % (FLUSH) 0.9 %
3-10 SYRINGE (ML) INJECTION AS NEEDED
Status: DISCONTINUED | OUTPATIENT
Start: 2020-01-27 | End: 2020-01-28 | Stop reason: HOSPADM

## 2020-01-27 RX ORDER — MAGNESIUM HYDROXIDE 1200 MG/15ML
LIQUID ORAL AS NEEDED
Status: DISCONTINUED | OUTPATIENT
Start: 2020-01-27 | End: 2020-01-27 | Stop reason: HOSPADM

## 2020-01-27 RX ORDER — DOCUSATE SODIUM 100 MG/1
100 CAPSULE, LIQUID FILLED ORAL 2 TIMES DAILY PRN
Status: DISCONTINUED | OUTPATIENT
Start: 2020-01-27 | End: 2020-01-28 | Stop reason: HOSPADM

## 2020-01-27 RX ORDER — SODIUM CHLORIDE 0.9 % (FLUSH) 0.9 %
3 SYRINGE (ML) INJECTION EVERY 12 HOURS SCHEDULED
Status: DISCONTINUED | OUTPATIENT
Start: 2020-01-27 | End: 2020-01-28 | Stop reason: HOSPADM

## 2020-01-27 RX ORDER — ISOSORBIDE MONONITRATE 30 MG/1
30 TABLET, EXTENDED RELEASE ORAL EVERY 12 HOURS SCHEDULED
Status: DISCONTINUED | OUTPATIENT
Start: 2020-01-27 | End: 2020-01-28 | Stop reason: HOSPADM

## 2020-01-27 RX ORDER — ASPIRIN 325 MG
325 TABLET, DELAYED RELEASE (ENTERIC COATED) ORAL EVERY 12 HOURS SCHEDULED
Status: DISCONTINUED | OUTPATIENT
Start: 2020-01-28 | End: 2020-01-28 | Stop reason: HOSPADM

## 2020-01-27 RX ORDER — NALOXONE HCL 0.4 MG/ML
0.1 VIAL (ML) INJECTION
Status: DISCONTINUED | OUTPATIENT
Start: 2020-01-27 | End: 2020-01-28 | Stop reason: HOSPADM

## 2020-01-27 RX ORDER — SODIUM CHLORIDE 9 MG/ML
100 INJECTION, SOLUTION INTRAVENOUS CONTINUOUS
Status: DISCONTINUED | OUTPATIENT
Start: 2020-01-27 | End: 2020-01-28 | Stop reason: HOSPADM

## 2020-01-27 RX ORDER — BISACODYL 10 MG
10 SUPPOSITORY, RECTAL RECTAL DAILY PRN
Status: DISCONTINUED | OUTPATIENT
Start: 2020-01-27 | End: 2020-01-28 | Stop reason: HOSPADM

## 2020-01-27 RX ORDER — CEFAZOLIN SODIUM 2 G/100ML
2 INJECTION, SOLUTION INTRAVENOUS ONCE
Status: COMPLETED | OUTPATIENT
Start: 2020-01-27 | End: 2020-01-27

## 2020-01-27 RX ORDER — ONDANSETRON 2 MG/ML
4 INJECTION INTRAMUSCULAR; INTRAVENOUS EVERY 6 HOURS PRN
Status: DISCONTINUED | OUTPATIENT
Start: 2020-01-27 | End: 2020-01-28 | Stop reason: HOSPADM

## 2020-01-27 RX ORDER — IPRATROPIUM BROMIDE AND ALBUTEROL SULFATE 2.5; .5 MG/3ML; MG/3ML
3 SOLUTION RESPIRATORY (INHALATION) ONCE AS NEEDED
Status: DISCONTINUED | OUTPATIENT
Start: 2020-01-27 | End: 2020-01-27 | Stop reason: HOSPADM

## 2020-01-27 RX ORDER — DEXAMETHASONE SODIUM PHOSPHATE 4 MG/ML
INJECTION, SOLUTION INTRA-ARTICULAR; INTRALESIONAL; INTRAMUSCULAR; INTRAVENOUS; SOFT TISSUE AS NEEDED
Status: DISCONTINUED | OUTPATIENT
Start: 2020-01-27 | End: 2020-01-27 | Stop reason: SURG

## 2020-01-27 RX ORDER — BISACODYL 5 MG/1
10 TABLET, DELAYED RELEASE ORAL DAILY PRN
Status: DISCONTINUED | OUTPATIENT
Start: 2020-01-27 | End: 2020-01-28 | Stop reason: HOSPADM

## 2020-01-27 RX ORDER — OXYCODONE HYDROCHLORIDE 5 MG/1
5 TABLET ORAL EVERY 4 HOURS PRN
Status: DISCONTINUED | OUTPATIENT
Start: 2020-01-27 | End: 2020-01-28 | Stop reason: HOSPADM

## 2020-01-27 RX ORDER — NICOTINE POLACRILEX 4 MG
15 LOZENGE BUCCAL
Status: DISCONTINUED | OUTPATIENT
Start: 2020-01-27 | End: 2020-01-28 | Stop reason: HOSPADM

## 2020-01-27 RX ORDER — OXYCODONE HYDROCHLORIDE 5 MG/1
10 TABLET ORAL EVERY 4 HOURS PRN
Status: DISCONTINUED | OUTPATIENT
Start: 2020-01-27 | End: 2020-01-28 | Stop reason: HOSPADM

## 2020-01-27 RX ORDER — ACETAMINOPHEN 500 MG
1000 TABLET ORAL ONCE
Status: COMPLETED | OUTPATIENT
Start: 2020-01-27 | End: 2020-01-27

## 2020-01-27 RX ORDER — BUPIVACAINE HYDROCHLORIDE 2.5 MG/ML
INJECTION, SOLUTION EPIDURAL; INFILTRATION; INTRACAUDAL
Status: COMPLETED | OUTPATIENT
Start: 2020-01-27 | End: 2020-01-27

## 2020-01-27 RX ORDER — FAMOTIDINE 20 MG/1
20 TABLET, FILM COATED ORAL ONCE
Status: COMPLETED | OUTPATIENT
Start: 2020-01-27 | End: 2020-01-27

## 2020-01-27 RX ORDER — FENTANYL CITRATE 50 UG/ML
50 INJECTION, SOLUTION INTRAMUSCULAR; INTRAVENOUS
Status: DISCONTINUED | OUTPATIENT
Start: 2020-01-27 | End: 2020-01-27 | Stop reason: HOSPADM

## 2020-01-27 RX ADMIN — CARVEDILOL 12.5 MG: 12.5 TABLET, FILM COATED ORAL at 17:02

## 2020-01-27 RX ADMIN — FAMOTIDINE 20 MG: 20 TABLET, FILM COATED ORAL at 06:40

## 2020-01-27 RX ADMIN — INSULIN LISPRO 6 UNITS: 100 INJECTION, SOLUTION INTRAVENOUS; SUBCUTANEOUS at 17:00

## 2020-01-27 RX ADMIN — DICYCLOMINE HYDROCHLORIDE 10 MG: 10 CAPSULE ORAL at 20:22

## 2020-01-27 RX ADMIN — PROPOFOL 100 MCG/KG/MIN: 10 INJECTION, EMULSION INTRAVENOUS at 07:35

## 2020-01-27 RX ADMIN — SODIUM CHLORIDE, PRESERVATIVE FREE 3 ML: 5 INJECTION INTRAVENOUS at 20:24

## 2020-01-27 RX ADMIN — MELOXICAM 15 MG: 7.5 TABLET ORAL at 11:40

## 2020-01-27 RX ADMIN — MUPIROCIN 1 APPLICATION: 20 OINTMENT TOPICAL at 06:40

## 2020-01-27 RX ADMIN — ROPIVACAINE HYDROCHLORIDE 10 ML/HR: 5 INJECTION, SOLUTION EPIDURAL; INFILTRATION; PERINEURAL at 10:05

## 2020-01-27 RX ADMIN — PANTOPRAZOLE SODIUM 40 MG: 40 TABLET, DELAYED RELEASE ORAL at 16:43

## 2020-01-27 RX ADMIN — TRANEXAMIC ACID 1000 MG: 100 INJECTION, SOLUTION INTRAVENOUS at 08:48

## 2020-01-27 RX ADMIN — OXYCODONE HYDROCHLORIDE 10 MG: 5 TABLET ORAL at 20:22

## 2020-01-27 RX ADMIN — ISOSORBIDE MONONITRATE 30 MG: 30 TABLET, EXTENDED RELEASE ORAL at 20:19

## 2020-01-27 RX ADMIN — MELOXICAM 15 MG: 15 TABLET ORAL at 06:40

## 2020-01-27 RX ADMIN — ISOSORBIDE MONONITRATE 30 MG: 30 TABLET, EXTENDED RELEASE ORAL at 14:38

## 2020-01-27 RX ADMIN — TRANEXAMIC ACID 1000 MG: 100 INJECTION, SOLUTION INTRAVENOUS at 07:34

## 2020-01-27 RX ADMIN — DICYCLOMINE HYDROCHLORIDE 10 MG: 10 CAPSULE ORAL at 16:44

## 2020-01-27 RX ADMIN — CITALOPRAM HYDROBROMIDE 40 MG: 40 TABLET ORAL at 14:39

## 2020-01-27 RX ADMIN — AMITRIPTYLINE HYDROCHLORIDE 100 MG: 50 TABLET, FILM COATED ORAL at 20:19

## 2020-01-27 RX ADMIN — BUPIVACAINE HYDROCHLORIDE 1.8 ML: 5 INJECTION, SOLUTION PERINEURAL at 07:28

## 2020-01-27 RX ADMIN — DEXAMETHASONE SODIUM PHOSPHATE 8 MG: 4 INJECTION, SOLUTION INTRAMUSCULAR; INTRAVENOUS at 07:40

## 2020-01-27 RX ADMIN — LIDOCAINE HYDROCHLORIDE 0.3 ML: 10 INJECTION, SOLUTION EPIDURAL; INFILTRATION; INTRACAUDAL; PERINEURAL at 06:40

## 2020-01-27 RX ADMIN — ACETAMINOPHEN 1000 MG: 500 TABLET, FILM COATED ORAL at 11:40

## 2020-01-27 RX ADMIN — ACETAMINOPHEN 1000 MG: 500 TABLET ORAL at 06:40

## 2020-01-27 RX ADMIN — CEFAZOLIN SODIUM 2 G: 2 INJECTION, SOLUTION INTRAVENOUS at 16:00

## 2020-01-27 RX ADMIN — OXYCODONE HYDROCHLORIDE 10 MG: 10 TABLET, FILM COATED, EXTENDED RELEASE ORAL at 06:40

## 2020-01-27 RX ADMIN — INSULIN LISPRO 7 UNITS: 100 INJECTION, SOLUTION INTRAVENOUS; SUBCUTANEOUS at 14:39

## 2020-01-27 RX ADMIN — SODIUM CHLORIDE 100 ML/HR: 9 INJECTION, SOLUTION INTRAVENOUS at 11:40

## 2020-01-27 RX ADMIN — CEFAZOLIN SODIUM 2 G: 2 INJECTION, SOLUTION INTRAVENOUS at 07:22

## 2020-01-27 RX ADMIN — INSULIN LISPRO 6 UNITS: 100 INJECTION, SOLUTION INTRAVENOUS; SUBCUTANEOUS at 20:22

## 2020-01-27 RX ADMIN — BUPIVACAINE HYDROCHLORIDE 30 ML: 2.5 INJECTION, SOLUTION EPIDURAL; INFILTRATION; INTRACAUDAL; PERINEURAL at 07:51

## 2020-01-27 RX ADMIN — OXYCODONE HYDROCHLORIDE 5 MG: 5 TABLET ORAL at 14:38

## 2020-01-27 RX ADMIN — SODIUM CHLORIDE, POTASSIUM CHLORIDE, SODIUM LACTATE AND CALCIUM CHLORIDE 9 ML/HR: 600; 310; 30; 20 INJECTION, SOLUTION INTRAVENOUS at 06:40

## 2020-01-27 RX ADMIN — ACETAMINOPHEN 1000 MG: 500 TABLET, FILM COATED ORAL at 17:01

## 2020-01-27 NOTE — DISCHARGE INSTRUCTIONS
"DISCHARGE INSTRUCTIONS   Dr. Barba     Total Knee Replacement/ Partial (Uni) Knee Replacement     Wound Care   1) Keep wound / incision area clean and dry.   2) Dressing to remain in place until post-operative day 7. Upon dressing removal, assess for wound drainage. If no drainage is present, keep wound / incision area open to air as much as possible. If drainage is present, place sterile dressing to cover wound and assess daily. If drainage continues to occur after post-operative day 14, call the office for an urgent appointment. (You should be seen in the clinic within 1-2 days of calling). DO NOT REMOVE SUTURES (IF PRESENT) UNDER ANY CIRCUMSTANCES PRIOR TO FOLLOW UP APPOINTMENT.  3) No baths or swimming until otherwise instructed. The wound must remain dry for 10 days after surgery. After 10 days, you may begin to shower only if no drainage is present. No submerging the wound under standing water until cleared by your physician (no baths, hot tubs, swimming pools, etc). Sponge baths are the best way to perform personal hygiene while at the same time protecting the wound from moisture.   4) Prior to showering, the wound must remain dry for 72 consecutive hours (no drainage whatsoever) prior to showering. If the wound drains or spots, the clock \"resets\" - make sure the wound has been drainage-free for 72 consecutive hours.   5) Once you are allowed to get the wound wet, please use gentle soap to wash the wound area. DO NOT aggressively scrub the wound with a washcloth or bath sponge. Please visually inspect your wound(s) at least once daily. If the wound(s) are in a difficult to see location, please use a mirror or have someone else assist with visual inspection.   6) No scrubbing the wound. You may \"pad dry\" the wound, but do not rub, as this may open up the wound and pre-dispose to wound infection.   7) Do not apply lotions or creams to incision site, unless instructed otherwise.   8) Observe for redness, " "swelling, or drainage. Please call the clinic immediately if you have fevers, chills with warmth/redness surrounding wound site or if you notice pus drainage from the wound site     Activity   No heavy lifting objects greater than 10 pounds.   No driving while on narcotic pain medication.   No submerging wound under standing water (pool, bath tub, etc.) until otherwise instructed.   You may be protected weightbearing as tolerated on your operative (right lower) extremity   Use crutches or a walker for ambulation.   Wean as appropriate per physical therapist's discretion.   Do not sleep with a pillow behind your knee. You may sleep with a pillow behind your Achilles or foot. This will prevent your knee from getting stiff in the flexed (\"bent\") position and will encourage full extension (leg straightening).   Be vigilant in terms of working on full knee extension and flexion. Your goal should be 0 to 90 degrees by 2 weeks post-op - MINIMUM!   Knee range of motion as tolerated.    Blood Clot Prophylaxis   (Aspirin vs. Lovenox vs. Eliquis administration is determined by your surgeon and tailored to your specific risk profile. You will be discharged with one of these medications.) You will need to complete a total 4 week course of enteric coated aspirin 325 mg (or 81mg) twice daily or Eliquis 2.5mg twice daily, in order to minimize your risk of blood clots following surgery. You will be supplied with a prescription to obtain this. Alternatively, you will need to compete a total 2 week course of Lovenox after surgery (followed by a 2 week course of aspirin twice daily), in order to minimize the risk of blood clots following surgery. Lovenox requires a single shot in the abdomen, to be taken once daily. You will be supplied with the prescription to obtain this. Prior to your discharge from the hospital, the nursing staff will instruct you on self-administration of the Lovenox, if you will be returning directly home from " "the hospital.     Discharge Pain Medications   You will be given a prescription for pain medication. You should start taking this the same day after your surgery. Wean off as tolerated. Do not wait to take the pain medication until the pain is severe, as it will be difficult to \"catch up\" once this occurs. The pain medication usually reaches its full effect ~1 hour after ingesting. If you have been sent home on Valium, this medication works well for muscle spasms. If you have been sent home on Colace, this medication should be taken until you are off all narcotic (i.e. Norco, Percocet, Oxycodone, etc) pain medications, in order to prevent constipation. Percocet or Norco have Tylenol in their ingredient lists. You must be careful not to exceed 4,000mg (4 grams) of Tylenol, from all sources, within a single 24-hr period. This means that you may not take more than 12 Percocets or Norcos within a 24-hr period. If you have been sent home with a combination of oxycodone and Tylenol, please take Tylenol as scheduled.  The oxycodone is to be taken as needed for \"breakthrough\" pain.  Do NOT take Regular or Extra Strength Tylenol when taking your Percocet or Norco medications. Some common side effects of the narcotic pain medications (Percocet, Oxycodone, Norco, etc) include nausea and itching. Benadryl is a great over the counter medication that helps calm your stomach, decreases your anxiety levels, and minimizes the itching. You can easily purchase this at your local pharmacy as an over-the-counter medication. Please abide by the instructions as printed on the bottle. If your nausea persists, make sure to take small amounts of crackers or other lighter foods.     Follow-Up   Follow-up with Dr. Barba's office in 3 weeks from the surgery date for a post-operative evaluation. Have the following xrays done upon arrival to the follow-up appointment: 3 views of operative knee. Please call Dr. Barba's office at [phone] " for orthopaedic appointments or questions.

## 2020-01-27 NOTE — ANESTHESIA POSTPROCEDURE EVALUATION
Patient: Damaso Leonard    Procedure Summary     Date:  01/27/20 Room / Location:   [facility]  /  [facility]    Anesthesia Start:  0723 Anesthesia Stop:      Procedure:  TOTAL KNEE ARTHROPLASTY RIGHT (Right Knee) Diagnosis:       Primary osteoarthritis of both knees      (Primary osteoarthritis of both knees [M17.0])    Surgeon:  Ortiz Barba MD Provider:  Jim Keller Jr., MD    Anesthesia Type:  spinal, MAC ASA Status:  3          Anesthesia Type: spinal, MAC    Vitals  Vitals Value Taken Time   /77 1/27/2020 10:00 AM   Temp 97.5 °F (36.4 °C) 1/27/2020 10:00 AM   Pulse 79 1/27/2020 10:05 AM   Resp 16 1/27/2020 10:00 AM   SpO2 94 % 1/27/2020 10:05 AM   Vitals shown include unvalidated device data.        Post Anesthesia Care and Evaluation    Patient location during evaluation: PACU  Patient participation: complete - patient participated  Level of consciousness: awake and alert  Pain score: 0  Pain management: adequate  Airway patency: patent  Anesthetic complications: No anesthetic complications  PONV Status: none  Cardiovascular status: hemodynamically stable and acceptable  Respiratory status: nonlabored ventilation, acceptable and nasal cannula  Hydration status: acceptable

## 2020-01-27 NOTE — PLAN OF CARE
Problem: Patient Care Overview  Goal: Plan of Care Review  Outcome: Ongoing (interventions implemented as appropriate)  Flowsheets  Taken 1/27/2020 1419  Progress: improving  Taken 1/27/2020 9182  Plan of Care Reviewed With: patient  Outcome Summary: Pt able to amb 350' with RW CGA x 2 with step through gait mechanics. Pt limited by fatigue. Encouraged patient to ambulate later with nursing. Recommend patient home with assist and home health PT at discharge. Pt will need RW at discharge. PADD: 10

## 2020-01-27 NOTE — PLAN OF CARE
Patient reports minimal to no pain and tolerated ambulating in [place]. Voiding without difficulty. Numbness resolved. VSS. Patient sat in chair for a few hours this afternoon. Blood sugars have been elevated but patient has been reporting frequent hunger this afternoon.

## 2020-01-27 NOTE — ANESTHESIA PREPROCEDURE EVALUATION
Anesthesia Evaluation     Patient summary reviewed and Nursing notes reviewed                Airway   Mallampati: III  TM distance: <3 FB  Neck ROM: full  Possible difficult intubation  Dental    (+) edentulous    Pulmonary - normal exam   (+) sleep apnea (wears O2 at night),   (-) not a smoker  Cardiovascular - normal exam    ECG reviewed  Patient on routine beta blocker and Beta blocker given within 24 hours of surgery    (+) hypertension, CAD, cardiac stents more than 12 months ago PVD, hyperlipidemia,   (-) angina    ROS comment: Stress Test 2/2018:  · Findings consistent with a normal ECG stress test.  · Myocardial perfusion imaging indicates a normal myocardial perfusion study with no evidence of ischemia.  · Impressions are consistent with a low risk study.  · Left ventricular ejection fraction is normal.    ECHO 2/2018:  · Estimated EF = 60%.  · Left ventricular diastolic dysfunction (grade I) consistent with impaired relaxation.  · Normal right ventricular cavity size and systolic function noted.  · Normal left atrial size noted.  · No aortic valve stenosis is present.  · Trace tricuspid valve regurgitation is present  · There is no evidence of pericardial effusion       Neuro/Psych  (+) psychiatric history Anxiety,     GI/Hepatic/Renal/Endo    (+)  GERD,  diabetes mellitus type 2,     Musculoskeletal     Abdominal    Substance History      OB/GYN          Other   arthritis,                    Anesthesia Plan    ASA 3     spinal and MAC     intravenous induction     Anesthetic plan, all risks, benefits, and alternatives have been provided, discussed and informed consent has been obtained with: patient.    Plan discussed with CRNA.

## 2020-01-27 NOTE — DISCHARGE PLACEMENT REQUEST
"Damaso Leonard (59 y.o. Male)     Anamika Larsen, RN  [phone]  F [phone]    Date of Birth Social Security Number Address Home Phone MRN    1960  [address] [id number] [phone] [id number]    [place] Marital Status          Shinto        Admission Date Admission Type Admitting Provider Attending Provider Department, Room/Bed    20 Elective Ortiz Barba MD Luckett, Matthew R, MD [facility] 3G, S357/1    Discharge Date Discharge Disposition Discharge Destination                       Attending Provider:  Ortiz Barba MD    Allergies:  No Known Allergies    Isolation:  None   Infection:  None   Code Status:  CPR    Ht:  177.8 cm (70\")   Wt:  100 kg (221 lb)    Admission Cmt:  None   Principal Problem:  Status post total right knee replacement [Z96.651]                 Active Insurance as of 2020     Primary Coverage     Payor Plan Insurance Group Employer/Plan Group    ANTHEM MEDICARE REPLACEMENT ANTHEM MEDICARE ADVANTAGE [id number]     Payor Plan Address Payor Plan Phone Number Payor Plan Fax Number Effective Dates    [address] [phone]  2020 - None Entered    [facility] [address]       Subscriber Name Subscriber Birth Date Member ID       DAMASO LEONARD 1960 [id number]                 Emergency Contacts      (Rel.) Home Phone Work Phone Mobile Phone    LeonardDelores paez (Spouse) [phone] --    leonardshahana (Daughter) -- -- [phone]        [facility] 3G  [address] [facility] [facility] [address]  Phone:  [phone]  Fax:   Date: 2020      Ambulatory Referral to Home Health     Patient:  Damaso Leonard MRN:  [id number]   [address] [facility] [address] :  1960  SSN:    Phone: [phone] Sex:  M      INSURANCE PAYOR PLAN GROUP # SUBSCRIBER ID   Primary:    ANTHEM MEDICARE REPLACEMENT [id number] " [id number]      Referring Provider Information:  BECKY CROWLEY Phone: [phone] Fax:       Referral Information:   # Visits:  1 Referral Type: Home Health [42]   Urgency:  Routine Referral Reason: Specialty Services Required   Start Date: 2020 End Date:  To be determined by Insurer   Diagnosis: Status post total right knee replacement (Z96.651 [ICD-10-CM] V43.65 [ICD-9-CM])      Refer to Dept:   Refer to Provider:   Refer to Facility:       Face to Face Visit Date: 2020  Follow-up provider for Plan of Care? I will be treating the patient on an ongoing basis.  Please send me the Plan of Care for signature.  Follow-up provider: BECKY CROWLEY [[id number]]  Reason/Clinical Findings: Right total knee arthroplasty  Describe mobility limitations that make leaving home difficult: Right total knee arthroplasty  Nursing/Therapeutic Services Requested: Physical Therapy  Frequency: 1 Week 1     This document serves as a request of services and does not constitute Insurance authorization or approval of services.  To determine eligibility, please contact the members Insurance carrier to verify and review coverage.     If you have medical questions regarding this request for services. Please contact [address] at [phone] during normal business hours.       Verbal Order Mode: Verbal with readback   Authorizing Provider: Becky Crowley MD  Authorizing Provider's NPI: [id number]     Order Entered By: Anamika Robbins RN 2020  3:22 PM                  History & Physical      Flower Grijalva APRN at 20 1252          Patient Name: Damaso Leonard  MRN: [id number]  : 1960  DOS: 2020    Attending: Becky Crowley MD    Primary Care Provider: Clare Quintero APRN      Chief complaint: Right knee pain    Subjective   Patient is a 59 y.o. male presented for right total knee arthroplasty by Dr. Crowley.    He underwent surgery  under spinal anesthesia.  He tolerated surgery well and is admitted for further medical management.  His knee has been painful for many years.  He denies use of assistive device for ambulation.  He does report recent falls.  Conservative treatments failed to provide long-term pain relief.    When seen postop he is doing well.  His pain is well controlled.  He denies nausea, shortness of breath or chest pain.  No history of DVT or PE.    He is followed by cardiology in [place] and had preop cardiac clearance.       Allergies:  No Known Allergies    Meds:  Facility-Administered Medications Prior to Admission   Medication Dose Route Frequency Provider Last Rate Last Dose   • cyanocobalamin injection 1,000 mcg  1,000 mcg Intramuscular Q28 Days Moody Quinterorosalie Cheema, OYUNG   1,000 mcg at 07/11/18 0843     Medications Prior to Admission   Medication Sig Dispense Refill Last Dose   • amitriptyline (ELAVIL) 50 MG tablet Take 2 tablets by mouth At Night As Needed for Sleep. 1 daily 60 tablet 5 1/26/2020 at 2100   • carvedilol (COREG) 12.5 MG tablet Take 1 tablet by mouth 2 (Two) Times a Day With Meals. 180 tablet 3 1/26/2020 at 2100   • citalopram (CeleXA) 40 MG tablet Take 1 tablet by mouth Daily. 30 tablet 5 1/26/2020 at 2100   • clonazePAM (KlonoPIN) 0.5 MG tablet Take 1 tablet by mouth 3 (Three) Times a Day As Needed for Anxiety. 90 tablet 0 1/26/2020 at 2100   • dicyclomine (BENTYL) 10 MG capsule Take 1 capsule by mouth 4 (Four) Times a Day Before Meals & at Bedtime. 120 capsule 5 1/26/2020 at 2100   • diphenhydrAMINE (BENADRYL ALLERGY) 25 mg capsule Take 1 capsule by mouth Every 6 (Six) Hours As Needed for Itching. 90 capsule 3 Past Week at Unknown time   • finasteride (PROSCAR) 5 MG tablet Take 1 tablet by mouth Daily. 90 tablet 3 1/26/2020 at 2100   • isosorbide mononitrate (IMDUR) 30 MG 24 hr tablet Take 1 tablet by mouth 2 (Two) Times a Day. 60 tablet 5 1/26/2020 at 2100   • metFORMIN (GLUCOPHAGE) 500 MG tablet  Take 1 tablet by mouth 2 (Two) Times a Day With Meals. 180 tablet 3 1/26/2020 at 2100   • pantoprazole (PROTONIX) 40 MG EC tablet Take 1 tablet by mouth Daily. 90 tablet 3 1/26/2020 at 0800   • simvastatin (ZOCOR) 40 MG tablet Take 1 tablet by mouth Every Night. 90 tablet 3 1/26/2020 at 0800   • Thyroid (NP THYROID) 30 MG PO tablet Take 1 tablet by mouth Daily. 90 tablet 3 1/26/2020 at 0800   • traMADol (ULTRAM) 50 MG tablet Take 1 tablet by mouth Every 8 (Eight) Hours As Needed for Moderate Pain . 90 tablet 2 Past Month at Unknown time   • vitamin D (ERGOCALCIFEROL) 72853 units capsule capsule Take 1 capsule by mouth Every 7 (Seven) Days. 12 capsule 3 Past Month at Unknown time   • aspirin 81 MG tablet Take 1 tablet by mouth Daily. 90 tablet 3 1/22/2020   • BYDUREON 2 MG pen-injector injection INJECT CONTENTS OF 1 PEN SUBCUTANEOUSLY INTO THE APPROPRIATE AREA AS DIRECTED ONCE WEEKLY 5 pen 5 1/23/2020   • cyanocobalamin 1000 MCG/ML injection Inject 1 mL into the appropriate muscle as directed by prescriber Every 28 (Twenty-Eight) Days. 1 mL 11 1/21/2020   • glucose blood test strip Check blood sugar 3x times a day. 100 each 12 Taking   • glucose monitor monitoring kit 1 each 3 (Three) Times a Day As Needed (diabetes). 1 each 0 Taking   • icosapent ethyl (VASCEPA) 1 g capsule capsule 2 twice daily 120 capsule 5 Taking   • Lancets (ONETOUCH ULTRASOFT) lancets Check blood sugar 3 times daily 100 each 12 Taking   • naloxone (NARCAN) 4 MG/0.1ML nasal spray 1 spray into the nostril(s) as directed by provider As Needed (overdose). 1 each 0 Taking   • O2 (OXYGEN) Inhale 2 L/min Every Night.      • ondansetron (ZOFRAN) 8 MG tablet Take 1 tablet by mouth Every 8 (Eight) Hours As Needed for Nausea. 20 tablet 2 Taking   • polyethylene glycol (MIRALAX) packet Take 17 g by mouth Daily. (Patient taking differently: Take 17 g by mouth Daily As Needed.) 1 each 5 More than a month at Unknown time       History:   Past Medical History:  "  Diagnosis Date   • Anxiety    • Anxiety and depression    • Arthritis    • ASCVD (arteriosclerotic cardiovascular disease)    • Colitis    • Coronary artery disease    • Disease of thyroid gland    • DM (diabetes mellitus) (CMS/HCC)    • GERD (gastroesophageal reflux disease)    • History of EKG 04/15/2015    NORMAL   • History of transfusion    • HTN (hypertension)    • Hyperlipidemia    • Injury of back    • Low back pain    • Myocardial infarction (CMS/HCC)    • Sleep apnea     wears oxygen at night    • Wears glasses    • Wears partial dentures      Past Surgical History:   Procedure Laterality Date   • CARDIAC CATHETERIZATION     • CARDIAC SURGERY      stenting   • CHOLECYSTECTOMY     • COLONOSCOPY  2007   • HERNIA REPAIR     • SHOULDER SURGERY     • TONSILLECTOMY     • TYMPANOPLASTY     • UPPER GASTROINTESTINAL ENDOSCOPY     • VASECTOMY       Family History   Problem Relation Age of Onset   • Heart attack Father    • Heart disease Father    • Hypertension Father    • Heart attack Sister    • Diabetes Sister    • Heart disease Sister    • Hypertension Sister    • Thyroid disease Sister    • Heart attack Brother    • Diabetes Brother    • Thyroid disease Brother    • Arthritis Daughter    • COPD Daughter    • Asthma Daughter    • Depression Daughter    • Heart disease Sister    • Hypertension Sister      Social History     Tobacco Use   • Smoking status: Never Smoker   • Smokeless tobacco: Never Used   Substance Use Topics   • Alcohol use: No   • Drug use: No   He is  with 3 children.  He is retired from [employer].    Review of Systems  All systems were reviewed and negative except for:  Respiratory: positive for  shortness of air  Cardiovascular: positive for  chest pressure / pain, on exertion  Gastrointestinal: positive for  constipation and diarrhea    Vital Signs  Visit Vitals  /58   Pulse 81   Temp 97.5 °F (36.4 °C)   Resp 16   Ht 177.8 cm (70\")   Wt 100 kg (221 lb)   SpO2 95%   BMI 31.71 " kg/m²       Physical Exam:    General Appearance:    Alert, cooperative, in no acute distress   Head:    Normocephalic, without obvious abnormality, atraumatic   Eyes:            Lids and lashes normal, conjunctivae and sclerae normal, no   icterus, no pallor, corneas clear   Ears:    Ears appear intact with no abnormalities noted   Neck:   No adenopathy, supple, trachea midline, no thyromegaly   Lungs:     Clear to auscultation, respirations regular, even and                   unlabored    Heart:    Regular rhythm and normal rate, normal S1 and S2, no            murmur, no gallop   Abdomen:     Normal bowel sounds, no masses, no organomegaly, soft        non-tender, non-distended, no guarding, no rebound                 tenderness   Genitalia:    Deferred   Extremities:  Right knee Ace wrap clean dry intact.  Nerve block present.   Pulses:   Pulses palpable and equal bilaterally   Skin:   No bleeding, bruising or rash   Neurologic:   Cranial nerves 2 - 12 grossly intact, sensation intact. Flexion and dorsiflexion intact bilateral feet.        I reviewed the patient's new clinical results.     Results for VIRGEN SEVERINO (MRN [id number]) as of 1/27/2020 13:03   Ref. Range 1/14/2020 11:01   Glucose Latest Ref Range: 65 - 99 mg/dL 186 (H)   Sodium Latest Ref Range: 136 - 145 mmol/L 139   Potassium Latest Ref Range: 3.5 - 5.2 mmol/L 4.1   CO2 Latest Ref Range: 22.0 - 29.0 mmol/L 27.0   Chloride Latest Ref Range: 98 - 107 mmol/L 100   Anion Gap Latest Ref Range: 5.0 - 15.0 mmol/L 12.0   Creatinine Latest Ref Range: 0.76 - 1.27 mg/dL 1.15   BUN Latest Ref Range: 6 - 20 mg/dL 13   BUN/Creatinine Ratio Latest Ref Range: 7.0 - 25.0  11.3   Calcium Latest Ref Range: 8.6 - 10.5 mg/dL 10.1   eGFR Non  Am Latest Ref Range: >60 mL/min/1.73 66   Hemoglobin A1C Latest Ref Range: 4.80 - 5.60 % 7.20 (H)   Protime Latest Ref Range: 11.2 - 14.3 Seconds 13.7   INR Latest Ref Range: 0.85 - 1.16  1.11   PTT Latest Ref Range:  24.0 - 37.0 seconds 27.6   WBC Latest Ref Range: 3.40 - 10.80 10*3/mm3 7.71   RBC Latest Ref Range: 4.14 - 5.80 10*6/mm3 4.82   Hemoglobin Latest Ref Range: 13.0 - 17.7 g/dL 13.0   Hematocrit Latest Ref Range: 37.5 - 51.0 % 42.1   RDW Latest Ref Range: 12.3 - 15.4 % 14.0   MCV Latest Ref Range: 79.0 - 97.0 fL 87.3   MCH Latest Ref Range: 26.6 - 33.0 pg 27.0   MCHC Latest Ref Range: 31.5 - 35.7 g/dL 30.9 (L)   MPV Latest Ref Range: 6.0 - 12.0 fL 9.1   Platelets Latest Ref Range: 140 - 450 10*3/mm3 329     Assessment and Plan:     Status post total right knee replacement    Primary osteoarthritis of both knees    Coronary artery disease involving native coronary artery of native heart    Obstructive sleep apnea    Hypercholesterolemia    Essential hypertension    Chronic combined systolic and diastolic heart failure (CMS/HCC)    DM (diabetes mellitus) (CMS/[facility])    Hypothyroid    On home O2      Plan  1. PT/OT- early ambulation postop  2. Pain control-prns, AC nerve block   3. IS-encourage  4. DVT proph- mechs/ASA  5. Bowel regimen  6. Resume home medications as appropriate  7. Monitor post-op labs  8. Discharge planning for home    HTN, Hyperlipidemia, CAD  - Continue home coreg and statin  - Monitor BP   - Holding parameters for BP meds  - Labetalol PRN for SBP>170    DM  - hgb A1c on 1/14/2020 7.2  - hold metformin  - Accu-Chek AC and HS with moderate dose SSI    TRES  - O2 at night    Hypothyroid  - continue home YOUNG Bentley  01/27/20  1:34 PM    Electronically signed by Flower Grijalva APRN at 01/27/20 8525     Mireya Castro PA-C at 01/27/20 0637     Attestation signed by Ortiz Barba MD at 01/27/20 0711    Agree.  Proceed with right total knee arthroplasty.                  Pre-Op H&P  Damaso Leonard  [id number]  1960    Chief complaint: Bilateral knee pain    HPI:  Damaso Gerberley is a 59 y.o. male who presents with bilateral knee pain for 12 year s. Onset has been  atraumatic and gradual nature.  Patient is complaining of a constant aching throbbing sensation in the bilateral knees, right worse than left that is been ongoing for 12 years but is gotten progressively worse over the past 2 months.  Walking weightbearing activities increases pain.  His pain is currently a 2/10 on the pain scale when sitting and resting but increases to a 10/10 on the pain scale when walking.  He is describing the pain as a constant ache and throb with associated swelling and stiffness.  Prior treatments have included ibuprofen and tramadol for the past 3 months with only minimal improvement.  He is also attempted treatment with physical therapy and bracing.  Despite these interventions, his pain is persisting and causing limitations in daily activities.      Radiographic imaging, bilateral knee, was performed with findings that demonstrate moderate to severe tricompartmental arthritis with genu varum alignment, periarticular osteophytes visualized in all compartments and no significant changes compared to prior radiographs.    He presents today for a total knee arthroplasty, right    Review of Systems:  General ROS: negative for chills, fever or skin lesions;  No changes since last office visit  Cardiovascular ROS: no chest pain or dyspnea on exertion  Respiratory ROS: no cough, shortness of breath, or wheezing    Allergies: No Known Allergies    Home Meds:    No current facility-administered medications on file prior to encounter.      Current Outpatient Medications on File Prior to Encounter   Medication Sig Dispense Refill   • amitriptyline (ELAVIL) 50 MG tablet Take 2 tablets by mouth At Night As Needed for Sleep. 1 daily 60 tablet 5   • aspirin 81 MG tablet Take 1 tablet by mouth Daily. 90 tablet 3   • BYDUREON 2 MG pen-injector injection INJECT CONTENTS OF 1 PEN SUBCUTANEOUSLY INTO THE APPROPRIATE AREA AS DIRECTED ONCE WEEKLY 5 pen 5   • carvedilol (COREG) 12.5 MG tablet Take 1 tablet by  mouth 2 (Two) Times a Day With Meals. 180 tablet 3   • citalopram (CeleXA) 40 MG tablet Take 1 tablet by mouth Daily. 30 tablet 5   • cyanocobalamin 1000 MCG/ML injection Inject 1 mL into the appropriate muscle as directed by prescriber Every 28 (Twenty-Eight) Days. 1 mL 11   • diphenhydrAMINE (BENADRYL ALLERGY) 25 mg capsule Take 1 capsule by mouth Every 6 (Six) Hours As Needed for Itching. 90 capsule 3   • finasteride (PROSCAR) 5 MG tablet Take 1 tablet by mouth Daily. 90 tablet 3   • glucose blood test strip Check blood sugar 3x times a day. 100 each 12   • glucose monitor monitoring kit 1 each 3 (Three) Times a Day As Needed (diabetes). 1 each 0   • isosorbide mononitrate (IMDUR) 30 MG 24 hr tablet Take 1 tablet by mouth 2 (Two) Times a Day. 60 tablet 5   • Lancets (ONETOUCH ULTRASOFT) lancets Check blood sugar 3 times daily 100 each 12   • metFORMIN (GLUCOPHAGE) 500 MG tablet Take 1 tablet by mouth 2 (Two) Times a Day With Meals. 180 tablet 3   • naloxone (NARCAN) 4 MG/0.1ML nasal spray 1 spray into the nostril(s) as directed by provider As Needed (overdose). 1 each 0   • ondansetron (ZOFRAN) 8 MG tablet Take 1 tablet by mouth Every 8 (Eight) Hours As Needed for Nausea. 20 tablet 2   • pantoprazole (PROTONIX) 40 MG EC tablet Take 1 tablet by mouth Daily. 90 tablet 3   • polyethylene glycol (MIRALAX) packet Take 17 g by mouth Daily. (Patient taking differently: Take 17 g by mouth Daily As Needed.) 1 each 5   • simvastatin (ZOCOR) 40 MG tablet Take 1 tablet by mouth Every Night. 90 tablet 3   • Thyroid (NP THYROID) 30 MG PO tablet Take 1 tablet by mouth Daily. 90 tablet 3   • vitamin D (ERGOCALCIFEROL) 64387 units capsule capsule Take 1 capsule by mouth Every 7 (Seven) Days. 12 capsule 3       PMH:   Past Medical History:   Diagnosis Date   • Anxiety    • Anxiety and depression    • Arthritis    • ASCVD (arteriosclerotic cardiovascular disease)    • Colitis    • Coronary artery disease    • Disease of thyroid  "gland    • DM (diabetes mellitus) (CMS/HCC)    • GERD (gastroesophageal reflux disease)    • History of EKG 04/15/2015    NORMAL   • History of transfusion    • HTN (hypertension)    • Hyperlipidemia    • Injury of back    • Low back pain    • Myocardial infarction (CMS/HCC)    • Sleep apnea     wears oxygen at night    • Wears glasses    • Wears partial dentures      PSH:    Past Surgical History:   Procedure Laterality Date   • CARDIAC CATHETERIZATION     • CARDIAC SURGERY      stenting   • CHOLECYSTECTOMY     • COLONOSCOPY  2007   • HERNIA REPAIR     • SHOULDER SURGERY     • TONSILLECTOMY     • TYMPANOPLASTY     • UPPER GASTROINTESTINAL ENDOSCOPY     • VASECTOMY         Social History:   Tobacco:   Social History     Tobacco Use   Smoking Status Never Smoker   Smokeless Tobacco Never Used      Alcohol:     Social History     Substance and Sexual Activity   Alcohol Use No       Vitals:           /97 (BP Location: Right arm, Patient Position: Lying)   Pulse 79   Temp 97.9 °F (36.6 °C) (Temporal)   Resp 18   Ht 177.8 cm (70\")   Wt 100 kg (221 lb)   SpO2 97%   BMI 31.71 kg/m²      Physical Exam:  General Appearance:    Alert, cooperative, no distress, appears stated age   Head:    Normocephalic, without obvious abnormality, atraumatic   Lungs:     Clear to auscultation bilaterally, respirations unlabored    Heart:   Regular rate and rhythm, S1 and S2 normal, no murmur, rub    or gallop    Abdomen:    Soft, non-tender.  +bowel sounds   Breast Exam:    deferred   Genitalia:    deferred   Extremities:   Extremities normal, atraumatic, no cyanosis or edema   Skin:   Skin color, texture, turgor normal, no rashes or lesions   Neurologic:   Grossly intact   Results Review  LABS:  Lab Results   Component Value Date    WBC 7.71 01/14/2020    HGB 13.0 01/14/2020    HCT 42.1 01/14/2020    MCV 87.3 01/14/2020     01/14/2020    NEUTROABS 3.40 01/14/2020    GLUCOSE 186 (H) 01/14/2020    BUN 13 01/14/2020    " CREATININE 1.15 01/14/2020    EGFRIFNONA 66 01/14/2020    EGFRIFAFRI 102 12/06/2019     01/14/2020    K 4.1 01/14/2020     01/14/2020    CO2 27.0 01/14/2020    CALCIUM 10.1 01/14/2020    ALBUMIN 4.40 12/06/2019    AST 5 12/06/2019    ALT 12 12/06/2019    BILITOT 0.4 12/06/2019       RADIOLOGY:  Imaging Results (Last 72 Hours)     ** No results found for the last 72 hours. **          I reviewed the patient's new clinical results.    Impression:   Primary osteoarthritis of right knee    Plan:   Total knee arthroplasty, right    Mireya Castro PA-C   1/27/2020   6:37 AM    Electronically signed by Ortiz Barba MD at 01/27/20 0711         Current Facility-Administered Medications   Medication Dose Route Frequency Provider Last Rate Last Dose   • acetaminophen (TYLENOL) tablet 1,000 mg  1,000 mg Oral Q6H Ortiz Barba MD   1,000 mg at 01/27/20 1140   • amitriptyline (ELAVIL) tablet 100 mg  100 mg Oral Nightly PRN Flower Grijalva APRN       • [START ON 1/28/2020] aspirin EC tablet 325 mg  325 mg Oral Q12H Ortiz Barba MD       • atorvastatin (LIPITOR) tablet 20 mg  20 mg Oral Daily Flower Grijalva APRN       • bisacodyl (DULCOLAX) EC tablet 10 mg  10 mg Oral Daily PRN Ortiz Barba MD       • bisacodyl (DULCOLAX) suppository 10 mg  10 mg Rectal Daily PRN Ortiz Barba MD       • carvedilol (COREG) tablet 12.5 mg  12.5 mg Oral BID With Meals Flower Grijalva APRN       • ceFAZolin in dextrose (ANCEF) IVPB solution 2 g  2 g Intravenous Q8H Ortiz Barba MD       • citalopram (CeleXA) tablet 40 mg  40 mg Oral Daily Flower Grijalva APRN   40 mg at 01/27/20 1439   • clonazePAM (KlonoPIN) tablet 0.5 mg  0.5 mg Oral TID PRN Huma Ayala MD       • dextrose (D50W) 25 g/ 50mL Intravenous Solution 25 g  25 g Intravenous Q15 Min PRN Flower Grijalva APRN       • dextrose (GLUTOSE) oral gel 15 g  15 g Oral Q15 Min PRN Flower Grijalva APRN       • dicyclomine  (BENTYL) capsule 10 mg  10 mg Oral 4x Daily AC & at Bedtime Flower Grijalva, YOUNG       • diphenhydrAMINE (BENADRYL) capsule 25 mg  25 mg Oral Q6H PRN Flower Grijalva APRN       • docusate sodium (COLACE) capsule 100 mg  100 mg Oral BID PRN Ortiz Barba MD       • finasteride (PROSCAR) tablet 5 mg  5 mg Oral Daily Flower Grijalva, YOUNG       • glucagon (human recombinant) (GLUCAGEN DIAGNOSTIC) injection 1 mg  1 mg Subcutaneous Q15 Min PRN Flower Grijalva, YOUNG       • HYDROmorphone (DILAUDID) injection 0.5 mg  0.5 mg Intravenous Q2H PRN Ortiz Barba MD        And   • naloxone (NARCAN) injection 0.1 mg  0.1 mg Intravenous Q5 Min PRN Ortiz Barba MD       • insulin lispro (humaLOG) injection 0-9 Units  0-9 Units Subcutaneous 4x Daily With Meals & Nightly Flower Grijalva APRN   7 Units at 01/27/20 1439   • isosorbide mononitrate (IMDUR) 24 hr tablet 30 mg  30 mg Oral Q12H Flower Grijalva APRN   30 mg at 01/27/20 1438   • labetalol (NORMODYNE,TRANDATE) injection 10 mg  10 mg Intravenous Q4H PRN Flower Grijalva, APRYANNI       • lactated ringers infusion  9 mL/hr Intravenous Continuous Ortiz Barba MD   Stopped at 01/27/20 0945   • meloxicam (MOBIC) tablet 15 mg  15 mg Oral Daily Ortiz Barba MD   15 mg at 01/27/20 1140   • ondansetron (ZOFRAN) tablet 4 mg  4 mg Oral Q6H PRN Ortiz Barba MD        Or   • ondansetron (ZOFRAN) injection 4 mg  4 mg Intravenous Q6H PRN Ortiz Barba MD       • oxyCODONE (ROXICODONE) immediate release tablet 10 mg  10 mg Oral Q4H PRN Ortiz Barba MD       • oxyCODONE (ROXICODONE) immediate release tablet 5 mg  5 mg Oral Q4H PRN Ortiz Barba MD   5 mg at 01/27/20 1438   • pantoprazole (PROTONIX) EC tablet 40 mg  40 mg Oral Daily Flower Grijalva APRN       • polyethylene glycol (MIRALAX) packet 17 g  17 g Oral Daily Flower Grijalva APRN       • ropivacaine (Naropin) 0.2% in NS infusion (ARROW Pump)  10 mL/hr Peripheral  Nerve Continuous Ortiz Barba MD 10 mL/hr at 01/27/20 1005 10 mL/hr at 01/27/20 1005   • sodium chloride 0.9 % bolus 500 mL  500 mL Intravenous TID PRN Flower Grijalva APRN       • sodium chloride 0.9 % flush 3 mL  3 mL Intravenous Q12H Ortiz Barba MD       • sodium chloride 0.9 % flush 3-10 mL  3-10 mL Intravenous PRN Ortiz Barba MD       • sodium chloride 0.9 % infusion  100 mL/hr Intravenous Continuous Ortiz Barba  mL/hr at 01/27/20 1140 100 mL/hr at 01/27/20 1140   • Thyroid (ARMOUR) tablet 30 mg  30 mg Oral Daily Flower Grijalva APRN            Operative/Procedure Notes (last 24 hours) (Notes from 01/26/20 1525 through 01/27/20 1525)      Ortiz Barba MD at 01/27/20 0747        OPERATIVE REPORT     DATE OF PROCEDURE: 1/27/2020    SURGEON: Ortiz Barba M.D.     ASSISTANT(S): Circulator: Carmita De Los Santos RN  Scrub Person: Ralph Ferguson  Vendor Representative: Robert Centeno  Nursing Assistant: Arun Flores  Assistant: Madhavi Zhang PA; Sandy Cherry PA-C    Note-PA was utilized during the case to facilitate positioning the patient, exposure, retraction, placement of final components and definitive closure.    PREOPERATIVE DIAGNOSIS: Advanced degenerative joint disease of the right knee secondary to osteoarthritis    POSTOPERATIVE DIAGNOSIS: same     PROCEDURE: Right total Knee Arthroplasty     SURGICAL DETAILS:     APPROACH: Medial parapatellar     ANESTHESIA: Spinal plus local periarticular block    PREOPERATIVE ANTIBIOTICS: Ancef 2 g IV    TRANEXAMIC ACID: IV    TOURNIQUET TIME: 78 min @300 mmHg     ESTIMATED BLOOD LOSS: 50 cc     SPECIMENS: None    IMPLANTS:   /Brand: [employer] triathlon  Tibial component size: 5 primary baseplate   Femoral component size: 5 cruciate retaining   Tibial polyethylene insert: 13 mm cruciate stabilizing   Patellar component: 32 mm asymmetric   Cement: 2 bags Simplex P medium viscosity without  antibiotics    DRAINS: None    LOCAL INJECTION: 1 cc Toradol 30mg/ml, 4 cc duramorph 2mg/ml, 20 cc 0.5% ropivicaine, 20 cc 0.5% lidocaine with 1:200,000 epinephrine, 15 cc preservative free normal saline     MODIFIER(S): None    COMPLICATIONS: None apparent    INDICATIONS FOR PROCEDURE: The patient has a long history of progressive knee pain, arthritis, and degeneration resulting in deformity in the right knee from predominantly medial wear and bone loss. Non-operative treatment and conservative therapeutic measures have been attempted, but have not improved or controlled the symptoms and pain that occurs during normal daily activities. Knee motion has also become limited and is restricting the patient. Total knee arthroplasty was recommended. The risks, benefits, alternatives, and potential complications of the arthroplasty surgery were discussed with the patient in detail to include but not be limited to infection, bleeding, anesthesia risks, damage to neurovascular structures, osteolysis, aseptic loosening, instability, anterior knee pain, continued pain, iatrogenic fracture, dislocation, need for future surgery including the potential for amputation, blood clots, myocardial infarction, stroke, and death. Specific details of the surgical procedure, hospitalization, recovery, rehabilitation, and long-term precautions were also presented. Pre-operative teaching was provided. Implant/prosthesis selection was outlined, and the many options available were explained; the final choice will be made at the time of the procedure to match the anatomy and condition of the bone, ligaments, tendons, and muscles. The patient completed preoperative medical optimization and risk assessment, joint arthroplasty education, and MRSA decolonization using a universal decolonization protocol. Perioperative blood management and the potential for blood transfusion were discussed with risks and options clearly outlined.     INTRAOPERATIVE  FINDINGS: Tricompartmental arthritis with genu varum alignment    PROCEDURE: The patient was identified in the preoperative holding area. The operative site was confirmed and marked. JAY hose and a sequential compression device were placed on the nonoperative leg. The risks, benefits, and alternatives to surgery were again confirmed with the patient and the patient wished to proceed. The patient was brought to the operating room and placed on the operating room table in the supine position. All bony prominences were padded. A huddle was performed with the patient and all vital surgical team members to confirm the correct operative site, procedure, anesthesia type, and operative plan with the patient. After anesthesia was performed, a tourniquet was applied to the upper thigh of the operative leg. A full knee exam was performed once anesthesia was in full effect. Intravenous antibiotic prophylaxis was given and confirmed with the anesthesia team.     The operative leg was prepped and draped in the usual sterile fashion. A surgical time out was performed immediately preceding the incision with all personnel in the operating room to confirm patient identity, the correct operative site and extremity, correct radiographic studies, availability of appropriate surgical equipment and agreement on the planned procedure. The operative knee was elevated and exsanguinated using an esmarch and the tourniquet was inflated. The knee was exposed using a limited anterior-midline skin incision. Dissection was carried down through skin and subcutaneous tissue to the extensor mechanism with a scalpel. A medial parapatellar arthrotomy was made to enter the knee space sharply. A large amount of normal appearing joint fluid was encountered and suctioned. The synovium was thickened, hypertrophic, and inflamed. A partial synovectomy was performed for exposure, and the medial and lateral gutters were cleared of scar and synovial reflections.  The superficial medial collateral ligament was carefully elevated off osteophytes which were then removed with a rongeur and osteotome. Degenerative meniscal remnants were excised medially and laterally. The patellar synovial reflections were released and the patella exposed to reveal complete wear through the articular surface. The trochlea demonstrated similar severe wear. The patella was then subluxed laterally. The knee was then flexed up to 90 degrees.     Assessment of the knee joint revealed severe end-stage articular damage with no remaining medial weight bearing cartilage. The medial compartment was severely eburnated with bone loss on the medial tibia and medial femoral condyle, resulting in the varus deformity. Osteophytes were removed from the notch. The ACL was resected. Osteophytes were then further debrided from the femur and the tibia.     Attention was then turned to the femur. The medullary cavity of the femur was entered anterior-medial to the intra-condylar notch above the PCL with a 5/6th inch step drill. The femoral canal was over-reamed, irrigated, and suctioned to prevent fat embolization. An intramedullary alignment system was then placed, fixed to the femur with pins, and the distal femoral osteotomy was made in 5 degrees of valgus with an 8 mm resection. Attention was then turned to the tibia. Retractors were placed medially and laterally to protect the collateral ligaments and a Marlo retractor was placed around the PCL in the intra-condylar notch. The tibia was then subluxed anteriorly for wide exposure. An extramedullary alignment system was then placed and the proximal tibial osteotomy was made measuring 1-2 mm off the most affected side and 9-10 mm off the unaffected side with approximately 3 degrees posterior slope. The guide was also confirmed to be perpendicular to the tibial axis prior to the osteotomy. A sizing guide was then used to select the tibial component size. The knee  was then brought to extension and the appropriate sized spacer block was placed. This brought the knee to full extension with excellent medial and lateral balance.     The flexion gap was then inspected and measured both visually and with a tensioner device to assess the medial and lateral flexion balance. A femur posterior reference guide was placed in 6 degrees of external rotation in accordance with the soft tissue balance. This resulted in symmetric flexion and extension gaps and was verified against the epicondylar axis and [place]'s line. The femur was sized using a posterior referencing guide and, using the previously determined degrees of rotation, drill holes were made for the cutting block. The 4 in 1 cutting block was impacted into place and secured. Cuts were completed using a saw with the collaterals protected by Z-retractors. Care was taken to preserve the PCL. A lamina  was placed with the knee in 90 degrees of flexion and a large curved osteotome, rongeur, and curettes were utilized to clear posterior osteophytes, loose bodies and meniscal remnants.     A femoral trial implant was placed; excellent fit was confirmed. The medial-lateral and anterior-posterior dimensions were checked; anatomic fit and coverage were achieved.The proximal tibia baseplate trial was placed with its mid-point at the junction of medial one-third and lateral two-thirds of the tibial tubercle and pinned to this fixed position. A trial reduction was then performed. Trial reduction demonstrated the knee achieved full extension with excellent stability and range of motion, and no tendency toward instability with varus-valgus stress at full extension, mid-flexion, or 90 degrees of flexion. The PCL was also found to be appropriately tensioned with normal posterior tibial excursion.     Next, attention was turned to the patella. It was measured and the posterior 9-10 mm was resected leaving a healthy remnant with greater  than 11mm thickness. The patella was sized with the asymmetric guide, and drill holes were made. A trial button was placed and tracking of the patella and the entire knee trial was tested. The patella tracking was excellent throughout range of motion with no instability. Punches and drills were then placed through the trials to accommodate the final implants. All trials were removed.     The wound was copiously lavaged with a pulse irrigation/suction system. The posterior recess of the knee and areas of known bleeding were treated with the electrocautery to reduce post-operative bleeding. A pain cocktail was injected into the lilliana-articular tissues. The cut bone surfaces were then irrigated again, suctioned, and dried. A double batch of Polymethylmethacrylate (PMMA) bone cement was mixed, and the final implants were cemented into place, tibia followed by femur followed by patella. The cement was compressed using a non-constrained poly trial which was removed so the excess cement could be curetted free. The final polyethylene was impacted into place, and the knee was held in extension until the cement cured. The tourniquet was released and no excessive bleeding was encountered. Synovial bleeding was further treated with the electrocautery until adequate hemostasis was obtained.     The wound was again irrigated with dilute betadine solution followed by saline. The extensor mechanism and capsule was then anatomically closed with interrupted #1 Vicryl suture and a running #2 Stratafix stitch. Knee stability and range of motion with the capsule closed was excellent, and range of motion was 0 to 135 without excessive stress on the repair. Instrument and sponge count was completed and confirmed correct. Deep and superficial subcutaneous tissue was closed with interrupted 2-0 Vicryl suture. A running 3-0 Monocryl subcuticular stitch was used to re-approximate the skin edges followed by skin glue adhesive to seal the wound.  A silver impregnated dressing was then placed, followed by JAY hose, and a sequential compression device to the operative limb. The patient was sufficiently recovered from anesthesia, transferred to a hospital bed and taken to the PACU in stable condition.     One gram (1000 mg) of intravenous tranexamic acid was administered prior to incision. A second one gram (1000 mg) intravenous dose was given prior to wound closure.    No apparent complications occurred during the procedure. Instrument, sponge and needle counts were correct x 2.     The patient underwent risk stratification preoperatively and aspirin was chosen for DVT prophylaxis. Delay in starting chemical prophylaxis for 23 hours from surgical incision was over concerns for hematoma formation and wound related issues.     POST OPERATIVE PLAN:   Weight bearing as tolerated with knee range of motion as tolerated   Pain control with PO/IV meds   Adductor canal catheter placement by Anesthesia Pain Management Team in PACU.   23 hours perioperative antibiotic prophylaxis   PT/OT for mobilization and medical equipment needs   Keep silver dressing in place for 7 days post op. Change dressing only if saturated.   JAY hose and SCDs to bilateral lower extremities   Social work for discharge planning needs   Follow up in 3 weeks for post operative wound check with XR AP and lateral of operative knee.       Electronically signed by Ortiz Barba MD at 01/27/20 0940     Ortiz Barba MD at 01/27/20 0785        TOTAL KNEE ARTHROPLASTY  Progress Note    Damaso Leonard  1/27/2020    Pre-op Diagnosis:   Primary osteoarthritis of both knees [M17.0]       Post-Op Diagnosis Codes:     * Primary osteoarthritis of both knees [M17.0]    Procedure/CPT® Codes:  [facility] TOTAL KNEE ARTHROPLASTY [78426]    Procedure(s):  TOTAL KNEE ARTHROPLASTY RIGHT    Surgeon(s):  Ortiz Barba MD    Anesthesia: Spinal    Staff:   Circulator: Carmita De Los Santos RN  Scrub Person: Phyllis  Ralph  Vendor Representative: Robert Centeno  Nursing Assistant: Arun Flores  Assistant: Madhavi Zhang PA; Sandy Cherry PA-C    Estimated Blood Loss: 50ml    Urine Voided: * No values recorded between 1/27/2020  7:22 AM and 1/27/2020  9:38 AM *    Specimens:                None          Drains: * No LDAs found *    Findings: Tricompartmental arthritis with genu varum alignment    Complications: None apparent      Ortiz Barba MD     Date: 1/27/2020  Time: 9:38 AM        Electronically signed by Ortiz Barba MD at 01/27/20 0938

## 2020-01-27 NOTE — ANESTHESIA PROCEDURE NOTES
Spinal Block      Patient reassessed immediately prior to procedure    Patient location during procedure: OR  Indication:at surgeon's request  Performed By  CRNA: Shara Bass CRNA  Preanesthetic Checklist  Completed: patient identified, site marked, surgical consent, pre-op evaluation, timeout performed, IV checked, risks and benefits discussed and monitors and equipment checked  Spinal Block Prep:  Patient Position:sitting  Sterile Tech:cap, gloves, sterile barriers and mask  Prep:Chloraprep  Patient Monitoring:blood pressure monitoring, continuous pulse oximetry and EKG  Spinal Block Procedure  Approach:midline  Guidance:landmark technique and palpation technique  Location:L4-L5  Needle Type:Sprotte  Needle Gauge:25 G  Placement of Spinal needle event:cerebrospinal fluid aspirated  Paresthesia: no  Fluid Appearance:clear  Medications: bupivacaine (MARCAINE) 0.5 % injection, 1.8 mL  Med Administered at 1/27/2020 7:28 AM   Post Assessment  Patient Tolerance:patient tolerated the procedure well with no apparent complications  Complications no  Additional Notes  Procedure:  Pt assisted to sitting position, with legs in position of comfort over side of bed.  Pt. instructed in optimal spine presentation, the spine was prepped/ Draped and the skin at insertion site was anesthetized with 1% Lidocaine 2 ml.  The spinal needle was then advanced until CSF flow was obtained and LA was injected:

## 2020-01-27 NOTE — BRIEF OP NOTE
TOTAL KNEE ARTHROPLASTY  Progress Note    Damaso Leonard  1/27/2020    Pre-op Diagnosis:   Primary osteoarthritis of both knees [M17.0]       Post-Op Diagnosis Codes:     * Primary osteoarthritis of both knees [M17.0]    Procedure/CPT® Codes:  [place] TOTAL KNEE ARTHROPLASTY [34790]    Procedure(s):  TOTAL KNEE ARTHROPLASTY RIGHT    Surgeon(s):  Ortiz Barba MD    Anesthesia: Spinal    Staff:   Circulator: Carmita De Los Santos RN  Scrub Person: Ralph Ferguson  Vendor Representative: Robert Centeno  Nursing Assistant: Arun Flores  Assistant: Madhavi Zhang PA; Sandy Cherry PA-C    Estimated Blood Loss: 50ml    Urine Voided: * No values recorded between 1/27/2020  7:22 AM and 1/27/2020  9:38 AM *    Specimens:                None          Drains: * No LDAs found *    Findings: Tricompartmental arthritis with genu varum alignment    Complications: None apparent      Ortiz Barba MD     Date: 1/27/2020  Time: 9:38 AM

## 2020-01-27 NOTE — THERAPY EVALUATION
Patient Name: Damaso Leonard  : 1960    MRN: [id number]                              Today's Date: 2020       Admit Date: 2020    Visit Dx:     ICD-10-CM ICD-9-CM   1. Primary osteoarthritis of both knees M17.0 715.16     Patient Active Problem List   Diagnosis   • Generalized anxiety disorder   • Malignant hypertension with heart disease, without congestive heart failure   • Major depressive disorder in partial remission (CMS/Formerly [facility])   • Type 2 diabetes mellitus with diabetic peripheral angiopathy without gangrene, without long-term current use of insulin (CMS/Formerly [facility])   • Coronary artery disease involving native coronary artery of native heart   • Sleep apnea   • Obstructive sleep apnea   • Osteoarthritis involving multiple joints on both sides of body   • Vitamin D deficiency   • Vitamin B 12 deficiency   • Excessive cerumen in both ear canals   • Hypercholesterolemia   • Anginal pain (CMS/[facility]ly [facility])   • Lumbar back pain with radiculopathy affecting left lower extremity   • Vitamin D deficiency disease   • High risk medication use   • Gastroesophageal reflux disease without esophagitis   • Seasonal allergic rhinitis due to pollen   • Benign non-nodular prostatic hyperplasia without lower urinary tract symptoms   • Abnormal thyroid blood test   • Essential hypertension   • Excessive cerumen in both ear canals   • Chronic pain of both knees   • Colitis   • Right hand weakness   • ED (erectile dysfunction) of organic origin   • Obesity (BMI 30.0-34.9)   • Primary osteoarthritis of both knees   • Chronic combined systolic and diastolic heart failure (CMS/HCC)   • Status post total right knee replacement   • DM (diabetes mellitus) (CMS/[facility])   • Hypothyroid   • On home O2     Past Medical History:   Diagnosis Date   • Anxiety    • Anxiety and depression    • Arthritis    • ASCVD (arteriosclerotic cardiovascular disease)    • Colitis    • Coronary artery disease    • Disease of thyroid gland    • DM (diabetes  mellitus) (CMS/HCC)    • GERD (gastroesophageal reflux disease)    • History of EKG 04/15/2015    NORMAL   • History of transfusion    • HTN (hypertension)    • Hyperlipidemia    • Injury of back    • Low back pain    • Myocardial infarction (CMS/HCC)    • Sleep apnea     wears oxygen at night    • Wears glasses    • Wears partial dentures      Past Surgical History:   Procedure Laterality Date   • CARDIAC CATHETERIZATION     • CARDIAC SURGERY      stenting   • CHOLECYSTECTOMY     • COLONOSCOPY  2007   • HERNIA REPAIR     • SHOULDER SURGERY     • TONSILLECTOMY     • TYMPANOPLASTY     • UPPER GASTROINTESTINAL ENDOSCOPY     • VASECTOMY       General Information     Row Name 01/27/20 1356          PT Evaluation Time/Intention    Document Type  evaluation  -CS     Mode of Treatment  individual therapy;physical therapy  -CS     Row Name 01/27/20 1356          General Information    Patient Profile Reviewed?  yes  -CS     Prior Level of Function  min assist:;dressing;bathing;home management;cooking;cleaning;gait Mobility limited by R knee pain  -CS     Existing Precautions/Restrictions  fall;other (see comments) R LE adductor canal catheter  -CS     Barriers to Rehab  none identified  -CS     Row Name 01/27/20 1356          Relationship/Environment    Lives With  spouse Spouse will be able to assist at all times at home. Daughters are able to assist as well.   -CS     Row Name 01/27/20 1356          Resource/Environmental Concerns    Current Living Arrangements  home/apartment/condo  -CS     Row Name 01/27/20 1356          Home Main Entrance    Number of Stairs, Main Entrance  six  -CS     Stair Railings, Main Entrance  railings on both sides of stairs  -CS     Row Name 01/27/20 1356          Stairs Within Home, Primary    Stairs, Within Home, Primary  1 story house   -CS     Number of Stairs, Within Home, Primary  none  -CS     Row Name 01/27/20 1356          Cognitive Assessment/Intervention- PT/OT    Orientation Status  (Cognition)  oriented x 4  -CS     Row Name 01/27/20 [facility]          Safety Issues, Functional Mobility    Safety Issues Affecting Function (Mobility)  safety precautions follow-through/compliance;safety precaution awareness  -CS     Impairments Affecting Function (Mobility)  endurance/activity tolerance;pain;range of motion (ROM);strength  -CS       User Key  (r) = Recorded By, (t) = Taken By, (c) = Cosigned By    Initials Name Provider Type    CS Caroline Bolanos, PT Physical Therapist        Mobility     Row Name 01/27/20 [facility]          Bed Mobility Assessment/Treatment    Comment (Bed Mobility)  Pt in bathroom with nursing at start of therapy session and ended with treatment in chair.   -CS     Row Name 01/27/20 [facility]          Transfer Assessment/Treatment    Comment (Transfers)  VC's to push off of bed to stand and to reach back for chair to sit. VC's to step R LE out with transfers for comfort.   -     Row Name 01/27/20 [facility]          Sit-Stand Transfer    Sit-Stand [place] (Transfers)  contact guard;2 person assist  -     Assistive Device (Sit-Stand Transfers)  walker, front-wheeled  -CS     Row Name 01/27/20 [facility]          Gait/Stairs Assessment/Training    74197 - Gait Training Minutes   8  -CS     Gait/Stairs Assessment/Training  gait/ambulation independence;gait/ambulation assistive device;distance ambulated;gait pattern  -     [place] Level (Gait)  contact guard;2 person assist  -     Assistive Device (Gait)  walker, front-wheeled  -CS     Distance in Feet (Gait)  350'  -CS     Pattern (Gait)  step-through  -CS     Deviations/Abnormal Patterns (Gait)  bilateral deviations;kimberly decreased;gait speed decreased;stride length decreased  -CS     Right Sided Gait Deviations  weight shift ability decreased  -CS     Comment (Gait/Stairs)  Pt able to amb 350' with RW CGA x 2 with step through gait mechanics. Pt limited by fatigue. VC's to perform step through gait mechanics and to bend knee in  swing phase. Pt able to correct with cues.   -CS     Row Name 01/27/20 [facility]          Mobility Assessment/Intervention    Extremity Weight-bearing Status  right lower extremity  -CS     Right Lower Extremity (Weight-bearing Status)  weight-bearing as tolerated (WBAT)  -CS       User Key  (r) = Recorded By, (t) = Taken By, (c) = Cosigned By    Initials Name Provider Type    CS Caroline Bolanos, PT Physical Therapist        Obj/Interventions     Row Name 01/27/20 135          General ROM    GENERAL ROM COMMENTS  Pt's R knee ROM limited by 25%. Will formally assess on POD #1.  -CS     Row Name 01/27/20 [facility]          MMT (Manual Muscle Testing)    General MMT Comments  Pt able to perform SLR on R LE indep. Pt's R LE strength is grossly 4-/5.  -     Row Name 01/27/20 [facility]          Therapeutic Exercise    Lower Extremity (Therapeutic Exercise)  gluteal sets;quad sets, right  -CS     Lower Extremity Range of Motion (Therapeutic Exercise)  ankle dorsiflexion/plantar flexion, bilateral  -CS     Exercise Type (Therapeutic Exercise)  isometric contraction, static;AROM (active range of motion)  -CS     Position (Therapeutic Exercise)  seated  -CS     Sets/Reps (Therapeutic Exercise)  10 reps each   -CS     Comment (Therapeutic Exercise)  VC's on technique   -CS     Row Name 01/27/20 [facility]          Sensory Assessment/Intervention    Sensory General Assessment  no sensation deficits identified  -CS       User Key  (r) = Recorded By, (t) = Taken By, (c) = Cosigned By    Initials Name Provider Type    CS Caroline Bolanos, PT Physical Therapist        Goals/Plan     Row Name 01/27/20 [facility]          Bed Mobility Goal 1 (PT)    Activity/Assistive Device (Bed Mobility Goal 1, PT)  bed mobility activities, all  -CS     [place] Level/Cues Needed (Bed Mobility Goal 1, PT)  conditional independence  -CS     Time Frame (Bed Mobility Goal 1, PT)  long term goal (LTG);3 days  -CS     Progress/Outcomes (Bed Mobility Goal 1, PT)   goal ongoing  -CS     Row Name 01/27/20 1356          Transfer Goal 1 (PT)    Activity/Assistive Device (Transfer Goal 1, PT)  sit-to-stand/stand-to-sit;bed-to-chair/chair-to-bed;walker, rolling  -CS     [place] Level/Cues Needed (Transfer Goal 1, PT)  conditional independence  -CS     Time Frame (Transfer Goal 1, PT)  long term goal (LTG);3 days  -CS     Progress/Outcome (Transfer Goal 1, PT)  goal ongoing  -CS     [facility] Name 01/27/20 1356          Gait Training Goal 1 (PT)    Activity/Assistive Device (Gait Training Goal 1, PT)  gait (walking locomotion);assistive device use;walker, rolling  -CS     [place] Level (Gait Training Goal 1, PT)  conditional independence  -CS     Distance (Gait Goal 1, PT)  500'  -CS     Time Frame (Gait Training Goal 1, PT)  long term goal (LTG);3 days  -CS     Progress/Outcome (Gait Training Goal 1, PT)  goal ongoing  -CS     [facility] Name 01/27/20 1356          ROM Goal 1 (PT)    ROM Goal 1 (PT)  0-90 deg R knee ROM  -CS     Time Frame (ROM Goal 1, PT)  long term goal (LTG);3 days  -CS     Progress/Outcome (ROM Goal 1, PT)  goal ongoing  -CS     [facility] Name 01/27/20 [facility]          Stairs Goal 1 (PT)    Activity/Assistive Device (Stairs Goal 1, PT)  stairs, all skills;using handrail, right;using handrail, left  -CS     [place] Level/Cues Needed (Stairs Goal 1, PT)  contact guard assist  -CS     Number of Stairs (Stairs Goal 1, PT)  6  -CS     Time Frame (Stairs Goal 1, PT)  long term goal (LTG);3 days  -CS     Progress/Outcome (Stairs Goal 1, PT)  goal ongoing  -CS       User Key  (r) = Recorded By, (t) = Taken By, (c) = Cosigned By    Initials Name Provider Type    CS Caroline Bolanos, PT Physical Therapist        Clinical Impression     Row Name 01/27/20 1356          Pain Assessment    Additional Documentation  Pain Scale: Numbers Pre/Post-Treatment (Group)  -CS     Row Name 01/27/20 1356          Pain Scale: Numbers Pre/Post-Treatment    Pain Scale: Numbers, Pretreatment   2/10  -CS     Pain Scale: Numbers, Post-Treatment  2/10  -CS     Pain Location - Side  Right  -CS     Pain Location - Orientation  generalized  -CS     Pain Location  knee  -CS     Pain Intervention(s)  Repositioned;Ambulation/increased activity  -CS     Row Name 01/27/20 [facility]          Plan of Care Review    Plan of Care Reviewed With  patient  -CS     Progress  improving  -CS     Outcome Summary  Pt able to amb 350' with RW CGA x 2 with step through gait mechanics. Pt limited by fatigue. Encouraged patient to ambulate later with nursing. Recommend patient home with assist and home health PT at discharge. Pt will need RW at discharge. PADD: 10  -CS     Row Name 01/27/20 1355          Physical Therapy Clinical Impression    Patient/Family Goals Statement (PT Clinical Impression)  To go home  -CS     Criteria for Skilled Interventions Met (PT Clinical Impression)  yes;treatment indicated  -CS     Rehab Potential (PT Clinical Summary)  good, to achieve stated therapy goals  -CS     Predicted Duration of Therapy (PT)  3 days  -CS     Row Name 01/27/20 1023          Positioning and Restraints    Pre-Treatment Position  bathroom  -CS     Post Treatment Position  chair  -CS     In Chair  notified nsg;reclined;call light within reach;encouraged to call for assist;exit alarm on;with family/caregiver;legs elevated  -CS       User Key  (r) = Recorded By, (t) = Taken By, (c) = Cosigned By    Initials Name Provider Type    CS Caroline Bolanos, PT Physical Therapist        Outcome Measures     Row Name 01/27/20 7702          How much help from another person do you currently need...    Turning from your back to your side while in flat bed without using bedrails?  3  -CS     Moving from lying on back to sitting on the side of a flat bed without bedrails?  3  -CS     Moving to and from a bed to a chair (including a wheelchair)?  3  -CS     Standing up from a chair using your arms (e.g., wheelchair, bedside chair)?  3  -CS      Climbing 3-5 steps with a railing?  3  -CS     To walk in hospital room?  3  -CS     AM-PAC 6 Clicks Score (PT)  18  -CS     Row Name 01/27/20 1356          Functional Assessment    Outcome Measure Options  AM-PAC 6 Clicks Basic Mobility (PT)  -CS       User Key  (r) = Recorded By, (t) = Taken By, (c) = Cosigned By    Initials Name Provider Type    CS Caroline Bolanos PT Physical Therapist        Physical Therapy Education                 Title: PT OT SLP Therapies (Done)     Topic: Physical Therapy (Done)     Point: Mobility training (Done)     Description:   Instruct learner(s) on safety and technique for assisting patient out of bed, chair or wheelchair.  Instruct in the proper use of assistive devices, such as walker, crutches, cane or brace.              Patient Friendly Description:   It's important to get you on your feet again, but we need to do so in a way that is safe for you. Falling has serious consequences, and your personal safety is the most important thing of all.        When it's time to get out of bed, one of us or a family member will sit next to you on the bed to give you support.     If your doctor or nurse tells you to use a walker, crutches, a cane, or a brace, be sure you use it every time you get out of bed, even if you think you don't need it.    Learning Progress Summary           Patient Acceptance, E,D, TATY,DU by  at 1/27/2020 1356    Comment:  Educated patient on WB status, knee precautions, safe hand placement with transfers, gait mechanics, and plan for progression of care.                   Point: Home exercise program (Done)     Description:   Instruct learner(s) on appropriate technique for monitoring, assisting and/or progressing patient with therapeutic exercises and activities.              Learning Progress Summary           Patient Acceptance, E,D, TATY,DU by  at 1/27/2020 1356    Comment:  Educated patient on WB status, knee precautions, safe hand placement with  transfers, gait mechanics, and plan for progression of care.                   Point: Body mechanics (Done)     Description:   Instruct learner(s) on proper positioning and spine alignment for patient and/or caregiver during mobility tasks and/or exercises.              Learning Progress Summary           Patient Acceptance, E,D, VU,DU by  at 1/27/2020 1356    Comment:  Educated patient on WB status, knee precautions, safe hand placement with transfers, gait mechanics, and plan for progression of care.                   Point: Precautions (Done)     Description:   Instruct learner(s) on prescribed precautions during mobility and gait tasks              Learning Progress Summary           Patient Acceptance, E,D, VU,DU by  at 1/27/2020 1356    Comment:  Educated patient on WB status, knee precautions, safe hand placement with transfers, gait mechanics, and plan for progression of care.                               User Key     Initials Effective Dates Name Provider Type Discipline     03/26/19 -  Caroline Bolanos, PT Physical Therapist PT              PT Recommendation and Plan  Planned Therapy Interventions (PT Eval): balance training, bed mobility training, gait training, home exercise program, neuromuscular re-education, patient/family education, ROM (range of motion), strengthening, stair training, transfer training  Outcome Summary/Treatment Plan (PT)  Anticipated Equipment Needs at Discharge (PT): front wheeled walker  Anticipated Discharge Disposition (PT): home with assist, home with home health  Plan of Care Reviewed With: patient  Progress: improving  Outcome Summary: Pt able to amb 350' with RW CGA x 2 with step through gait mechanics. Pt limited by fatigue. Encouraged patient to ambulate later with nursing. Recommend patient home with assist and home health PT at discharge. Pt will need RW at discharge. PADD: 10     Time Calculation:   PT Charges     Row Name 01/27/20 3306             Time  Calculation    Start Time  1356  -CS      PT Received On  01/27/20  -CS      PT Goal Re-Cert Due Date  02/06/20  -CS         Time Calculation- PT    Total Timed Code Minutes- PT  10 minute(s)  -CS         Timed Charges    32258 - PT Therapeutic Exercise Minutes  2  -CS      63118 - Gait Training Minutes   8  -CS        User Key  (r) = Recorded By, (t) = Taken By, (c) = Cosigned By    Initials Name Provider Type    CS Caroline Bolanos, PT Physical Therapist        Therapy Charges for Today     Code Description Service Date Service Provider Modifiers Qty    33796744997 HC GAIT TRAINING EA 15 MIN 1/27/2020 Caroline Bolanos, PT GP 1    81525075217 HC PT EVAL MOD COMPLEXITY 3 1/27/2020 Caroline Bolanos, PT GP 1    57445071137 HC PT THER SUPP EA 15 MIN 1/27/2020 Caroline Bolanos, PT GP 2          PT G-Codes  Outcome Measure Options: AM-PAC 6 Clicks Basic Mobility (PT)  AM-PAC 6 Clicks Score (PT): 18    Caroline Bolanos PT  1/27/2020

## 2020-01-27 NOTE — PROGRESS NOTES
Discharge Planning Assessment  [facility]     Patient Name: Damaso Leonard  MRN: [id number]  Today's Date: 1/27/2020    Admit Date: 1/27/2020    Discharge Needs Assessment     Row Name 01/27/20 1527       Living Environment    Lives With  spouse    Name(s) of Who Lives With Patient  Delores Blair    Current Living Arrangements  home/apartment/condo    Primary Care Provided by  self    Provides Primary Care For  no one    Family Caregiver if Needed  spouse    Family Caregiver Names  Wife, Delores    Quality of Family Relationships  helpful;involved;supportive    Able to Return to Prior Arrangements  yes       Resource/Environmental Concerns    Resource/Environmental Concerns  home accessibility    Home Accessibility Concerns  stairs to enter home 5-6 steps to enter home       Transition Planning    Patient/Family Anticipates Transition to  home with family    Patient/Family Anticipated Services at Transition  home health care    Transportation Anticipated  family or friend will provide       Discharge Needs Assessment    Readmission Within the Last 30 Days  no previous admission in last 30 days    Concerns to be Addressed  discharge planning    Equipment Currently Used at Home  none    Anticipated Changes Related to Illness  none    Equipment Needed After Discharge  walker, rolling    Outpatient/Agency/Support Group Needs  homecare agency    Discharge Facility/Level of Care Needs  home with home health    Provided post acute provider list?  Yes    Post Acute Provider List  Home Health    Delivered To  Patient    Patient's Choice of Community Agency(s)  [facility]         Discharge Plan     Row Name 01/27/20 1528       Plan    Plan  Home with wife and [facility]     Provided post acute provider list?  Yes    Post Acute Provider List  Home Health    Delivered To  Patient    Method of Delivery  In person    Patient/Family in Agreement with Plan  yes    Plan Comments  Spoke with Mr. Leonard. He is independent at  baseline. He lives w/ his wife and has 5 to 6 steps to enter his home. He will need a rolling walker at discharge. Called referral to [facility]. He will also need home health for PT. He requested [facility]. Called and faxed referral. No other needs at this time.     Final Discharge Disposition Code  06 - home with home health care        Durable Medical Equipment - Selection Complete      Service Provider Request Status Selected Services Address Phone Number Fax Number    [facility] Selected Durable Medical Equipment [address] [address] [phone]      Home Medical Care      Service Provider Request Status Selected Services Address Phone Number Fax Number    [facility] Pending - Request Sent N/A [address] [facility] [address] [phone]          Demographic Summary     Row Name 01/27/20 1526       General Information    Admission Type  observation    Arrived From  home    Referral Source  admission list    Reason for Consult  discharge planning    General Information Comments  Confirmed with patient that PCP is Clare Quintero        Functional Status     Row Name 01/27/20 1526       Functional Status    Usual Activity Tolerance  good    Current Activity Tolerance  moderate       Functional Status, IADL    Medications  independent    Meal Preparation  independent    Housekeeping  independent    Laundry  independent    Shopping  independent       Mental Status    General Appearance WDL  WDL       Mental Status Summary    Recent Changes in Mental Status/Cognitive Functioning  no changes       Employment/    Employment/ Comments  Confirmed with patient that he has [employer] Medicare.        Anamika Larsen RN

## 2020-01-27 NOTE — OP NOTE
OPERATIVE REPORT     DATE OF PROCEDURE: 1/27/2020    SURGEON: Ortiz Barba M.D.     ASSISTANT(S): Circulator: Carmita De Los Santos RN  Scrub Person: Ralph Ferguson  Vendor Representative: Robert Centeno  Nursing Assistant: Arun Flores  Assistant: Madhavi Zhang PA; Sandy Cherry PA-C    Note-PA was utilized during the case to facilitate positioning the patient, exposure, retraction, placement of final components and definitive closure.    PREOPERATIVE DIAGNOSIS: Advanced degenerative joint disease of the right knee secondary to osteoarthritis    POSTOPERATIVE DIAGNOSIS: same     PROCEDURE: Right total Knee Arthroplasty     SURGICAL DETAILS:     APPROACH: Medial parapatellar     ANESTHESIA: Spinal plus local periarticular block    PREOPERATIVE ANTIBIOTICS: Ancef 2 g IV    TRANEXAMIC ACID: IV    TOURNIQUET TIME: 78 min @300 mmHg     ESTIMATED BLOOD LOSS: 50 cc     SPECIMENS: None    IMPLANTS:   /Brand: [place] [employer]  Tibial component size: 5 primary baseplate   Femoral component size: 5 cruciate retaining   Tibial polyethylene insert: 13 mm cruciate stabilizing   Patellar component: 32 mm asymmetric   Cement: 2 bags Simplex P medium viscosity without antibiotics    DRAINS: None    LOCAL INJECTION: 1 cc Toradol 30mg/ml, 4 cc duramorph 2mg/ml, 20 cc 0.5% ropivicaine, 20 cc 0.5% lidocaine with 1:200,000 epinephrine, 15 cc preservative free normal saline     MODIFIER(S): None    COMPLICATIONS: None apparent    INDICATIONS FOR PROCEDURE: The patient has a long history of progressive knee pain, arthritis, and degeneration resulting in deformity in the right knee from predominantly medial wear and bone loss. Non-operative treatment and conservative therapeutic measures have been attempted, but have not improved or controlled the symptoms and pain that occurs during normal daily activities. Knee motion has also become limited and is restricting the patient. Total knee arthroplasty was recommended.  The risks, benefits, alternatives, and potential complications of the arthroplasty surgery were discussed with the patient in detail to include but not be limited to infection, bleeding, anesthesia risks, damage to neurovascular structures, osteolysis, aseptic loosening, instability, anterior knee pain, continued pain, iatrogenic fracture, dislocation, need for future surgery including the potential for amputation, blood clots, myocardial infarction, stroke, and death. Specific details of the surgical procedure, hospitalization, recovery, rehabilitation, and long-term precautions were also presented. Pre-operative teaching was provided. Implant/prosthesis selection was outlined, and the many options available were explained; the final choice will be made at the time of the procedure to match the anatomy and condition of the bone, ligaments, tendons, and muscles. The patient completed preoperative medical optimization and risk assessment, joint arthroplasty education, and MRSA decolonization using a universal decolonization protocol. Perioperative blood management and the potential for blood transfusion were discussed with risks and options clearly outlined.     INTRAOPERATIVE FINDINGS: Tricompartmental arthritis with genu varum alignment    PROCEDURE: The patient was identified in the preoperative holding area. The operative site was confirmed and marked. JAY hose and a sequential compression device were placed on the nonoperative leg. The risks, benefits, and alternatives to surgery were again confirmed with the patient and the patient wished to proceed. The patient was brought to the operating room and placed on the operating room table in the supine position. All bony prominences were padded. A huddle was performed with the patient and all vital surgical team members to confirm the correct operative site, procedure, anesthesia type, and operative plan with the patient. After anesthesia was performed, a tourniquet  was applied to the upper thigh of the operative leg. A full knee exam was performed once anesthesia was in full effect. Intravenous antibiotic prophylaxis was given and confirmed with the anesthesia team.     The operative leg was prepped and draped in the usual sterile fashion. A surgical time out was performed immediately preceding the incision with all personnel in the operating room to confirm patient identity, the correct operative site and extremity, correct radiographic studies, availability of appropriate surgical equipment and agreement on the planned procedure. The operative knee was elevated and exsanguinated using an esmarch and the tourniquet was inflated. The knee was exposed using a limited anterior-midline skin incision. Dissection was carried down through skin and subcutaneous tissue to the extensor mechanism with a scalpel. A medial parapatellar arthrotomy was made to enter the knee space sharply. A large amount of normal appearing joint fluid was encountered and suctioned. The synovium was thickened, hypertrophic, and inflamed. A partial synovectomy was performed for exposure, and the medial and lateral gutters were cleared of scar and synovial reflections. The superficial medial collateral ligament was carefully elevated off osteophytes which were then removed with a rongeur and osteotome. Degenerative meniscal remnants were excised medially and laterally. The patellar synovial reflections were released and the patella exposed to reveal complete wear through the articular surface. The trochlea demonstrated similar severe wear. The patella was then subluxed laterally. The knee was then flexed up to 90 degrees.     Assessment of the knee joint revealed severe end-stage articular damage with no remaining medial weight bearing cartilage. The medial compartment was severely eburnated with bone loss on the medial tibia and medial femoral condyle, resulting in the varus deformity. Osteophytes were  removed from the notch. The ACL was resected. Osteophytes were then further debrided from the femur and the tibia.     Attention was then turned to the femur. The medullary cavity of the femur was entered anterior-medial to the intra-condylar notch above the PCL with a 5/6th inch step drill. The femoral canal was over-reamed, irrigated, and suctioned to prevent fat embolization. An intramedullary alignment system was then placed, fixed to the femur with pins, and the distal femoral osteotomy was made in 5 degrees of valgus with an 8 mm resection. Attention was then turned to the tibia. Retractors were placed medially and laterally to protect the collateral ligaments and a Marlo retractor was placed around the PCL in the intra-condylar notch. The tibia was then subluxed anteriorly for wide exposure. An extramedullary alignment system was then placed and the proximal tibial osteotomy was made measuring 1-2 mm off the most affected side and 9-10 mm off the unaffected side with approximately 3 degrees posterior slope. The guide was also confirmed to be perpendicular to the tibial axis prior to the osteotomy. A sizing guide was then used to select the tibial component size. The knee was then brought to extension and the appropriate sized spacer block was placed. This brought the knee to full extension with excellent medial and lateral balance.     The flexion gap was then inspected and measured both visually and with a tensioner device to assess the medial and lateral flexion balance. A femur posterior reference guide was placed in 6 degrees of external rotation in accordance with the soft tissue balance. This resulted in symmetric flexion and extension gaps and was verified against the epicondylar axis and Dubois's line. The femur was sized using a posterior referencing guide and, using the previously determined degrees of rotation, drill holes were made for the cutting block. The 4 in 1 cutting block was impacted  into place and secured. Cuts were completed using a saw with the collaterals protected by Z-retractors. Care was taken to preserve the PCL. A lamina  was placed with the knee in 90 degrees of flexion and a large curved osteotome, rongeur, and curettes were utilized to clear posterior osteophytes, loose bodies and meniscal remnants.     A femoral trial implant was placed; excellent fit was confirmed. The medial-lateral and anterior-posterior dimensions were checked; anatomic fit and coverage were achieved.The proximal tibia baseplate trial was placed with its mid-point at the junction of medial one-third and lateral two-thirds of the tibial tubercle and pinned to this fixed position. A trial reduction was then performed. Trial reduction demonstrated the knee achieved full extension with excellent stability and range of motion, and no tendency toward instability with varus-valgus stress at full extension, mid-flexion, or 90 degrees of flexion. The PCL was also found to be appropriately tensioned with normal posterior tibial excursion.     Next, attention was turned to the patella. It was measured and the posterior 9-10 mm was resected leaving a healthy remnant with greater than 11mm thickness. The patella was sized with the asymmetric guide, and drill holes were made. A trial button was placed and tracking of the patella and the entire knee trial was tested. The patella tracking was excellent throughout range of motion with no instability. Punches and drills were then placed through the trials to accommodate the final implants. All trials were removed.     The wound was copiously lavaged with a pulse irrigation/suction system. The posterior recess of the knee and areas of known bleeding were treated with the electrocautery to reduce post-operative bleeding. A pain cocktail was injected into the lilliana-articular tissues. The cut bone surfaces were then irrigated again, suctioned, and dried. A double batch of  Polymethylmethacrylate (PMMA) bone cement was mixed, and the final implants were cemented into place, tibia followed by femur followed by patella. The cement was compressed using a non-constrained poly trial which was removed so the excess cement could be curetted free. The final polyethylene was impacted into place, and the knee was held in extension until the cement cured. The tourniquet was released and no excessive bleeding was encountered. Synovial bleeding was further treated with the electrocautery until adequate hemostasis was obtained.     The wound was again irrigated with dilute betadine solution followed by saline. The extensor mechanism and capsule was then anatomically closed with interrupted #1 Vicryl suture and a running #2 Stratafix stitch. Knee stability and range of motion with the capsule closed was excellent, and range of motion was 0 to 135 without excessive stress on the repair. Instrument and sponge count was completed and confirmed correct. Deep and superficial subcutaneous tissue was closed with interrupted 2-0 Vicryl suture. A running 3-0 Monocryl subcuticular stitch was used to re-approximate the skin edges followed by skin glue adhesive to seal the wound. A silver impregnated dressing was then placed, followed by JAY hose, and a sequential compression device to the operative limb. The patient was sufficiently recovered from anesthesia, transferred to a hospital bed and taken to the PACU in stable condition.     One gram (1000 mg) of intravenous tranexamic acid was administered prior to incision. A second one gram (1000 mg) intravenous dose was given prior to wound closure.    No apparent complications occurred during the procedure. Instrument, sponge and needle counts were correct x 2.     The patient underwent risk stratification preoperatively and aspirin was chosen for DVT prophylaxis. Delay in starting chemical prophylaxis for 23 hours from surgical incision was over concerns for  hematoma formation and wound related issues.     POST OPERATIVE PLAN:   Weight bearing as tolerated with knee range of motion as tolerated   Pain control with PO/IV meds   Adductor canal catheter placement by Anesthesia Pain Management Team in PACU.   23 hours perioperative antibiotic prophylaxis   PT/OT for mobilization and medical equipment needs   Keep silver dressing in place for 7 days post op. Change dressing only if saturated.   JAY hose and SCDs to bilateral lower extremities   Social work for discharge planning needs   Follow up in 3 weeks for post operative wound check with XR AP and lateral of operative knee.

## 2020-01-27 NOTE — H&P
Pre-Op H&P  Damaso Leonard  [id number]  1960    Chief complaint: Bilateral knee pain    HPI:  Damaso Leonard is a 59 y.o. male who presents with bilateral knee pain for 12 years. Onset has been atraumatic and gradual nature.  Patient is complaining of a constant aching throbbing sensation in the bilateral knees, right worse than left that is been ongoing for 12 years but is gotten progressively worse over the past 2 months.  Walking weightbearing activities increases pain.  His pain is currently a 2/10 on the pain scale when sitting and resting but increases to a 10/10 on the pain scale when walking.  He is describing the pain as a constant ache and throb with associated swelling and stiffness.  Prior treatments have included ibuprofen and tramadol for the past 3 months with only minimal improvement.  He is also attempted treatment with physical therapy and bracing.  Despite these interventions, his pain is persisting and causing limitations in daily activities.      Radiographic imaging, bilateral knee, was performed with findings that demonstrate moderate to severe tricompartmental arthritis with genu varum alignment, periarticular osteophytes visualized in all compartments and no significant changes compared to prior radiographs.    He presents today for a total knee arthroplasty, right    Review of Systems:  General ROS: negative for chills, fever or skin lesions;  No changes since last office visit  Cardiovascular ROS: no chest pain or dyspnea on exertion  Respiratory ROS: no cough, shortness of breath, or wheezing    Allergies: No Known Allergies    Home Meds:    No current facility-administered medications on file prior to encounter.      Current Outpatient Medications on File Prior to Encounter   Medication Sig Dispense Refill   • amitriptyline (ELAVIL) 50 MG tablet Take 2 tablets by mouth At Night As Needed for Sleep. 1 daily 60 tablet 5   • aspirin 81 MG tablet Take 1 tablet by mouth Daily. 90  tablet 3   • BYDUREON 2 MG pen-injector injection INJECT CONTENTS OF 1 PEN SUBCUTANEOUSLY INTO THE APPROPRIATE AREA AS DIRECTED ONCE WEEKLY 5 pen 5   • carvedilol (COREG) 12.5 MG tablet Take 1 tablet by mouth 2 (Two) Times a Day With Meals. 180 tablet 3   • citalopram (CeleXA) 40 MG tablet Take 1 tablet by mouth Daily. 30 tablet 5   • cyanocobalamin 1000 MCG/ML injection Inject 1 mL into the appropriate muscle as directed by prescriber Every 28 (Twenty-Eight) Days. 1 mL 11   • diphenhydrAMINE (BENADRYL ALLERGY) 25 mg capsule Take 1 capsule by mouth Every 6 (Six) Hours As Needed for Itching. 90 capsule 3   • finasteride (PROSCAR) 5 MG tablet Take 1 tablet by mouth Daily. 90 tablet 3   • glucose blood test strip Check blood sugar 3x times a day. 100 each 12   • glucose monitor monitoring kit 1 each 3 (Three) Times a Day As Needed (diabetes). 1 each 0   • isosorbide mononitrate (IMDUR) 30 MG 24 hr tablet Take 1 tablet by mouth 2 (Two) Times a Day. 60 tablet 5   • Lancets (ONETOUCH ULTRASOFT) lancets Check blood sugar 3 times daily 100 each 12   • metFORMIN (GLUCOPHAGE) 500 MG tablet Take 1 tablet by mouth 2 (Two) Times a Day With Meals. 180 tablet 3   • naloxone (NARCAN) 4 MG/0.1ML nasal spray 1 spray into the nostril(s) as directed by provider As Needed (overdose). 1 each 0   • ondansetron (ZOFRAN) 8 MG tablet Take 1 tablet by mouth Every 8 (Eight) Hours As Needed for Nausea. 20 tablet 2   • pantoprazole (PROTONIX) 40 MG EC tablet Take 1 tablet by mouth Daily. 90 tablet 3   • polyethylene glycol (MIRALAX) packet Take 17 g by mouth Daily. (Patient taking differently: Take 17 g by mouth Daily As Needed.) 1 each 5   • simvastatin (ZOCOR) 40 MG tablet Take 1 tablet by mouth Every Night. 90 tablet 3   • Thyroid (NP THYROID) 30 MG PO tablet Take 1 tablet by mouth Daily. 90 tablet 3   • vitamin D (ERGOCALCIFEROL) 34088 units capsule capsule Take 1 capsule by mouth Every 7 (Seven) Days. 12 capsule 3       PMH:   Past Medical  "History:   Diagnosis Date   • Anxiety    • Anxiety and depression    • Arthritis    • ASCVD (arteriosclerotic cardiovascular disease)    • Colitis    • Coronary artery disease    • Disease of thyroid gland    • DM (diabetes mellitus) (CMS/HCC)    • GERD (gastroesophageal reflux disease)    • History of EKG 04/15/2015    NORMAL   • History of transfusion    • HTN (hypertension)    • Hyperlipidemia    • Injury of back    • Low back pain    • Myocardial infarction (CMS/HCC)    • Sleep apnea     wears oxygen at night    • Wears glasses    • Wears partial dentures      PSH:    Past Surgical History:   Procedure Laterality Date   • CARDIAC CATHETERIZATION     • CARDIAC SURGERY      stenting   • CHOLECYSTECTOMY     • COLONOSCOPY  2007   • HERNIA REPAIR     • SHOULDER SURGERY     • TONSILLECTOMY     • TYMPANOPLASTY     • UPPER GASTROINTESTINAL ENDOSCOPY     • VASECTOMY         Social History:   Tobacco:   Social History     Tobacco Use   Smoking Status Never Smoker   Smokeless Tobacco Never Used      Alcohol:     Social History     Substance and Sexual Activity   Alcohol Use No       Vitals:           /97 (BP Location: Right arm, Patient Position: Lying)   Pulse 79   Temp 97.9 °F (36.6 °C) (Temporal)   Resp 18   Ht 177.8 cm (70\")   Wt 100 kg (221 lb)   SpO2 97%   BMI 31.71 kg/m²     Physical Exam:  General Appearance:    Alert, cooperative, no distress, appears stated age   Head:    Normocephalic, without obvious abnormality, atraumatic   Lungs:     Clear to auscultation bilaterally, respirations unlabored    Heart:   Regular rate and rhythm, S1 and S2 normal, no murmur, rub    or gallop    Abdomen:    Soft, non-tender.  +bowel sounds   Breast Exam:    deferred   Genitalia:    deferred   Extremities:   Extremities normal, atraumatic, no cyanosis or edema   Skin:   Skin color, texture, turgor normal, no rashes or lesions   Neurologic:   Grossly intact   Results Review  LABS:  Lab Results   Component Value Date "    WBC 7.71 01/14/2020    HGB 13.0 01/14/2020    HCT 42.1 01/14/2020    MCV 87.3 01/14/2020     01/14/2020    NEUTROABS 3.40 01/14/2020    GLUCOSE 186 (H) 01/14/2020    BUN 13 01/14/2020    CREATININE 1.15 01/14/2020    EGFRIFNONA 66 01/14/2020    EGFRIFAFRI 102 12/06/2019     01/14/2020    K 4.1 01/14/2020     01/14/2020    CO2 27.0 01/14/2020    CALCIUM 10.1 01/14/2020    ALBUMIN 4.40 12/06/2019    AST 5 12/06/2019    ALT 12 12/06/2019    BILITOT 0.4 12/06/2019       RADIOLOGY:  Imaging Results (Last 72 Hours)     ** No results found for the last 72 hours. **          I reviewed the patient's new clinical results.    Impression:   Primary osteoarthritis of right knee    Plan:   Total knee arthroplasty, right    Mireya Castro PA-C   1/27/2020   6:37 AM

## 2020-01-27 NOTE — H&P
Patient Name: Damaso Leonard  MRN: [id number]  : 1960  DOS: 2020    Attending: Ortiz Barba MD    Primary Care Provider: Clare Quintero APRN      Chief complaint: Right knee pain    Subjective   Patient is a 59 y.o. male presented for right total knee arthroplasty by Dr. Barba.    He underwent surgery under spinal anesthesia.  He tolerated surgery well and is admitted for further medical management.  His knee has been painful for many years.  He denies use of assistive device for ambulation.  He does report recent falls.  Conservative treatments failed to provide long-term pain relief.    When seen postop he is doing well.  His pain is well controlled.  He denies nausea, shortness of breath or chest pain.  No history of DVT or PE.    He is followed by cardiology in [place] and had preop cardiac clearance.     (Above is noted, agree.  Doing well when seen in his room afterwards.  Good pain control.  Ambulated with PT:  'Pt able to amb 350' with RW CGA x 2 with step through gait mechanics. Pt limited by fatigue. '  No complaints of fever, chills, nausea or vomiting.  No shortness of breath.)wy    Allergies:  No Known Allergies    Meds:  Facility-Administered Medications Prior to Admission   Medication Dose Route Frequency Provider Last Rate Last Dose   • cyanocobalamin injection 1,000 mcg  1,000 mcg Intramuscular Q28 Days Clare Quintero APRN   1,000 mcg at 18 0843     Medications Prior to Admission   Medication Sig Dispense Refill Last Dose   • amitriptyline (ELAVIL) 50 MG tablet Take 2 tablets by mouth At Night As Needed for Sleep. 1 daily 60 tablet 5 2020 at 2100   • carvedilol (COREG) 12.5 MG tablet Take 1 tablet by mouth 2 (Two) Times a Day With Meals. 180 tablet 3 2020 at 2100   • citalopram (CeleXA) 40 MG tablet Take 1 tablet by mouth Daily. 30 tablet 5 2020 at 2100   • clonazePAM (KlonoPIN) 0.5 MG tablet Take 1 tablet by mouth 3 (Three)  Times a Day As Needed for Anxiety. 90 tablet 0 1/26/2020 at 2100   • dicyclomine (BENTYL) 10 MG capsule Take 1 capsule by mouth 4 (Four) Times a Day Before Meals & at Bedtime. 120 capsule 5 1/26/2020 at 2100   • diphenhydrAMINE (BENADRYL ALLERGY) 25 mg capsule Take 1 capsule by mouth Every 6 (Six) Hours As Needed for Itching. 90 capsule 3 Past Week at Unknown time   • finasteride (PROSCAR) 5 MG tablet Take 1 tablet by mouth Daily. 90 tablet 3 1/26/2020 at 2100   • isosorbide mononitrate (IMDUR) 30 MG 24 hr tablet Take 1 tablet by mouth 2 (Two) Times a Day. 60 tablet 5 1/26/2020 at 2100   • metFORMIN (GLUCOPHAGE) 500 MG tablet Take 1 tablet by mouth 2 (Two) Times a Day With Meals. 180 tablet 3 1/26/2020 at 2100   • pantoprazole (PROTONIX) 40 MG EC tablet Take 1 tablet by mouth Daily. 90 tablet 3 1/26/2020 at 0800   • simvastatin (ZOCOR) 40 MG tablet Take 1 tablet by mouth Every Night. 90 tablet 3 1/26/2020 at 0800   • Thyroid (NP THYROID) 30 MG PO tablet Take 1 tablet by mouth Daily. 90 tablet 3 1/26/2020 at 0800   • traMADol (ULTRAM) 50 MG tablet Take 1 tablet by mouth Every 8 (Eight) Hours As Needed for Moderate Pain . 90 tablet 2 Past Month at Unknown time   • vitamin D (ERGOCALCIFEROL) 17536 units capsule capsule Take 1 capsule by mouth Every 7 (Seven) Days. 12 capsule 3 Past Month at Unknown time   • aspirin 81 MG tablet Take 1 tablet by mouth Daily. 90 tablet 3 1/22/2020   • BYDUREON 2 MG pen-injector injection INJECT CONTENTS OF 1 PEN SUBCUTANEOUSLY INTO THE APPROPRIATE AREA AS DIRECTED ONCE WEEKLY 5 pen 5 1/23/2020   • cyanocobalamin 1000 MCG/ML injection Inject 1 mL into the appropriate muscle as directed by prescriber Every 28 (Twenty-Eight) Days. 1 mL 11 1/21/2020   • glucose blood test strip Check blood sugar 3x times a day. 100 each 12 Taking   • glucose monitor monitoring kit 1 each 3 (Three) Times a Day As Needed (diabetes). 1 each 0 Taking   • icosapent ethyl (VASCEPA) 1 g capsule capsule 2 twice  daily 120 capsule 5 Taking   • Lancets (ONETOUCH ULTRASOFT) lancets Check blood sugar 3 times daily 100 each 12 Taking   • naloxone (NARCAN) 4 MG/0.1ML nasal spray 1 spray into the nostril(s) as directed by provider As Needed (overdose). 1 each 0 Taking   • O2 (OXYGEN) Inhale 2 L/min Every Night.      • ondansetron (ZOFRAN) 8 MG tablet Take 1 tablet by mouth Every 8 (Eight) Hours As Needed for Nausea. 20 tablet 2 Taking   • polyethylene glycol (MIRALAX) packet Take 17 g by mouth Daily. (Patient taking differently: Take 17 g by mouth Daily As Needed.) 1 each 5 More than a month at Unknown time       History:   Past Medical History:   Diagnosis Date   • Anxiety    • Anxiety and depression    • Arthritis    • ASCVD (arteriosclerotic cardiovascular disease)    • Colitis    • Coronary artery disease    • Disease of thyroid gland    • DM (diabetes mellitus) (CMS/HCC)    • GERD (gastroesophageal reflux disease)    • History of EKG 04/15/2015    NORMAL   • History of transfusion    • HTN (hypertension)    • Hyperlipidemia    • Injury of back    • Low back pain    • Myocardial infarction (CMS/HCC)    • Sleep apnea     wears oxygen at night    • Wears glasses    • Wears partial dentures      Past Surgical History:   Procedure Laterality Date   • CARDIAC CATHETERIZATION     • CARDIAC SURGERY      stenting   • CHOLECYSTECTOMY     • COLONOSCOPY  2007   • HERNIA REPAIR     • SHOULDER SURGERY     • TONSILLECTOMY     • TYMPANOPLASTY     • UPPER GASTROINTESTINAL ENDOSCOPY     • VASECTOMY       Family History   Problem Relation Age of Onset   • Heart attack Father    • Heart disease Father    • Hypertension Father    • Heart attack Sister    • Diabetes Sister    • Heart disease Sister    • Hypertension Sister    • Thyroid disease Sister    • Heart attack Brother    • Diabetes Brother    • Thyroid disease Brother    • Arthritis Daughter    • COPD Daughter    • Asthma Daughter    • Depression Daughter    • Heart disease Sister    •  "Hypertension Sister      Social History     Tobacco Use   • Smoking status: Never Smoker   • Smokeless tobacco: Never Used   Substance Use Topics   • Alcohol use: No   • Drug use: No   He is  with 3 children.  He is retired from [employer].    Review of Systems  All systems were reviewed and negative except for:  Respiratory: positive for  shortness of air  Cardiovascular: positive for  chest pressure / pain, on exertion  Gastrointestinal: positive for  constipation and diarrhea    Vital Signs  Visit Vitals  /58   Pulse 81   Temp 97.5 °F (36.4 °C)   Resp 16   Ht 177.8 cm (70\")   Wt 100 kg (221 lb)   SpO2 95%   BMI 31.71 kg/m²       Physical Exam:    General Appearance:    Alert, cooperative, in no acute distress   Head:    Normocephalic, without obvious abnormality, atraumatic   Eyes:            Lids and lashes normal, conjunctivae and sclerae normal, no   icterus, no pallor, corneas clear   Ears:    Ears appear intact with no abnormalities noted   Neck:   No adenopathy, supple, trachea midline, no thyromegaly   Lungs:     Clear to auscultation, respirations regular, even and                   unlabored    Heart:    Regular rhythm and normal rate, normal S1 and S2, no            murmur, no gallop   Abdomen:     Normal bowel sounds, no masses, no organomegaly, soft        non-tender, non-distended, no guarding, no rebound                 tenderness   Genitalia:    Deferred   Extremities:  Right knee Ace wrap clean dry intact.  Nerve block present.   Pulses:   Pulses palpable and equal bilaterally   Skin:   No bleeding, bruising or rash   Neurologic:   Cranial nerves 2 - 12 grossly intact, sensation intact. Flexion and dorsiflexion intact bilateral feet.        I reviewed the patient's new clinical results.     Results for VIRGEN SEVERINO (MRN [id number]) as of 1/27/2020 13:03   Ref. Range 1/14/2020 11:01   Glucose Latest Ref Range: 65 - 99 mg/dL 186 (H)   Sodium Latest Ref Range: 136 - 145 mmol/L " 139   Potassium Latest Ref Range: 3.5 - 5.2 mmol/L 4.1   CO2 Latest Ref Range: 22.0 - 29.0 mmol/L 27.0   Chloride Latest Ref Range: 98 - 107 mmol/L 100   Anion Gap Latest Ref Range: 5.0 - 15.0 mmol/L 12.0   Creatinine Latest Ref Range: 0.76 - 1.27 mg/dL 1.15   BUN Latest Ref Range: 6 - 20 mg/dL 13   BUN/Creatinine Ratio Latest Ref Range: 7.0 - 25.0  11.3   Calcium Latest Ref Range: 8.6 - 10.5 mg/dL 10.1   eGFR Non  Am Latest Ref Range: >60 mL/min/1.73 66   Hemoglobin A1C Latest Ref Range: 4.80 - 5.60 % 7.20 (H)   Protime Latest Ref Range: 11.2 - 14.3 Seconds 13.7   INR Latest Ref Range: 0.85 - 1.16  1.11   PTT Latest Ref Range: 24.0 - 37.0 seconds 27.6   WBC Latest Ref Range: 3.40 - 10.80 10*3/mm3 7.71   RBC Latest Ref Range: 4.14 - 5.80 10*6/mm3 4.82   Hemoglobin Latest Ref Range: 13.0 - 17.7 g/dL 13.0   Hematocrit Latest Ref Range: 37.5 - 51.0 % 42.1   RDW Latest Ref Range: 12.3 - 15.4 % 14.0   MCV Latest Ref Range: 79.0 - 97.0 fL 87.3   MCH Latest Ref Range: 26.6 - 33.0 pg 27.0   MCHC Latest Ref Range: 31.5 - 35.7 g/dL 30.9 (L)   MPV Latest Ref Range: 6.0 - 12.0 fL 9.1   Platelets Latest Ref Range: 140 - 450 10*3/mm3 329     Assessment and Plan:     Status post total right knee replacement    Primary osteoarthritis of both knees    Coronary artery disease involving native coronary artery of native heart    Obstructive sleep apnea    Hypercholesterolemia    Essential hypertension    Chronic combined systolic and diastolic heart failure (CMS/HCC)    DM (diabetes mellitus) (CMS/HCC)    Hypothyroid    On home O2      Plan  1. PT/OT- early ambulation postop  2. Pain control-prns, AC nerve block   3. IS-encourage  4. DVT proph- mechs/ASA  5. Bowel regimen  6. Resume home medications as appropriate  7. Monitor post-op labs  8. Discharge planning for home    HTN, Hyperlipidemia, CAD  - Continue home coreg and statin  - Monitor BP   - Holding parameters for BP meds  - Labetalol PRN for SBP>170    DM  - hgb A1c on  1/14/2020 7.2  - hold metformin  - Accu-Chek AC and HS with moderate dose SSI    TRES  - O2 at night    Hypothyroid  - continue home YOUNG Bentley  01/27/20  1:34 PM     I have personally performed the evaluation on this patient. My history is consistent  with HPI obtained. My exam findings are listed above. I have personally reviewed and discussed the above formulated treatment plan with pt and [email or web address].

## 2020-01-27 NOTE — ANESTHESIA PROCEDURE NOTES
Peripheral Block      Patient reassessed immediately prior to procedure    Patient location during procedure: post-op  Reason for block: at surgeon's request and post-op pain management  Performed by  CRNA: Shara Bass CRNA  Assisted by: Rocio Nichols CRNA  Preanesthetic Checklist  Completed: patient identified, site marked, surgical consent, pre-op evaluation, timeout performed, IV checked, risks and benefits discussed and monitors and equipment checked  Prep:  Pt Position: supine  Sterile barriers:cap, gloves, mask and sterile barriers  Prep: ChloraPrep  Patient monitoring: blood pressure monitoring, continuous pulse oximetry and EKG  Procedure  Performed under: spinal  Guidance:ultrasound guided  Images:still images obtained, printed/placed on chart    Laterality:right  Block Type:adductor canal block  Injection Technique:catheter  Needle Type:Tuohy and echogenic  Needle Gauge:18 G  Resistance on Injection: none  Catheter Size:20 G (20g)  Cath Depth at skin: 9 cm    Medications Used: bupivacaine PF (MARCAINE) 0.25 % injection, 30 mL      Post Assessment  Injection Assessment: negative aspiration for heme, incremental injection and no paresthesia on injection  Patient Tolerance:comfortable throughout block  Complications:no  Additional Notes  Procedure:             The pt was placed in the Supine position.  The Insertion site was  prepped and Draped in sterile fashion.  The pt was anesthetized with  IV Sedation( see meds).  Skin and cutaneous tissue was infiltrated and anesthetized with 1% Lidocaine 3 mls via a 25g needle.  A BBraun 4 inch 18g echogenic needle was then  inserted approximately midline, mid-thigh and advanced In-plane with Ultrasound guidance.  Normal Saline PSF was utilized for hydrodissection of tissue.  The Vastus medialis and Sartorius muscle where visualized and the needle tip was placed in the adductor canal,  lateral to the femoral artery.  LA injection spread was visualized,  injection was incremental 1-5ml, injection pressure was normal or little, no intraneural injection, no vascular injection.  LA dose was injected thru the needle(see dose above).  A BBraun 20g wire stylet catheter was placed via the needle with ultrasound visualization and confirmation with NS fluid bolus. The labeled Catheter was then secured to skin at insertion site with skin afix and steristrips to curled catheter and CHG transparent dressing.  Thank you.

## 2020-01-28 VITALS
WEIGHT: 221 LBS | DIASTOLIC BLOOD PRESSURE: 75 MMHG | OXYGEN SATURATION: 94 % | TEMPERATURE: 97.7 F | RESPIRATION RATE: 14 BRPM | HEART RATE: 80 BPM | HEIGHT: 70 IN | BODY MASS INDEX: 31.64 KG/M2 | SYSTOLIC BLOOD PRESSURE: 112 MMHG

## 2020-01-28 PROBLEM — D62 ACUTE BLOOD LOSS ANEMIA: Status: ACTIVE | Noted: 2020-01-28

## 2020-01-28 PROBLEM — G89.18 ACUTE POSTOPERATIVE PAIN: Status: ACTIVE | Noted: 2020-01-28

## 2020-01-28 PROBLEM — D72.829 LEUKOCYTOSIS: Status: ACTIVE | Noted: 2020-01-28

## 2020-01-28 LAB
ANION GAP SERPL CALCULATED.3IONS-SCNC: 10 MMOL/L (ref 5–15)
BUN BLD-MCNC: 17 MG/DL (ref 6–20)
BUN/CREAT SERPL: 17.7 (ref 7–25)
CALCIUM SPEC-SCNC: 7.9 MG/DL (ref 8.6–10.5)
CHLORIDE SERPL-SCNC: 106 MMOL/L (ref 98–107)
CO2 SERPL-SCNC: 23 MMOL/L (ref 22–29)
CREAT BLD-MCNC: 0.96 MG/DL (ref 0.76–1.27)
DEPRECATED RDW RBC AUTO: 45.4 FL (ref 37–54)
ERYTHROCYTE [DISTWIDTH] IN BLOOD BY AUTOMATED COUNT: 14.3 % (ref 12.3–15.4)
GFR SERPL CREATININE-BSD FRML MDRD: 81 ML/MIN/1.73
GLUCOSE BLD-MCNC: 173 MG/DL (ref 65–99)
GLUCOSE BLDC GLUCOMTR-MCNC: 164 MG/DL (ref 70–130)
HCT VFR BLD AUTO: 32.7 % (ref 37.5–51)
HGB BLD-MCNC: 10.2 G/DL (ref 13–17.7)
MCH RBC QN AUTO: 27.3 PG (ref 26.6–33)
MCHC RBC AUTO-ENTMCNC: 31.2 G/DL (ref 31.5–35.7)
MCV RBC AUTO: 87.4 FL (ref 79–97)
PLATELET # BLD AUTO: 313 10*3/MM3 (ref 140–450)
PMV BLD AUTO: 9.2 FL (ref 6–12)
POTASSIUM BLD-SCNC: 4.5 MMOL/L (ref 3.5–5.2)
RBC # BLD AUTO: 3.74 10*6/MM3 (ref 4.14–5.8)
SODIUM BLD-SCNC: 139 MMOL/L (ref 136–145)
WBC NRBC COR # BLD: 13 10*3/MM3 (ref 3.4–10.8)

## 2020-01-28 PROCEDURE — 63710000001 INSULIN LISPRO (HUMAN) PER 5 UNITS: Performed by: NURSE PRACTITIONER

## 2020-01-28 PROCEDURE — 97535 SELF CARE MNGMENT TRAINING: CPT

## 2020-01-28 PROCEDURE — 25010000002 HYDROMORPHONE PER 4 MG: Performed by: ORTHOPAEDIC SURGERY

## 2020-01-28 PROCEDURE — 85027 COMPLETE CBC AUTOMATED: CPT | Performed by: NURSE PRACTITIONER

## 2020-01-28 PROCEDURE — 97165 OT EVAL LOW COMPLEX 30 MIN: CPT

## 2020-01-28 PROCEDURE — 97116 GAIT TRAINING THERAPY: CPT

## 2020-01-28 PROCEDURE — 25010000003 CEFAZOLIN IN DEXTROSE 2-4 GM/100ML-% SOLUTION: Performed by: ORTHOPAEDIC SURGERY

## 2020-01-28 PROCEDURE — 82962 GLUCOSE BLOOD TEST: CPT

## 2020-01-28 PROCEDURE — 80048 BASIC METABOLIC PNL TOTAL CA: CPT | Performed by: ORTHOPAEDIC SURGERY

## 2020-01-28 PROCEDURE — 99024 POSTOP FOLLOW-UP VISIT: CPT | Performed by: ORTHOPAEDIC SURGERY

## 2020-01-28 PROCEDURE — 97110 THERAPEUTIC EXERCISES: CPT

## 2020-01-28 RX ORDER — PSEUDOEPHEDRINE HCL 30 MG
100 TABLET ORAL 2 TIMES DAILY PRN
Qty: 60 EACH | Refills: 0 | Status: SHIPPED | OUTPATIENT
Start: 2020-01-28 | End: 2020-10-19 | Stop reason: SDUPTHER

## 2020-01-28 RX ORDER — OXYCODONE HYDROCHLORIDE 5 MG/1
5 TABLET ORAL EVERY 4 HOURS PRN
Qty: 40 TABLET | Refills: 0 | Status: SHIPPED | OUTPATIENT
Start: 2020-01-28 | End: 2020-02-06

## 2020-01-28 RX ORDER — MELOXICAM 15 MG/1
15 TABLET ORAL DAILY
Qty: 14 TABLET | Refills: 0 | Status: SHIPPED | OUTPATIENT
Start: 2020-01-28 | End: 2020-06-08 | Stop reason: ALTCHOICE

## 2020-01-28 RX ORDER — ACETAMINOPHEN 500 MG
1000 TABLET ORAL EVERY 6 HOURS
Qty: 112 TABLET | Refills: 0 | Status: SHIPPED | OUTPATIENT
Start: 2020-01-28 | End: 2020-02-11

## 2020-01-28 RX ADMIN — PANTOPRAZOLE SODIUM 40 MG: 40 TABLET, DELAYED RELEASE ORAL at 09:31

## 2020-01-28 RX ADMIN — CARVEDILOL 12.5 MG: 12.5 TABLET, FILM COATED ORAL at 09:17

## 2020-01-28 RX ADMIN — POLYETHYLENE GLYCOL 3350 17 G: 17 POWDER, FOR SOLUTION ORAL at 09:16

## 2020-01-28 RX ADMIN — ACETAMINOPHEN 1000 MG: 500 TABLET, FILM COATED ORAL at 07:19

## 2020-01-28 RX ADMIN — FINASTERIDE 5 MG: 5 TABLET, FILM COATED ORAL at 09:16

## 2020-01-28 RX ADMIN — HYDROMORPHONE HYDROCHLORIDE 0.5 MG: 1 INJECTION, SOLUTION INTRAMUSCULAR; INTRAVENOUS; SUBCUTANEOUS at 02:30

## 2020-01-28 RX ADMIN — INSULIN LISPRO 2 UNITS: 100 INJECTION, SOLUTION INTRAVENOUS; SUBCUTANEOUS at 09:17

## 2020-01-28 RX ADMIN — ASPIRIN 325 MG: 325 TABLET, COATED ORAL at 09:17

## 2020-01-28 RX ADMIN — OXYCODONE HYDROCHLORIDE 5 MG: 5 TABLET ORAL at 01:25

## 2020-01-28 RX ADMIN — ISOSORBIDE MONONITRATE 30 MG: 30 TABLET, EXTENDED RELEASE ORAL at 09:16

## 2020-01-28 RX ADMIN — THYROID, PORCINE 30 MG: 30 TABLET ORAL at 09:16

## 2020-01-28 RX ADMIN — CITALOPRAM HYDROBROMIDE 40 MG: 40 TABLET ORAL at 09:16

## 2020-01-28 RX ADMIN — ATORVASTATIN CALCIUM 20 MG: 20 TABLET, FILM COATED ORAL at 09:16

## 2020-01-28 RX ADMIN — SODIUM CHLORIDE 100 ML/HR: 9 INJECTION, SOLUTION INTRAVENOUS at 02:38

## 2020-01-28 RX ADMIN — MELOXICAM 15 MG: 7.5 TABLET ORAL at 09:16

## 2020-01-28 RX ADMIN — DICYCLOMINE HYDROCHLORIDE 10 MG: 10 CAPSULE ORAL at 05:32

## 2020-01-28 RX ADMIN — ACETAMINOPHEN 1000 MG: 500 TABLET, FILM COATED ORAL at 01:24

## 2020-01-28 RX ADMIN — OXYCODONE HYDROCHLORIDE 5 MG: 5 TABLET ORAL at 10:57

## 2020-01-28 RX ADMIN — CEFAZOLIN SODIUM 2 G: 2 INJECTION, SOLUTION INTRAVENOUS at 01:25

## 2020-01-28 RX ADMIN — SODIUM CHLORIDE, PRESERVATIVE FREE 3 ML: 5 INJECTION INTRAVENOUS at 09:16

## 2020-01-28 NOTE — THERAPY DISCHARGE NOTE
Patient Name: Damaso Leonard  : 1960    MRN: [id number]                              Today's Date: 2020       Admit Date: 2020    Visit Dx:     ICD-10-CM ICD-9-CM   1. Status post total right knee replacement Z96.651 V43.65   2. Primary osteoarthritis of both knees M17.0 715.16   3. Coronary artery disease involving native coronary artery of native heart without angina pectoris I25.10 414.01     Patient Active Problem List   Diagnosis   • Generalized anxiety disorder   • Malignant hypertension with heart disease, without congestive heart failure   • Major depressive disorder in partial remission (CMS/HCC)   • Type 2 diabetes mellitus with diabetic peripheral angiopathy without gangrene, without long-term current use of insulin (CMS/[facility])   • Coronary artery disease involving native coronary artery of native heart   • Sleep apnea   • Obstructive sleep apnea   • Osteoarthritis involving multiple joints on both sides of body   • Vitamin D deficiency   • Vitamin B 12 deficiency   • Excessive cerumen in both ear canals   • Hypercholesterolemia   • Anginal pain (CMS/HCC)   • Lumbar back pain with radiculopathy affecting left lower extremity   • Vitamin D deficiency disease   • High risk medication use   • Gastroesophageal reflux disease without esophagitis   • Seasonal allergic rhinitis due to pollen   • Benign non-nodular prostatic hyperplasia without lower urinary tract symptoms   • Abnormal thyroid blood test   • Essential hypertension   • Excessive cerumen in both ear canals   • Chronic pain of both knees   • Colitis   • Right hand weakness   • ED (erectile dysfunction) of organic origin   • Obesity (BMI 30.0-34.9)   • Primary osteoarthritis of both knees   • Chronic combined systolic and diastolic heart failure (CMS/HCC)   • Status post total right knee replacement   • DM (diabetes mellitus) (CMS/HCC)   • Hypothyroid   • On home O2     Past Medical History:   Diagnosis Date   • Anxiety    •  Anxiety and depression    • Arthritis    • ASCVD (arteriosclerotic cardiovascular disease)    • Colitis    • Coronary artery disease    • Disease of thyroid gland    • DM (diabetes mellitus) (CMS/HCC)    • GERD (gastroesophageal reflux disease)    • History of EKG 04/15/2015    NORMAL   • History of transfusion    • HTN (hypertension)    • Hyperlipidemia    • Injury of back    • Low back pain    • Myocardial infarction (CMS/HCC)    • Sleep apnea     wears oxygen at night    • Wears glasses    • Wears partial dentures      Past Surgical History:   Procedure Laterality Date   • CARDIAC CATHETERIZATION     • CARDIAC SURGERY      stenting   • CHOLECYSTECTOMY     • COLONOSCOPY  2007   • HERNIA REPAIR     • SHOULDER SURGERY     • TONSILLECTOMY     • TOTAL KNEE ARTHROPLASTY Right 1/27/2020    Procedure: TOTAL KNEE ARTHROPLASTY RIGHT;  Surgeon: Ortiz Barba MD;  Location: [facility];  Service: Orthopedics   • TYMPANOPLASTY     • UPPER GASTROINTESTINAL ENDOSCOPY     • VASECTOMY       General Information     Row Name 01/28/20 0755          PT Evaluation Time/Intention    Document Type  discharge treatment  -     Row Name 01/28/20 0755          General Information    Patient Profile Reviewed?  yes  -JACOB     Existing Precautions/Restrictions  fall;other (see comments) R adductor nerve catheter  -JACOB     Row Name 01/28/20 0755          Cognitive Assessment/Intervention- PT/OT    Orientation Status (Cognition)  oriented x 4  -JACOB     Row Name 01/28/20 0755          Safety Issues, Functional Mobility    Safety Issues Affecting Function (Mobility)  safety precautions follow-through/compliance;safety precaution awareness  -JACOB     Impairments Affecting Function (Mobility)  endurance/activity tolerance;pain;range of motion (ROM);strength  -       User Key  (r) = Recorded By, (t) = Taken By, (c) = Cosigned By    Initials Name Provider Type    Jose Okeefe, PT Physical Therapist        Mobility     Row Name 01/28/20 0755           Bed Mobility Assessment/Treatment    Bed Mobility Assessment/Treatment  scooting/bridging;supine-sit  -JACOB     Scooting/Bridging [place] (Bed Mobility)  supervision;verbal cues  -JACOB     Supine-Sit [place] (Bed Mobility)  supervision;verbal cues  -JACOB     Assistive Device (Bed Mobility)  head of bed elevated;bed rails  -JACOB     Comment (Bed Mobility)  Verbal cues for LE sequencing and use of UEs to push trunk into sitting  -JACOB     Row Name 01/28/20 0755          Transfer Assessment/Treatment    Comment (Transfers)  Verbal cues for safe hand placement during standing/sitting and moving R LE out for comfort prior to sitting; pt assisted to bathroom requiring CGA x1 for toilet transfer  -JACOB     Row Name 01/28/20 0755          Sit-Stand Transfer    Sit-Stand [place] (Transfers)  supervision;verbal cues  -JACOB     Assistive Device (Sit-Stand Transfers)  walker, front-wheeled  -JACOB     Row Name 01/28/20 0755          Gait/Stairs Assessment/Training    Gait/Stairs Assessment/Training  gait/ambulation independence;gait/ambulation assistive device  -JACOB     [place] Level (Gait)  verbal cues;stand by assist  -JACOB     Assistive Device (Gait)  walker, front-wheeled  -JACOB     Distance in Feet (Gait)  400 feet  -JACOB     Pattern (Gait)  step-through  -JACOB     Deviations/Abnormal Patterns (Gait)  bilateral deviations;kimber[place] decreased;gait speed decreased;stride length decreased  -JACOB     Bilateral Gait Deviations  forward flexed posture  -JACOB     Right Sided Gait Deviations  weight shift ability decreased  -JACOB     [place] Level (Stairs)  verbal cues;contact guard  -JACOB     Assistive Device (Stairs)  other (see comments) none  -JACOB     Handrail Location (Stairs)  left side (ascending)  -JACOB     Number of Steps (Stairs)  6  -JACOB     Ascending Technique (Stairs)  step-to-step  -JACOB     Descending Technique (Stairs)  step-to-step  -JACOB     Comment (Gait/Stairs)  Pt ambulated with step through pattern and decreased speed.  Verbal cues for maintaining body within walker, upright posture, increase step length and WB on LEs. Pt ascended/descended 6 steps using L ascending handrail with CGA x1. Verbal cues for LE sequencing throughout. Gait/stair training limited by fatigue.   -     Row Name 01/28/20 [facility]          Mobility Assessment/Intervention    Extremity Weight-bearing Status  right lower extremity  -     Right Lower Extremity (Weight-bearing Status)  weight-bearing as tolerated (WBAT)  -       User Key  (r) = Recorded By, (t) = Taken By, (c) = Cosigned By    Initials Name Provider Type    Jose Okeefe, KAMI Physical Therapist        Obj/Interventions     Row Name 01/28/20 [facility]          General ROM    GENERAL ROM COMMENTS  R knee AROM measured at 4-90 degrees, lacking 4 degrees of extension, with flexion measured in sitting  -[facility] Name 01/28/20 [facility]          MMT (Manual Muscle Testing)    General MMT Comments  IND with bilateral SLR  -[facility] Name 01/28/20 [facility]          Therapeutic Exercise    Lower Extremity (Therapeutic Exercise)  gluteal sets;LAQ (long arc quad), right;quad sets, right;SLR (straight leg raise), right;SAQ (short arc quad), right;heel slides, right  -JACOB     Lower Extremity Range of Motion (Therapeutic Exercise)  ankle dorsiflexion/plantar flexion, bilateral  -     Exercise Type (Therapeutic Exercise)  AROM (active range of motion);isometric contraction, static  -     Position (Therapeutic Exercise)  seated  -JACOB     Sets/Reps (Therapeutic Exercise)  15x each  -     Expected Outcome (Therapeutic Exercise)  facilitate normal movement patterns;improve functional tolerance, self-care activity  -     Comment (Therapeutic Exercise)  Cues for technique  -       User Key  (r) = Recorded By, (t) = Taken By, (c) = Cosigned By    Initials Name Provider Type    Jose Okeefe, KAMI Physical Therapist        Goals/Plan     Row Name 01/28/20 [facility]          Bed Mobility Goal 1 (PT)    Activity/Assistive Device  (Bed Mobility Goal 1, PT)  bed mobility activities, all  -JACOB     [place] Level/Cues Needed (Bed Mobility Goal 1, PT)  conditional independence  -JACOB     Time Frame (Bed Mobility Goal 1, PT)  long term goal (LTG);3 days  -JACOB     Progress/Outcomes (Bed Mobility Goal 1, PT)  goal ongoing  -JACOB     Row Name 01/28/20 0755          Transfer Goal 1 (PT)    Activity/Assistive Device (Transfer Goal 1, PT)  sit-to-stand/stand-to-sit;bed-to-chair/chair-to-bed;walker, rolling  -JACOB     [place] Level/Cues Needed (Transfer Goal 1, PT)  conditional independence  -JACOB     Time Frame (Transfer Goal 1, PT)  long term goal (LTG);3 days  -JACOB     Progress/Outcome (Transfer Goal 1, PT)  goal ongoing  -JACOB     Row Name 01/28/20 0755          Gait Training Goal 1 (PT)    Activity/Assistive Device (Gait Training Goal 1, PT)  gait (walking locomotion);assistive device use;walker, rolling  -JACOB     [place] Level (Gait Training Goal 1, PT)  conditional independence  -JACOB     Distance (Gait Goal 1, PT)  500'  -JACOB     Time Frame (Gait Training Goal 1, PT)  long term goal (LTG);3 days  -JACOB     Progress/Outcome (Gait Training Goal 1, PT)  goal ongoing  -JACOB     Row Name 01/28/20 0755          ROM Goal 1 (PT)    ROM Goal 1 (PT)  0-90 deg R knee ROM  -JACOB     Time Frame (ROM Goal 1, PT)  long term goal (LTG);3 days  -JACOB     Progress/Outcome (ROM Goal 1, PT)  goal partially met;goal ongoing  -JACOB     Row Name 01/28/20 0755          Stairs Goal 1 (PT)    Activity/Assistive Device (Stairs Goal 1, PT)  stairs, all skills;using handrail, right;using handrail, left  -JACOB     [place] Level/Cues Needed (Stairs Goal 1, PT)  contact guard assist  -JACOB     Number of Stairs (Stairs Goal 1, PT)  6  -JACOB     Time Frame (Stairs Goal 1, PT)  long term goal (LTG);3 days  -JACOB     Progress/Outcome (Stairs Goal 1, PT)  goal met  -JACOB       User Key  (r) = Recorded By, (t) = Taken By, (c) = Cosigned By    Initials Name Provider Type    JACOB Jose Joshua, PT Physical  Therapist        Clinical Impression     Row Name 01/28/20 0755          Pain Assessment    Additional Documentation  Pain Scale: Numbers Pre/Post-Treatment (Group)  -JACOB     Row Name 01/28/20 0755          Pain Scale: Numbers Pre/Post-Treatment    Pain Scale: Numbers, Pretreatment  1/10  -JACOB     Pain Scale: Numbers, Post-Treatment  2/10  -JACOB     Pain Location - Side  Left  -JACOB     Pain Location - Orientation  posterior  -JACOB     Pain Location  knee  -JACOB     Pain Intervention(s)  Repositioned;Cold applied;Ambulation/increased activity  -JACOB     Row Name 01/28/20 0755          Physical Therapy Clinical Impression    Patient/Family Goals Statement (PT Clinical Impression)  To return home  -JACOB     Criteria for Skilled Interventions Met (PT Clinical Impression)  yes;treatment indicated  -JACOB     Rehab Potential (PT Clinical Summary)  good, to achieve stated therapy goals  -JACOB     Row Name 01/28/20 0755          Positioning and Restraints    Pre-Treatment Position  in bed  -JACOB     Post Treatment Position  chair  -JACOB     In Chair  notified nsg;reclined;call light within reach;encouraged to call for assist;exit alarm on;with family/caregiver;legs elevated  -JACOB       User Key  (r) = Recorded By, (t) = Taken By, (c) = Cosigned By    Initials Name Provider Type    Jose Okeefe, PT Physical Therapist        Outcome Measures     Row Name 01/28/20 0755          How much help from another person do you currently need...    Turning from your back to your side while in flat bed without using bedrails?  4  -JACOB     Moving from lying on back to sitting on the side of a flat bed without bedrails?  4  -JACOB     Moving to and from a bed to a chair (including a wheelchair)?  3  -JACOB     Standing up from a chair using your arms (e.g., wheelchair, bedside chair)?  3  -JACOB     Climbing 3-5 steps with a railing?  3  -JACOB     To walk in hospital room?  3  -JACOB     AM-PAC 6 Clicks Score (PT)  20  -JACOB     Row Name 01/28/20 0755          Functional  Assessment    Outcome Measure Options  AM-PAC 6 Clicks Basic Mobility (PT)  -JACOB       User Key  (r) = Recorded By, (t) = Taken By, (c) = Cosigned By    Initials Name Provider Type    Jose Okeefe PT Physical Therapist        Physical Therapy Education                 Title: PT OT SLP Therapies (In Progress)     Topic: Physical Therapy (Done)     Point: Mobility training (Done)     Description:   Instruct learner(s) on safety and technique for assisting patient out of bed, chair or wheelchair.  Instruct in the proper use of assistive devices, such as walker, crutches, cane or brace.              Patient Friendly Description:   It's important to get you on your feet again, but we need to do so in a way that is safe for you. Falling has serious consequences, and your personal safety is the most important thing of all.        When it's time to get out of bed, one of us or a family member will sit next to you on the bed to give you support.     If your doctor or nurse tells you to use a walker, crutches, a cane, or a brace, be sure you use it every time you get out of bed, even if you think you don't need it.    Learning Progress Summary           Patient Acceptance, E,D, VU by JACOB at 1/28/2020 0755    Comment:  Educated on safe sequencing with bed mobility, ambulatory/toilet/car transfers, gait, and stair training. Reviewed HEP and knee precautions.    Acceptance, E,D, VU,DU by  at 1/27/2020 6096    Comment:  Educated patient on WB status, knee precautions, safe hand placement with transfers, gait mechanics, and plan for progression of care.                   Point: Home exercise program (Done)     Description:   Instruct learner(s) on appropriate technique for monitoring, assisting and/or progressing patient with therapeutic exercises and activities.              Learning Progress Summary           Patient Acceptance, E,D, VU by JACOB at 1/28/2020 0755    Comment:  Educated on safe sequencing with bed mobility,  ambulatory/toilet/car transfers, gait, and stair training. Reviewed HEP and knee precautions.    Acceptance, E,CIPRIANO, TATY,DU by KATELIN at 1/27/2020 1356    Comment:  Educated patient on WB status, knee precautions, safe hand placement with transfers, gait mechanics, and plan for progression of care.                   Point: Body mechanics (Done)     Description:   Instruct learner(s) on proper positioning and spine alignment for patient and/or caregiver during mobility tasks and/or exercises.              Learning Progress Summary           Patient Acceptance, E,D, VU by JACOB at 1/28/2020 0755    Comment:  Educated on safe sequencing with bed mobility, ambulatory/toilet/car transfers, gait, and stair training. Reviewed HEP and knee precautions.    Acceptance, E,CIPRIANO, TATY,DU by KATELIN at 1/27/2020 1356    Comment:  Educated patient on WB status, knee precautions, safe hand placement with transfers, gait mechanics, and plan for progression of care.                   Point: Precautions (Done)     Description:   Instruct learner(s) on prescribed precautions during mobility and gait tasks              Learning Progress Summary           Patient Acceptance, E,D, VU by JACOB at 1/28/2020 0755    Comment:  Educated on safe sequencing with bed mobility, ambulatory/toilet/car transfers, gait, and stair training. Reviewed HEP and knee precautions.    Acceptance, E,CIPRIANO, TATY,DU by KATELIN at 1/27/2020 1356    Comment:  Educated patient on WB status, knee precautions, safe hand placement with transfers, gait mechanics, and plan for progression of care.                               User Key     Initials Effective Dates Name Provider Type Discipline     03/26/19 -  Caroline Bolanos, PT Physical Therapist PT    JACOB 09/10/19 -  Jose Joshua, PT Physical Therapist PT              PT Recommendation and Plan  Planned Therapy Interventions (PT Eval): balance training, bed mobility training, gait training, home exercise program, patient/family education,  strengthening, stair training, transfer training, ROM (range of motion)  Outcome Summary/Treatment Plan (PT)  Anticipated Equipment Needs at Discharge (PT): front wheeled walker  Anticipated Discharge Disposition (PT): home with assist, home with home health  Plan of Care Reviewed With: patient  Progress: improving  Outcome Summary: Pt ambulated 400 feet using FWW and SBA. Pt ascended/descended 6 steps using L ascending handrail and no AD, with CGA x1. Gait/stair training limited by fatigue. Bed mobility and STS requiring supervision. R knee AROM measured at 4-90 degrees. Reviewed HEP and knee precautions. Anticiapte pt d/c home with assist and HHPT today.     Time Calculation:   PT Charges     Row Name 01/28/20 0755             Time Calculation    Start Time  0755  -JACOB      PT Received On  01/28/20  -JACOB      PT Goal Re-Cert Due Date  02/06/20  -JACOB         Time Calculation- PT    Total Timed Code Minutes- PT  23 minute(s)  -JACOB         Timed Charges    63932 - PT Therapeutic Exercise Minutes  10  -JACOB      38355 - Gait Training Minutes   13  -JACOB        User Key  (r) = Recorded By, (t) = Taken By, (c) = Cosigned By    Initials Name Provider Type    Jose Okeefe, PT Physical Therapist        Therapy Charges for Today     Code Description Service Date Service Provider Modifiers Qty    09212115902 HC PT THER PROC EA 15 MIN 1/28/2020 Jose Joshua, PT GP 1    04271312950 HC GAIT TRAINING EA 15 MIN 1/28/2020 Jose Joshua, PT GP 1          PT G-Codes  Outcome Measure Options: AM-PAC 6 Clicks Daily Activity (OT)  AM-PAC 6 Clicks Score (PT): 20  AM-PAC 6 Clicks Score (OT): 20    PT Discharge Summary  Anticipated Discharge Disposition (PT): home with assist, home with home health  Reason for Discharge: Discharge from facility  Outcomes Achieved: Patient able to partially acheive established goals  Discharge Destination: Home with assist, Home with home health    Jose Joshua PT  1/28/2020

## 2020-01-28 NOTE — PROGRESS NOTES
[facility]    Acute pain service Inpatient Progress Note    Patient Name: Damaso Leonard  :  1960  MRN:  [id number]        Acute Pain  Service Inpatient Progress Note:    Analgesia:Excellent  Pain Score:0/10  LOC: alert and awake  Resp Status: room air  Cardiac: VS stable  Side Effects:None  Catheter Site:clean, dry and dressing intact  Cath type: peripheral nerve cath with ON Q  Infusion rate: 10ml/hr  Dosing/Volume: ropivacaine 0.2%  Catheter Plan:Catheter to remain Insitu and Continue catheter infusion rate unchanged

## 2020-01-28 NOTE — PLAN OF CARE
Problem: Patient Care Overview  Goal: Plan of Care Review  Flowsheets (Taken 1/28/2020 7372)  Progress: improving  Plan of Care Reviewed With: patient  Outcome Summary: Pt ambulated 400 feet using FWW and SBA. Pt ascended/descended 6 steps using L ascending handrail and no AD, with CGA x1. Gait/stair training limited by fatigue. Bed mobility and STS requiring supervision. R knee AROM measured at 4-90 degrees. Reviewed HEP and knee precautions. Anticiapte pt d/c home with assist and HHPT today.

## 2020-01-28 NOTE — PLAN OF CARE
Problem: Patient Care Overview  Goal: Plan of Care Review  Flowsheets (Taken 1/28/2020 1330)  Plan of Care Reviewed With: patient; spouse; daughter  Outcome Summary: A&Ox4.   LBD (doff/don socks, don pants): [place].  Sit<>Stand: CGA w/RW and min verbal cues for proper body mechanics.  Pt able to verbalize precautions and demo proper use of leg .  Anticipate pt to d/c home w/assist.

## 2020-01-28 NOTE — PLAN OF CARE
Problem: Patient Care Overview  Goal: Plan of Care Review  Outcome: Outcome(s) achieved  Flowsheets (Taken 1/28/2020 1152)  Progress: improving  Plan of Care Reviewed With: patient; spouse; daughter  Note:   Vitals WNL. Pt weaned to RA. Pt denies numbness or tingling. Chief complaint this shift has been 2/10 right posterior knee pain. PRN oxycodone given in addition to scheduled mobic. Gel pack applied to knee. Ropivacaine infusing at 10 mL/hr, rate unchanged. Pt is able to sleep between care. Ace wrap dressing removed this AM by occupational therapist. Pt tolerates ambulation with assist X 1 and a walker. Pt reports voiding spontaneously. SSI administered per order parameters. Pt D/C'd home with  this AM.

## 2020-01-28 NOTE — DISCHARGE INSTR - ACTIVITY
You may bear weight as tolerated on your right leg. Perform range of motion exercises as tolerated.     Use ice as needed for pain and swelling.     Your dressing is to remain in place for 7 days. You may remove your dressing on Sunday, February 2, 2020. If no drainage, you may shower on Wednesday, February 5, 2020. No submerging your wound in water. If drainage is noted, a sterile dressing should be placed and the wound checked daily. No showering until your wound has remained dry for 72 consecutive hours.     You have a nerve catheter to assist with pain control. Please call the phone number provided by anesthesiology for any questions, problems, or concerns regarding your nerve catheter.      [facility] will provide you with home health. They will contact you to set up your first appointment.     [facility] has provided you with a rolling walker.

## 2020-01-28 NOTE — THERAPY DISCHARGE NOTE
Acute Care - Occupational Therapy Initial Eval/Discharge  [facility]     Patient Name: Damaso Leonard  : 1960  MRN: [id number]  Today's Date: 2020               Admit Date: 2020       ICD-10-CM ICD-9-CM   1. Status post total right knee replacement Z96.651 V43.65   2. Primary osteoarthritis of both knees M17.0 715.16   3. Coronary artery disease involving native coronary artery of native heart without angina pectoris I25.10 414.01     Patient Active Problem List   Diagnosis   • Generalized anxiety disorder   • Malignant hypertension with heart disease, without congestive heart failure   • Major depressive disorder in partial remission (CMS/HCC)   • Type 2 diabetes mellitus with diabetic peripheral angiopathy without gangrene, without long-term current use of insulin (CMS/HCC)   • Coronary artery disease involving native coronary artery of native heart   • Sleep apnea   • Obstructive sleep apnea   • Osteoarthritis involving multiple joints on both sides of body   • Vitamin D deficiency   • Vitamin B 12 deficiency   • Excessive cerumen in both ear canals   • Hypercholesterolemia   • Anginal pain (CMS/HCC)   • Lumbar back pain with radiculopathy affecting left lower extremity   • Vitamin D deficiency disease   • High risk medication use   • Gastroesophageal reflux disease without esophagitis   • Seasonal allergic rhinitis due to pollen   • Benign non-nodular prostatic hyperplasia without lower urinary tract symptoms   • Abnormal thyroid blood test   • Essential hypertension   • Excessive cerumen in both ear canals   • Chronic pain of both knees   • Colitis   • Right hand weakness   • ED (erectile dysfunction) of organic origin   • Obesity (BMI 30.0-34.9)   • Primary osteoarthritis of both knees   • Chronic combined systolic and diastolic heart failure (CMS/HCC)   • Status post total right knee replacement   • DM (diabetes mellitus) (CMS/HCC)   • Hypothyroid   • On home O2     Past Medical  History:   Diagnosis Date   • Anxiety    • Anxiety and depression    • Arthritis    • ASCVD (arteriosclerotic cardiovascular disease)    • Colitis    • Coronary artery disease    • Disease of thyroid gland    • DM (diabetes mellitus) (CMS/HCC)    • GERD (gastroesophageal reflux disease)    • History of EKG 04/15/2015    NORMAL   • History of transfusion    • HTN (hypertension)    • Hyperlipidemia    • Injury of back    • Low back pain    • Myocardial infarction (CMS/HCC)    • Sleep apnea     wears oxygen at night    • Wears glasses    • Wears partial dentures      Past Surgical History:   Procedure Laterality Date   • CARDIAC CATHETERIZATION     • CARDIAC SURGERY      stenting   • CHOLECYSTECTOMY     • COLONOSCOPY  2007   • HERNIA REPAIR     • SHOULDER SURGERY     • TONSILLECTOMY     • TOTAL KNEE ARTHROPLASTY Right 1/27/2020    Procedure: TOTAL KNEE ARTHROPLASTY RIGHT;  Surgeon: Ortiz Barba MD;  Location: [facility];  Service: Orthopedics   • TYMPANOPLASTY     • UPPER GASTROINTESTINAL ENDOSCOPY     • VASECTOMY            OT ASSESSMENT FLOWSHEET (last 12 hours)      Occupational Therapy Evaluation     Row Name 01/28/20 0873                   OT Evaluation Time/Intention    Subjective Information  no complaints  -KA        Document Type  discharge evaluation/summary  -KA        Mode of Treatment  occupational therapy  -KA        Patient Effort  excellent  -KA        Symptoms Noted During/After Treatment  none  -KA        Comment  Pt educated on ADL kit and declined stating family would assist. Pt/family educated on knee precautions and verbalized understanding.  Pt/family educated on use of leg  and safe car transfer.   -KA           General Information    Patient Profile Reviewed?  yes  -KA        Patient/Family Observations  spouse and dtr present.   -KA        Prior Level of Function  independent:;all household mobility;ADL's;home management;cooking;cleaning;using stairs  -KA        Existing  Precautions/Restrictions  fall;other (see comments) R LE adductor canal catheter.   -KA        Equipment Issued to Patient  leg   -KA        Risks Reviewed  patient and family:;LOB;nausea/vomiting;dizziness;change in vital signs;increased discomfort;increased drainage;lines disloged  -KA        Benefits Reviewed  patient and family:;improve function;increase strength;increase independence;increase balance;decrease pain;decrease risk of DVT;increase knowledge;improve skin integrity  -KA        Barriers to Rehab  none identified  -KA           Relationship/Environment    Lives With  spouse  -KA        Family Caregiver if Needed  spouse  -KA        Concerns About Impact on Relationships  spouse able to assist AAT.   -KA           Resource/Environmental Concerns    Current Living Arrangements  home/apartment/condo  -KA        Home Accessibility Concerns  --  -KA           Home Main Entrance    Number of Stairs, Main Entrance  six  -KA        Stair Railings, Main Entrance  railings on both sides of stairs  -           Cognitive Assessment/Interventions    Additional Documentation  Cognitive Assessment/Intervention (Group)  -           Cognitive Assessment/Intervention- PT/OT    Affect/Mental Status (Cognitive)  WFL  -        Orientation Status (Cognition)  oriented x 4  -KA        Safety Deficit (Cognitive)  mild deficit;awareness of need for assistance;insight into deficits/self awareness;judgment;safety precautions awareness;safety precautions follow-through/compliance  -           Mobility Assessment/Treatment    Extremity Weight-bearing Status  right lower extremity  -        Right Lower Extremity (Weight-bearing Status)  weight-bearing as tolerated (WBAT)  -           Transfer Assessment/Treatment    Transfer Assessment/Treatment  sit-stand transfer;stand-sit transfer  -        Comment (Transfers)  pt educated multiple times on proper body mechanics and safety during sit<>stand w/RW; pt demo'd  ability w/min verbal cues.   -KA           Sit-Stand Transfer    Sit-Stand Section (Transfers)  contact guard;verbal cues  -KA        Assistive Device (Sit-Stand Transfers)  walker, front-wheeled  -KA           Stand-Sit Transfer    Stand-Sit Section (Transfers)  contact guard;verbal cues  -KA        Assistive Device (Stand-Sit Transfers)  walker, front-wheeled  -KA           ADL Assessment/Intervention    16801 - OT Self Care/Mgmt Minutes  20  -KA        BADL Assessment/Intervention  lower body dressing  -KA           Lower Body Dressing Assessment/Training    Lower Body Dressing Section Level  doff;don;socks;pants/bottoms;minimum assist (75% patient effort);supervision  -KA        Lower Body Dressing Position  unsupported sitting  -KA        Comment (Lower Body Dressing)  pt/dtr educated on nerve catheter mgmt, verbalized understanding.   -KA           BADL Safety/Performance    Impairments, BADL Safety/Performance  balance;endurance/activity tolerance;range of motion;pain  -KA           General ROM    GENERAL ROM COMMENTS  B UE WFL for eval purposes.   -KA           MMT (Manual Muscle Testing)    General MMT Comments  B UE WFL for eval purposes.   -KA           Positioning and Restraints    Pre-Treatment Position  sitting in chair/recliner  -KA        Post Treatment Position  chair  -KA        In Chair  reclined;call light within reach;encouraged to call for assist;exit alarm on;with family/caregiver;legs elevated  -KA           Pain Scale: Numbers Pre/Post-Treatment    Pain Scale: Numbers, Pretreatment  2/10  -KA        Pain Scale: Numbers, Post-Treatment  2/10  -KA        Pain Location - Side  Left  -KA        Pain Location - Orientation  posterior  -KA        Pain Location  knee  -KA        Pain Intervention(s)  Ambulation/increased activity;Repositioned;Cold applied  -KA           Wound 01/27/20 0759 Right anterior knee Incision    Wound - Properties Group Date first assessed: 01/27/20  -TK  Time first assessed: 0759 -TK Present on Hospital Admission: N  -TK Side: Right  -TK Orientation: anterior  -TK Location: knee  -TK Primary Wound Type: Incision  -TK       Coping    Observed Emotional State  accepting;calm;cooperative  -KA        Verbalized Emotional State  acceptance  -KA           Clinical Impression (OT)    OT Diagnosis  decreased ADL/IADL independence  -KA        Patient/Family Goals Statement (OT Eval)  pain free ADL/IADLs  -KA        Criteria for Skilled Therapeutic Interventions Met (OT Eval)  yes;treatment indicated  -KA        Rehab Potential (OT Eval)  good, to achieve stated therapy goals  -KA        Therapy Frequency (OT Eval)  daily  -KA        Predicted Duration of Therapy Intervention (Therapy Eval)  10  -KA        Care Plan Review (OT)  evaluation/treatment results reviewed;care plan/treatment goals reviewed;risks/benefits reviewed;current/potential barriers reviewed;patient/other agree to care plan  -KA        Care Plan Review, Other Participant (OT Eval)  daughter;spouse  -KA        Anticipated Discharge Disposition (OT)  home with assist  -KA           Vital Signs    Pre Systolic BP Rehab  -- VSS; pt cleared for therapy w/nsg.   -KA           OT Goals    Transfer Goal Selection (OT)  transfer, OT goal 1  -KA        Dressing Goal Selection (OT)  dressing, OT goal 1  -KA           Transfer Goal 1 (OT)    Activity/Assistive Device (Transfer Goal 1, OT)  sit-to-stand/stand-to-sit  -KA        [place] Level/Cues Needed (Transfer Goal 1, OT)  contact guard assist;verbal cues required  -KA        Time Frame (Transfer Goal 1, OT)  long term goal (LTG);by discharge  -KA        Progress/Outcome (Transfer Goal 1, OT)  goal met  -KA           Dressing Goal 1 (OT)    Activity/Assistive Device (Dressing Goal 1, OT)  lower body dressing  -KA        [place]/Cues Needed (Dressing Goal 1, OT)  minimum assist (75% or more patient effort);verbal cues required  -KA        Time Frame  (Dressing Goal 1, OT)  long term goal (LTG);by discharge  -        Progress/Outcome (Dressing Goal 1, OT)  goal met  -           Discharge Summary (Occupational Therapy)    Additional Documentation  Discharge Summary, OT Eval (Group)  -           Discharge Summary, OT Eval    Reason for Discharge (OT Discharge Summary)  patient discharged from this facility  -        Outcomes Achieved Upon Discharge (OT Discharge Summary)  able to achieve all goals within established timeline  -        Transfer to Another Level of Care or Facility (OT Discharge Summary)  patient to receive therapy via home health  -           Living Environment    Home Accessibility  stairs to enter home;tub/shower is not walk in  -          User Key  (r) = Recorded By, (t) = Taken By, (c) = Cosigned By    Initials Name Effective Dates    TK Carmita De Los Santos RN 06/16/16 -     Sandra Quiroga OT 07/17/19 -           Occupational Therapy Education                 Title: PT OT SLP Therapies (In Progress)     Topic: Occupational Therapy (In Progress)     Point: ADL training (Done)     Description:   Instruct learner(s) on proper safety adaptation and remediation techniques during self care or transfers.   Instruct in proper use of assistive devices.              Learning Progress Summary           Patient Acceptance, E,D, VU,DU,NR by  at 1/28/2020 0830    Comment:  Pt/family educated on role of OT, safety, fall prevention, ADLs, transfers, AE, precautions and POC.   Family Acceptance, E,D, VU,DU,NR by  at 1/28/2020 0830    Comment:  Pt/family educated on role of OT, safety, fall prevention, ADLs, transfers, AE, precautions and POC.                   Point: Precautions (Done)     Description:   Instruct learner(s) on prescribed precautions during self-care and functional transfers.              Learning Progress Summary           Patient Acceptance, E,D, VU,DU,NR by  at 1/28/2020 0830    Comment:  Pt/family educated on role of OT,  safety, fall prevention, ADLs, transfers, AE, precautions and POC.   Family Acceptance, E,D, TATY,DU,NR by JAMMIE at 1/28/2020 0830    Comment:  Pt/family educated on role of OT, safety, fall prevention, ADLs, transfers, AE, precautions and POC.                   Point: Body mechanics (Done)     Description:   Instruct learner(s) on proper positioning and spine alignment during self-care, functional mobility activities and/or exercises.              Learning Progress Summary           Patient Acceptance, E,D, TATY,BHAKTI,NR by JAMMIE at 1/28/2020 0830    Comment:  Pt/family educated on role of OT, safety, fall prevention, ADLs, transfers, AE, precautions and POC.   Family Acceptance, E,D, TATY,BHAKTI,NR by JAMMIE at 1/28/2020 0830    Comment:  Pt/family educated on role of OT, safety, fall prevention, ADLs, transfers, AE, precautions and POC.                               User Key     Initials Effective Dates Name Provider Type Discipline     07/17/19 -  Sandra Suero OT Occupational Therapist OT                OT Recommendation and Plan  Outcome Summary/Treatment Plan (OT)  Anticipated Discharge Disposition (OT): home with assist  Reason for Discharge (OT Discharge Summary): patient discharged from this facility  Therapy Frequency (OT Eval): daily  Plan of Care Review  Plan of Care Reviewed With: patient, spouse, daughter  Plan of Care Reviewed With: patient, spouse, daughter  Outcome Summary: A&Ox4.   LBD (doff/don socks, don pants): [place].  Sit<>Stand: CGA w/RW and min verbal cues for proper body mechanics.  Anticipate pt to d/c home w/assist.     Rehab Goal Summary     Row Name 01/28/20 0830             Occupational Therapy Goals    Transfer Goal Selection (OT)  transfer, OT goal 1  -KA      Dressing Goal Selection (OT)  dressing, OT goal 1  -KA         Transfer Goal 1 (OT)    Activity/Assistive Device (Transfer Goal 1, OT)  sit-to-stand/stand-to-sit  -KA      [place] Level/Cues Needed (Transfer Goal 1, OT)  contact guard  assist;verbal cues required  -KA      Time Frame (Transfer Goal 1, OT)  long term goal (LTG);by discharge  -KA      Progress/Outcome (Transfer Goal 1, OT)  goal met  -KA         Dressing Goal 1 (OT)    Activity/Assistive Device (Dressing Goal 1, OT)  lower body dressing  -KA      [place]/Cues Needed (Dressing Goal 1, OT)  minimum assist (75% or more patient effort);verbal cues required  -KA      Time Frame (Dressing Goal 1, OT)  long term goal (LTG);by discharge  -KA      Progress/Outcome (Dressing Goal 1, OT)  goal met  -KA        User Key  (r) = Recorded By, (t) = Taken By, (c) = Cosigned By    Initials Name Provider Type Discipline    Sandra Quiroga OT Occupational Therapist OT          Outcome Measures     Row Name 01/28/20 0830             How much help from another is currently needed...    Putting on and taking off regular lower body clothing?  3  -KA      Bathing (including washing, rinsing, and drying)  3  -KA      Toileting (which includes using toilet bed pan or urinal)  3  -KA      Putting on and taking off regular upper body clothing  4  -KA      Taking care of personal grooming (such as brushing teeth)  3  -KA      Eating meals  4  -KA      AM-PAC 6 Clicks Score (OT)  20  -KA         Functional Assessment    Outcome Measure Options  AM-PAC 6 Clicks Daily Activity (OT)  -KA        User Key  (r) = Recorded By, (t) = Taken By, (c) = Cosigned By    Initials Name Provider Type    Sandra Quiroga OT Occupational Therapist          Time Calculation:   Time Calculation- OT     Row Name 01/28/20 0830             Time Calculation- OT    OT Start Time  0830  -KA      OT Received On  01/28/20  -KA      OT Goal Re-Cert Due Date  02/07/20  -KA         Timed Charges    55416 - OT Self Care/Mgmt Minutes  20  -KA        User Key  (r) = Recorded By, (t) = Taken By, (c) = Cosigned By    Initials Name Provider Type    Sandra Quiroga OT Occupational Therapist        Therapy Suggested Charges      Code   Minutes Charges    52952 (CPT®) Hc Ot Neuromusc Re Education Ea 15 Min      86715 (CPT®) Hc Ot Ther Proc Ea 15 Min      56206 (CPT®) Hc Ot Therapeutic Act Ea 15 Min      54653 (CPT®) Hc Ot Manual Therapy Ea 15 Min      65466 (CPT®) Hc Ot Iontophoresis Ea 15 Min      85281 (CPT®) Hc Ot Elec Stim Ea-Per 15 Min      48220 (CPT®) Hc Ot Ultrasound Ea 15 Min      35247 (CPT®) Hc Ot Self Care/Mgmt/Train Ea 15 Min 20 1    Total  20 1        Therapy Charges for Today     Code Description Service Date Service Provider Modifiers Qty    47248461194 HC OT SELF CARE/MGMT/TRAIN EA 15 MIN 1/28/2020 Sandra Suero OT GO 1    89547225092 HC OT EVAL LOW COMPLEXITY 3 1/28/2020 Sandra Suero OT GO 1               OT Discharge Summary  Anticipated Discharge Disposition (OT): home with assist    Sandra Suero OT  1/28/2020

## 2020-01-28 NOTE — PLAN OF CARE
Problem: Patient Care Overview  Goal: Plan of Care Review  Flowsheets (Taken 1/28/2020 0845)  Progress: improving  Plan of Care Reviewed With: patient  Outcome Summary: pt VSS, A&O x 4, no neurological/neurovascular decline, RA, complaint of pain - managed with PRN pain meds, arrow infusing at 10ml/hr, rested well throughout the night.     Problem: Fall Risk (Adult)  Goal: Absence of Fall  Flowsheets (Taken 1/28/2020 0845)  Absence of Fall: achieves outcome

## 2020-01-28 NOTE — PROGRESS NOTES
"  SUBJECTIVE  Patient resting comfortably.  Pain well controlled.  No events overnight.  Ambulated 350 feet with therapy yesterday.    OBJECTIVE  Temp (24hrs), Av.8 °F (36.6 °C), Min:97.2 °F (36.2 °C), Max:98.4 °F (36.9 °C)    Blood pressure 130/90, pulse 62, temperature 98.4 °F (36.9 °C), temperature source Oral, resp. rate 16, height 177.8 cm (70\"), weight 100 kg (221 lb), SpO2 95 %.    Lab Results (last 24 hours)     Procedure Component Value Units Date/Time    Basic Metabolic Panel [[id number]]  (Abnormal) Collected:  20    Specimen:  Blood Updated:  20     Glucose 173 mg/dL      BUN 17 mg/dL      Creatinine 0.96 mg/dL      Sodium 139 mmol/L      Potassium 4.5 mmol/L      Chloride 106 mmol/L      CO2 23.0 mmol/L      Calcium 7.9 mg/dL      eGFR Non African Amer 81 mL/min/1.73      BUN/Creatinine Ratio 17.7     Anion Gap 10.0 mmol/L     Narrative:       GFR Normal >60  Chronic Kidney Disease <60  Kidney Failure <15      CBC (No Diff) [[id number]]  (Abnormal) Collected:  20    Specimen:  Blood Updated:  20     WBC 13.00 10*3/mm3      RBC 3.74 10*6/mm3      Hemoglobin 10.2 g/dL      Hematocrit 32.7 %      MCV 87.4 fL      MCH 27.3 pg      MCHC 31.2 g/dL      RDW 14.3 %      RDW-SD 45.4 fl      MPV 9.2 fL      Platelets 313 10*3/mm3     POC Glucose Once [[id number]]  (Abnormal) Collected:  20    Specimen:  Blood Updated:  20     Glucose 285 mg/dL     POC Glucose Once [[id number]]  (Abnormal) Collected:  20 1638    Specimen:  Blood Updated:  20 1642     Glucose 267 mg/dL     POC Glucose Once [[id number]]  (Abnormal) Collected:  20 1359    Specimen:  Blood Updated:  20 1402     Glucose 332 mg/dL             PHYSICAL EXAM  Right lower extremity: Dressing clean, dry and intact.  Able to perform straight leg raise without assist.  Intact EHL, FHL, tibialis anterior, and gastrocsoleus. Sensation intact to light touch to deep peroneal, " superficial peroneal, sural, saphenous, tibial nerves. 2+ palpable DP and PT pulses.         Status post total right knee replacement    Coronary artery disease involving native coronary artery of native heart    Obstructive sleep apnea    Hypercholesterolemia    Essential hypertension    Primary osteoarthritis of both knees    Chronic combined systolic and diastolic heart failure (CMS/HCC)    DM (diabetes mellitus) (CMS/HCC)    Hypothyroid    On home O2      PLAN / DISPOSITION:  1 Day Post-Op right total knee arthroplasty    Protected weight bearing as tolerated right lower extremity, knee range of motion as tolerated  Pain control  PT/OT for post op mobilization and medical equipment needs   23 hours perioperative antibiotic prophylaxis   SCD's bilateral lower extremities   Aspirin for DVT prophylaxis   Social work for discharge planning.  Anticipate discharge home today.  Dressing to remain in place for 7 days. May remove on POD#7. If no drainage, may shower on POD#10. No submerging wound in water. If drainage is noted, sterile dressing should be placed and wound checked daily. No showering until wound has remained dry for 72 consecutive hours.   Follow up in 3 weeks for re-assessment.      Ortiz Barba MD  01/28/20  7:18 AM

## 2020-01-28 NOTE — DISCHARGE SUMMARY
Patient Name: Damaso Leonard  MRN: [id number]  : 1960  DOS: 2020    Attending: No att. providers found    Primary Care Provider: Clare Quintero APRN    Date of Admission:.2020  5:12 AM    Date of Discharge:  2020    Discharge Diagnosis:     Status post total right knee replacement    Primary osteoarthritis of both knees    Coronary artery disease involving native coronary artery of native heart    Obstructive sleep apnea    Hypercholesterolemia    Essential hypertension    Chronic combined systolic and diastolic heart failure (CMS/HCC)    DM (diabetes mellitus) (CMS/HCC)    Hypothyroid    On home O2    Leukocytosis, likely reactive    Acute blood loss anemia, mild, asymptomatic    Acute postoperative pain      Hospital Course  Patient is a 59 y.o. male presented for right total knee arthroplasty by Dr. Barba.     He underwent surgery under spinal anesthesia.  He tolerated surgery well and was admitted for further medical management.  His knee has been painful for many years.  He denies use of assistive device for ambulation.  He does report recent falls.  Conservative treatments failed to provide long-term pain relief.    He is followed by cardiology in [place] and had preop cardiac clearance.    Patient was provided pain medications as needed for pain control, along with adductor canal nerve block infusion of Ropivacaine.      Adjustments were made to pain medications to optimize postop pain management. Risks and benefits of opiate medications discussed with patient.    He was seen by PT and OT and has progressed well over his stay.  He used an IS for atelectasis prophylaxis and aspirin along with mechanicals for DVT prophylaxis.    Home medications were resumed as appropriate, and labs were monitored and remained fairly stable.     With the progress he has made, he is ready for DC home today.    He will have an Arrow pump ( instructed on it during this admit).    Discussed  "with patient regarding plan and he shows understanding and agreement.    Patient will have HHPT following discharge.        Procedures Performed  DATE OF PROCEDURE: 2020     SURGEON: Ortiz Barba M.D.      PREOPERATIVE DIAGNOSIS: Advanced degenerative joint disease of the right knee secondary to osteoarthritis     POSTOPERATIVE DIAGNOSIS: same      PROCEDURE: Right total Knee Arthroplasty     Pertinent Test Results:    I reviewed the patient's new clinical results.   Results from last 7 days   Lab Units 20  0547   WBC 10*3/mm3 13.00*   HEMOGLOBIN g/dL 10.2*   HEMATOCRIT % 32.7*   PLATELETS 10*3/mm3 313     Results for VIRGEN SEVERINO (MRN [id number]) as of 2020 14:35   Ref. Range 2020 11:01   Hemoglobin Latest Ref Range: 13.0 - 17.7 g/dL 13.0   Hematocrit Latest Ref Range: 37.5 - 51.0 % 42.1     Results from last 7 days   Lab Units 20  0547   SODIUM mmol/L 139   POTASSIUM mmol/L 4.5   CHLORIDE mmol/L 106   CO2 mmol/L 23.0   BUN mg/dL 17   CREATININE mg/dL 0.96   CALCIUM mg/dL 7.9*   GLUCOSE mg/dL 173*     Results for VIRGEN SEVERINO (MRN [id number]) as of 2020 14:35   Ref. Range 2020 16:38 2020 20:10 2020 05:47 2020 07:50   Glucose Latest Ref Range: 70 - 130 mg/dL 267 (H) 285 (H) 173 (H) 164 (H)     I reviewed the patient's new imaging including images and reports.      Physical therapy: Pt ambulated 400 feet using FWW and SBA. Pt ascended/descended 6 steps using L ascending handrail and no AD, with CGA x1. Gait/stair training limited by fatigue. Bed mobility and STS requiring supervision. R knee AROM measured at 4-90 degrees. Reviewed HEP and knee precautions. Anticiapte pt d/c home with assist and HHPT today.    Discharge Assessment:    Vital Signs  Visit Vitals  /75   Pulse 80   Temp 97.7 °F (36.5 °C) (Oral)   Resp 14   Ht 177.8 cm (70\")   Wt 100 kg (221 lb)   SpO2 94%   BMI 31.71 kg/m²     Temp (24hrs), Av.1 °F (36.7 °C), Min:97.7 °F " (36.5 °C), Max:98.4 °F (36.9 °C)      General Appearance:    Alert, cooperative, in no acute distress   Lungs:     Clear to auscultation,respirations regular, even and                   unlabored    Heart:    Regular rhythm and normal rate, normal S1 and S2   Abdomen:     Normal bowel sounds, no masses, no organomegaly, soft        non-tender, non-distended, no guarding, no rebound                 tenderness   Extremities:   Moves all extremities well, no edema, no cyanosis, no              Redness. Right knee ACE wrap CDI. Nerve block present   Pulses:   Pulses palpable and equal bilaterally   Skin:   No bleeding, bruising or rash   Neurologic:   Cranial nerves 2 - 12 grossly intact, sensation intact. Flexion and dorsiflexion intact bilateral feet.       Discharge Disposition: Home    Discharge Medications     Discharge Medications      New Medications      Instructions Start Date   acetaminophen 500 MG tablet  Commonly known as:  TYLENOL   1,000 mg, Oral, Every 6 Hours      docusate sodium 100 MG capsule   100 mg, Oral, 2 Times Daily PRN      meloxicam 15 MG tablet  Commonly known as:  MOBIC   15 mg, Oral, Daily      oxyCODONE 5 MG immediate release tablet  Commonly known as:  ROXICODONE   5 mg, Oral, Every 4 Hours PRN      Ropivacine HCl-NaCl  Commonly known as:  NAROPIN  Notes to patient:  CONTINUOUS INFUSION   10 mL/hr (20 mg/hr), Peripheral Nerve, Continuous         Changes to Medications      Instructions Start Date   aspirin 81 MG tablet  What changed:  additional instructions  Notes to patient:  RESUME IN 1 MONTH   81 mg, Oral, Daily, Resume in 1 month      aspirin 325 MG EC tablet  What changed:  You were already taking a medication with the same name, and this prescription was added. Make sure you understand how and when to take each.   325 mg, Oral, Every 12 Hours Scheduled, For 1 month      polyethylene glycol packet  Commonly known as:  MIRALAX  What changed:    · when to take this  · reasons to take  this   17 g, Oral, Daily         Continue These Medications      Instructions Start Date   amitriptyline 50 MG tablet  Commonly known as:  ELAVIL   100 mg, Oral, Nightly PRN, 1 daily      BYDUREON 2 MG pen-injector injection  Generic drug:  exenatide er  Notes to patient:  RESUME AS TAKING AT HOME   INJECT CONTENTS OF 1 PEN SUBCUTANEOUSLY INTO THE APPROPRIATE AREA AS DIRECTED ONCE WEEKLY      carvedilol 12.5 MG tablet  Commonly known as:  COREG   12.5 mg, Oral, 2 Times Daily With Meals      citalopram 40 MG tablet  Commonly known as:  CeleXA   40 mg, Oral, Daily      clonazePAM 0.5 MG tablet  Commonly known as:  KlonoPIN   0.5 mg, Oral, 3 Times Daily PRN      cyanocobalamin 1000 MCG/ML injection  Notes to patient:  RESUME AS TAKING AT HOME   1,000 mcg, Intramuscular, Every 28 Days      dicyclomine 10 MG capsule  Commonly known as:  BENTYL   10 mg, Oral, 4 Times Daily Before Meals & Nightly      diphenhydrAMINE 25 mg capsule  Commonly known as:  BENADRYL ALLERGY   25 mg, Oral, Every 6 Hours PRN      finasteride 5 MG tablet  Commonly known as:  PROSCAR   5 mg, Oral, Daily      glucose blood test strip   Check blood sugar 3x times a day.      glucose monitor monitoring kit   1 each, Does not apply, 3 Times Daily PRN      icosapent ethyl 1 g capsule capsule  Commonly known as:  VASCEPA   2 twice daily      isosorbide mononitrate 30 MG 24 hr tablet  Commonly known as:  IMDUR   30 mg, Oral, 2 Times Daily      metFORMIN 500 MG tablet  Commonly known as:  GLUCOPHAGE   500 mg, Oral, 2 Times Daily With Meals      naloxone 4 MG/0.1ML nasal spray  Commonly known as:  NARCAN   1 spray, Nasal, As Needed      O2  Commonly known as:  OXYGEN   2 L/min, Inhalation, Nightly      ondansetron 8 MG tablet  Commonly known as:  ZOFRAN   8 mg, Oral, Every 8 Hours PRN      onetouch ultrasoft lancets   Check blood sugar 3 times daily      pantoprazole 40 MG EC tablet  Commonly known as:  PROTONIX   40 mg, Oral, Daily      simvastatin 40 MG  tablet  Commonly known as:  ZOCOR   40 mg, Oral, Nightly      Thyroid 30 MG tablet  Commonly known as:  NP THYROID   30 mg, Oral, Daily      vitamin D 1.25 MG (84539 UT) capsule capsule  Commonly known as:  ERGOCALCIFEROL  Notes to patient:  RESUME AS TAKING AT HOME   50,000 Units, Oral, Every 7 Days         Stop These Medications    traMADol 50 MG tablet  Commonly known as:  ULTRAM            Discharge Diet: Consistent carb diet    Activity at Discharge: WBAT RLE    Follow-up Appointments  Dr. Barba per his orders      Scribed for Dr. Ayala by YOUNG Waters. 1/28/2020  2:37 PM    YOUNG Waters  01/28/20  2:37 PM   IHuma MD, personally performed the services described in this documentation as scribed by YOUNG Waters ,and it is both accurate and complete. wy.

## 2020-01-29 ENCOUNTER — TELEPHONE (OUTPATIENT)
Dept: ORTHOPEDIC SURGERY | Facility: CLINIC | Age: 60
End: 2020-01-29

## 2020-01-29 NOTE — TELEPHONE ENCOUNTER
The patient is wanting to rotate between  Tramadol and hydrocodone for PO pain.  He states that the hydrocodone by itself is not helping.  He has plenty of both meds.  Is it ok that he rotates?      Sejal

## 2020-01-29 NOTE — PROGRESS NOTES
Continued Stay Note  [facility]     Patient Name: Damaso Leonard  MRN: [id number]  Today's Date: 1/29/2020    Admit Date: 1/27/2020    Discharge Plan     Row Name 01/29/20 1009       Plan    Plan Comments  Post Discharge note: Upon auditing chart, it was noted that fax failed that was sent to [facility]. Called [facility]. They confirmed that they did not receive the fax. Faxed to a different fax number via [employer] and by traditional fax at [phone]. Asked that they call back if they do not receive this fax.         Discharge Codes    No documentation.       Expected Discharge Date and Time     Expected Discharge Date Expected Discharge Time    Jan 28, 2020             Anamika Larsen RN

## 2020-01-29 NOTE — PROGRESS NOTES
SIRI Rai    Nerve Cath Post Op Call    Patient Name: Damaso Leonard  :  1960  MRN:  [id number]  Date of Discharge: 2020    Nerve Cath Post Op Call:    Analgesia:Fair  Pain Score:5/10  Side Effects:None  Catheter Site:clean  Patient Controlled ON Q pump infusion rate: 10ml/hr    Pt's wife states patient is hurting a lot, doing PT presently. Instructed to increase the nerve catheter rate to 14mL/hr for two hours and instructed to turn it down to rate of 10mL/hr at 3:30pm today. Pt's wife states understanding.    Pt's wife stated that pt had turned the pump to rate of 14ml/hr over night, all night. And turned the rate down to 10ml/hr this morning.

## 2020-01-31 NOTE — PROGRESS NOTES
SIRI Rai    Nerve Cath Post Op Call    Patient Name: Damaso Leonard  :  1960  MRN:  [id number]  Date of Discharge: 2020    Nerve Cath Post Op Call:    Analgesia:Excellent  Pain Score:0/10  Catheter Plan:Patient/Family member report nerve catheter previously discontinued, tip intact

## 2020-02-11 ENCOUNTER — OFFICE VISIT (OUTPATIENT)
Dept: ORTHOPEDIC SURGERY | Facility: CLINIC | Age: 60
End: 2020-02-11

## 2020-02-11 ENCOUNTER — TELEPHONE (OUTPATIENT)
Dept: ORTHOPEDIC SURGERY | Facility: CLINIC | Age: 60
End: 2020-02-11

## 2020-02-11 DIAGNOSIS — Z96.651 S/P TOTAL KNEE ARTHROPLASTY, RIGHT: Primary | ICD-10-CM

## 2020-02-11 PROCEDURE — 99024 POSTOP FOLLOW-UP VISIT: CPT | Performed by: PHYSICIAN ASSISTANT

## 2020-02-11 RX ORDER — OXYCODONE HYDROCHLORIDE 5 MG/1
5 TABLET ORAL EVERY 4 HOURS PRN
Qty: 30 TABLET | Refills: 0 | Status: SHIPPED | OUTPATIENT
Start: 2020-02-11 | End: 2020-06-23

## 2020-02-11 RX ORDER — TRAMADOL HYDROCHLORIDE 50 MG/1
TABLET ORAL
COMMUNITY
Start: 2020-02-04 | End: 2020-03-09 | Stop reason: SDUPTHER

## 2020-02-11 NOTE — PROGRESS NOTES
[facility] Note    Subjective     Patient: Damaso Leonard  : 1960    Primary Care Provider: Clare Quintero APRN    Requesting Provider: As above    Post-op (2 week S/P Right total Knee Arthroplasty 2020)      History    History of Present Illness: Patient presents today for status post right total knee arthroplasty with Dr. Barba on 2020.  He reports pain is very slowly improving.  He still states he has pain 10/10.  He is using a cane for ambulation.  He is taking narcotics as well as anti-inflammatories.  He is doing home health physical therapy.  He continues to ice.      Current Outpatient Medications on File Prior to Visit   Medication Sig Dispense Refill   • [] acetaminophen (TYLENOL) 500 MG tablet Take 2 tablets by mouth Every 6 (Six) Hours for 14 days. 112 tablet 0   • amitriptyline (ELAVIL) 50 MG tablet Take 2 tablets by mouth At Night As Needed for Sleep. 1 daily 60 tablet 5   • aspirin 81 MG tablet Take 1 tablet by mouth Daily. Resume in 1 month 90 tablet 3   • aspirin  MG EC tablet Take 1 tablet by mouth Every 12 (Twelve) Hours. For 1 month 60 tablet 0   • BYDUREON 2 MG pen-injector injection INJECT CONTENTS OF 1 PEN SUBCUTANEOUSLY INTO THE APPROPRIATE AREA AS DIRECTED ONCE WEEKLY 5 pen 5   • carvedilol (COREG) 12.5 MG tablet Take 1 tablet by mouth 2 (Two) Times a Day With Meals. 180 tablet 3   • citalopram (CeleXA) 40 MG tablet Take 1 tablet by mouth Daily. 30 tablet 5   • clonazePAM (KlonoPIN) 0.5 MG tablet Take 1 tablet by mouth 3 (Three) Times a Day As Needed for Anxiety. 90 tablet 0   • cyanocobalamin 1000 MCG/ML injection Inject 1 mL into the appropriate muscle as directed by prescriber Every 28 (Twenty-Eight) Days. 1 mL 11   • dicyclomine (BENTYL) 10 MG capsule Take 1 capsule by mouth 4 (Four) Times a Day Before Meals & at Bedtime. 120 capsule 5   • diphenhydrAMINE (BENADRYL ALLERGY) 25 mg capsule Take 1 capsule by  mouth Every 6 (Six) Hours As Needed for Itching. 90 capsule 3   • docusate sodium 100 MG capsule Take 100 mg by mouth 2 (Two) Times a Day As Needed for Constipation. 60 each 0   • finasteride (PROSCAR) 5 MG tablet Take 1 tablet by mouth Daily. 90 tablet 3   • glucose blood test strip Check blood sugar 3x times a day. 100 each 12   • glucose monitor monitoring kit 1 each 3 (Three) Times a Day As Needed (diabetes). 1 each 0   • icosapent ethyl (VASCEPA) 1 g capsule capsule 2 twice daily 120 capsule 5   • isosorbide mononitrate (IMDUR) 30 MG 24 hr tablet Take 1 tablet by mouth 2 (Two) Times a Day. 60 tablet 5   • Lancets (ONETOUCH ULTRASOFT) lancets Check blood sugar 3 times daily 100 each 12   • meloxicam (MOBIC) 15 MG tablet Take 1 tablet by mouth Daily. 14 tablet 0   • metFORMIN (GLUCOPHAGE) 500 MG tablet Take 1 tablet by mouth 2 (Two) Times a Day With Meals. 180 tablet 3   • naloxone (NARCAN) 4 MG/0.1ML nasal spray 1 spray into the nostril(s) as directed by provider As Needed (overdose). 1 each 0   • O2 (OXYGEN) Inhale 2 L/min Every Night.     • ondansetron (ZOFRAN) 8 MG tablet Take 1 tablet by mouth Every 8 (Eight) Hours As Needed for Nausea. 20 tablet 2   • pantoprazole (PROTONIX) 40 MG EC tablet Take 1 tablet by mouth Daily. 90 tablet 3   • polyethylene glycol (MIRALAX) packet Take 17 g by mouth Daily. (Patient taking differently: Take 17 g by mouth Daily As Needed.) 1 each 5   • Ropivacine HCl-NaCl (NAROPIN) 20 mg/hr by Peripheral Nerve route Continuous. Indications: Acute Pain     • simvastatin (ZOCOR) 40 MG tablet Take 1 tablet by mouth Every Night. 90 tablet 3   • Thyroid (NP THYROID) 30 MG PO tablet Take 1 tablet by mouth Daily. 90 tablet 3   • traMADol (ULTRAM) 50 MG tablet      • vitamin D (ERGOCALCIFEROL) 10877 units capsule capsule Take 1 capsule by mouth Every 7 (Seven) Days. 12 capsule 3     Current Facility-Administered Medications on File Prior to Visit   Medication Dose Route Frequency Provider  Last Rate Last Dose   • cyanocobalamin injection 1,000 mcg  1,000 mcg Intramuscular Q28 Days Clare Quintero APRN   1,000 mcg at 07/11/18 0843      No Known Allergies   Past Medical History:   Diagnosis Date   • Anxiety    • Anxiety and depression    • Arthritis    • ASCVD (arteriosclerotic cardiovascular disease)    • Colitis    • Coronary artery disease    • Disease of thyroid gland    • DM (diabetes mellitus) (CMS/HCC)    • GERD (gastroesophageal reflux disease)    • History of EKG 04/15/2015    NORMAL   • History of transfusion    • HTN (hypertension)    • Hyperlipidemia    • Injury of back    • Low back pain    • Myocardial infarction (CMS/HCC)    • Sleep apnea     wears oxygen at night    • Wears glasses    • Wears partial dentures      Past Surgical History:   Procedure Laterality Date   • CARDIAC CATHETERIZATION     • CARDIAC SURGERY      stenting   • CHOLECYSTECTOMY     • COLONOSCOPY  2007   • HERNIA REPAIR     • SHOULDER SURGERY     • TONSILLECTOMY     • TOTAL KNEE ARTHROPLASTY Right 1/27/2020    Procedure: TOTAL KNEE ARTHROPLASTY RIGHT;  Surgeon: Ortiz Barba MD;  Location: [facility];  Service: Orthopedics   • TYMPANOPLASTY     • UPPER GASTROINTESTINAL ENDOSCOPY     • VASECTOMY       Family History   Problem Relation Age of Onset   • Heart attack Father    • Heart disease Father    • Hypertension Father    • Heart attack Sister    • Diabetes Sister    • Heart disease Sister    • Hypertension Sister    • Thyroid disease Sister    • Heart attack Brother    • Diabetes Brother    • Thyroid disease Brother    • Arthritis Daughter    • COPD Daughter    • Asthma Daughter    • Depression Daughter    • Heart disease Sister    • Hypertension Sister       Social History     Socioeconomic History   • Marital status:      Spouse name: ruben   • Number of children: 3   • Years of education: 12   • Highest education level: Not on file   Occupational History   • Occupation: retired   Tobacco Use    • Smoking status: Never Smoker   • Smokeless tobacco: Never Used   Substance and Sexual Activity   • Alcohol use: No   • Drug use: No   • Sexual activity: Defer        Review of Systems   Constitutional: Negative.    HENT: Negative.    Eyes: Negative.    Respiratory: Negative.    Cardiovascular: Negative.    Gastrointestinal: Negative.    Endocrine: Negative.    Genitourinary: Negative.    Musculoskeletal: Positive for arthralgias.   Skin: Negative.    Allergic/Immunologic: Negative.    Neurological: Negative.    Hematological: Negative.    Psychiatric/Behavioral: Negative.        The following portions of the patient's history were reviewed and updated as appropriate: allergies, current medications, past family history, past medical history, past social history, past surgical history and problem list.      Objective      Physical Exam  There were no vitals taken for this visit.    There is no height or weight on file to calculate BMI.    Ortho Exam  Right knee exam:  Anterior knee incision is healing well  Range of motion 5-85  Ligaments stable to valgus and varus stress  Neurovascular intact distally  Ambulating with a walker  Medical Decision Making    Data Review:   ordered and reviewed x-rays today    Assessment:  1. S/P total knee arthroplasty, right        Plan:  Doing well status post right total knee arthroplasty with Dr. Barba on 1/27/2020.  X-rays today show well-positioned, cemented total knee arthroplasty with no evidence of ostial lysis, subsidence or fracture.  Patient reports slowly improving pain.  I encouraged him to begin outpatient physical therapy as soon as he is able.  I have given him prescription.  We have refilled his narcotics.  He will return to see Dr. Barba in 3 weeks with repeat x-rays or sooner if needed    History, diagnosis and treatment plan discussed with Dr. Barba.      Sandy Cherry PA-C  02/12/20  12:38 PM

## 2020-02-11 NOTE — TELEPHONE ENCOUNTER
[facility] CALLED IN REGARDS TO THIS PATIENT AND THE PRESCRIPTION THAT WAS CALLED IN BY DR. CROWLEY. HE IS CURRENTLY ON TRAMADOL AND HE WAS PRESCRIBED OXYCODONE. ITZ SPOKE TO THE PHARMACY TO OKAY THAT THE PATIENT REMAIN TO ROTATE THE MEDICATION AND SINCE THE OXYCODONE IS FOR HIS POST OP CARE, THAT THIS IS FINE. THEY UNDERSTOOD.

## 2020-03-03 ENCOUNTER — OFFICE VISIT (OUTPATIENT)
Dept: ORTHOPEDIC SURGERY | Facility: CLINIC | Age: 60
End: 2020-03-03

## 2020-03-03 DIAGNOSIS — Z96.651 S/P TOTAL KNEE ARTHROPLASTY, RIGHT: Primary | ICD-10-CM

## 2020-03-03 PROCEDURE — 99024 POSTOP FOLLOW-UP VISIT: CPT | Performed by: ORTHOPAEDIC SURGERY

## 2020-03-03 NOTE — PROGRESS NOTES
Orthopaedic Clinic Note:  Knee Post Op    Chief Complaint   Patient presents with   • Follow-up     3 week f/u; 5 weeks S/P Right total Knee Arthroplasty 01/27/2020        HPI    Mr. Leonard is 5  week(s) s/p right total knee arthroplasty. Rates pain 2/10. He is ambulating with no assistive device and is taking Motrin/Ibuprofen/Meloxicam/Naproxen/Diclofenac for pain control. He denies fevers, chills, or constitutional symptoms.  He is continuing home health PT. Patient is improving overall.  He still has significant stiffness and has failed to progress to outpatient physical therapy as instructed at his last clinic appointment.    I have reviewed the following portions of the patient's history:History of Present Illness    Past Medical History:   Diagnosis Date   • Anxiety    • Anxiety and depression    • Arthritis    • ASCVD (arteriosclerotic cardiovascular disease)    • Colitis    • Coronary artery disease    • Disease of thyroid gland    • DM (diabetes mellitus) (CMS/HCC)    • GERD (gastroesophageal reflux disease)    • History of EKG 04/15/2015    NORMAL   • History of transfusion    • HTN (hypertension)    • Hyperlipidemia    • Injury of back    • Low back pain    • Myocardial infarction (CMS/HCC)    • Sleep apnea     wears oxygen at night    • Wears glasses    • Wears partial dentures       Past Surgical History:   Procedure Laterality Date   • CARDIAC CATHETERIZATION     • CARDIAC SURGERY      stenting   • CHOLECYSTECTOMY     • COLONOSCOPY  2007   • HERNIA REPAIR     • SHOULDER SURGERY     • TONSILLECTOMY     • TOTAL KNEE ARTHROPLASTY Right 1/27/2020    Procedure: TOTAL KNEE ARTHROPLASTY RIGHT;  Surgeon: Ortiz Barba MD;  Location: Replaced by [facility];  Service: Orthopedics   • TYMPANOPLASTY     • UPPER GASTROINTESTINAL ENDOSCOPY     • VASECTOMY       Family History   Problem Relation Age of Onset   • Heart attack Father    • Heart disease Father    • Hypertension Father    • Heart attack Sister    • Diabetes  Sister    • Heart disease Sister    • Hypertension Sister    • Thyroid disease Sister    • Heart attack Brother    • Diabetes Brother    • Thyroid disease Brother    • Arthritis Daughter    • COPD Daughter    • Asthma Daughter    • Depression Daughter    • Heart disease Sister    • Hypertension Sister       Social History     Socioeconomic History   • Marital status:      Spouse name: ruben   • Number of children: 3   • Years of education: 12   • Highest education level: Not on file   Occupational History   • Occupation: retired   Tobacco Use   • Smoking status: Never Smoker   • Smokeless tobacco: Never Used   Substance and Sexual Activity   • Alcohol use: No   • Drug use: No   • Sexual activity: Defer      Current Outpatient Medications on File Prior to Visit   Medication Sig Dispense Refill   • amitriptyline (ELAVIL) 50 MG tablet Take 2 tablets by mouth At Night As Needed for Sleep. 1 daily 60 tablet 5   • aspirin 81 MG tablet Take 1 tablet by mouth Daily. Resume in 1 month 90 tablet 3   • aspirin  MG EC tablet Take 1 tablet by mouth Every 12 (Twelve) Hours. For 1 month 60 tablet 0   • BYDUREON 2 MG pen-injector injection INJECT CONTENTS OF 1 PEN SUBCUTANEOUSLY INTO THE APPROPRIATE AREA AS DIRECTED ONCE WEEKLY 5 pen 5   • carvedilol (COREG) 12.5 MG tablet Take 1 tablet by mouth 2 (Two) Times a Day With Meals. 180 tablet 3   • citalopram (CeleXA) 40 MG tablet Take 1 tablet by mouth Daily. 30 tablet 5   • clonazePAM (KlonoPIN) 0.5 MG tablet Take 1 tablet by mouth 3 (Three) Times a Day As Needed for Anxiety. 90 tablet 0   • cyanocobalamin 1000 MCG/ML injection Inject 1 mL into the appropriate muscle as directed by prescriber Every 28 (Twenty-Eight) Days. 1 mL 11   • dicyclomine (BENTYL) 10 MG capsule Take 1 capsule by mouth 4 (Four) Times a Day Before Meals & at Bedtime. 120 capsule 5   • diphenhydrAMINE (BENADRYL ALLERGY) 25 mg capsule Take 1 capsule by mouth Every 6 (Six) Hours As Needed for Itching. 90  capsule 3   • docusate sodium 100 MG capsule Take 100 mg by mouth 2 (Two) Times a Day As Needed for Constipation. 60 each 0   • finasteride (PROSCAR) 5 MG tablet Take 1 tablet by mouth Daily. 90 tablet 3   • glucose blood test strip Check blood sugar 3x times a day. 100 each 12   • glucose monitor monitoring kit 1 each 3 (Three) Times a Day As Needed (diabetes). 1 each 0   • icosapent ethyl (VASCEPA) 1 g capsule capsule 2 twice daily 120 capsule 5   • isosorbide mononitrate (IMDUR) 30 MG 24 hr tablet Take 1 tablet by mouth 2 (Two) Times a Day. 60 tablet 5   • Lancets (ONETOUCH ULTRASOFT) lancets Check blood sugar 3 times daily 100 each 12   • meloxicam (MOBIC) 15 MG tablet Take 1 tablet by mouth Daily. 14 tablet 0   • metFORMIN (GLUCOPHAGE) 500 MG tablet Take 1 tablet by mouth 2 (Two) Times a Day With Meals. 180 tablet 3   • naloxone (NARCAN) 4 MG/0.1ML nasal spray 1 spray into the nostril(s) as directed by provider As Needed (overdose). 1 each 0   • O2 (OXYGEN) Inhale 2 L/min Every Night.     • ondansetron (ZOFRAN) 8 MG tablet Take 1 tablet by mouth Every 8 (Eight) Hours As Needed for Nausea. 20 tablet 2   • oxyCODONE (ROXICODONE) 5 MG immediate release tablet Take 1 tablet by mouth Every 4 (Four) Hours As Needed for Moderate Pain . 30 tablet 0   • pantoprazole (PROTONIX) 40 MG EC tablet Take 1 tablet by mouth Daily. 90 tablet 3   • polyethylene glycol (MIRALAX) packet Take 17 g by mouth Daily. (Patient taking differently: Take 17 g by mouth Daily As Needed.) 1 each 5   • Ropivacine HCl-NaCl (NAROPIN) 20 mg/hr by Peripheral Nerve route Continuous. Indications: Acute Pain     • simvastatin (ZOCOR) 40 MG tablet Take 1 tablet by mouth Every Night. 90 tablet 3   • Thyroid (NP THYROID) 30 MG PO tablet Take 1 tablet by mouth Daily. 90 tablet 3   • traMADol (ULTRAM) 50 MG tablet      • vitamin D (ERGOCALCIFEROL) 04523 units capsule capsule Take 1 capsule by mouth Every 7 (Seven) Days. 12 capsule 3     Current  Facility-Administered Medications on File Prior to Visit   Medication Dose Route Frequency Provider Last Rate Last Dose   • cyanocobalamin injection 1,000 mcg  1,000 mcg Intramuscular Q28 Days Clare Quintero APRYANNI   1,000 mcg at 07/11/18 0843      No Known Allergies     Review of Systems   Constitutional: Positive for appetite change, chills, fatigue and unexpected weight change.   HENT: Negative.    Eyes: Negative.    Respiratory: Negative.    Cardiovascular: Negative.    Gastrointestinal: Negative.    Endocrine: Negative.    Genitourinary: Negative.    Musculoskeletal: Positive for arthralgias.   Skin: Negative.    Allergic/Immunologic: Negative.    Neurological: Negative.    Hematological: Negative.    Psychiatric/Behavioral: Negative.    All other systems reviewed and are negative.       Physical Exam  There were no vitals taken for this visit.    There is no height or weight on file to calculate BMI.    GENERAL APPEARANCE: awake, alert, oriented, in no acute distress and well developed, well nourished  LUNGS:  breathing nonlabored  EXTREMITIES: no clubbing, cyanosis  PERIPHERAL PULSES: palpable dorsalis pedis and posterior tibial pulses bilaterally.    GAIT:  Normal          Right Knee Exam:  ----------  ALIGNMENT: neutral  ----------  RANGE OF MOTION:  Decreased (0 - 100 degrees) with no extensor lag  LIGAMENTOUS STABILITY:   stable to varus and valgus stress at terminal extension and 30 degrees without any evidence of laxity  ----------  STRENGTH:  KNEE FLEXION 5/5  KNEE EXTENSION  5/5  ANKLE DORSIFLEXION  5/5  ANKLE PLANTARFLEXION  5/5  ----------  PAIN WITH PALPATION:denies tenderness to palpation about the knee  KNEE EFFUSION: yes, trace effusion  PAIN WITH KNEE ROM: yes, anterior based pain with flexion terminally  PATELLAR CREPITUS:  no  ----------  SENSATION TO LIGHT TOUCH:  DEEP PERONEAL/SUPERFICIAL PERONEAL/SURAL/SAPHENOUS/TIBIAL:    intact  ----------  EDEMA:  no  ERYTHEMA:     no  WOUNDS/INCISIONS:   yes, well healed surgical incision without evidence of erythema or drainage  _____________________________________________________________________  _____________________________________________________________________    RADIOGRAPHIC FINDINGS:   Indication: Status post right total knee arthroplasty    Comparison: Todays xrays were compared to previous xrays from 2/11/2020    Knee films: Demonstrate well positioned knee arthroplasty components in satisfactory alignment without evidence of wear, loosening, subsidence, fracture, or osteolysis and No significant changes compared to prior radiographs.       Assessment/Plan:   Diagnosis Plan   1. S/P total knee arthroplasty, right  XR Knee 3+ View With [place] Right    Ambulatory Referral to Physical Therapy Evaluate and treat     I discussed with the patient that he is behind on his postoperative rehab.  He is failed to progress to outpatient physical therapy for more aggressive range of motion exercises.  I believe his lack of range of motion is secondary to failure to progress through pain.  I gave him a long lecture regarding the importance of working aggressively over the next 3 weeks with outpatient physical therapy to initiate improved range of motion.  He will follow-up in 3 weeks for repeat assessment.    Ortiz Barba MD  03/03/20  3:12 PM

## 2020-03-09 ENCOUNTER — OFFICE VISIT (OUTPATIENT)
Dept: GASTROENTEROLOGY | Facility: CLINIC | Age: 60
End: 2020-03-09

## 2020-03-09 ENCOUNTER — TELEPHONE (OUTPATIENT)
Dept: GASTROENTEROLOGY | Facility: CLINIC | Age: 60
End: 2020-03-09

## 2020-03-09 ENCOUNTER — RESULTS ENCOUNTER (OUTPATIENT)
Dept: FAMILY MEDICINE CLINIC | Facility: CLINIC | Age: 60
End: 2020-03-09

## 2020-03-09 ENCOUNTER — OFFICE VISIT (OUTPATIENT)
Dept: FAMILY MEDICINE CLINIC | Facility: CLINIC | Age: 60
End: 2020-03-09

## 2020-03-09 ENCOUNTER — HOSPITAL ENCOUNTER (OUTPATIENT)
Dept: CT IMAGING | Facility: HOSPITAL | Age: 60
Discharge: HOME | End: 2020-03-09
Admitting: PHYSICIAN ASSISTANT

## 2020-03-09 VITALS
BODY MASS INDEX: 28.77 KG/M2 | DIASTOLIC BLOOD PRESSURE: 90 MMHG | HEART RATE: 112 BPM | WEIGHT: 201 LBS | HEIGHT: 70 IN | TEMPERATURE: 98.6 F | OXYGEN SATURATION: 96 % | SYSTOLIC BLOOD PRESSURE: 140 MMHG

## 2020-03-09 VITALS
BODY MASS INDEX: 28.77 KG/M2 | HEART RATE: 120 BPM | OXYGEN SATURATION: 96 % | DIASTOLIC BLOOD PRESSURE: 90 MMHG | WEIGHT: 201 LBS | SYSTOLIC BLOOD PRESSURE: 128 MMHG | HEIGHT: 70 IN

## 2020-03-09 DIAGNOSIS — F41.1 GENERALIZED ANXIETY DISORDER: ICD-10-CM

## 2020-03-09 DIAGNOSIS — R10.33 PERIUMBILICAL ABDOMINAL PAIN: ICD-10-CM

## 2020-03-09 DIAGNOSIS — Z86.010 HISTORY OF COLON POLYPS: ICD-10-CM

## 2020-03-09 DIAGNOSIS — R10.817 GENERALIZED ABDOMINAL TENDERNESS, REBOUND TENDERNESS PRESENCE NOT SPECIFIED: ICD-10-CM

## 2020-03-09 DIAGNOSIS — R11.0 NAUSEA: ICD-10-CM

## 2020-03-09 DIAGNOSIS — R19.7 NAUSEA VOMITING AND DIARRHEA: ICD-10-CM

## 2020-03-09 DIAGNOSIS — R63.4 WEIGHT LOSS, ABNORMAL: ICD-10-CM

## 2020-03-09 DIAGNOSIS — E03.9 ACQUIRED HYPOTHYROIDISM: ICD-10-CM

## 2020-03-09 DIAGNOSIS — R19.4 CHANGE IN BOWEL HABITS: ICD-10-CM

## 2020-03-09 DIAGNOSIS — K52.9 COLITIS PRESUMED INFECTIOUS: Primary | ICD-10-CM

## 2020-03-09 DIAGNOSIS — K21.9 GASTROESOPHAGEAL REFLUX DISEASE WITHOUT ESOPHAGITIS: Primary | ICD-10-CM

## 2020-03-09 DIAGNOSIS — K52.9 COLITIS: ICD-10-CM

## 2020-03-09 DIAGNOSIS — R10.33 PERIUMBILICAL ABDOMINAL PAIN: Primary | ICD-10-CM

## 2020-03-09 DIAGNOSIS — E66.3 OVERWEIGHT: ICD-10-CM

## 2020-03-09 DIAGNOSIS — E11.51 TYPE 2 DIABETES MELLITUS WITH DIABETIC PERIPHERAL ANGIOPATHY WITHOUT GANGRENE, WITHOUT LONG-TERM CURRENT USE OF INSULIN (HCC): ICD-10-CM

## 2020-03-09 DIAGNOSIS — K21.9 GASTROESOPHAGEAL REFLUX DISEASE, ESOPHAGITIS PRESENCE NOT SPECIFIED: ICD-10-CM

## 2020-03-09 DIAGNOSIS — E55.9 VITAMIN D DEFICIENCY: ICD-10-CM

## 2020-03-09 DIAGNOSIS — R11.2 NAUSEA VOMITING AND DIARRHEA: ICD-10-CM

## 2020-03-09 LAB
25(OH)D3 SERPL-MCNC: 54.4 NG/ML (ref 30–100)
ALBUMIN SERPL-MCNC: 4.8 G/DL (ref 3.5–5.2)
ALBUMIN/GLOB SERPL: 1.4 G/DL
ALP SERPL-CCNC: 152 U/L (ref 39–117)
ALT SERPL W P-5'-P-CCNC: 8 U/L (ref 1–41)
ANION GAP SERPL CALCULATED.3IONS-SCNC: 17.3 MMOL/L (ref 5–15)
AST SERPL-CCNC: 11 U/L (ref 1–40)
BASOPHILS # BLD AUTO: 0.03 10*3/MM3 (ref 0–0.2)
BASOPHILS NFR BLD AUTO: 0.3 % (ref 0–1.5)
BILIRUB SERPL-MCNC: 0.6 MG/DL (ref 0.2–1.2)
BUN BLD-MCNC: 9 MG/DL (ref 6–20)
BUN/CREAT SERPL: 7.3 (ref 7–25)
CALCIUM SPEC-SCNC: 10 MG/DL (ref 8.6–10.5)
CHLORIDE SERPL-SCNC: 98 MMOL/L (ref 98–107)
CHOLEST SERPL-MCNC: 147 MG/DL (ref 0–200)
CO2 SERPL-SCNC: 22.7 MMOL/L (ref 22–29)
CREAT BLD-MCNC: 1.24 MG/DL (ref 0.76–1.27)
DEPRECATED RDW RBC AUTO: 41.8 FL (ref 37–54)
EOSINOPHIL # BLD AUTO: 1.35 10*3/MM3 (ref 0–0.4)
EOSINOPHIL NFR BLD AUTO: 14.5 % (ref 0.3–6.2)
ERYTHROCYTE [DISTWIDTH] IN BLOOD BY AUTOMATED COUNT: 14.2 % (ref 12.3–15.4)
GFR SERPL CREATININE-BSD FRML MDRD: 60 ML/MIN/1.73
GLOBULIN UR ELPH-MCNC: 3.5 GM/DL
GLUCOSE BLD-MCNC: 123 MG/DL (ref 65–99)
HBA1C MFR BLD: 6.5 % (ref 4.8–5.6)
HCT VFR BLD AUTO: 45.4 % (ref 37.5–51)
HDLC SERPL-MCNC: 37 MG/DL (ref 40–60)
HGB BLD-MCNC: 15.1 G/DL (ref 13–17.7)
IMM GRANULOCYTES # BLD AUTO: 0.02 10*3/MM3 (ref 0–0.05)
IMM GRANULOCYTES NFR BLD AUTO: 0.2 % (ref 0–0.5)
LDLC SERPL CALC-MCNC: 82 MG/DL (ref 0–100)
LDLC/HDLC SERPL: 2.22 {RATIO}
LYMPHOCYTES # BLD AUTO: 2.25 10*3/MM3 (ref 0.7–3.1)
LYMPHOCYTES NFR BLD AUTO: 24.2 % (ref 19.6–45.3)
MCH RBC QN AUTO: 27.3 PG (ref 26.6–33)
MCHC RBC AUTO-ENTMCNC: 33.3 G/DL (ref 31.5–35.7)
MCV RBC AUTO: 81.9 FL (ref 79–97)
MONOCYTES # BLD AUTO: 0.86 10*3/MM3 (ref 0.1–0.9)
MONOCYTES NFR BLD AUTO: 9.2 % (ref 5–12)
NEUTROPHILS # BLD AUTO: 4.79 10*3/MM3 (ref 1.7–7)
NEUTROPHILS NFR BLD AUTO: 51.6 % (ref 42.7–76)
NRBC BLD AUTO-RTO: 0 /100 WBC (ref 0–0.2)
PLATELET # BLD AUTO: 550 10*3/MM3 (ref 140–450)
PMV BLD AUTO: 9.3 FL (ref 6–12)
POTASSIUM BLD-SCNC: 3.7 MMOL/L (ref 3.5–5.2)
PROT SERPL-MCNC: 8.3 G/DL (ref 6–8.5)
RBC # BLD AUTO: 5.54 10*6/MM3 (ref 4.14–5.8)
SODIUM BLD-SCNC: 138 MMOL/L (ref 136–145)
TRIGL SERPL-MCNC: 140 MG/DL (ref 0–150)
TSH SERPL DL<=0.05 MIU/L-ACNC: 2.79 UIU/ML (ref 0.27–4.2)
VIT B12 BLD-MCNC: 458 PG/ML (ref 211–946)
VLDLC SERPL-MCNC: 28 MG/DL (ref 5–40)
WBC NRBC COR # BLD: 9.3 10*3/MM3 (ref 3.4–10.8)

## 2020-03-09 PROCEDURE — 82607 VITAMIN B-12: CPT | Performed by: NURSE PRACTITIONER

## 2020-03-09 PROCEDURE — 86677 HELICOBACTER PYLORI ANTIBODY: CPT | Performed by: NURSE PRACTITIONER

## 2020-03-09 PROCEDURE — 85025 COMPLETE CBC W/AUTO DIFF WBC: CPT | Performed by: NURSE PRACTITIONER

## 2020-03-09 PROCEDURE — 99214 OFFICE O/P EST MOD 30 MIN: CPT | Performed by: PHYSICIAN ASSISTANT

## 2020-03-09 PROCEDURE — 25010000002 IOPAMIDOL 61 % SOLUTION: Performed by: PHYSICIAN ASSISTANT

## 2020-03-09 PROCEDURE — 82306 VITAMIN D 25 HYDROXY: CPT | Performed by: NURSE PRACTITIONER

## 2020-03-09 PROCEDURE — 74177 CT ABD & PELVIS W/CONTRAST: CPT | Performed by: RADIOLOGY

## 2020-03-09 PROCEDURE — 74177 CT ABD & PELVIS W/CONTRAST: CPT

## 2020-03-09 PROCEDURE — 84443 ASSAY THYROID STIM HORMONE: CPT | Performed by: NURSE PRACTITIONER

## 2020-03-09 PROCEDURE — 99214 OFFICE O/P EST MOD 30 MIN: CPT | Performed by: NURSE PRACTITIONER

## 2020-03-09 PROCEDURE — 83036 HEMOGLOBIN GLYCOSYLATED A1C: CPT | Performed by: NURSE PRACTITIONER

## 2020-03-09 PROCEDURE — 80061 LIPID PANEL: CPT | Performed by: NURSE PRACTITIONER

## 2020-03-09 PROCEDURE — 80053 COMPREHEN METABOLIC PANEL: CPT | Performed by: NURSE PRACTITIONER

## 2020-03-09 RX ORDER — CIPROFLOXACIN 750 MG/1
750 TABLET, FILM COATED ORAL 2 TIMES DAILY
Qty: 20 TABLET | Refills: 0 | Status: SHIPPED | OUTPATIENT
Start: 2020-03-09 | End: 2020-06-08

## 2020-03-09 RX ORDER — METRONIDAZOLE 500 MG/1
500 TABLET ORAL 3 TIMES DAILY
Qty: 30 TABLET | Refills: 0 | Status: SHIPPED | OUTPATIENT
Start: 2020-03-09 | End: 2020-06-08

## 2020-03-09 RX ORDER — TRAMADOL HYDROCHLORIDE 50 MG/1
50 TABLET ORAL EVERY 8 HOURS PRN
Qty: 60 TABLET | Refills: 2 | Status: SHIPPED | OUTPATIENT
Start: 2020-03-09 | End: 2020-07-08 | Stop reason: SDUPTHER

## 2020-03-09 RX ORDER — BISACODYL 5 MG/1
TABLET, DELAYED RELEASE ORAL
Qty: 4 TABLET | Refills: 0 | Status: SHIPPED | OUTPATIENT
Start: 2020-03-09 | End: 2020-06-08

## 2020-03-09 RX ORDER — SUCRALFATE 1 G/1
1 TABLET ORAL 4 TIMES DAILY
Qty: 120 TABLET | Refills: 1 | Status: SHIPPED | OUTPATIENT
Start: 2020-03-09 | End: 2020-06-08

## 2020-03-09 RX ORDER — CLONAZEPAM 0.5 MG/1
0.5 TABLET ORAL 3 TIMES DAILY PRN
Qty: 90 TABLET | Refills: 0 | Status: SHIPPED | OUTPATIENT
Start: 2020-03-09 | End: 2020-08-12

## 2020-03-09 RX ADMIN — IOPAMIDOL 100 ML: 612 INJECTION, SOLUTION INTRAVENOUS at 13:03

## 2020-03-09 NOTE — TELEPHONE ENCOUNTER
Pt's CT scan shows that he has colitis, I am sending tx for him to his pharmacy. Do not take carafate while taking these antibiotics.

## 2020-03-09 NOTE — PROGRESS NOTES
": 1960    Chief Complaint   Patient presents with   • Abdominal Pain       Damaso Leonard is a 59 y.o. male who presents to the office today as a consultation from YOUNG Toscano for evaluation of Abdominal Pain.    History of Present Illness:  Has been having nausea without vomiting and diarrhea after eating for the past 1.5 months. Symptoms have worsened over the past 1 week. Also has periumbilical abdominal pain at the time of eating, sometimes radiates into the right side. Pain is described as severe and cramping, aching, stabbing. Nothing seems to help the pain. He had colitis diagnosed last year, has similar symptoms now. He was given antibiotics after colitis was found, was better after Cipro and Flagyl at [facility].. No personal history of Crohn's or UC.  Denies any rectal bleeding. Lost 20 lbs over the past 1.5 months. Does have a personal history of colon polyps, last colonoscopy was in 2019 by Dr. Lima. He was told to have another colonoscopy in 1 year because it was a \"cancerous\" polyp but he had knee surgery (2020) and had to delay having it done, only been taking incr opioid medications since then. Sister has Crohn's. He does have heartburn even though he takes Protonix 40 mg once daily, has symptoms of GERD 4-5 days per week.     BMs are occurring up to 3 times per day, loose and will have sometimes 3 days without any stools. Had labs with PCP this morning.     Review of Systems   Constitutional: Positive for appetite change, chills, fatigue and unexpected weight change. Negative for fever.   HENT: Negative for trouble swallowing.    Eyes: Negative.    Respiratory: Negative for cough, choking, chest tightness and shortness of breath.    Cardiovascular: Negative for chest pain.   Gastrointestinal: Positive for abdominal distention, abdominal pain, constipation, diarrhea and nausea. Negative for anal bleeding, blood in stool and vomiting.   Endocrine: " Negative.    Genitourinary: Negative for difficulty urinating.   Musculoskeletal: Positive for back pain. Negative for neck pain.   Skin: Negative.    Neurological: Positive for light-headedness. Negative for dizziness and headaches.   Hematological: Does not bruise/bleed easily.   Psychiatric/Behavioral: Negative.      I have reviewed and confirmed the accuracy of the ROS as documented by the MA/LPN/RN Talita Olivas PA-C    Past Medical History:   Diagnosis Date   • Anxiety    • Anxiety and depression    • Arthritis    • ASCVD (arteriosclerotic cardiovascular disease)    • Colitis    • Coronary artery disease    • Disease of thyroid gland    • DM (diabetes mellitus) (CMS/HCC)    • GERD (gastroesophageal reflux disease)    • History of EKG 04/15/2015    NORMAL   • History of transfusion    • HTN (hypertension)    • Hyperlipidemia    • Injury of back    • Low back pain    • Myocardial infarction (CMS/HCC)    • Sleep apnea     wears oxygen at night    • Wears glasses    • Wears partial dentures        Past Surgical History:   Procedure Laterality Date   • CARDIAC CATHETERIZATION     • CARDIAC SURGERY      stenting   • CHOLECYSTECTOMY     • COLONOSCOPY  2007   • HERNIA REPAIR     • SHOULDER SURGERY     • TONSILLECTOMY     • TOTAL KNEE ARTHROPLASTY Right 1/27/2020    Procedure: TOTAL KNEE ARTHROPLASTY RIGHT;  Surgeon: Ortiz Barba MD;  Location: [facility];  Service: Orthopedics   • TYMPANOPLASTY     • UPPER GASTROINTESTINAL ENDOSCOPY     • VASECTOMY         Family History   Problem Relation Age of Onset   • Heart attack Father    • Heart disease Father    • Hypertension Father    • Heart attack Sister    • Diabetes Sister    • Heart disease Sister    • Hypertension Sister    • Thyroid disease Sister    • Heart attack Brother    • Diabetes Brother    • Thyroid disease Brother    • Arthritis Daughter    • COPD Daughter    • Asthma Daughter    • Depression Daughter    • Heart disease Sister    • Hypertension Sister         Social History     Socioeconomic History   • Marital status:      Spouse name: ruben   • Number of children: 3   • Years of education: 12   • Highest education level: Not on file   Occupational History   • Occupation: retired   Tobacco Use   • Smoking status: Never Smoker   • Smokeless tobacco: Never Used   Substance and Sexual Activity   • Alcohol use: No   • Drug use: No   • Sexual activity: Defer       Current Outpatient Medications:   •  amitriptyline (ELAVIL) 50 MG tablet, Take 2 tablets by mouth At Night As Needed for Sleep. 1 daily, Disp: 60 tablet, Rfl: 5  •  aspirin 81 MG tablet, Take 1 tablet by mouth Daily. Resume in 1 month, Disp: 90 tablet, Rfl: 3  •  aspirin  MG EC tablet, Take 1 tablet by mouth Every 12 (Twelve) Hours. For 1 month, Disp: 60 tablet, Rfl: 0  •  BYDUREON 2 MG pen-injector injection, INJECT CONTENTS OF 1 PEN SUBCUTANEOUSLY INTO THE APPROPRIATE AREA AS DIRECTED ONCE WEEKLY, Disp: 5 pen, Rfl: 5  •  carvedilol (COREG) 12.5 MG tablet, Take 1 tablet by mouth 2 (Two) Times a Day With Meals., Disp: 180 tablet, Rfl: 3  •  citalopram (CeleXA) 40 MG tablet, Take 1 tablet by mouth Daily., Disp: 30 tablet, Rfl: 5  •  clonazePAM (KlonoPIN) 0.5 MG tablet, Take 1 tablet by mouth 3 (Three) Times a Day As Needed for Anxiety., Disp: 90 tablet, Rfl: 0  •  cyanocobalamin 1000 MCG/ML injection, Inject 1 mL into the appropriate muscle as directed by prescriber Every 28 (Twenty-Eight) Days., Disp: 1 mL, Rfl: 11  •  dicyclomine (BENTYL) 10 MG capsule, Take 1 capsule by mouth 4 (Four) Times a Day Before Meals & at Bedtime., Disp: 120 capsule, Rfl: 5  •  diphenhydrAMINE (BENADRYL ALLERGY) 25 mg capsule, Take 1 capsule by mouth Every 6 (Six) Hours As Needed for Itching., Disp: 90 capsule, Rfl: 3  •  docusate sodium 100 MG capsule, Take 100 mg by mouth 2 (Two) Times a Day As Needed for Constipation., Disp: 60 each, Rfl: 0  •  finasteride (PROSCAR) 5 MG tablet, Take 1 tablet by mouth Daily., Disp:  90 tablet, Rfl: 3  •  glucose blood test strip, Check blood sugar 3x times a day., Disp: 100 each, Rfl: 12  •  glucose monitor monitoring kit, 1 each 3 (Three) Times a Day As Needed (diabetes)., Disp: 1 each, Rfl: 0  •  icosapent ethyl (VASCEPA) 1 g capsule capsule, 2 twice daily, Disp: 120 capsule, Rfl: 5  •  isosorbide mononitrate (IMDUR) 30 MG 24 hr tablet, Take 1 tablet by mouth 2 (Two) Times a Day., Disp: 60 tablet, Rfl: 5  •  Lancets (ONETOUCH ULTRASOFT) lancets, Check blood sugar 3 times daily, Disp: 100 each, Rfl: 12  •  meloxicam (MOBIC) 15 MG tablet, Take 1 tablet by mouth Daily., Disp: 14 tablet, Rfl: 0  •  metFORMIN (GLUCOPHAGE) 500 MG tablet, Take 1 tablet by mouth 2 (Two) Times a Day With Meals., Disp: 180 tablet, Rfl: 3  •  naloxone (NARCAN) 4 MG/0.1ML nasal spray, 1 spray into the nostril(s) as directed by provider As Needed (overdose)., Disp: 1 each, Rfl: 0  •  O2 (OXYGEN), Inhale 2 L/min Every Night., Disp: , Rfl:   •  ondansetron (ZOFRAN) 8 MG tablet, Take 1 tablet by mouth Every 8 (Eight) Hours As Needed for Nausea., Disp: 20 tablet, Rfl: 2  •  oxyCODONE (ROXICODONE) 5 MG immediate release tablet, Take 1 tablet by mouth Every 4 (Four) Hours As Needed for Moderate Pain ., Disp: 30 tablet, Rfl: 0  •  pantoprazole (PROTONIX) 40 MG EC tablet, Take 1 tablet by mouth Daily., Disp: 90 tablet, Rfl: 3  •  polyethylene glycol (MIRALAX) packet, Take 17 g by mouth Daily. (Patient taking differently: Take 17 g by mouth Daily As Needed.), Disp: 1 each, Rfl: 5  •  Ropivacine HCl-NaCl (NAROPIN), 20 mg/hr by Peripheral Nerve route Continuous. Indications: Acute Pain, Disp: , Rfl:   •  simvastatin (ZOCOR) 40 MG tablet, Take 1 tablet by mouth Every Night., Disp: 90 tablet, Rfl: 3  •  sucralfate (CARAFATE) 1 g tablet, Take 1 tablet by mouth 4 (Four) Times a Day., Disp: 120 tablet, Rfl: 1  •  Thyroid (NP THYROID) 30 MG PO tablet, Take 1 tablet by mouth Daily., Disp: 90 tablet, Rfl: 3  •  traMADol (ULTRAM) 50 MG  "tablet, Take 1 tablet by mouth Every 8 (Eight) Hours As Needed for Moderate Pain  or Severe Pain ., Disp: 60 tablet, Rfl: 2  •  vitamin D (ERGOCALCIFEROL) 77326 units capsule capsule, Take 1 capsule by mouth Every 7 (Seven) Days., Disp: 12 capsule, Rfl: 3    Current Facility-Administered Medications:   •  cyanocobalamin injection 1,000 mcg, 1,000 mcg, Intramuscular, Q28 Days, Leon Clarerosalie Cheema, YOUNG, 1,000 mcg at 07/11/18 0843    Allergies:   Patient has no known allergies.    Vitals:  /90 (BP Location: Left arm, Patient Position: Sitting, Cuff Size: Adult)   Pulse 120   Ht 177.8 cm (70\")   Wt 91.2 kg (201 lb)   SpO2 96%   BMI 28.84 kg/m²     Physical Exam   Constitutional: He is oriented to person, place, and time. He appears well-developed and well-nourished. No distress.   HENT:   Head: Normocephalic and atraumatic.   Nose: Nose normal.   Mouth/Throat: Oropharynx is clear and moist.   Eyes: Conjunctivae are normal. Right eye exhibits no discharge. Left eye exhibits no discharge. No scleral icterus.   Neck: Normal range of motion. No JVD present.   Cardiovascular: Normal rate, regular rhythm and normal heart sounds. Exam reveals no gallop and no friction rub.   No murmur heard.  Pulmonary/Chest: Effort normal and breath sounds normal. No respiratory distress. He has no wheezes. He has no rales. He exhibits no tenderness.   Abdominal: Soft. He exhibits no mass. There is tenderness (generalized, mild).   Hyperactive bowel sounds   Musculoskeletal: He exhibits no edema or deformity.   Neurological: He is alert and oriented to person, place, and time. Coordination normal.   Skin: Skin is warm and dry. No rash noted. He is not diaphoretic. No erythema.   Psychiatric: His behavior is normal. Judgment and thought content normal.   Anxious affect   Vitals reviewed.    Assessment:  1. Periumbilical abdominal pain    2. Change in bowel habits    3. Nausea    4. Weight loss, abnormal    5. " Gastroesophageal reflux disease, esophagitis presence not specified    6. History of colon polyps    7. Generalized abdominal tenderness, rebound tenderness presence not specified      Plan:  Orders Placed This Encounter   Procedures   • CT Abdomen Pelvis With Contrast   • Follow Anesthesia Guidelines / Standing Orders   • Obtain Informed Consent     ESOPHAGOGASTRODUODENOSCOPY WITH BIOPSY CPT CODE: 96083 (N/A), COLONOSCOPY CPT CODE: 97892 (N/A)  He will need an esophagogastroduodenoscopy and colonoscopy performed with IV general sedation. All of the risks, benefits and alternatives of these procedures have been discussed with him, all of his questions have been answered and he has elected to proceed. He should follow up in the office after these procedures to discuss the results and further recommendations can be made at that time.    New Medications Ordered This Visit   Medications   • magnesium citrate solution     Sig: Take 296 mL by mouth Take As Directed. Follow bowel prep instructions given at office     Dispense:  592 mL     Refill:  0   • bisacodyl (DULCOLAX) 5 MG EC tablet     Sig: Take 4 tablets at 4pm the day prior to procedure with a full glass of water.     Dispense:  4 tablet     Refill:  0     CT scan today. Will review labs when resulted. Arrange endoscopic procedures as soon as possible for evaluation of complaints.           Return for follow up after procedure to discuss results.      Electronically signed 3/9/2020 1:04 PM  Talita Olivas PA-C, [facility]

## 2020-03-09 NOTE — PROGRESS NOTES
Subjective   Damaso Leonard is a 59 y.o. male.       CC  Nausea and diarrhea after meals   Weight loss  Hospital follow up     History of Present Illness:      Right knee surgery January 27th for total knee replacement at [facility].   Seen by ortho last week for follow up and is going back in 3 weeks.   He continues to have quite a bit of pain in his knee.  Patient does have chronic osteoarthritis and his back and knees.  He receives Ultram for this chronic pain.  He rates his pain a 7 on pain scale rating of 0-10.  Pain is described as aching and grinding.  Pain is worse with activity or prolonged sitting.  Patient has been to physical therapy in the past as well as neurology/Ortho.  He has no history of substance abuse or addiction.  He does report that taking Ultram does help provide higher quality of life.    Previous colitis/IBS which required hospitalization.  Patient reports that he is having a flare of similar symptoms.  He has been having diarrhea for several weeks.  Diarrhea is several times a day, occurs every time he eats or drinks.  He is having some generalized abdominal pain.  Patient shows a 20 pound weight loss since January 27, 2020.  He has difficulty eating because it makes him sick.    Hypothyroidism-stable with thyroid NP 30 mg daily.    B12 deficiency-stable with home injections.    Type 2 diabetes-receives metformin.  Watches his glucose closely.  Denies any symptomatic hypoglycemia or hyperglycemia.  Tries to be compliant with diet.  Has not been eating much of anything lately though due to GI exacerbation.          The following portions of the patient's history and ROS were reviewed and updated as appropriate per provider:  Allergies, current medications, past family history, past medical history, past social history, past surgical history and problem list.    Review of Systems   Constitutional: Positive for activity change, appetite change, fatigue and unexpected weight  "change.   HENT: Negative for congestion, sinus pressure, sinus pain, sneezing, sore throat and trouble swallowing.    Eyes: Negative.    Respiratory: Negative for cough, chest tightness, shortness of breath and wheezing.    Cardiovascular: Negative for chest pain and palpitations.   Gastrointestinal: Positive for abdominal pain, diarrhea and nausea.   Endocrine: Negative.    Genitourinary: Negative for difficulty urinating, dysuria, flank pain and frequency.   Musculoskeletal: Positive for arthralgias, back pain and gait problem. Negative for neck pain and neck stiffness.   Skin: Negative.    Allergic/Immunologic: Negative for environmental allergies, food allergies and immunocompromised state.   Neurological: Negative for syncope, speech difficulty and headaches.   Psychiatric/Behavioral: Positive for sleep disturbance (Due to diarrhea). Negative for behavioral problems, dysphoric mood, self-injury and suicidal ideas. The patient is not nervous/anxious.        Objective     /90   Pulse 112   Temp 98.6 °F (37 °C) (Temporal)   Ht 177.8 cm (70\")   Wt 91.2 kg (201 lb)   SpO2 96%   BMI 28.84 kg/m²   Admission on 01/27/2020, Discharged on 01/28/2020   Component Date Value Ref Range Status   • Glucose 01/27/2020 115  70 - 130 mg/dL Final   • Glucose 01/27/2020 332* 70 - 130 mg/dL Final   • Glucose 01/27/2020 267* 70 - 130 mg/dL Final   • Glucose 01/27/2020 285* 70 - 130 mg/dL Final   • Glucose 01/28/2020 173* 65 - 99 mg/dL Final   • BUN 01/28/2020 17  6 - 20 mg/dL Final   • Creatinine 01/28/2020 0.96  0.76 - 1.27 mg/dL Final   • Sodium 01/28/2020 139  136 - 145 mmol/L Final   • Potassium 01/28/2020 4.5  3.5 - 5.2 mmol/L Final   • Chloride 01/28/2020 106  98 - 107 mmol/L Final   • CO2 01/28/2020 23.0  22.0 - 29.0 mmol/L Final   • Calcium 01/28/2020 7.9* 8.6 - 10.5 mg/dL Final   • eGFR Non African Amer 01/28/2020 81  >60 mL/min/1.73 Final   • BUN/Creatinine Ratio 01/28/2020 17.7  7.0 - 25.0 Final   • Anion Gap " 01/28/2020 10.0  5.0 - 15.0 mmol/L Final   • WBC 01/28/2020 13.00* 3.40 - 10.80 10*3/mm3 Final   • RBC 01/28/2020 3.74* 4.14 - 5.80 10*6/mm3 Final   • Hemoglobin 01/28/2020 10.2* 13.0 - 17.7 g/dL Final   • Hematocrit 01/28/2020 32.7* 37.5 - 51.0 % Final   • MCV 01/28/2020 87.4  79.0 - 97.0 fL Final   • MCH 01/28/2020 27.3  26.6 - 33.0 pg Final   • MCHC 01/28/2020 31.2* 31.5 - 35.7 g/dL Final   • RDW 01/28/2020 14.3  12.3 - 15.4 % Final   • RDW-SD 01/28/2020 45.4  37.0 - 54.0 fl Final   • MPV 01/28/2020 9.2  6.0 - 12.0 fL Final   • Platelets 01/28/2020 313  140 - 450 10*3/mm3 Final   • Glucose 01/28/2020 164* 70 - 130 mg/dL Final       Physical Exam   Constitutional: He is oriented to person, place, and time. Vital signs are normal. He appears well-developed and well-nourished. He is cooperative.  Non-toxic appearance. He has a sickly appearance. He does not appear ill. No distress.   HENT:   Head: Normocephalic and atraumatic.   Right Ear: Tympanic membrane, external ear and ear canal normal.   Left Ear: Tympanic membrane, external ear and ear canal normal.   Nose: Nose normal. Right sinus exhibits no maxillary sinus tenderness. Left sinus exhibits no maxillary sinus tenderness.   Mouth/Throat: Uvula is midline and oropharynx is clear and moist. No oropharyngeal exudate.   Eyes: Pupils are equal, round, and reactive to light. Conjunctivae, EOM and lids are normal. Right eye exhibits no discharge. Left eye exhibits no discharge.   Neck: Normal range of motion. Neck supple. No tracheal deviation present. No thyromegaly present.   Cardiovascular: Normal rate, regular rhythm and normal heart sounds. Exam reveals no gallop and no friction rub.   No murmur heard.  Pulmonary/Chest: Effort normal and breath sounds normal. No respiratory distress. He has no wheezes. He has no rales. He exhibits no tenderness.   Abdominal: Soft. Normal appearance and bowel sounds are normal. He exhibits no distension and no mass. There is  generalized tenderness and tenderness in the right lower quadrant and left lower quadrant. There is no rigidity, no rebound and no guarding.   Musculoskeletal: Normal range of motion.        Right knee: He exhibits no swelling and no erythema.   Limited mobility due to osteoarthritis of the low back and recent right total knee replacement.   Lymphadenopathy:     He has no cervical adenopathy.   Neurological: He is alert and oriented to person, place, and time. He has normal reflexes.   CN 2-12 grossly intact    Skin: Skin is warm and dry. Capillary refill takes less than 2 seconds. No rash noted. He is not diaphoretic. No erythema.   Psychiatric: He has a normal mood and affect. His speech is normal and behavior is normal. Judgment and thought content normal. Cognition and memory are normal.   Vitals reviewed.      Assessment/Plan     Problem List Items Addressed This Visit        Cardiovascular and Mediastinum    Type 2 diabetes mellitus with diabetic peripheral angiopathy without gangrene, without long-term current use of insulin (CMS/[facility])    Relevant Orders    CBC & Differential (Completed)    Comprehensive Metabolic Panel (Completed)    Lipid Panel (Completed)    TSH (Completed)    Hemoglobin A1c (Completed)    Vitamin B12 (Completed)    Vitamin D 25 Hydroxy (Completed)    Helicobacter Pylori, IgA IgG IgM    CBC Auto Differential (Completed)       Digestive    Vitamin D deficiency    Relevant Orders    CBC & Differential (Completed)    Comprehensive Metabolic Panel (Completed)    Lipid Panel (Completed)    TSH (Completed)    Hemoglobin A1c (Completed)    Vitamin B12 (Completed)    Vitamin D 25 Hydroxy (Completed)    Helicobacter Pylori, IgA IgG IgM    CBC Auto Differential (Completed)    Gastroesophageal reflux disease without esophagitis - Primary    Relevant Medications    sucralfate (CARAFATE) 1 g tablet    Other Relevant Orders    Ambulatory Referral to Gastroenterology    CBC & Differential (Completed)     Comprehensive Metabolic Panel (Completed)    Lipid Panel (Completed)    TSH (Completed)    Hemoglobin A1c (Completed)    Vitamin B12 (Completed)    Vitamin D 25 Hydroxy (Completed)    Helicobacter Pylori, IgA IgG IgM    CBC Auto Differential (Completed)    Colitis    Relevant Medications    traMADol (ULTRAM) 50 MG tablet       Endocrine    Hypothyroid    Relevant Orders    CBC & Differential (Completed)    Comprehensive Metabolic Panel (Completed)    Lipid Panel (Completed)    TSH (Completed)    Hemoglobin A1c (Completed)    Vitamin B12 (Completed)    Vitamin D 25 Hydroxy (Completed)    Helicobacter Pylori, IgA IgG IgM    CBC Auto Differential (Completed)       Other    Generalized anxiety disorder    Relevant Medications    traMADol (ULTRAM) 50 MG tablet    clonazePAM (KlonoPIN) 0.5 MG tablet    Other Relevant Orders    Urine Drug Screen - Urine, Clean Catch    Overweight    Current Assessment & Plan     Obesity is improving with lifestyle modifications.  Discussed the patient's BMI.  The BMI is above average; BMI management plan is completed.  General weight loss/lifestyle modification strategies discussed (elicit support from others; identify saboteurs; non-food rewards, etc).           Other Visit Diagnoses     Nausea vomiting and diarrhea        Relevant Orders    Ambulatory Referral to Gastroenterology    CBC & Differential (Completed)    Comprehensive Metabolic Panel (Completed)    Lipid Panel (Completed)    TSH (Completed)    Hemoglobin A1c (Completed)    Vitamin B12 (Completed)    Vitamin D 25 Hydroxy (Completed)    Helicobacter Pylori, IgA IgG IgM    CBC Auto Differential (Completed)          Current Outpatient Medications:   •  amitriptyline (ELAVIL) 50 MG tablet, Take 2 tablets by mouth At Night As Needed for Sleep. 1 daily, Disp: 60 tablet, Rfl: 5  •  aspirin 81 MG tablet, Take 1 tablet by mouth Daily. Resume in 1 month, Disp: 90 tablet, Rfl: 3  •  aspirin  MG EC tablet, Take 1 tablet by mouth  Every 12 (Twelve) Hours. For 1 month, Disp: 60 tablet, Rfl: 0  •  bisacodyl (DULCOLAX) 5 MG EC tablet, Take 4 tablets at 4pm the day prior to procedure with a full glass of water., Disp: 4 tablet, Rfl: 0  •  BYDUREON 2 MG pen-injector injection, INJECT CONTENTS OF 1 PEN SUBCUTANEOUSLY INTO THE APPROPRIATE AREA AS DIRECTED ONCE WEEKLY, Disp: 5 pen, Rfl: 5  •  carvedilol (COREG) 12.5 MG tablet, Take 1 tablet by mouth 2 (Two) Times a Day With Meals., Disp: 180 tablet, Rfl: 3  •  ciprofloxacin (CIPRO) 750 MG tablet, Take 1 tablet by mouth 2 (Two) Times a Day., Disp: 20 tablet, Rfl: 0  •  citalopram (CeleXA) 40 MG tablet, Take 1 tablet by mouth Daily., Disp: 30 tablet, Rfl: 5  •  clonazePAM (KlonoPIN) 0.5 MG tablet, Take 1 tablet by mouth 3 (Three) Times a Day As Needed for Anxiety., Disp: 90 tablet, Rfl: 0  •  cyanocobalamin 1000 MCG/ML injection, Inject 1 mL into the appropriate muscle as directed by prescriber Every 28 (Twenty-Eight) Days., Disp: 1 mL, Rfl: 11  •  dicyclomine (BENTYL) 10 MG capsule, Take 1 capsule by mouth 4 (Four) Times a Day Before Meals & at Bedtime., Disp: 120 capsule, Rfl: 5  •  diphenhydrAMINE (BENADRYL ALLERGY) 25 mg capsule, Take 1 capsule by mouth Every 6 (Six) Hours As Needed for Itching., Disp: 90 capsule, Rfl: 3  •  docusate sodium 100 MG capsule, Take 100 mg by mouth 2 (Two) Times a Day As Needed for Constipation., Disp: 60 each, Rfl: 0  •  finasteride (PROSCAR) 5 MG tablet, Take 1 tablet by mouth Daily., Disp: 90 tablet, Rfl: 3  •  glucose blood test strip, Check blood sugar 3x times a day., Disp: 100 each, Rfl: 12  •  glucose monitor monitoring kit, 1 each 3 (Three) Times a Day As Needed (diabetes)., Disp: 1 each, Rfl: 0  •  icosapent ethyl (VASCEPA) 1 g capsule capsule, 2 twice daily, Disp: 120 capsule, Rfl: 5  •  isosorbide mononitrate (IMDUR) 30 MG 24 hr tablet, Take 1 tablet by mouth 2 (Two) Times a Day., Disp: 60 tablet, Rfl: 5  •  Lancets (ONETOUCH ULTRASOFT) lancets, Check blood  sugar 3 times daily, Disp: 100 each, Rfl: 12  •  magnesium citrate solution, Take 296 mL by mouth Take As Directed. Follow bowel prep instructions given at office, Disp: 592 mL, Rfl: 0  •  meloxicam (MOBIC) 15 MG tablet, Take 1 tablet by mouth Daily., Disp: 14 tablet, Rfl: 0  •  metFORMIN (GLUCOPHAGE) 500 MG tablet, Take 1 tablet by mouth 2 (Two) Times a Day With Meals., Disp: 180 tablet, Rfl: 3  •  metroNIDAZOLE (FLAGYL) 500 MG tablet, Take 1 tablet by mouth 3 (Three) Times a Day., Disp: 30 tablet, Rfl: 0  •  naloxone (NARCAN) 4 MG/0.1ML nasal spray, 1 spray into the nostril(s) as directed by provider As Needed (overdose)., Disp: 1 each, Rfl: 0  •  O2 (OXYGEN), Inhale 2 L/min Every Night., Disp: , Rfl:   •  ondansetron (ZOFRAN) 8 MG tablet, Take 1 tablet by mouth Every 8 (Eight) Hours As Needed for Nausea., Disp: 20 tablet, Rfl: 2  •  oxyCODONE (ROXICODONE) 5 MG immediate release tablet, Take 1 tablet by mouth Every 4 (Four) Hours As Needed for Moderate Pain ., Disp: 30 tablet, Rfl: 0  •  pantoprazole (PROTONIX) 40 MG EC tablet, Take 1 tablet by mouth Daily., Disp: 90 tablet, Rfl: 3  •  polyethylene glycol (MIRALAX) packet, Take 17 g by mouth Daily. (Patient taking differently: Take 17 g by mouth Daily As Needed.), Disp: 1 each, Rfl: 5  •  Ropivacine HCl-NaCl (NAROPIN), 20 mg/hr by Peripheral Nerve route Continuous. Indications: Acute Pain, Disp: , Rfl:   •  simvastatin (ZOCOR) 40 MG tablet, Take 1 tablet by mouth Every Night., Disp: 90 tablet, Rfl: 3  •  sucralfate (CARAFATE) 1 g tablet, Take 1 tablet by mouth 4 (Four) Times a Day., Disp: 120 tablet, Rfl: 1  •  Thyroid (NP THYROID) 30 MG PO tablet, Take 1 tablet by mouth Daily., Disp: 90 tablet, Rfl: 3  •  traMADol (ULTRAM) 50 MG tablet, Take 1 tablet by mouth Every 8 (Eight) Hours As Needed for Moderate Pain  or Severe Pain ., Disp: 60 tablet, Rfl: 2  •  vitamin D (ERGOCALCIFEROL) 84289 units capsule capsule, Take 1 capsule by mouth Every 7 (Seven) Days., Disp:  12 capsule, Rfl: 3    Current Facility-Administered Medications:   •  cyanocobalamin injection 1,000 mcg, 1,000 mcg, Intramuscular, Q28 Days, Clare Quintero, APRN, 1,000 mcg at 07/11/18 0843    Fasting labs today.  I have asked staff to call and get him in to [facility] gastro for consult today if possible.  I fear he is having exacerbation of colitis which may require hospitalization.  I have encouraged him to do liquids only for the next couple of days.  If we are unable to get him in to gastrology for assessment in the next 24 hours I will go ahead and order a CT.    Proper body mechanics has been reviewed and discussed today.  Recent Ortho notes and consults as well as surgical notes have been reviewed and discussed with patient and his family today.    As part of this patient's treatment plan they are being prescribed controlled substance/substances with potential for abuse. This patient has been made aware of the appropriate use of such medications, including potential risk of somnolence, limited ability to drive and / or work safely, and potential for overdose. It has also been made clear that these medications are for use by this patient only, without concomitant use of alcohol or other substances unless prescribed/advised by medical provider. Patient has completed controlled substance agreement detailing terms of continued prescribing of controlled substances including monitoring Danny reports, drug screens and pill counts. The patient was asked and states they are not receiving narcotic medication from any there provider or any provider that this office has not been made aware of. History and physical exam exhibit continued safe and appropriate use of controlled substances.   Goal: Improved quality of life and reduction in pain as evidenced by pt report.   Danny # [id number] has been reviewed as consistent.  UDS is on file.   Ultram prescription provided.   Patient is at low risk for substance  abuse and addiction.  He has been instructed regarding the potential for addiction/interaction and side effects.  Patient has been instructed and counseled regarding opioid misuse and risk of addiction.  We have discussed proper storage and disposal of controlled medication.       Patient's Body mass index is 28.84 kg/m². BMI is above normal parameters. Recommendations include: nutrition counseling.      I have discussed diagnosis in detail today allowing time for questions and answers. Patient is aware of reasons to seek urgent or emergent medical care as well as reasons to return to the clinic for evaluation. Possible side effects, interactions and progression of symptoms discussed as well. Patient / family states understanding.   Emotional support and active listening provided.       I would like to see him back in 1-2 weeks, sooner if needed.              This document has been electronically signed by:  YOUNG Conway, NP-C

## 2020-03-10 PROBLEM — E66.3 OVERWEIGHT: Status: ACTIVE | Noted: 2019-08-29

## 2020-03-10 PROBLEM — R79.89 ABNORMAL THYROID BLOOD TEST: Status: RESOLVED | Noted: 2018-01-29 | Resolved: 2020-03-10

## 2020-03-10 LAB
H PYLORI IGA SER IA-ACNC: 17.8 UNITS (ref 0–8.9)
H PYLORI IGG SER IA-ACNC: 1.82 INDEX VALUE (ref 0–0.79)
H PYLORI IGM SER-ACNC: <9 UNITS (ref 0–8.9)

## 2020-03-23 ENCOUNTER — TELEPHONE (OUTPATIENT)
Dept: ORTHOPEDIC SURGERY | Facility: CLINIC | Age: 60
End: 2020-03-23

## 2020-04-15 DIAGNOSIS — R79.89 ABNORMAL THYROID BLOOD TEST: ICD-10-CM

## 2020-04-16 RX ORDER — LEVOTHYROXINE, LIOTHYRONINE 19; 4.5 UG/1; UG/1
TABLET ORAL
Qty: 30 TABLET | Refills: 5 | Status: SHIPPED | OUTPATIENT
Start: 2020-04-16 | End: 2020-10-19 | Stop reason: SDUPTHER

## 2020-04-21 ENCOUNTER — OFFICE VISIT (OUTPATIENT)
Dept: FAMILY MEDICINE CLINIC | Facility: CLINIC | Age: 60
End: 2020-04-21

## 2020-04-21 DIAGNOSIS — K21.9 GASTROESOPHAGEAL REFLUX DISEASE WITHOUT ESOPHAGITIS: ICD-10-CM

## 2020-04-21 DIAGNOSIS — K52.9 COLITIS: Primary | ICD-10-CM

## 2020-04-21 PROCEDURE — 99441 PR PHYS/QHP TELEPHONE EVALUATION 5-10 MIN: CPT | Performed by: NURSE PRACTITIONER

## 2020-04-21 RX ORDER — DICYCLOMINE HYDROCHLORIDE 10 MG/1
10 CAPSULE ORAL
Qty: 120 CAPSULE | Refills: 5 | Status: SHIPPED | OUTPATIENT
Start: 2020-04-21 | End: 2020-10-19 | Stop reason: SDUPTHER

## 2020-04-21 NOTE — PROGRESS NOTES
Establish patient called office of APRN today to discuss:   CC  IBS      You have chosen to receive care through a telephone visit today. Do you consent to use a telephone visit for your medical care today? Yes     Brief HPI/ROS obtained as follows:    Calling today to discuss IBS/colitis- patient reports that now that his bowels are moving regularly he is IBS symptoms are much improved.  Most of his abdominal pain has resolved.  Continues to have a little bit of cramping every now and then but very much improved.  He is requesting refill today on Bentyl.  Continues to take MiraLAX on as-needed basis.  Patient reports his appetite is improved and he has some improved strength and overall feeling pretty good.    GERD is much improved.    ROS and History reviewed as accurate per provider    Review of Systems   Constitutional: Negative for activity change, chills and fever.   HENT: Positive for rhinorrhea and sneezing. Negative for congestion and sore throat.    Eyes: Negative.    Respiratory: Negative for cough, shortness of breath and wheezing.    Cardiovascular: Negative for chest pain, palpitations and leg swelling.   Gastrointestinal: Positive for abdominal pain (much improved and almost completely resolved). Negative for constipation, nausea and vomiting.   Endocrine: Negative.    Genitourinary: Negative for dysuria, frequency and hematuria.   Musculoskeletal: Positive for arthralgias and back pain. Negative for neck pain and neck stiffness.   Skin: Negative.    Allergic/Immunologic: Negative.    Neurological: Negative for dizziness and light-headedness.   Hematological: Negative.    Psychiatric/Behavioral: Negative for dysphoric mood, self-injury and suicidal ideas.       The current allergy list and medication list was reviewed with patient for accuracy.     Assessment   Alert and oriented x3, talkative and friendly    Diagnoses and all orders for this visit:    Colitis  -     dicyclomine (BENTYL) 10 MG  capsule; Take 1 capsule by mouth 4 (Four) Times a Day Before Meals & at Bedtime.    Gastroesophageal reflux disease without esophagitis  -     dicyclomine (BENTYL) 10 MG capsule; Take 1 capsule by mouth 4 (Four) Times a Day Before Meals & at Bedtime.        Current Outpatient Medications:   •  amitriptyline (ELAVIL) 50 MG tablet, Take 2 tablets by mouth At Night As Needed for Sleep. 1 daily, Disp: 60 tablet, Rfl: 5  •  aspirin 81 MG tablet, Take 1 tablet by mouth Daily. Resume in 1 month, Disp: 90 tablet, Rfl: 3  •  aspirin  MG EC tablet, Take 1 tablet by mouth Every 12 (Twelve) Hours. For 1 month, Disp: 60 tablet, Rfl: 0  •  bisacodyl (DULCOLAX) 5 MG EC tablet, Take 4 tablets at 4pm the day prior to procedure with a full glass of water., Disp: 4 tablet, Rfl: 0  •  BYDUREON 2 MG pen-injector injection, INJECT CONTENTS OF 1 PEN SUBCUTANEOUSLY INTO THE APPROPRIATE AREA AS DIRECTED ONCE WEEKLY, Disp: 5 pen, Rfl: 5  •  carvedilol (COREG) 12.5 MG tablet, Take 1 tablet by mouth 2 (Two) Times a Day With Meals., Disp: 180 tablet, Rfl: 3  •  ciprofloxacin (CIPRO) 750 MG tablet, Take 1 tablet by mouth 2 (Two) Times a Day., Disp: 20 tablet, Rfl: 0  •  citalopram (CeleXA) 40 MG tablet, Take 1 tablet by mouth Daily., Disp: 30 tablet, Rfl: 5  •  clonazePAM (KlonoPIN) 0.5 MG tablet, Take 1 tablet by mouth 3 (Three) Times a Day As Needed for Anxiety., Disp: 90 tablet, Rfl: 0  •  cyanocobalamin 1000 MCG/ML injection, Inject 1 mL into the appropriate muscle as directed by prescriber Every 28 (Twenty-Eight) Days., Disp: 1 mL, Rfl: 11  •  dicyclomine (BENTYL) 10 MG capsule, Take 1 capsule by mouth 4 (Four) Times a Day Before Meals & at Bedtime., Disp: 120 capsule, Rfl: 5  •  diphenhydrAMINE (BENADRYL ALLERGY) 25 mg capsule, Take 1 capsule by mouth Every 6 (Six) Hours As Needed for Itching., Disp: 90 capsule, Rfl: 3  •  docusate sodium 100 MG capsule, Take 100 mg by mouth 2 (Two) Times a Day As Needed for Constipation., Disp: 60  each, Rfl: 0  •  finasteride (PROSCAR) 5 MG tablet, Take 1 tablet by mouth Daily., Disp: 90 tablet, Rfl: 3  •  glucose blood test strip, Check blood sugar 3x times a day., Disp: 100 each, Rfl: 12  •  glucose monitor monitoring kit, 1 each 3 (Three) Times a Day As Needed (diabetes)., Disp: 1 each, Rfl: 0  •  icosapent ethyl (VASCEPA) 1 g capsule capsule, 2 twice daily, Disp: 120 capsule, Rfl: 5  •  isosorbide mononitrate (IMDUR) 30 MG 24 hr tablet, Take 1 tablet by mouth 2 (Two) Times a Day., Disp: 60 tablet, Rfl: 5  •  Lancets (ONETOUCH ULTRASOFT) lancets, Check blood sugar 3 times daily, Disp: 100 each, Rfl: 12  •  magnesium citrate solution, Take 296 mL by mouth Take As Directed. Follow bowel prep instructions given at office, Disp: 592 mL, Rfl: 0  •  meloxicam (MOBIC) 15 MG tablet, Take 1 tablet by mouth Daily., Disp: 14 tablet, Rfl: 0  •  metFORMIN (GLUCOPHAGE) 500 MG tablet, Take 1 tablet by mouth 2 (Two) Times a Day With Meals., Disp: 180 tablet, Rfl: 3  •  metroNIDAZOLE (FLAGYL) 500 MG tablet, Take 1 tablet by mouth 3 (Three) Times a Day., Disp: 30 tablet, Rfl: 0  •  naloxone (NARCAN) 4 MG/0.1ML nasal spray, 1 spray into the nostril(s) as directed by provider As Needed (overdose)., Disp: 1 each, Rfl: 0  •  NP THYROID 30 MG tablet, Take 1 tablet by mouth once daily, Disp: 30 tablet, Rfl: 5  •  O2 (OXYGEN), Inhale 2 L/min Every Night., Disp: , Rfl:   •  ondansetron (ZOFRAN) 8 MG tablet, Take 1 tablet by mouth Every 8 (Eight) Hours As Needed for Nausea., Disp: 20 tablet, Rfl: 2  •  oxyCODONE (ROXICODONE) 5 MG immediate release tablet, Take 1 tablet by mouth Every 4 (Four) Hours As Needed for Moderate Pain ., Disp: 30 tablet, Rfl: 0  •  pantoprazole (PROTONIX) 40 MG EC tablet, Take 1 tablet by mouth Daily., Disp: 90 tablet, Rfl: 3  •  polyethylene glycol (MIRALAX) packet, Take 17 g by mouth Daily. (Patient taking differently: Take 17 g by mouth Daily As Needed.), Disp: 1 each, Rfl: 5  •  Ropivacine HCl-NaCl  (NAROPIN), 20 mg/hr by Peripheral Nerve route Continuous. Indications: Acute Pain, Disp: , Rfl:   •  simvastatin (ZOCOR) 40 MG tablet, Take 1 tablet by mouth Every Night., Disp: 90 tablet, Rfl: 3  •  sucralfate (CARAFATE) 1 g tablet, Take 1 tablet by mouth 4 (Four) Times a Day., Disp: 120 tablet, Rfl: 1  •  traMADol (ULTRAM) 50 MG tablet, Take 1 tablet by mouth Every 8 (Eight) Hours As Needed for Moderate Pain  or Severe Pain ., Disp: 60 tablet, Rfl: 2  •  vitamin D (ERGOCALCIFEROL) 50417 units capsule capsule, Take 1 capsule by mouth Every 7 (Seven) Days., Disp: 12 capsule, Rfl: 3    Current Facility-Administered Medications:   •  cyanocobalamin injection 1,000 mcg, 1,000 mcg, Intramuscular, Q28 Days, Clare Quintero APRN, 1,000 mcg at 07/11/18 0843    Medication list reviewed and discussed.  Continue current medication.  Refill provided on Bentyl.  Remain under the care of GI.    Emotional support and active listening provided.   I have discussed diagnosis in detail today allowing time for questions and answers. Pt is aware of reasons to seek urgent or emergent medical care as well as reasons to return to the clinic for evaluation. Possible side effects, interactions and progression of symptoms discussed as well. Pt / family states understanding.          Patient instructed and advised to call if symptoms are increasing or new symptoms occur.    Understands reasons for urgent and emergent care.  Patient (& family) verbalized agreement for treatment plan.     Coronavirus precautions have been reviewed and discussed.  I have discussed the CDC recommendations  of social distancing, hand washing and disinfecting commonly touched items. Reviewed need to notify PCP and self quarantine with mild symptoms.  Discussed procedure to obtain Covid-19 testing and notification of PCP/health dept/ED/Urgent Center if symptoms begin. Understanding verbalized.    Continue plan of care.    I would like to see him back in  2-3 months, will set up for my chart and video visit.  Follow-up sooner if needed.      This visit has been rescheduled as a phone visit to comply with patient safety concerns in accordance with CDC recommendations. Total time of discussion was 9 minutes.

## 2020-04-30 ENCOUNTER — TELEPHONE (OUTPATIENT)
Dept: GASTROENTEROLOGY | Facility: CLINIC | Age: 60
End: 2020-04-30

## 2020-05-18 PROBLEM — K21.9 GASTROESOPHAGEAL REFLUX DISEASE: Status: ACTIVE | Noted: 2020-05-18

## 2020-05-18 PROBLEM — R63.4 WEIGHT LOSS, ABNORMAL: Status: ACTIVE | Noted: 2020-05-18

## 2020-05-18 PROBLEM — R19.4 CHANGE IN BOWEL HABITS: Status: ACTIVE | Noted: 2020-05-18

## 2020-05-18 PROBLEM — R10.33 PERIUMBILICAL ABDOMINAL PAIN: Status: ACTIVE | Noted: 2020-05-18

## 2020-05-18 PROBLEM — R11.0 NAUSEA: Status: ACTIVE | Noted: 2020-05-18

## 2020-05-27 ENCOUNTER — TRANSCRIBE ORDERS (OUTPATIENT)
Dept: ADMINISTRATIVE | Facility: HOSPITAL | Age: 60
End: 2020-05-27

## 2020-05-27 DIAGNOSIS — Z01.818 PRE-OPERATIVE CLEARANCE: Primary | ICD-10-CM

## 2020-05-28 ENCOUNTER — LAB (OUTPATIENT)
Dept: LAB | Facility: HOSPITAL | Age: 60
End: 2020-05-28

## 2020-05-28 DIAGNOSIS — Z01.818 PRE-OPERATIVE CLEARANCE: ICD-10-CM

## 2020-05-28 LAB
REF LAB TEST METHOD: NORMAL
SARS-COV-2 RNA RESP QL NAA+PROBE: NOT DETECTED

## 2020-05-28 PROCEDURE — U0004 COV-19 TEST NON-CDC HGH THRU: HCPCS

## 2020-05-28 PROCEDURE — U0002 COVID-19 LAB TEST NON-CDC: HCPCS

## 2020-05-28 PROCEDURE — C9803 HOPD COVID-19 SPEC COLLECT: HCPCS

## 2020-05-29 ENCOUNTER — ANESTHESIA EVENT (OUTPATIENT)
Dept: PERIOP | Facility: HOSPITAL | Age: 60
End: 2020-05-29

## 2020-05-30 ENCOUNTER — ANESTHESIA (OUTPATIENT)
Dept: PERIOP | Facility: HOSPITAL | Age: 60
End: 2020-05-30

## 2020-05-30 ENCOUNTER — HOSPITAL ENCOUNTER (OUTPATIENT)
Facility: HOSPITAL | Age: 60
Setting detail: HOSPITAL OUTPATIENT SURGERY
Discharge: HOME | End: 2020-05-30
Attending: INTERNAL MEDICINE | Admitting: INTERNAL MEDICINE

## 2020-05-30 VITALS
DIASTOLIC BLOOD PRESSURE: 83 MMHG | OXYGEN SATURATION: 96 % | TEMPERATURE: 98.3 F | WEIGHT: 197.8 LBS | SYSTOLIC BLOOD PRESSURE: 107 MMHG | BODY MASS INDEX: 28.32 KG/M2 | RESPIRATION RATE: 20 BRPM | HEIGHT: 70 IN | HEART RATE: 81 BPM

## 2020-05-30 DIAGNOSIS — R63.4 WEIGHT LOSS, ABNORMAL: ICD-10-CM

## 2020-05-30 DIAGNOSIS — R11.0 NAUSEA: ICD-10-CM

## 2020-05-30 DIAGNOSIS — R10.33 PERIUMBILICAL ABDOMINAL PAIN: ICD-10-CM

## 2020-05-30 DIAGNOSIS — R19.4 CHANGE IN BOWEL HABITS: ICD-10-CM

## 2020-05-30 DIAGNOSIS — K21.9 GASTROESOPHAGEAL REFLUX DISEASE, ESOPHAGITIS PRESENCE NOT SPECIFIED: ICD-10-CM

## 2020-05-30 LAB — GLUCOSE BLDC GLUCOMTR-MCNC: 141 MG/DL (ref 70–130)

## 2020-05-30 PROCEDURE — 83516 IMMUNOASSAY NONANTIBODY: CPT | Performed by: INTERNAL MEDICINE

## 2020-05-30 PROCEDURE — 45380 COLONOSCOPY AND BIOPSY: CPT | Performed by: INTERNAL MEDICINE

## 2020-05-30 PROCEDURE — 43239 EGD BIOPSY SINGLE/MULTIPLE: CPT | Performed by: INTERNAL MEDICINE

## 2020-05-30 PROCEDURE — 82962 GLUCOSE BLOOD TEST: CPT

## 2020-05-30 PROCEDURE — 86255 FLUORESCENT ANTIBODY SCREEN: CPT | Performed by: INTERNAL MEDICINE

## 2020-05-30 PROCEDURE — 25010000002 PROPOFOL 10 MG/ML EMULSION: Performed by: NURSE ANESTHETIST, CERTIFIED REGISTERED

## 2020-05-30 PROCEDURE — 82784 ASSAY IGA/IGD/IGG/IGM EACH: CPT | Performed by: INTERNAL MEDICINE

## 2020-05-30 RX ORDER — SODIUM CHLORIDE, SODIUM LACTATE, POTASSIUM CHLORIDE, CALCIUM CHLORIDE 600; 310; 30; 20 MG/100ML; MG/100ML; MG/100ML; MG/100ML
INJECTION, SOLUTION INTRAVENOUS CONTINUOUS PRN
Status: DISCONTINUED | OUTPATIENT
Start: 2020-05-30 | End: 2020-05-30 | Stop reason: SURG

## 2020-05-30 RX ORDER — LIDOCAINE HYDROCHLORIDE 20 MG/ML
INJECTION, SOLUTION INFILTRATION; PERINEURAL AS NEEDED
Status: DISCONTINUED | OUTPATIENT
Start: 2020-05-30 | End: 2020-05-30 | Stop reason: SURG

## 2020-05-30 RX ORDER — ONDANSETRON 2 MG/ML
4 INJECTION INTRAMUSCULAR; INTRAVENOUS AS NEEDED
Status: DISCONTINUED | OUTPATIENT
Start: 2020-05-30 | End: 2020-05-30 | Stop reason: HOSPADM

## 2020-05-30 RX ORDER — OXYCODONE HYDROCHLORIDE AND ACETAMINOPHEN 5; 325 MG/1; MG/1
1 TABLET ORAL ONCE AS NEEDED
Status: DISCONTINUED | OUTPATIENT
Start: 2020-05-30 | End: 2020-05-30 | Stop reason: HOSPADM

## 2020-05-30 RX ORDER — IPRATROPIUM BROMIDE AND ALBUTEROL SULFATE 2.5; .5 MG/3ML; MG/3ML
3 SOLUTION RESPIRATORY (INHALATION) ONCE AS NEEDED
Status: DISCONTINUED | OUTPATIENT
Start: 2020-05-30 | End: 2020-05-30 | Stop reason: HOSPADM

## 2020-05-30 RX ORDER — MEPERIDINE HYDROCHLORIDE 25 MG/ML
12.5 INJECTION INTRAMUSCULAR; INTRAVENOUS; SUBCUTANEOUS
Status: DISCONTINUED | OUTPATIENT
Start: 2020-05-30 | End: 2020-05-30 | Stop reason: HOSPADM

## 2020-05-30 RX ORDER — FENTANYL CITRATE 50 UG/ML
50 INJECTION, SOLUTION INTRAMUSCULAR; INTRAVENOUS
Status: DISCONTINUED | OUTPATIENT
Start: 2020-05-30 | End: 2020-05-30 | Stop reason: HOSPADM

## 2020-05-30 RX ORDER — PROPOFOL 10 MG/ML
VIAL (ML) INTRAVENOUS AS NEEDED
Status: DISCONTINUED | OUTPATIENT
Start: 2020-05-30 | End: 2020-05-30 | Stop reason: SURG

## 2020-05-30 RX ADMIN — PROPOFOL 30 MG: 10 INJECTION, EMULSION INTRAVENOUS at 08:15

## 2020-05-30 RX ADMIN — SODIUM CHLORIDE, POTASSIUM CHLORIDE, SODIUM LACTATE AND CALCIUM CHLORIDE: 600; 310; 30; 20 INJECTION, SOLUTION INTRAVENOUS at 08:13

## 2020-05-30 RX ADMIN — LIDOCAINE HYDROCHLORIDE 40 MG: 20 INJECTION, SOLUTION INFILTRATION; PERINEURAL at 08:15

## 2020-05-30 RX ADMIN — PROPOFOL 150 MCG/KG/MIN: 10 INJECTION, EMULSION INTRAVENOUS at 08:15

## 2020-06-01 LAB
ENDOMYSIUM IGA SER QL: NEGATIVE
GLIADIN PEPTIDE IGA SER-ACNC: 4 UNITS (ref 0–19)
GLIADIN PEPTIDE IGG SER-ACNC: 1 UNITS (ref 0–19)
IGA SERPL-MCNC: 333 MG/DL (ref 90–386)
TTG IGA SER-ACNC: <2 U/ML (ref 0–3)
TTG IGG SER-ACNC: <2 U/ML (ref 0–5)

## 2020-06-04 ENCOUNTER — TELEPHONE (OUTPATIENT)
Dept: FAMILY MEDICINE CLINIC | Facility: CLINIC | Age: 60
End: 2020-06-04

## 2020-06-04 LAB
LAB AP CASE REPORT: NORMAL
PATH REPORT.FINAL DX SPEC: NORMAL

## 2020-06-08 ENCOUNTER — OFFICE VISIT (OUTPATIENT)
Dept: GASTROENTEROLOGY | Facility: CLINIC | Age: 60
End: 2020-06-08

## 2020-06-08 VITALS
SYSTOLIC BLOOD PRESSURE: 126 MMHG | DIASTOLIC BLOOD PRESSURE: 88 MMHG | TEMPERATURE: 96.6 F | HEIGHT: 70 IN | OXYGEN SATURATION: 97 % | WEIGHT: 202.6 LBS | HEART RATE: 93 BPM | BODY MASS INDEX: 29.01 KG/M2

## 2020-06-08 DIAGNOSIS — R19.8 ABNORMAL FINDINGS ON ESOPHAGOGASTRODUODENOSCOPY (EGD): ICD-10-CM

## 2020-06-08 DIAGNOSIS — K21.9 GASTROESOPHAGEAL REFLUX DISEASE WITHOUT ESOPHAGITIS: ICD-10-CM

## 2020-06-08 DIAGNOSIS — K59.00 CONSTIPATION, UNSPECIFIED CONSTIPATION TYPE: Primary | ICD-10-CM

## 2020-06-08 PROCEDURE — 99214 OFFICE O/P EST MOD 30 MIN: CPT | Performed by: PHYSICIAN ASSISTANT

## 2020-06-08 NOTE — PROGRESS NOTES
": 1960    Chief Complaint   Patient presents with   • Results       Damaso Leonard is a 59 y.o. male who presents to the office today as a follow up appointment regarding recent procedures.     History of Present Illness:  He is feeling much better, no GI complaints today other than intermittent and mild hard stools. Previously reported nausea and diarrhea have now resolved. He would like to discuss his recent endoscopic procedure and pathology results today. Completed CT scan abd/pelv in 3/2020 which showed colitis, was given treatment with Flagyl and Cipr which he took with great relief. For GERD, still taking Protonix 40 mg once daily with good relief.     Does have a personal history of colon polyps, previous colonoscopy was in 2019 by Dr. Lima. He was told to have another colonoscopy in 1 year because it was a \"cancerous\" polyp but he had knee surgery (2020) and had to delay having it done, only been taking incr opioid medications since then. Sister has Crohn's disease.    Review of Systems   Constitutional: Positive for chills, fatigue and unexpected weight change. Negative for appetite change and fever.   HENT: Negative for trouble swallowing.    Eyes: Negative.    Respiratory: Negative for cough, choking, chest tightness and shortness of breath.    Cardiovascular: Negative for chest pain.   Gastrointestinal: Negative for abdominal distention, abdominal pain, anal bleeding, blood in stool, constipation, diarrhea, nausea and vomiting.   Endocrine: Negative.    Genitourinary: Negative for difficulty urinating.   Musculoskeletal: Positive for back pain. Negative for neck pain.   Skin: Negative.    Neurological: Positive for light-headedness. Negative for dizziness and headaches.   Hematological: Does not bruise/bleed easily.   Psychiatric/Behavioral: Negative.      I have reviewed and confirmed the accuracy of the ROS as documented by the MA/LPN/RN Talita Olivas PA-C    Past Medical " History:   Diagnosis Date   • Anxiety    • Anxiety and depression    • Arthritis    • ASCVD (arteriosclerotic cardiovascular disease)    • CHF (congestive heart failure) (CMS/[facility])    • Colitis    • Coronary artery disease    • Disease of thyroid gland    • DM (diabetes mellitus) (CMS/[facility])    • Elevated cholesterol    • GERD (gastroesophageal reflux disease)    • History of EKG 04/15/2015    NORMAL   • History of transfusion    • HTN (hypertension)    • Hyperlipidemia    • Injury of back    • Low back pain    • Myocardial infarction (CMS/[facility])     about 13 years ago   • Sleep apnea     wears oxygen at night    • Wears glasses    • Wears partial dentures        Past Surgical History:   Procedure Laterality Date   • CARDIAC CATHETERIZATION     • CARDIAC SURGERY      stenting   • CHOLECYSTECTOMY     • COLONOSCOPY  2007   • COLONOSCOPY N/A 5/30/2020    Procedure: COLONOSCOPY CPT CODE: 33196;  Surgeon: Anthony Garcia MD;  Location: [facility];  Service: Gastroenterology;  Laterality: N/A;   • ENDOSCOPY N/A 5/30/2020    Procedure: ESOPHAGOGASTRODUODENOSCOPY WITH BIOPSY CPT CODE: 82491;  Surgeon: Anthony Garcia MD;  Location: [facility];  Service: Gastroenterology;  Laterality: N/A;   • HERNIA REPAIR     • SHOULDER SURGERY     • TONSILLECTOMY     • TOTAL KNEE ARTHROPLASTY Right 1/27/2020    Procedure: TOTAL KNEE ARTHROPLASTY RIGHT;  Surgeon: Ortiz Barba MD;  Location: [facility];  Service: Orthopedics   • TYMPANOPLASTY     • UPPER GASTROINTESTINAL ENDOSCOPY     • VASECTOMY         Family History   Problem Relation Age of Onset   • Heart attack Father    • Heart disease Father    • Hypertension Father    • Heart attack Sister    • Diabetes Sister    • Heart disease Sister    • Hypertension Sister    • Thyroid disease Sister    • Heart attack Brother    • Diabetes Brother    • Thyroid disease Brother    • Arthritis Daughter    • COPD Daughter    • Asthma Daughter    • Depression Daughter    •  Heart disease Sister    • Hypertension Sister        Social History     Socioeconomic History   • Marital status:      Spouse name: ruben   • Number of children: 3   • Years of education: 12   • Highest education level: Not on file   Occupational History   • Occupation: retired   Tobacco Use   • Smoking status: Never Smoker   • Smokeless tobacco: Never Used   Substance and Sexual Activity   • Alcohol use: No   • Drug use: No   • Sexual activity: Defer       Current Outpatient Medications:   •  amitriptyline (ELAVIL) 50 MG tablet, Take 2 tablets by mouth At Night As Needed for Sleep. 1 daily, Disp: 60 tablet, Rfl: 5  •  aspirin 81 MG tablet, Take 1 tablet by mouth Daily. Resume in 1 month, Disp: 90 tablet, Rfl: 3  •  aspirin  MG EC tablet, Take 1 tablet by mouth Every 12 (Twelve) Hours. For 1 month, Disp: 60 tablet, Rfl: 0  •  bisacodyl (DULCOLAX) 5 MG EC tablet, Take 4 tablets at 4pm the day prior to procedure with a full glass of water., Disp: 4 tablet, Rfl: 0  •  BYDUREON 2 MG pen-injector injection, INJECT CONTENTS OF 1 PEN SUBCUTANEOUSLY INTO THE APPROPRIATE AREA AS DIRECTED ONCE WEEKLY, Disp: 5 pen, Rfl: 5  •  carvedilol (COREG) 12.5 MG tablet, Take 1 tablet by mouth 2 (Two) Times a Day With Meals., Disp: 180 tablet, Rfl: 3  •  ciprofloxacin (CIPRO) 750 MG tablet, Take 1 tablet by mouth 2 (Two) Times a Day., Disp: 20 tablet, Rfl: 0  •  citalopram (CeleXA) 40 MG tablet, Take 1 tablet by mouth Daily., Disp: 30 tablet, Rfl: 5  •  clonazePAM (KlonoPIN) 0.5 MG tablet, Take 1 tablet by mouth 3 (Three) Times a Day As Needed for Anxiety., Disp: 90 tablet, Rfl: 0  •  cyanocobalamin 1000 MCG/ML injection, Inject 1 mL into the appropriate muscle as directed by prescriber Every 28 (Twenty-Eight) Days., Disp: 1 mL, Rfl: 11  •  dicyclomine (BENTYL) 10 MG capsule, Take 1 capsule by mouth 4 (Four) Times a Day Before Meals & at Bedtime., Disp: 120 capsule, Rfl: 5  •  diphenhydrAMINE (BENADRYL ALLERGY) 25 mg capsule,  Take 1 capsule by mouth Every 6 (Six) Hours As Needed for Itching., Disp: 90 capsule, Rfl: 3  •  docusate sodium 100 MG capsule, Take 100 mg by mouth 2 (Two) Times a Day As Needed for Constipation., Disp: 60 each, Rfl: 0  •  finasteride (PROSCAR) 5 MG tablet, Take 1 tablet by mouth Daily., Disp: 90 tablet, Rfl: 3  •  glucose blood test strip, Check blood sugar 3x times a day., Disp: 100 each, Rfl: 12  •  glucose monitor monitoring kit, 1 each 3 (Three) Times a Day As Needed (diabetes)., Disp: 1 each, Rfl: 0  •  icosapent ethyl (VASCEPA) 1 g capsule capsule, 2 twice daily, Disp: 120 capsule, Rfl: 5  •  isosorbide mononitrate (IMDUR) 30 MG 24 hr tablet, Take 1 tablet by mouth 2 (Two) Times a Day., Disp: 60 tablet, Rfl: 5  •  Lancets (ONETOUCH ULTRASOFT) lancets, Check blood sugar 3 times daily, Disp: 100 each, Rfl: 12  •  magnesium citrate solution, Take 296 mL by mouth Take As Directed. Follow bowel prep instructions given at office, Disp: 592 mL, Rfl: 0  •  meloxicam (MOBIC) 15 MG tablet, Take 1 tablet by mouth Daily., Disp: 14 tablet, Rfl: 0  •  metFORMIN (GLUCOPHAGE) 500 MG tablet, Take 1 tablet by mouth 2 (Two) Times a Day With Meals., Disp: 180 tablet, Rfl: 3  •  metroNIDAZOLE (FLAGYL) 500 MG tablet, Take 1 tablet by mouth 3 (Three) Times a Day., Disp: 30 tablet, Rfl: 0  •  naloxone (NARCAN) 4 MG/0.1ML nasal spray, 1 spray into the nostril(s) as directed by provider As Needed (overdose)., Disp: 1 each, Rfl: 0  •  NP THYROID 30 MG tablet, Take 1 tablet by mouth once daily, Disp: 30 tablet, Rfl: 5  •  O2 (OXYGEN), Inhale 2 L/min Every Night., Disp: , Rfl:   •  ondansetron (ZOFRAN) 8 MG tablet, Take 1 tablet by mouth Every 8 (Eight) Hours As Needed for Nausea., Disp: 20 tablet, Rfl: 2  •  oxyCODONE (ROXICODONE) 5 MG immediate release tablet, Take 1 tablet by mouth Every 4 (Four) Hours As Needed for Moderate Pain ., Disp: 30 tablet, Rfl: 0  •  pantoprazole (PROTONIX) 40 MG EC tablet, Take 1 tablet by mouth Daily.,  "Disp: 90 tablet, Rfl: 3  •  polyethylene glycol (MIRALAX) packet, Take 17 g by mouth Daily. (Patient taking differently: Take 17 g by mouth Daily As Needed.), Disp: 1 each, Rfl: 5  •  Ropivacine HCl-NaCl (NAROPIN), 20 mg/hr by Peripheral Nerve route Continuous. Indications: Acute Pain, Disp: , Rfl:   •  simvastatin (ZOCOR) 40 MG tablet, Take 1 tablet by mouth Every Night., Disp: 90 tablet, Rfl: 3  •  sucralfate (CARAFATE) 1 g tablet, Take 1 tablet by mouth 4 (Four) Times a Day., Disp: 120 tablet, Rfl: 1  •  traMADol (ULTRAM) 50 MG tablet, Take 1 tablet by mouth Every 8 (Eight) Hours As Needed for Moderate Pain  or Severe Pain ., Disp: 60 tablet, Rfl: 2  •  vitamin D (ERGOCALCIFEROL) 99504 units capsule capsule, Take 1 capsule by mouth Every 7 (Seven) Days., Disp: 12 capsule, Rfl: 3    Current Facility-Administered Medications:   •  cyanocobalamin injection 1,000 mcg, 1,000 mcg, Intramuscular, Q28 Days, Clare Quintero APRN, 1,000 mcg at 07/11/18 0843    Allergies:   Patient has no known allergies.    Vitals:  /88   Pulse 93   Temp 96.6 °F (35.9 °C)   Ht 177.8 cm (70\")   Wt 91.9 kg (202 lb 9.6 oz)   SpO2 97%   BMI 29.07 kg/m²     Physical Exam   Constitutional: He is oriented to person, place, and time. He appears well-developed and well-nourished. No distress.   HENT:   Head: Normocephalic and atraumatic.   Wearing a mask   Eyes: Conjunctivae are normal. Right eye exhibits no discharge. Left eye exhibits no discharge. No scleral icterus.   Neck: Normal range of motion. No JVD present.   Cardiovascular: Normal rate, regular rhythm and normal heart sounds. Exam reveals no gallop and no friction rub.   No murmur heard.  Pulmonary/Chest: Effort normal and breath sounds normal. No respiratory distress. He has no wheezes. He has no rales. He exhibits no tenderness.   Abdominal: Soft. Bowel sounds are normal. He exhibits no mass. There is no tenderness.   Musculoskeletal: Normal range of motion. He " exhibits no edema or deformity.   Neurological: He is alert and oriented to person, place, and time. Coordination normal.   Skin: Skin is warm and dry. No rash noted. He is not diaphoretic. No erythema.   Psychiatric: His behavior is normal. Judgment and thought content normal.   Anxious affect   Vitals reviewed.    Results Review:  EGD and colonoscopy completed by Dr. Garcia on 5/30/2020.  Findings were irregular Z line, small sliding hiatal hernia, normal stomach, scalloping of duodenal folds, normal terminal ileum, normal colon except for left-sided diverticulosis.  Pathology showed normal duodenum, mild chronic gastritis, negative H. pylori, GE junction showed chronic inflammation and features of reflux with focal minimal intestinal metaplasia negative for dysplasia, random colon biopsies negative.  Pathologist's comment states that depending on relation to the Z line, could be Thomas's esophagus.    Celiac panel 5/30/2020: Negative.    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 Mild  fatty change in the liver. There is evidence of some focal colitis  involving the distal ascending and hepatic flexure portions of the  colon. There were no CT findings of appendicitis.     Assessment:  1. Constipation, unspecified constipation type    2. Abnormal findings on esophagogastroduodenoscopy (EGD)    3. Gastroesophageal reflux disease without esophagitis      Plan:  He was instructed to increase dietary fiber intake to 25-45g daily and a list of fiber foods was given. He has agreed to try to increase daily water intake and daily exercise as well.     Needs repeat EGD in 1 year to re-check for Thomas's esophagus due to abnormal findings on EGD.     He was instructed not to lie down immediately after eating (wait at least 3 hours after meals), elevate the head of the bed at night, avoid spicy foods, avoid mints, avoid caffeine, avoid nicotine and work on getting to a healthy weight. He  will continue taking pantoprazole 40 mg once daily 30 minutes before a meal for treatment of GERD.     Repeat [facility] colonoscopy in 5 years.          Return if symptoms worsen or fail to improve.      Electronically signed 6/19/2020 11:59  Talita Olivas PA-C, [facility]

## 2020-06-16 DIAGNOSIS — F41.1 GENERALIZED ANXIETY DISORDER: ICD-10-CM

## 2020-06-17 RX ORDER — CITALOPRAM 40 MG/1
TABLET ORAL
Qty: 90 TABLET | Refills: 0 | Status: SHIPPED | OUTPATIENT
Start: 2020-06-17 | End: 2020-09-29

## 2020-06-23 ENCOUNTER — OFFICE VISIT (OUTPATIENT)
Dept: CARDIOLOGY | Facility: CLINIC | Age: 60
End: 2020-06-23

## 2020-06-23 VITALS
DIASTOLIC BLOOD PRESSURE: 87 MMHG | SYSTOLIC BLOOD PRESSURE: 128 MMHG | WEIGHT: 206 LBS | HEART RATE: 96 BPM | BODY MASS INDEX: 29.49 KG/M2 | OXYGEN SATURATION: 94 % | HEIGHT: 70 IN

## 2020-06-23 DIAGNOSIS — Z95.5 HISTORY OF CORONARY ANGIOPLASTY WITH INSERTION OF STENT: ICD-10-CM

## 2020-06-23 DIAGNOSIS — I20.8 EFFORT ANGINA (HCC): Primary | ICD-10-CM

## 2020-06-23 PROCEDURE — 99214 OFFICE O/P EST MOD 30 MIN: CPT | Performed by: INTERNAL MEDICINE

## 2020-06-23 RX ORDER — NITROGLYCERIN 0.4 MG/1
TABLET SUBLINGUAL
Qty: 50 TABLET | Refills: 1 | Status: SHIPPED | OUTPATIENT
Start: 2020-06-23 | End: 2020-10-19 | Stop reason: SDUPTHER

## 2020-06-23 NOTE — PROGRESS NOTES
[facility] CARDIOLOGY  [address] [facility] [address]  [place] [address]  Phone: [phone]  Fax: [phone]    06/23/2020    Chief Complaint   Patient presents with   • Follow-up     6 Month         History:   Damaso Leonard is a 59 y.o. male seen in followup, patient reports episodes of chest pain which occurred mostly when he is working outside in heat.  Each of these episodes were substernal and sometimes radiating to the back.  These are associated with diaphoresis.  Each episode has lasted only 3 to 5 minutes and resolved spontaneously.  He has known history of previous multiple stents back in 2006 at [facility] and a previous stress test in 2018 was showing normal perfusion and no evidence of ischemia.    Past Medical History:   Diagnosis Date   • Anxiety    • Anxiety and depression    • Arthritis    • ASCVD (arteriosclerotic cardiovascular disease)    • CHF (congestive heart failure) (CMS/HCC)    • Colitis    • Coronary artery disease    • Disease of thyroid gland    • DM (diabetes mellitus) (CMS/HCC)    • Elevated cholesterol    • GERD (gastroesophageal reflux disease)    • History of EKG 04/15/2015    NORMAL   • History of transfusion    • HTN (hypertension)    • Hyperlipidemia    • Injury of back    • Low back pain    • Myocardial infarction (CMS/HCC)     about 13 years ago   • Sleep apnea     wears oxygen at night    • Wears glasses    • Wears partial dentures        Past Surgical History:   Procedure Laterality Date   • CARDIAC CATHETERIZATION     • CARDIAC SURGERY      stenting   • CHOLECYSTECTOMY     • COLONOSCOPY  2007   • COLONOSCOPY N/A 5/30/2020    Procedure: COLONOSCOPY CPT CODE: 22156;  Surgeon: Anthony Garcia MD;  Location: [facility] OR;  Service: Gastroenterology;  Laterality: N/A;   • ENDOSCOPY N/A 5/30/2020    Procedure: ESOPHAGOGASTRODUODENOSCOPY WITH BIOPSY CPT CODE: 24407;  Surgeon: Anthony Garcia MD;  Location: [facility]  OR;  Service: Gastroenterology;  Laterality: N/A;   • HERNIA REPAIR     • SHOULDER SURGERY     • TONSILLECTOMY     • TOTAL KNEE ARTHROPLASTY Right 1/27/2020    Procedure: TOTAL KNEE ARTHROPLASTY RIGHT;  Surgeon: Ortiz Barba MD;  Location: [facility];  Service: Orthopedics   • TYMPANOPLASTY     • UPPER GASTROINTESTINAL ENDOSCOPY     • VASECTOMY          Past Social History:  Social History     Socioeconomic History   • Marital status:      Spouse name: ruben   • Number of children: 3   • Years of education: 12   • Highest education level: Not on file   Occupational History   • Occupation: retired   Tobacco Use   • Smoking status: Never Smoker   • Smokeless tobacco: Never Used   Substance and Sexual Activity   • Alcohol use: No   • Drug use: No   • Sexual activity: Defer       Past Family History:  Family History   Problem Relation Age of Onset   • Heart attack Father    • Heart disease Father    • Hypertension Father    • Heart attack Sister    • Diabetes Sister    • Heart disease Sister    • Hypertension Sister    • Thyroid disease Sister    • Heart attack Brother    • Diabetes Brother    • Thyroid disease Brother    • Arthritis Daughter    • COPD Daughter    • Asthma Daughter    • Depression Daughter    • Heart disease Sister    • Hypertension Sister        Review of Systems:   Please see HPI  Constitution: No chills, no rigors, no unexplained weight loss or weight gain  Eyes:  No diplopia, no blurred vision, no loss of vision, conjunctiva is pink and sclera is anicteric  ENT:  No tinnitus, no otorrhea, no epistaxis, no sore throat   Respiratory: No cough, no hemoptysis  Cardiovascular: see HPI  Gastrointestinal: No nausea, no vomiting, no hematemesis, no diarrhea or constipation, no melena  Genitourinary: No frequency of dysuria no hematuria  Integument: No pruritis and  no skin rash  Hematologic / Lymphatic: No excessive bleeding, easy bruising, fatigue, lymphadenopathy and  petechiae  Musculoskeletal: No joint pain, joint stiffness, joint swelling, muscle pain, muscle weakness and neck pain  Neurological: No dizziness, headaches, light headedness, seizures and vertigo  Endocrine: No frequent urination and nocturia, temperature intolerance, weight gain, unintended and weight loss, unintended      Current Outpatient Medications   Medication Sig Dispense Refill   • amitriptyline (ELAVIL) 50 MG tablet Take 2 tablets by mouth At Night As Needed for Sleep. 1 daily 60 tablet 5   • aspirin  MG EC tablet Take 1 tablet by mouth Every 12 (Twelve) Hours. For 1 month 60 tablet 0   • BYDUREON 2 MG pen-injector injection INJECT CONTENTS OF 1 PEN SUBCUTANEOUSLY INTO THE APPROPRIATE AREA AS DIRECTED ONCE WEEKLY 5 pen 5   • carvedilol (COREG) 12.5 MG tablet Take 1 tablet by mouth 2 (Two) Times a Day With Meals. 180 tablet 3   • citalopram (CeleXA) 40 MG tablet Take 1 tablet by mouth once daily 90 tablet 0   • clonazePAM (KlonoPIN) 0.5 MG tablet Take 1 tablet by mouth 3 (Three) Times a Day As Needed for Anxiety. 90 tablet 0   • cyanocobalamin 1000 MCG/ML injection Inject 1 mL into the appropriate muscle as directed by prescriber Every 28 (Twenty-Eight) Days. 1 mL 11   • dicyclomine (BENTYL) 10 MG capsule Take 1 capsule by mouth 4 (Four) Times a Day Before Meals & at Bedtime. 120 capsule 5   • diphenhydrAMINE (BENADRYL ALLERGY) 25 mg capsule Take 1 capsule by mouth Every 6 (Six) Hours As Needed for Itching. 90 capsule 3   • docusate sodium 100 MG capsule Take 100 mg by mouth 2 (Two) Times a Day As Needed for Constipation. 60 each 0   • finasteride (PROSCAR) 5 MG tablet Take 1 tablet by mouth Daily. 90 tablet 3   • glucose blood test strip Check blood sugar 3x times a day. 100 each 12   • glucose monitor monitoring kit 1 each 3 (Three) Times a Day As Needed (diabetes). 1 each 0   • isosorbide mononitrate (IMDUR) 30 MG 24 hr tablet Take 1 tablet by mouth 2 (Two) Times a Day. 60 tablet 5   • Lancets  (ONETOUCH ULTRASOFT) lancets Check blood sugar 3 times daily 100 each 12   • metFORMIN (GLUCOPHAGE) 500 MG tablet Take 1 tablet by mouth 2 (Two) Times a Day With Meals. 180 tablet 3   • NP THYROID 30 MG tablet Take 1 tablet by mouth once daily 30 tablet 5   • O2 (OXYGEN) Inhale 2 L/min Every Night.     • ondansetron (ZOFRAN) 8 MG tablet Take 1 tablet by mouth Every 8 (Eight) Hours As Needed for Nausea. 20 tablet 2   • pantoprazole (PROTONIX) 40 MG EC tablet Take 1 tablet by mouth Daily. 90 tablet 3   • polyethylene glycol (MIRALAX) packet Take 17 g by mouth Daily. (Patient taking differently: Take 17 g by mouth Daily As Needed.) 1 each 5   • simvastatin (ZOCOR) 40 MG tablet Take 1 tablet by mouth Every Night. 90 tablet 3   • traMADol (ULTRAM) 50 MG tablet Take 1 tablet by mouth Every 8 (Eight) Hours As Needed for Moderate Pain  or Severe Pain . 60 tablet 2   • vitamin D (ERGOCALCIFEROL) 57851 units capsule capsule Take 1 capsule by mouth Every 7 (Seven) Days. 12 capsule 3   • icosapent ethyl (VASCEPA) 1 g capsule capsule 2 twice daily 120 capsule 5   • naloxone (NARCAN) 4 MG/0.1ML nasal spray 1 spray into the nostril(s) as directed by provider As Needed (overdose). 1 each 0   • Ropivacine HCl-NaCl (NAROPIN) 20 mg/hr by Peripheral Nerve route Continuous. Indications: Acute Pain       Current Facility-Administered Medications   Medication Dose Route Frequency Provider Last Rate Last Dose   • cyanocobalamin injection 1,000 mcg  1,000 mcg Intramuscular Q28 Days Clare Quintero APRN   1,000 mcg at 07/11/18 0843        No Known Allergies    Objective:  Vitals:    06/23/20 1521   BP: 128/87   Pulse: 96   SpO2: 94%     Comfortable NAD  PERRL, conjunctiva clear  Neck supple, no JVD or thyromegaly appreciated  S1/S2 RRR, no m/r/g  Lungs CTA B, normal effort  Abdomen S/NT/ND (+) BS, no HSM appreciated  Extremities warm, no clubbing, cyanosis, or edema  Normal gait  No visible or palpable skin lesions  A/Ox4, mood  and affect appropriate  Pulse exam:    Feet are warm bilateral  No edema bilateral  Capillary refill is normal  No evidence of ulceration or color change of the toes  PULSES  Right DP and PT are 2+ and Left DP and PT are 2+    DATA:      Results for orders placed during the hospital encounter of 02/27/18   Adult Transthoracic Echo Complete W/ Cont if Necessary Per Protocol    Narrative · Estimated EF = 60%.  · Left ventricular diastolic dysfunction (grade I) consistent with   impaired relaxation.  · Normal right ventricular cavity size and systolic function noted.  · Normal left atrial size noted.  · No aortic valve stenosis is present.  · Trace tricuspid valve regurgitation is present  · There is no evidence of pericardial effusion         Results for orders placed during the hospital encounter of 02/27/18   SCANNED - NUCLEAR STRESS      Results for orders placed during the hospital encounter of 02/27/18   SCANNED - NUCLEAR STRESS       Procedures       A/P:  New symptom of chest pain with exertion suggesting effort angina  Known CAD with previous history of stenting done at [facility] in 2006 are prior  Normal LV systolic function by echo and nuclear study in 2018    Plan  Lexiscan stress test due to patient's inability to exercise due to significant knee problems  Nitroglycerin sublingual pills  Follow-up after the procedure    Patient's Body mass index is 29.56 kg/m². BMI is within normal parameters. No follow-up required..       No diagnosis found.     Thank you for allowing me to participate in the care of Damaso Leonard. Feel free to contact me directly with any further questions or concerns.        Director, Cardiac Cath Lab

## 2020-07-08 ENCOUNTER — OFFICE VISIT (OUTPATIENT)
Dept: FAMILY MEDICINE CLINIC | Facility: CLINIC | Age: 60
End: 2020-07-08

## 2020-07-08 ENCOUNTER — RESULTS ENCOUNTER (OUTPATIENT)
Dept: FAMILY MEDICINE CLINIC | Facility: CLINIC | Age: 60
End: 2020-07-08

## 2020-07-08 DIAGNOSIS — F41.1 GENERALIZED ANXIETY DISORDER: ICD-10-CM

## 2020-07-08 DIAGNOSIS — K52.9 COLITIS: ICD-10-CM

## 2020-07-08 DIAGNOSIS — R21 RASH AND NONSPECIFIC SKIN ERUPTION: ICD-10-CM

## 2020-07-08 DIAGNOSIS — E11.59 TYPE 2 DIABETES MELLITUS WITH OTHER CIRCULATORY COMPLICATION, WITHOUT LONG-TERM CURRENT USE OF INSULIN (HCC): ICD-10-CM

## 2020-07-08 DIAGNOSIS — M15.9 OSTEOARTHRITIS INVOLVING MULTIPLE JOINTS ON BOTH SIDES OF BODY: ICD-10-CM

## 2020-07-08 DIAGNOSIS — E11.51 TYPE 2 DIABETES MELLITUS WITH DIABETIC PERIPHERAL ANGIOPATHY WITHOUT GANGRENE, WITHOUT LONG-TERM CURRENT USE OF INSULIN (HCC): Primary | ICD-10-CM

## 2020-07-08 DIAGNOSIS — E11.51 TYPE 2 DIABETES MELLITUS WITH DIABETIC PERIPHERAL ANGIOPATHY WITHOUT GANGRENE, WITHOUT LONG-TERM CURRENT USE OF INSULIN (HCC): ICD-10-CM

## 2020-07-08 DIAGNOSIS — I25.10 CORONARY ARTERY DISEASE INVOLVING NATIVE CORONARY ARTERY OF NATIVE HEART WITHOUT ANGINA PECTORIS: ICD-10-CM

## 2020-07-08 PROCEDURE — 99442 PR PHYS/QHP TELEPHONE EVALUATION 11-20 MIN: CPT | Performed by: NURSE PRACTITIONER

## 2020-07-08 RX ORDER — TRAMADOL HYDROCHLORIDE 50 MG/1
50 TABLET ORAL EVERY 8 HOURS PRN
Qty: 60 TABLET | Refills: 2 | Status: SHIPPED | OUTPATIENT
Start: 2020-07-08 | End: 2020-07-08 | Stop reason: SDUPTHER

## 2020-07-08 RX ORDER — ONDANSETRON HYDROCHLORIDE 8 MG/1
8 TABLET, FILM COATED ORAL EVERY 8 HOURS PRN
Qty: 20 TABLET | Refills: 2 | Status: SHIPPED | OUTPATIENT
Start: 2020-07-08 | End: 2020-10-19 | Stop reason: SDUPTHER

## 2020-07-08 NOTE — PROGRESS NOTES
Establish patient called office of APRN today to discuss:   CC    Diabetes  Arthritis    You have chosen to receive care through a telephone visit today. Do you consent to use a telephone visit for your medical care today? Yes     Patient is at high risk for complications of coronavirus if he were to acquire due to multiple health conditions.      Patient requesting telephone visit due to fear of [place] coronavirus    Brief HPI/ROS obtained as follows:    Type 2 diabetes-patient reports that his glucose readings have been better controlled.  He is compliant with his medication and diet.  Denies symptomatic hypoglycemic episodes.      OA -osteoarthritis of multiple joints.  Patient has had a recent total knee replacement.  He has osteoarthritis in his low back.  Patient currently receives Ultram for chronic pain.  Patient rates his pain today as 6 on pain scale rating of 0-10.  Pain described as aching and grinding.  Patient reports that his ambulation is much improved after recent knee replacement.  Patient reports that his pain is worse with activity.  He does become stiff with prolonged sitting.  Patient has failed improvement with physical therapy.  He has been seen by orthopedic provider as well as neurology provider.  Patient has no history of substance abuse or addiction.  He does report improved quality of life with current medication regimen.     CAD -is under the care of cardiology.  Coronary artery disease is stable.  He denies any chest pain or shortness of breath.      BECKY -currently receives Celexa.  Takes clonazepam on as-needed basis.  Reports his symptoms are controlled.      Colitis-under the care of GI.  Some improvement in symptoms.      The following portions of the patient's history, chief complaint and ROS were reviewed and updated as appropriate per provider:  Allergies, current medications, past family history, past medical history, past social history, past surgical history and problem  list.    Review of Systems   Constitutional: Negative for activity change, appetite change, chills, fatigue, fever and unexpected weight change.   HENT: Negative for congestion, sinus pain, sneezing, sore throat and trouble swallowing.    Eyes: Negative for pain, discharge and itching.   Respiratory: Negative for cough and shortness of breath.    Cardiovascular: Positive for chest pain. Negative for palpitations and leg swelling.   Gastrointestinal: Positive for abdominal pain (cramping with diarrhea up to 4 times aweek, under care of GI ) and diarrhea. Negative for anal bleeding, constipation and vomiting.   Endocrine: Negative.    Genitourinary: Negative for difficulty urinating, flank pain, frequency and hematuria.   Musculoskeletal: Positive for arthralgias, back pain and gait problem (Knees). Negative for joint swelling, neck pain and neck stiffness.   Skin: Negative.    Allergic/Immunologic: Positive for immunocompromised state (Autoimmune disorder, cardiac patient and diabetic). Negative for environmental allergies and food allergies.   Neurological: Negative for tremors, seizures and speech difficulty.   Hematological: Negative for adenopathy. Does not bruise/bleed easily.   Psychiatric/Behavioral: Negative for behavioral problems, dysphoric mood, self-injury and suicidal ideas. The patient is nervous/anxious (At times).        The current allergy list and medication list was reviewed with patient for accuracy.     Assessment     Alert and oriented x3.  Respirations not labored with conversation.  No cough.  Speech normal.  Hearing adequate.  Cooperative.  Mood normal.  Thought process normal.    Diagnoses and all orders for this visit:    Type 2 diabetes mellitus with diabetic peripheral angiopathy without gangrene, without long-term current use of insulin (CMS/[facility])  -     Ambulatory Referral for Diabetic Eye Exam-Optometry  -     Lipid Panel; Future  -     Hemoglobin A1c; Future  -     Comprehensive  Metabolic Panel; Future  -     CBC & Differential; Future  -     MicroAlbumin, Urine, Random - Urine, Clean Catch; Future  -     Urine Drug Screen - Urine, Clean Catch; Future    Osteoarthritis involving multiple joints on both sides of body  -     Lipid Panel; Future  -     Hemoglobin A1c; Future  -     Comprehensive Metabolic Panel; Future  -     CBC & Differential; Future  -     MicroAlbumin, Urine, Random - Urine, Clean Catch; Future  -     Urine Drug Screen - Urine, Clean Catch; Future    Coronary artery disease involving native coronary artery of native heart without angina pectoris  -     Lipid Panel; Future  -     Hemoglobin A1c; Future  -     Comprehensive Metabolic Panel; Future  -     CBC & Differential; Future  -     MicroAlbumin, Urine, Random - Urine, Clean Catch; Future  -     Urine Drug Screen - Urine, Clean Catch; Future    Rash and nonspecific skin eruption  -     ondansetron (ZOFRAN) 8 MG tablet; Take 1 tablet by mouth Every 8 (Eight) Hours As Needed for Nausea.  -     Lipid Panel; Future  -     Hemoglobin A1c; Future  -     Comprehensive Metabolic Panel; Future  -     CBC & Differential; Future  -     MicroAlbumin, Urine, Random - Urine, Clean Catch; Future  -     Urine Drug Screen - Urine, Clean Catch; Future    Type 2 diabetes mellitus with other circulatory complication, without long-term current use of insulin (CMS/[facility])  -     metFORMIN (GLUCOPHAGE) 500 MG tablet; Take 1 tablet by mouth 2 (Two) Times a Day With Meals.  -     Lipid Panel; Future  -     Hemoglobin A1c; Future  -     Comprehensive Metabolic Panel; Future  -     CBC & Differential; Future  -     MicroAlbumin, Urine, Random - Urine, Clean Catch; Future  -     Urine Drug Screen - Urine, Clean Catch; Future    Generalized anxiety disorder  Comments:  continue klonopin and celexa  Orders:  -     Discontinue: traMADol (ULTRAM) 50 MG tablet; Take 1 tablet by mouth Every 8 (Eight) Hours As Needed for Moderate Pain  or Severe Pain .  -      Lipid Panel; Future  -     Hemoglobin A1c; Future  -     Comprehensive Metabolic Panel; Future  -     CBC & Differential; Future  -     MicroAlbumin, Urine, Random - Urine, Clean Catch; Future  -     Urine Drug Screen - Urine, Clean Catch; Future    Colitis  -     Discontinue: traMADol (ULTRAM) 50 MG tablet; Take 1 tablet by mouth Every 8 (Eight) Hours As Needed for Moderate Pain  or Severe Pain .  -     Lipid Panel; Future  -     Hemoglobin A1c; Future  -     Comprehensive Metabolic Panel; Future  -     CBC & Differential; Future  -     MicroAlbumin, Urine, Random - Urine, Clean Catch; Future  -     Urine Drug Screen - Urine, Clean Catch; Future        Current Outpatient Medications:   •  amitriptyline (ELAVIL) 50 MG tablet, Take 2 tablets by mouth At Night As Needed for Sleep. 1 daily, Disp: 60 tablet, Rfl: 5  •  aspirin  MG EC tablet, Take 1 tablet by mouth Every 12 (Twelve) Hours. For 1 month, Disp: 60 tablet, Rfl: 0  •  BYDUREON 2 MG pen-injector injection, INJECT CONTENTS OF 1 PEN SUBCUTANEOUSLY INTO THE APPROPRIATE AREA AS DIRECTED ONCE WEEKLY, Disp: 5 pen, Rfl: 5  •  carvedilol (COREG) 12.5 MG tablet, Take 1 tablet by mouth 2 (Two) Times a Day With Meals., Disp: 180 tablet, Rfl: 3  •  citalopram (CeleXA) 40 MG tablet, Take 1 tablet by mouth once daily, Disp: 90 tablet, Rfl: 0  •  clonazePAM (KlonoPIN) 0.5 MG tablet, Take 1 tablet by mouth 3 (Three) Times a Day As Needed for Anxiety., Disp: 90 tablet, Rfl: 0  •  cyanocobalamin 1000 MCG/ML injection, Inject 1 mL into the appropriate muscle as directed by prescriber Every 28 (Twenty-Eight) Days., Disp: 1 mL, Rfl: 11  •  dicyclomine (BENTYL) 10 MG capsule, Take 1 capsule by mouth 4 (Four) Times a Day Before Meals & at Bedtime., Disp: 120 capsule, Rfl: 5  •  diphenhydrAMINE (BENADRYL ALLERGY) 25 mg capsule, Take 1 capsule by mouth Every 6 (Six) Hours As Needed for Itching., Disp: 90 capsule, Rfl: 3  •  docusate sodium 100 MG capsule, Take 100 mg by mouth  2 (Two) Times a Day As Needed for Constipation., Disp: 60 each, Rfl: 0  •  finasteride (PROSCAR) 5 MG tablet, Take 1 tablet by mouth Daily., Disp: 90 tablet, Rfl: 3  •  glucose blood test strip, Check blood sugar 3x times a day., Disp: 100 each, Rfl: 12  •  glucose monitor monitoring kit, 1 each 3 (Three) Times a Day As Needed (diabetes)., Disp: 1 each, Rfl: 0  •  isosorbide mononitrate (IMDUR) 30 MG 24 hr tablet, Take 1 tablet by mouth 2 (Two) Times a Day., Disp: 60 tablet, Rfl: 5  •  Lancets (ONETOUCH ULTRASOFT) lancets, Check blood sugar 3 times daily, Disp: 100 each, Rfl: 12  •  metFORMIN (GLUCOPHAGE) 500 MG tablet, Take 1 tablet by mouth 2 (Two) Times a Day With Meals., Disp: 180 tablet, Rfl: 3  •  naloxone (NARCAN) 4 MG/0.1ML nasal spray, 1 spray into the nostril(s) as directed by provider As Needed (overdose)., Disp: 1 each, Rfl: 0  •  nitroglycerin (NITROSTAT) 0.4 MG SL tablet, 1 under the tongue as needed for angina, may repeat q5mins for up three doses, Disp: 50 tablet, Rfl: 1  •  NP THYROID 30 MG tablet, Take 1 tablet by mouth once daily, Disp: 30 tablet, Rfl: 5  •  O2 (OXYGEN), Inhale 2 L/min Every Night., Disp: , Rfl:   •  ondansetron (ZOFRAN) 8 MG tablet, Take 1 tablet by mouth Every 8 (Eight) Hours As Needed for Nausea., Disp: 20 tablet, Rfl: 2  •  pantoprazole (PROTONIX) 40 MG EC tablet, Take 1 tablet by mouth Daily., Disp: 90 tablet, Rfl: 3  •  polyethylene glycol (MIRALAX) packet, Take 17 g by mouth Daily. (Patient taking differently: Take 17 g by mouth Daily As Needed.), Disp: 1 each, Rfl: 5  •  Ropivacine HCl-NaCl (NAROPIN), 20 mg/hr by Peripheral Nerve route Continuous. Indications: Acute Pain, Disp: , Rfl:   •  simvastatin (ZOCOR) 40 MG tablet, Take 1 tablet by mouth Every Night., Disp: 90 tablet, Rfl: 3  •  vitamin D (ERGOCALCIFEROL) 54296 units capsule capsule, Take 1 capsule by mouth Every 7 (Seven) Days., Disp: 12 capsule, Rfl: 3  •  icosapent ethyl (VASCEPA) 1 g capsule capsule, 2 twice  daily, Disp: 120 capsule, Rfl: 5  •  traMADol (ULTRAM) 50 MG tablet, Take 1 tablet by mouth Every 8 (Eight) Hours As Needed for Moderate Pain  or Severe Pain ., Disp: 60 tablet, Rfl: 2    Current Facility-Administered Medications:   •  cyanocobalamin injection 1,000 mcg, 1,000 mcg, Intramuscular, Q28 Days, Clare Quintero, APRN, 1,000 mcg at 07/11/18 0843         Proper body mechanics has been reviewed and discussed today.      As part of this patient's treatment plan they are being prescribed controlled substance/substances with potential for abuse. This patient has been made aware of the appropriate use of such medications, including potential risk of somnolence, limited ability to drive and / or work safely, and potential for overdose. It has also been made clear that these medications are for use by this patient only, without concomitant use of alcohol or other substances unless prescribed/advised by medical provider. Patient has completed controlled substance agreement detailing terms of continued prescribing of controlled substances including monitoring Danny reports, drug screens and pill counts. The patient was asked and states they are not receiving narcotic medication from any there provider or any provider that this office has not been made aware of. History and physical exam exhibit continued safe and appropriate use of controlled substances.   Goal: Improved quality of life and reduction in pain/anxiety as evidenced by pt report.   Danny #[id number] has been reviewed as consistent.  UDS is on file.   Patient states he does not need a prescription for clonazepam today.  He uses very sparingly.  Patient does need a refill on his Ultram.  Patient has been instructed and counseled regarding opioid misuse and risk of addiction.  We have discussed proper storage and disposal of controlled medication.    Due to coronavirus pandemic and state of emergency patients controlled medication will be sent  to the pharmacy without a face-to-face visit. In the effort to stop the spread and prevent illness all unnessacary medial appointments will be rescheduled.  We will continue to evaluate on a monthly basis based on government recommendations and quarantine precautions.    Coronavirus precautions have been reviewed and discussed.  I have discussed the CDC recommendations  of social distancing, hand washing and disinfecting commonly touched items. Reviewed need to notify PCP and self quarantine with mild symptoms.  Discussed procedure to obtain Covid-19 testing and notification of PCP/health dept/ED/Urgent Center if symptoms begin. Understanding verbalized.    .     Medication list reviewed and discussed.  Refill routine medications.      Emotional support and active listening provided.   I have discussed diagnosis in detail today allowing time for questions and answers. Pt is aware of reasons to seek urgent or emergent medical care as well as reasons to return to the clinic for evaluation. Possible side effects, interactions and progression of symptoms discussed as well. Pt / family states understanding.     Pt has been instructed today regarding low fat heart smart diet. Weight management and routine exercise has been recommended. Avoid high fat foods, starchy foods and processed foods. Increase lean meats, fresh vegetables and fresh fruits.     Pt has been educated/instructed on diabetic care and protocols.  Diabetic diet instructions provided.  Medication regimen reviewed.  Discussed possible side effects and interactions of current medications.  Sick day rules reviewed. Continue to monitor blood sugar and report abnormal readings as discussed today. Understanding stated by patient.       Follow up in 3 months. Routine labs fasting one week prior to next office visit. Return sooner if needed.       This visit has been rescheduled as a phone visit to comply with patient safety concerns in accordance with CDC  recommendations. Total time of discussion was 17 minutes.

## 2020-07-09 RX ORDER — TRAMADOL HYDROCHLORIDE 50 MG/1
50 TABLET ORAL EVERY 8 HOURS PRN
Qty: 60 TABLET | Refills: 2 | Status: SHIPPED | OUTPATIENT
Start: 2020-07-09 | End: 2020-10-19 | Stop reason: SDUPTHER

## 2020-07-13 ENCOUNTER — LAB (OUTPATIENT)
Dept: FAMILY MEDICINE CLINIC | Facility: CLINIC | Age: 60
End: 2020-07-13

## 2020-07-13 DIAGNOSIS — K52.9 COLITIS: ICD-10-CM

## 2020-07-13 DIAGNOSIS — I25.10 CORONARY ARTERY DISEASE INVOLVING NATIVE CORONARY ARTERY OF NATIVE HEART WITHOUT ANGINA PECTORIS: ICD-10-CM

## 2020-07-13 DIAGNOSIS — F41.1 GENERALIZED ANXIETY DISORDER: ICD-10-CM

## 2020-07-13 DIAGNOSIS — M15.9 OSTEOARTHRITIS INVOLVING MULTIPLE JOINTS ON BOTH SIDES OF BODY: ICD-10-CM

## 2020-07-13 DIAGNOSIS — E11.59 TYPE 2 DIABETES MELLITUS WITH OTHER CIRCULATORY COMPLICATION, WITHOUT LONG-TERM CURRENT USE OF INSULIN (HCC): ICD-10-CM

## 2020-07-13 DIAGNOSIS — E11.51 TYPE 2 DIABETES MELLITUS WITH DIABETIC PERIPHERAL ANGIOPATHY WITHOUT GANGRENE, WITHOUT LONG-TERM CURRENT USE OF INSULIN (HCC): ICD-10-CM

## 2020-07-13 DIAGNOSIS — R21 RASH AND NONSPECIFIC SKIN ERUPTION: ICD-10-CM

## 2020-07-13 LAB
ALBUMIN SERPL-MCNC: 4.2 G/DL (ref 3.5–5.2)
ALBUMIN/GLOB SERPL: 1.4 G/DL
ALP SERPL-CCNC: 91 U/L (ref 39–117)
ALT SERPL W P-5'-P-CCNC: 9 U/L (ref 1–41)
ANION GAP SERPL CALCULATED.3IONS-SCNC: 11.4 MMOL/L (ref 5–15)
AST SERPL-CCNC: 11 U/L (ref 1–40)
BASOPHILS # BLD AUTO: 0.04 10*3/MM3 (ref 0–0.2)
BASOPHILS NFR BLD AUTO: 0.6 % (ref 0–1.5)
BILIRUB SERPL-MCNC: 0.4 MG/DL (ref 0–1.2)
BUN SERPL-MCNC: 9 MG/DL (ref 6–20)
BUN/CREAT SERPL: 9.1 (ref 7–25)
CALCIUM SPEC-SCNC: 9.2 MG/DL (ref 8.6–10.5)
CHLORIDE SERPL-SCNC: 100 MMOL/L (ref 98–107)
CHOLEST SERPL-MCNC: 155 MG/DL (ref 0–200)
CO2 SERPL-SCNC: 26.6 MMOL/L (ref 22–29)
CREAT SERPL-MCNC: 0.99 MG/DL (ref 0.76–1.27)
DEPRECATED RDW RBC AUTO: 46.6 FL (ref 37–54)
EOSINOPHIL # BLD AUTO: 0.65 10*3/MM3 (ref 0–0.4)
EOSINOPHIL NFR BLD AUTO: 9.1 % (ref 0.3–6.2)
ERYTHROCYTE [DISTWIDTH] IN BLOOD BY AUTOMATED COUNT: 15.4 % (ref 12.3–15.4)
GFR SERPL CREATININE-BSD FRML MDRD: 78 ML/MIN/1.73
GLOBULIN UR ELPH-MCNC: 2.9 GM/DL
GLUCOSE SERPL-MCNC: 111 MG/DL (ref 65–99)
HBA1C MFR BLD: 7 % (ref 4.8–5.6)
HCT VFR BLD AUTO: 42.2 % (ref 37.5–51)
HDLC SERPL-MCNC: 49 MG/DL (ref 40–60)
HGB BLD-MCNC: 13.4 G/DL (ref 13–17.7)
IMM GRANULOCYTES # BLD AUTO: 0.02 10*3/MM3 (ref 0–0.05)
IMM GRANULOCYTES NFR BLD AUTO: 0.3 % (ref 0–0.5)
LDLC SERPL CALC-MCNC: 75 MG/DL (ref 0–100)
LDLC/HDLC SERPL: 1.53 {RATIO}
LYMPHOCYTES # BLD AUTO: 2.08 10*3/MM3 (ref 0.7–3.1)
LYMPHOCYTES NFR BLD AUTO: 29.1 % (ref 19.6–45.3)
MCH RBC QN AUTO: 26.3 PG (ref 26.6–33)
MCHC RBC AUTO-ENTMCNC: 31.8 G/DL (ref 31.5–35.7)
MCV RBC AUTO: 82.7 FL (ref 79–97)
MONOCYTES # BLD AUTO: 0.72 10*3/MM3 (ref 0.1–0.9)
MONOCYTES NFR BLD AUTO: 10.1 % (ref 5–12)
NEUTROPHILS NFR BLD AUTO: 3.63 10*3/MM3 (ref 1.7–7)
NEUTROPHILS NFR BLD AUTO: 50.8 % (ref 42.7–76)
NRBC BLD AUTO-RTO: 0 /100 WBC (ref 0–0.2)
PLATELET # BLD AUTO: 389 10*3/MM3 (ref 140–450)
PMV BLD AUTO: 9.3 FL (ref 6–12)
POTASSIUM SERPL-SCNC: 4.1 MMOL/L (ref 3.5–5.2)
PROT SERPL-MCNC: 7.1 G/DL (ref 6–8.5)
RBC # BLD AUTO: 5.1 10*6/MM3 (ref 4.14–5.8)
SODIUM SERPL-SCNC: 138 MMOL/L (ref 136–145)
TRIGL SERPL-MCNC: 155 MG/DL (ref 0–150)
VLDLC SERPL-MCNC: 31 MG/DL (ref 5–40)
WBC # BLD AUTO: 7.14 10*3/MM3 (ref 3.4–10.8)

## 2020-07-13 PROCEDURE — 80053 COMPREHEN METABOLIC PANEL: CPT | Performed by: NURSE PRACTITIONER

## 2020-07-13 PROCEDURE — 83036 HEMOGLOBIN GLYCOSYLATED A1C: CPT | Performed by: NURSE PRACTITIONER

## 2020-07-13 PROCEDURE — 85025 COMPLETE CBC W/AUTO DIFF WBC: CPT | Performed by: NURSE PRACTITIONER

## 2020-07-13 PROCEDURE — 80061 LIPID PANEL: CPT | Performed by: NURSE PRACTITIONER

## 2020-07-14 ENCOUNTER — HOSPITAL ENCOUNTER (OUTPATIENT)
Dept: CARDIOLOGY | Facility: HOSPITAL | Age: 60
Discharge: HOME | End: 2020-07-14

## 2020-07-14 ENCOUNTER — HOSPITAL ENCOUNTER (OUTPATIENT)
Dept: NUCLEAR MEDICINE | Facility: HOSPITAL | Age: 60
Discharge: HOME | End: 2020-07-14

## 2020-07-14 DIAGNOSIS — Z95.5 HISTORY OF CORONARY ANGIOPLASTY WITH INSERTION OF STENT: ICD-10-CM

## 2020-07-14 DIAGNOSIS — I20.8 EFFORT ANGINA (HCC): ICD-10-CM

## 2020-07-14 LAB
BH CV NUCLEAR PRIOR STUDY: 3
BH CV STRESS BP STAGE 1: NORMAL
BH CV STRESS BP STAGE 2: NORMAL
BH CV STRESS COMMENTS STAGE 1: NORMAL
BH CV STRESS COMMENTS STAGE 2: NORMAL
BH CV STRESS DOSE REGADENOSON STAGE 1: 0.4
BH CV STRESS DURATION MIN STAGE 1: 0
BH CV STRESS DURATION MIN STAGE 2: 4
BH CV STRESS DURATION SEC STAGE 1: 10
BH CV STRESS DURATION SEC STAGE 2: 0
BH CV STRESS HR STAGE 1: 96
BH CV STRESS HR STAGE 2: 88
BH CV STRESS PROTOCOL 1: NORMAL
BH CV STRESS RECOVERY BP: NORMAL MMHG
BH CV STRESS RECOVERY HR: 88 BPM
BH CV STRESS STAGE 1: 1
BH CV STRESS STAGE 2: 2
LV EF NUC BP: 63 %
MAXIMAL PREDICTED HEART RATE: 163 BPM
PERCENT MAX PREDICTED HR: 58.9 %
STRESS BASELINE BP: NORMAL MMHG
STRESS BASELINE HR: 78 BPM
STRESS PERCENT HR: 69 %
STRESS POST PEAK BP: NORMAL MMHG
STRESS POST PEAK HR: 96 BPM
STRESS TARGET HR: 139 BPM

## 2020-07-14 PROCEDURE — 82043 UR ALBUMIN QUANTITATIVE: CPT | Performed by: NURSE PRACTITIONER

## 2020-07-14 PROCEDURE — 78452 HT MUSCLE IMAGE SPECT MULT: CPT | Performed by: INTERNAL MEDICINE

## 2020-07-14 PROCEDURE — A9500 TC99M SESTAMIBI: HCPCS | Performed by: INTERNAL MEDICINE

## 2020-07-14 PROCEDURE — 0 TECHNETIUM SESTAMIBI: Performed by: INTERNAL MEDICINE

## 2020-07-14 PROCEDURE — 93017 CV STRESS TEST TRACING ONLY: CPT

## 2020-07-14 PROCEDURE — 25010000002 REGADENOSON 0.4 MG/5ML SOLUTION: Performed by: INTERNAL MEDICINE

## 2020-07-14 PROCEDURE — 93018 CV STRESS TEST I&R ONLY: CPT | Performed by: INTERNAL MEDICINE

## 2020-07-14 PROCEDURE — 78452 HT MUSCLE IMAGE SPECT MULT: CPT

## 2020-07-14 RX ADMIN — REGADENOSON 0.4 MG: 0.08 INJECTION, SOLUTION INTRAVENOUS at 11:27

## 2020-07-14 RX ADMIN — TECHNETIUM TC 99M SESTAMIBI 1 DOSE: 1 INJECTION INTRAVENOUS at 10:20

## 2020-07-14 RX ADMIN — TECHNETIUM TC 99M SESTAMIBI 1 DOSE: 1 INJECTION INTRAVENOUS at 11:27

## 2020-07-15 ENCOUNTER — OFFICE VISIT (OUTPATIENT)
Dept: FAMILY MEDICINE CLINIC | Facility: CLINIC | Age: 60
End: 2020-07-15

## 2020-07-15 DIAGNOSIS — R29.898 RIGHT HAND WEAKNESS: ICD-10-CM

## 2020-07-15 DIAGNOSIS — R79.89 ABNORMAL THYROID BLOOD TEST: ICD-10-CM

## 2020-07-15 DIAGNOSIS — E11.59 TYPE 2 DIABETES MELLITUS WITH OTHER CIRCULATORY COMPLICATION, WITH LONG-TERM CURRENT USE OF INSULIN (HCC): ICD-10-CM

## 2020-07-15 DIAGNOSIS — I10 ESSENTIAL HYPERTENSION: ICD-10-CM

## 2020-07-15 DIAGNOSIS — M54.16 LUMBAR BACK PAIN WITH RADICULOPATHY AFFECTING LEFT LOWER EXTREMITY: ICD-10-CM

## 2020-07-15 DIAGNOSIS — Z79.4 TYPE 2 DIABETES MELLITUS WITH OTHER CIRCULATORY COMPLICATION, WITH LONG-TERM CURRENT USE OF INSULIN (HCC): ICD-10-CM

## 2020-07-15 DIAGNOSIS — I25.10 CORONARY ARTERY DISEASE INVOLVING NATIVE CORONARY ARTERY OF NATIVE HEART WITHOUT ANGINA PECTORIS: Primary | ICD-10-CM

## 2020-07-15 DIAGNOSIS — K52.9 COLITIS: ICD-10-CM

## 2020-07-15 DIAGNOSIS — F41.1 GENERALIZED ANXIETY DISORDER: ICD-10-CM

## 2020-07-15 DIAGNOSIS — M17.0 PRIMARY OSTEOARTHRITIS OF BOTH KNEES: ICD-10-CM

## 2020-07-15 DIAGNOSIS — E11.51 TYPE 2 DIABETES MELLITUS WITH DIABETIC PERIPHERAL ANGIOPATHY WITHOUT GANGRENE, WITHOUT LONG-TERM CURRENT USE OF INSULIN (HCC): ICD-10-CM

## 2020-07-15 PROBLEM — G89.18 ACUTE POSTOPERATIVE PAIN: Status: RESOLVED | Noted: 2020-01-28 | Resolved: 2020-07-15

## 2020-07-15 PROBLEM — H61.23 EXCESSIVE CERUMEN IN BOTH EAR CANALS: Status: RESOLVED | Noted: 2018-07-11 | Resolved: 2020-07-15

## 2020-07-15 LAB — ALBUMIN UR-MCNC: <1.2 MG/DL

## 2020-07-15 PROCEDURE — 99442 PR PHYS/QHP TELEPHONE EVALUATION 11-20 MIN: CPT | Performed by: NURSE PRACTITIONER

## 2020-07-15 NOTE — PROGRESS NOTES
Establish patient called office of APRN today to discuss:   CC    Degenerative disc disease, nerve damage and diabetes  I have a life insurance disability statement that needs filled out    You have chosen to receive care through a telephone visit today. Do you consent to use a telephone visit for your medical care today? Yes     Brief HPI/ROS obtained as follows:    Mr. Leonard is a 59-year-old male with multiple health conditions making him disabled.  Patient has type 2 diabetes which fluctuates up and down.  He monitors his glucose readings closely.  Working on better compliance with diet.  Patient has a chronic colitis for which she is under the care of GI.  He has required hospitalization due to colitis flareups.  Chronic degenerative disc disease and joint pain.  He is under the care of an Ortho provider as well as a neurosurgeon.  Patient has chronic osteoarthritis in both knees with a recent knee replacement.  He has lumbar back pain with radiculopathy into the left lower extremity.  MRI is on file.  Patient has difficulty standing, sitting or walking for long periods of time.  Becomes weak.  Has pain which interferes with his quality of life.  Patient currently receives Ultram for his chronic pain.  Mr. Leonard also has chronic anxiety for which he receives Celexa and clonazepam.  Patient has difficulty sleeping for which he receives Elavil.    The following portions of the patient's history, chief complaint and ROS were reviewed and updated as appropriate per provider:  Allergies, current medications, past family history, past medical history, past social history, past surgical history and problem list.    Review of Systems   Constitutional: Negative for activity change, appetite change, chills, fatigue, fever and unexpected weight change.   HENT: Negative for congestion, sinus pain, sneezing, sore throat and trouble swallowing.    Eyes: Negative for pain, discharge and itching.   Respiratory: Negative for  cough and shortness of breath.    Cardiovascular: Positive for chest pain. Negative for palpitations and leg swelling.   Gastrointestinal: Positive for abdominal pain (cramping with diarrhea up to 4 times aweek, under care of GI ) and diarrhea. Negative for anal bleeding, constipation and vomiting.   Endocrine: Negative.    Genitourinary: Negative for difficulty urinating, flank pain, frequency and hematuria.   Musculoskeletal: Positive for arthralgias, back pain and gait problem (Knees). Negative for joint swelling, neck pain and neck stiffness.   Skin: Negative.    Allergic/Immunologic: Positive for immunocompromised state (Autoimmune disorder, cardiac patient and diabetic). Negative for environmental allergies and food allergies.   Neurological: Negative for tremors, seizures and speech difficulty.   Hematological: Negative for adenopathy. Does not bruise/bleed easily.   Psychiatric/Behavioral: Negative for behavioral problems, dysphoric mood, self-injury and suicidal ideas. The patient is nervous/anxious (At times).        The current allergy list and medication list was reviewed with patient for accuracy.     Assessment     Alert and oriented x3.  Respirations not labored with conversation.  No cough.  Speech normal.  Hearing adequate.  Cooperative.  Mood normal.  Thought process normal.    Diagnoses and all orders for this visit:    Coronary artery disease involving native coronary artery of native heart without angina pectoris    Type 2 diabetes mellitus with diabetic peripheral angiopathy without gangrene, without long-term current use of insulin (CMS/[facility])    Generalized anxiety disorder    Primary osteoarthritis of both knees    Right hand weakness    Colitis    Lumbar back pain with radiculopathy affecting left lower extremity    It is my opinion that the patient is disabled due to multiple chronic conditions.  Patient became disabled and unable to meet his work requirements in July 2016.      At this time  we will continue medication, specialty consultation and treatment, supportive measures and conservative treatment.    Medication reviewed and discussed.    Body mechanics reviewed and discussed.  Have reviewed his medical records including a labs, radiology and procedures.  [employer] statement of disability has been completed.  Staff have been notified to fax the completed form to his insurance company.  Pt has been educated/instructed on diabetic care and protocols.  Diabetic diet instructions provided.  Medication regimen reviewed.  Discussed possible side effects and interactions of current medications.  Sick day rules reviewed. Continue to monitor blood sugar and report abnormal readings as discussed today. Understanding stated by patient.      Coronavirus precautions have been reviewed and discussed.  I have discussed the CDC recommendations  of social distancing, hand washing, wearing mask and disinfecting commonly touched items. Reviewed need to notify PCP and self quarantine with mild symptoms.  Discussed procedure to obtain Covid-19 testing and notification of PCP/health dept/ED/Urgent Center if symptoms begin. Understanding verbalized.    Patient instructed and advised to call if symptoms are increasing or new symptoms occur.    Understands reasons for urgent and emergent care.  Patient (& family) verbalized agreement for treatment plan.     I would like to do routine visits every 2-3 months, sooner if needed.    This visit has been rescheduled as a phone visit to comply with patient safety concerns in accordance with CDC recommendations. Total time of discussion was 15 minutes.

## 2020-07-16 RX ORDER — ISOSORBIDE MONONITRATE 30 MG/1
TABLET, EXTENDED RELEASE ORAL
Qty: 180 TABLET | Refills: 0 | Status: SHIPPED | OUTPATIENT
Start: 2020-07-16 | End: 2020-10-19 | Stop reason: SDUPTHER

## 2020-07-16 RX ORDER — AMITRIPTYLINE HYDROCHLORIDE 50 MG/1
TABLET, FILM COATED ORAL
Qty: 180 TABLET | Refills: 0 | Status: SHIPPED | OUTPATIENT
Start: 2020-07-16 | End: 2020-10-19 | Stop reason: SDUPTHER

## 2020-07-16 RX ORDER — EXENATIDE 2 MG/.65ML
INJECTION, SUSPENSION, EXTENDED RELEASE SUBCUTANEOUS
Qty: 4 EACH | Refills: 0 | Status: SHIPPED | OUTPATIENT
Start: 2020-07-16 | End: 2020-09-29

## 2020-08-05 ENCOUNTER — TELEPHONE (OUTPATIENT)
Dept: CARDIOLOGY | Facility: CLINIC | Age: 60
End: 2020-08-05

## 2020-08-05 NOTE — TELEPHONE ENCOUNTER
----- Message from Marcia Jhaveri MA sent at 8/4/2020  5:18 PM EDT -----  No ischemia found per Dr. Riley

## 2020-08-11 DIAGNOSIS — F41.1 GENERALIZED ANXIETY DISORDER: ICD-10-CM

## 2020-08-12 RX ORDER — CLONAZEPAM 0.5 MG/1
TABLET ORAL
Qty: 90 TABLET | Refills: 0 | Status: SHIPPED | OUTPATIENT
Start: 2020-08-12 | End: 2020-10-19 | Stop reason: SDUPTHER

## 2020-08-12 NOTE — TELEPHONE ENCOUNTER
[facility] #[id number] and urine drug screen from 7/13/2020 have been reviewed as consistent.  Due to positive provider coronavirus exposure I will go ahead and send in patient's Klonopin without a face-to-face visit today.

## 2020-08-19 ENCOUNTER — TELEPHONE (OUTPATIENT)
Dept: CARDIOLOGY | Facility: CLINIC | Age: 60
End: 2020-08-19

## 2020-08-19 NOTE — TELEPHONE ENCOUNTER
Called [facility], spoke with Delores who is listed on HIPAA, to let her know that per Dr. Riley, stress test showed no ischemia. She is aware and understands and will let patient know.

## 2020-09-25 DIAGNOSIS — F41.1 GENERALIZED ANXIETY DISORDER: ICD-10-CM

## 2020-09-25 DIAGNOSIS — E55.9 VITAMIN D DEFICIENCY DISEASE: ICD-10-CM

## 2020-09-25 DIAGNOSIS — E11.59 TYPE 2 DIABETES MELLITUS WITH OTHER CIRCULATORY COMPLICATION, WITH LONG-TERM CURRENT USE OF INSULIN (HCC): ICD-10-CM

## 2020-09-25 DIAGNOSIS — E53.8 VITAMIN B 12 DEFICIENCY: ICD-10-CM

## 2020-09-25 DIAGNOSIS — Z79.4 TYPE 2 DIABETES MELLITUS WITH OTHER CIRCULATORY COMPLICATION, WITH LONG-TERM CURRENT USE OF INSULIN (HCC): ICD-10-CM

## 2020-09-29 RX ORDER — ERGOCALCIFEROL 1.25 MG/1
CAPSULE ORAL
Qty: 12 CAPSULE | Refills: 5 | Status: SHIPPED | OUTPATIENT
Start: 2020-09-29 | End: 2020-10-19 | Stop reason: SDUPTHER

## 2020-09-29 RX ORDER — CITALOPRAM 40 MG/1
TABLET ORAL
Qty: 90 TABLET | Refills: 5 | Status: SHIPPED | OUTPATIENT
Start: 2020-09-29 | End: 2020-10-19 | Stop reason: SDUPTHER

## 2020-09-29 RX ORDER — CYANOCOBALAMIN 1000 UG/ML
INJECTION, SOLUTION INTRAMUSCULAR; SUBCUTANEOUS
Qty: 1 ML | Refills: 5 | Status: SHIPPED | OUTPATIENT
Start: 2020-09-29 | End: 2020-10-19 | Stop reason: SDUPTHER

## 2020-09-29 RX ORDER — EXENATIDE 2 MG/.65ML
INJECTION, SUSPENSION, EXTENDED RELEASE SUBCUTANEOUS
Qty: 4 EACH | Refills: 5 | Status: SHIPPED | OUTPATIENT
Start: 2020-09-29 | End: 2020-10-19 | Stop reason: SDUPTHER

## 2020-10-19 ENCOUNTER — OFFICE VISIT (OUTPATIENT)
Dept: FAMILY MEDICINE CLINIC | Facility: CLINIC | Age: 60
End: 2020-10-19

## 2020-10-19 VITALS
TEMPERATURE: 97.1 F | DIASTOLIC BLOOD PRESSURE: 80 MMHG | SYSTOLIC BLOOD PRESSURE: 120 MMHG | BODY MASS INDEX: 29.35 KG/M2 | HEART RATE: 85 BPM | WEIGHT: 205 LBS | OXYGEN SATURATION: 91 % | HEIGHT: 70 IN

## 2020-10-19 DIAGNOSIS — I10 ESSENTIAL HYPERTENSION: ICD-10-CM

## 2020-10-19 DIAGNOSIS — Z79.4 TYPE 2 DIABETES MELLITUS WITH OTHER CIRCULATORY COMPLICATION, WITH LONG-TERM CURRENT USE OF INSULIN (HCC): ICD-10-CM

## 2020-10-19 DIAGNOSIS — M54.16 LUMBAR BACK PAIN WITH RADICULOPATHY AFFECTING LEFT LOWER EXTREMITY: ICD-10-CM

## 2020-10-19 DIAGNOSIS — F41.1 GENERALIZED ANXIETY DISORDER: ICD-10-CM

## 2020-10-19 DIAGNOSIS — R79.89 ABNORMAL THYROID BLOOD TEST: ICD-10-CM

## 2020-10-19 DIAGNOSIS — E55.9 VITAMIN D DEFICIENCY DISEASE: ICD-10-CM

## 2020-10-19 DIAGNOSIS — K21.9 GASTROESOPHAGEAL REFLUX DISEASE WITHOUT ESOPHAGITIS: ICD-10-CM

## 2020-10-19 DIAGNOSIS — I25.10 CORONARY ARTERY DISEASE INVOLVING NATIVE CORONARY ARTERY OF NATIVE HEART WITHOUT ANGINA PECTORIS: ICD-10-CM

## 2020-10-19 DIAGNOSIS — E11.59 TYPE 2 DIABETES MELLITUS WITH OTHER CIRCULATORY COMPLICATION, WITHOUT LONG-TERM CURRENT USE OF INSULIN (HCC): ICD-10-CM

## 2020-10-19 DIAGNOSIS — J30.1 SEASONAL ALLERGIC RHINITIS DUE TO POLLEN: ICD-10-CM

## 2020-10-19 DIAGNOSIS — E11.51 TYPE 2 DIABETES MELLITUS WITH DIABETIC PERIPHERAL ANGIOPATHY WITHOUT GANGRENE, WITHOUT LONG-TERM CURRENT USE OF INSULIN (HCC): Primary | ICD-10-CM

## 2020-10-19 DIAGNOSIS — N40.0 BENIGN NON-NODULAR PROSTATIC HYPERPLASIA WITHOUT LOWER URINARY TRACT SYMPTOMS: ICD-10-CM

## 2020-10-19 DIAGNOSIS — E03.8 OTHER SPECIFIED HYPOTHYROIDISM: ICD-10-CM

## 2020-10-19 DIAGNOSIS — E55.9 VITAMIN D DEFICIENCY: ICD-10-CM

## 2020-10-19 DIAGNOSIS — E53.8 VITAMIN B 12 DEFICIENCY: ICD-10-CM

## 2020-10-19 DIAGNOSIS — E78.00 HYPERCHOLESTEROLEMIA: ICD-10-CM

## 2020-10-19 DIAGNOSIS — H61.23 BILATERAL IMPACTED CERUMEN: ICD-10-CM

## 2020-10-19 DIAGNOSIS — E11.59 TYPE 2 DIABETES MELLITUS WITH OTHER CIRCULATORY COMPLICATION, WITH LONG-TERM CURRENT USE OF INSULIN (HCC): ICD-10-CM

## 2020-10-19 DIAGNOSIS — Z23 ENCOUNTER FOR VACCINATION: ICD-10-CM

## 2020-10-19 DIAGNOSIS — K52.9 COLITIS: ICD-10-CM

## 2020-10-19 PROCEDURE — 69210 REMOVE IMPACTED EAR WAX UNI: CPT | Performed by: NURSE PRACTITIONER

## 2020-10-19 PROCEDURE — 90636 HEP A/HEP B VACC ADULT IM: CPT | Performed by: NURSE PRACTITIONER

## 2020-10-19 PROCEDURE — 80061 LIPID PANEL: CPT | Performed by: NURSE PRACTITIONER

## 2020-10-19 PROCEDURE — 80053 COMPREHEN METABOLIC PANEL: CPT | Performed by: NURSE PRACTITIONER

## 2020-10-19 PROCEDURE — 84443 ASSAY THYROID STIM HORMONE: CPT | Performed by: NURSE PRACTITIONER

## 2020-10-19 PROCEDURE — 36415 COLL VENOUS BLD VENIPUNCTURE: CPT | Performed by: NURSE PRACTITIONER

## 2020-10-19 PROCEDURE — 82043 UR ALBUMIN QUANTITATIVE: CPT | Performed by: NURSE PRACTITIONER

## 2020-10-19 PROCEDURE — 83036 HEMOGLOBIN GLYCOSYLATED A1C: CPT | Performed by: NURSE PRACTITIONER

## 2020-10-19 PROCEDURE — 82607 VITAMIN B-12: CPT | Performed by: NURSE PRACTITIONER

## 2020-10-19 PROCEDURE — G0008 ADMIN INFLUENZA VIRUS VAC: HCPCS | Performed by: NURSE PRACTITIONER

## 2020-10-19 PROCEDURE — 82306 VITAMIN D 25 HYDROXY: CPT | Performed by: NURSE PRACTITIONER

## 2020-10-19 PROCEDURE — 99214 OFFICE O/P EST MOD 30 MIN: CPT | Performed by: NURSE PRACTITIONER

## 2020-10-19 PROCEDURE — 85025 COMPLETE CBC W/AUTO DIFF WBC: CPT | Performed by: NURSE PRACTITIONER

## 2020-10-19 RX ORDER — FINASTERIDE 5 MG/1
5 TABLET, FILM COATED ORAL DAILY
Qty: 90 TABLET | Refills: 3 | Status: SHIPPED | OUTPATIENT
Start: 2020-10-19 | End: 2021-03-16 | Stop reason: SDUPTHER

## 2020-10-19 RX ORDER — ERGOCALCIFEROL 1.25 MG/1
50000 CAPSULE ORAL
Qty: 12 CAPSULE | Refills: 5 | Status: SHIPPED | OUTPATIENT
Start: 2020-10-19

## 2020-10-19 RX ORDER — LEVOTHYROXINE AND LIOTHYRONINE 19; 4.5 UG/1; UG/1
30 TABLET ORAL DAILY
Qty: 30 TABLET | Refills: 5 | Status: SHIPPED | OUTPATIENT
Start: 2020-10-19 | End: 2021-03-16 | Stop reason: SDUPTHER

## 2020-10-19 RX ORDER — CYANOCOBALAMIN 1000 UG/ML
1000 INJECTION, SOLUTION INTRAMUSCULAR; SUBCUTANEOUS
Qty: 1 ML | Refills: 5 | Status: SHIPPED | OUTPATIENT
Start: 2020-10-19 | End: 2021-03-16 | Stop reason: SDUPTHER

## 2020-10-19 RX ORDER — ICOSAPENT ETHYL 1000 MG/1
CAPSULE ORAL
Qty: 120 CAPSULE | Refills: 5 | Status: SHIPPED | OUTPATIENT
Start: 2020-10-19 | End: 2021-03-16 | Stop reason: SDUPTHER

## 2020-10-19 RX ORDER — NALOXONE HYDROCHLORIDE 4 MG/.1ML
1 SPRAY NASAL AS NEEDED
Qty: 1 EACH | Refills: 0 | Status: SHIPPED | OUTPATIENT
Start: 2020-10-19 | End: 2021-03-16 | Stop reason: SDUPTHER

## 2020-10-19 RX ORDER — CARVEDILOL 12.5 MG/1
12.5 TABLET ORAL 2 TIMES DAILY WITH MEALS
Qty: 180 TABLET | Refills: 3 | Status: SHIPPED | OUTPATIENT
Start: 2020-10-19 | End: 2021-07-06

## 2020-10-19 RX ORDER — ISOSORBIDE MONONITRATE 30 MG/1
30 TABLET, EXTENDED RELEASE ORAL 2 TIMES DAILY
Qty: 180 TABLET | Refills: 0 | Status: SHIPPED | OUTPATIENT
Start: 2020-10-19 | End: 2020-12-12 | Stop reason: HOSPADM

## 2020-10-19 RX ORDER — CLONAZEPAM 0.5 MG/1
0.5 TABLET ORAL 3 TIMES DAILY PRN
Qty: 90 TABLET | Refills: 0 | Status: ON HOLD | OUTPATIENT
Start: 2020-10-19 | End: 2020-12-12 | Stop reason: SDUPTHER

## 2020-10-19 RX ORDER — PSEUDOEPHEDRINE HCL 30 MG
100 TABLET ORAL 2 TIMES DAILY PRN
Qty: 60 EACH | Refills: 5 | Status: SHIPPED | OUTPATIENT
Start: 2020-10-19 | End: 2021-03-16 | Stop reason: SDUPTHER

## 2020-10-19 RX ORDER — TRAMADOL HYDROCHLORIDE 50 MG/1
50 TABLET ORAL EVERY 8 HOURS PRN
Qty: 60 TABLET | Refills: 3 | Status: ON HOLD | OUTPATIENT
Start: 2020-10-19 | End: 2020-12-12 | Stop reason: SDUPTHER

## 2020-10-19 RX ORDER — PANTOPRAZOLE SODIUM 40 MG/1
40 TABLET, DELAYED RELEASE ORAL DAILY
Qty: 90 TABLET | Refills: 3 | Status: SHIPPED | OUTPATIENT
Start: 2020-10-19 | End: 2021-03-16 | Stop reason: SDUPTHER

## 2020-10-19 RX ORDER — EXENATIDE 2 MG/.65ML
2 INJECTION, SUSPENSION, EXTENDED RELEASE SUBCUTANEOUS WEEKLY
Qty: 4 EACH | Refills: 5 | Status: SHIPPED | OUTPATIENT
Start: 2020-10-19 | End: 2021-03-16 | Stop reason: SDUPTHER

## 2020-10-19 RX ORDER — AMITRIPTYLINE HYDROCHLORIDE 50 MG/1
100 TABLET, FILM COATED ORAL NIGHTLY PRN
Qty: 180 TABLET | Refills: 1 | Status: SHIPPED | OUTPATIENT
Start: 2020-10-19 | End: 2021-03-16 | Stop reason: SDUPTHER

## 2020-10-19 RX ORDER — DIPHENHYDRAMINE HCL 25 MG
25 CAPSULE ORAL EVERY 6 HOURS PRN
Qty: 90 CAPSULE | Refills: 3 | Status: SHIPPED | OUTPATIENT
Start: 2020-10-19

## 2020-10-19 RX ORDER — ONDANSETRON HYDROCHLORIDE 8 MG/1
8 TABLET, FILM COATED ORAL EVERY 8 HOURS PRN
Qty: 20 TABLET | Refills: 2 | Status: SHIPPED | OUTPATIENT
Start: 2020-10-19 | End: 2021-03-16 | Stop reason: SDUPTHER

## 2020-10-19 RX ORDER — NITROGLYCERIN 0.4 MG/1
TABLET SUBLINGUAL
Qty: 50 TABLET | Refills: 1 | Status: SHIPPED | OUTPATIENT
Start: 2020-10-19

## 2020-10-19 RX ORDER — SIMVASTATIN 40 MG
40 TABLET ORAL NIGHTLY
Qty: 90 TABLET | Refills: 2 | Status: SHIPPED | OUTPATIENT
Start: 2020-10-19 | End: 2020-12-12 | Stop reason: HOSPADM

## 2020-10-19 RX ORDER — DICYCLOMINE HYDROCHLORIDE 10 MG/1
10 CAPSULE ORAL
Qty: 120 CAPSULE | Refills: 5 | Status: SHIPPED | OUTPATIENT
Start: 2020-10-19 | End: 2021-03-16 | Stop reason: SDUPTHER

## 2020-10-19 RX ORDER — CITALOPRAM 40 MG/1
40 TABLET ORAL DAILY
Qty: 90 TABLET | Refills: 5 | Status: SHIPPED | OUTPATIENT
Start: 2020-10-19 | End: 2021-03-16 | Stop reason: SDUPTHER

## 2020-10-19 NOTE — PROGRESS NOTES
Subjective   Damaso Leonard is a 60 y.o. male.       Cc  Dm  Arthritis  Anxiety     History of Present Illness:    Type 2 diabetes-well controlled with metformin and Bydureon.  Denies any side effects.  Denies any symptomatic hypoglycemia or hyperglycemia.  Currently receives aspirin, metformin, simvastatin and CPAP.    BPH-stable with Proscar    IBS-taking Bentyl.  Under the care of GI.    Arthritis pain-patient has chronic joint pain.  Does have radiology on file.  Rates his pain a 6 on pain scale rating of 0-10.  Described as dull aching and throbbing pain located in his low back, hips and knees.  At times he has pain in his upper back as well.  Pain is worse with activity.  Becomes stiff with prolonged immobility.  No history of substance abuse or addiction.  Has failed conservative treatment such as physical therapy and Ortho/neuro consults.    Preventative health-patient declines influenza vaccine.  He would like his second dose of hepatitis A and B vaccine.    Anxiety with insomnia-patient currently receives Elavil.  Elavil helps him sleep at night.  Patient has frequent panic attacks/anxiety attacks.  When these attacks start he is unable to perform his daily activity, does not want to leave the house, hyperventilates.  Current medication regimen including clonazepam helps control these anxiety episodes as well as helps with sleep.  He only takes clonazepam when Elavil is not successful with sleep.  He does not take clonazepam with me in a 4-hour window of taking Elavil.      Patient feels as if his bilateral ears are clogged up.      The following portions of the patient's history and ROS were reviewed and updated as appropriate per provider:  Allergies, current medications, past family history, past medical history, past social history, past surgical history and problem list.    Review of Systems   Constitutional: Negative for activity change, appetite change, chills, fatigue and fever.   HENT:  "Negative for congestion, ear pain, facial swelling, hearing loss, sinus pressure, sore throat, trouble swallowing and voice change.    Eyes: Negative for pain, discharge and visual disturbance. Eye redness:      Respiratory: Negative for apnea, cough, chest tightness, shortness of breath and wheezing.    Cardiovascular: Negative for chest pain, palpitations and leg swelling.   Gastrointestinal: Negative for abdominal pain, blood in stool, constipation, diarrhea, nausea and vomiting.   Endocrine: Negative.    Genitourinary: Negative for dysuria, flank pain and hematuria.   Musculoskeletal: Positive for arthralgias, back pain and myalgias. Negative for neck stiffness.   Skin: Negative for color change, pallor, rash and wound.   Allergic/Immunologic: Negative for environmental allergies, food allergies and immunocompromised state.   Neurological: Negative for dizziness, seizures, speech difficulty and headaches.   Hematological: Negative.    Psychiatric/Behavioral: Positive for sleep disturbance. Negative for confusion, dysphoric mood, self-injury and suicidal ideas. The patient is nervous/anxious.        Objective     /80   Pulse 85   Temp 97.1 °F (36.2 °C) (Temporal)   Ht 177.8 cm (70\")   Wt 93 kg (205 lb)   SpO2 91%   BMI 29.41 kg/m²   Hospital Outpatient Visit on 07/14/2020   Component Date Value Ref Range Status   • Target HR (85%) 07/14/2020 139  bpm Final   • Max. Pred. HR (100%) 07/14/2020 163  bpm Final   •  CV STRESS PROTOCOL 1 07/14/2020 Pharmacologic   Final   • Stage 1 07/14/2020 1   Final   • HR Stage 1 07/14/2020 96   Final   • BP Stage 1 07/14/2020 133/84   Final   • Duration Min Stage 1 07/14/2020 0   Final   • Duration Sec Stage 1 07/14/2020 10   Final   • Stress Dose Regadenoson Stage 1 07/14/2020 0.4   Final   • Stress Comments Stage 1 07/14/2020 10 sec bolus injection   Final   • Stage 2 07/14/2020 2   Final   • HR Stage 2 07/14/2020 88   Final   • BP Stage 2 07/14/2020 121/79   Final "   • Duration Min Stage 2 07/14/2020 4   Final   • Duration Sec Stage 2 07/14/2020 0   Final   • Stress Comments Stage 2 07/14/2020 recovery   Final   • Baseline HR 07/14/2020 78  bpm Final   • Baseline BP 07/14/2020 120/82  mmHg Final   • Peak HR 07/14/2020 96  bpm Final   • Percent Max Pred HR 07/14/2020 58.90  % Final   • Percent Target HR 07/14/2020 69  % Final   • Peak BP 07/14/2020 133/84  mmHg Final   • Recovery HR 07/14/2020 88  bpm Final   • Recovery BP 07/14/2020 121/79  mmHg Final   • Nuclear Prior Study 07/14/2020 3   Final   • Nuc Stress EF 07/14/2020 63  % Final       Physical Exam  Vitals signs and nursing note reviewed.   Constitutional:       General: He is not in acute distress.     Appearance: Normal appearance. He is well-developed, well-groomed and overweight. He is not ill-appearing, toxic-appearing or diaphoretic.   HENT:      Head: Normocephalic and atraumatic. Hair is normal.      Right Ear: Tympanic membrane, ear canal and external ear normal. There is impacted cerumen.      Left Ear: Tympanic membrane, ear canal and external ear normal.      Ears:      Comments: Bilateral cerumen impaction removed per provider with flex loop instrument, tolerated well.  Large amount of honey colored in consistency cerumen removed.     Nose: Nose normal.      Right Sinus: No maxillary sinus tenderness or frontal sinus tenderness.      Left Sinus: No maxillary sinus tenderness or frontal sinus tenderness.      Mouth/Throat:      Lips: Pink. No lesions.      Pharynx: No oropharyngeal exudate or posterior oropharyngeal erythema.   Eyes:      General: Lids are normal. Vision grossly intact. Gaze aligned appropriately.         Right eye: No discharge.         Left eye: No discharge.      Conjunctiva/sclera: Conjunctivae normal.      Pupils: Pupils are equal, round, and reactive to light.   Neck:      Musculoskeletal: Normal range of motion and neck supple. Normal range of motion. No spinous process tenderness or  muscular tenderness.      Thyroid: No thyromegaly.      Trachea: No tracheal deviation.   Cardiovascular:      Rate and Rhythm: Normal rate and regular rhythm.      Pulses: Normal pulses.      Heart sounds: Normal heart sounds. No murmur. No friction rub. No gallop.    Pulmonary:      Effort: Pulmonary effort is normal. No accessory muscle usage or respiratory distress.      Breath sounds: Normal breath sounds. No wheezing or rales.   Chest:      Chest wall: No tenderness.   Abdominal:      General: Bowel sounds are normal. There is no distension.      Palpations: Abdomen is soft. There is no mass.      Tenderness: There is no abdominal tenderness. There is no guarding or rebound.   Musculoskeletal: Normal range of motion.      Lumbar back: He exhibits tenderness, pain and spasm.      Comments: Decreased lumbar curvature, there is pain with forward flexion. DTR's +2. No edema.    Lymphadenopathy:      Cervical: No cervical adenopathy.   Skin:     General: Skin is warm and dry.      Capillary Refill: Capillary refill takes less than 2 seconds.      Coloration: Skin is not cyanotic or pale.      Findings: No erythema or rash.      Nails: There is no clubbing.     Neurological:      General: No focal deficit present.      Mental Status: He is alert and oriented to person, place, and time.      Deep Tendon Reflexes: Reflexes are normal and symmetric.      Comments: CN 2-12 grossly intact    Psychiatric:         Attention and Perception: Attention normal.         Mood and Affect: Mood normal. Affect is not inappropriate.         Speech: Speech normal.         Behavior: Behavior normal. Behavior is cooperative.         Thought Content: Thought content normal.         Cognition and Memory: Cognition normal.         Judgment: Judgment normal.         Assessment/Plan     Problem List Items Addressed This Visit        Cardiovascular and Mediastinum    Type 2 diabetes mellitus with diabetic peripheral angiopathy without  gangrene, without long-term current use of insulin (CMS/[facility]) - Primary    Relevant Medications    aspirin 325 MG EC tablet    exenatide er (Bydureon) 2 MG pen-injector injection    metFORMIN (GLUCOPHAGE) 500 MG tablet    Other Relevant Orders    CBC & Differential    Comprehensive Metabolic Panel    Lipid Panel    TSH    Hemoglobin A1c    Vitamin B12    Vitamin D 25 Hydroxy    MicroAlbumin, Urine, Random - Urine, Clean Catch    CBC Auto Differential    Coronary artery disease involving native coronary artery of native heart    Relevant Medications    carvedilol (COREG) 12.5 MG tablet    icosapent ethyl (Vascepa) 1 g capsule capsule    isosorbide mononitrate (IMDUR) 30 MG 24 hr tablet    nitroglycerin (NITROSTAT) 0.4 MG SL tablet    Hypercholesterolemia    Relevant Medications    icosapent ethyl (Vascepa) 1 g capsule capsule    simvastatin (Zocor) 40 MG tablet    Other Relevant Orders    CBC & Differential    Comprehensive Metabolic Panel    Lipid Panel    TSH    Hemoglobin A1c    Vitamin B12    Vitamin D 25 Hydroxy    MicroAlbumin, Urine, Random - Urine, Clean Catch    CBC Auto Differential    Essential hypertension    Relevant Medications    carvedilol (COREG) 12.5 MG tablet    isosorbide mononitrate (IMDUR) 30 MG 24 hr tablet    Other Relevant Orders    CBC & Differential    Comprehensive Metabolic Panel    Lipid Panel    TSH    Hemoglobin A1c    Vitamin B12    Vitamin D 25 Hydroxy    MicroAlbumin, Urine, Random - Urine, Clean Catch    CBC Auto Differential       Respiratory    Seasonal allergic rhinitis due to pollen    Relevant Medications    diphenhydrAMINE (Benadryl Allergy) 25 mg capsule       Digestive    Vitamin D deficiency    Relevant Orders    Vitamin D 25 Hydroxy    Vitamin B 12 deficiency    Relevant Medications    cyanocobalamin 1000 MCG/ML injection    Vitamin D deficiency disease    Relevant Medications    vitamin D (ERGOCALCIFEROL) 1.25 MG (71607 UT) capsule capsule    Gastroesophageal reflux  disease without esophagitis    Relevant Medications    dicyclomine (BENTYL) 10 MG capsule    pantoprazole (PROTONIX) 40 MG EC tablet    Colitis    Relevant Medications    dicyclomine (BENTYL) 10 MG capsule    traMADol (ULTRAM) 50 MG tablet       Endocrine    DM (diabetes mellitus) (CMS/HCC)    Relevant Medications    exenatide er (Bydureon) 2 MG pen-injector injection    metFORMIN (GLUCOPHAGE) 500 MG tablet    Other Relevant Orders    CBC & Differential    Comprehensive Metabolic Panel    Lipid Panel    TSH    Hemoglobin A1c    Vitamin B12    Vitamin D 25 Hydroxy    MicroAlbumin, Urine, Random - Urine, Clean Catch    CBC Auto Differential    Hypothyroid    Relevant Medications    carvedilol (COREG) 12.5 MG tablet    Thyroid (NP THYROID) 30 MG PO tablet    Other Relevant Orders    CBC & Differential    Comprehensive Metabolic Panel    Lipid Panel    TSH    Hemoglobin A1c    Vitamin B12    Vitamin D 25 Hydroxy    MicroAlbumin, Urine, Random - Urine, Clean Catch    CBC Auto Differential       Nervous and Auditory    Bilateral impacted cerumen    Current Assessment & Plan     Tolerated removal well, avoid Q-tips         Lumbar back pain with radiculopathy affecting left lower extremity    Relevant Orders    Urine Drug Screen - Urine, Clean Catch       Genitourinary    Benign non-nodular prostatic hyperplasia without lower urinary tract symptoms    Relevant Medications    finasteride (PROSCAR) 5 MG tablet       Other    Generalized anxiety disorder    Relevant Medications    amitriptyline (ELAVIL) 50 MG tablet    citalopram (CeleXA) 40 MG tablet    clonazePAM (KlonoPIN) 0.5 MG tablet    ondansetron (ZOFRAN) 8 MG tablet    traMADol (ULTRAM) 50 MG tablet      Other Visit Diagnoses     Abnormal thyroid blood test        Relevant Medications    isosorbide mononitrate (IMDUR) 30 MG 24 hr tablet    Thyroid (NP THYROID) 30 MG PO tablet    Encounter for vaccination        Relevant Orders    Hepatitis A Hepatitis B Combined  Vaccine IM (Completed)          After obtaining informed consent, the immunization is given by staff.    Recent labs reviewed and discussed with patient.    I have reviewed medication list and discussed with patient the possible side effects and interactions.  We have discussed purpose for medication, route, dosage, frequency.  Refill routine medications.      Current Outpatient Medications:   •  amitriptyline (ELAVIL) 50 MG tablet, Take 2 tablets by mouth At Night As Needed for Sleep., Disp: 180 tablet, Rfl: 1  •  aspirin 325 MG EC tablet, Take 1 tablet by mouth Every 12 (Twelve) Hours. For 1 month, Disp: 60 tablet, Rfl: 0  •  carvedilol (COREG) 12.5 MG tablet, Take 1 tablet by mouth 2 (Two) Times a Day With Meals., Disp: 180 tablet, Rfl: 3  •  citalopram (CeleXA) 40 MG tablet, Take 1 tablet by mouth Daily., Disp: 90 tablet, Rfl: 5  •  clonazePAM (KlonoPIN) 0.5 MG tablet, Take 1 tablet by mouth 3 (Three) Times a Day As Needed for Anxiety. for anxiety, Disp: 90 tablet, Rfl: 0  •  cyanocobalamin 1000 MCG/ML injection, Inject 1 mL into the appropriate muscle as directed by prescriber Every 30 (Thirty) Days., Disp: 1 mL, Rfl: 5  •  dicyclomine (BENTYL) 10 MG capsule, Take 1 capsule by mouth 4 (Four) Times a Day Before Meals & at Bedtime., Disp: 120 capsule, Rfl: 5  •  diphenhydrAMINE (Benadryl Allergy) 25 mg capsule, Take 1 capsule by mouth Every 6 (Six) Hours As Needed for Itching., Disp: 90 capsule, Rfl: 3  •  docusate sodium 100 MG capsule, Take 1 capsule by mouth 2 (Two) Times a Day As Needed (constipation)., Disp: 60 each, Rfl: 5  •  exenatide er (Bydureon) 2 MG pen-injector injection, Inject 1 pen under the skin into the appropriate area as directed 1 (One) Time Per Week., Disp: 4 each, Rfl: 5  •  finasteride (PROSCAR) 5 MG tablet, Take 1 tablet by mouth Daily., Disp: 90 tablet, Rfl: 3  •  glucose blood test strip, Check blood sugar 3x times a day., Disp: 100 each, Rfl: 12  •  glucose monitor monitoring kit, 1  each 3 (Three) Times a Day As Needed (diabetes)., Disp: 1 each, Rfl: 0  •  icosapent ethyl (Vascepa) 1 g capsule capsule, 2 twice daily, Disp: 120 capsule, Rfl: 5  •  isosorbide mononitrate (IMDUR) 30 MG 24 hr tablet, Take 1 tablet by mouth 2 (Two) Times a Day., Disp: 180 tablet, Rfl: 0  •  Lancets (ONETOUCH ULTRASOFT) lancets, Check blood sugar 3 times daily, Disp: 100 each, Rfl: 12  •  metFORMIN (GLUCOPHAGE) 500 MG tablet, Take 1 tablet by mouth 2 (Two) Times a Day With Meals., Disp: 180 tablet, Rfl: 3  •  naloxone (NARCAN) 4 MG/0.1ML nasal spray, 1 spray into the nostril(s) as directed by provider As Needed (overdose)., Disp: 1 each, Rfl: 0  •  nitroglycerin (NITROSTAT) 0.4 MG SL tablet, 1 under the tongue as needed for angina, may repeat q5mins for up three doses, Disp: 50 tablet, Rfl: 1  •  O2 (OXYGEN), Inhale 2 L/min Every Night., Disp: , Rfl:   •  ondansetron (ZOFRAN) 8 MG tablet, Take 1 tablet by mouth Every 8 (Eight) Hours As Needed for Nausea., Disp: 20 tablet, Rfl: 2  •  pantoprazole (PROTONIX) 40 MG EC tablet, Take 1 tablet by mouth Daily., Disp: 90 tablet, Rfl: 3  •  polyethylene glycol (MIRALAX) packet, Take 17 g by mouth Daily. (Patient taking differently: Take 17 g by mouth Daily As Needed.), Disp: 1 each, Rfl: 5  •  Ropivacine HCl-NaCl (NAROPIN), 20 mg/hr by Peripheral Nerve route Continuous. Indications: Acute Pain, Disp: , Rfl:   •  simvastatin (Zocor) 40 MG tablet, Take 1 tablet by mouth Every Night., Disp: 90 tablet, Rfl: 2  •  Thyroid (NP THYROID) 30 MG PO tablet, Take 1 tablet by mouth Daily., Disp: 30 tablet, Rfl: 5  •  traMADol (ULTRAM) 50 MG tablet, Take 1 tablet by mouth Every 8 (Eight) Hours As Needed for Moderate Pain  or Severe Pain ., Disp: 60 tablet, Rfl: 3  •  vitamin D (ERGOCALCIFEROL) 1.25 MG (16913 UT) capsule capsule, Take 1 capsule by mouth Every 7 (Seven) Days., Disp: 12 capsule, Rfl: 5    Current Facility-Administered Medications:   •  cyanocobalamin injection 1,000 mcg, 1,000  mcg, Intramuscular, Q28 Days, Leon Clare Cheema, APRN, 1,000 mcg at 07/11/18 0843    Fasting labs today.       Patient's Body mass index is 29.41 kg/m². BMI is above normal parameters. Recommendations include: exercise counseling and nutrition counseling.    Coronavirus precautions have been reviewed and discussed.  I have discussed the CDC recommendations  of social distancing, hand washing, wearing mask and disinfecting commonly touched items. Reviewed need to notify PCP and self quarantine with mild symptoms.  Discussed procedure to obtain Covid-19 testing and notification of PCP/health dept/ED/Urgent Center if symptoms begin. Understanding verbalized.    Proper body mechanics has been reviewed and discussed today.      As part of this patient's treatment plan they are being prescribed controlled substance/substances with potential for abuse. This patient has been made aware of the appropriate use of such medications, including potential risk of somnolence, limited ability to drive and / or work safely, and potential for overdose. It has also been made clear that these medications are for use by this patient only, without concomitant use of alcohol or other substances unless prescribed/advised by medical provider. Patient has completed controlled substance agreement detailing terms of continued prescribing of controlled substances including monitoring Danny reports, drug screens and pill counts. The patient was asked and states they are not receiving narcotic medication from any there provider or any provider that this office has not been made aware of. History and physical exam exhibit continued safe and appropriate use of controlled substances.   Goal: Improved quality of life and reduction in pain as evidenced by pt report.   Danny  has been reviewed as consistent.  UDS is on file.  Repeat urine drug screen today.  Provide patient with tramadol prescription.  Provide patient with clonazepam   Prescription.  Pt is at low risk for substance abuse or addiction. Pt will be monitored closely for compliance and the need to continue plan of care.  Patient has been instructed and counseled regarding opioid /controlled medication misuse, side effects, possible interactions and risk of addiction.  Patient has been instructed on risk for oversedation, respiratory suppression and even death.  We have discussed proper storage and disposal of controlled medication.      I have discussed diagnosis in detail today allowing time for questions and answers. Patient is aware of reasons to seek urgent or emergent medical care as well as reasons to return to the clinic for evaluation. Possible side effects, interactions and progression of symptoms discussed as well. Patient / family states understanding.   Emotional support and active listening provided.       Follow up in 3 months. Routine labs fasting one week prior to next office visit. Return sooner if needed.           This document has been electronically signed by:  YOUNG Conway, NP-C

## 2020-10-20 LAB
25(OH)D3 SERPL-MCNC: 48.5 NG/ML (ref 30–100)
ALBUMIN SERPL-MCNC: 4.2 G/DL (ref 3.5–5.2)
ALBUMIN UR-MCNC: <1.2 MG/DL
ALBUMIN/GLOB SERPL: 1.4 G/DL
ALP SERPL-CCNC: 92 U/L (ref 39–117)
ALT SERPL W P-5'-P-CCNC: 13 U/L (ref 1–41)
ANION GAP SERPL CALCULATED.3IONS-SCNC: 10.1 MMOL/L (ref 5–15)
AST SERPL-CCNC: 12 U/L (ref 1–40)
BASOPHILS # BLD AUTO: 0.03 10*3/MM3 (ref 0–0.2)
BASOPHILS NFR BLD AUTO: 0.6 % (ref 0–1.5)
BILIRUB SERPL-MCNC: 0.4 MG/DL (ref 0–1.2)
BUN SERPL-MCNC: 8 MG/DL (ref 6–20)
BUN/CREAT SERPL: 8.9 (ref 7–25)
CALCIUM SPEC-SCNC: 9 MG/DL (ref 8.6–10.5)
CHLORIDE SERPL-SCNC: 103 MMOL/L (ref 98–107)
CHOLEST SERPL-MCNC: 204 MG/DL (ref 0–200)
CO2 SERPL-SCNC: 25.9 MMOL/L (ref 22–29)
CREAT SERPL-MCNC: 0.9 MG/DL (ref 0.76–1.27)
DEPRECATED RDW RBC AUTO: 43.3 FL (ref 37–54)
EOSINOPHIL # BLD AUTO: 0.33 10*3/MM3 (ref 0–0.4)
EOSINOPHIL NFR BLD AUTO: 6.2 % (ref 0.3–6.2)
ERYTHROCYTE [DISTWIDTH] IN BLOOD BY AUTOMATED COUNT: 14.4 % (ref 12.3–15.4)
GFR SERPL CREATININE-BSD FRML MDRD: 87 ML/MIN/1.73
GLOBULIN UR ELPH-MCNC: 2.9 GM/DL
GLUCOSE SERPL-MCNC: 96 MG/DL (ref 65–99)
HBA1C MFR BLD: 6.95 % (ref 4.8–5.6)
HCT VFR BLD AUTO: 39.1 % (ref 37.5–51)
HDLC SERPL-MCNC: 52 MG/DL (ref 40–60)
HGB BLD-MCNC: 12.9 G/DL (ref 13–17.7)
IMM GRANULOCYTES # BLD AUTO: 0.01 10*3/MM3 (ref 0–0.05)
IMM GRANULOCYTES NFR BLD AUTO: 0.2 % (ref 0–0.5)
LDLC SERPL CALC-MCNC: 131 MG/DL (ref 0–100)
LDLC/HDLC SERPL: 2.46 {RATIO}
LYMPHOCYTES # BLD AUTO: 1.75 10*3/MM3 (ref 0.7–3.1)
LYMPHOCYTES NFR BLD AUTO: 32.8 % (ref 19.6–45.3)
MCH RBC QN AUTO: 27.3 PG (ref 26.6–33)
MCHC RBC AUTO-ENTMCNC: 33 G/DL (ref 31.5–35.7)
MCV RBC AUTO: 82.8 FL (ref 79–97)
MONOCYTES # BLD AUTO: 0.59 10*3/MM3 (ref 0.1–0.9)
MONOCYTES NFR BLD AUTO: 11 % (ref 5–12)
NEUTROPHILS NFR BLD AUTO: 2.63 10*3/MM3 (ref 1.7–7)
NEUTROPHILS NFR BLD AUTO: 49.2 % (ref 42.7–76)
NRBC BLD AUTO-RTO: 0 /100 WBC (ref 0–0.2)
PLATELET # BLD AUTO: 375 10*3/MM3 (ref 140–450)
PMV BLD AUTO: 9.5 FL (ref 6–12)
POTASSIUM SERPL-SCNC: 3.7 MMOL/L (ref 3.5–5.2)
PROT SERPL-MCNC: 7.1 G/DL (ref 6–8.5)
RBC # BLD AUTO: 4.72 10*6/MM3 (ref 4.14–5.8)
SODIUM SERPL-SCNC: 139 MMOL/L (ref 136–145)
TRIGL SERPL-MCNC: 120 MG/DL (ref 0–150)
TSH SERPL DL<=0.05 MIU/L-ACNC: 2.62 UIU/ML (ref 0.27–4.2)
VIT B12 BLD-MCNC: 277 PG/ML (ref 211–946)
VLDLC SERPL-MCNC: 21 MG/DL (ref 5–40)
WBC # BLD AUTO: 5.34 10*3/MM3 (ref 3.4–10.8)

## 2020-12-10 ENCOUNTER — APPOINTMENT (OUTPATIENT)
Dept: GENERAL RADIOLOGY | Facility: HOSPITAL | Age: 60
End: 2020-12-10

## 2020-12-10 ENCOUNTER — HOSPITAL ENCOUNTER (EMERGENCY)
Facility: HOSPITAL | Age: 60
Discharge: ANOTHER HOSPITAL | End: 2020-12-10
Attending: FAMILY MEDICINE | Admitting: FAMILY MEDICINE

## 2020-12-10 VITALS
SYSTOLIC BLOOD PRESSURE: 146 MMHG | BODY MASS INDEX: 29.35 KG/M2 | DIASTOLIC BLOOD PRESSURE: 98 MMHG | RESPIRATION RATE: 16 BRPM | WEIGHT: 205 LBS | OXYGEN SATURATION: 94 % | HEIGHT: 70 IN | HEART RATE: 82 BPM | TEMPERATURE: 98.1 F

## 2020-12-10 DIAGNOSIS — R07.9 CHEST PAIN, UNSPECIFIED TYPE: Primary | ICD-10-CM

## 2020-12-10 LAB
ALBUMIN SERPL-MCNC: 4.54 G/DL (ref 3.5–5.2)
ALBUMIN/GLOB SERPL: 1.7 G/DL
ALP SERPL-CCNC: 105 U/L (ref 39–117)
ALT SERPL W P-5'-P-CCNC: 16 U/L (ref 1–41)
ANION GAP SERPL CALCULATED.3IONS-SCNC: 13.1 MMOL/L (ref 5–15)
APTT PPP: 24.5 SECONDS (ref 25.6–35.3)
APTT PPP: >100 SECONDS (ref 25.6–35.3)
AST SERPL-CCNC: 16 U/L (ref 1–40)
BASOPHILS # BLD AUTO: 0.02 10*3/MM3 (ref 0–0.2)
BASOPHILS # BLD AUTO: 0.06 10*3/MM3 (ref 0–0.2)
BASOPHILS NFR BLD AUTO: 0.1 % (ref 0–1.5)
BASOPHILS NFR BLD AUTO: 0.5 % (ref 0–1.5)
BILIRUB SERPL-MCNC: 0.5 MG/DL (ref 0–1.2)
BUN SERPL-MCNC: 15 MG/DL (ref 6–20)
BUN/CREAT SERPL: 13 (ref 7–25)
CALCIUM SPEC-SCNC: 9.4 MG/DL (ref 8.6–10.5)
CHLORIDE SERPL-SCNC: 99 MMOL/L (ref 98–107)
CO2 SERPL-SCNC: 21.9 MMOL/L (ref 22–29)
CREAT SERPL-MCNC: 1.15 MG/DL (ref 0.76–1.27)
DEPRECATED RDW RBC AUTO: 44.2 FL (ref 37–54)
DEPRECATED RDW RBC AUTO: 44.7 FL (ref 37–54)
EOSINOPHIL # BLD AUTO: 0.08 10*3/MM3 (ref 0–0.4)
EOSINOPHIL # BLD AUTO: 0.08 10*3/MM3 (ref 0–0.4)
EOSINOPHIL NFR BLD AUTO: 0.6 % (ref 0.3–6.2)
EOSINOPHIL NFR BLD AUTO: 0.6 % (ref 0.3–6.2)
ERYTHROCYTE [DISTWIDTH] IN BLOOD BY AUTOMATED COUNT: 14.3 % (ref 12.3–15.4)
ERYTHROCYTE [DISTWIDTH] IN BLOOD BY AUTOMATED COUNT: 14.3 % (ref 12.3–15.4)
FLUAV RNA RESP QL NAA+PROBE: NOT DETECTED
FLUBV RNA RESP QL NAA+PROBE: NOT DETECTED
GFR SERPL CREATININE-BSD FRML MDRD: 65 ML/MIN/1.73
GLOBULIN UR ELPH-MCNC: 2.7 GM/DL
GLUCOSE SERPL-MCNC: 187 MG/DL (ref 65–99)
HCT VFR BLD AUTO: 39.5 % (ref 37.5–51)
HCT VFR BLD AUTO: 41.3 % (ref 37.5–51)
HGB BLD-MCNC: 12.2 G/DL (ref 13–17.7)
HGB BLD-MCNC: 12.7 G/DL (ref 13–17.7)
HOLD SPECIMEN: NORMAL
IMM GRANULOCYTES # BLD AUTO: 0.03 10*3/MM3 (ref 0–0.05)
IMM GRANULOCYTES # BLD AUTO: 0.05 10*3/MM3 (ref 0–0.05)
IMM GRANULOCYTES NFR BLD AUTO: 0.2 % (ref 0–0.5)
IMM GRANULOCYTES NFR BLD AUTO: 0.4 % (ref 0–0.5)
INR PPP: 1.01 (ref 0.9–1.1)
INR PPP: 1.11 (ref 0.9–1.1)
LYMPHOCYTES # BLD AUTO: 1.22 10*3/MM3 (ref 0.7–3.1)
LYMPHOCYTES # BLD AUTO: 2.45 10*3/MM3 (ref 0.7–3.1)
LYMPHOCYTES NFR BLD AUTO: 19.3 % (ref 19.6–45.3)
LYMPHOCYTES NFR BLD AUTO: 8.8 % (ref 19.6–45.3)
MCH RBC QN AUTO: 26.1 PG (ref 26.6–33)
MCH RBC QN AUTO: 26.3 PG (ref 26.6–33)
MCHC RBC AUTO-ENTMCNC: 30.8 G/DL (ref 31.5–35.7)
MCHC RBC AUTO-ENTMCNC: 30.9 G/DL (ref 31.5–35.7)
MCV RBC AUTO: 84.8 FL (ref 79–97)
MCV RBC AUTO: 85.3 FL (ref 79–97)
MONOCYTES # BLD AUTO: 1.09 10*3/MM3 (ref 0.1–0.9)
MONOCYTES # BLD AUTO: 1.37 10*3/MM3 (ref 0.1–0.9)
MONOCYTES NFR BLD AUTO: 10.8 % (ref 5–12)
MONOCYTES NFR BLD AUTO: 7.9 % (ref 5–12)
NEUTROPHILS NFR BLD AUTO: 11.41 10*3/MM3 (ref 1.7–7)
NEUTROPHILS NFR BLD AUTO: 68.6 % (ref 42.7–76)
NEUTROPHILS NFR BLD AUTO: 8.72 10*3/MM3 (ref 1.7–7)
NEUTROPHILS NFR BLD AUTO: 82.2 % (ref 42.7–76)
NRBC BLD AUTO-RTO: 0 /100 WBC (ref 0–0.2)
NRBC BLD AUTO-RTO: 0 /100 WBC (ref 0–0.2)
NT-PROBNP SERPL-MCNC: 69.7 PG/ML (ref 0–900)
PLATELET # BLD AUTO: 339 10*3/MM3 (ref 140–450)
PLATELET # BLD AUTO: 368 10*3/MM3 (ref 140–450)
PMV BLD AUTO: 8.7 FL (ref 6–12)
PMV BLD AUTO: 9 FL (ref 6–12)
POTASSIUM SERPL-SCNC: 4.1 MMOL/L (ref 3.5–5.2)
PROT SERPL-MCNC: 7.2 G/DL (ref 6–8.5)
PROTHROMBIN TIME: 13.1 SECONDS (ref 11.9–14.1)
PROTHROMBIN TIME: 14.1 SECONDS (ref 11.9–14.1)
RBC # BLD AUTO: 4.63 10*6/MM3 (ref 4.14–5.8)
RBC # BLD AUTO: 4.87 10*6/MM3 (ref 4.14–5.8)
SARS-COV-2 RNA RESP QL NAA+PROBE: NOT DETECTED
SODIUM SERPL-SCNC: 134 MMOL/L (ref 136–145)
TROPONIN T SERPL-MCNC: 0.09 NG/ML (ref 0–0.03)
TROPONIN T SERPL-MCNC: <0.01 NG/ML (ref 0–0.03)
WBC # BLD AUTO: 12.71 10*3/MM3 (ref 3.4–10.8)
WBC # BLD AUTO: 13.87 10*3/MM3 (ref 3.4–10.8)
WHOLE BLOOD HOLD SPECIMEN: NORMAL

## 2020-12-10 PROCEDURE — 93005 ELECTROCARDIOGRAM TRACING: CPT | Performed by: FAMILY MEDICINE

## 2020-12-10 PROCEDURE — 96376 TX/PRO/DX INJ SAME DRUG ADON: CPT

## 2020-12-10 PROCEDURE — 85025 COMPLETE CBC W/AUTO DIFF WBC: CPT | Performed by: FAMILY MEDICINE

## 2020-12-10 PROCEDURE — 99284 EMERGENCY DEPT VISIT MOD MDM: CPT

## 2020-12-10 PROCEDURE — 84484 ASSAY OF TROPONIN QUANT: CPT | Performed by: FAMILY MEDICINE

## 2020-12-10 PROCEDURE — 93010 ELECTROCARDIOGRAM REPORT: CPT | Performed by: INTERNAL MEDICINE

## 2020-12-10 PROCEDURE — 96365 THER/PROPH/DIAG IV INF INIT: CPT

## 2020-12-10 PROCEDURE — 96375 TX/PRO/DX INJ NEW DRUG ADDON: CPT

## 2020-12-10 PROCEDURE — 36415 COLL VENOUS BLD VENIPUNCTURE: CPT

## 2020-12-10 PROCEDURE — 85730 THROMBOPLASTIN TIME PARTIAL: CPT | Performed by: FAMILY MEDICINE

## 2020-12-10 PROCEDURE — 71045 X-RAY EXAM CHEST 1 VIEW: CPT

## 2020-12-10 PROCEDURE — 83880 ASSAY OF NATRIURETIC PEPTIDE: CPT | Performed by: FAMILY MEDICINE

## 2020-12-10 PROCEDURE — 25010000002 HEPARIN (PORCINE) PER 1000 UNITS: Performed by: FAMILY MEDICINE

## 2020-12-10 PROCEDURE — 80053 COMPREHEN METABOLIC PANEL: CPT | Performed by: FAMILY MEDICINE

## 2020-12-10 PROCEDURE — 85610 PROTHROMBIN TIME: CPT | Performed by: FAMILY MEDICINE

## 2020-12-10 PROCEDURE — 71045 X-RAY EXAM CHEST 1 VIEW: CPT | Performed by: RADIOLOGY

## 2020-12-10 PROCEDURE — 93005 ELECTROCARDIOGRAM TRACING: CPT | Performed by: EMERGENCY MEDICINE

## 2020-12-10 PROCEDURE — 87636 SARSCOV2 & INF A&B AMP PRB: CPT | Performed by: FAMILY MEDICINE

## 2020-12-10 PROCEDURE — 96366 THER/PROPH/DIAG IV INF ADDON: CPT

## 2020-12-10 RX ORDER — ASPIRIN 81 MG/1
100 TABLET, CHEWABLE ORAL ONCE
Status: COMPLETED | OUTPATIENT
Start: 2020-12-10 | End: 2020-12-10

## 2020-12-10 RX ORDER — NITROGLYCERIN 0.4 MG/1
0.4 TABLET SUBLINGUAL
Status: DISCONTINUED | OUTPATIENT
Start: 2020-12-10 | End: 2020-12-11 | Stop reason: HOSPADM

## 2020-12-10 RX ORDER — SODIUM CHLORIDE 0.9 % (FLUSH) 0.9 %
10 SYRINGE (ML) INJECTION AS NEEDED
Status: DISCONTINUED | OUTPATIENT
Start: 2020-12-10 | End: 2020-12-11 | Stop reason: HOSPADM

## 2020-12-10 RX ORDER — HEPARIN SODIUM 5000 [USP'U]/ML
5000 INJECTION, SOLUTION INTRAVENOUS; SUBCUTANEOUS ONCE
Status: COMPLETED | OUTPATIENT
Start: 2020-12-10 | End: 2020-12-10

## 2020-12-10 RX ORDER — HEPARIN SODIUM 10000 [USP'U]/100ML
10.76 INJECTION, SOLUTION INTRAVENOUS
Status: DISCONTINUED | OUTPATIENT
Start: 2020-12-10 | End: 2020-12-11 | Stop reason: HOSPADM

## 2020-12-10 RX ADMIN — HEPARIN SODIUM 5000 UNITS: 5000 INJECTION INTRAVENOUS; SUBCUTANEOUS at 21:26

## 2020-12-10 RX ADMIN — NITROGLYCERIN 0.4 MG: 0.4 TABLET, ORALLY DISINTEGRATING SUBLINGUAL at 20:30

## 2020-12-10 RX ADMIN — HEPARIN SODIUM 10.76 UNITS/KG/HR: 10000 INJECTION, SOLUTION INTRAVENOUS at 21:26

## 2020-12-10 RX ADMIN — ASPIRIN 101.25 MG: 81 TABLET, CHEWABLE ORAL at 19:32

## 2020-12-11 ENCOUNTER — HOSPITAL ENCOUNTER (OUTPATIENT)
Facility: HOSPITAL | Age: 60
Discharge: HOME | End: 2020-12-12
Attending: INTERNAL MEDICINE | Admitting: INTERNAL MEDICINE

## 2020-12-11 ENCOUNTER — APPOINTMENT (OUTPATIENT)
Dept: CARDIOLOGY | Facility: HOSPITAL | Age: 60
End: 2020-12-11

## 2020-12-11 DIAGNOSIS — F41.1 GENERALIZED ANXIETY DISORDER: ICD-10-CM

## 2020-12-11 DIAGNOSIS — I20.0 UNSTABLE ANGINA (HCC): Primary | ICD-10-CM

## 2020-12-11 DIAGNOSIS — K52.9 COLITIS: ICD-10-CM

## 2020-12-11 LAB
ACT BLD: 251 SECONDS (ref 82–152)
ANION GAP SERPL CALCULATED.3IONS-SCNC: 11.1 MMOL/L (ref 5–15)
ANION GAP SERPL CALCULATED.3IONS-SCNC: 12 MMOL/L (ref 5–15)
APTT PPP: 44.9 SECONDS (ref 70–100)
APTT PPP: 98.2 SECONDS (ref 70–100)
APTT PPP: 98.9 SECONDS (ref 70–100)
BASOPHILS # BLD AUTO: 0.03 10*3/MM3 (ref 0–0.2)
BASOPHILS # BLD AUTO: 0.03 10*3/MM3 (ref 0–0.2)
BASOPHILS NFR BLD AUTO: 0.3 % (ref 0–1.5)
BASOPHILS NFR BLD AUTO: 0.3 % (ref 0–1.5)
BH CV ECHO MEAS - IVSD: 1.3 CM
BH CV ECHO MEAS - LVPWD: 1.1 CM
BUN SERPL-MCNC: 14 MG/DL (ref 6–20)
BUN SERPL-MCNC: 15 MG/DL (ref 6–20)
BUN/CREAT SERPL: 15.6 (ref 7–25)
BUN/CREAT SERPL: 16 (ref 7–25)
CALCIUM SPEC-SCNC: 9 MG/DL (ref 8.6–10.5)
CALCIUM SPEC-SCNC: 9.1 MG/DL (ref 8.6–10.5)
CHLORIDE SERPL-SCNC: 101 MMOL/L (ref 98–107)
CHLORIDE SERPL-SCNC: 99 MMOL/L (ref 98–107)
CHOLEST SERPL-MCNC: 133 MG/DL (ref 0–200)
CO2 SERPL-SCNC: 25.9 MMOL/L (ref 22–29)
CO2 SERPL-SCNC: 26 MMOL/L (ref 22–29)
CREAT SERPL-MCNC: 0.9 MG/DL (ref 0.76–1.27)
CREAT SERPL-MCNC: 0.94 MG/DL (ref 0.76–1.27)
DEPRECATED RDW RBC AUTO: 42.6 FL (ref 37–54)
DEPRECATED RDW RBC AUTO: 43.8 FL (ref 37–54)
EOSINOPHIL # BLD AUTO: 0.13 10*3/MM3 (ref 0–0.4)
EOSINOPHIL # BLD AUTO: 0.27 10*3/MM3 (ref 0–0.4)
EOSINOPHIL NFR BLD AUTO: 1.2 % (ref 0.3–6.2)
EOSINOPHIL NFR BLD AUTO: 2.4 % (ref 0.3–6.2)
ERYTHROCYTE [DISTWIDTH] IN BLOOD BY AUTOMATED COUNT: 14.4 % (ref 12.3–15.4)
ERYTHROCYTE [DISTWIDTH] IN BLOOD BY AUTOMATED COUNT: 14.4 % (ref 12.3–15.4)
GFR SERPL CREATININE-BSD FRML MDRD: 82 ML/MIN/1.73
GFR SERPL CREATININE-BSD FRML MDRD: 87 ML/MIN/1.73
GLUCOSE SERPL-MCNC: 100 MG/DL (ref 65–99)
GLUCOSE SERPL-MCNC: 98 MG/DL (ref 65–99)
HBA1C MFR BLD: 6.7 % (ref 4.8–5.6)
HCT VFR BLD AUTO: 37.4 % (ref 37.5–51)
HCT VFR BLD AUTO: 38.8 % (ref 37.5–51)
HDLC SERPL-MCNC: 49 MG/DL (ref 40–60)
HGB BLD-MCNC: 12.1 G/DL (ref 13–17.7)
HGB BLD-MCNC: 12.5 G/DL (ref 13–17.7)
IMM GRANULOCYTES # BLD AUTO: 0.02 10*3/MM3 (ref 0–0.05)
IMM GRANULOCYTES # BLD AUTO: 0.04 10*3/MM3 (ref 0–0.05)
IMM GRANULOCYTES NFR BLD AUTO: 0.2 % (ref 0–0.5)
IMM GRANULOCYTES NFR BLD AUTO: 0.4 % (ref 0–0.5)
INR PPP: 1.09 (ref 0.9–1.1)
LDLC SERPL CALC-MCNC: 66 MG/DL (ref 0–100)
LDLC/HDLC SERPL: 1.33 {RATIO}
LV EF 2D ECHO EST: 60 %
LYMPHOCYTES # BLD AUTO: 2.85 10*3/MM3 (ref 0.7–3.1)
LYMPHOCYTES # BLD AUTO: 3.65 10*3/MM3 (ref 0.7–3.1)
LYMPHOCYTES NFR BLD AUTO: 26.6 % (ref 19.6–45.3)
LYMPHOCYTES NFR BLD AUTO: 33.1 % (ref 19.6–45.3)
MAGNESIUM SERPL-MCNC: 2.1 MG/DL (ref 1.6–2.6)
MAGNESIUM SERPL-MCNC: 2.2 MG/DL (ref 1.6–2.6)
MAXIMAL PREDICTED HEART RATE: 162 BPM
MCH RBC QN AUTO: 26.6 PG (ref 26.6–33)
MCH RBC QN AUTO: 26.8 PG (ref 26.6–33)
MCHC RBC AUTO-ENTMCNC: 32.2 G/DL (ref 31.5–35.7)
MCHC RBC AUTO-ENTMCNC: 32.4 G/DL (ref 31.5–35.7)
MCV RBC AUTO: 82.2 FL (ref 79–97)
MCV RBC AUTO: 83.3 FL (ref 79–97)
MONOCYTES # BLD AUTO: 1.18 10*3/MM3 (ref 0.1–0.9)
MONOCYTES # BLD AUTO: 1.29 10*3/MM3 (ref 0.1–0.9)
MONOCYTES NFR BLD AUTO: 11 % (ref 5–12)
MONOCYTES NFR BLD AUTO: 11.7 % (ref 5–12)
NEUTROPHILS NFR BLD AUTO: 5.78 10*3/MM3 (ref 1.7–7)
NEUTROPHILS NFR BLD AUTO: 52.3 % (ref 42.7–76)
NEUTROPHILS NFR BLD AUTO: 6.48 10*3/MM3 (ref 1.7–7)
NEUTROPHILS NFR BLD AUTO: 60.5 % (ref 42.7–76)
NRBC BLD AUTO-RTO: 0 /100 WBC (ref 0–0.2)
NRBC BLD AUTO-RTO: 0 /100 WBC (ref 0–0.2)
PLATELET # BLD AUTO: 329 10*3/MM3 (ref 140–450)
PLATELET # BLD AUTO: 332 10*3/MM3 (ref 140–450)
PMV BLD AUTO: 8.5 FL (ref 6–12)
PMV BLD AUTO: 8.8 FL (ref 6–12)
POTASSIUM SERPL-SCNC: 3.8 MMOL/L (ref 3.5–5.2)
POTASSIUM SERPL-SCNC: 3.9 MMOL/L (ref 3.5–5.2)
PROTHROMBIN TIME: 14.6 SECONDS (ref 12–15.1)
QT INTERVAL: 352 MS
QT INTERVAL: 370 MS
QT INTERVAL: 402 MS
QT INTERVAL: 406 MS
QTC INTERVAL: 450 MS
QTC INTERVAL: 450 MS
QTC INTERVAL: 451 MS
QTC INTERVAL: 458 MS
RBC # BLD AUTO: 4.55 10*6/MM3 (ref 4.14–5.8)
RBC # BLD AUTO: 4.66 10*6/MM3 (ref 4.14–5.8)
SODIUM SERPL-SCNC: 137 MMOL/L (ref 136–145)
SODIUM SERPL-SCNC: 138 MMOL/L (ref 136–145)
STRESS TARGET HR: 138 BPM
TRIGL SERPL-MCNC: 94 MG/DL (ref 0–150)
TROPONIN T SERPL-MCNC: 0.3 NG/ML (ref 0–0.03)
TROPONIN T SERPL-MCNC: 0.56 NG/ML (ref 0–0.03)
TSH SERPL DL<=0.05 MIU/L-ACNC: 2.17 UIU/ML (ref 0.27–4.2)
VLDLC SERPL-MCNC: 18 MG/DL (ref 5–40)
WBC # BLD AUTO: 10.71 10*3/MM3 (ref 3.4–10.8)
WBC # BLD AUTO: 11.04 10*3/MM3 (ref 3.4–10.8)

## 2020-12-11 PROCEDURE — C1769 GUIDE WIRE: HCPCS | Performed by: INTERNAL MEDICINE

## 2020-12-11 PROCEDURE — C1894 INTRO/SHEATH, NON-LASER: HCPCS | Performed by: INTERNAL MEDICINE

## 2020-12-11 PROCEDURE — 84443 ASSAY THYROID STIM HORMONE: CPT | Performed by: INTERNAL MEDICINE

## 2020-12-11 PROCEDURE — 25010000002 MIDAZOLAM PER 1MG: Performed by: INTERNAL MEDICINE

## 2020-12-11 PROCEDURE — 96365 THER/PROPH/DIAG IV INF INIT: CPT

## 2020-12-11 PROCEDURE — 85730 THROMBOPLASTIN TIME PARTIAL: CPT | Performed by: INTERNAL MEDICINE

## 2020-12-11 PROCEDURE — G0378 HOSPITAL OBSERVATION PER HR: HCPCS

## 2020-12-11 PROCEDURE — 93005 ELECTROCARDIOGRAM TRACING: CPT | Performed by: INTERNAL MEDICINE

## 2020-12-11 PROCEDURE — 93454 CORONARY ARTERY ANGIO S&I: CPT | Performed by: INTERNAL MEDICINE

## 2020-12-11 PROCEDURE — 93572 IV DOP VEL&/PRESS C FLO EA: CPT | Performed by: INTERNAL MEDICINE

## 2020-12-11 PROCEDURE — 83735 ASSAY OF MAGNESIUM: CPT | Performed by: INTERNAL MEDICINE

## 2020-12-11 PROCEDURE — 85025 COMPLETE CBC W/AUTO DIFF WBC: CPT | Performed by: INTERNAL MEDICINE

## 2020-12-11 PROCEDURE — 99219 PR INITIAL OBSERVATION CARE/DAY 50 MINUTES: CPT | Performed by: INTERNAL MEDICINE

## 2020-12-11 PROCEDURE — 83036 HEMOGLOBIN GLYCOSYLATED A1C: CPT | Performed by: INTERNAL MEDICINE

## 2020-12-11 PROCEDURE — 93571 IV DOP VEL&/PRESS C FLO 1ST: CPT | Performed by: INTERNAL MEDICINE

## 2020-12-11 PROCEDURE — 25010000002 HEPARIN (PORCINE) PER 1000 UNITS: Performed by: INTERNAL MEDICINE

## 2020-12-11 PROCEDURE — 85347 COAGULATION TIME ACTIVATED: CPT

## 2020-12-11 PROCEDURE — 96366 THER/PROPH/DIAG IV INF ADDON: CPT

## 2020-12-11 PROCEDURE — 80061 LIPID PANEL: CPT | Performed by: INTERNAL MEDICINE

## 2020-12-11 PROCEDURE — 25010000002 FENTANYL CITRATE (PF) 100 MCG/2ML SOLUTION: Performed by: INTERNAL MEDICINE

## 2020-12-11 PROCEDURE — 93306 TTE W/DOPPLER COMPLETE: CPT | Performed by: INTERNAL MEDICINE

## 2020-12-11 PROCEDURE — 85730 THROMBOPLASTIN TIME PARTIAL: CPT | Performed by: FAMILY MEDICINE

## 2020-12-11 PROCEDURE — 85610 PROTHROMBIN TIME: CPT | Performed by: INTERNAL MEDICINE

## 2020-12-11 PROCEDURE — C1887 CATHETER, GUIDING: HCPCS | Performed by: INTERNAL MEDICINE

## 2020-12-11 PROCEDURE — 96376 TX/PRO/DX INJ SAME DRUG ADON: CPT

## 2020-12-11 PROCEDURE — 25010000003 LIDOCAINE 1 % SOLUTION: Performed by: INTERNAL MEDICINE

## 2020-12-11 PROCEDURE — 99204 OFFICE O/P NEW MOD 45 MIN: CPT | Performed by: INTERNAL MEDICINE

## 2020-12-11 PROCEDURE — 93306 TTE W/DOPPLER COMPLETE: CPT

## 2020-12-11 PROCEDURE — 25010000002 ADENOSINE (DIAGNOSTIC) PER 30 MG: Performed by: INTERNAL MEDICINE

## 2020-12-11 PROCEDURE — 80048 BASIC METABOLIC PNL TOTAL CA: CPT | Performed by: INTERNAL MEDICINE

## 2020-12-11 PROCEDURE — 84484 ASSAY OF TROPONIN QUANT: CPT | Performed by: INTERNAL MEDICINE

## 2020-12-11 PROCEDURE — 0 IOPAMIDOL PER 1 ML: Performed by: INTERNAL MEDICINE

## 2020-12-11 RX ORDER — NALOXONE HCL 0.4 MG/ML
0.4 VIAL (ML) INJECTION
Status: DISCONTINUED | OUTPATIENT
Start: 2020-12-11 | End: 2020-12-12 | Stop reason: HOSPADM

## 2020-12-11 RX ORDER — ACETAMINOPHEN 325 MG/1
650 TABLET ORAL EVERY 4 HOURS PRN
Status: DISCONTINUED | OUTPATIENT
Start: 2020-12-11 | End: 2020-12-12 | Stop reason: HOSPADM

## 2020-12-11 RX ORDER — SODIUM CHLORIDE 9 MG/ML
100 INJECTION, SOLUTION INTRAVENOUS CONTINUOUS
Status: ACTIVE | OUTPATIENT
Start: 2020-12-11 | End: 2020-12-11

## 2020-12-11 RX ORDER — ALPRAZOLAM 0.25 MG/1
0.25 TABLET ORAL 3 TIMES DAILY PRN
Status: DISCONTINUED | OUTPATIENT
Start: 2020-12-11 | End: 2020-12-12 | Stop reason: HOSPADM

## 2020-12-11 RX ORDER — CHOLECALCIFEROL (VITAMIN D3) 125 MCG
5 CAPSULE ORAL NIGHTLY PRN
Status: DISCONTINUED | OUTPATIENT
Start: 2020-12-11 | End: 2020-12-12 | Stop reason: HOSPADM

## 2020-12-11 RX ORDER — ACETAMINOPHEN 650 MG/1
650 SUPPOSITORY RECTAL EVERY 4 HOURS PRN
Status: DISCONTINUED | OUTPATIENT
Start: 2020-12-11 | End: 2020-12-12 | Stop reason: HOSPADM

## 2020-12-11 RX ORDER — ATORVASTATIN CALCIUM 80 MG/1
80 TABLET, FILM COATED ORAL NIGHTLY
Status: DISCONTINUED | OUTPATIENT
Start: 2020-12-11 | End: 2020-12-12 | Stop reason: HOSPADM

## 2020-12-11 RX ORDER — FINASTERIDE 5 MG/1
5 TABLET, FILM COATED ORAL DAILY
Status: DISCONTINUED | OUTPATIENT
Start: 2020-12-11 | End: 2020-12-12 | Stop reason: HOSPADM

## 2020-12-11 RX ORDER — ISOSORBIDE MONONITRATE 30 MG/1
30 TABLET, EXTENDED RELEASE ORAL
Status: DISCONTINUED | OUTPATIENT
Start: 2020-12-11 | End: 2020-12-11

## 2020-12-11 RX ORDER — ONDANSETRON 4 MG/1
4 TABLET, FILM COATED ORAL EVERY 6 HOURS PRN
Status: DISCONTINUED | OUTPATIENT
Start: 2020-12-11 | End: 2020-12-12 | Stop reason: HOSPADM

## 2020-12-11 RX ORDER — DIPHENHYDRAMINE HYDROCHLORIDE 50 MG/ML
25 INJECTION INTRAMUSCULAR; INTRAVENOUS EVERY 6 HOURS PRN
Status: DISCONTINUED | OUTPATIENT
Start: 2020-12-11 | End: 2020-12-12 | Stop reason: HOSPADM

## 2020-12-11 RX ORDER — HEPARIN SODIUM 1000 [USP'U]/ML
30 INJECTION INTRAVENOUS; SUBCUTANEOUS AS NEEDED
Status: DISCONTINUED | OUTPATIENT
Start: 2020-12-11 | End: 2020-12-11

## 2020-12-11 RX ORDER — ACETAMINOPHEN 325 MG/1
650 TABLET ORAL EVERY 4 HOURS PRN
Status: DISCONTINUED | OUTPATIENT
Start: 2020-12-11 | End: 2020-12-11 | Stop reason: SDUPTHER

## 2020-12-11 RX ORDER — PANTOPRAZOLE SODIUM 40 MG/1
40 TABLET, DELAYED RELEASE ORAL DAILY
Status: DISCONTINUED | OUTPATIENT
Start: 2020-12-11 | End: 2020-12-12 | Stop reason: HOSPADM

## 2020-12-11 RX ORDER — HEPARIN SODIUM 1000 [USP'U]/ML
INJECTION, SOLUTION INTRAVENOUS; SUBCUTANEOUS AS NEEDED
Status: DISCONTINUED | OUTPATIENT
Start: 2020-12-11 | End: 2020-12-11 | Stop reason: HOSPADM

## 2020-12-11 RX ORDER — ONDANSETRON 2 MG/ML
4 INJECTION INTRAMUSCULAR; INTRAVENOUS EVERY 6 HOURS PRN
Status: DISCONTINUED | OUTPATIENT
Start: 2020-12-11 | End: 2020-12-12 | Stop reason: HOSPADM

## 2020-12-11 RX ORDER — ONDANSETRON 2 MG/ML
4 INJECTION INTRAMUSCULAR; INTRAVENOUS EVERY 6 HOURS PRN
Status: DISCONTINUED | OUTPATIENT
Start: 2020-12-11 | End: 2020-12-11 | Stop reason: SDUPTHER

## 2020-12-11 RX ORDER — MORPHINE SULFATE 4 MG/ML
4 INJECTION, SOLUTION INTRAMUSCULAR; INTRAVENOUS
Status: DISCONTINUED | OUTPATIENT
Start: 2020-12-11 | End: 2020-12-12 | Stop reason: HOSPADM

## 2020-12-11 RX ORDER — FENTANYL CITRATE 50 UG/ML
INJECTION, SOLUTION INTRAMUSCULAR; INTRAVENOUS AS NEEDED
Status: DISCONTINUED | OUTPATIENT
Start: 2020-12-11 | End: 2020-12-11 | Stop reason: HOSPADM

## 2020-12-11 RX ORDER — AMITRIPTYLINE HYDROCHLORIDE 50 MG/1
100 TABLET, FILM COATED ORAL NIGHTLY PRN
Status: DISCONTINUED | OUTPATIENT
Start: 2020-12-11 | End: 2020-12-12 | Stop reason: HOSPADM

## 2020-12-11 RX ORDER — HYDROCODONE BITARTRATE AND ACETAMINOPHEN 5; 325 MG/1; MG/1
1 TABLET ORAL EVERY 4 HOURS PRN
Status: DISCONTINUED | OUTPATIENT
Start: 2020-12-11 | End: 2020-12-12 | Stop reason: HOSPADM

## 2020-12-11 RX ORDER — LIDOCAINE HYDROCHLORIDE 10 MG/ML
INJECTION, SOLUTION INFILTRATION; PERINEURAL AS NEEDED
Status: DISCONTINUED | OUTPATIENT
Start: 2020-12-11 | End: 2020-12-11 | Stop reason: HOSPADM

## 2020-12-11 RX ORDER — ISOSORBIDE MONONITRATE 60 MG/1
60 TABLET, EXTENDED RELEASE ORAL
Status: DISCONTINUED | OUTPATIENT
Start: 2020-12-11 | End: 2020-12-12 | Stop reason: HOSPADM

## 2020-12-11 RX ORDER — CLONAZEPAM 0.5 MG/1
0.5 TABLET ORAL 3 TIMES DAILY PRN
Status: DISCONTINUED | OUTPATIENT
Start: 2020-12-11 | End: 2020-12-12 | Stop reason: HOSPADM

## 2020-12-11 RX ORDER — CARVEDILOL 12.5 MG/1
12.5 TABLET ORAL 2 TIMES DAILY WITH MEALS
Status: DISCONTINUED | OUTPATIENT
Start: 2020-12-11 | End: 2020-12-12 | Stop reason: HOSPADM

## 2020-12-11 RX ORDER — TRAMADOL HYDROCHLORIDE 50 MG/1
50 TABLET ORAL EVERY 8 HOURS PRN
Status: DISCONTINUED | OUTPATIENT
Start: 2020-12-11 | End: 2020-12-12 | Stop reason: HOSPADM

## 2020-12-11 RX ORDER — TEMAZEPAM 15 MG/1
15 CAPSULE ORAL NIGHTLY PRN
Status: DISCONTINUED | OUTPATIENT
Start: 2020-12-11 | End: 2020-12-12 | Stop reason: HOSPADM

## 2020-12-11 RX ORDER — HEPARIN SODIUM 1000 [USP'U]/ML
5000 INJECTION INTRAVENOUS; SUBCUTANEOUS AS NEEDED
Status: DISCONTINUED | OUTPATIENT
Start: 2020-12-11 | End: 2020-12-12

## 2020-12-11 RX ORDER — CITALOPRAM 20 MG/1
40 TABLET ORAL DAILY
Status: DISCONTINUED | OUTPATIENT
Start: 2020-12-12 | End: 2020-12-12 | Stop reason: HOSPADM

## 2020-12-11 RX ORDER — HEPARIN SODIUM 10000 [USP'U]/100ML
1000 INJECTION, SOLUTION INTRAVENOUS
Status: DISCONTINUED | OUTPATIENT
Start: 2020-12-11 | End: 2020-12-11

## 2020-12-11 RX ORDER — ASPIRIN 81 MG/1
81 TABLET ORAL DAILY
Status: DISCONTINUED | OUTPATIENT
Start: 2020-12-11 | End: 2020-12-11 | Stop reason: DRUGHIGH

## 2020-12-11 RX ORDER — CLOPIDOGREL BISULFATE 75 MG/1
75 TABLET ORAL DAILY
Status: DISCONTINUED | OUTPATIENT
Start: 2020-12-11 | End: 2020-12-12 | Stop reason: HOSPADM

## 2020-12-11 RX ORDER — RANOLAZINE 500 MG/1
500 TABLET, EXTENDED RELEASE ORAL EVERY 12 HOURS SCHEDULED
Status: DISCONTINUED | OUTPATIENT
Start: 2020-12-11 | End: 2020-12-12 | Stop reason: HOSPADM

## 2020-12-11 RX ORDER — CALCIUM CARBONATE 200(500)MG
2 TABLET,CHEWABLE ORAL 2 TIMES DAILY PRN
Status: DISCONTINUED | OUTPATIENT
Start: 2020-12-11 | End: 2020-12-12 | Stop reason: HOSPADM

## 2020-12-11 RX ORDER — ACETAMINOPHEN 160 MG/5ML
650 SOLUTION ORAL EVERY 4 HOURS PRN
Status: DISCONTINUED | OUTPATIENT
Start: 2020-12-11 | End: 2020-12-12 | Stop reason: HOSPADM

## 2020-12-11 RX ORDER — MIDAZOLAM HYDROCHLORIDE 2 MG/2ML
INJECTION, SOLUTION INTRAMUSCULAR; INTRAVENOUS AS NEEDED
Status: DISCONTINUED | OUTPATIENT
Start: 2020-12-11 | End: 2020-12-11 | Stop reason: HOSPADM

## 2020-12-11 RX ADMIN — CARVEDILOL 12.5 MG: 12.5 TABLET, FILM COATED ORAL at 18:35

## 2020-12-11 RX ADMIN — HEPARIN SODIUM AND DEXTROSE 16 UNITS/KG/HR: 10000; 5 INJECTION INTRAVENOUS at 01:56

## 2020-12-11 RX ADMIN — ISOSORBIDE MONONITRATE 60 MG: 60 TABLET, EXTENDED RELEASE ORAL at 18:34

## 2020-12-11 RX ADMIN — ATORVASTATIN CALCIUM 80 MG: 80 TABLET, FILM COATED ORAL at 02:29

## 2020-12-11 RX ADMIN — ATORVASTATIN CALCIUM 80 MG: 80 TABLET, FILM COATED ORAL at 20:36

## 2020-12-11 RX ADMIN — ASPIRIN 81 MG: 81 TABLET, COATED ORAL at 09:00

## 2020-12-11 RX ADMIN — HEPARIN SODIUM 5000 UNITS: 1000 INJECTION INTRAVENOUS; SUBCUTANEOUS at 01:54

## 2020-12-11 RX ADMIN — RANOLAZINE 500 MG: 500 TABLET, FILM COATED, EXTENDED RELEASE ORAL at 20:35

## 2020-12-11 RX ADMIN — FINASTERIDE 5 MG: 5 TABLET, FILM COATED ORAL at 09:00

## 2020-12-11 RX ADMIN — CLOPIDOGREL BISULFATE 75 MG: 75 TABLET ORAL at 09:01

## 2020-12-11 RX ADMIN — ASPIRIN 325 MG: 325 TABLET, COATED ORAL at 20:37

## 2020-12-11 RX ADMIN — TEMAZEPAM 15 MG: 15 CAPSULE ORAL at 22:54

## 2020-12-11 RX ADMIN — ISOSORBIDE MONONITRATE 30 MG: 30 TABLET, EXTENDED RELEASE ORAL at 09:01

## 2020-12-11 RX ADMIN — TRAMADOL HYDROCHLORIDE 50 MG: 50 TABLET, FILM COATED ORAL at 09:14

## 2020-12-11 RX ADMIN — PANTOPRAZOLE SODIUM 40 MG: 40 TABLET, DELAYED RELEASE ORAL at 06:10

## 2020-12-11 RX ADMIN — CARVEDILOL 12.5 MG: 12.5 TABLET, FILM COATED ORAL at 09:01

## 2020-12-11 RX ADMIN — SODIUM CHLORIDE 100 ML/HR: 9 INJECTION, SOLUTION INTRAVENOUS at 18:36

## 2020-12-11 NOTE — ED NOTES
EMTALA consent signed at this time, placed on patients chart at this time.      Ese Alonso, RN  12/10/20 4708

## 2020-12-11 NOTE — PROGRESS NOTES
"Adult Nutrition  Assessment/PES    Patient Name:  Damaso Leonard  YOB: 1960  MRN: [id number]  Admit Date:  12/11/2020    Assessment Date:  12/11/2020    Comments:    Recommend:  1. Continue NPO diet order as medically appropriate. Consider advancing diet with cardiac and carb consistent modifications once appropriate.   2. Establish and encourage PO intake once appropriate.  3. Consider a multivitamin with minerals daily.     RD to follow pt and available PRN.      Reason for Assessment     Row Name 12/11/20 1335          Reason for Assessment    Reason For Assessment  per organizational policy;diagnosis/disease state     Diagnosis  cardiac disease;diabetes diagnosis/complications CHF, CAD, HTN, HLD, DM 2, GERD           Anthropometrics     Row Name 12/11/20 1339          Anthropometrics    Height  177.8 cm (70\")        Ideal Body Weight (IBW)    Ideal Body Weight (IBW) (kg)  76.48         Labs/Tests/Procedures/Meds     Row Name 12/11/20 1338          Labs/Procedures/Meds    Lab Results Reviewed  reviewed, pertinent     Lab Results Comments  High: HgbA1c        Medications    Pertinent Medications Reviewed  reviewed, pertinent     Pertinent Medications Comments  Lipitor, Imdur, Protonix         Physical Findings     Row Name 12/11/20 1339          Physical Findings    Overall Physical Appearance  overweight         Estimated/Assessed Needs     Row Name 12/11/20 1331          Calculation Measurements    Weight Used For Calculations  93.9 kg (207 lb 0.2 oz) actual BW     Height  177.8 cm (70\")        Estimated/Assessed Needs    Additional Documentation  Fluid Requirements (Group);[place]-St. Jeor Equation (Group);Protein Requirements (Group);Calorie Requirements (Group)        Calorie Requirements    Estimated Calorie Need Method  [place]-St Jeor     Estimated Calorie Requirement Comment  [id number]        [place]-St. Jeor Equation    RMR ([place]-St. Jeor Equation)  1761.25     [place]-St. Jeor " Activity Factors  -- 1.3 AF        Protein Requirements    Weight Used For Protein Calculations  93.9 kg (207 lb 0.2 oz) actual BW     Est Protein Requirement Amount (gms/kg)  1.0 gm protein 75-93 grams     Estimated Protein Requirements (gms/day)  93.9        Fluid Requirements    Estimated Fluid Requirement Method  Re[place]-Segar Formula     [place]-Segar Method (over 20 kg)  3378         Nutrition Prescription Ordered     Row Name 12/11/20 1340          Nutrition Prescription PO    Current PO Diet  NPO         Evaluation of Received Nutrient/Fluid Intake     Row Name 12/11/20 1339          PO Evaluation    Number of Days PO Intake Evaluated  Insufficient Data               Problem/Interventions:  Problem 1     Row Name 12/11/20 1341          Nutrition Diagnoses Problem 1    Problem 1  Altered Nutrition Related to Labs     Etiology (related to)  Medical Diagnosis     Endocrine  DM2     Signs/Symptoms (evidenced by)  Biochemical     Specific Labs Noted  HgbA1C               Intervention Goal     Row Name 12/11/20 1341          Intervention Goal    General  Improved nutrition related lab(s);Meet nutritional needs for age/condition     PO  Meet estimated needs;Advance diet;Establish PO     Weight  No significant weight loss         Nutrition Intervention     Row Name 12/11/20 1341          Nutrition Intervention    RD/Tech Action  Follow Tx progress;Recommend/ordered     Recommended/Ordered  Diet         Nutrition Prescription     Row Name 12/11/20 1341          Nutrition Prescription PO    PO Prescription  Begin/change diet     Begin/Change Diet to  Regular     Common Modifiers  Cardiac;Consistent Carbohydrate     New PO Prescription Ordered?  No, recommended        Other Orders    Obtain Weight  Daily     Obtain Weight Ordered?  No, recommended     Supplement  Vitamin mineral supplement     Supplement Ordered?  No, recommended         Education/Evaluation     Row Name 12/11/20 1341          Education    Education   Will Instruct as appropriate        Monitor/Evaluation    Monitor  Per protocol;I&O;Pertinent labs;Weight;Skin status           Electronically signed by:  Sharifa Carvajal RD  12/11/20 13:42 EST

## 2020-12-11 NOTE — ED NOTES
I called [facility] for an ALS truck to .     Sammy Kwon  12/10/20 2049

## 2020-12-11 NOTE — ED NOTES
I called [facility], they have an advanced truck but they do not have a paramedic to send the patient ALS.     Sammy Kwon  12/10/20 2047

## 2020-12-11 NOTE — ED NOTES
[facility] accepted, slight delay as they will need to wait for their other truck to get back from [place].     Sammy Kwon  12/10/20 2053

## 2020-12-11 NOTE — ED NOTES
Report given to ANTONIO Toth at [facility] at this time     Ese Alonso RN  12/10/20 6473

## 2020-12-11 NOTE — PLAN OF CARE
Goal Outcome Evaluation:      Transfer from [facility] for chest pain workup. Trending labs and EKGs. Cardiology consult in a.m. Heparin drip infusing per titration protocol.

## 2020-12-11 NOTE — ED PROVIDER NOTES
Subjective   60-year-old male with history of CHF coronary disease status post stent placement, hypertension hyperlipidemia presents the emergency room complaints of chest pain.  Patient reports intermittent chest pain for the past couple months.  He states pain occurs at rest and wakes him up from sleep he also reports that pain occurs with activity.  He states that pain is usually a pressure-like pain.  He states pain radiates into his neck.  He states tonight he was walking at his son-in-law when he developed chest pain he states that he became nauseated and short of breath.      Chest Pain  Pain location:  Substernal area  Pain quality: pressure    Pain radiates to:  Neck  Pain severity:  Moderate  Timing:  Intermittent  Progression:  Waxing and waning  Chronicity:  Recurrent  Relieved by:  Nothing  Worsened by:  Nothing  Associated symptoms: nausea and shortness of breath    Associated symptoms: no abdominal pain, no anorexia, no back pain, no claudication, no cough, no diaphoresis, no fatigue, no fever, no numbness, no orthopnea and no palpitations        Review of Systems   Constitutional: Negative for diaphoresis, fatigue and fever.   Respiratory: Positive for shortness of breath. Negative for cough.    Cardiovascular: Positive for chest pain. Negative for palpitations, orthopnea and claudication.   Gastrointestinal: Positive for nausea. Negative for abdominal pain and anorexia.   Musculoskeletal: Negative for back pain.   Neurological: Negative for numbness.   All other systems reviewed and are negative.      Past Medical History:   Diagnosis Date   • Anxiety    • Anxiety and depression    • Arthritis    • ASCVD (arteriosclerotic cardiovascular disease)    • CHF (congestive heart failure) (CMS/[facility]ly [facility])    • Colitis    • Coronary artery disease    • Disease of thyroid gland    • DM (diabetes mellitus) (CMS/Formerly [facility])    • Elevated cholesterol    • GERD (gastroesophageal reflux disease)    • History of EKG 04/15/2015     NORMAL   • History of transfusion    • HTN (hypertension)    • Hyperlipidemia    • Injury of back    • Low back pain    • Myocardial infarction (CMS/HCC)     about 13 years ago   • Sleep apnea     wears oxygen at night    • Wears glasses    • Wears partial dentures        No Known Allergies    Past Surgical History:   Procedure Laterality Date   • CARDIAC CATHETERIZATION     • CARDIAC SURGERY      stenting   • CHOLECYSTECTOMY     • COLONOSCOPY  2007   • COLONOSCOPY N/A 5/30/2020    Procedure: COLONOSCOPY CPT CODE: 32049;  Surgeon: Anthony Garcia MD;  Location: [facility];  Service: Gastroenterology;  Laterality: N/A;   • ENDOSCOPY N/A 5/30/2020    Procedure: ESOPHAGOGASTRODUODENOSCOPY WITH BIOPSY CPT CODE: 02342;  Surgeon: Anthony Garcia MD;  Location: [facility];  Service: Gastroenterology;  Laterality: N/A;   • HERNIA REPAIR     • SHOULDER SURGERY     • TONSILLECTOMY     • TOTAL KNEE ARTHROPLASTY Right 1/27/2020    Procedure: TOTAL KNEE ARTHROPLASTY RIGHT;  Surgeon: Ortiz Barba MD;  Location: [facility];  Service: Orthopedics   • TYMPANOPLASTY     • UPPER GASTROINTESTINAL ENDOSCOPY     • VASECTOMY         Family History   Problem Relation Age of Onset   • Heart attack Father    • Heart disease Father    • Hypertension Father    • Heart attack Sister    • Diabetes Sister    • Heart disease Sister    • Hypertension Sister    • Thyroid disease Sister    • Heart attack Brother    • Diabetes Brother    • Thyroid disease Brother    • Arthritis Daughter    • COPD Daughter    • Asthma Daughter    • Depression Daughter    • Heart disease Sister    • Hypertension Sister        Social History     Socioeconomic History   • Marital status:      Spouse name: ruben   • Number of children: 3   • Years of education: 12   • Highest education level: Not on file   Occupational History   • Occupation: retired   Tobacco Use   • Smoking status: Never Smoker   • Smokeless tobacco: Never Used    Substance and Sexual Activity   • Alcohol use: No   • Drug use: No   • Sexual activity: Defer           Objective   Physical Exam  Vitals signs and nursing note reviewed.   Constitutional:       Appearance: He is not ill-appearing or diaphoretic.      Comments: Nontoxic-appearing   HENT:      Head: Normocephalic and atraumatic.      Comments: Moist mucous membranes.  Clear oropharynx.  No oral lesions.  Eyes:      Extraocular Movements: Extraocular movements intact.      Pupils: Pupils are equal, round, and reactive to light.      Comments: Anicteric   Neck:      Musculoskeletal: Neck supple.      Vascular: No JVD.      Comments: No nuchal rigidity  Cardiovascular:      Rate and Rhythm: Normal rate and regular rhythm.      Pulses:           Radial pulses are 2+ on the right side and 2+ on the left side.        Dorsalis pedis pulses are 2+ on the right side and 2+ on the left side.      Heart sounds: Normal heart sounds. No systolic murmur.      Comments: No murmur 2+ radial pulse 2+ dorsalis pedis pulses no extrasystoles  Pulmonary:      Breath sounds: No decreased breath sounds, wheezing, rhonchi or rales.      Comments: No accessory muscle use symmetric chest wall movement  Abdominal:      General: Bowel sounds are normal.      Palpations: Abdomen is soft.      Comments: No guarding no rigidity negative Rovsing negative [place]   Musculoskeletal:      Right lower leg: He exhibits no tenderness.      Left lower leg: He exhibits no tenderness.   Lymphadenopathy:      Cervical: No cervical adenopathy.   Skin:     General: Skin is warm and dry.      Capillary Refill: Capillary refill takes less than 2 seconds.   Neurological:      Mental Status: He is alert and oriented to person, place, and time.      Cranial Nerves: No cranial nerve deficit.      Comments: Symmetric/light touch   Psychiatric:         Mood and Affect: Mood normal.         Procedures           ED Course  ED Course as of Dec 10 2032   Thu Dec 10,  2020 1833 Normal sinus rhythm.  Rate 99.  Normal axis.  Normal intervals.  Q waves in lead V3.  No acute ST elevation or depression.  Interpreted by me.   ECG 12 Lead [BC]   1934 Patient with elevated white blood cell count 13.87    [BB]   1946 EKG normal sinus rhythm rate 89  QRS 84  no ST elevation.    [BB]   2012 Patient troponin negative x1 set.  Patient BNP unremarkable.    [BB]   2012 Xr Chest 1 View    Result Date: 12/10/2020  No evidence of active or acute cardiopulmonary disease on today's chest radiograph.  This report was finalized on 12/10/2020 8:09 PM by Dr. Keaton Sheffield MD.          [BB]   2024 Patient currently denies chest pain.  Have noted that patient's heart score is a 5.  For set of cardiac enzymes is negative.  Have noted no beds available at current facility.  [facility] was noted to have no telemetry beds as well as light compromise on diversion.  Have contacted [facility] awaiting callback    [BB]   2030 Have discussed case with Dr. Hernandez with hospitalist who is agreeable to accept transfer    [BB]      ED Course User Index  [BB] Jacob Neely MD  [BC] Leonardo Jones MD                                           [facility]    Final diagnoses:   Chest pain, unspecified type            Jacob Neely MD  12/12/20 0607

## 2020-12-11 NOTE — H&P
[facility]   HISTORY AND PHYSICAL      Name:  Damaso Leonard   Age:  60 y.o.  Sex:  male  :  1960  MRN:  [id number]   Visit Number:  [id number]  Admission Date:  2020  Date Of Service:  20  Primary Care Physician:  Clare Quintero APRN    Chief Complaint:     Chest pain    History Of Presenting Illness:      Patient is a 60-year-old male history significant for diastolic heart failure, CAD s/p stenting x4, hypertension, hyperlipidemia, insulin-dependent type 2 diabetes who presented to [facility] with worsening chest pressure.  Patient reports that he has had chest pressure sensations on and off for the past 3 months.  Today he was walking with his son-in-law when the pressure began in his mid chest with radiation up the left side of his neck.  It was associated with vomiting x3 as well as shortness of breath.  Patient reports that this lasted for several hours until he was in the ER.  He reports that he has had this pain even at rest and has woken him from sleep over the last several weeks.  Nothing seems to make it better.  He does tell me that this is how he he felt when he had his previous MIs.  Patient follows with cardiology at [place].  Stress test was performed 2020 showing small sized infarct located in the mid to basal inferior, inferior lateral wall with no significant ischemia noted.  EF 63%.  Results were consistent with an intermediate risk.  Patient transferred to our facility due to lack of available beds at [facility].  Prior to transfer, troponin was negative.  Chest x-ray unremarkable.  At time of my exam, patient is resting comfortably in bed.  Tells me that his chest pain has resolved.  No shortness of breath.  COVID-19 was negative.    Review Of Systems:     A full 10 point ROS was reviewed and negative unless otherwise stated in the HPI     Past Medical History:    Past Medical History:   Diagnosis Date   • Anxiety      • Anxiety and depression    • Arthritis    • ASCVD (arteriosclerotic cardiovascular disease)    • CHF (congestive heart failure) (CMS/HCC)    • Colitis    • Coronary artery disease    • Disease of thyroid gland    • DM (diabetes mellitus) (CMS/HCC)    • Elevated cholesterol    • GERD (gastroesophageal reflux disease)    • History of EKG 04/15/2015    NORMAL   • History of transfusion    • HTN (hypertension)    • Hyperlipidemia    • Injury of back    • Low back pain    • Myocardial infarction (CMS/HCC)     about 13 years ago   • Sleep apnea     wears oxygen at night    • Wears glasses    • Wears partial dentures        Past Surgical history:    Past Surgical History:   Procedure Laterality Date   • CARDIAC CATHETERIZATION     • CARDIAC SURGERY      stenting   • CHOLECYSTECTOMY     • COLONOSCOPY  2007   • COLONOSCOPY N/A 5/30/2020    Procedure: COLONOSCOPY CPT CODE: 75258;  Surgeon: Anthony Garcia MD;  Location: [facility];  Service: Gastroenterology;  Laterality: N/A;   • ENDOSCOPY N/A 5/30/2020    Procedure: ESOPHAGOGASTRODUODENOSCOPY WITH BIOPSY CPT CODE: 99557;  Surgeon: Anthony Garcia MD;  Location: [facility];  Service: Gastroenterology;  Laterality: N/A;   • HERNIA REPAIR     • SHOULDER SURGERY     • TONSILLECTOMY     • TOTAL KNEE ARTHROPLASTY Right 1/27/2020    Procedure: TOTAL KNEE ARTHROPLASTY RIGHT;  Surgeon: Ortiz Barba MD;  [facility];  Service: Orthopedics   • TYMPANOPLASTY     • UPPER GASTROINTESTINAL ENDOSCOPY     • VASECTOMY         Social History:    Social History     Socioeconomic History   • Marital status:      Spouse name: ruben   • Number of children: 3   • Years of education: 12   • Highest education level: Not on file   Occupational History   • Occupation: retired   Tobacco Use   • Smoking status: Never Smoker   • Smokeless tobacco: Never Used   Substance and Sexual Activity   • Alcohol use: No   • Drug use: No   • Sexual activity: Defer        Family History:    Family History   Problem Relation Age of Onset   • Heart attack Father    • Heart disease Father    • Hypertension Father    • Heart attack Sister    • Diabetes Sister    • Heart disease Sister    • Hypertension Sister    • Thyroid disease Sister    • Heart attack Brother    • Diabetes Brother    • Thyroid disease Brother    • Arthritis Daughter    • COPD Daughter    • Asthma Daughter    • Depression Daughter    • Heart disease Sister    • Hypertension Sister        Allergies:      Patient has no known allergies.    Home Medications:    Prior to Admission Medications     Prescriptions Last Dose Informant Patient Reported? Taking?    amitriptyline (ELAVIL) 50 MG tablet   No No    Take 2 tablets by mouth At Night As Needed for Sleep.    aspirin 325 MG EC tablet   No No    Take 1 tablet by mouth Every 12 (Twelve) Hours. For 1 month    carvedilol (COREG) 12.5 MG tablet   No No    Take 1 tablet by mouth 2 (Two) Times a Day With Meals.    citalopram (CeleXA) 40 MG tablet   No No    Take 1 tablet by mouth Daily.    clonazePAM (KlonoPIN) 0.5 MG tablet   No No    Take 1 tablet by mouth 3 (Three) Times a Day As Needed for Anxiety. for anxiety    cyanocobalamin 1000 MCG/ML injection   No No    Inject 1 mL into the appropriate muscle as directed by prescriber Every 30 (Thirty) Days.    cyanocobalamin injection 1,000 mcg   No No    dicyclomine (BENTYL) 10 MG capsule   No No    Take 1 capsule by mouth 4 (Four) Times a Day Before Meals & at Bedtime.    diphenhydrAMINE (Benadryl Allergy) 25 mg capsule   No No    Take 1 capsule by mouth Every 6 (Six) Hours As Needed for Itching.    docusate sodium 100 MG capsule   No No    Take 1 capsule by mouth 2 (Two) Times a Day As Needed (constipation).    exenatide er (Bydureon) 2 MG pen-injector injection   No No    Inject 1 pen under the skin into the appropriate area as directed 1 (One) Time Per Week.    finasteride (PROSCAR) 5 MG tablet   No No    Take 1 tablet by  mouth Daily.    glucose blood test strip   No No    Check blood sugar 3x times a day.    glucose monitor monitoring kit   No No    1 each 3 (Three) Times a Day As Needed (diabetes).    icosapent ethyl (Vascepa) 1 g capsule capsule   No No    2 twice daily    isosorbide mononitrate (IMDUR) 30 MG 24 hr tablet   No No    Take 1 tablet by mouth 2 (Two) Times a Day.    Lancets (ONETOUCH ULTRASOFT) lancets   No No    Check blood sugar 3 times daily    metFORMIN (GLUCOPHAGE) 500 MG tablet   No No    Take 1 tablet by mouth 2 (Two) Times a Day With Meals.    naloxone (NARCAN) 4 MG/0.1ML nasal spray   No No    1 spray into the nostril(s) as directed by provider As Needed (overdose).    nitroglycerin (NITROSTAT) 0.4 MG SL tablet   No No    1 under the tongue as needed for angina, may repeat q5mins for up three doses    O2 (OXYGEN)  Self Yes No    Inhale 2 L/min Every Night.    ondansetron (ZOFRAN) 8 MG tablet   No No    Take 1 tablet by mouth Every 8 (Eight) Hours As Needed for Nausea.    pantoprazole (PROTONIX) 40 MG EC tablet   No No    Take 1 tablet by mouth Daily.    polyethylene glycol (MIRALAX) packet  Self No No    Take 17 g by mouth Daily.    Patient taking differently:  Take 17 g by mouth Daily As Needed.    Ropivacine HCl-NaCl (NAROPIN)   No No    20 mg/hr by Peripheral Nerve route Continuous. Indications: Acute Pain    simvastatin (Zocor) 40 MG tablet   No No    Take 1 tablet by mouth Every Night.    Thyroid (NP THYROID) 30 MG PO tablet   No No    Take 1 tablet by mouth Daily.    traMADol (ULTRAM) 50 MG tablet   No No    Take 1 tablet by mouth Every 8 (Eight) Hours As Needed for Moderate Pain  or Severe Pain .    vitamin D (ERGOCALCIFEROL) 1.25 MG (57719 UT) capsule capsule   No No    Take 1 capsule by mouth Every 7 (Seven) Days.             ED Medications:    Medications   carvedilol (COREG) tablet 12.5 mg (has no administration in time range)   clonazePAM (KlonoPIN) tablet 0.5 mg (has no administration in time  range)   finasteride (PROSCAR) tablet 5 mg (has no administration in time range)   isosorbide mononitrate (IMDUR) 24 hr tablet 30 mg (has no administration in time range)   traMADol (ULTRAM) tablet 50 mg (has no administration in time range)   pantoprazole (PROTONIX) EC tablet 40 mg (has no administration in time range)   aspirin EC tablet 81 mg (has no administration in time range)   acetaminophen (TYLENOL) tablet 650 mg (has no administration in time range)     Or   acetaminophen (TYLENOL) 160 MG/5ML solution 650 mg (has no administration in time range)     Or   acetaminophen (TYLENOL) suppository 650 mg (has no administration in time range)   ondansetron (ZOFRAN) injection 4 mg (has no administration in time range)   clopidogrel (PLAVIX) tablet 75 mg (has no administration in time range)   atorvastatin (LIPITOR) tablet 80 mg (has no administration in time range)   calcium carbonate (TUMS) chewable tablet 500 mg (200 mg elemental) (has no administration in time range)   melatonin tablet 5 mg (has no administration in time range)       Vital Signs:    Temp:  [97.6 °F (36.4 °C)] 97.6 °F (36.4 °C)  Heart Rate:  [77] 77  Resp:  [18] 18  BP: (129)/(76) 129/76        12/11/20  0012   Weight: 93.9 kg (207 lb 0.2 oz)       Body mass index is 29.7 kg/m².    Physical Exam:    General Appearance:  Alert and cooperative, not in any acute distress.   Head:  Atraumatic and normocephalic, without obvious abnormality.   Eyes:          PERRLA, conjunctivae and sclerae normal, no Icterus. No pallor. Extraocular movements are within normal limits.   Ears:  Ears appear intact with no abnormalities noted.   Throat: No oral lesions, no thrush, oral mucosa moist.   Neck: Supple, trachea midline, no thyromegaly, no carotid bruit.       Lungs:   Chest shape is normal. Breath sounds heard bilaterally equally.  No crackles or wheezing. No pleural rub or bronchial breathing.   Heart:  Normal S1 and S2, no murmur, no gallop, no rub. No JVD.      Abdomen:   Normal bowel sounds, no masses, no organomegaly. Soft, nontender, nondistended, no guarding, no rebound tenderness.   Extremities: Moves all extremities well, no edema, no cyanosis, no clubbing.   Pulses: Pulses palpable and equal bilaterally.   Skin: No bleeding, bruising or rash.   Neurologic: Alert and oriented x 3. Moves all four limbs equally. No tremors. No facial asymmetry.     Laboratory data:    I have reviewed the labs done in the emergency room.    Results from last 7 days   Lab Units 12/10/20  1818   SODIUM mmol/L 134*   POTASSIUM mmol/L 4.1   CHLORIDE mmol/L 99   CO2 mmol/L 21.9*   BUN mg/dL 15   CREATININE mg/dL 1.15   CALCIUM mg/dL 9.4   BILIRUBIN mg/dL 0.5   ALK PHOS U/L 105   ALT (SGPT) U/L 16   AST (SGOT) U/L 16   GLUCOSE mg/dL 187*     Results from last 7 days   Lab Units 12/10/20  2207 12/10/20  1818   WBC 10*3/mm3 12.71* 13.87*   HEMOGLOBIN g/dL 12.2* 12.7*   HEMATOCRIT % 39.5 41.3   PLATELETS 10*3/mm3 339 368     Results from last 7 days   Lab Units 12/10/20  2207 12/10/20  1818   INR  1.11* 1.01     Results from last 7 days   Lab Units 12/10/20  2207 12/10/20  1818   TROPONIN T ng/mL 0.093* <0.010     Results from last 7 days   Lab Units 12/10/20  1818   PROBNP pg/mL 69.7                       Invalid input(s): USDES,  BLOODU, NITRITITE, BACT, EP  Pain Management Panel     There is no flowsheet data to display.              EKG:      EKG personally reviewed, sinus rhythm with rate 89.  No signs of acute infarct or ischemia.    Radiology:    Imaging Results (Last 72 Hours)     ** No results found for the last 72 hours. **            Unstable angina (CMS/HCC)      Assessment:    Unstable angina, POA  Type 2 diabetes  Depression/anxiety  Hypertension  Hyperlipidemia  Hypothyroidism  History of CAD s/p stenting - 2010  Diastolic congestive heart failure, not in exacerbation  Obesity  GERD  TRES    Plan:    Admit patient to hospital for observation with cardiac monitoring.  Patient has  history consistent with cardiac related chest pain.  Trend troponins.  Received aspirin prior to transfer.  Will start heparin drip.  Cardiology consult.  TSH, HbA1c, lipid panel pending.  Supportive care.  Continue home beta-blocker.  Start high intensity statin.  Further orders as clinical course dictates.    Advance Care Planning   ACP discussion was declined by the patient. Patient does not have an advance directive, declines further assistance.    Rmaan Hope,   12/11/20  00:35 EST    Dictated utilizing Dragon dictation.

## 2020-12-11 NOTE — PROGRESS NOTES
[facility]    Name:  Damaso Leonard   Age:  60 y.o.  Sex:  male  :  1960  MRN:  [id number]   Visit Number:  [id number]  Admission Date:  2020  Date Of Service:  20  Primary Care Physician:  Clare Quintero APRN    Patient was seen and examined. Pertinent laboratory and radiology data were reviewed.    Vital signs:    Vital Signs (last 24 hours)       12/10 0700  -   0659  0700  -   1306   Most Recent    Temp (°F) 97.6 -  97.9    97.8 -  98.6     98.6 (37)    Heart Rate 77 -  80    77 -  78     78    Resp   18      18     18    /76 -  129/76    106/68 -  120/77     106/68    SpO2 (%)   98    94 -  97     94          Unstable angina, POA  Type 2 diabetes  Depression/anxiety  Hypertension  Hyperlipidemia  Hypothyroidism  History of CAD s/p stenting -   Diastolic congestive heart failure, not in exacerbation  Obesity  GERD  TRES      Plan:  Await cardiology consultation. He is NPO. Currently, chest pain resolved. Heparin drip continued. Continue telemetry monitoring. Further recommendations depend on the clinical course.    Ariadna Walter DO  20  13:06 EST

## 2020-12-11 NOTE — CONSULTS
"[facility] Consult Note    Referring Provider: Jose Angel  Reason for Consultation: Non-ST elevation-ACS    Patient Care Team:  Clare Quintero APRN as PCP - General  Clare Quintero APRN as PCP - Family Medicine    Chief complaint : Chest discomfort    Subjective:    History of present illness: This is a 60-year-old male patient with known coronary artery disease who reports having intermittent episodes of chest discomfort dating back to this summer.  The patient indicates that he did not seek medical attention.  He has a history of known coronary artery disease with 4 prior stents placed on 3 separate occasions.  The patient's initial stent procedure was for what appears to be an inferior myocardial infarction.  The patient indicates that his chest discomfort was identical in quality that which he was experiencing with his previous heart attack.  He described a diffuse anterior pressure heaviness diffusely across his central chest with radiation down his left arm associated with nausea vomiting and diaphoresis.  The patient indicates that he \"waited as long as he could\" before seeking medical attention.  His twelve-lead electrocardiogram showed no ischemic ST-T wave changes or injury current.  His initial cardiac troponin was normal but has subsequently trended upward.  He is currently angina free.  He denies having orthopnea PND or lower extremity edema.  He has no dizziness palpitations or syncope.  He has a history of hypertension, dyslipidemia and insulin requiring type 2 diabetes mellitus.  He is a non-smoker.    Review of Systems   Review of Systems   Constitution: Negative for chills, diaphoresis, fever, malaise/fatigue, weight gain and weight loss.   HENT: Negative for ear discharge, hearing loss, hoarse voice and nosebleeds.    Eyes: Negative for discharge, double vision, pain and photophobia.   Cardiovascular: Positive for chest pain. Negative for claudication, cyanosis, dyspnea " on exertion, irregular heartbeat, leg swelling, near-syncope, orthopnea, palpitations, paroxysmal nocturnal dyspnea and syncope.   Respiratory: Negative for cough, hemoptysis, shortness of breath, sputum production and wheezing.    Endocrine: Negative for cold intolerance, heat intolerance, polydipsia, polyphagia and polyuria.   Hematologic/Lymphatic: Negative for adenopathy and bleeding problem. Does not bruise/bleed easily.   Skin: Negative for color change, flushing, itching and rash.   Musculoskeletal: Negative for muscle cramps, muscle weakness, myalgias and stiffness.   Gastrointestinal: Negative for abdominal pain, diarrhea, hematemesis, hematochezia, nausea and vomiting.   Genitourinary: Negative for dysuria, frequency and nocturia.   Neurological: Negative for focal weakness, loss of balance, numbness, paresthesias and seizures.   Psychiatric/Behavioral: Negative for altered mental status, hallucinations and suicidal ideas.   Allergic/Immunologic: Negative for HIV exposure, hives and persistent infections.       History  Past Medical History:   Diagnosis Date   • Anxiety    • Anxiety and depression    • Arthritis    • ASCVD (arteriosclerotic cardiovascular disease)    • CHF (congestive heart failure) (CMS/HCC)    • Colitis    • Coronary artery disease    • Disease of thyroid gland    • DM (diabetes mellitus) (CMS/HCC)    • Elevated cholesterol    • GERD (gastroesophageal reflux disease)    • History of EKG 04/15/2015    NORMAL   • History of transfusion    • HTN (hypertension)    • Hyperlipidemia    • Injury of back    • Low back pain    • Myocardial infarction (CMS/HCC)     about 13 years ago   • Sleep apnea     wears oxygen at night    • Wears glasses    • Wears partial dentures    ,   Past Surgical History:   Procedure Laterality Date   • CARDIAC CATHETERIZATION      reports 4 cardiac stents   • CARDIAC SURGERY      stenting   • CHOLECYSTECTOMY     • COLONOSCOPY  2007   • COLONOSCOPY N/A 5/30/2020     Procedure: COLONOSCOPY CPT CODE: 31005;  Surgeon: Anthony Garcia MD;  Location:  [facility];  Service: Gastroenterology;  Laterality: N/A;   • ENDOSCOPY N/A 5/30/2020    Procedure: ESOPHAGOGASTRODUODENOSCOPY WITH BIOPSY CPT CODE: 56384;  Surgeon: Anthony Garcia MD;  Location:  [facility];  Service: Gastroenterology;  Laterality: N/A;   • HERNIA REPAIR     • SHOULDER SURGERY     • TONSILLECTOMY     • TOTAL KNEE ARTHROPLASTY Right 1/27/2020    Procedure: TOTAL KNEE ARTHROPLASTY RIGHT;  Surgeon: Ortiz Barba MD;  Location:  [facility];  Service: Orthopedics   • TYMPANOPLASTY     • UPPER GASTROINTESTINAL ENDOSCOPY     • VASECTOMY     ,   Family History   Problem Relation Age of Onset   • Heart attack Father    • Heart disease Father    • Hypertension Father    • Heart attack Sister    • Diabetes Sister    • Heart disease Sister    • Hypertension Sister    • Thyroid disease Sister    • Heart attack Brother    • Diabetes Brother    • Thyroid disease Brother    • Arthritis Daughter    • COPD Daughter    • Asthma Daughter    • Depression Daughter    • Heart disease Sister    • Hypertension Sister    ,   Social History     Tobacco Use   • Smoking status: Never Smoker   • Smokeless tobacco: Never Used   Substance Use Topics   • Alcohol use: No   • Drug use: No   ,   Facility-Administered Medications Prior to Admission   Medication Dose Route Frequency Provider Last Rate Last Admin   • cyanocobalamin injection 1,000 mcg  1,000 mcg Intramuscular Q28 Days Clare Quintero APRN   1,000 mcg at 07/11/18 0843     Medications Prior to Admission   Medication Sig Dispense Refill Last Dose   • amitriptyline (ELAVIL) 50 MG tablet Take 2 tablets by mouth At Night As Needed for Sleep. 180 tablet 1 12/10/2020 at Unknown time   • aspirin 325 MG EC tablet Take 1 tablet by mouth Every 12 (Twelve) Hours. For 1 month 60 tablet 0 12/11/2020 at Unknown time   • carvedilol (COREG) 12.5 MG tablet Take 1  tablet by mouth 2 (Two) Times a Day With Meals. 180 tablet 3 12/11/2020 at Unknown time   • citalopram (CeleXA) 40 MG tablet Take 1 tablet by mouth Daily. 90 tablet 5 12/11/2020 at Unknown time   • clonazePAM (KlonoPIN) 0.5 MG tablet Take 1 tablet by mouth 3 (Three) Times a Day As Needed for Anxiety. for anxiety 90 tablet 0 12/11/2020 at Unknown time   • cyanocobalamin 1000 MCG/ML injection Inject 1 mL into the appropriate muscle as directed by prescriber Every 30 (Thirty) Days. 1 mL 5 Past Month at Unknown time   • dicyclomine (BENTYL) 10 MG capsule Take 1 capsule by mouth 4 (Four) Times a Day Before Meals & at Bedtime. 120 capsule 5 12/11/2020 at Unknown time   • diphenhydrAMINE (Benadryl Allergy) 25 mg capsule Take 1 capsule by mouth Every 6 (Six) Hours As Needed for Itching. 90 capsule 3 Past Month at Unknown time   • exenatide er (Bydureon) 2 MG pen-injector injection Inject 1 pen under the skin into the appropriate area as directed 1 (One) Time Per Week. 4 each 5 Past Week at Unknown time   • finasteride (PROSCAR) 5 MG tablet Take 1 tablet by mouth Daily. 90 tablet 3 12/10/2020 at Unknown time   • icosapent ethyl (Vascepa) 1 g capsule capsule 2 twice daily 120 capsule 5 12/11/2020 at Unknown time   • isosorbide mononitrate (IMDUR) 30 MG 24 hr tablet Take 1 tablet by mouth 2 (Two) Times a Day. 180 tablet 0 12/11/2020 at Unknown time   • metFORMIN (GLUCOPHAGE) 500 MG tablet Take 1 tablet by mouth 2 (Two) Times a Day With Meals. 180 tablet 3 12/11/2020 at Unknown time   • nitroglycerin (NITROSTAT) 0.4 MG SL tablet 1 under the tongue as needed for angina, may repeat q5mins for up three doses 50 tablet 1 12/11/2020 at Unknown time   • O2 (OXYGEN) Inhale 2 L/min Every Night.   12/11/2020 at Unknown time   • pantoprazole (PROTONIX) 40 MG EC tablet Take 1 tablet by mouth Daily. 90 tablet 3 12/11/2020 at Unknown time   • polyethylene glycol (MIRALAX) packet Take 17 g by mouth Daily. (Patient taking differently: Take 17  "g by mouth Daily As Needed.) 1 each 5 Past Week at Unknown time   • simvastatin (Zocor) 40 MG tablet Take 1 tablet by mouth Every Night. 90 tablet 2 12/11/2020 at Unknown time   • Thyroid (NP THYROID) 30 MG PO tablet Take 1 tablet by mouth Daily. 30 tablet 5 12/11/2020 at Unknown time   • traMADol (ULTRAM) 50 MG tablet Take 1 tablet by mouth Every 8 (Eight) Hours As Needed for Moderate Pain  or Severe Pain . 60 tablet 3 Past Week at Unknown time   • vitamin D (ERGOCALCIFEROL) 1.25 MG (40680 UT) capsule capsule Take 1 capsule by mouth Every 7 (Seven) Days. 12 capsule 5 Past Week at Unknown time   • docusate sodium 100 MG capsule Take 1 capsule by mouth 2 (Two) Times a Day As Needed (constipation). 60 each 5 More than a month at Unknown time   • glucose blood test strip Check blood sugar 3x times a day. 100 each 12    • glucose monitor monitoring kit 1 each 3 (Three) Times a Day As Needed (diabetes). 1 each 0    • Lancets (ONETOUCH ULTRASOFT) lancets Check blood sugar 3 times daily 100 each 12    • naloxone (NARCAN) 4 MG/0.1ML nasal spray 1 spray into the nostril(s) as directed by provider As Needed (overdose). 1 each 0 Unknown at Unknown time   • ondansetron (ZOFRAN) 8 MG tablet Take 1 tablet by mouth Every 8 (Eight) Hours As Needed for Nausea. 20 tablet 2 More than a month at Unknown time    and Allergies:  Patient has no known allergies.    Objective:    Vital Sign Min/Max for last 24 hours  Temp  Min: 97.6 °F (36.4 °C)  Max: 98.9 °F (37.2 °C)   BP  Min: 106/68  Max: 150/89   Pulse  Min: 74  Max: 101   Resp  Min: 16  Max: 18   SpO2  Min: 93 %  Max: 99 %   No data recorded   Weight  Min: 93 kg (205 lb)  Max: 93.9 kg (207 lb 0.2 oz)     Flowsheet Rows      First Filed Value   Admission Height  177.8 cm (70\") Documented at 12/11/2020 0012   Admission Weight  93.9 kg (207 lb 0.2 oz) Documented at 12/11/2020 0012             Ejection Fraction  Lab Results   Component Value Date    EF 63 07/14/2020       Echo EF " Estimated  Lab Results   Component Value Date    ECHOEFEST 60 12/11/2020       Physical Exam:   Vitals signs and nursing note reviewed.   Constitutional:       Appearance: Healthy appearance. Not in distress.   Neck:      Vascular: No JVR. JVD normal.   Pulmonary:      Effort: Pulmonary effort is normal.      Breath sounds: Normal breath sounds. No wheezing. No rhonchi. No rales.   Chest:      Chest wall: Not tender to palpatation.   Cardiovascular:      PMI at left midclavicular line. Normal rate. Regular rhythm. Normal S1. Normal S2.      Murmurs: There is no murmur.      No gallop. No click. No rub.   Pulses:     Intact distal pulses.   Edema:     Peripheral edema absent.   Abdominal:      General: Bowel sounds are normal.      Palpations: Abdomen is soft.      Tenderness: There is no abdominal tenderness.   Musculoskeletal: Normal range of motion.         General: No tenderness.   Skin:     General: Skin is warm and dry.   Neurological:      General: No focal deficit present.      Mental Status: Alert and oriented to person, place and time.         Results Review:   I reviewed the patient's new clinical results.  Results from last 7 days   Lab Units 12/11/20  0500 12/11/20  0058 12/10/20  2207   WBC 10*3/mm3 11.04* 10.71 12.71*   HEMOGLOBIN g/dL 12.1* 12.5* 12.2*   HEMATOCRIT % 37.4* 38.8 39.5   PLATELETS 10*3/mm3 332 329 339     Results from last 7 days   Lab Units 12/11/20  0500 12/11/20  0058 12/10/20  1818   SODIUM mmol/L 138 137 134*   POTASSIUM mmol/L 3.8 3.9 4.1   CHLORIDE mmol/L 101 99 99   CO2 mmol/L 25.9 26.0 21.9*   BUN mg/dL 14 15 15   CREATININE mg/dL 0.90 0.94 1.15   GLUCOSE mg/dL 98 100* 187*   CALCIUM mg/dL 9.0 9.1 9.4     Lab Results   Lab Value Date/Time    TROPONINT 0.560 (C) 12/11/2020 0500    TROPONINT 0.301 (C) 12/11/2020 0058    TROPONINT 0.093 (C) 12/10/2020 2207    TROPONINT <0.010 12/10/2020 1818     Results from last 7 days   Lab Units 12/11/20  0500   CHOLESTEROL mg/dL 133    TRIGLYCERIDES mg/dL 94   HDL CHOL mg/dL 49   LDL CHOL mg/dL 66         Assessment/Plan:      Unstable angina (CMS/HCC).  Non-ST elevation ACS.    Coronary artery disease.  Native heart.  Native vessels.  Presentation with unstable coronary syndrome.    Essential hypertension.    Dyslipidemia.    Insulin requiring type 2 diabetes mellitus.      I have recommended coronary angiography with interventional standby.  Mr. Leonard has been engaged in a patient level discussion regarding the rationale for proceeding with invasive testing\procedures.  The procedure has been explained in detail to the patient including risks benefits and alternatives.  He expresses understanding and a desire to proceed.    Further recommendations will be predicated on the results of his catheterization findings.    I discussed the patients findings and my recommendations with patient, family and nursing staff    Noble Thompson MD  12/11/20  15:56 EST

## 2020-12-11 NOTE — ED NOTES
Dona with [facility] called back, the patient is going to Room 425, the number for report is [phone] and the number for face sheet is [phone].     Sammy Kwon  12/10/20 2049

## 2020-12-12 ENCOUNTER — READMISSION MANAGEMENT (OUTPATIENT)
Dept: CALL CENTER | Facility: HOSPITAL | Age: 60
End: 2020-12-12

## 2020-12-12 VITALS
TEMPERATURE: 98.2 F | HEART RATE: 71 BPM | DIASTOLIC BLOOD PRESSURE: 66 MMHG | SYSTOLIC BLOOD PRESSURE: 104 MMHG | BODY MASS INDEX: 29.64 KG/M2 | WEIGHT: 207.01 LBS | HEIGHT: 70 IN | RESPIRATION RATE: 18 BRPM | OXYGEN SATURATION: 97 %

## 2020-12-12 LAB
ANION GAP SERPL CALCULATED.3IONS-SCNC: 10.5 MMOL/L (ref 5–15)
BASOPHILS # BLD AUTO: 0.02 10*3/MM3 (ref 0–0.2)
BASOPHILS NFR BLD AUTO: 0.3 % (ref 0–1.5)
BUN SERPL-MCNC: 13 MG/DL (ref 6–20)
BUN/CREAT SERPL: 12.6 (ref 7–25)
CALCIUM SPEC-SCNC: 8.5 MG/DL (ref 8.6–10.5)
CHLORIDE SERPL-SCNC: 103 MMOL/L (ref 98–107)
CHOLEST SERPL-MCNC: 122 MG/DL (ref 0–200)
CO2 SERPL-SCNC: 24.5 MMOL/L (ref 22–29)
CREAT SERPL-MCNC: 1.03 MG/DL (ref 0.76–1.27)
DEPRECATED RDW RBC AUTO: 42.8 FL (ref 37–54)
EOSINOPHIL # BLD AUTO: 0.31 10*3/MM3 (ref 0–0.4)
EOSINOPHIL NFR BLD AUTO: 5.1 % (ref 0.3–6.2)
ERYTHROCYTE [DISTWIDTH] IN BLOOD BY AUTOMATED COUNT: 14.4 % (ref 12.3–15.4)
GFR SERPL CREATININE-BSD FRML MDRD: 74 ML/MIN/1.73
GLUCOSE SERPL-MCNC: 110 MG/DL (ref 65–99)
HCT VFR BLD AUTO: 34.3 % (ref 37.5–51)
HDLC SERPL-MCNC: 40 MG/DL (ref 40–60)
HGB BLD-MCNC: 11.1 G/DL (ref 13–17.7)
IMM GRANULOCYTES # BLD AUTO: 0 10*3/MM3 (ref 0–0.05)
IMM GRANULOCYTES NFR BLD AUTO: 0 % (ref 0–0.5)
LDLC SERPL CALC-MCNC: 61 MG/DL (ref 0–100)
LDLC/HDLC SERPL: 1.48 {RATIO}
LYMPHOCYTES # BLD AUTO: 1.22 10*3/MM3 (ref 0.7–3.1)
LYMPHOCYTES NFR BLD AUTO: 20 % (ref 19.6–45.3)
MAGNESIUM SERPL-MCNC: 2.2 MG/DL (ref 1.6–2.6)
MCH RBC QN AUTO: 26.6 PG (ref 26.6–33)
MCHC RBC AUTO-ENTMCNC: 32.4 G/DL (ref 31.5–35.7)
MCV RBC AUTO: 82.3 FL (ref 79–97)
MONOCYTES # BLD AUTO: 0.86 10*3/MM3 (ref 0.1–0.9)
MONOCYTES NFR BLD AUTO: 14.1 % (ref 5–12)
NEUTROPHILS NFR BLD AUTO: 3.68 10*3/MM3 (ref 1.7–7)
NEUTROPHILS NFR BLD AUTO: 60.5 % (ref 42.7–76)
NRBC BLD AUTO-RTO: 0 /100 WBC (ref 0–0.2)
PLATELET # BLD AUTO: 305 10*3/MM3 (ref 140–450)
PMV BLD AUTO: 8.9 FL (ref 6–12)
POTASSIUM SERPL-SCNC: 3.7 MMOL/L (ref 3.5–5.2)
RBC # BLD AUTO: 4.17 10*6/MM3 (ref 4.14–5.8)
SODIUM SERPL-SCNC: 138 MMOL/L (ref 136–145)
TRIGL SERPL-MCNC: 115 MG/DL (ref 0–150)
VLDLC SERPL-MCNC: 21 MG/DL (ref 5–40)
WBC # BLD AUTO: 6.09 10*3/MM3 (ref 3.4–10.8)

## 2020-12-12 PROCEDURE — 80061 LIPID PANEL: CPT | Performed by: INTERNAL MEDICINE

## 2020-12-12 PROCEDURE — 99217 PR OBSERVATION CARE DISCHARGE MANAGEMENT: CPT | Performed by: INTERNAL MEDICINE

## 2020-12-12 PROCEDURE — 85025 COMPLETE CBC W/AUTO DIFF WBC: CPT | Performed by: INTERNAL MEDICINE

## 2020-12-12 PROCEDURE — 83735 ASSAY OF MAGNESIUM: CPT | Performed by: INTERNAL MEDICINE

## 2020-12-12 PROCEDURE — 80048 BASIC METABOLIC PNL TOTAL CA: CPT | Performed by: INTERNAL MEDICINE

## 2020-12-12 PROCEDURE — G0378 HOSPITAL OBSERVATION PER HR: HCPCS

## 2020-12-12 RX ORDER — ISOSORBIDE MONONITRATE 60 MG/1
60 TABLET, EXTENDED RELEASE ORAL
Qty: 90 TABLET | Refills: 3 | Status: SHIPPED | OUTPATIENT
Start: 2020-12-13 | End: 2021-03-16 | Stop reason: SDUPTHER

## 2020-12-12 RX ORDER — TRAMADOL HYDROCHLORIDE 50 MG/1
50 TABLET ORAL EVERY 8 HOURS PRN
Qty: 60 TABLET | Refills: 3
Start: 2020-12-12

## 2020-12-12 RX ORDER — RANOLAZINE 500 MG/1
500 TABLET, EXTENDED RELEASE ORAL EVERY 12 HOURS SCHEDULED
Qty: 60 TABLET | Refills: 5 | Status: ON HOLD | OUTPATIENT
Start: 2020-12-12 | End: 2021-07-19

## 2020-12-12 RX ORDER — CLONAZEPAM 0.5 MG/1
0.5 TABLET ORAL 3 TIMES DAILY PRN
Qty: 90 TABLET | Refills: 0
Start: 2020-12-12 | End: 2021-02-02 | Stop reason: SDUPTHER

## 2020-12-12 RX ADMIN — PANTOPRAZOLE SODIUM 40 MG: 40 TABLET, DELAYED RELEASE ORAL at 06:01

## 2020-12-12 RX ADMIN — FINASTERIDE 5 MG: 5 TABLET, FILM COATED ORAL at 08:52

## 2020-12-12 RX ADMIN — ASPIRIN 325 MG: 325 TABLET, COATED ORAL at 10:41

## 2020-12-12 RX ADMIN — CITALOPRAM HYDROBROMIDE 40 MG: 20 TABLET ORAL at 08:53

## 2020-12-12 RX ADMIN — ISOSORBIDE MONONITRATE 60 MG: 60 TABLET, EXTENDED RELEASE ORAL at 08:53

## 2020-12-12 RX ADMIN — RANOLAZINE 500 MG: 500 TABLET, FILM COATED, EXTENDED RELEASE ORAL at 08:52

## 2020-12-12 RX ADMIN — CARVEDILOL 12.5 MG: 12.5 TABLET, FILM COATED ORAL at 08:53

## 2020-12-12 RX ADMIN — CLOPIDOGREL BISULFATE 75 MG: 75 TABLET ORAL at 08:53

## 2020-12-12 NOTE — PLAN OF CARE
Goal Outcome Evaluation:      S/p heart cath. Radial site clean, dry and intact. No complaints overnight, restoril given by request for sleep. Patient an anticipated discharge today

## 2020-12-12 NOTE — PLAN OF CARE
Goal Outcome Evaluation:        Outcome Summary: Heart cath performed today. TRband inplace, dry and intact. No c/o of chest pain or SOB. Plan is to discharge to home tomorrow with f/u outpatient.

## 2020-12-12 NOTE — OUTREACH NOTE
Prep Survey      Responses   Scientologist facility patient discharged from?  [place]   Is LACE score < 7 ?  No   Eligibility  [facility]   Date of Admission  12/11/20   Date of Discharge  12/12/20   Discharge Disposition  Home or Self Care   Discharge diagnosis  Unstable angina, depression, DM type 2   Does the patient have one of the following disease processes/diagnoses(primary or secondary)?  Other   Does the patient have Home health ordered?  No   Is there a DME ordered?  No   Prep survey completed?  Yes          Meron Esquivel RN

## 2020-12-12 NOTE — PLAN OF CARE
Goal Outcome Evaluation:  Plan of Care Reviewed With: patient, daughter     Outcome Summary: No events overnight. Patient is doing well. Up out of bed ad leena. VSS. Heart cath site-right radial no sign of complications. Patient is being discharged to home. Follow up instructions and new medications reviewed with patient and family.

## 2020-12-12 NOTE — PROGRESS NOTES
Case Management Discharge Note                Selected Continued Care - Discharged on 12/12/2020 Admission date: 12/11/2020 - Discharge disposition: Home or Self Care    Destination    No services have been selected for the patient.              Durable Medical Equipment    No services have been selected for the patient.              Dialysis/Infusion    No services have been selected for the patient.              Home Medical Care    No services have been selected for the patient.              Therapy    No services have been selected for the patient.              Community Resources    No services have been selected for the patient.                       Final Discharge Disposition Code: 01 - home or self-care

## 2020-12-12 NOTE — DISCHARGE SUMMARY
[facility]      Name:  Damaso Leonard   Age:  60 y.o.  Sex:  male  :  1960  MRN:  [id number]   Visit Number:  [id number]  Primary Care Physician:  Clare Quintero APRN  Date of Discharge:  2020  Admission Date:  2020      Discharge Diagnosis:           Unstable angina (CMS/HCC)    Major depressive disorder in partial remission (CMS/HCC)    Type 2 diabetes mellitus with diabetic peripheral angiopathy without gangrene, without long-term current use of insulin (CMS/HCC)    Coronary artery disease involving native coronary artery of native heart      Presenting Problem/History of Present Illness:    Unstable angina (CMS/HCC) [I20.0]     Consults:     Consults     Date and Time Order Name Status Description    2020 1526 Inpatient Cardiology Consult Completed           Procedures Performed:    Procedure(s):  Coronary angiography         History of presenting illness:    54-year-old male with type 2 diabetes and history of coronary artery disease status post angioplasty and stenting x4 in the past.  Presented to the [facility] with worsening chest pressure and similar symptoms on and off for the past 3 months.  His symptoms were similar to when he had his previous MIs.  He was admitted for further evaluation and management of his symptoms.    Hospital Course:  Cardiac enzymes were mildly elevated, EKG without evidence of acute ischemia cardiology consult was obtained patient underwent cardiac catheterization this did not show an obvious culprit for acute coronary syndrome.  Medical management was maximized statin was changed from simvastatin to Lipitor.  Imdur dosing was increased.  He was also placed on Ranexa which he appears to be tolerating well.  Aspirin dose has been increased  A1c shows good control at 6.7.  Continue with Bydureon   Review of Systems:  Review of Systems   Constitutional: Positive for fatigue. Negative  for activity change, appetite change and fever.   HENT: Negative for congestion, ear discharge, ear pain and trouble swallowing.    Eyes: Negative for photophobia and visual disturbance.   Respiratory: Negative for cough and shortness of breath.    Cardiovascular: Negative for chest pain and palpitations.   Gastrointestinal: Negative for abdominal distention, abdominal pain, constipation, diarrhea, nausea and vomiting.   Endocrine: Negative.    Genitourinary: Negative for dysuria, hematuria and urgency.   Musculoskeletal: Positive for arthralgias. Negative for back pain, joint swelling and myalgias.   Skin: Negative for color change and rash.   Allergic/Immunologic: Negative.    Neurological: Negative for dizziness, weakness, light-headedness and headaches.   Hematological: Negative for adenopathy. Does not bruise/bleed easily.   Psychiatric/Behavioral: Positive for sleep disturbance. Negative for agitation, confusion and dysphoric mood. The patient is not nervous/anxious.         Vital Signs:    Temp:  [97.8 °F (36.6 °C)-98.8 °F (37.1 °C)] 98.2 °F (36.8 °C)  Heart Rate:  [] 71  Resp:  [16-18] 18  BP: ()/(60-97) 104/66    Physical Exam:  Physical Exam  Constitutional:       General: He is not in acute distress.     Appearance: He is well-developed.   HENT:      Nose: Nose normal.   Eyes:      General: No scleral icterus.     Conjunctiva/sclera: Conjunctivae normal.   Neck:      Thyroid: No thyromegaly.      Trachea: No tracheal deviation.   Cardiovascular:      Rate and Rhythm: Normal rate and regular rhythm.      Heart sounds: No murmur. No friction rub.   Pulmonary:      Effort: No respiratory distress.      Breath sounds: No wheezing or rales.   Abdominal:      General: There is no distension.      Palpations: Abdomen is soft. There is no mass.      Tenderness: There is no abdominal tenderness. There is no guarding.   Musculoskeletal: Normal range of motion.         General: No deformity.      Lymphadenopathy:      Cervical: No cervical adenopathy.   Skin:     General: Skin is warm and dry.      Findings: No erythema or rash.   Neurological:      Mental Status: He is alert and oriented to person, place, and time.      Cranial Nerves: No cranial nerve deficit.      Coordination: Coordination normal.      Deep Tendon Reflexes: Reflexes are normal and symmetric.   Psychiatric:         Behavior: Behavior normal.         Thought Content: Thought content normal.         Judgment: Judgment normal.         Pertinent Lab Results:     Lab Results (last 72 hours)     Procedure Component Value Units Date/Time    Magnesium [[id number]]  (Normal) Collected: 12/12/20 0654    Specimen: Blood Updated: 12/12/20 0744     Magnesium 2.2 mg/dL     Basic Metabolic Panel [[id number]]  (Abnormal) Collected: 12/12/20 0654    Specimen: Blood Updated: 12/12/20 0744     Glucose 110 mg/dL      BUN 13 mg/dL      Creatinine 1.03 mg/dL      Sodium 138 mmol/L      Potassium 3.7 mmol/L      Chloride 103 mmol/L      CO2 24.5 mmol/L      Calcium 8.5 mg/dL      eGFR Non African Amer 74 mL/min/1.73      BUN/Creatinine Ratio 12.6     Anion Gap 10.5 mmol/L     Narrative:      GFR Normal >60  Chronic Kidney Disease <60  Kidney Failure <15      Lipid Panel [[id number]] Collected: 12/12/20 0654    Specimen: Blood Updated: 12/12/20 0744     Total Cholesterol 122 mg/dL      Triglycerides 115 mg/dL      HDL Cholesterol 40 mg/dL      LDL Cholesterol  61 mg/dL      VLDL Cholesterol 21 mg/dL      LDL/HDL Ratio 1.48    Narrative:      Cholesterol Reference Ranges  (U.S. Department of Health and Human Services ATP III Classifications)    Desirable          <200 mg/dL  Borderline High    200-239 mg/dL  High Risk          >240 mg/dL      Triglyceride Reference Ranges  (U.S. Department of Health and Human Services ATP III Classifications)    Normal           <150 mg/dL  Borderline High  150-199 mg/dL  High             200-499 mg/dL  Very High        >500  mg/dL    HDL Reference Ranges  (U.S. Department of Health and Human Services ATP III Classifcations)    Low     <40 mg/dl (major risk factor for CHD)  High    >60 mg/dl ('negative' risk factor for CHD)        LDL Reference Ranges  (U.S. Department of Health and Human Services ATP III Classifcations)    Optimal          <100 mg/dL  Near Optimal     100-129 mg/dL  Borderline High  130-159 mg/dL  High             160-189 mg/dL  Very High        >189 mg/dL    CBC & Differential [[id number]]  (Abnormal) Collected: 12/12/20 0654    Specimen: Blood Updated: 12/12/20 0726    Narrative:      The following orders were created for panel order CBC & Differential.  Procedure                               Abnormality         Status                     ---------                               -----------         ------                     CBC Auto Differential[[id number]]        Abnormal            Final result                 Please view results for these tests on the individual orders.    CBC Auto Differential [[id number]]  (Abnormal) Collected: 12/12/20 0654    Specimen: Blood Updated: 12/12/20 0726     WBC 6.09 10*3/mm3      RBC 4.17 10*6/mm3      Hemoglobin 11.1 g/dL      Hematocrit 34.3 %      MCV 82.3 fL      MCH 26.6 pg      MCHC 32.4 g/dL      RDW 14.4 %      RDW-SD 42.8 fl      MPV 8.9 fL      Platelets 305 10*3/mm3      Neutrophil % 60.5 %      Lymphocyte % 20.0 %      Monocyte % 14.1 %      Eosinophil % 5.1 %      Basophil % 0.3 %      Immature Grans % 0.0 %      Neutrophils, Absolute 3.68 10*3/mm3      Lymphocytes, Absolute 1.22 10*3/mm3      Monocytes, Absolute 0.86 10*3/mm3      Eosinophils, Absolute 0.31 10*3/mm3      Basophils, Absolute 0.02 10*3/mm3      Immature Grans, Absolute 0.00 10*3/mm3      nRBC 0.0 /100 WBC     POC Activated Clotting Time [[id number]]  (Abnormal) Collected: 12/11/20 1829    Specimen: Blood Updated: 12/11/20 1812     Activated Clotting Time  251.0000 Seconds      Comment: Serial Number:  [id number]:  [id number]       aPTT [[id number]]  (Normal) Collected: 12/11/20 1443    Specimen: Blood Updated: 12/11/20 1520     PTT 98.2 seconds     aPTT [[id number]]  (Normal) Collected: 12/11/20 0844    Specimen: Blood from Hand, Left Updated: 12/11/20 0949     PTT 98.9 seconds     Troponin [[id number]]  (Abnormal) Collected: 12/11/20 0500    Specimen: Blood Updated: 12/11/20 0605     Troponin T 0.560 ng/mL     Narrative:      Troponin T Reference Range:  <= 0.03 ng/mL-   Negative for AMI  >0.03 ng/mL-     Abnormal for myocardial necrosis.  Clinicians would have to utilize clinical acumen, EKG, Troponin and serial changes to determine if it is an Acute Myocardial Infarction or myocardial injury due to an underlying chronic condition.       Results may be falsely decreased if patient taking Biotin.      TSH [[id number]]  (Normal) Collected: 12/11/20 0500    Specimen: Blood Updated: 12/11/20 0541     TSH 2.170 uIU/mL     Magnesium [[id number]]  (Normal) Collected: 12/11/20 0500    Specimen: Blood Updated: 12/11/20 0538     Magnesium 2.1 mg/dL     Basic Metabolic Panel [[id number]]  (Normal) Collected: 12/11/20 0500    Specimen: Blood Updated: 12/11/20 0538     Glucose 98 mg/dL      BUN 14 mg/dL      Creatinine 0.90 mg/dL      Sodium 138 mmol/L      Potassium 3.8 mmol/L      Chloride 101 mmol/L      CO2 25.9 mmol/L      Calcium 9.0 mg/dL      eGFR Non African Amer 87 mL/min/1.73      BUN/Creatinine Ratio 15.6     Anion Gap 11.1 mmol/L     Narrative:      GFR Normal >60  Chronic Kidney Disease <60  Kidney Failure <15      Lipid Panel [[id number]] Collected: 12/11/20 0500    Specimen: Blood Updated: 12/11/20 0538     Total Cholesterol 133 mg/dL      Triglycerides 94 mg/dL      HDL Cholesterol 49 mg/dL      LDL Cholesterol  66 mg/dL      VLDL Cholesterol 18 mg/dL      LDL/HDL Ratio 1.33    Narrative:      Cholesterol Reference Ranges  (U.S. Department of Health and Human Services ATP III Classifications)    Desirable           <200 mg/dL  Borderline High    200-239 mg/dL  High Risk          >240 mg/dL      Triglyceride Reference Ranges  (U.S. Department of Health and Human Services ATP III Classifications)    Normal           <150 mg/dL  Borderline High  150-199 mg/dL  High             200-499 mg/dL  Very High        >500 mg/dL    HDL Reference Ranges  (U.S. Department of Health and Human Services ATP III Classifcations)    Low     <40 mg/dl (major risk factor for CHD)  High    >60 mg/dl ('negative' risk factor for CHD)        LDL Reference Ranges  (U.S. Department of Health and Human Services ATP III Classifcations)    Optimal          <100 mg/dL  Near Optimal     100-129 mg/dL  Borderline High  130-159 mg/dL  High             160-189 mg/dL  Very High        >189 mg/dL    CBC & Differential [[id number]]  (Abnormal) Collected: 12/11/20 0500    Specimen: Blood Updated: 12/11/20 0512    Narrative:      The following orders were created for panel order CBC & Differential.  Procedure                               Abnormality         Status                     ---------                               -----------         ------                     CBC Auto Differential[[id number]]        Abnormal            Final result                 Please view results for these tests on the individual orders.    CBC Auto Differential [[id number]]  (Abnormal) Collected: 12/11/20 0500    Specimen: Blood Updated: 12/11/20 0512     WBC 11.04 10*3/mm3      RBC 4.55 10*6/mm3      Hemoglobin 12.1 g/dL      Hematocrit 37.4 %      MCV 82.2 fL      MCH 26.6 pg      MCHC 32.4 g/dL      RDW 14.4 %      RDW-SD 42.6 fl      MPV 8.8 fL      Platelets 332 10*3/mm3      Neutrophil % 52.3 %      Lymphocyte % 33.1 %      Monocyte % 11.7 %      Eosinophil % 2.4 %      Basophil % 0.3 %      Immature Grans % 0.2 %      Neutrophils, Absolute 5.78 10*3/mm3      Lymphocytes, Absolute 3.65 10*3/mm3      Monocytes, Absolute 1.29 10*3/mm3      Eosinophils, Absolute 0.27 10*3/mm3       Basophils, Absolute 0.03 10*3/mm3      Immature Grans, Absolute 0.02 10*3/mm3      nRBC 0.0 /100 WBC     Troponin [[id number]]  (Abnormal) Collected: 12/11/20 0058    Specimen: Blood Updated: 12/11/20 0140     Troponin T 0.301 ng/mL     Narrative:      Troponin T Reference Range:  <= 0.03 ng/mL-   Negative for AMI  >0.03 ng/mL-     Abnormal for myocardial necrosis.  Clinicians would have to utilize clinical acumen, EKG, Troponin and serial changes to determine if it is an Acute Myocardial Infarction or myocardial injury due to an underlying chronic condition.       Results may be falsely decreased if patient taking Biotin.      aPTT [[id number]]  (Abnormal) Collected: 12/11/20 0058    Specimen: Blood Updated: 12/11/20 0126     PTT 44.9 seconds     Protime-INR [[id number]]  (Normal) Collected: 12/11/20 0058    Specimen: Blood Updated: 12/11/20 0126     Protime 14.6 Seconds      INR 1.09    Narrative:      Suggested INR therapeutic range for stable oral anticoagulant therapy:    Low Intensity therapy:   1.5-2.0  Moderate Intensity therapy:   2.0-3.0  High Intensity therapy:   2.5-4.0    Magnesium [[id number]]  (Normal) Collected: 12/11/20 0058    Specimen: Blood Updated: 12/11/20 0123     Magnesium 2.2 mg/dL     Basic Metabolic Panel [[id number]]  (Abnormal) Collected: 12/11/20 0058    Specimen: Blood Updated: 12/11/20 0123     Glucose 100 mg/dL      BUN 15 mg/dL      Creatinine 0.94 mg/dL      Sodium 137 mmol/L      Potassium 3.9 mmol/L      Chloride 99 mmol/L      CO2 26.0 mmol/L      Calcium 9.1 mg/dL      eGFR Non African Amer 82 mL/min/1.73      BUN/Creatinine Ratio 16.0     Anion Gap 12.0 mmol/L     Narrative:      GFR Normal >60  Chronic Kidney Disease <60  Kidney Failure <15      Hemoglobin A1c [[id number]]  (Abnormal) Collected: 12/11/20 0058    Specimen: Blood Updated: 12/11/20 0123     Hemoglobin A1C 6.70 %     Narrative:      Hemoglobin A1C Ranges:    Increased Risk for Diabetes  5.7% to 6.4%  Diabetes                      >= 6.5%  Diabetic Goal                < 7.0%    CBC & Differential [[id number]]  (Abnormal) Collected: 12/11/20 0058    Specimen: Blood Updated: 12/11/20 0106    Narrative:      The following orders were created for panel order CBC & Differential.  Procedure                               Abnormality         Status                     ---------                               -----------         ------                     CBC Auto Differential[[id number]]        Abnormal            Final result                 Please view results for these tests on the individual orders.    CBC Auto Differential [[id number]]  (Abnormal) Collected: 12/11/20 0058    Specimen: Blood Updated: 12/11/20 0106     WBC 10.71 10*3/mm3      RBC 4.66 10*6/mm3      Hemoglobin 12.5 g/dL      Hematocrit 38.8 %      MCV 83.3 fL      MCH 26.8 pg      MCHC 32.2 g/dL      RDW 14.4 %      RDW-SD 43.8 fl      MPV 8.5 fL      Platelets 329 10*3/mm3      Neutrophil % 60.5 %      Lymphocyte % 26.6 %      Monocyte % 11.0 %      Eosinophil % 1.2 %      Basophil % 0.3 %      Immature Grans % 0.4 %      Neutrophils, Absolute 6.48 10*3/mm3      Lymphocytes, Absolute 2.85 10*3/mm3      Monocytes, Absolute 1.18 10*3/mm3      Eosinophils, Absolute 0.13 10*3/mm3      Basophils, Absolute 0.03 10*3/mm3      Immature Grans, Absolute 0.04 10*3/mm3      nRBC 0.0 /100 WBC           Pertinent Radiology Results:    Imaging Results (All)     None          Condition on Discharge:      Awake alert comfortable.  In no acute distress ambulating without difficulty    Code status during the hospital stay:    Code Status and Medical Interventions:   Ordered at: 12/11/20 0035     Code Status:    CPR     Medical Interventions (Level of Support Prior to Arrest):    Full       Discharge Disposition:    Home or Self Care    Discharge Medication:       Discharge Medications      New Medications      Instructions Start Date   ranolazine 500 MG 12 hr tablet  Commonly known as:  RANEXA   500 mg, Oral, Every 12 Hours Scheduled         Changes to Medications      Instructions Start Date   isosorbide mononitrate 60 MG 24 hr tablet  Commonly known as: IMDUR  What changed:   · medication strength  · how much to take  · when to take this   60 mg, Oral, Every 24 Hours Scheduled   Start Date: December 13, 2020     polyethylene glycol 17 g packet  Commonly known as: MIRALAX  What changed:   · when to take this  · reasons to take this   17 g, Oral, Daily         Continue These Medications      Instructions Start Date   amitriptyline 50 MG tablet  Commonly known as: ELAVIL   100 mg, Oral, Nightly PRN      aspirin 325 MG EC tablet   325 mg, Oral, Every 12 Hours Scheduled, For 1 month      Bydureon 2 MG pen-injector injection  Generic drug: exenatide er   2 mg, Subcutaneous, Weekly      carvedilol 12.5 MG tablet  Commonly known as: COREG   12.5 mg, Oral, 2 Times Daily With Meals      citalopram 40 MG tablet  Commonly known as: CeleXA   40 mg, Oral, Daily      clonazePAM 0.5 MG tablet  Commonly known as: KlonoPIN   0.5 mg, Oral, 3 Times Daily PRN, for anxiety      cyanocobalamin 1000 MCG/ML injection   1,000 mcg, Intramuscular, Every 30 Days      dicyclomine 10 MG capsule  Commonly known as: BENTYL   10 mg, Oral, 4 Times Daily Before Meals & Nightly      diphenhydrAMINE 25 mg capsule  Commonly known as: Benadryl Allergy   25 mg, Oral, Every 6 Hours PRN      docusate sodium 100 MG capsule   100 mg, Oral, 2 Times Daily PRN      finasteride 5 MG tablet  Commonly known as: PROSCAR   5 mg, Oral, Daily      glucose blood test strip   Check blood sugar 3x times a day.      glucose monitor monitoring kit   1 each, Does not apply, 3 Times Daily PRN      metFORMIN 500 MG tablet  Commonly known as: GLUCOPHAGE   500 mg, Oral, 2 Times Daily With Meals      naloxone 4 MG/0.1ML nasal spray  Commonly known as: NARCAN   1 spray, Nasal, As Needed      nitroglycerin 0.4 MG SL tablet  Commonly known as: NITROSTAT   1 under  the tongue as needed for angina, may repeat q5mins for up three doses      O2  Commonly known as: OXYGEN   2 L/min, Inhalation, Nightly      ondansetron 8 MG tablet  Commonly known as: ZOFRAN   8 mg, Oral, Every 8 Hours PRN      onetouch ultrasoft lancets   Check blood sugar 3 times daily      pantoprazole 40 MG EC tablet  Commonly known as: PROTONIX   40 mg, Oral, Daily      Thyroid 30 MG tablet  Commonly known as: NP Thyroid   30 mg, Oral, Daily      traMADol 50 MG tablet  Commonly known as: ULTRAM   50 mg, Oral, Every 8 Hours PRN      Vascepa 1 g capsule capsule  Generic drug: icosapent ethyl   2 twice daily      vitamin D 1.25 MG (53711 UT) capsule capsule  Commonly known as: ERGOCALCIFEROL   50,000 Units, Oral, Every 7 Days         Stop These Medications    simvastatin 40 MG tablet  Commonly known as: Zocor          Patient does not tolerate Plavix.  Aspirin dose has been increased  Discharge Diet: Cardiac prudent        Activity at Discharge: As tolerated        Follow-up Appointments:    Follow-up Information     Clare Quintero, YOUNG .    Specialty: Family Medicine  Contact information:  [address] [place] [id number] [phone]                 Discussed plan in detail with patient and daughter who is at bedside      Test Results Pending at Discharge:           Baron Joshua MD  12/12/20  10:45 EST    Time: Discharge 35 min

## 2020-12-14 ENCOUNTER — TRANSITIONAL CARE MANAGEMENT TELEPHONE ENCOUNTER (OUTPATIENT)
Dept: CALL CENTER | Facility: HOSPITAL | Age: 60
End: 2020-12-14

## 2020-12-14 NOTE — OUTREACH NOTE
Call Center TCM Note      Responses   [facility] patient discharged from?  [facility]   Does the patient have one of the following disease processes/diagnoses(primary or secondary)?  Other   TCM attempt successful?  Yes   Call start time  1114   Call end time  1119   Discharge diagnosis  Unstable angina, depression, DM type 2   Meds reviewed with patient/caregiver?  Yes   Is the patient having any side effects they believe may be caused by any medication additions or changes?  No   Does the patient have all medications ordered at discharge?  Yes   Is the patient taking all medications as directed (includes completed medication regime)?  Yes   Does the patient have a primary care provider?   Yes   Does the patient have an appointment with their PCP within 7 days of discharge?  Greater than 7 days   Comments regarding PCP  Has an appt in Jan on 1/11/2020 with Clare and he would like to keep as scheduled.    What is preventing the patient from scheduling follow up appointments within 7 days of discharge?  Earlier appointment not available   Nursing Interventions  Verified appointment date/time/provider   Has the patient kept scheduled appointments due by today?  N/A   Has home health visited the patient within 72 hours of discharge?  N/A   Psychosocial issues?  No   Did the patient receive a copy of their discharge instructions?  Yes   Nursing interventions  Reviewed instructions with patient   What is the patient's perception of their health status since discharge?  Improving   Is the patient/caregiver able to teach back signs and symptoms related to disease process for when to call PCP?  Yes   Is the patient/caregiver able to teach back signs and symptoms related to disease process for when to call 911?  Yes   Is the patient/caregiver able to teach back the hierarchy of who to call/visit for symptoms/problems? PCP, Specialist, Home health nurse, Urgent Care, ED, 911  Yes   If the patient is a current smoker,  are they able to teach back resources for cessation?  Not a smoker   TCM call completed?  Yes   Wrap up additional comments  Patient is doing better. no further chest pain.           Anthony Bailey RN    12/14/2020, 11:19 EST

## 2021-01-29 DIAGNOSIS — F41.1 GENERALIZED ANXIETY DISORDER: ICD-10-CM

## 2021-01-30 RX ORDER — CLONAZEPAM 0.5 MG/1
TABLET ORAL
Qty: 90 TABLET | Refills: 0 | OUTPATIENT
Start: 2021-01-30

## 2021-02-02 ENCOUNTER — OFFICE VISIT (OUTPATIENT)
Dept: FAMILY MEDICINE CLINIC | Facility: CLINIC | Age: 61
End: 2021-02-02

## 2021-02-02 VITALS
BODY MASS INDEX: 30.64 KG/M2 | SYSTOLIC BLOOD PRESSURE: 120 MMHG | OXYGEN SATURATION: 95 % | HEART RATE: 86 BPM | HEIGHT: 70 IN | TEMPERATURE: 96.8 F | DIASTOLIC BLOOD PRESSURE: 80 MMHG | WEIGHT: 214 LBS

## 2021-02-02 DIAGNOSIS — D68.9 CLOTTING DISORDER (HCC): Chronic | ICD-10-CM

## 2021-02-02 DIAGNOSIS — K59.04 CHRONIC IDIOPATHIC CONSTIPATION: Chronic | ICD-10-CM

## 2021-02-02 DIAGNOSIS — K52.9 COLITIS: Primary | Chronic | ICD-10-CM

## 2021-02-02 DIAGNOSIS — E03.9 ACQUIRED HYPOTHYROIDISM: ICD-10-CM

## 2021-02-02 DIAGNOSIS — N40.0 BENIGN NON-NODULAR PROSTATIC HYPERPLASIA WITHOUT LOWER URINARY TRACT SYMPTOMS: ICD-10-CM

## 2021-02-02 DIAGNOSIS — F41.1 GENERALIZED ANXIETY DISORDER: Chronic | ICD-10-CM

## 2021-02-02 DIAGNOSIS — K21.9 GASTROESOPHAGEAL REFLUX DISEASE WITHOUT ESOPHAGITIS: ICD-10-CM

## 2021-02-02 PROCEDURE — G0439 PPPS, SUBSEQ VISIT: HCPCS | Performed by: NURSE PRACTITIONER

## 2021-02-02 RX ORDER — CIPROFLOXACIN 500 MG/1
500 TABLET, FILM COATED ORAL 2 TIMES DAILY
Qty: 20 TABLET | Refills: 0 | Status: SHIPPED | OUTPATIENT
Start: 2021-02-02 | End: 2021-02-12

## 2021-02-02 RX ORDER — CLONAZEPAM 0.5 MG/1
0.5 TABLET ORAL 3 TIMES DAILY PRN
Qty: 90 TABLET | Refills: 0 | Status: SHIPPED | OUTPATIENT
Start: 2021-02-02 | End: 2021-04-28

## 2021-02-02 NOTE — PROGRESS NOTES
The ABCs of the Annual Wellness Visit  Subsequent Medicare Wellness Visit    No chief complaint on file.  Chief complaint  Medicare wellness exam  Anxiety  Colitis  Constipation  Clotting disorder  Arthritis     Subjective   History of Present Illness:  Damaso Leonard is a 60 y.o. male who presents for a Subsequent Medicare Wellness Visit.      Chronic health conditions include allergic rhinitis which is stable with H2 blockers, hypertension with heart disease for which he receives multiple medications and is under the care of cardiology, hypercholesterolemia for which he receives Vascepa, aspirin.  Patient reports that statin therapy increases leg cramps.  Type 2 diabetes which is stable with current medication regimen.  Vitamin D deficiency which is stable supplementation.  B12 deficiency that is stable with monthly B12 injection which daughter administers.  Obesity/has been overweight but has shown weight gain over the last little bit.  Hypothyroidism-stable with current medication regimen.  Monitoring thyroid level closely.  Colitis with frequent GERD, nausea-under the care of GI.  Keep routine follow-ups.  BPH for which he is under the care of urology  Generalized anxiety disorder with insomnia for which he receives Elavil, Celexa, clonazepam which he uses on a sparing basis  Chronic degenerative disc disease with osteoarthritis-receives tramadol.  Rates his pain as 6 on pain scale rating of 0-10.  Reports improvement in pain with tramadol.  Pain is described as catching, grinding and throbbing.  Pain is located in his shoulders, low back, hips and knees.  Does report improved quality of life with current pain medication regimen.  Denies any substance abuse or addiction.  Filled multiple attempts at physical therapy and other conservative treatments.          HEALTH RISK ASSESSMENT    Recent Hospitalizations:  Recently treated at the following:  [facility] then transferred to [place]      Current Medical Providers:  Patient Care Team:  Clare Quintero APRN as PCP - General  Clare Quintero APRN as PCP - Family Medicine    Smoking Status:  Social History     Tobacco Use   Smoking Status Never Smoker   Smokeless Tobacco Never Used       Alcohol Consumption:  Social History     Substance and Sexual Activity   Alcohol Use No       Depression Screen:   PHQ-2/PHQ-9 Depression Screening 2/2/2021   Little interest or pleasure in doing things 1   Feeling down, depressed, or hopeless 0   Trouble falling or staying asleep, or sleeping too much 1   Feeling tired or having little energy 1   Poor appetite or overeating 0   Feeling bad about yourself - or that you are a failure or have let yourself or your family down 0   Trouble concentrating on things, such as reading the newspaper or watching television 1   Moving or speaking so slowly that other people could have noticed. Or the opposite - being so fidgety or restless that you have been moving around a lot more than usual 0   Thoughts that you would be better off dead, or of hurting yourself in some way 0   Total Score 4   If you checked off any problems, how difficult have these problems made it for you to do your work, take care of things at home, or get along with other people? Not difficult at all       Fall Risk Screen:  NETTE Fall Risk Assessment has not been completed.    Health Habits and Functional and Cognitive Screening:  Functional & Cognitive Status 2/2/2021   Do you have difficulty preparing food and eating? No   Do you have difficulty bathing yourself, getting dressed or grooming yourself? No   Do you have difficulty using the toilet? No   Do you have difficulty moving around from place to place? No   Do you have trouble with steps or getting out of a bed or a chair? Yes   Current Diet Well Balanced Diet   Dental Exam Not up to date   Eye Exam Up to date   Exercise (times per week) 4 times per week   Current Exercises  Include House Cleaning   Current Exercise Activities Include -   Do you need help using the phone?  No   Are you deaf or do you have serious difficulty hearing?  No   Do you need help with transportation? No   Do you need help shopping? No   Do you need help preparing meals?  No   Do you need help with housework?  No   Do you need help with laundry? No   Do you need help taking your medications? No   Do you need help managing money? No   Do you ever drive or ride in a car without wearing a seat belt? No   Have you felt unusual stress, anger or loneliness in the last month? No   Who do you live with? Spouse   If you need help, do you have trouble finding someone available to you? No   Have you been bothered in the last four weeks by sexual problems? No   Do you have difficulty concentrating, remembering or making decisions? Yes         Does the patient have evidence of cognitive impairment? No    Asprin use counseling:Taking ASA appropriately as indicated    Age-appropriate Screening Schedule:  Refer to the list below for future screening recommendations based on patient's age, sex and/or medical conditions. Orders for these recommended tests are listed in the plan section. The patient has been provided with a written plan.    Health Maintenance   Topic Date Due   • DIABETIC EYE EXAM  05/20/2020   • PROSTATE CANCER SCREENING  12/06/2020   • INFLUENZA VACCINE  10/19/2021 (Originally 8/1/2020)   • ZOSTER VACCINE (2 of 3) 02/23/2022 (Originally 8/1/2019)   • HEMOGLOBIN A1C  06/11/2021   • DIABETIC FOOT EXAM  10/19/2021   • URINE MICROALBUMIN  10/19/2021   • LIPID PANEL  12/12/2021   • TDAP/TD VACCINES (3 - Td) 04/19/2027   • COLONOSCOPY  05/30/2030          The following portions of the patient's history were reviewed and updated as appropriate: allergies, current medications, past family history, past medical history, past social history, past surgical history and problem list.    Outpatient Medications Prior to Visit    Medication Sig Dispense Refill   • amitriptyline (ELAVIL) 50 MG tablet Take 2 tablets by mouth At Night As Needed for Sleep. 180 tablet 1   • carvedilol (COREG) 12.5 MG tablet Take 1 tablet by mouth 2 (Two) Times a Day With Meals. 180 tablet 3   • citalopram (CeleXA) 40 MG tablet Take 1 tablet by mouth Daily. 90 tablet 5   • cyanocobalamin 1000 MCG/ML injection Inject 1 mL into the appropriate muscle as directed by prescriber Every 30 (Thirty) Days. 1 mL 5   • dicyclomine (BENTYL) 10 MG capsule Take 1 capsule by mouth 4 (Four) Times a Day Before Meals & at Bedtime. 120 capsule 5   • diphenhydrAMINE (Benadryl Allergy) 25 mg capsule Take 1 capsule by mouth Every 6 (Six) Hours As Needed for Itching. 90 capsule 3   • docusate sodium 100 MG capsule Take 1 capsule by mouth 2 (Two) Times a Day As Needed (constipation). 60 each 5   • exenatide er (Bydureon) 2 MG pen-injector injection Inject 1 pen under the skin into the appropriate area as directed 1 (One) Time Per Week. 4 each 5   • finasteride (PROSCAR) 5 MG tablet Take 1 tablet by mouth Daily. 90 tablet 3   • glucose blood test strip Check blood sugar 3x times a day. 100 each 12   • glucose monitor monitoring kit 1 each 3 (Three) Times a Day As Needed (diabetes). 1 each 0   • icosapent ethyl (Vascepa) 1 g capsule capsule 2 twice daily 120 capsule 5   • isosorbide mononitrate (IMDUR) 60 MG 24 hr tablet Take 1 tablet by mouth Daily. 90 tablet 3   • Lancets (ONETOUCH ULTRASOFT) lancets Check blood sugar 3 times daily 100 each 12   • metFORMIN (GLUCOPHAGE) 500 MG tablet Take 1 tablet by mouth 2 (Two) Times a Day With Meals. 180 tablet 3   • naloxone (NARCAN) 4 MG/0.1ML nasal spray 1 spray into the nostril(s) as directed by provider As Needed (overdose). 1 each 0   • nitroglycerin (NITROSTAT) 0.4 MG SL tablet 1 under the tongue as needed for angina, may repeat q5mins for up three doses 50 tablet 1   • O2 (OXYGEN) Inhale 2 L/min Every Night.     • ondansetron (ZOFRAN) 8 MG  tablet Take 1 tablet by mouth Every 8 (Eight) Hours As Needed for Nausea. 20 tablet 2   • pantoprazole (PROTONIX) 40 MG EC tablet Take 1 tablet by mouth Daily. 90 tablet 3   • polyethylene glycol (MIRALAX) packet Take 17 g by mouth Daily. (Patient taking differently: Take 17 g by mouth Daily As Needed.) 1 each 5   • ranolazine (RANEXA) 500 MG 12 hr tablet Take 1 tablet by mouth Every 12 (Twelve) Hours. 60 tablet 5   • Thyroid (NP THYROID) 30 MG PO tablet Take 1 tablet by mouth Daily. 30 tablet 5   • traMADol (ULTRAM) 50 MG tablet Take 1 tablet by mouth Every 8 (Eight) Hours As Needed for Moderate Pain  or Severe Pain . 60 tablet 3   • vitamin D (ERGOCALCIFEROL) 1.25 MG (45935 UT) capsule capsule Take 1 capsule by mouth Every 7 (Seven) Days. 12 capsule 5   • aspirin 325 MG EC tablet Take 1 tablet by mouth Every 12 (Twelve) Hours. For 1 month 60 tablet 0   • clonazePAM (KlonoPIN) 0.5 MG tablet Take 1 tablet by mouth 3 (Three) Times a Day As Needed for Anxiety. for anxiety 90 tablet 0     No facility-administered medications prior to visit.        Patient Active Problem List   Diagnosis   • Generalized anxiety disorder   • Malignant hypertension with heart disease, without congestive heart failure   • Major depressive disorder in partial remission (CMS/HCC)   • Type 2 diabetes mellitus with diabetic peripheral angiopathy without gangrene, without long-term current use of insulin (CMS/HCC)   • Coronary artery disease involving native coronary artery of native heart   • Sleep apnea   • Obstructive sleep apnea   • Osteoarthritis involving multiple joints on both sides of body   • Vitamin D deficiency   • Vitamin B 12 deficiency   • Bilateral impacted cerumen   • Hypercholesterolemia   • Anginal pain (CMS/HCC)   • Lumbar back pain with radiculopathy affecting left lower extremity   • Vitamin D deficiency disease   • High risk medication use   • Gastroesophageal reflux disease without esophagitis   • Seasonal allergic  rhinitis due to pollen   • Benign non-nodular prostatic hyperplasia without lower urinary tract symptoms   • Essential hypertension   • Chronic pain of both knees   • Colitis   • Right hand weakness   • ED (erectile dysfunction) of organic origin   • Class 1 obesity due to excess calories with serious comorbidity and body mass index (BMI) of 30.0 to 30.9 in adult   • Primary osteoarthritis of both knees   • Chronic combined systolic and diastolic heart failure (CMS/HCC)   • Status post total right knee replacement   • DM (diabetes mellitus) (CMS/HCC)   • Hypothyroid   • On home O2   • Leukocytosis, likely reactive   • Periumbilical abdominal pain   • Change in bowel habits   • Nausea   • Gastroesophageal reflux disease   • Unstable angina (CMS/HCC)       Advanced Care Planning:  ACP discussion was held with the patient during this visit. Patient does not have an advance directive, declines further assistance.    Review of Systems   Constitutional: Positive for fatigue. Negative for activity change, appetite change, chills, fever and unexpected weight change.   HENT: Negative for congestion, ear pain, facial swelling, hearing loss, sinus pressure, sinus pain, sore throat, trouble swallowing and voice change.    Eyes: Negative for pain, discharge and visual disturbance.   Respiratory: Negative for apnea, cough, chest tightness, shortness of breath and wheezing.    Cardiovascular: Negative for chest pain, palpitations and leg swelling.   Gastrointestinal: Negative for abdominal pain, blood in stool, constipation, diarrhea, nausea and vomiting.   Endocrine: Negative.    Genitourinary: Negative for difficulty urinating, dysuria and flank pain.   Musculoskeletal: Positive for arthralgias and back pain. Negative for myalgias and neck stiffness.   Skin: Negative.  Negative for color change.   Allergic/Immunologic: Positive for immunocompromised state (Heart disease and diabetes).   Neurological: Negative for seizures,  "syncope and headaches.   Hematological: Negative for adenopathy. Bruises/bleeds easily (Chronic anticoagulation therapy).   Psychiatric/Behavioral: Positive for sleep disturbance. Negative for agitation, behavioral problems, confusion, dysphoric mood, self-injury and suicidal ideas. The patient is not nervous/anxious.        Compared to one year ago, the patient feels his physical health is worse.  Compared to one year ago, the patient feels his mental health is the same.    Reviewed chart for potential of high risk medication in the elderly: yes  Reviewed chart for potential of harmful drug interactions in the elderly:yes    Objective         Vitals:    02/02/21 1027   BP: 120/80   Pulse: 86   Temp: 96.8 °F (36 °C)   TempSrc: Tympanic   SpO2: 95%   Weight: 97.1 kg (214 lb)   Height: 177.8 cm (70\")       Body mass index is 30.71 kg/m².  Discussed the patient's BMI with him. The BMI is above average; BMI management plan is completed.    Physical Exam  Vitals signs and nursing note reviewed.   Constitutional:       General: He is not in acute distress.     Appearance: Normal appearance. He is well-developed. He is obese. He is not ill-appearing, toxic-appearing or diaphoretic.   HENT:      Head: Normocephalic and atraumatic. Hair is normal.      Right Ear: Tympanic membrane, ear canal and external ear normal.      Left Ear: Tympanic membrane, ear canal and external ear normal.      Nose: Nose normal.      Right Sinus: No maxillary sinus tenderness or frontal sinus tenderness.      Left Sinus: No maxillary sinus tenderness or frontal sinus tenderness.      Mouth/Throat:      Lips: Pink. No lesions.      Pharynx: No oropharyngeal exudate.   Eyes:      General: Lids are normal. Vision grossly intact. Gaze aligned appropriately.         Right eye: No discharge.         Left eye: No discharge.      Conjunctiva/sclera: Conjunctivae normal.      Pupils: Pupils are equal, round, and reactive to light.   Neck:      " Musculoskeletal: Normal range of motion and neck supple. Normal range of motion. No spinous process tenderness or muscular tenderness.      Thyroid: No thyromegaly.      Trachea: No tracheal deviation.   Cardiovascular:      Rate and Rhythm: Normal rate and regular rhythm.      Pulses: Normal pulses.      Heart sounds: Normal heart sounds. No murmur. No friction rub. No gallop.    Pulmonary:      Effort: Pulmonary effort is normal. No accessory muscle usage or respiratory distress.      Breath sounds: Normal breath sounds. No wheezing or rales.   Chest:      Chest wall: No tenderness.   Abdominal:      General: Bowel sounds are normal. There is no distension.      Palpations: Abdomen is soft. There is no mass.      Tenderness: There is no abdominal tenderness. There is no guarding or rebound.   Musculoskeletal: Normal range of motion.      Lumbar back: He exhibits tenderness, pain and spasm.      Comments: Decreased lumbar curvature, there is pain with forward flexion. DTR's +2. No edema.    Lymphadenopathy:      Cervical: No cervical adenopathy.   Skin:     General: Skin is warm and dry.      Capillary Refill: Capillary refill takes less than 2 seconds.      Coloration: Skin is not cyanotic or pale.      Findings: No erythema or rash.      Nails: There is no clubbing.     Neurological:      General: No focal deficit present.      Mental Status: He is alert and oriented to person, place, and time.      Deep Tendon Reflexes: Reflexes are normal and symmetric.      Comments: CN 2-12 grossly intact    Psychiatric:         Attention and Perception: Attention normal.         Mood and Affect: Mood normal. Affect is not inappropriate.         Speech: Speech normal.         Behavior: Behavior normal. Behavior is cooperative.         Thought Content: Thought content normal.         Cognition and Memory: Cognition normal.         Judgment: Judgment normal.         Lab Results   Component Value Date    TRIG 115 12/12/2020     HDL 40 12/12/2020    LDL 61 12/12/2020    VLDL 21 12/12/2020    HGBA1C 6.70 (H) 12/11/2020        Assessment/Plan   Medicare Risks and Personalized Health Plan  CMS Preventative Services Quick Reference  Abdominal Aortic Aneurysm Screening  Advance Directive Discussion  Cardiovascular risk  Chronic Pain   Diabetic Lab Screening   Fall Risk  Immunizations Discussed/Encouraged (specific immunizations; Influenza, Shingrix and covid  )  Obesity/Overweight   Polypharmacy  Prostate Cancer Screening     The above risks/problems have been discussed with the patient.  Pertinent information has been shared with the patient in the After Visit Summary.  Follow up plans and orders are seen below in the Assessment/Plan Section.    Diagnoses and all orders for this visit:    1. Colitis (Primary)    2. Generalized anxiety disorder  Comments:  continue klonopin and celexa  Orders:  -     clonazePAM (KlonoPIN) 0.5 MG tablet; Take 1 tablet by mouth 3 (Three) Times a Day As Needed for Anxiety. for anxiety  Dispense: 90 tablet; Refill: 0    3. Chronic idiopathic constipation    4. Clotting disorder (CMS/[facility])    5. Acquired hypothyroidism    6. Gastroesophageal reflux disease without esophagitis    7. Benign non-nodular prostatic hyperplasia without lower urinary tract symptoms    Other orders  -     ciprofloxacin (CIPRO) 500 MG tablet; Take 1 tablet by mouth 2 (Two) Times a Day for 10 days.  Dispense: 20 tablet; Refill: 0    Medicare wellness exam has been performed today.  Medication list has been reviewed and discussed with patient.  Recommended preventative and screenings has been discussed with patient.      Age appropriate education and safety instruction has been provided. Preventive education/recommendation > 15 minutes. Safety, accident prevention, vaccines, health maintenance concerns, nutrition, diet, exercise and social activity.     I have reviewed medication list and discussed with patient the possible side effects and  interactions.  We have discussed purpose for medication, route, dosage, frequency.  Refill routine medications.    Recent labs reviewed and discussed with patient.    Pt has been educated/instructed on diabetic care and protocols.  Diabetic diet instructions provided.  Medication regimen reviewed.  Discussed possible side effects and interactions of current medications.  Sick day rules reviewed. Continue to monitor blood sugar and report abnormal readings as discussed today. Understanding stated by patient.       Most recent hospital records, labs and consult notes have been reviewed and discussed.    Coronavirus precautions have been reviewed and discussed.  I have discussed the CDC recommendations  of social distancing, hand washing, wearing mask and disinfecting commonly touched items. Reviewed need to notify PCP and self quarantine with mild symptoms.  Discussed procedure to obtain Covid-19 testing and notification of PCP/health dept/ED/Urgent Center if symptoms begin. Understanding verbalized.    Proper body mechanics has been reviewed and discussed today.      As part of this patient's treatment plan they are being prescribed controlled substance/substances with potential for abuse. This patient has been made aware of the appropriate use of such medications, including potential risk of somnolence, limited ability to drive and / or work safely, and potential for overdose. It has also been made clear that these medications are for use by this patient only, without concomitant use of alcohol or other substances unless prescribed/advised by medical provider. Patient has completed controlled substance agreement detailing terms of continued prescribing of controlled substances including monitoring Danny reports, drug screens and pill counts. The patient was asked and states they are not receiving narcotic medication from any there provider or any provider that this office has not been made aware of. History and  physical exam exhibit continued safe and appropriate use of controlled substances.   Goal: Improved quality of life and reduction in pain as evidenced by pt report.   Danny/PDMP has been reviewed as consistent.  UDS is on file.   Continue current medication regimen  Patient has been instructed and counseled regarding opioid /controlled medication misuse, side effects, possible interactions and risk of addiction.  Patient has been instructed on risk for oversedation, respiratory suppression and even death.  We have discussed proper storage and disposal of controlled medication.  Pt is at low risk for substance abuse or addiction. Pt will be monitored closely for compliance and the need to continue plan of care.    Follow up in 3 months. Routine labs fasting one week prior to next office visit. Return sooner if needed.     Follow Up:  Return in about 6 weeks (around 3/16/2021) for 30 minutes please/consult review/very complicated patient , Recheck.     An After Visit Summary and PPPS were given to the patient.

## 2021-02-04 RX ORDER — CLOPIDOGREL BISULFATE 75 MG/1
75 TABLET ORAL DAILY
Qty: 30 TABLET | Refills: 11 | Status: SHIPPED | OUTPATIENT
Start: 2021-02-04 | End: 2021-11-10 | Stop reason: SDUPTHER

## 2021-02-04 RX ORDER — ASPIRIN 81 MG/1
81 TABLET ORAL DAILY
Qty: 90 TABLET | Refills: 3 | Status: SHIPPED | OUTPATIENT
Start: 2021-02-04 | End: 2022-12-27 | Stop reason: SDUPTHER

## 2021-02-04 NOTE — TELEPHONE ENCOUNTER
----- Message from Maris Weeks MA sent at 2/2/2021 11:26 AM EST -----  Okay, ill let bladimir know. Whenever you hear back from Dr. Riley just let us know. Thank you!   ----- Message -----  From: Re Tom CMA  Sent: 2/2/2021  11:21 AM EST  To: Maris Weeks MA    I'll discuss with Dr. Riley but there wont be any way we can get him on the schedule.  ----- Message -----  From: Maris Weeks MA  Sent: 2/2/2021  11:19 AM EST  To: Re Tom CMA    Do you care to ask Dr. Riley if he can be changed to something else?   ----- Message -----  From: Bladimir Quintero APRN  Sent: 2/2/2021  11:12 AM EST  To: Maris Weeks MA    Call cardiology office and ask if he can be put on a different blood thinner. The aspirin is hurting his stomach. Not scheduled for follow up with cardiology until march, can they do a telephone visit this week to discuss?       Mb

## 2021-02-04 NOTE — TELEPHONE ENCOUNTER
Attempted to reach Abraham and go over some suggested medication adjustments per Dr. Riley. No answer, will try to reach out to patient again later this afternoon.      Dr. Riley reviewed most recent cath report from 12/11/20. He advises/recommends that if Abraham is having stomach issues from the ASA he could reduce it to ASA EC 81MG. If he continues to have the stomach issues he can discontinue it so long as he stays on the Plavix.

## 2021-02-04 NOTE — TELEPHONE ENCOUNTER
Called patient, told him he could decrease the ASA to 81mg. (patient reports having been taking 325mg x 2) and I told him to continue plavix. Patient states he has not been taking plavix because in 2006 he had issues with bleeding in bowels. He also tells me he was not prescribed Plavix when he was DC from [facility]. I told him I would discuss with Dr. Riley to see what we need to do.    Discussed with Dr. Riley who is fine given his history with bleeding for Abraham to only take ASA 81mg.     Called patient back to let him know this and he would like to try taking the plavix and asa again. He is going to call us back if any issues arise.

## 2021-02-14 PROBLEM — D62 ACUTE BLOOD LOSS ANEMIA: Status: RESOLVED | Noted: 2020-01-28 | Resolved: 2021-02-14

## 2021-02-14 PROBLEM — R63.4 WEIGHT LOSS, ABNORMAL: Status: RESOLVED | Noted: 2020-05-18 | Resolved: 2021-02-14

## 2021-02-14 PROBLEM — E66.09 CLASS 1 OBESITY DUE TO EXCESS CALORIES WITH SERIOUS COMORBIDITY AND BODY MASS INDEX (BMI) OF 30.0 TO 30.9 IN ADULT: Status: ACTIVE | Noted: 2019-08-29

## 2021-03-01 ENCOUNTER — OFFICE VISIT (OUTPATIENT)
Dept: CARDIOLOGY | Facility: CLINIC | Age: 61
End: 2021-03-01

## 2021-03-01 VITALS
WEIGHT: 210 LBS | DIASTOLIC BLOOD PRESSURE: 87 MMHG | HEART RATE: 96 BPM | HEIGHT: 70 IN | SYSTOLIC BLOOD PRESSURE: 117 MMHG | BODY MASS INDEX: 30.06 KG/M2 | OXYGEN SATURATION: 97 %

## 2021-03-01 DIAGNOSIS — E78.00 HYPERCHOLESTEROLEMIA: ICD-10-CM

## 2021-03-01 DIAGNOSIS — I10 ESSENTIAL HYPERTENSION: ICD-10-CM

## 2021-03-01 DIAGNOSIS — I50.42 CHRONIC COMBINED SYSTOLIC AND DIASTOLIC HEART FAILURE (HCC): ICD-10-CM

## 2021-03-01 DIAGNOSIS — I25.10 CORONARY ARTERY DISEASE INVOLVING NATIVE CORONARY ARTERY OF NATIVE HEART WITHOUT ANGINA PECTORIS: Primary | ICD-10-CM

## 2021-03-01 PROCEDURE — 99214 OFFICE O/P EST MOD 30 MIN: CPT | Performed by: INTERNAL MEDICINE

## 2021-03-01 NOTE — PROGRESS NOTES
"    Chief Complaint  Coronary Artery Disease, Shortness of Breath, and Irregular Heart Beat    Subjective    History of Present Illness      Damaso Leonard presents to [facility] for   History of Present Illness   Chest pain:  1 episode.  Described as pressure. Onset a few weeks ago.  Lasting a few seconds  Exertional  Non-exertional. Associated symptoms sweating  Denied any shortness of breath, palpations, dizziness or edema  CAD:  Patient is currently asymptomatic.   Status post PCI MARILY with multiple stents. By report, there is good compliance with treatement, good tolerance of treatment and good symptom control.  Dyslipidemia:  The patient states that it has been stable.   Denies any complications with statins.   Medications:  The patient is adherent with his medications.    The chart, PMH, FMH, social history, PSH, and medications were reviewed today. Problems above addressed this visit.        Objective     Vital Signs:   /87 (BP Location: Right arm, Patient Position: Sitting)   Pulse 96   Ht 177.8 cm (70\")   Wt 95.3 kg (210 lb)   SpO2 97%   BMI 30.13 kg/m²       Physical Exam     Result Review :       Data reviewed: Cardiology studies 03/01/2021         Assessment and Plan   Stable angina pectoris s/p PCI with multiple stents  Essential hypertension  ASCVD is stable today.     Encouraged him to start walking and exercising daily  Continue with medications as prescribe  If chest pain starts to progress recommend that he call the office or proceed to the ED  Follow up in 4 months.     Problem List Items Addressed This Visit        Cardiac and Vasculature    Coronary artery disease involving native coronary artery of native heart - Primary    Hypercholesterolemia    Essential hypertension    Chronic combined systolic and diastolic heart failure (CMS/HCC)            Follow Up     Return in about 4 months (around 7/1/2021).  Patient was given instructions and counseling " regarding his condition or for health maintenance advice. Please see specific information pulled into the AVS if appropriate.             Director, Cardiac Cath Lab    JANET Rosa, acting as scribe for Arron Riley MD, [facility], [facility].   03/01/21  15:32 EST     I have read and agree the documentation that has been completed regarding this visit.  By signing this record, I attest that the documentation was completed by my physical presence and is an accurate record of the encounter.  Voice recognition / transcription technology used for some documentation in this chart in attempt to mitigate substantial inefficiencies created by this electronic health record technology.  As a result, there may be some typos and.or nonsensical language introduced into the chart that either overlooked in editing/review and/or that I am unable to correct as patient care needs require me to prioritize my attention to bedside patient care rather than electronic documentation. MA

## 2021-03-04 RX ORDER — SIMVASTATIN 40 MG
40 TABLET ORAL NIGHTLY
COMMUNITY
Start: 2021-01-29 | End: 2021-08-09

## 2021-03-16 ENCOUNTER — OFFICE VISIT (OUTPATIENT)
Dept: FAMILY MEDICINE CLINIC | Facility: CLINIC | Age: 61
End: 2021-03-16

## 2021-03-16 VITALS
HEART RATE: 87 BPM | WEIGHT: 211 LBS | DIASTOLIC BLOOD PRESSURE: 80 MMHG | HEIGHT: 70 IN | BODY MASS INDEX: 30.21 KG/M2 | TEMPERATURE: 96.6 F | OXYGEN SATURATION: 98 % | SYSTOLIC BLOOD PRESSURE: 115 MMHG

## 2021-03-16 DIAGNOSIS — K21.9 GASTROESOPHAGEAL REFLUX DISEASE WITHOUT ESOPHAGITIS: ICD-10-CM

## 2021-03-16 DIAGNOSIS — Z12.5 PROSTATE CANCER SCREENING: Primary | ICD-10-CM

## 2021-03-16 DIAGNOSIS — E11.59 TYPE 2 DIABETES MELLITUS WITH OTHER CIRCULATORY COMPLICATION, WITHOUT LONG-TERM CURRENT USE OF INSULIN (HCC): ICD-10-CM

## 2021-03-16 DIAGNOSIS — N40.0 BENIGN NON-NODULAR PROSTATIC HYPERPLASIA WITHOUT LOWER URINARY TRACT SYMPTOMS: ICD-10-CM

## 2021-03-16 DIAGNOSIS — K52.9 COLITIS: ICD-10-CM

## 2021-03-16 DIAGNOSIS — F41.1 GENERALIZED ANXIETY DISORDER: ICD-10-CM

## 2021-03-16 DIAGNOSIS — Z79.899 HIGH RISK MEDICATION USE: ICD-10-CM

## 2021-03-16 DIAGNOSIS — E66.09 CLASS 1 OBESITY DUE TO EXCESS CALORIES WITH SERIOUS COMORBIDITY AND BODY MASS INDEX (BMI) OF 30.0 TO 30.9 IN ADULT: ICD-10-CM

## 2021-03-16 DIAGNOSIS — R79.89 ABNORMAL THYROID BLOOD TEST: ICD-10-CM

## 2021-03-16 DIAGNOSIS — E11.59 TYPE 2 DIABETES MELLITUS WITH OTHER CIRCULATORY COMPLICATION, WITH LONG-TERM CURRENT USE OF INSULIN (HCC): ICD-10-CM

## 2021-03-16 DIAGNOSIS — Z79.4 TYPE 2 DIABETES MELLITUS WITH OTHER CIRCULATORY COMPLICATION, WITH LONG-TERM CURRENT USE OF INSULIN (HCC): ICD-10-CM

## 2021-03-16 DIAGNOSIS — I25.10 CORONARY ARTERY DISEASE INVOLVING NATIVE CORONARY ARTERY OF NATIVE HEART WITHOUT ANGINA PECTORIS: ICD-10-CM

## 2021-03-16 DIAGNOSIS — E53.8 VITAMIN B 12 DEFICIENCY: ICD-10-CM

## 2021-03-16 PROBLEM — H61.23 BILATERAL IMPACTED CERUMEN: Status: RESOLVED | Noted: 2017-07-19 | Resolved: 2021-03-16

## 2021-03-16 PROCEDURE — 99214 OFFICE O/P EST MOD 30 MIN: CPT | Performed by: NURSE PRACTITIONER

## 2021-03-16 RX ORDER — AMITRIPTYLINE HYDROCHLORIDE 50 MG/1
100 TABLET, FILM COATED ORAL NIGHTLY PRN
Qty: 180 TABLET | Refills: 1 | Status: SHIPPED | OUTPATIENT
Start: 2021-03-16

## 2021-03-16 RX ORDER — ICOSAPENT ETHYL 1000 MG/1
CAPSULE ORAL
Qty: 120 CAPSULE | Refills: 5 | Status: ON HOLD | OUTPATIENT
Start: 2021-03-16 | End: 2021-07-19

## 2021-03-16 RX ORDER — FINASTERIDE 5 MG/1
5 TABLET, FILM COATED ORAL DAILY
Qty: 90 TABLET | Refills: 3 | Status: SHIPPED | OUTPATIENT
Start: 2021-03-16

## 2021-03-16 RX ORDER — ONDANSETRON HYDROCHLORIDE 8 MG/1
8 TABLET, FILM COATED ORAL EVERY 8 HOURS PRN
Qty: 20 TABLET | Refills: 2 | Status: SHIPPED | OUTPATIENT
Start: 2021-03-16 | End: 2021-09-20

## 2021-03-16 RX ORDER — NALOXONE HYDROCHLORIDE 4 MG/.1ML
1 SPRAY NASAL AS NEEDED
Qty: 1 EACH | Refills: 0 | Status: SHIPPED | OUTPATIENT
Start: 2021-03-16

## 2021-03-16 RX ORDER — EXENATIDE 2 MG/.65ML
2 INJECTION, SUSPENSION, EXTENDED RELEASE SUBCUTANEOUS WEEKLY
Qty: 4 EACH | Refills: 5 | Status: SHIPPED | OUTPATIENT
Start: 2021-03-16

## 2021-03-16 RX ORDER — LEVOTHYROXINE AND LIOTHYRONINE 19; 4.5 UG/1; UG/1
30 TABLET ORAL DAILY
Qty: 30 TABLET | Refills: 5 | Status: SHIPPED | OUTPATIENT
Start: 2021-03-16

## 2021-03-16 RX ORDER — DICYCLOMINE HYDROCHLORIDE 10 MG/1
10 CAPSULE ORAL
Qty: 120 CAPSULE | Refills: 5 | Status: SHIPPED | OUTPATIENT
Start: 2021-03-16

## 2021-03-16 RX ORDER — CYANOCOBALAMIN 1000 UG/ML
1000 INJECTION, SOLUTION INTRAMUSCULAR; SUBCUTANEOUS
Qty: 1 ML | Refills: 5 | Status: SHIPPED | OUTPATIENT
Start: 2021-03-16

## 2021-03-16 RX ORDER — CITALOPRAM 40 MG/1
40 TABLET ORAL DAILY
Qty: 90 TABLET | Refills: 5 | Status: SHIPPED | OUTPATIENT
Start: 2021-03-16

## 2021-03-16 RX ORDER — PSEUDOEPHEDRINE HCL 30 MG
100 TABLET ORAL 2 TIMES DAILY PRN
Qty: 60 EACH | Refills: 5 | Status: SHIPPED | OUTPATIENT
Start: 2021-03-16

## 2021-03-16 RX ORDER — POLYETHYLENE GLYCOL 3350 17 G/17G
17 POWDER, FOR SOLUTION ORAL DAILY
Qty: 1 EACH | Refills: 5 | Status: SHIPPED | OUTPATIENT
Start: 2021-03-16

## 2021-03-16 RX ORDER — PANTOPRAZOLE SODIUM 40 MG/1
40 TABLET, DELAYED RELEASE ORAL DAILY
Qty: 90 TABLET | Refills: 3 | Status: ON HOLD | OUTPATIENT
Start: 2021-03-16 | End: 2021-07-19

## 2021-03-16 RX ORDER — ISOSORBIDE MONONITRATE 60 MG/1
60 TABLET, EXTENDED RELEASE ORAL
Qty: 90 TABLET | Refills: 3 | Status: SHIPPED | OUTPATIENT
Start: 2021-03-16

## 2021-03-16 NOTE — PROGRESS NOTES
Subjective   Damaso Leonard is a 60 y.o. male.     No chief complaint on file.    Chief complaint  Medication refills  Hypertension  Diabetes    History of Present Illness:    Malignant hypertension with heart disease-coronary artery disease/hypercholesterolemia/chronic combined systolic and diastolic heart failure and unstable angina-under the care of cardiology.  Denies any recent chest pain or complications.  No side effects from Plavix.      Type 2 diabetes-stable.  Denies any hyperglycemia or hypoglycemia.    Vitamin B12 deficiency-daughter gives him his injections at home.  Needs refill.    Obesity-current BMI 30.28.  Not following any specific diet.    GERD-stable with Protonix and diet changes.    Colitis-no recent exacerbation    BPH with lower urinary tract symptoms-under the care of urology.  Does need refill on routine meds.    Patient does not need refills on his controlled medications today.    The following portions of the patient's history and ROS were reviewed and updated as appropriate per provider:  Allergies, current medications, past family history, past medical history, past social history, past surgical history and problem list.    Review of Systems   Constitutional: Positive for fatigue. Negative for activity change, appetite change, chills and fever.   HENT: Negative for ear pain, facial swelling, hearing loss, sinus pressure, sore throat, trouble swallowing and voice change.    Eyes: Negative for pain, discharge and visual disturbance.   Respiratory: Negative for apnea, cough, chest tightness, shortness of breath and wheezing.    Cardiovascular: Negative for chest pain, palpitations and leg swelling.   Gastrointestinal: Negative for abdominal pain, blood in stool, constipation, diarrhea, nausea and vomiting.   Endocrine: Negative.    Genitourinary: Negative for difficulty urinating, dysuria and frequency.   Musculoskeletal: Positive for arthralgias and back pain. Negative for neck  "stiffness.   Skin: Negative.  Negative for color change.   Allergic/Immunologic: Negative.    Neurological: Negative for dizziness, seizures and headaches.   Hematological: Negative.    Psychiatric/Behavioral: Negative for agitation, confusion, self-injury and suicidal ideas. The patient is not nervous/anxious.        Objective     /80   Pulse 87   Temp 96.6 °F (35.9 °C) (Temporal)   Ht 177.8 cm (70\")   Wt 95.7 kg (211 lb)   SpO2 98%   BMI 30.28 kg/m²   Admission on 12/11/2020, Discharged on 12/12/2020   Component Date Value Ref Range Status   • Troponin T 12/11/2020 0.301* 0.000 - 0.030 ng/mL Final   • Troponin T 12/11/2020 0.560* 0.000 - 0.030 ng/mL Final   • QT Interval 12/11/2020 406  ms Final   • QTC Interval 12/11/2020 450  ms Final   • QT Interval 12/11/2020 402  ms Final   • QTC Interval 12/11/2020 458  ms Final   • Target HR (85%) 12/11/2020 138  bpm Final   • Max. Pred. HR (100%) 12/11/2020 162  bpm Final   • Echo EF Estimated 12/11/2020 60  % Final   • LVPWd 12/11/2020 1.1  cm Final   • IVSd 12/11/2020 1.3  cm Final   • Magnesium 12/11/2020 2.2  1.6 - 2.6 mg/dL Final   • Magnesium 12/11/2020 2.1  1.6 - 2.6 mg/dL Final   • Glucose 12/11/2020 100* 65 - 99 mg/dL Final   • BUN 12/11/2020 15  6 - 20 mg/dL Final   • Creatinine 12/11/2020 0.94  0.76 - 1.27 mg/dL Final   • Sodium 12/11/2020 137  136 - 145 mmol/L Final   • Potassium 12/11/2020 3.9  3.5 - 5.2 mmol/L Final   • Chloride 12/11/2020 99  98 - 107 mmol/L Final   • CO2 12/11/2020 26.0  22.0 - 29.0 mmol/L Final   • Calcium 12/11/2020 9.1  8.6 - 10.5 mg/dL Final   • eGFR Non African Amer 12/11/2020 82  >60 mL/min/1.73 Final   • BUN/Creatinine Ratio 12/11/2020 16.0  7.0 - 25.0 Final   • Anion Gap 12/11/2020 12.0  5.0 - 15.0 mmol/L Final   • Glucose 12/11/2020 98  65 - 99 mg/dL Final   • BUN 12/11/2020 14  6 - 20 mg/dL Final   • Creatinine 12/11/2020 0.90  0.76 - 1.27 mg/dL Final   • Sodium 12/11/2020 138  136 - 145 mmol/L Final   • Potassium " 12/11/2020 3.8  3.5 - 5.2 mmol/L Final   • Chloride 12/11/2020 101  98 - 107 mmol/L Final   • CO2 12/11/2020 25.9  22.0 - 29.0 mmol/L Final   • Calcium 12/11/2020 9.0  8.6 - 10.5 mg/dL Final   • eGFR Non African Amer 12/11/2020 87  >60 mL/min/1.73 Final   • BUN/Creatinine Ratio 12/11/2020 15.6  7.0 - 25.0 Final   • Anion Gap 12/11/2020 11.1  5.0 - 15.0 mmol/L Final   • TSH 12/11/2020 2.170  0.270 - 4.200 uIU/mL Final   • Hemoglobin A1C 12/11/2020 6.70* 4.80 - 5.60 % Final   • Total Cholesterol 12/11/2020 133  0 - 200 mg/dL Final   • Triglycerides 12/11/2020 94  0 - 150 mg/dL Final   • HDL Cholesterol 12/11/2020 49  40 - 60 mg/dL Final   • LDL Cholesterol  12/11/2020 66  0 - 100 mg/dL Final   • VLDL Cholesterol 12/11/2020 18  5 - 40 mg/dL Final   • LDL/HDL Ratio 12/11/2020 1.33   Final   • WBC 12/11/2020 10.71  3.40 - 10.80 10*3/mm3 Final   • RBC 12/11/2020 4.66  4.14 - 5.80 10*6/mm3 Final   • Hemoglobin 12/11/2020 12.5* 13.0 - 17.7 g/dL Final   • Hematocrit 12/11/2020 38.8  37.5 - 51.0 % Final   • MCV 12/11/2020 83.3  79.0 - 97.0 fL Final   • MCH 12/11/2020 26.8  26.6 - 33.0 pg Final   • MCHC 12/11/2020 32.2  31.5 - 35.7 g/dL Final   • RDW 12/11/2020 14.4  12.3 - 15.4 % Final   • RDW-SD 12/11/2020 43.8  37.0 - 54.0 fl Final   • MPV 12/11/2020 8.5  6.0 - 12.0 fL Final   • Platelets 12/11/2020 329  140 - 450 10*3/mm3 Final   • Neutrophil % 12/11/2020 60.5  42.7 - 76.0 % Final   • Lymphocyte % 12/11/2020 26.6  19.6 - 45.3 % Final   • Monocyte % 12/11/2020 11.0  5.0 - 12.0 % Final   • Eosinophil % 12/11/2020 1.2  0.3 - 6.2 % Final   • Basophil % 12/11/2020 0.3  0.0 - 1.5 % Final   • Immature Grans % 12/11/2020 0.4  0.0 - 0.5 % Final   • Neutrophils, Absolute 12/11/2020 6.48  1.70 - 7.00 10*3/mm3 Final   • Lymphocytes, Absolute 12/11/2020 2.85  0.70 - 3.10 10*3/mm3 Final   • Monocytes, Absolute 12/11/2020 1.18* 0.10 - 0.90 10*3/mm3 Final   • Eosinophils, Absolute 12/11/2020 0.13  0.00 - 0.40 10*3/mm3 Final   • Basophils,  Absolute 12/11/2020 0.03  0.00 - 0.20 10*3/mm3 Final   • Immature Grans, Absolute 12/11/2020 0.04  0.00 - 0.05 10*3/mm3 Final   • nRBC 12/11/2020 0.0  0.0 - 0.2 /100 WBC Final   • WBC 12/11/2020 11.04* 3.40 - 10.80 10*3/mm3 Final   • RBC 12/11/2020 4.55  4.14 - 5.80 10*6/mm3 Final   • Hemoglobin 12/11/2020 12.1* 13.0 - 17.7 g/dL Final   • Hematocrit 12/11/2020 37.4* 37.5 - 51.0 % Final   • MCV 12/11/2020 82.2  79.0 - 97.0 fL Final   • MCH 12/11/2020 26.6  26.6 - 33.0 pg Final   • MCHC 12/11/2020 32.4  31.5 - 35.7 g/dL Final   • RDW 12/11/2020 14.4  12.3 - 15.4 % Final   • RDW-SD 12/11/2020 42.6  37.0 - 54.0 fl Final   • MPV 12/11/2020 8.8  6.0 - 12.0 fL Final   • Platelets 12/11/2020 332  140 - 450 10*3/mm3 Final   • Neutrophil % 12/11/2020 52.3  42.7 - 76.0 % Final   • Lymphocyte % 12/11/2020 33.1  19.6 - 45.3 % Final   • Monocyte % 12/11/2020 11.7  5.0 - 12.0 % Final   • Eosinophil % 12/11/2020 2.4  0.3 - 6.2 % Final   • Basophil % 12/11/2020 0.3  0.0 - 1.5 % Final   • Immature Grans % 12/11/2020 0.2  0.0 - 0.5 % Final   • Neutrophils, Absolute 12/11/2020 5.78  1.70 - 7.00 10*3/mm3 Final   • Lymphocytes, Absolute 12/11/2020 3.65* 0.70 - 3.10 10*3/mm3 Final   • Monocytes, Absolute 12/11/2020 1.29* 0.10 - 0.90 10*3/mm3 Final   • Eosinophils, Absolute 12/11/2020 0.27  0.00 - 0.40 10*3/mm3 Final   • Basophils, Absolute 12/11/2020 0.03  0.00 - 0.20 10*3/mm3 Final   • Immature Grans, Absolute 12/11/2020 0.02  0.00 - 0.05 10*3/mm3 Final   • nRBC 12/11/2020 0.0  0.0 - 0.2 /100 WBC Final   • Protime 12/11/2020 14.6  12.0 - 15.1 Seconds Final   • INR 12/11/2020 1.09  0.90 - 1.10 Final   • PTT 12/11/2020 44.9* 70.0 - 100.0 seconds Final   • PTT 12/11/2020 98.9  70.0 - 100.0 seconds Final   • PTT 12/11/2020 98.2  70.0 - 100.0 seconds Final   • Activated Clotting Time  12/11/2020 251.0000* 82 - 152 Seconds Final   • Magnesium 12/12/2020 2.2  1.6 - 2.6 mg/dL Final   • Glucose 12/12/2020 110* 65 - 99 mg/dL Final   • BUN  12/12/2020 13  6 - 20 mg/dL Final   • Creatinine 12/12/2020 1.03  0.76 - 1.27 mg/dL Final   • Sodium 12/12/2020 138  136 - 145 mmol/L Final   • Potassium 12/12/2020 3.7  3.5 - 5.2 mmol/L Final   • Chloride 12/12/2020 103  98 - 107 mmol/L Final   • CO2 12/12/2020 24.5  22.0 - 29.0 mmol/L Final   • Calcium 12/12/2020 8.5* 8.6 - 10.5 mg/dL Final   • eGFR Non African Amer 12/12/2020 74  >60 mL/min/1.73 Final   • BUN/Creatinine Ratio 12/12/2020 12.6  7.0 - 25.0 Final   • Anion Gap 12/12/2020 10.5  5.0 - 15.0 mmol/L Final   • Total Cholesterol 12/12/2020 122  0 - 200 mg/dL Final   • Triglycerides 12/12/2020 115  0 - 150 mg/dL Final   • HDL Cholesterol 12/12/2020 40  40 - 60 mg/dL Final   • LDL Cholesterol  12/12/2020 61  0 - 100 mg/dL Final   • VLDL Cholesterol 12/12/2020 21  5 - 40 mg/dL Final   • LDL/HDL Ratio 12/12/2020 1.48   Final   • WBC 12/12/2020 6.09  3.40 - 10.80 10*3/mm3 Final   • RBC 12/12/2020 4.17  4.14 - 5.80 10*6/mm3 Final   • Hemoglobin 12/12/2020 11.1* 13.0 - 17.7 g/dL Final   • Hematocrit 12/12/2020 34.3* 37.5 - 51.0 % Final   • MCV 12/12/2020 82.3  79.0 - 97.0 fL Final   • MCH 12/12/2020 26.6  26.6 - 33.0 pg Final   • MCHC 12/12/2020 32.4  31.5 - 35.7 g/dL Final   • RDW 12/12/2020 14.4  12.3 - 15.4 % Final   • RDW-SD 12/12/2020 42.8  37.0 - 54.0 fl Final   • MPV 12/12/2020 8.9  6.0 - 12.0 fL Final   • Platelets 12/12/2020 305  140 - 450 10*3/mm3 Final   • Neutrophil % 12/12/2020 60.5  42.7 - 76.0 % Final   • Lymphocyte % 12/12/2020 20.0  19.6 - 45.3 % Final   • Monocyte % 12/12/2020 14.1* 5.0 - 12.0 % Final   • Eosinophil % 12/12/2020 5.1  0.3 - 6.2 % Final   • Basophil % 12/12/2020 0.3  0.0 - 1.5 % Final   • Immature Grans % 12/12/2020 0.0  0.0 - 0.5 % Final   • Neutrophils, Absolute 12/12/2020 3.68  1.70 - 7.00 10*3/mm3 Final   • Lymphocytes, Absolute 12/12/2020 1.22  0.70 - 3.10 10*3/mm3 Final   • Monocytes, Absolute 12/12/2020 0.86  0.10 - 0.90 10*3/mm3 Final   • Eosinophils, Absolute 12/12/2020  0.31  0.00 - 0.40 10*3/mm3 Final   • Basophils, Absolute 12/12/2020 0.02  0.00 - 0.20 10*3/mm3 Final   • Immature Grans, Absolute 12/12/2020 0.00  0.00 - 0.05 10*3/mm3 Final   • nRBC 12/12/2020 0.0  0.0 - 0.2 /100 WBC Final       Physical Exam  Vitals and nursing note reviewed.   Constitutional:       General: He is not in acute distress.     Appearance: Normal appearance. He is well-developed. He is not ill-appearing, toxic-appearing or diaphoretic.   HENT:      Head: Normocephalic and atraumatic. Hair is normal.      Right Ear: Tympanic membrane, ear canal and external ear normal.      Left Ear: Tympanic membrane, ear canal and external ear normal.      Nose: Nose normal.      Right Sinus: No maxillary sinus tenderness or frontal sinus tenderness.      Left Sinus: No maxillary sinus tenderness or frontal sinus tenderness.      Mouth/Throat:      Lips: Pink. No lesions.      Pharynx: No oropharyngeal exudate.   Eyes:      General: Lids are normal. Vision grossly intact. Gaze aligned appropriately.         Right eye: No discharge.         Left eye: No discharge.      Conjunctiva/sclera: Conjunctivae normal.      Pupils: Pupils are equal, round, and reactive to light.   Neck:      Thyroid: No thyromegaly.      Trachea: No tracheal deviation.   Cardiovascular:      Rate and Rhythm: Normal rate and regular rhythm.      Pulses: Normal pulses.      Heart sounds: Normal heart sounds. No murmur. No friction rub. No gallop.    Pulmonary:      Effort: Pulmonary effort is normal. No accessory muscle usage or respiratory distress.      Breath sounds: Normal breath sounds. No wheezing or rales.   Chest:      Chest wall: No tenderness.   Abdominal:      General: Bowel sounds are normal. There is no distension.      Palpations: Abdomen is soft. There is no mass.      Tenderness: There is no abdominal tenderness. There is no guarding or rebound.   Musculoskeletal:         General: Normal range of motion.      Cervical back: Normal  range of motion and neck supple. No spinous process tenderness or muscular tenderness. Normal range of motion.      Lumbar back: Spasms and tenderness present.      Comments: Decreased lumbar curvature, there is pain with forward flexion. DTR's +2. No edema.    Lymphadenopathy:      Cervical: No cervical adenopathy.   Skin:     General: Skin is warm and dry.      Capillary Refill: Capillary refill takes less than 2 seconds.      Coloration: Skin is not cyanotic or pale.      Findings: No erythema or rash.      Nails: There is no clubbing.   Neurological:      General: No focal deficit present.      Mental Status: He is alert and oriented to person, place, and time.      Deep Tendon Reflexes: Reflexes are normal and symmetric.      Comments: CN 2-12 grossly intact    Psychiatric:         Attention and Perception: Attention normal.         Mood and Affect: Mood normal. Affect is not inappropriate.         Speech: Speech normal.         Behavior: Behavior normal. Behavior is cooperative.         Thought Content: Thought content normal.         Cognition and Memory: Cognition normal.         Judgment: Judgment normal.         Assessment/Plan     Problem List Items Addressed This Visit        Cardiac and Vasculature    Coronary artery disease involving native coronary artery of native heart    Relevant Medications    icosapent ethyl (Vascepa) 1 g capsule capsule    isosorbide mononitrate (IMDUR) 60 MG 24 hr tablet    Other Relevant Orders    CBC & Differential    Comprehensive Metabolic Panel    TSH    Hemoglobin A1c    Vitamin B12    Lipid Panel    MicroAlbumin, Urine, Random - Urine, Clean Catch    Vitamin D 25 Hydroxy       Endocrine and Metabolic    Vitamin B 12 deficiency    Relevant Medications    cyanocobalamin 1000 MCG/ML injection    Other Relevant Orders    CBC & Differential    Comprehensive Metabolic Panel    TSH    Hemoglobin A1c    Vitamin B12    Lipid Panel    MicroAlbumin, Urine, Random - Urine, Clean  Catch    Vitamin D 25 Hydroxy    Class 1 obesity due to excess calories with serious comorbidity and body mass index (BMI) of 30.0 to 30.9 in adult    Relevant Orders    CBC & Differential    Comprehensive Metabolic Panel    TSH    Hemoglobin A1c    Vitamin B12    Lipid Panel    MicroAlbumin, Urine, Random - Urine, Clean Catch    Vitamin D 25 Hydroxy    DM (diabetes mellitus) (CMS/[facility])    Relevant Medications    exenatide er (Bydureon) 2 MG pen-injector injection    metFORMIN (GLUCOPHAGE) 500 MG tablet    Other Relevant Orders    CBC & Differential    Comprehensive Metabolic Panel    TSH    Hemoglobin A1c    Vitamin B12    Lipid Panel    MicroAlbumin, Urine, Random - Urine, Clean Catch    Vitamin D 25 Hydroxy       Gastrointestinal Abdominal     Gastroesophageal reflux disease without esophagitis    Relevant Medications    dicyclomine (BENTYL) 10 MG capsule    pantoprazole (PROTONIX) 40 MG EC tablet    Other Relevant Orders    CBC & Differential    Comprehensive Metabolic Panel    TSH    Hemoglobin A1c    Vitamin B12    Lipid Panel    MicroAlbumin, Urine, Random - Urine, Clean Catch    Vitamin D 25 Hydroxy    Colitis    Relevant Medications    dicyclomine (BENTYL) 10 MG capsule    Other Relevant Orders    CBC & Differential    Comprehensive Metabolic Panel    TSH    Hemoglobin A1c    Vitamin B12    Lipid Panel    MicroAlbumin, Urine, Random - Urine, Clean Catch    Vitamin D 25 Hydroxy       Genitourinary and Reproductive     Benign non-nodular prostatic hyperplasia without lower urinary tract symptoms    Relevant Medications    finasteride (PROSCAR) 5 MG tablet    Other Relevant Orders    CBC & Differential    Comprehensive Metabolic Panel    TSH    Hemoglobin A1c    Vitamin B12    Lipid Panel    MicroAlbumin, Urine, Random - Urine, Clean Catch    Vitamin D 25 Hydroxy       Mental Health    Generalized anxiety disorder    Relevant Medications    amitriptyline (ELAVIL) 50 MG tablet    citalopram (CeleXA) 40 MG tablet     ondansetron (ZOFRAN) 8 MG tablet    Other Relevant Orders    CBC & Differential    Comprehensive Metabolic Panel    TSH    Hemoglobin A1c    Vitamin B12    Lipid Panel    MicroAlbumin, Urine, Random - Urine, Clean Catch    Vitamin D 25 Hydroxy      Other Visit Diagnoses     Prostate cancer screening    -  Primary    Relevant Orders    PSA SCREENING    Abnormal thyroid blood test        Relevant Medications    Thyroid (NP THYROID) 30 MG PO tablet    Other Relevant Orders    CBC & Differential    Comprehensive Metabolic Panel    TSH    Hemoglobin A1c    Vitamin B12    Lipid Panel    MicroAlbumin, Urine, Random - Urine, Clean Catch    Vitamin D 25 Hydroxy          I have reviewed medication list and discussed with patient the possible side effects and interactions.  We have discussed purpose for medication, route, dosage, frequency.  Refill routine medications.      Current Outpatient Medications:   •  amitriptyline (ELAVIL) 50 MG tablet, Take 2 tablets by mouth At Night As Needed for Sleep., Disp: 180 tablet, Rfl: 1  •  aspirin (SB Low Dose ASA EC) 81 MG EC tablet, Take 1 tablet by mouth Daily., Disp: 90 tablet, Rfl: 3  •  carvedilol (COREG) 12.5 MG tablet, Take 1 tablet by mouth 2 (Two) Times a Day With Meals., Disp: 180 tablet, Rfl: 3  •  citalopram (CeleXA) 40 MG tablet, Take 1 tablet by mouth Daily., Disp: 90 tablet, Rfl: 5  •  clonazePAM (KlonoPIN) 0.5 MG tablet, Take 1 tablet by mouth 3 (Three) Times a Day As Needed for Anxiety. for anxiety, Disp: 90 tablet, Rfl: 0  •  clopidogrel (PLAVIX) 75 MG tablet, Take 1 tablet by mouth Daily., Disp: 30 tablet, Rfl: 11  •  cyanocobalamin 1000 MCG/ML injection, Inject 1 mL into the appropriate muscle as directed by prescriber Every 30 (Thirty) Days., Disp: 1 mL, Rfl: 5  •  dicyclomine (BENTYL) 10 MG capsule, Take 1 capsule by mouth 4 (Four) Times a Day Before Meals & at Bedtime., Disp: 120 capsule, Rfl: 5  •  diphenhydrAMINE (Benadryl Allergy) 25 mg capsule, Take 1  capsule by mouth Every 6 (Six) Hours As Needed for Itching., Disp: 90 capsule, Rfl: 3  •  docusate sodium 100 MG capsule, Take 1 capsule by mouth 2 (Two) Times a Day As Needed (constipation)., Disp: 60 each, Rfl: 5  •  exenatide er (Bydureon) 2 MG pen-injector injection, Inject 1 pen under the skin into the appropriate area as directed 1 (One) Time Per Week., Disp: 4 each, Rfl: 5  •  finasteride (PROSCAR) 5 MG tablet, Take 1 tablet by mouth Daily., Disp: 90 tablet, Rfl: 3  •  glucose blood test strip, Check blood sugar 3x times a day., Disp: 100 each, Rfl: 12  •  glucose monitor monitoring kit, 1 each 3 (Three) Times a Day As Needed (diabetes)., Disp: 1 each, Rfl: 0  •  icosapent ethyl (Vascepa) 1 g capsule capsule, 2 twice daily, Disp: 120 capsule, Rfl: 5  •  isosorbide mononitrate (IMDUR) 60 MG 24 hr tablet, Take 1 tablet by mouth Daily., Disp: 90 tablet, Rfl: 3  •  Lancets (ONETOUCH ULTRASOFT) lancets, Check blood sugar 3 times daily, Disp: 100 each, Rfl: 12  •  metFORMIN (GLUCOPHAGE) 500 MG tablet, Take 1 tablet by mouth 2 (Two) Times a Day With Meals., Disp: 180 tablet, Rfl: 3  •  naloxone (NARCAN) 4 MG/0.1ML nasal spray, 1 spray into the nostril(s) as directed by provider As Needed (overdose)., Disp: 1 each, Rfl: 0  •  nitroglycerin (NITROSTAT) 0.4 MG SL tablet, 1 under the tongue as needed for angina, may repeat q5mins for up three doses, Disp: 50 tablet, Rfl: 1  •  O2 (OXYGEN), Inhale 2 L/min Every Night., Disp: , Rfl:   •  ondansetron (ZOFRAN) 8 MG tablet, Take 1 tablet by mouth Every 8 (Eight) Hours As Needed for Nausea., Disp: 20 tablet, Rfl: 2  •  pantoprazole (PROTONIX) 40 MG EC tablet, Take 1 tablet by mouth Daily., Disp: 90 tablet, Rfl: 3  •  polyethylene glycol (MIRALAX) 17 g packet, Take 17 g by mouth Daily., Disp: 1 each, Rfl: 5  •  ranolazine (RANEXA) 500 MG 12 hr tablet, Take 1 tablet by mouth Every 12 (Twelve) Hours., Disp: 60 tablet, Rfl: 5  •  simvastatin (ZOCOR) 40 MG tablet, Take 40 mg by  mouth Every Night., Disp: , Rfl:   •  Thyroid (NP THYROID) 30 MG PO tablet, Take 1 tablet by mouth Daily., Disp: 30 tablet, Rfl: 5  •  traMADol (ULTRAM) 50 MG tablet, Take 1 tablet by mouth Every 8 (Eight) Hours As Needed for Moderate Pain  or Severe Pain ., Disp: 60 tablet, Rfl: 3  •  vitamin D (ERGOCALCIFEROL) 1.25 MG (98583 UT) capsule capsule, Take 1 capsule by mouth Every 7 (Seven) Days., Disp: 12 capsule, Rfl: 5    .Pt has been instructed today regarding low fat heart smart diet. Weight management and routine exercise has been recommended. Avoid high fat foods, starchy foods and processed foods. Increase lean meats, fresh vegetables and fresh fruits.        Patient's Body mass index is 30.28 kg/m². BMI is above normal parameters. Recommendations include: exercise counseling and nutrition counseling.       I have discussed diagnosis in detail today allowing time for questions and answers. Patient is aware of reasons to seek urgent or emergent medical care as well as reasons to return to the clinic for evaluation. Possible side effects, interactions and progression of symptoms discussed as well. Patient / family states understanding.   Emotional support and active listening provided.       Coronavirus precautions have been reviewed and discussed.  I have discussed the CDC recommendations  of social distancing, hand washing and disinfecting commonly touched items. Reviewed need to notify PCP and self quarantine with mild symptoms.  Discussed procedure to obtain Covid-19 testing and notification of PCP/health dept/ED/Urgent Center if symptoms begin. Understanding verbalized.    Reviewed patient medication list and precautions/possible interactions.    Extensive diabetic education provided today.  Refill routine medications.  No controlled medications needed today.    SEE him back in 2-3 months, sooner if needed.  Fasting labs 1 week prior.        This document has been electronically signed by:  Clare  YOUNG Quintero, NP-C

## 2021-04-26 ENCOUNTER — LAB (OUTPATIENT)
Dept: FAMILY MEDICINE CLINIC | Facility: CLINIC | Age: 61
End: 2021-04-26

## 2021-04-26 DIAGNOSIS — N40.0 BENIGN NON-NODULAR PROSTATIC HYPERPLASIA WITHOUT LOWER URINARY TRACT SYMPTOMS: ICD-10-CM

## 2021-04-26 DIAGNOSIS — I25.10 CORONARY ARTERY DISEASE INVOLVING NATIVE CORONARY ARTERY OF NATIVE HEART WITHOUT ANGINA PECTORIS: ICD-10-CM

## 2021-04-26 DIAGNOSIS — Z12.5 PROSTATE CANCER SCREENING: ICD-10-CM

## 2021-04-26 DIAGNOSIS — E11.59 TYPE 2 DIABETES MELLITUS WITH OTHER CIRCULATORY COMPLICATION, WITH LONG-TERM CURRENT USE OF INSULIN (HCC): ICD-10-CM

## 2021-04-26 DIAGNOSIS — K52.9 COLITIS: ICD-10-CM

## 2021-04-26 DIAGNOSIS — Z79.01 LONG TERM (CURRENT) USE OF ANTICOAGULANTS: Primary | ICD-10-CM

## 2021-04-26 DIAGNOSIS — K21.9 GASTROESOPHAGEAL REFLUX DISEASE WITHOUT ESOPHAGITIS: ICD-10-CM

## 2021-04-26 DIAGNOSIS — F41.1 GENERALIZED ANXIETY DISORDER: ICD-10-CM

## 2021-04-26 DIAGNOSIS — R79.89 ABNORMAL THYROID BLOOD TEST: ICD-10-CM

## 2021-04-26 DIAGNOSIS — E66.09 CLASS 1 OBESITY DUE TO EXCESS CALORIES WITH SERIOUS COMORBIDITY AND BODY MASS INDEX (BMI) OF 30.0 TO 30.9 IN ADULT: ICD-10-CM

## 2021-04-26 DIAGNOSIS — E53.8 VITAMIN B 12 DEFICIENCY: ICD-10-CM

## 2021-04-26 DIAGNOSIS — Z79.4 TYPE 2 DIABETES MELLITUS WITH OTHER CIRCULATORY COMPLICATION, WITH LONG-TERM CURRENT USE OF INSULIN (HCC): ICD-10-CM

## 2021-04-26 LAB
25(OH)D3 SERPL-MCNC: 58.5 NG/ML
ALBUMIN SERPL-MCNC: 4.1 G/DL (ref 3.5–5.2)
ALBUMIN/GLOB SERPL: 1.6 G/DL
ALP SERPL-CCNC: 77 U/L (ref 39–117)
ALT SERPL W P-5'-P-CCNC: 9 U/L (ref 1–41)
ANION GAP SERPL CALCULATED.3IONS-SCNC: 12.6 MMOL/L (ref 5–15)
APTT PPP: 26.3 SECONDS (ref 25.6–35.3)
AST SERPL-CCNC: 11 U/L (ref 1–40)
BASOPHILS # BLD AUTO: 0.02 10*3/MM3 (ref 0–0.2)
BASOPHILS NFR BLD AUTO: 0.3 % (ref 0–1.5)
BILIRUB SERPL-MCNC: 0.3 MG/DL (ref 0–1.2)
BUN SERPL-MCNC: 15 MG/DL (ref 6–20)
BUN/CREAT SERPL: 16.3 (ref 7–25)
CALCIUM SPEC-SCNC: 8.9 MG/DL (ref 8.6–10.5)
CHLORIDE SERPL-SCNC: 102 MMOL/L (ref 98–107)
CHOLEST SERPL-MCNC: 141 MG/DL (ref 0–200)
CO2 SERPL-SCNC: 25.4 MMOL/L (ref 22–29)
CREAT SERPL-MCNC: 0.92 MG/DL (ref 0.76–1.27)
DEPRECATED RDW RBC AUTO: 42.4 FL (ref 37–54)
EOSINOPHIL # BLD AUTO: 0.45 10*3/MM3 (ref 0–0.4)
EOSINOPHIL NFR BLD AUTO: 6.7 % (ref 0.3–6.2)
ERYTHROCYTE [DISTWIDTH] IN BLOOD BY AUTOMATED COUNT: 14.7 % (ref 12.3–15.4)
GFR SERPL CREATININE-BSD FRML MDRD: 84 ML/MIN/1.73
GLOBULIN UR ELPH-MCNC: 2.6 GM/DL
GLUCOSE SERPL-MCNC: 99 MG/DL (ref 65–99)
HBA1C MFR BLD: 6.43 % (ref 4.8–5.6)
HCT VFR BLD AUTO: 39.6 % (ref 37.5–51)
HDLC SERPL-MCNC: 45 MG/DL (ref 40–60)
HGB BLD-MCNC: 12.7 G/DL (ref 13–17.7)
IMM GRANULOCYTES # BLD AUTO: 0.01 10*3/MM3 (ref 0–0.05)
IMM GRANULOCYTES NFR BLD AUTO: 0.1 % (ref 0–0.5)
INR PPP: 0.97 (ref 0.9–1.1)
LDLC SERPL CALC-MCNC: 71 MG/DL (ref 0–100)
LDLC/HDLC SERPL: 1.5 {RATIO}
LYMPHOCYTES # BLD AUTO: 1.88 10*3/MM3 (ref 0.7–3.1)
LYMPHOCYTES NFR BLD AUTO: 28 % (ref 19.6–45.3)
MCH RBC QN AUTO: 25.9 PG (ref 26.6–33)
MCHC RBC AUTO-ENTMCNC: 32.1 G/DL (ref 31.5–35.7)
MCV RBC AUTO: 80.8 FL (ref 79–97)
MONOCYTES # BLD AUTO: 0.81 10*3/MM3 (ref 0.1–0.9)
MONOCYTES NFR BLD AUTO: 12.1 % (ref 5–12)
NEUTROPHILS NFR BLD AUTO: 3.55 10*3/MM3 (ref 1.7–7)
NEUTROPHILS NFR BLD AUTO: 52.8 % (ref 42.7–76)
NRBC BLD AUTO-RTO: 0 /100 WBC (ref 0–0.2)
PLATELET # BLD AUTO: 442 10*3/MM3 (ref 140–450)
PMV BLD AUTO: 9 FL (ref 6–12)
POTASSIUM SERPL-SCNC: 4.2 MMOL/L (ref 3.5–5.2)
PROT SERPL-MCNC: 6.7 G/DL (ref 6–8.5)
PROTHROMBIN TIME: 12.7 SECONDS (ref 11.9–14.1)
PSA SERPL-MCNC: 0.25 NG/ML (ref 0–4)
RBC # BLD AUTO: 4.9 10*6/MM3 (ref 4.14–5.8)
SODIUM SERPL-SCNC: 140 MMOL/L (ref 136–145)
TRIGL SERPL-MCNC: 142 MG/DL (ref 0–150)
TSH SERPL DL<=0.05 MIU/L-ACNC: 4.66 UIU/ML (ref 0.27–4.2)
VIT B12 BLD-MCNC: 402 PG/ML (ref 211–946)
VLDLC SERPL-MCNC: 25 MG/DL (ref 5–40)
WBC # BLD AUTO: 6.72 10*3/MM3 (ref 3.4–10.8)

## 2021-04-26 PROCEDURE — 84443 ASSAY THYROID STIM HORMONE: CPT | Performed by: NURSE PRACTITIONER

## 2021-04-26 PROCEDURE — 85610 PROTHROMBIN TIME: CPT | Performed by: NURSE PRACTITIONER

## 2021-04-26 PROCEDURE — 82607 VITAMIN B-12: CPT | Performed by: NURSE PRACTITIONER

## 2021-04-26 PROCEDURE — 80053 COMPREHEN METABOLIC PANEL: CPT | Performed by: NURSE PRACTITIONER

## 2021-04-26 PROCEDURE — G0103 PSA SCREENING: HCPCS | Performed by: NURSE PRACTITIONER

## 2021-04-26 PROCEDURE — 85025 COMPLETE CBC W/AUTO DIFF WBC: CPT | Performed by: NURSE PRACTITIONER

## 2021-04-26 PROCEDURE — 80061 LIPID PANEL: CPT | Performed by: NURSE PRACTITIONER

## 2021-04-26 PROCEDURE — 83036 HEMOGLOBIN GLYCOSYLATED A1C: CPT | Performed by: NURSE PRACTITIONER

## 2021-04-26 PROCEDURE — 82306 VITAMIN D 25 HYDROXY: CPT | Performed by: NURSE PRACTITIONER

## 2021-04-26 PROCEDURE — 85730 THROMBOPLASTIN TIME PARTIAL: CPT | Performed by: NURSE PRACTITIONER

## 2021-04-28 DIAGNOSIS — F41.1 GENERALIZED ANXIETY DISORDER: Chronic | ICD-10-CM

## 2021-04-28 RX ORDER — CLONAZEPAM 0.5 MG/1
TABLET ORAL
Qty: 90 TABLET | Refills: 0 | Status: SHIPPED | OUTPATIENT
Start: 2021-04-28

## 2021-05-15 DIAGNOSIS — F41.1 GENERALIZED ANXIETY DISORDER: Chronic | ICD-10-CM

## 2021-05-17 RX ORDER — CLONAZEPAM 0.5 MG/1
TABLET ORAL
Qty: 90 TABLET | Refills: 0 | OUTPATIENT
Start: 2021-05-17

## 2021-06-30 DIAGNOSIS — F41.1 GENERALIZED ANXIETY DISORDER: ICD-10-CM

## 2021-06-30 DIAGNOSIS — K52.9 COLITIS: ICD-10-CM

## 2021-06-30 RX ORDER — TRAMADOL HYDROCHLORIDE 50 MG/1
TABLET ORAL
Qty: 60 TABLET | Refills: 0 | OUTPATIENT
Start: 2021-06-30

## 2021-07-06 ENCOUNTER — OFFICE VISIT (OUTPATIENT)
Dept: CARDIOLOGY | Facility: CLINIC | Age: 61
End: 2021-07-06

## 2021-07-06 VITALS
WEIGHT: 213 LBS | BODY MASS INDEX: 30.49 KG/M2 | HEART RATE: 89 BPM | HEIGHT: 70 IN | OXYGEN SATURATION: 98 % | DIASTOLIC BLOOD PRESSURE: 84 MMHG | SYSTOLIC BLOOD PRESSURE: 122 MMHG

## 2021-07-06 DIAGNOSIS — I25.10 CORONARY ARTERY DISEASE INVOLVING NATIVE CORONARY ARTERY OF NATIVE HEART WITHOUT ANGINA PECTORIS: Primary | ICD-10-CM

## 2021-07-06 DIAGNOSIS — E78.00 HYPERCHOLESTEROLEMIA: ICD-10-CM

## 2021-07-06 DIAGNOSIS — I10 ESSENTIAL HYPERTENSION: ICD-10-CM

## 2021-07-06 DIAGNOSIS — I50.42 CHRONIC COMBINED SYSTOLIC AND DIASTOLIC HEART FAILURE (HCC): ICD-10-CM

## 2021-07-06 PROCEDURE — 99214 OFFICE O/P EST MOD 30 MIN: CPT | Performed by: INTERNAL MEDICINE

## 2021-07-06 RX ORDER — CARVEDILOL 25 MG/1
25 TABLET ORAL 2 TIMES DAILY WITH MEALS
COMMUNITY
End: 2021-07-06 | Stop reason: SDUPTHER

## 2021-07-06 RX ORDER — FAMOTIDINE 40 MG/1
40 TABLET, FILM COATED ORAL
COMMUNITY
Start: 2021-05-12 | End: 2022-04-12

## 2021-07-06 NOTE — PROGRESS NOTES
"    Chief Complaint  Coronary Artery Disease and Hypertension    Subjective      Damaso Leonard presents to [facility] CARDIOLOGY for regular follow up  History of Present Illness   He has intermittent chest pain. Happens a couple times a month. Exertional. Associated with shortness of breath and the heat.   No palpitations, dizziness, syncope or edema .  Hypertension is well controlled with no significant intervals.  No complaints with statins for his dyslipidemia  The chart, PMH, FMH, social history, PSH, and medications were reviewed today. Problems above addressed this visit.    Objective     Vital Signs:   /84 (BP Location: Left arm, Patient Position: Sitting)   Pulse 89   Ht 177.8 cm (70\")   Wt 96.6 kg (213 lb)   SpO2 98%   BMI 30.56 kg/m²       Physical Exam  Constitutional:       Appearance: Normal appearance.   HENT:      Head: Normocephalic.      Nose: Nose normal.   Eyes:      Pupils: Pupils are equal, round, and reactive to light.   Neck:      Vascular: No carotid bruit.   Cardiovascular:      Rate and Rhythm: Normal rate and regular rhythm.      Pulses: Normal pulses.      Heart sounds: Normal heart sounds. No friction rub. No gallop.    Pulmonary:      Effort: Pulmonary effort is normal.      Breath sounds: Normal breath sounds. No stridor. No wheezing.   Abdominal:      General: Bowel sounds are normal.      Palpations: Abdomen is soft.   Musculoskeletal:      Cervical back: Neck supple.      Right lower leg: No edema.      Left lower leg: No edema.   Skin:     General: Skin is warm and dry.   Neurological:      General: No focal deficit present.      Mental Status: He is alert and oriented to person, place, and time.   Psychiatric:         Mood and Affect: Mood normal.         Behavior: Behavior normal.         Thought Content: Thought content normal.         Judgment: Judgment normal.          Result Review :{Data reviewed: Cardiology studies 07/06/2021     "   Assessment and Plan   Stable angina pectoris s/p PCI with multiple stents  Essential hypertension  Dyslipidemia continue with Simvistatin    Increase Coreg to 25 mg BID   Follow in 3 months    Problem List Items Addressed This Visit        Cardiac and Vasculature    Coronary artery disease involving native coronary artery of native heart - Primary    Relevant Medications    carvedilol (COREG) 25 MG tablet    Hypercholesterolemia    Essential hypertension    Relevant Medications    carvedilol (COREG) 25 MG tablet    Chronic combined systolic and diastolic heart failure (CMS/HCC)    Relevant Medications    carvedilol (COREG) 25 MG tablet            Follow Up     Return in about 3 months (around 10/6/2021).  Patient was given instructions and counseling regarding his condition or for health maintenance advice. Please see specific information pulled into the AVS if appropriate.             Director, Cardiac Cath Lab    JANET Rosa, acting as scribe for Arron Riley MD, [facility].   07/06/21  15:21 EDT     I have read and agree the documentation that has been completed regarding this visit.  By signing this record, I attest that the documentation was completed by my physical presence and is an accurate record of the encounter.  Voice recognition / transcription technology used for some documentation in this chart in attempt to mitigate substantial inefficiencies created by this electronic health record technology.  As a result, there may be some typos and.or nonsensical language introduced into the chart that either overlooked in editing/review and/or that I am unable to correct as patient care needs require me to prioritize my attention to bedside patient care rather than electronic documentation. MA

## 2021-07-12 RX ORDER — CARVEDILOL 25 MG/1
25 TABLET ORAL 2 TIMES DAILY WITH MEALS
Qty: 180 TABLET | Refills: 3 | Status: SHIPPED | OUTPATIENT
Start: 2021-07-12 | End: 2022-08-11 | Stop reason: SDUPTHER

## 2021-07-13 DIAGNOSIS — R19.8 ABNORMAL FINDINGS ON ESOPHAGOGASTRODUODENOSCOPY (EGD): ICD-10-CM

## 2021-07-13 DIAGNOSIS — K21.9 GASTROESOPHAGEAL REFLUX DISEASE WITHOUT ESOPHAGITIS: Primary | ICD-10-CM

## 2021-07-13 DIAGNOSIS — Z01.818 PREOPERATIVE CLEARANCE: ICD-10-CM

## 2021-07-13 DIAGNOSIS — K59.00 CONSTIPATION, UNSPECIFIED CONSTIPATION TYPE: ICD-10-CM

## 2021-07-16 ENCOUNTER — LAB (OUTPATIENT)
Dept: LAB | Facility: HOSPITAL | Age: 61
End: 2021-07-16

## 2021-07-16 DIAGNOSIS — K59.00 CONSTIPATION, UNSPECIFIED CONSTIPATION TYPE: ICD-10-CM

## 2021-07-16 DIAGNOSIS — Z01.818 PREOPERATIVE CLEARANCE: ICD-10-CM

## 2021-07-16 DIAGNOSIS — R19.8 ABNORMAL FINDINGS ON ESOPHAGOGASTRODUODENOSCOPY (EGD): ICD-10-CM

## 2021-07-16 DIAGNOSIS — K21.9 GASTROESOPHAGEAL REFLUX DISEASE WITHOUT ESOPHAGITIS: ICD-10-CM

## 2021-07-16 LAB — SARS-COV-2 RNA RESP QL NAA+PROBE: NOT DETECTED

## 2021-07-16 PROCEDURE — U0003 INFECTIOUS AGENT DETECTION BY NUCLEIC ACID (DNA OR RNA); SEVERE ACUTE RESPIRATORY SYNDROME CORONAVIRUS 2 (SARS-COV-2) (CORONAVIRUS DISEASE [COVID-19]), AMPLIFIED PROBE TECHNIQUE, MAKING USE OF HIGH THROUGHPUT TECHNOLOGIES AS DESCRIBED BY CMS-2020-01-R: HCPCS | Performed by: PHYSICIAN ASSISTANT

## 2021-07-16 PROCEDURE — C9803 HOPD COVID-19 SPEC COLLECT: HCPCS | Performed by: PHYSICIAN ASSISTANT

## 2021-07-19 ENCOUNTER — HOSPITAL ENCOUNTER (OUTPATIENT)
Facility: HOSPITAL | Age: 61
Setting detail: HOSPITAL OUTPATIENT SURGERY
Discharge: HOME | End: 2021-07-19
Attending: INTERNAL MEDICINE | Admitting: INTERNAL MEDICINE

## 2021-07-19 ENCOUNTER — ANESTHESIA EVENT (OUTPATIENT)
Dept: PERIOP | Facility: HOSPITAL | Age: 61
End: 2021-07-19

## 2021-07-19 ENCOUNTER — ANESTHESIA (OUTPATIENT)
Dept: PERIOP | Facility: HOSPITAL | Age: 61
End: 2021-07-19

## 2021-07-19 VITALS
DIASTOLIC BLOOD PRESSURE: 78 MMHG | HEIGHT: 70 IN | OXYGEN SATURATION: 98 % | WEIGHT: 214 LBS | TEMPERATURE: 97.2 F | BODY MASS INDEX: 30.64 KG/M2 | HEART RATE: 76 BPM | RESPIRATION RATE: 20 BRPM | SYSTOLIC BLOOD PRESSURE: 109 MMHG

## 2021-07-19 DIAGNOSIS — K59.00 CONSTIPATION, UNSPECIFIED CONSTIPATION TYPE: ICD-10-CM

## 2021-07-19 DIAGNOSIS — K21.9 GASTROESOPHAGEAL REFLUX DISEASE WITHOUT ESOPHAGITIS: ICD-10-CM

## 2021-07-19 DIAGNOSIS — Z01.818 PREOPERATIVE CLEARANCE: ICD-10-CM

## 2021-07-19 DIAGNOSIS — R19.8 ABNORMAL FINDINGS ON ESOPHAGOGASTRODUODENOSCOPY (EGD): ICD-10-CM

## 2021-07-19 LAB — GLUCOSE BLDC GLUCOMTR-MCNC: 120 MG/DL (ref 70–130)

## 2021-07-19 PROCEDURE — 43239 EGD BIOPSY SINGLE/MULTIPLE: CPT | Performed by: INTERNAL MEDICINE

## 2021-07-19 PROCEDURE — 88305 TISSUE EXAM BY PATHOLOGIST: CPT | Performed by: INTERNAL MEDICINE

## 2021-07-19 PROCEDURE — C1726 CATH, BAL DIL, NON-VASCULAR: HCPCS | Performed by: INTERNAL MEDICINE

## 2021-07-19 PROCEDURE — 82962 GLUCOSE BLOOD TEST: CPT

## 2021-07-19 PROCEDURE — 25010000002 PROPOFOL 10 MG/ML EMULSION: Performed by: NURSE ANESTHETIST, CERTIFIED REGISTERED

## 2021-07-19 PROCEDURE — 43249 ESOPH EGD DILATION <30 MM: CPT | Performed by: INTERNAL MEDICINE

## 2021-07-19 RX ORDER — MEPERIDINE HYDROCHLORIDE 25 MG/ML
12.5 INJECTION INTRAMUSCULAR; INTRAVENOUS; SUBCUTANEOUS
Status: DISCONTINUED | OUTPATIENT
Start: 2021-07-19 | End: 2021-07-19 | Stop reason: HOSPADM

## 2021-07-19 RX ORDER — ONDANSETRON 2 MG/ML
4 INJECTION INTRAMUSCULAR; INTRAVENOUS AS NEEDED
Status: DISCONTINUED | OUTPATIENT
Start: 2021-07-19 | End: 2021-07-19 | Stop reason: HOSPADM

## 2021-07-19 RX ORDER — SODIUM CHLORIDE 0.9 % (FLUSH) 0.9 %
10 SYRINGE (ML) INJECTION AS NEEDED
Status: DISCONTINUED | OUTPATIENT
Start: 2021-07-19 | End: 2021-07-19 | Stop reason: HOSPADM

## 2021-07-19 RX ORDER — SODIUM CHLORIDE 0.9 % (FLUSH) 0.9 %
10 SYRINGE (ML) INJECTION EVERY 12 HOURS SCHEDULED
Status: DISCONTINUED | OUTPATIENT
Start: 2021-07-19 | End: 2021-07-19 | Stop reason: HOSPADM

## 2021-07-19 RX ORDER — SODIUM CHLORIDE, SODIUM LACTATE, POTASSIUM CHLORIDE, CALCIUM CHLORIDE 600; 310; 30; 20 MG/100ML; MG/100ML; MG/100ML; MG/100ML
100 INJECTION, SOLUTION INTRAVENOUS ONCE AS NEEDED
Status: DISCONTINUED | OUTPATIENT
Start: 2021-07-19 | End: 2021-07-19 | Stop reason: HOSPADM

## 2021-07-19 RX ORDER — OXYCODONE HYDROCHLORIDE AND ACETAMINOPHEN 5; 325 MG/1; MG/1
1 TABLET ORAL ONCE AS NEEDED
Status: DISCONTINUED | OUTPATIENT
Start: 2021-07-19 | End: 2021-07-19 | Stop reason: HOSPADM

## 2021-07-19 RX ORDER — DROPERIDOL 2.5 MG/ML
0.62 INJECTION, SOLUTION INTRAMUSCULAR; INTRAVENOUS ONCE AS NEEDED
Status: DISCONTINUED | OUTPATIENT
Start: 2021-07-19 | End: 2021-07-19 | Stop reason: HOSPADM

## 2021-07-19 RX ORDER — PANTOPRAZOLE SODIUM 40 MG/1
40 TABLET, DELAYED RELEASE ORAL 2 TIMES DAILY
Qty: 60 TABLET | Refills: 5 | Status: SHIPPED | OUTPATIENT
Start: 2021-07-19 | End: 2021-10-27

## 2021-07-19 RX ORDER — LIDOCAINE HYDROCHLORIDE 20 MG/ML
INJECTION, SOLUTION INFILTRATION; PERINEURAL AS NEEDED
Status: DISCONTINUED | OUTPATIENT
Start: 2021-07-19 | End: 2021-07-19 | Stop reason: SURG

## 2021-07-19 RX ORDER — IPRATROPIUM BROMIDE AND ALBUTEROL SULFATE 2.5; .5 MG/3ML; MG/3ML
3 SOLUTION RESPIRATORY (INHALATION) ONCE AS NEEDED
Status: DISCONTINUED | OUTPATIENT
Start: 2021-07-19 | End: 2021-07-19 | Stop reason: HOSPADM

## 2021-07-19 RX ORDER — FENTANYL CITRATE 50 UG/ML
50 INJECTION, SOLUTION INTRAMUSCULAR; INTRAVENOUS
Status: DISCONTINUED | OUTPATIENT
Start: 2021-07-19 | End: 2021-07-19 | Stop reason: HOSPADM

## 2021-07-19 RX ORDER — KETOROLAC TROMETHAMINE 30 MG/ML
30 INJECTION, SOLUTION INTRAMUSCULAR; INTRAVENOUS EVERY 6 HOURS PRN
Status: DISCONTINUED | OUTPATIENT
Start: 2021-07-19 | End: 2021-07-19 | Stop reason: HOSPADM

## 2021-07-19 RX ORDER — MIDAZOLAM HYDROCHLORIDE 1 MG/ML
1 INJECTION INTRAMUSCULAR; INTRAVENOUS
Status: DISCONTINUED | OUTPATIENT
Start: 2021-07-19 | End: 2021-07-19 | Stop reason: HOSPADM

## 2021-07-19 RX ORDER — PROPOFOL 10 MG/ML
VIAL (ML) INTRAVENOUS AS NEEDED
Status: DISCONTINUED | OUTPATIENT
Start: 2021-07-19 | End: 2021-07-19 | Stop reason: SURG

## 2021-07-19 RX ORDER — SODIUM CHLORIDE, SODIUM LACTATE, POTASSIUM CHLORIDE, CALCIUM CHLORIDE 600; 310; 30; 20 MG/100ML; MG/100ML; MG/100ML; MG/100ML
125 INJECTION, SOLUTION INTRAVENOUS ONCE
Status: COMPLETED | OUTPATIENT
Start: 2021-07-19 | End: 2021-07-19

## 2021-07-19 RX ADMIN — SODIUM CHLORIDE, POTASSIUM CHLORIDE, SODIUM LACTATE AND CALCIUM CHLORIDE: 600; 310; 30; 20 INJECTION, SOLUTION INTRAVENOUS at 10:05

## 2021-07-19 RX ADMIN — PROPOFOL 50 MG: 10 INJECTION, EMULSION INTRAVENOUS at 10:10

## 2021-07-19 RX ADMIN — PROPOFOL 50 MG: 10 INJECTION, EMULSION INTRAVENOUS at 10:12

## 2021-07-19 RX ADMIN — LIDOCAINE HYDROCHLORIDE 100 MG: 20 INJECTION, SOLUTION INFILTRATION; PERINEURAL at 10:07

## 2021-07-19 RX ADMIN — PROPOFOL 100 MG: 10 INJECTION, EMULSION INTRAVENOUS at 10:07

## 2021-07-19 RX ADMIN — PROPOFOL 50 MG: 10 INJECTION, EMULSION INTRAVENOUS at 10:14

## 2021-07-19 RX ADMIN — PROPOFOL 50 MG: 10 INJECTION, EMULSION INTRAVENOUS at 10:17

## 2021-07-19 NOTE — ANESTHESIA PREPROCEDURE EVALUATION
Anesthesia Evaluation     Patient summary reviewed and Nursing notes reviewed   NPO Solid Status: > 8 hours  NPO Liquid Status: > 8 hours           Airway   Mallampati: II  TM distance: >3 FB  Neck ROM: full  Dental - normal exam     Pulmonary     breath sounds clear to auscultation  (+) sleep apnea,   Cardiovascular   Exercise tolerance: poor (<4 METS)    Rhythm: regular  Rate: normal    (+) hypertension, past MI , CAD, angina, CHF , PVD, hyperlipidemia,       Neuro/Psych  (+) numbness, psychiatric history,     GI/Hepatic/Renal/Endo    (+) obesity, morbid obesity, GERD,  diabetes mellitus,     Musculoskeletal     Abdominal     Abdomen: soft.   Substance History      OB/GYN          Other   arthritis,                      Anesthesia Plan    ASA 4     general     intravenous induction     Anesthetic plan, all risks, benefits, and alternatives have been provided, discussed and informed consent has been obtained with: patient.

## 2021-07-19 NOTE — ANESTHESIA POSTPROCEDURE EVALUATION
Patient: Damaso Leonard    Procedure Summary     Date: 07/19/21 Room / Location: [facility] OR  /  [facility] OR    Anesthesia Start: 1005 Anesthesia Stop: 1020    Procedure: ESOPHAGOGASTRODUODENOSCOPY WITH BIOPSY (N/A Esophagus) Diagnosis:       Preoperative clearance      Constipation, unspecified constipation type      Abnormal findings on esophagogastroduodenoscopy (EGD)      Gastroesophageal reflux disease without esophagitis      (Preoperative clearance [Z01.818])      (Constipation, unspecified constipation type [K59.00])      (Abnormal findings on esophagogastroduodenoscopy (EGD) [R19.8])      (Gastroesophageal reflux disease without esophagitis [K21.9])    Surgeons: Anthony Garcia MD Provider: Royal Garcia MD    Anesthesia Type: general ASA Status: 4          Anesthesia Type: general    Vitals  Vitals Value Taken Time   /78 07/19/21 1052   Temp 97.2 °F (36.2 °C) 07/19/21 1022   Pulse 76 07/19/21 1052   Resp 20 07/19/21 1052   SpO2 98 % 07/19/21 1052           Post Anesthesia Care and Evaluation    Patient location during evaluation: PHASE II  Patient participation: complete - patient participated  Level of consciousness: awake  Pain score: 1  Pain management: adequate  Airway patency: patent  Anesthetic complications: No anesthetic complications  PONV Status: none  Cardiovascular status: acceptable  Respiratory status: acceptable  Hydration status: acceptable

## 2021-07-19 NOTE — OP NOTE
ESOPHAGOGASTRODUODENOSCOPY PROCEDURE REPORT    Damaso Leonard  7/19/2021    GASTROENTEROLOGIST:  Anthony Garcia MD    PRE-PROCEDURE DIAGNOSIS/INDICATION:  History of Thomas's esophagus  Medically refractory GERD  Dysphagia    POST-PROCEDURE DIAGNOSIS/ FINDINGS:  1.-Suspected Thomas's esophagus and distal 1.5 cm in the esophagus.  Biopsies pending for confirmation.  2.-Status post empirical esophageal dilation to 20 mm with a through-the-scope balloon across upper and lower esophageal sphincters, proximal and distal esophagus  3.-Small sliding hiatal hernia  4.-Normal stomach  5.-Normal duodenum      ANESTHESIA:  Propofol administered by anesthesia.  See anesthesia notes for ASA classification      OPERATIVE PROCEDURE:  After proper informed consent was obtained, patient was transferred to the OR/endoscopy suite.  Patient was then placed in left lateral decubitus position. The Olympus 180 series video gastroscope was inserted orally under direct visualization.  Esophagus, stomach, and duodenum were inspected.  The endoscope was passed to the third portion of the duodenum.  Scope was retroflexed for visualization of the cardia and incisuraThe endoscope was then withdrawn. Patient tolerated the procedure well. There were no immediate complications.    ESTIMATED BLOOD LOSS:  None    SPECIMENS:  Distal esophageal biopsies pending to rule out Thomas's esophagus               COMPLICATIONS;  None    RECOMMENDATIONS/ PLAN:  1.-Increase Protonix/pantoprazole to 40 mg p.o. twice daily  2.-GI clinic follow-up  3.-Await pathology report to determine need for surveillance    Anthony Garcia MD     07/19/21 10:20 EDT

## 2021-07-19 NOTE — H&P
Chief complaint  Thomas's esophagus, dysphagia, acid reflux    Subjective     Patient is a 60 y.o. male who presents today for an EGD.   The patient had an EGD/colonoscopy last year which reported Thomas's esophagus and diverticulosis.  He reports swallowing problems with medicine and dry foods.  He also states that he still has severe acid reflux even with Pepcid 40 mg PO daily and Protonix 40 mg PO daily.  He denies BRBPR and black stools.  He takes docusate sodium for constipation.  Family history is negative for GI disease.  Medical, surgical, and social histories were reviewed and are listed below.      Review of Systems  Review of Systems - General ROS: negative for - weight loss  Psychological ROS: negative for - behavioral disorder  Ophthalmic ROS: negative for - dry eyes  ENT ROS: negative for - vertigo or vocal changes  Hematological and Lymphatic ROS: negative for - jaundice or swollen lymph nodes  Respiratory ROS: negative for - sputum changes or stridor  Cardiovascular ROS: negative for - irregular heartbeat or murmur  Gastrointestinal ROS: positive for-dysphagia, constipation, acid reflux; negative for - blood in stools or change in stools  Genito-Urinary ROS: negative for - hematuria or incontinence  Musculoskeletal ROS: negative for - gait disturbance      History  Past Medical History:   Diagnosis Date   • Anxiety    • Anxiety and depression    • Arthritis    • ASCVD (arteriosclerotic cardiovascular disease)    • CHF (congestive heart failure) (CMS/[facility])    • Colitis    • Coronary artery disease    • Disease of thyroid gland    • DM (diabetes mellitus) (CMS/HCC)    • Elevated cholesterol    • GERD (gastroesophageal reflux disease)    • History of EKG 04/15/2015    NORMAL   • History of transfusion    • HTN (hypertension)    • Hyperlipidemia    • Injury of back    • Low back pain    • Myocardial infarction (CMS/HCC)     about 13 years ago   • Requires supplemental oxygen     USES 2 L AT NIGHT    • Sleep apnea     wears oxygen at night    • Wears glasses    • Wears partial dentures      Past Surgical History:   Procedure Laterality Date   • CARDIAC CATHETERIZATION      reports 4 cardiac stents   • CARDIAC CATHETERIZATION N/A 12/11/2020    Procedure: Coronary angiography;  Surgeon: Noble Thompson MD;  Location: [facility] CATH INVASIVE LOCATION;  Service: Cardiology;  Laterality: N/A;   • CARDIAC SURGERY      stenting   • CHOLECYSTECTOMY     • COLONOSCOPY  2007   • COLONOSCOPY N/A 5/30/2020    Procedure: COLONOSCOPY CPT CODE: 36891;  Surgeon: Anthony Garcia MD;  Location: [facility] OR;  Service: Gastroenterology;  Laterality: N/A;   • ENDOSCOPY N/A 5/30/2020    Procedure: ESOPHAGOGASTRODUODENOSCOPY WITH BIOPSY CPT CODE: 54986;  Surgeon: Anthony Garcia MD;  Location: [facility] OR;  Service: Gastroenterology;  Laterality: N/A;   • HERNIA REPAIR     • SHOULDER SURGERY     • TONSILLECTOMY     • TOTAL KNEE ARTHROPLASTY Right 1/27/2020    Procedure: TOTAL KNEE ARTHROPLASTY RIGHT;  Surgeon: Ortiz Barba MD;  Location: [facility] OR;  Service: Orthopedics   • TYMPANOPLASTY     • UPPER GASTROINTESTINAL ENDOSCOPY     • VASECTOMY       Family History   Problem Relation Age of Onset   • Heart attack Father    • Heart disease Father    • Hypertension Father    • Heart attack Sister    • Diabetes Sister    • Heart disease Sister    • Hypertension Sister    • Thyroid disease Sister    • Heart attack Brother    • Diabetes Brother    • Thyroid disease Brother    • Arthritis Daughter    • COPD Daughter    • Asthma Daughter    • Depression Daughter    • Heart disease Sister    • Hypertension Sister      Social History     Tobacco Use   • Smoking status: Never Smoker   • Smokeless tobacco: Never Used   Substance Use Topics   • Alcohol use: No   • Drug use: No     Medications Prior to Admission   Medication Sig Dispense Refill Last Dose   • amitriptyline (ELAVIL) 50 MG tablet Take 2 tablets by mouth  At Night As Needed for Sleep. 180 tablet 1    • aspirin (SB Low Dose ASA EC) 81 MG EC tablet Take 1 tablet by mouth Daily. 90 tablet 3    • carvedilol (COREG) 25 MG tablet Take 1 tablet by mouth 2 (Two) Times a Day With Meals. 180 tablet 3    • citalopram (CeleXA) 40 MG tablet Take 1 tablet by mouth Daily. 90 tablet 5    • clonazePAM (KlonoPIN) 0.5 MG tablet TAKE 1 TABLET BY MOUTH THREE TIMES DAILY AS NEEDED FOR ANXIETY 90 tablet 0    • clopidogrel (PLAVIX) 75 MG tablet Take 1 tablet by mouth Daily. 30 tablet 11    • cyanocobalamin 1000 MCG/ML injection Inject 1 mL into the appropriate muscle as directed by prescriber Every 30 (Thirty) Days. 1 mL 5    • dicyclomine (BENTYL) 10 MG capsule Take 1 capsule by mouth 4 (Four) Times a Day Before Meals & at Bedtime. 120 capsule 5    • diphenhydrAMINE (Benadryl Allergy) 25 mg capsule Take 1 capsule by mouth Every 6 (Six) Hours As Needed for Itching. 90 capsule 3    • docusate sodium 100 MG capsule Take 1 capsule by mouth 2 (Two) Times a Day As Needed (constipation). 60 each 5    • exenatide er (Bydureon) 2 MG pen-injector injection Inject 1 pen under the skin into the appropriate area as directed 1 (One) Time Per Week. 4 each 5    • famotidine (PEPCID) 40 MG tablet Take 40 mg by mouth every night at bedtime.      • finasteride (PROSCAR) 5 MG tablet Take 1 tablet by mouth Daily. 90 tablet 3    • glucose blood test strip Check blood sugar 3x times a day. 100 each 12    • glucose monitor monitoring kit 1 each 3 (Three) Times a Day As Needed (diabetes). 1 each 0    • icosapent ethyl (Vascepa) 1 g capsule capsule 2 twice daily 120 capsule 5    • isosorbide mononitrate (IMDUR) 60 MG 24 hr tablet Take 1 tablet by mouth Daily. 90 tablet 3    • Lancets (ONETOUCH ULTRASOFT) lancets Check blood sugar 3 times daily 100 each 12    • metFORMIN (GLUCOPHAGE) 500 MG tablet Take 1 tablet by mouth 2 (Two) Times a Day With Meals. 180 tablet 3    • naloxone (NARCAN) 4 MG/0.1ML nasal spray 1  spray into the nostril(s) as directed by provider As Needed (overdose). 1 each 0    • nitroglycerin (NITROSTAT) 0.4 MG SL tablet 1 under the tongue as needed for angina, may repeat q5mins for up three doses 50 tablet 1    • O2 (OXYGEN) Inhale 2 L/min Every Night.      • ondansetron (ZOFRAN) 8 MG tablet Take 1 tablet by mouth Every 8 (Eight) Hours As Needed for Nausea. 20 tablet 2    • pantoprazole (PROTONIX) 40 MG EC tablet Take 1 tablet by mouth Daily. 90 tablet 3    • polyethylene glycol (MIRALAX) 17 g packet Take 17 g by mouth Daily. 1 each 5    • ranolazine (RANEXA) 500 MG 12 hr tablet Take 1 tablet by mouth Every 12 (Twelve) Hours. 60 tablet 5    • simvastatin (ZOCOR) 40 MG tablet Take 40 mg by mouth Every Night.      • Thyroid (NP THYROID) 30 MG PO tablet Take 1 tablet by mouth Daily. 30 tablet 5    • traMADol (ULTRAM) 50 MG tablet Take 1 tablet by mouth Every 8 (Eight) Hours As Needed for Moderate Pain  or Severe Pain . 60 tablet 3    • vitamin D (ERGOCALCIFEROL) 1.25 MG (32035 UT) capsule capsule Take 1 capsule by mouth Every 7 (Seven) Days. 12 capsule 5      Allergies:  Patient has no known allergies.    Objective     Vital Signs       Physical Exam  General:  This is a WD pleasant male in no acute distress  Vital signs stable, afebrile  HEENT exam:  WNL. Sclera are anicteric.  EOMI  Neck:  supple, FROM.  No JVD.  Trachea midline  Lungs:  Respiratory effort normal. Auscultation: Clear, without wheezes, rhonchi, rales  Heart:  Regular rate and rhythm, without murmur, gallop, rub.  No pedal edema  Abdomen:  Significant tenderness in epigastric and just above umbilicus; no palpable masses;  Bowel sounds normal  Musculoskeletal:  muscle strength/tone is normal.    Psyc:  alert, oriented x 3.  Mood and affect are appropriate  skin:  Warm with good turgor.  Without rash or lesion  extremities:  Examination of the extremities revealed no cyanosis, clubbing or edema.    Results Review:                     Invalid  input(s): PROTCrCl cannot be calculated (Patient's most recent lab result is older than the maximum 30 days allowed.).  No results found for: AMMONIA      No results found for: BLOODCX  No results found for: URINECX  No results found for: WOUNDCX  No results found for: STOOLCX    Imaging:  Imaging Results (Last 24 Hours)     ** No results found for the last 24 hours. **                Impression:  Patient Active Problem List   Diagnosis Code   • Generalized anxiety disorder F41.1   • Malignant hypertension with heart disease, without congestive heart failure I11.9   • Major depressive disorder in partial remission (CMS/[facility]) F32.4   • Type 2 diabetes mellitus with diabetic peripheral angiopathy without gangrene, without long-term current use of insulin (CMS/[facility]) E11.51   • Coronary artery disease involving native coronary artery of native heart I25.10   • Sleep apnea G47.30   • Obstructive sleep apnea G47.33   • Osteoarthritis involving multiple joints on both sides of body M15.9   • Vitamin D deficiency E55.9   • Vitamin B 12 deficiency E53.8   • Hypercholesterolemia E78.00   • Anginal pain (CMS/[facility]) I20.9   • Lumbar back pain with radiculopathy affecting left lower extremity M54.16   • Vitamin D deficiency disease E55.9   • High risk medication use Z79.899   • Gastroesophageal reflux disease without esophagitis K21.9   • Seasonal allergic rhinitis due to pollen J30.1   • Benign non-nodular prostatic hyperplasia without lower urinary tract symptoms N40.0   • Essential hypertension I10   • Chronic pain of both knees M25.561, M25.562, G89.29   • Colitis K52.9   • Right hand weakness R29.898   • ED (erectile dysfunction) of organic origin N52.9   • Class 1 obesity due to excess calories with serious comorbidity and body mass index (BMI) of 30.0 to 30.9 in adult E66.09, Z68.30   • Primary osteoarthritis of both knees M17.0   • Chronic combined systolic and diastolic heart failure (CMS/HCC) I50.42   • Status post total  right knee replacement Z96.651   • DM (diabetes mellitus) (CMS/HCC) E11.9   • Hypothyroid E03.9   • On home O2 Z99.81   • Leukocytosis, likely reactive D72.829   • Periumbilical abdominal pain R10.33   • Change in bowel habits R19.4   • Nausea R11.0   • Gastroesophageal reflux disease K21.9   • Unstable angina (CMS/HCC) I20.0   • Preoperative clearance Z01.818   • Constipation K59.00   • Abnormal findings on esophagogastroduodenoscopy (EGD) R19.8       Assessment:  1.  Dysphagia  2.  Uncontrolled acid reflux  3.  Hx Thomas's esophagus  4.  Epigastric pain  5.  Hx diverticulosis  6.  Constipation    Plan:  The patient will undergo an EGD.  Procedure was explained to the patient who voiced understanding and agreement.    ANNMARIE Okeefe  07/19/21  09:05 EDT

## 2021-07-21 LAB — LAB AP CASE REPORT: NORMAL

## 2021-08-09 RX ORDER — SIMVASTATIN 40 MG
TABLET ORAL
Qty: 90 TABLET | Refills: 0 | Status: SHIPPED | OUTPATIENT
Start: 2021-08-09

## 2021-09-20 DIAGNOSIS — F41.1 GENERALIZED ANXIETY DISORDER: ICD-10-CM

## 2021-09-20 RX ORDER — ONDANSETRON HYDROCHLORIDE 8 MG/1
TABLET, FILM COATED ORAL
Qty: 20 TABLET | Refills: 0 | Status: SHIPPED | OUTPATIENT
Start: 2021-09-20 | End: 2022-01-18

## 2021-10-12 ENCOUNTER — OFFICE VISIT (OUTPATIENT)
Dept: CARDIOLOGY | Facility: CLINIC | Age: 61
End: 2021-10-12

## 2021-10-12 VITALS
HEART RATE: 97 BPM | HEIGHT: 70 IN | SYSTOLIC BLOOD PRESSURE: 118 MMHG | BODY MASS INDEX: 30.06 KG/M2 | DIASTOLIC BLOOD PRESSURE: 76 MMHG | OXYGEN SATURATION: 98 % | WEIGHT: 210 LBS

## 2021-10-12 DIAGNOSIS — E78.00 HYPERCHOLESTEROLEMIA: ICD-10-CM

## 2021-10-12 DIAGNOSIS — I25.10 CORONARY ARTERY DISEASE INVOLVING NATIVE CORONARY ARTERY OF NATIVE HEART WITHOUT ANGINA PECTORIS: ICD-10-CM

## 2021-10-12 DIAGNOSIS — I50.42 CHRONIC COMBINED SYSTOLIC AND DIASTOLIC HEART FAILURE (HCC): ICD-10-CM

## 2021-10-12 DIAGNOSIS — I10 ESSENTIAL HYPERTENSION: Primary | ICD-10-CM

## 2021-10-12 PROCEDURE — 99214 OFFICE O/P EST MOD 30 MIN: CPT | Performed by: INTERNAL MEDICINE

## 2021-10-27 ENCOUNTER — OFFICE VISIT (OUTPATIENT)
Dept: GASTROENTEROLOGY | Facility: CLINIC | Age: 61
End: 2021-10-27

## 2021-10-27 VITALS
SYSTOLIC BLOOD PRESSURE: 112 MMHG | WEIGHT: 214.2 LBS | BODY MASS INDEX: 30.67 KG/M2 | HEART RATE: 83 BPM | OXYGEN SATURATION: 97 % | HEIGHT: 70 IN | DIASTOLIC BLOOD PRESSURE: 79 MMHG

## 2021-10-27 DIAGNOSIS — R12 HEARTBURN: ICD-10-CM

## 2021-10-27 DIAGNOSIS — K59.04 CHRONIC IDIOPATHIC CONSTIPATION: ICD-10-CM

## 2021-10-27 DIAGNOSIS — R10.84 GENERALIZED ABDOMINAL PAIN: Primary | ICD-10-CM

## 2021-10-27 DIAGNOSIS — K22.70 BARRETT'S ESOPHAGUS WITHOUT DYSPLASIA: ICD-10-CM

## 2021-10-27 DIAGNOSIS — K21.00 GASTROESOPHAGEAL REFLUX DISEASE WITH ESOPHAGITIS WITHOUT HEMORRHAGE: ICD-10-CM

## 2021-10-27 DIAGNOSIS — Z87.19 HISTORY OF DIVERTICULOSIS: ICD-10-CM

## 2021-10-27 PROCEDURE — 99214 OFFICE O/P EST MOD 30 MIN: CPT | Performed by: PHYSICIAN ASSISTANT

## 2021-10-27 RX ORDER — DEXLANSOPRAZOLE 60 MG/1
60 CAPSULE, DELAYED RELEASE ORAL DAILY
Qty: 30 CAPSULE | Refills: 0 | Status: SHIPPED | OUTPATIENT
Start: 2021-10-27 | End: 2021-12-03

## 2021-10-27 NOTE — PROGRESS NOTES
Chief Complaint   Patient presents with   • Abdominal Pain   • Constipation   • Nausea   • Heartburn       Damaso Leonard is a 61 y.o. male who presents to the office today for follow up appointment for Abdominal Pain, Constipation, Nausea, and Heartburn  .    HPI  The patient was seen for a follow up visit.  He had an EGD/colonoscopy in July when his esophagus was dilated to 20 mm.  Thomas's was also reported as likely on pathology report.  He now takes Protonix 40 mg BID and his reflux is still not controlled.  Patient denies swallowing problems since dilatation.  He does have abdominal pain that is generalized and happening all of them time.  He has constipation and takes docusate sodium BID with no relief.  He often goes one week between bowel movements.   He has anal discomfort during bowel movements due to hard consistency of stool.          Review of Systems   Constitutional: Positive for chills, fatigue and unexpected weight change. Negative for appetite change and fever.   HENT: Negative for trouble swallowing.    Eyes: Negative.    Respiratory: Negative for cough, choking, chest tightness and shortness of breath.    Cardiovascular: Negative for chest pain.   Gastrointestinal: Positive for abdominal distention, abdominal pain, constipation, nausea and rectal pain. Negative for anal bleeding, blood in stool, diarrhea and vomiting.   Endocrine: Negative.    Genitourinary: Negative for difficulty urinating.   Musculoskeletal: Positive for back pain. Negative for neck pain.   Skin: Negative.    Neurological: Positive for light-headedness. Negative for dizziness and headaches.   Hematological: Does not bruise/bleed easily.   Psychiatric/Behavioral: Negative.        ACTIVE PROBLEMS:   Specialty Problems        Gastroenterology Problems    Gastroesophageal reflux disease without esophagitis        Colitis        Gastroesophageal reflux disease              PAST MEDICAL HISTORY:  Past Medical History:    Diagnosis Date   • Anxiety    • Anxiety and depression    • Arthritis    • ASCVD (arteriosclerotic cardiovascular disease)    • CHF (congestive heart failure) ([facility])    • Colitis    • Coronary artery disease    • Disease of thyroid gland    • DM (diabetes mellitus) (HCC)    • Elevated cholesterol    • GERD (gastroesophageal reflux disease)    • History of EKG 04/15/2015    NORMAL   • History of transfusion    • HTN (hypertension)    • Hyperlipidemia    • Injury of back    • Low back pain    • Myocardial infarction (HCC)     about 13 years ago   • Requires supplemental oxygen     USES 2 L AT NIGHT   • Sleep apnea     wears oxygen at night    • Wears glasses    • Wears partial dentures        SURGICAL HISTORY:  Past Surgical History:   Procedure Laterality Date   • CARDIAC CATHETERIZATION      reports 4 cardiac stents   • CARDIAC CATHETERIZATION N/A 12/11/2020    Procedure: Coronary angiography;  Surgeon: Noble Thompson MD;  Location: [facility] CATH INVASIVE LOCATION;  Service: Cardiology;  Laterality: N/A;   • CARDIAC SURGERY      stenting   • CHOLECYSTECTOMY     • COLONOSCOPY  2007   • COLONOSCOPY N/A 5/30/2020    Procedure: COLONOSCOPY CPT CODE: 60450;  Surgeon: Anthony Garcia MD;  Location: [facility] OR;  Service: Gastroenterology;  Laterality: N/A;   • ENDOSCOPY N/A 5/30/2020    Procedure: ESOPHAGOGASTRODUODENOSCOPY WITH BIOPSY CPT CODE: 40476;  Surgeon: Anthony Garcia MD;  Location: [facility] OR;  Service: Gastroenterology;  Laterality: N/A;   • ENDOSCOPY N/A 7/19/2021    Procedure: ESOPHAGOGASTRODUODENOSCOPY WITH BIOPSY;  Surgeon: Anthony Garcia MD;  Location: [facility] OR;  Service: Gastroenterology;  Laterality: N/A;  dilated with balloon dilator to 20mm   • HERNIA REPAIR     • SHOULDER SURGERY     • TONSILLECTOMY     • TOTAL KNEE ARTHROPLASTY Right 1/27/2020    Procedure: TOTAL KNEE ARTHROPLASTY RIGHT;  Surgeon: Ortiz Barba MD;  Location: [facility] OR;  Service:  Orthopedics   • TYMPANOPLASTY     • UPPER GASTROINTESTINAL ENDOSCOPY     • VASECTOMY         FAMILY HISTORY:  Family History   Problem Relation Age of Onset   • Heart attack Father    • Heart disease Father    • Hypertension Father    • Heart attack Sister    • Diabetes Sister    • Heart disease Sister    • Hypertension Sister    • Thyroid disease Sister    • Heart attack Brother    • Diabetes Brother    • Thyroid disease Brother    • Arthritis Daughter    • COPD Daughter    • Asthma Daughter    • Depression Daughter    • Heart disease Sister    • Hypertension Sister        SOCIAL HISTORY:  Social History     Tobacco Use   • Smoking status: Never Smoker   • Smokeless tobacco: Never Used   Substance Use Topics   • Alcohol use: No       CURRENT MEDICATION:    Current Outpatient Medications:   •  amitriptyline (ELAVIL) 50 MG tablet, Take 2 tablets by mouth At Night As Needed for Sleep., Disp: 180 tablet, Rfl: 1  •  aspirin (SB Low Dose ASA EC) 81 MG EC tablet, Take 1 tablet by mouth Daily., Disp: 90 tablet, Rfl: 3  •  carvedilol (COREG) 25 MG tablet, Take 1 tablet by mouth 2 (Two) Times a Day With Meals., Disp: 180 tablet, Rfl: 3  •  citalopram (CeleXA) 40 MG tablet, Take 1 tablet by mouth Daily., Disp: 90 tablet, Rfl: 5  •  clonazePAM (KlonoPIN) 0.5 MG tablet, TAKE 1 TABLET BY MOUTH THREE TIMES DAILY AS NEEDED FOR ANXIETY, Disp: 90 tablet, Rfl: 0  •  clopidogrel (PLAVIX) 75 MG tablet, Take 1 tablet by mouth Daily., Disp: 30 tablet, Rfl: 11  •  cyanocobalamin 1000 MCG/ML injection, Inject 1 mL into the appropriate muscle as directed by prescriber Every 30 (Thirty) Days., Disp: 1 mL, Rfl: 5  •  dicyclomine (BENTYL) 10 MG capsule, Take 1 capsule by mouth 4 (Four) Times a Day Before Meals & at Bedtime., Disp: 120 capsule, Rfl: 5  •  diphenhydrAMINE (Benadryl Allergy) 25 mg capsule, Take 1 capsule by mouth Every 6 (Six) Hours As Needed for Itching., Disp: 90 capsule, Rfl: 3  •  docusate sodium 100 MG capsule, Take 1  capsule by mouth 2 (Two) Times a Day As Needed (constipation)., Disp: 60 each, Rfl: 5  •  exenatide er (Bydureon) 2 MG pen-injector injection, Inject 1 pen under the skin into the appropriate area as directed 1 (One) Time Per Week., Disp: 4 each, Rfl: 5  •  famotidine (PEPCID) 40 MG tablet, Take 40 mg by mouth every night at bedtime., Disp: , Rfl:   •  finasteride (PROSCAR) 5 MG tablet, Take 1 tablet by mouth Daily., Disp: 90 tablet, Rfl: 3  •  glucose blood test strip, Check blood sugar 3x times a day., Disp: 100 each, Rfl: 12  •  glucose monitor monitoring kit, 1 each 3 (Three) Times a Day As Needed (diabetes)., Disp: 1 each, Rfl: 0  •  isosorbide mononitrate (IMDUR) 60 MG 24 hr tablet, Take 1 tablet by mouth Daily., Disp: 90 tablet, Rfl: 3  •  Lancets (ONETOUCH ULTRASOFT) lancets, Check blood sugar 3 times daily, Disp: 100 each, Rfl: 12  •  metFORMIN (GLUCOPHAGE) 500 MG tablet, Take 1 tablet by mouth 2 (Two) Times a Day With Meals., Disp: 180 tablet, Rfl: 3  •  naloxone (NARCAN) 4 MG/0.1ML nasal spray, 1 spray into the nostril(s) as directed by provider As Needed (overdose)., Disp: 1 each, Rfl: 0  •  nitroglycerin (NITROSTAT) 0.4 MG SL tablet, 1 under the tongue as needed for angina, may repeat q5mins for up three doses, Disp: 50 tablet, Rfl: 1  •  O2 (OXYGEN), Inhale 2 L/min Every Night., Disp: , Rfl:   •  ondansetron (ZOFRAN) 8 MG tablet, TAKE 1 TABLET BY MOUTH EVERY 8 HOURS AS NEEDED FOR NAUSEA, Disp: 20 tablet, Rfl: 0  •  polyethylene glycol (MIRALAX) 17 g packet, Take 17 g by mouth Daily., Disp: 1 each, Rfl: 5  •  simvastatin (ZOCOR) 40 MG tablet, TAKE 1 TABLET BY MOUTH ONCE DAILY AT NIGHT, Disp: 90 tablet, Rfl: 0  •  Thyroid (NP THYROID) 30 MG PO tablet, Take 1 tablet by mouth Daily., Disp: 30 tablet, Rfl: 5  •  traMADol (ULTRAM) 50 MG tablet, Take 1 tablet by mouth Every 8 (Eight) Hours As Needed for Moderate Pain  or Severe Pain ., Disp: 60 tablet, Rfl: 3  •  vitamin D (ERGOCALCIFEROL) 1.25 MG (58383 UT)  "capsule capsule, Take 1 capsule by mouth Every 7 (Seven) Days., Disp: 12 capsule, Rfl: 5  •  dexlansoprazole (Dexilant) 60 MG capsule, Take 1 capsule by mouth Daily for 30 days., Disp: 30 capsule, Rfl: 0    ALLERGIES:  Patient has no known allergies.    VISIT VITALS:  Blood Pressure 112/79   Pulse 83   Height 177.8 cm (70\")   Weight 97.2 kg (214 lb 3.2 oz)   Oxygen Saturation 97%   Body Mass Index 30.73 kg/m²     Physical Exam  Constitutional:       General: He is not in acute distress.     Appearance: He is well-developed. He is not diaphoretic.   HENT:      Head: Normocephalic and atraumatic.      Right Ear: External ear normal.      Left Ear: External ear normal.      Nose: Nose normal.      Mouth/Throat:      Pharynx: No oropharyngeal exudate.   Eyes:      General: No scleral icterus.        Right eye: No discharge.         Left eye: No discharge.      Conjunctiva/sclera: Conjunctivae normal.      Pupils: Pupils are equal, round, and reactive to light.   Neck:      Thyroid: No thyromegaly.      Vascular: No JVD.      Trachea: No tracheal deviation.   Cardiovascular:      Rate and Rhythm: Normal rate and regular rhythm.      Heart sounds: Normal heart sounds. No murmur heard.  No friction rub. No gallop.    Pulmonary:      Effort: Pulmonary effort is normal. No respiratory distress.      Breath sounds: Normal breath sounds. No stridor. No wheezing or rales.   Chest:      Chest wall: No tenderness.   Abdominal:      General: Bowel sounds are normal. There is distension.      Palpations: Abdomen is soft. There is no mass.      Tenderness: There is abdominal tenderness (epigastric and lower abdomen). There is no guarding or rebound.      Hernia: No hernia is present.   Genitourinary:     Rectum: Guaiac result negative.   Musculoskeletal:      Cervical back: Normal range of motion and neck supple.   Lymphadenopathy:      Cervical: No cervical adenopathy.   Skin:     General: Skin is warm and dry.      " Coloration: Skin is not pale.      Findings: No erythema or rash.   Neurological:      Mental Status: He is alert and oriented to person, place, and time.      Cranial Nerves: No cranial nerve deficit.      Motor: No abnormal muscle tone.      Coordination: Coordination normal.      Deep Tendon Reflexes: Reflexes are normal and symmetric. Reflexes normal.   Psychiatric:         Behavior: Behavior normal.         Thought Content: Thought content normal.         Judgment: Judgment normal.         Assessment/Plan      Diagnosis Plan   1. Generalized abdominal pain     2. Chronic idiopathic constipation     3. Heartburn     4. Gastroesophageal reflux disease with esophagitis without hemorrhage     5. Thomas's esophagus without dysplasia     6. History of diverticulosis       The patient will be started on Dexilant 60 mg for his uncontrolled acid reflux and Thomas's esophagus.  He will also be started daily Miralax 17 gm daily for his constipation and stop the docusate.  He was educated on diverticulosis and how to control it.  He was also counseled to increase his water intake that is currently two bottles of water per day.  He is not drinking much caffeine now due to his abdominal pain.  We discussed a possible NISSEN fundoplication.  He will try Dexilant first and then revisit our discussion about the surgery consult in 4 weeks.  Return in about 4 weeks (around 11/24/2021) for Recheck.         Patient's Body mass index is 30.73 kg/m². indicating that he is obese (BMI >30). Obesity-related health conditions include the following: hypertension and GERD. Obesity is unchanged. BMI is is above average; BMI management plan is completed. We discussed portion control and increasing exercise..      ANNMARIE Okeefe

## 2021-11-09 DIAGNOSIS — E11.59 TYPE 2 DIABETES MELLITUS WITH OTHER CIRCULATORY COMPLICATION, WITHOUT LONG-TERM CURRENT USE OF INSULIN (HCC): ICD-10-CM

## 2021-11-09 RX ORDER — SIMVASTATIN 40 MG
TABLET ORAL
Qty: 90 TABLET | Refills: 0 | OUTPATIENT
Start: 2021-11-09

## 2021-11-10 RX ORDER — CLOPIDOGREL BISULFATE 75 MG/1
75 TABLET ORAL DAILY
Qty: 90 TABLET | Refills: 3 | Status: SHIPPED | OUTPATIENT
Start: 2021-11-10 | End: 2023-01-05 | Stop reason: SDUPTHER

## 2021-11-15 RX ORDER — SIMVASTATIN 40 MG
TABLET ORAL
Qty: 90 TABLET | Refills: 0 | OUTPATIENT
Start: 2021-11-15

## 2021-12-01 ENCOUNTER — OFFICE VISIT (OUTPATIENT)
Dept: GASTROENTEROLOGY | Facility: CLINIC | Age: 61
End: 2021-12-01

## 2021-12-01 VITALS
HEART RATE: 78 BPM | HEIGHT: 70 IN | BODY MASS INDEX: 30.64 KG/M2 | SYSTOLIC BLOOD PRESSURE: 98 MMHG | DIASTOLIC BLOOD PRESSURE: 66 MMHG | WEIGHT: 214 LBS

## 2021-12-01 DIAGNOSIS — K59.04 CHRONIC IDIOPATHIC CONSTIPATION: ICD-10-CM

## 2021-12-01 DIAGNOSIS — K21.9 GASTROESOPHAGEAL REFLUX DISEASE, UNSPECIFIED WHETHER ESOPHAGITIS PRESENT: Primary | ICD-10-CM

## 2021-12-01 PROCEDURE — 99212 OFFICE O/P EST SF 10 MIN: CPT | Performed by: PHYSICIAN ASSISTANT

## 2021-12-01 NOTE — PROGRESS NOTES
Chief Complaint   Patient presents with   • Abdominal Pain   • Nausea       Damaso Leonard is a 61 y.o. male who presents to the office today for follow up appointment for Abdominal Pain and Nausea  .    HPI    The patient was seen for a follow up visit.  He started Dexilant 60mg daily for his acid reflux and states that it is working better than previous medications.  He only had 4 mornings out of the past month when he woke up with acid reflux symptoms.  He also started Miralax for his constipation and is taking them PRN.   He reports that his bowels are dong good with this.       Review of Systems   Constitutional: Positive for chills, fatigue and unexpected weight change. Negative for appetite change and fever.   HENT: Negative for trouble swallowing.    Eyes: Negative.    Respiratory: Negative for cough, choking, chest tightness and shortness of breath.    Cardiovascular: Negative for chest pain.   Gastrointestinal: Positive for abdominal distention, abdominal pain, constipation, nausea and rectal pain. Negative for anal bleeding, blood in stool, diarrhea and vomiting.   Endocrine: Negative.    Genitourinary: Negative for difficulty urinating.   Musculoskeletal: Positive for back pain. Negative for neck pain.   Skin: Negative.    Neurological: Positive for light-headedness. Negative for dizziness and headaches.   Hematological: Does not bruise/bleed easily.   Psychiatric/Behavioral: Negative.        ACTIVE PROBLEMS:   Specialty Problems        Gastroenterology Problems    Gastroesophageal reflux disease without esophagitis        Colitis        Gastroesophageal reflux disease              PAST MEDICAL HISTORY:  Past Medical History:   Diagnosis Date   • Anxiety    • Anxiety and depression    • Arthritis    • ASCVD (arteriosclerotic cardiovascular disease)    • CHF (congestive heart failure) ([facility])    • Colitis    • Coronary artery disease    • Disease of thyroid gland    • DM (diabetes mellitus) ([facility])    •  Elevated cholesterol    • GERD (gastroesophageal reflux disease)    • History of EKG 04/15/2015    NORMAL   • History of transfusion    • HTN (hypertension)    • Hyperlipidemia    • Injury of back    • Low back pain    • Myocardial infarction (HCC)     about 13 years ago   • Requires supplemental oxygen     USES 2 L AT NIGHT   • Sleep apnea     wears oxygen at night    • Wears glasses    • Wears partial dentures        SURGICAL HISTORY:  Past Surgical History:   Procedure Laterality Date   • CARDIAC CATHETERIZATION      reports 4 cardiac stents   • CARDIAC CATHETERIZATION N/A 12/11/2020    Procedure: Coronary angiography;  Surgeon: Noble Thompson MD;  Location: [facility] CATH INVASIVE LOCATION;  Service: Cardiology;  Laterality: N/A;   • CARDIAC SURGERY      stenting   • CHOLECYSTECTOMY     • COLONOSCOPY  2007   • COLONOSCOPY N/A 5/30/2020    Procedure: COLONOSCOPY CPT CODE: 15044;  Surgeon: Anthony Garcia MD;  Location: [facility] OR;  Service: Gastroenterology;  Laterality: N/A;   • ENDOSCOPY N/A 5/30/2020    Procedure: ESOPHAGOGASTRODUODENOSCOPY WITH BIOPSY CPT CODE: 64195;  Surgeon: Anthony Garcia MD;  Location: [facility] OR;  Service: Gastroenterology;  Laterality: N/A;   • ENDOSCOPY N/A 7/19/2021    Procedure: ESOPHAGOGASTRODUODENOSCOPY WITH BIOPSY;  Surgeon: Anthony Garcia MD;  Location: [facility] OR;  Service: Gastroenterology;  Laterality: N/A;  dilated with balloon dilator to 20mm   • HERNIA REPAIR     • SHOULDER SURGERY     • TONSILLECTOMY     • TOTAL KNEE ARTHROPLASTY Right 1/27/2020    Procedure: TOTAL KNEE ARTHROPLASTY RIGHT;  Surgeon: Ortiz Barba MD;  Location: [facility] OR;  Service: Orthopedics   • TYMPANOPLASTY     • UPPER GASTROINTESTINAL ENDOSCOPY     • VASECTOMY         FAMILY HISTORY:  Family History   Problem Relation Age of Onset   • Heart attack Father    • Heart disease Father    • Hypertension Father    • Heart attack Sister    • Diabetes Sister     • Heart disease Sister    • Hypertension Sister    • Thyroid disease Sister    • Heart attack Brother    • Diabetes Brother    • Thyroid disease Brother    • Arthritis Daughter    • COPD Daughter    • Asthma Daughter    • Depression Daughter    • Heart disease Sister    • Hypertension Sister        SOCIAL HISTORY:  Social History     Tobacco Use   • Smoking status: Never Smoker   • Smokeless tobacco: Never Used   Substance Use Topics   • Alcohol use: No       CURRENT MEDICATION:    Current Outpatient Medications:   •  amitriptyline (ELAVIL) 50 MG tablet, Take 2 tablets by mouth At Night As Needed for Sleep., Disp: 180 tablet, Rfl: 1  •  aspirin (SB Low Dose ASA EC) 81 MG EC tablet, Take 1 tablet by mouth Daily., Disp: 90 tablet, Rfl: 3  •  carvedilol (COREG) 25 MG tablet, Take 1 tablet by mouth 2 (Two) Times a Day With Meals., Disp: 180 tablet, Rfl: 3  •  citalopram (CeleXA) 40 MG tablet, Take 1 tablet by mouth Daily., Disp: 90 tablet, Rfl: 5  •  clonazePAM (KlonoPIN) 0.5 MG tablet, TAKE 1 TABLET BY MOUTH THREE TIMES DAILY AS NEEDED FOR ANXIETY, Disp: 90 tablet, Rfl: 0  •  clopidogrel (PLAVIX) 75 MG tablet, Take 1 tablet by mouth Daily., Disp: 90 tablet, Rfl: 3  •  cyanocobalamin 1000 MCG/ML injection, Inject 1 mL into the appropriate muscle as directed by prescriber Every 30 (Thirty) Days., Disp: 1 mL, Rfl: 5  •  dicyclomine (BENTYL) 10 MG capsule, Take 1 capsule by mouth 4 (Four) Times a Day Before Meals & at Bedtime., Disp: 120 capsule, Rfl: 5  •  diphenhydrAMINE (Benadryl Allergy) 25 mg capsule, Take 1 capsule by mouth Every 6 (Six) Hours As Needed for Itching., Disp: 90 capsule, Rfl: 3  •  docusate sodium 100 MG capsule, Take 1 capsule by mouth 2 (Two) Times a Day As Needed (constipation)., Disp: 60 each, Rfl: 5  •  exenatide er (Bydureon) 2 MG pen-injector injection, Inject 1 pen under the skin into the appropriate area as directed 1 (One) Time Per Week., Disp: 4 each, Rfl: 5  •  famotidine (PEPCID) 40 MG  "tablet, Take 40 mg by mouth every night at bedtime., Disp: , Rfl:   •  finasteride (PROSCAR) 5 MG tablet, Take 1 tablet by mouth Daily., Disp: 90 tablet, Rfl: 3  •  glucose blood test strip, Check blood sugar 3x times a day., Disp: 100 each, Rfl: 12  •  glucose monitor monitoring kit, 1 each 3 (Three) Times a Day As Needed (diabetes)., Disp: 1 each, Rfl: 0  •  isosorbide mononitrate (IMDUR) 60 MG 24 hr tablet, Take 1 tablet by mouth Daily., Disp: 90 tablet, Rfl: 3  •  Lancets (ONETOUCH ULTRASOFT) lancets, Check blood sugar 3 times daily, Disp: 100 each, Rfl: 12  •  metFORMIN (GLUCOPHAGE) 500 MG tablet, Take 1 tablet by mouth 2 (Two) Times a Day With Meals., Disp: 180 tablet, Rfl: 3  •  naloxone (NARCAN) 4 MG/0.1ML nasal spray, 1 spray into the nostril(s) as directed by provider As Needed (overdose)., Disp: 1 each, Rfl: 0  •  nitroglycerin (NITROSTAT) 0.4 MG SL tablet, 1 under the tongue as needed for angina, may repeat q5mins for up three doses, Disp: 50 tablet, Rfl: 1  •  O2 (OXYGEN), Inhale 2 L/min Every Night., Disp: , Rfl:   •  ondansetron (ZOFRAN) 8 MG tablet, TAKE 1 TABLET BY MOUTH EVERY 8 HOURS AS NEEDED FOR NAUSEA, Disp: 20 tablet, Rfl: 0  •  polyethylene glycol (MIRALAX) 17 g packet, Take 17 g by mouth Daily., Disp: 1 each, Rfl: 5  •  simvastatin (ZOCOR) 40 MG tablet, TAKE 1 TABLET BY MOUTH ONCE DAILY AT NIGHT, Disp: 90 tablet, Rfl: 0  •  Thyroid (NP THYROID) 30 MG PO tablet, Take 1 tablet by mouth Daily., Disp: 30 tablet, Rfl: 5  •  traMADol (ULTRAM) 50 MG tablet, Take 1 tablet by mouth Every 8 (Eight) Hours As Needed for Moderate Pain  or Severe Pain ., Disp: 60 tablet, Rfl: 3  •  vitamin D (ERGOCALCIFEROL) 1.25 MG (34673 UT) capsule capsule, Take 1 capsule by mouth Every 7 (Seven) Days., Disp: 12 capsule, Rfl: 5    ALLERGIES:  Patient has no known allergies.    VISIT VITALS:  Blood Pressure 98/66   Pulse 78   Height 177.8 cm (70\")   Weight 97.1 kg (214 lb)   Body Mass Index 30.71 kg/m²     Physical " Exam  Constitutional:       General: He is not in acute distress.     Appearance: He is well-developed. He is not diaphoretic.   HENT:      Head: Normocephalic and atraumatic.      Right Ear: External ear normal.      Left Ear: External ear normal.      Nose: Nose normal.      Mouth/Throat:      Pharynx: No oropharyngeal exudate.   Eyes:      General: No scleral icterus.        Right eye: No discharge.         Left eye: No discharge.      Conjunctiva/sclera: Conjunctivae normal.      Pupils: Pupils are equal, round, and reactive to light.   Neck:      Thyroid: No thyromegaly.      Vascular: No JVD.      Trachea: No tracheal deviation.   Cardiovascular:      Rate and Rhythm: Normal rate and regular rhythm.      Heart sounds: Normal heart sounds. No murmur heard.  No friction rub. No gallop.    Pulmonary:      Effort: Pulmonary effort is normal. No respiratory distress.      Breath sounds: Normal breath sounds. No stridor. No wheezing or rales.   Chest:      Chest wall: No tenderness.   Abdominal:      General: Bowel sounds are normal. There is no distension.      Palpations: Abdomen is soft. There is no mass.      Tenderness: There is no abdominal tenderness. There is no guarding or rebound.      Hernia: No hernia is present.   Genitourinary:     Rectum: Guaiac result negative.   Musculoskeletal:      Cervical back: Normal range of motion and neck supple.   Lymphadenopathy:      Cervical: No cervical adenopathy.   Skin:     General: Skin is warm and dry.      Coloration: Skin is not pale.      Findings: No erythema or rash.   Neurological:      Mental Status: He is alert and oriented to person, place, and time.      Cranial Nerves: No cranial nerve deficit.      Motor: No abnormal muscle tone.      Coordination: Coordination normal.      Deep Tendon Reflexes: Reflexes are normal and symmetric. Reflexes normal.   Psychiatric:         Behavior: Behavior normal.         Thought Content: Thought content normal.          Judgment: Judgment normal.         Assessment/Plan      Diagnosis Plan   1. Gastroesophageal reflux disease, unspecified whether esophagitis present     2. Chronic idiopathic constipation       The patient will continue Dexilant 60 mg for GERD and Miralax for constipation.  We will discuss possible Nissen fundoplication if Dexilant does not continue to work well.  Return in about 8 weeks (around 1/26/2022) for Recheck.         Patient's Body mass index is 30.71 kg/m². indicating that he is obese (BMI >30). Obesity-related health conditions include the following: GERD. Obesity is unchanged. BMI is is above average; BMI management plan is completed. We discussed portion control and increasing exercise..      ANNMARIE Okeefe

## 2021-12-03 RX ORDER — DEXLANSOPRAZOLE 60 MG/1
CAPSULE, DELAYED RELEASE ORAL
Qty: 30 CAPSULE | Refills: 0 | Status: SHIPPED | OUTPATIENT
Start: 2021-12-03 | End: 2021-12-30

## 2021-12-29 DIAGNOSIS — K21.9 GASTROESOPHAGEAL REFLUX DISEASE, UNSPECIFIED WHETHER ESOPHAGITIS PRESENT: Primary | ICD-10-CM

## 2021-12-29 RX ORDER — DEXLANSOPRAZOLE 60 MG/1
CAPSULE, DELAYED RELEASE ORAL
Qty: 30 CAPSULE | Refills: 0 | Status: CANCELLED | OUTPATIENT
Start: 2021-12-29

## 2021-12-30 RX ORDER — DEXLANSOPRAZOLE 60 MG/1
CAPSULE, DELAYED RELEASE ORAL
Qty: 30 CAPSULE | Refills: 5 | Status: ON HOLD | OUTPATIENT
Start: 2021-12-30 | End: 2022-04-13 | Stop reason: SDUPTHER

## 2021-12-31 DIAGNOSIS — K21.9 GASTROESOPHAGEAL REFLUX DISEASE, UNSPECIFIED WHETHER ESOPHAGITIS PRESENT: ICD-10-CM

## 2022-01-03 RX ORDER — DEXLANSOPRAZOLE 60 MG/1
CAPSULE, DELAYED RELEASE ORAL
Qty: 30 CAPSULE | Refills: 0 | OUTPATIENT
Start: 2022-01-03

## 2022-01-17 DIAGNOSIS — F41.1 GENERALIZED ANXIETY DISORDER: ICD-10-CM

## 2022-01-18 RX ORDER — ONDANSETRON HYDROCHLORIDE 8 MG/1
TABLET, FILM COATED ORAL
Qty: 20 TABLET | Refills: 0 | Status: SHIPPED | OUTPATIENT
Start: 2022-01-18

## 2022-01-27 ENCOUNTER — OFFICE VISIT (OUTPATIENT)
Dept: GASTROENTEROLOGY | Facility: CLINIC | Age: 62
End: 2022-01-27

## 2022-01-27 ENCOUNTER — HOSPITAL ENCOUNTER (OUTPATIENT)
Dept: GENERAL RADIOLOGY | Facility: HOSPITAL | Age: 62
Discharge: HOME | End: 2022-01-27
Admitting: PHYSICIAN ASSISTANT

## 2022-01-27 VITALS
HEIGHT: 70 IN | SYSTOLIC BLOOD PRESSURE: 105 MMHG | HEART RATE: 95 BPM | WEIGHT: 215.4 LBS | DIASTOLIC BLOOD PRESSURE: 70 MMHG | BODY MASS INDEX: 30.84 KG/M2

## 2022-01-27 DIAGNOSIS — R14.3 FLATULENCE: ICD-10-CM

## 2022-01-27 DIAGNOSIS — K59.04 CHRONIC IDIOPATHIC CONSTIPATION: ICD-10-CM

## 2022-01-27 DIAGNOSIS — K21.9 GASTROESOPHAGEAL REFLUX DISEASE, UNSPECIFIED WHETHER ESOPHAGITIS PRESENT: Primary | ICD-10-CM

## 2022-01-27 DIAGNOSIS — R10.84 GENERALIZED ABDOMINAL PAIN: ICD-10-CM

## 2022-01-27 DIAGNOSIS — R15.2 FECAL URGENCY: ICD-10-CM

## 2022-01-27 PROCEDURE — 74018 RADEX ABDOMEN 1 VIEW: CPT | Performed by: RADIOLOGY

## 2022-01-27 PROCEDURE — 74018 RADEX ABDOMEN 1 VIEW: CPT

## 2022-01-27 PROCEDURE — 99213 OFFICE O/P EST LOW 20 MIN: CPT | Performed by: PHYSICIAN ASSISTANT

## 2022-01-27 NOTE — PROGRESS NOTES
Chief Complaint   Patient presents with   • Heartburn       Damaso Leonard is a 61 y.o. male who presents to the office today for follow up appointment for Heartburn  .    HPI    The patient continues to have severe heartburn even with Dexilant 60 mg.  He states that he still has some Protonix 40 mg tablets and has been taking them at night.  He states that he has not been using Miralax because he has been having diarrhea several times a day.  He states that after meals he has been having abdominal cramping and fecal urgency.  He has been having bloating and increased flatulence as well.      Review of Systems   Constitutional: Positive for fatigue and unexpected weight change. Negative for appetite change, chills and fever.   HENT: Negative for trouble swallowing.    Eyes: Negative.    Respiratory: Negative for cough, choking, chest tightness and shortness of breath.    Cardiovascular: Negative for chest pain.   Gastrointestinal: Positive for abdominal distention, abdominal pain, diarrhea, nausea and rectal pain. Negative for anal bleeding, blood in stool, constipation and vomiting.   Endocrine: Negative.    Genitourinary: Negative for difficulty urinating.   Musculoskeletal: Positive for back pain. Negative for neck pain.   Skin: Negative.    Neurological: Positive for light-headedness. Negative for dizziness and headaches.   Hematological: Does not bruise/bleed easily.   Psychiatric/Behavioral: Negative.        ACTIVE PROBLEMS:   Specialty Problems        Gastroenterology Problems    Gastroesophageal reflux disease without esophagitis        Colitis        Gastroesophageal reflux disease              PAST MEDICAL HISTORY:  Past Medical History:   Diagnosis Date   • Anxiety    • Anxiety and depression    • Arthritis    • ASCVD (arteriosclerotic cardiovascular disease)    • CHF (congestive heart failure) ([facility])    • Colitis    • Coronary artery disease    • Disease of thyroid gland    • DM (diabetes mellitus)  (HCC)    • Elevated cholesterol    • GERD (gastroesophageal reflux disease)    • History of EKG 04/15/2015    NORMAL   • History of transfusion    • HTN (hypertension)    • Hyperlipidemia    • Injury of back    • Low back pain    • Myocardial infarction (HCC)     about 13 years ago   • Requires supplemental oxygen     USES 2 L AT NIGHT   • Sleep apnea     wears oxygen at night    • Wears glasses    • Wears partial dentures        SURGICAL HISTORY:  Past Surgical History:   Procedure Laterality Date   • CARDIAC CATHETERIZATION      reports 4 cardiac stents   • CARDIAC CATHETERIZATION N/A 12/11/2020    Procedure: Coronary angiography;  Surgeon: Noble Thompson MD;  Location: [facility] CATH INVASIVE LOCATION;  Service: Cardiology;  Laterality: N/A;   • CARDIAC SURGERY      stenting   • CHOLECYSTECTOMY     • COLONOSCOPY  2007   • COLONOSCOPY N/A 5/30/2020    Procedure: COLONOSCOPY CPT CODE: 16633;  Surgeon: Anthony Garcia MD;  Location: [facility] OR;  Service: Gastroenterology;  Laterality: N/A;   • ENDOSCOPY N/A 5/30/2020    Procedure: ESOPHAGOGASTRODUODENOSCOPY WITH BIOPSY CPT CODE: 03001;  Surgeon: Anthony Garcia MD;  Location: [facility];  Service: Gastroenterology;  Laterality: N/A;   • ENDOSCOPY N/A 7/19/2021    Procedure: ESOPHAGOGASTRODUODENOSCOPY WITH BIOPSY;  Surgeon: Anthony Garcia MD;  Location: [facility];  Service: Gastroenterology;  Laterality: N/A;  dilated with balloon dilator to 20mm   • HERNIA REPAIR     • SHOULDER SURGERY     • TONSILLECTOMY     • TOTAL KNEE ARTHROPLASTY Right 1/27/2020    Procedure: TOTAL KNEE ARTHROPLASTY RIGHT;  Surgeon: Ortiz Barba MD;  Location: [facility] OR;  Service: Orthopedics   • TYMPANOPLASTY     • UPPER GASTROINTESTINAL ENDOSCOPY     • VASECTOMY         FAMILY HISTORY:  Family History   Problem Relation Age of Onset   • Heart attack Father    • Heart disease Father    • Hypertension Father    • Heart attack Sister    • Diabetes  Sister    • Heart disease Sister    • Hypertension Sister    • Thyroid disease Sister    • Heart attack Brother    • Diabetes Brother    • Thyroid disease Brother    • Arthritis Daughter    • COPD Daughter    • Asthma Daughter    • Depression Daughter    • Heart disease Sister    • Hypertension Sister        SOCIAL HISTORY:  Social History     Tobacco Use   • Smoking status: Never Smoker   • Smokeless tobacco: Never Used   Substance Use Topics   • Alcohol use: No       CURRENT MEDICATION:    Current Outpatient Medications:   •  amitriptyline (ELAVIL) 50 MG tablet, Take 2 tablets by mouth At Night As Needed for Sleep., Disp: 180 tablet, Rfl: 1  •  aspirin (SB Low Dose ASA EC) 81 MG EC tablet, Take 1 tablet by mouth Daily., Disp: 90 tablet, Rfl: 3  •  carvedilol (COREG) 25 MG tablet, Take 1 tablet by mouth 2 (Two) Times a Day With Meals., Disp: 180 tablet, Rfl: 3  •  citalopram (CeleXA) 40 MG tablet, Take 1 tablet by mouth Daily., Disp: 90 tablet, Rfl: 5  •  clonazePAM (KlonoPIN) 0.5 MG tablet, TAKE 1 TABLET BY MOUTH THREE TIMES DAILY AS NEEDED FOR ANXIETY, Disp: 90 tablet, Rfl: 0  •  clopidogrel (PLAVIX) 75 MG tablet, Take 1 tablet by mouth Daily., Disp: 90 tablet, Rfl: 3  •  cyanocobalamin 1000 MCG/ML injection, Inject 1 mL into the appropriate muscle as directed by prescriber Every 30 (Thirty) Days., Disp: 1 mL, Rfl: 5  •  Dexilant 60 MG capsule, Take 1 capsule by mouth once daily for 30 days, Disp: 30 capsule, Rfl: 5  •  dicyclomine (BENTYL) 10 MG capsule, Take 1 capsule by mouth 4 (Four) Times a Day Before Meals & at Bedtime., Disp: 120 capsule, Rfl: 5  •  diphenhydrAMINE (Benadryl Allergy) 25 mg capsule, Take 1 capsule by mouth Every 6 (Six) Hours As Needed for Itching., Disp: 90 capsule, Rfl: 3  •  docusate sodium 100 MG capsule, Take 1 capsule by mouth 2 (Two) Times a Day As Needed (constipation)., Disp: 60 each, Rfl: 5  •  exenatide er (Bydureon) 2 MG pen-injector injection, Inject 1 pen under the skin into  the appropriate area as directed 1 (One) Time Per Week., Disp: 4 each, Rfl: 5  •  famotidine (PEPCID) 40 MG tablet, Take 40 mg by mouth every night at bedtime., Disp: , Rfl:   •  finasteride (PROSCAR) 5 MG tablet, Take 1 tablet by mouth Daily., Disp: 90 tablet, Rfl: 3  •  glucose blood test strip, Check blood sugar 3x times a day., Disp: 100 each, Rfl: 12  •  glucose monitor monitoring kit, 1 each 3 (Three) Times a Day As Needed (diabetes)., Disp: 1 each, Rfl: 0  •  isosorbide mononitrate (IMDUR) 60 MG 24 hr tablet, Take 1 tablet by mouth Daily., Disp: 90 tablet, Rfl: 3  •  Lancets (ONETOUCH ULTRASOFT) lancets, Check blood sugar 3 times daily, Disp: 100 each, Rfl: 12  •  metFORMIN (GLUCOPHAGE) 500 MG tablet, Take 1 tablet by mouth 2 (Two) Times a Day With Meals., Disp: 180 tablet, Rfl: 3  •  naloxone (NARCAN) 4 MG/0.1ML nasal spray, 1 spray into the nostril(s) as directed by provider As Needed (overdose)., Disp: 1 each, Rfl: 0  •  nitroglycerin (NITROSTAT) 0.4 MG SL tablet, 1 under the tongue as needed for angina, may repeat q5mins for up three doses, Disp: 50 tablet, Rfl: 1  •  O2 (OXYGEN), Inhale 2 L/min Every Night., Disp: , Rfl:   •  ondansetron (ZOFRAN) 8 MG tablet, TAKE 1 TABLET BY MOUTH EVERY 8 HOURS AS NEEDED FOR NAUSEA, Disp: 20 tablet, Rfl: 0  •  polyethylene glycol (MIRALAX) 17 g packet, Take 17 g by mouth Daily., Disp: 1 each, Rfl: 5  •  simvastatin (ZOCOR) 40 MG tablet, TAKE 1 TABLET BY MOUTH ONCE DAILY AT NIGHT, Disp: 90 tablet, Rfl: 0  •  Thyroid (NP THYROID) 30 MG PO tablet, Take 1 tablet by mouth Daily., Disp: 30 tablet, Rfl: 5  •  traMADol (ULTRAM) 50 MG tablet, Take 1 tablet by mouth Every 8 (Eight) Hours As Needed for Moderate Pain  or Severe Pain ., Disp: 60 tablet, Rfl: 3  •  vitamin D (ERGOCALCIFEROL) 1.25 MG (85544 UT) capsule capsule, Take 1 capsule by mouth Every 7 (Seven) Days., Disp: 12 capsule, Rfl: 5    ALLERGIES:  Patient has no known allergies.    VISIT VITALS:  Blood Pressure 105/70  "(BP Location: Left arm, Patient Position: Sitting, Cuff Size: Adult)   Pulse 95   Height 177.8 cm (70\")   Weight 97.7 kg (215 lb 6.4 oz)   Body Mass Index 30.91 kg/m²     Physical Exam  Constitutional:       General: He is not in acute distress.     Appearance: He is well-developed. He is not diaphoretic.   HENT:      Head: Normocephalic and atraumatic.      Right Ear: External ear normal.      Left Ear: External ear normal.      Nose: Nose normal.      Mouth/Throat:      Pharynx: No oropharyngeal exudate.   Eyes:      General: No scleral icterus.        Right eye: No discharge.         Left eye: No discharge.      Conjunctiva/sclera: Conjunctivae normal.      Pupils: Pupils are equal, round, and reactive to light.   Neck:      Thyroid: No thyromegaly.      Vascular: No JVD.      Trachea: No tracheal deviation.   Cardiovascular:      Rate and Rhythm: Normal rate and regular rhythm.      Heart sounds: Normal heart sounds. No murmur heard.  No friction rub. No gallop.    Pulmonary:      Effort: Pulmonary effort is normal. No respiratory distress.      Breath sounds: Normal breath sounds. No stridor. No wheezing or rales.   Chest:      Chest wall: No tenderness.   Abdominal:      General: Bowel sounds are normal. There is no distension.      Palpations: Abdomen is soft. There is no mass.      Tenderness: There is abdominal tenderness (diffuse). There is no guarding or rebound.      Hernia: No hernia is present.   Genitourinary:     Rectum: Guaiac result negative.   Musculoskeletal:      Cervical back: Normal range of motion and neck supple.   Lymphadenopathy:      Cervical: No cervical adenopathy.   Skin:     General: Skin is warm and dry.      Coloration: Skin is not pale.      Findings: No erythema or rash.   Neurological:      Mental Status: He is alert and oriented to person, place, and time.      Cranial Nerves: No cranial nerve deficit.      Motor: No abnormal muscle tone.      Coordination: Coordination " normal.      Deep Tendon Reflexes: Reflexes are normal and symmetric. Reflexes normal.   Psychiatric:         Behavior: Behavior normal.         Thought Content: Thought content normal.         Judgment: Judgment normal.         Assessment/Plan      Diagnosis Plan   1. Gastroesophageal reflux disease, unspecified whether esophagitis present  Ambulatory Referral to General Surgery   2. Chronic idiopathic constipation     3. Generalized abdominal pain     4. Fecal urgency     5. Flatulence       The patient will have a KUB to see if he is having overflow diarrhea from constipated stool.  He will also be referred to a general surgeon to discuss possible NISSEN fundoplication.  He was also educated that Protonix is not indicated to be taken with Dexilant.  Return in about 4 weeks (around 2/24/2022) for Recheck.         Patient's Body mass index is 30.91 kg/m². indicating that he is obese (BMI >30). Obesity-related health conditions include the following: GERD. Obesity is unchanged. BMI is is above average; BMI management plan is completed. We discussed portion control and increasing exercise..      ANNMARIE Okeefe

## 2022-02-01 ENCOUNTER — TELEPHONE (OUTPATIENT)
Dept: GASTROENTEROLOGY | Facility: CLINIC | Age: 62
End: 2022-02-01

## 2022-02-01 NOTE — TELEPHONE ENCOUNTER
Spoke to patient's wife about xray result.  Patient will restart Miralax due to continued overflow diarrhea.  He will need to take 17 gm BID until he returns to the office.

## 2022-02-14 ENCOUNTER — OFFICE VISIT (OUTPATIENT)
Dept: CARDIOLOGY | Facility: CLINIC | Age: 62
End: 2022-02-14

## 2022-02-14 VITALS
HEART RATE: 90 BPM | HEIGHT: 70 IN | BODY MASS INDEX: 31.7 KG/M2 | DIASTOLIC BLOOD PRESSURE: 71 MMHG | WEIGHT: 221.4 LBS | OXYGEN SATURATION: 98 % | SYSTOLIC BLOOD PRESSURE: 105 MMHG

## 2022-02-14 DIAGNOSIS — I50.42 CHRONIC COMBINED SYSTOLIC AND DIASTOLIC HEART FAILURE: Chronic | ICD-10-CM

## 2022-02-14 DIAGNOSIS — I25.10 CORONARY ARTERY DISEASE INVOLVING NATIVE CORONARY ARTERY OF NATIVE HEART WITHOUT ANGINA PECTORIS: Primary | Chronic | ICD-10-CM

## 2022-02-14 DIAGNOSIS — I10 ESSENTIAL HYPERTENSION: Chronic | ICD-10-CM

## 2022-02-14 DIAGNOSIS — E78.00 HYPERCHOLESTEROLEMIA: Chronic | ICD-10-CM

## 2022-02-14 PROCEDURE — 93000 ELECTROCARDIOGRAM COMPLETE: CPT | Performed by: NURSE PRACTITIONER

## 2022-02-14 PROCEDURE — 99214 OFFICE O/P EST MOD 30 MIN: CPT | Performed by: NURSE PRACTITIONER

## 2022-02-14 NOTE — PROGRESS NOTES
Chief Complaint  Follow-up (patient states he has been having chest pressure ) and Chest Pain, Unstable Angina    Subjective          Damaso Leonard presents to [facility] CARDIOLOGY for follow up.     History of Present Illness    Mr. Leonard was last seen in clinic on 10/12/2021 by Dr. Riley.  At that visit he denied any symptoms and no changes were made to his medications.    At today's visit Mr. Leonard reports that he has had some episodes of chest pain.  He says sometimes it is a severe squeezing pain across his chest with associated shortness of breath and diaphoresis.  He reports that this chest pain occurs about every few days.  He states that many times it lasts just for 30 seconds to a couple of minutes, however sometimes it has lasted greater than half hour.  He reports that he is taken nitro tabs and this helps his chest pain.    Review of Systems   Constitutional: Positive for diaphoresis. Negative for fatigue.   Respiratory: Positive for shortness of breath.    Cardiovascular: Positive for chest pain. Negative for palpitations and leg swelling.   Gastrointestinal: Negative for blood in stool.   Neurological: Negative for dizziness, syncope, weakness and light-headedness.   Hematological: Does not bruise/bleed easily.   All other systems reviewed and are negative.      Objective     Past Medical History:   Diagnosis Date   • Anxiety    • Anxiety and depression    • Arthritis    • ASCVD (arteriosclerotic cardiovascular disease)    • CHF (congestive heart failure) ([facility])    • Colitis    • Coronary artery disease    • Disease of thyroid gland    • DM (diabetes mellitus) ([facility])    • Elevated cholesterol    • GERD (gastroesophageal reflux disease)    • History of EKG 04/15/2015    NORMAL   • History of transfusion    • HTN (hypertension)    • Hyperlipidemia    • Injury of back    • Low back pain    • Myocardial infarction (HCC)     about 13 years ago   • Requires supplemental oxygen      USES 2 L AT NIGHT   • Sleep apnea     wears oxygen at night    • Wears glasses    • Wears partial dentures      Past Surgical History:   Procedure Laterality Date   • CARDIAC CATHETERIZATION      reports 4 cardiac stents   • CARDIAC CATHETERIZATION N/A 12/11/2020    Procedure: Coronary angiography;  Surgeon: Noble Thompson MD;  Location:  [facility] CATH INVASIVE LOCATION;  Service: Cardiology;  Laterality: N/A;   • CARDIAC CATHETERIZATION N/A 3/2/2022    Procedure: Left Heart Cath;  Surgeon: Arron Riley MD;  Location:  [facility] CATH INVASIVE LOCATION;  Service: Cardiology;  Laterality: N/A;   • CARDIAC SURGERY      stenting   • CHOLECYSTECTOMY     • COLONOSCOPY  2007   • COLONOSCOPY N/A 5/30/2020    Procedure: COLONOSCOPY CPT CODE: 22299;  Surgeon: Anthony Garcia MD;  Location: [facility] OR;  Service: Gastroenterology;  Laterality: N/A;   • ENDOSCOPY N/A 5/30/2020    Procedure: ESOPHAGOGASTRODUODENOSCOPY WITH BIOPSY CPT CODE: 17662;  Surgeon: Anthony Garcia MD;  Location: [facility] OR;  Service: Gastroenterology;  Laterality: N/A;   • ENDOSCOPY N/A 7/19/2021    Procedure: ESOPHAGOGASTRODUODENOSCOPY WITH BIOPSY;  Surgeon: Anthony Garcia MD;  Location: [facility] OR;  Service: Gastroenterology;  Laterality: N/A;  dilated with balloon dilator to 20mm   • HERNIA REPAIR     • SHOULDER SURGERY     • TONSILLECTOMY     • TOTAL KNEE ARTHROPLASTY Right 1/27/2020    Procedure: TOTAL KNEE ARTHROPLASTY RIGHT;  Surgeon: Ortiz Barba MD;  Location: [facility] OR;  Service: Orthopedics   • TYMPANOPLASTY     • UPPER GASTROINTESTINAL ENDOSCOPY     • VASECTOMY       Family History   Problem Relation Age of Onset   • Heart attack Father    • Heart disease Father    • Hypertension Father    • Heart attack Sister    • Diabetes Sister    • Heart disease Sister    • Hypertension Sister    • Thyroid disease Sister    • Heart attack Brother    • Diabetes Brother    • Thyroid disease Brother    •  "Arthritis Daughter    • COPD Daughter    • Asthma Daughter    • Depression Daughter    • Heart disease Sister    • Hypertension Sister      Social History     Tobacco Use   • Smoking status: Never Smoker   • Smokeless tobacco: Never Used   Vaping Use   • Vaping Use: Never used   Substance Use Topics   • Alcohol use: No   • Drug use: No     ALLERGIES:  Ranexa [ranolazine]    Vital Signs:   /71   Pulse 90   Ht 177.8 cm (70\")   Wt 100 kg (221 lb 6.4 oz)   SpO2 98%   BMI 31.77 kg/m²       Physical Exam  Constitutional:       Appearance: Normal appearance. He is well-developed.   Cardiovascular:      Rate and Rhythm: Normal rate and regular rhythm.      Heart sounds: No murmur heard.  No friction rub. No gallop.    Pulmonary:      Effort: Pulmonary effort is normal. No respiratory distress.      Breath sounds: Normal breath sounds. No wheezing or rales.   Skin:     General: Skin is warm and dry.   Neurological:      Mental Status: He is alert and oriented to person, place, and time.   Psychiatric:         Mood and Affect: Mood normal.         Behavior: Behavior normal.          Result Review :            ECG 12 Lead    Date/Time: 2/14/2022 11:01 AM  Performed by: Meg Gonzalez APRN  Authorized by: Meg Gonzalez APRN   Comparison: compared with previous ECG from 12/11/2020  Similar to previous ECG  Comparison to previous ECG: Normal sinus rhythm, 74 bpm,   Rhythm: sinus rhythm  Rate: normal  BPM: 86  Comments:              Current Outpatient Medications   Medication Sig Dispense Refill   • amitriptyline (ELAVIL) 50 MG tablet Take 2 tablets by mouth At Night As Needed for Sleep. 180 tablet 1   • aspirin (SB Low Dose ASA EC) 81 MG EC tablet Take 1 tablet by mouth Daily. 90 tablet 3   • carvedilol (COREG) 25 MG tablet Take 1 tablet by mouth 2 (Two) Times a Day With Meals. 180 tablet 3   • citalopram (CeleXA) 40 MG tablet Take 1 tablet by mouth Daily. 90 tablet 5   • clonazePAM (KlonoPIN) 0.5 " MG tablet TAKE 1 TABLET BY MOUTH THREE TIMES DAILY AS NEEDED FOR ANXIETY 90 tablet 0   • clopidogrel (PLAVIX) 75 MG tablet Take 1 tablet by mouth Daily. 90 tablet 3   • cyanocobalamin 1000 MCG/ML injection Inject 1 mL into the appropriate muscle as directed by prescriber Every 30 (Thirty) Days. 1 mL 5   • Dexilant 60 MG capsule Take 1 capsule by mouth once daily for 30 days 30 capsule 5   • dicyclomine (BENTYL) 10 MG capsule Take 1 capsule by mouth 4 (Four) Times a Day Before Meals & at Bedtime. 120 capsule 5   • diphenhydrAMINE (Benadryl Allergy) 25 mg capsule Take 1 capsule by mouth Every 6 (Six) Hours As Needed for Itching. 90 capsule 3   • docusate sodium 100 MG capsule Take 1 capsule by mouth 2 (Two) Times a Day As Needed (constipation). 60 each 5   • exenatide er (Bydureon) 2 MG pen-injector injection Inject 1 pen under the skin into the appropriate area as directed 1 (One) Time Per Week. 4 each 5   • famotidine (PEPCID) 40 MG tablet Take 40 mg by mouth every night at bedtime.     • finasteride (PROSCAR) 5 MG tablet Take 1 tablet by mouth Daily. 90 tablet 3   • glucose blood test strip Check blood sugar 3x times a day. 100 each 12   • glucose monitor monitoring kit 1 each 3 (Three) Times a Day As Needed (diabetes). 1 each 0   • isosorbide mononitrate (IMDUR) 60 MG 24 hr tablet Take 1 tablet by mouth Daily. 90 tablet 3   • Lancets (ONETOUCH ULTRASOFT) lancets Check blood sugar 3 times daily 100 each 12   • metFORMIN (GLUCOPHAGE) 500 MG tablet Take 1 tablet by mouth 2 (Two) Times a Day With Meals. 180 tablet 3   • naloxone (NARCAN) 4 MG/0.1ML nasal spray 1 spray into the nostril(s) as directed by provider As Needed (overdose). 1 each 0   • nitroglycerin (NITROSTAT) 0.4 MG SL tablet 1 under the tongue as needed for angina, may repeat q5mins for up three doses 50 tablet 1   • O2 (OXYGEN) Inhale 2 L/min Every Night.     • ondansetron (ZOFRAN) 8 MG tablet TAKE 1 TABLET BY MOUTH EVERY 8 HOURS AS NEEDED FOR NAUSEA 20  tablet 0   • polyethylene glycol (MIRALAX) 17 g packet Take 17 g by mouth Daily. 1 each 5   • simvastatin (ZOCOR) 40 MG tablet TAKE 1 TABLET BY MOUTH ONCE DAILY AT NIGHT 90 tablet 0   • Thyroid (NP THYROID) 30 MG PO tablet Take 1 tablet by mouth Daily. 30 tablet 5   • traMADol (ULTRAM) 50 MG tablet Take 1 tablet by mouth Every 8 (Eight) Hours As Needed for Moderate Pain  or Severe Pain . 60 tablet 3   • vitamin D (ERGOCALCIFEROL) 1.25 MG (49133 UT) capsule capsule Take 1 capsule by mouth Every 7 (Seven) Days. 12 capsule 5     No current facility-administered medications for this visit.            Assessment and Plan    Problem List Items Addressed This Visit        Cardiac and Vasculature    Coronary artery disease involving native coronary artery of native heart - Primary (Chronic)    Hypercholesterolemia (Chronic)    Essential hypertension (Chronic)    Chronic combined systolic and diastolic heart failure (HCC) (Chronic)              Follow Up     Medications were reviewed with the patient.    Patient's anginal symptoms are concerning in the setting of known lesions to the LAD and circumflex from cardiac catheterization of 12/11/2020 and history of multiple stent placements.  I will schedule him for cardiac catheterization with Dr. Riley.    For his ASCVD he is to continue aspirin, Plavix, carvedilol, simvastatin, and isosorbide mononitrate.    With regard to dyslipidemia, patient had recent labs drawn at his primary care provider's office.  Will request.    Hypertension is controlled.    With regard to systolic and diastolic heart failure, the patient does not appear to be in acute exacerbation.    Return in about 4 weeks (around 3/14/2022).    Patient was given instructions and counseling regarding his condition or for health maintenance advice. Please see specific information pulled into the AVS if appropriate.

## 2022-02-15 ENCOUNTER — TRANSCRIBE ORDERS (OUTPATIENT)
Dept: ADMINISTRATIVE | Facility: HOSPITAL | Age: 62
End: 2022-02-15

## 2022-02-15 DIAGNOSIS — I25.10 CORONARY ARTERY DISEASE INVOLVING NATIVE CORONARY ARTERY OF NATIVE HEART WITHOUT ANGINA PECTORIS: Primary | ICD-10-CM

## 2022-02-15 DIAGNOSIS — Z01.818 PRE-OPERATIVE CLEARANCE: Primary | ICD-10-CM

## 2022-02-24 ENCOUNTER — OFFICE VISIT (OUTPATIENT)
Dept: GASTROENTEROLOGY | Facility: CLINIC | Age: 62
End: 2022-02-24

## 2022-02-24 VITALS
HEART RATE: 140 BPM | WEIGHT: 214 LBS | OXYGEN SATURATION: 96 % | BODY MASS INDEX: 30.64 KG/M2 | SYSTOLIC BLOOD PRESSURE: 125 MMHG | HEIGHT: 70 IN | DIASTOLIC BLOOD PRESSURE: 81 MMHG

## 2022-02-24 DIAGNOSIS — K59.04 CHRONIC IDIOPATHIC CONSTIPATION: Primary | ICD-10-CM

## 2022-02-24 PROCEDURE — 99214 OFFICE O/P EST MOD 30 MIN: CPT | Performed by: PHYSICIAN ASSISTANT

## 2022-02-24 NOTE — PROGRESS NOTES
Chief Complaint   Patient presents with   • Heartburn       Damaso Leonard is a 61 y.o. male who presents to the office today for follow up appointment for Heartburn  .    HPI    The patient was seen for a follow up visit.  Results of xray ordered last visit revealed constipated stool and he was placed on Miralax 17 gm daily.  He states that before Miralax, he was going a week and a half between bowel movements.  Since beginning Miralax he is a bowel movement almost daily but it is diarrhea.  He denies rectal bleeding. Patient reports he is still having significant uncontroled acid reflux even taking both Dexilant and Protonix, although counseled last visit not to take both.      Review of Systems   Constitutional: Positive for fatigue and unexpected weight change. Negative for appetite change, chills and fever.   HENT: Negative for trouble swallowing.    Eyes: Negative.    Respiratory: Negative for cough, choking, chest tightness and shortness of breath.    Cardiovascular: Negative for chest pain.   Gastrointestinal: Positive for abdominal distention, abdominal pain, diarrhea, nausea and rectal pain. Negative for anal bleeding, blood in stool, constipation and vomiting.   Endocrine: Negative.    Genitourinary: Negative for difficulty urinating.   Musculoskeletal: Positive for back pain. Negative for neck pain.   Skin: Negative.    Neurological: Positive for light-headedness. Negative for dizziness and headaches.   Hematological: Does not bruise/bleed easily.   Psychiatric/Behavioral: Negative.        ACTIVE PROBLEMS:   Specialty Problems        Gastroenterology Problems    Gastroesophageal reflux disease without esophagitis        Colitis        Gastroesophageal reflux disease              PAST MEDICAL HISTORY:  Past Medical History:   Diagnosis Date   • Anxiety    • Anxiety and depression    • Arthritis    • ASCVD (arteriosclerotic cardiovascular disease)    • CHF (congestive heart failure) ([facility])    • Colitis     • Coronary artery disease    • Disease of thyroid gland    • DM (diabetes mellitus) (HCC)    • Elevated cholesterol    • GERD (gastroesophageal reflux disease)    • History of EKG 04/15/2015    NORMAL   • History of transfusion    • HTN (hypertension)    • Hyperlipidemia    • Injury of back    • Low back pain    • Myocardial infarction (HCC)     about 13 years ago   • Requires supplemental oxygen     USES 2 L AT NIGHT   • Sleep apnea     wears oxygen at night    • Wears glasses    • Wears partial dentures        SURGICAL HISTORY:  Past Surgical History:   Procedure Laterality Date   • CARDIAC CATHETERIZATION      reports 4 cardiac stents   • CARDIAC CATHETERIZATION N/A 12/11/2020    Procedure: Coronary angiography;  Surgeon: Noble Thompson MD;  Location: [facility] CATH INVASIVE LOCATION;  Service: Cardiology;  Laterality: N/A;   • CARDIAC SURGERY      stenting   • CHOLECYSTECTOMY     • COLONOSCOPY  2007   • COLONOSCOPY N/A 5/30/2020    Procedure: COLONOSCOPY CPT CODE: 45828;  Surgeon: Anthony Garcia MD;  Location: [facility] OR;  Service: Gastroenterology;  Laterality: N/A;   • ENDOSCOPY N/A 5/30/2020    Procedure: ESOPHAGOGASTRODUODENOSCOPY WITH BIOPSY CPT CODE: 58954;  Surgeon: Anthony Garcia MD;  Location: [facility] OR;  Service: Gastroenterology;  Laterality: N/A;   • ENDOSCOPY N/A 7/19/2021    Procedure: ESOPHAGOGASTRODUODENOSCOPY WITH BIOPSY;  Surgeon: Anthony Garcia MD;  Location: [facility] OR;  Service: Gastroenterology;  Laterality: N/A;  dilated with balloon dilator to 20mm   • HERNIA REPAIR     • SHOULDER SURGERY     • TONSILLECTOMY     • TOTAL KNEE ARTHROPLASTY Right 1/27/2020    Procedure: TOTAL KNEE ARTHROPLASTY RIGHT;  Surgeon: Ortiz Barba MD;  Location: [facility] OR;  Service: Orthopedics   • TYMPANOPLASTY     • UPPER GASTROINTESTINAL ENDOSCOPY     • VASECTOMY         FAMILY HISTORY:  Family History   Problem Relation Age of Onset   • Heart attack Father     • Heart disease Father    • Hypertension Father    • Heart attack Sister    • Diabetes Sister    • Heart disease Sister    • Hypertension Sister    • Thyroid disease Sister    • Heart attack Brother    • Diabetes Brother    • Thyroid disease Brother    • Arthritis Daughter    • COPD Daughter    • Asthma Daughter    • Depression Daughter    • Heart disease Sister    • Hypertension Sister        SOCIAL HISTORY:  Social History     Tobacco Use   • Smoking status: Never Smoker   • Smokeless tobacco: Never Used   Substance Use Topics   • Alcohol use: No       CURRENT MEDICATION:    Current Outpatient Medications:   •  amitriptyline (ELAVIL) 50 MG tablet, Take 2 tablets by mouth At Night As Needed for Sleep., Disp: 180 tablet, Rfl: 1  •  aspirin (SB Low Dose ASA EC) 81 MG EC tablet, Take 1 tablet by mouth Daily., Disp: 90 tablet, Rfl: 3  •  carvedilol (COREG) 25 MG tablet, Take 1 tablet by mouth 2 (Two) Times a Day With Meals., Disp: 180 tablet, Rfl: 3  •  citalopram (CeleXA) 40 MG tablet, Take 1 tablet by mouth Daily., Disp: 90 tablet, Rfl: 5  •  clonazePAM (KlonoPIN) 0.5 MG tablet, TAKE 1 TABLET BY MOUTH THREE TIMES DAILY AS NEEDED FOR ANXIETY, Disp: 90 tablet, Rfl: 0  •  clopidogrel (PLAVIX) 75 MG tablet, Take 1 tablet by mouth Daily., Disp: 90 tablet, Rfl: 3  •  cyanocobalamin 1000 MCG/ML injection, Inject 1 mL into the appropriate muscle as directed by prescriber Every 30 (Thirty) Days., Disp: 1 mL, Rfl: 5  •  Dexilant 60 MG capsule, Take 1 capsule by mouth once daily for 30 days, Disp: 30 capsule, Rfl: 5  •  dicyclomine (BENTYL) 10 MG capsule, Take 1 capsule by mouth 4 (Four) Times a Day Before Meals & at Bedtime., Disp: 120 capsule, Rfl: 5  •  diphenhydrAMINE (Benadryl Allergy) 25 mg capsule, Take 1 capsule by mouth Every 6 (Six) Hours As Needed for Itching., Disp: 90 capsule, Rfl: 3  •  docusate sodium 100 MG capsule, Take 1 capsule by mouth 2 (Two) Times a Day As Needed (constipation)., Disp: 60 each, Rfl: 5  •   exenatide er (Bydureon) 2 MG pen-injector injection, Inject 1 pen under the skin into the appropriate area as directed 1 (One) Time Per Week., Disp: 4 each, Rfl: 5  •  famotidine (PEPCID) 40 MG tablet, Take 40 mg by mouth every night at bedtime., Disp: , Rfl:   •  finasteride (PROSCAR) 5 MG tablet, Take 1 tablet by mouth Daily., Disp: 90 tablet, Rfl: 3  •  glucose blood test strip, Check blood sugar 3x times a day., Disp: 100 each, Rfl: 12  •  glucose monitor monitoring kit, 1 each 3 (Three) Times a Day As Needed (diabetes)., Disp: 1 each, Rfl: 0  •  isosorbide mononitrate (IMDUR) 60 MG 24 hr tablet, Take 1 tablet by mouth Daily., Disp: 90 tablet, Rfl: 3  •  Lancets (ONETOUCH ULTRASOFT) lancets, Check blood sugar 3 times daily, Disp: 100 each, Rfl: 12  •  metFORMIN (GLUCOPHAGE) 500 MG tablet, Take 1 tablet by mouth 2 (Two) Times a Day With Meals., Disp: 180 tablet, Rfl: 3  •  naloxone (NARCAN) 4 MG/0.1ML nasal spray, 1 spray into the nostril(s) as directed by provider As Needed (overdose)., Disp: 1 each, Rfl: 0  •  nitroglycerin (NITROSTAT) 0.4 MG SL tablet, 1 under the tongue as needed for angina, may repeat q5mins for up three doses, Disp: 50 tablet, Rfl: 1  •  O2 (OXYGEN), Inhale 2 L/min Every Night., Disp: , Rfl:   •  ondansetron (ZOFRAN) 8 MG tablet, TAKE 1 TABLET BY MOUTH EVERY 8 HOURS AS NEEDED FOR NAUSEA, Disp: 20 tablet, Rfl: 0  •  polyethylene glycol (MIRALAX) 17 g packet, Take 17 g by mouth Daily., Disp: 1 each, Rfl: 5  •  simvastatin (ZOCOR) 40 MG tablet, TAKE 1 TABLET BY MOUTH ONCE DAILY AT NIGHT, Disp: 90 tablet, Rfl: 0  •  Thyroid (NP THYROID) 30 MG PO tablet, Take 1 tablet by mouth Daily., Disp: 30 tablet, Rfl: 5  •  traMADol (ULTRAM) 50 MG tablet, Take 1 tablet by mouth Every 8 (Eight) Hours As Needed for Moderate Pain  or Severe Pain ., Disp: 60 tablet, Rfl: 3  •  vitamin D (ERGOCALCIFEROL) 1.25 MG (62897 UT) capsule capsule, Take 1 capsule by mouth Every 7 (Seven) Days., Disp: 12 capsule, Rfl:  "5  •  linaclotide (LINZESS) 145 MCG capsule capsule, Take 1 tablet once daily on an empty stomach at least 30 minutes prior to the first meal of the day., Disp: 90 capsule, Rfl: 3    ALLERGIES:  Ranexa [ranolazine]    VISIT VITALS:  Blood Pressure 125/81   Pulse (Abnormal) 140   Height 177.8 cm (70\")   Weight 97.1 kg (214 lb)   Oxygen Saturation 96%   Body Mass Index 30.71 kg/m²     Physical Exam  Constitutional:       General: He is not in acute distress.     Appearance: He is well-developed. He is obese. He is not diaphoretic.   HENT:      Head: Normocephalic and atraumatic.      Right Ear: External ear normal.      Left Ear: External ear normal.      Nose: Nose normal.      Mouth/Throat:      Pharynx: No oropharyngeal exudate.   Eyes:      General: No scleral icterus.        Right eye: No discharge.         Left eye: No discharge.      Conjunctiva/sclera: Conjunctivae normal.      Pupils: Pupils are equal, round, and reactive to light.   Neck:      Thyroid: No thyromegaly.      Vascular: No JVD.      Trachea: No tracheal deviation.   Cardiovascular:      Rate and Rhythm: Normal rate and regular rhythm.      Heart sounds: Normal heart sounds. No murmur heard.  No friction rub. No gallop.    Pulmonary:      Effort: Pulmonary effort is normal. No respiratory distress.      Breath sounds: Normal breath sounds. No stridor. No wheezing or rales.   Chest:      Chest wall: No tenderness.   Abdominal:      General: Bowel sounds are normal. There is no distension.      Palpations: Abdomen is soft. There is no mass.      Tenderness: There is abdominal tenderness (epigastric and lower abdomen). There is no guarding or rebound.      Hernia: No hernia is present.   Genitourinary:     Rectum: Guaiac result negative.   Musculoskeletal:      Cervical back: Normal range of motion and neck supple.   Lymphadenopathy:      Cervical: No cervical adenopathy.   Skin:     General: Skin is warm and dry.      Coloration: Skin is not " pale.      Findings: No erythema or rash.   Neurological:      Mental Status: He is alert and oriented to person, place, and time.      Cranial Nerves: No cranial nerve deficit.      Motor: No abnormal muscle tone.      Coordination: Coordination normal.      Deep Tendon Reflexes: Reflexes are normal and symmetric. Reflexes normal.   Psychiatric:         Behavior: Behavior normal.         Thought Content: Thought content normal.         Judgment: Judgment normal.         Assessment/Plan      Diagnosis Plan   1. Chronic idiopathic constipation  Ambulatory Referral to General Surgery    linaclotide (LINZESS) 145 MCG capsule capsule     The patient will be referred to Dr. Cavanaugh, general surgeon, to discuss NISSEN fundoplication.  He will also be started on Linzess 145 mcg for constipation.  He voiced agreement of all recommendations.   Return in about 4 weeks (around 3/24/2022) for Recheck.         Patient's Body mass index is 30.71 kg/m². indicating that he is obese (BMI >30). Obesity-related health conditions include the following: hypertension. Obesity is unchanged. BMI is is above average; BMI management plan is completed. We discussed portion control and increasing exercise..      ANNMARIE Okeefe

## 2022-02-28 ENCOUNTER — LAB (OUTPATIENT)
Dept: LAB | Facility: HOSPITAL | Age: 62
End: 2022-02-28

## 2022-02-28 DIAGNOSIS — Z01.818 PRE-OPERATIVE CLEARANCE: ICD-10-CM

## 2022-03-01 LAB
FLUAV SUBTYP SPEC NAA+PROBE: NOT DETECTED
FLUBV RNA ISLT QL NAA+PROBE: NOT DETECTED
SARS-COV-2 RNA PNL SPEC NAA+PROBE: NOT DETECTED

## 2022-03-01 PROCEDURE — 87636 SARSCOV2 & INF A&B AMP PRB: CPT | Performed by: INTERNAL MEDICINE

## 2022-03-02 ENCOUNTER — HOSPITAL ENCOUNTER (OUTPATIENT)
Facility: HOSPITAL | Age: 62
Discharge: HOME | End: 2022-03-02
Attending: INTERNAL MEDICINE | Admitting: INTERNAL MEDICINE

## 2022-03-02 VITALS
HEIGHT: 70 IN | TEMPERATURE: 98.6 F | BODY MASS INDEX: 30.78 KG/M2 | SYSTOLIC BLOOD PRESSURE: 137 MMHG | HEART RATE: 72 BPM | DIASTOLIC BLOOD PRESSURE: 88 MMHG | WEIGHT: 215 LBS | RESPIRATION RATE: 14 BRPM | OXYGEN SATURATION: 97 %

## 2022-03-02 DIAGNOSIS — I25.10 CORONARY ARTERY DISEASE INVOLVING NATIVE CORONARY ARTERY OF NATIVE HEART WITHOUT ANGINA PECTORIS: ICD-10-CM

## 2022-03-02 LAB
ANION GAP SERPL CALCULATED.3IONS-SCNC: 13.2 MMOL/L (ref 5–15)
BUN SERPL-MCNC: 14 MG/DL (ref 6–20)
BUN/CREAT SERPL: 12.7 (ref 7–25)
CALCIUM SPEC-SCNC: 8.6 MG/DL (ref 8.6–10.5)
CHLORIDE SERPL-SCNC: 105 MMOL/L (ref 98–107)
CO2 SERPL-SCNC: 23.8 MMOL/L (ref 22–29)
CREAT SERPL-MCNC: 1.1 MG/DL (ref 0.76–1.27)
DEPRECATED RDW RBC AUTO: 47.5 FL (ref 37–54)
EGFRCR SERPLBLD CKD-EPI 2021: 77.3 ML/MIN/1.73
ERYTHROCYTE [DISTWIDTH] IN BLOOD BY AUTOMATED COUNT: 16.2 % (ref 12.3–15.4)
GLUCOSE SERPL-MCNC: 184 MG/DL (ref 65–99)
HCT VFR BLD AUTO: 39.1 % (ref 37.5–51)
HGB BLD-MCNC: 11.8 G/DL (ref 13–17.7)
MCH RBC QN AUTO: 24.3 PG (ref 26.6–33)
MCHC RBC AUTO-ENTMCNC: 30.2 G/DL (ref 31.5–35.7)
MCV RBC AUTO: 80.6 FL (ref 79–97)
PLATELET # BLD AUTO: 390 10*3/MM3 (ref 140–450)
PMV BLD AUTO: 8.7 FL (ref 6–12)
POTASSIUM SERPL-SCNC: 3.8 MMOL/L (ref 3.5–5.2)
RBC # BLD AUTO: 4.85 10*6/MM3 (ref 4.14–5.8)
SODIUM SERPL-SCNC: 142 MMOL/L (ref 136–145)
WBC NRBC COR # BLD: 6.57 10*3/MM3 (ref 3.4–10.8)

## 2022-03-02 PROCEDURE — 0 IOPAMIDOL PER 1 ML: Performed by: INTERNAL MEDICINE

## 2022-03-02 PROCEDURE — 25010000002 HEPARIN (PORCINE) PER 1000 UNITS: Performed by: INTERNAL MEDICINE

## 2022-03-02 PROCEDURE — S0260 H&P FOR SURGERY: HCPCS | Performed by: INTERNAL MEDICINE

## 2022-03-02 PROCEDURE — 85027 COMPLETE CBC AUTOMATED: CPT | Performed by: NURSE PRACTITIONER

## 2022-03-02 PROCEDURE — 25010000002 MIDAZOLAM PER 1 MG: Performed by: INTERNAL MEDICINE

## 2022-03-02 PROCEDURE — 99152 MOD SED SAME PHYS/QHP 5/>YRS: CPT | Performed by: INTERNAL MEDICINE

## 2022-03-02 PROCEDURE — 80048 BASIC METABOLIC PNL TOTAL CA: CPT | Performed by: NURSE PRACTITIONER

## 2022-03-02 PROCEDURE — C1769 GUIDE WIRE: HCPCS | Performed by: INTERNAL MEDICINE

## 2022-03-02 PROCEDURE — 25010000002 FENTANYL CITRATE (PF) 50 MCG/ML SOLUTION: Performed by: INTERNAL MEDICINE

## 2022-03-02 PROCEDURE — 93454 CORONARY ARTERY ANGIO S&I: CPT | Performed by: INTERNAL MEDICINE

## 2022-03-02 PROCEDURE — C1894 INTRO/SHEATH, NON-LASER: HCPCS | Performed by: INTERNAL MEDICINE

## 2022-03-02 RX ORDER — FENTANYL CITRATE 50 UG/ML
INJECTION, SOLUTION INTRAMUSCULAR; INTRAVENOUS AS NEEDED
Status: DISCONTINUED | OUTPATIENT
Start: 2022-03-02 | End: 2022-03-02 | Stop reason: HOSPADM

## 2022-03-02 RX ORDER — ACETAMINOPHEN 325 MG/1
650 TABLET ORAL EVERY 4 HOURS PRN
Status: CANCELLED | OUTPATIENT
Start: 2022-03-02

## 2022-03-02 RX ORDER — LIDOCAINE HYDROCHLORIDE 20 MG/ML
INJECTION, SOLUTION INFILTRATION; PERINEURAL AS NEEDED
Status: DISCONTINUED | OUTPATIENT
Start: 2022-03-02 | End: 2022-03-02 | Stop reason: HOSPADM

## 2022-03-02 RX ORDER — SODIUM CHLORIDE 9 MG/ML
INJECTION, SOLUTION INTRAVENOUS CONTINUOUS PRN
Status: COMPLETED | OUTPATIENT
Start: 2022-03-02 | End: 2022-03-02

## 2022-03-02 RX ORDER — CLOPIDOGREL BISULFATE 75 MG/1
75 TABLET ORAL DAILY
Status: CANCELLED | OUTPATIENT
Start: 2022-03-02

## 2022-03-02 RX ORDER — ASPIRIN 81 MG/1
81 TABLET ORAL DAILY
Status: CANCELLED | OUTPATIENT
Start: 2022-03-02

## 2022-03-02 RX ORDER — SODIUM CHLORIDE 9 MG/ML
100 INJECTION, SOLUTION INTRAVENOUS ONCE
Status: DISCONTINUED | OUTPATIENT
Start: 2022-03-02 | End: 2022-03-02 | Stop reason: HOSPADM

## 2022-03-02 RX ORDER — MIDAZOLAM HYDROCHLORIDE 1 MG/ML
INJECTION INTRAMUSCULAR; INTRAVENOUS AS NEEDED
Status: DISCONTINUED | OUTPATIENT
Start: 2022-03-02 | End: 2022-03-02 | Stop reason: HOSPADM

## 2022-03-02 NOTE — DISCHARGE INSTR - ACTIVITY
You may continue home medications as scheudled    DO NOT drive for 24 hours    DO NOT submerge right wrist in water for 3 days (ex. Washing dishes)    NO strenuous pulling, pushing or tugging for 3 days

## 2022-03-09 ENCOUNTER — TELEPHONE (OUTPATIENT)
Dept: GASTROENTEROLOGY | Facility: CLINIC | Age: 62
End: 2022-03-09

## 2022-03-09 DIAGNOSIS — K21.9 GASTROESOPHAGEAL REFLUX DISEASE, UNSPECIFIED WHETHER ESOPHAGITIS PRESENT: Primary | ICD-10-CM

## 2022-03-15 ENCOUNTER — OFFICE VISIT (OUTPATIENT)
Dept: SURGERY | Facility: CLINIC | Age: 62
End: 2022-03-15

## 2022-03-15 VITALS
DIASTOLIC BLOOD PRESSURE: 70 MMHG | SYSTOLIC BLOOD PRESSURE: 110 MMHG | HEIGHT: 70 IN | WEIGHT: 220 LBS | HEART RATE: 76 BPM | BODY MASS INDEX: 31.5 KG/M2

## 2022-03-15 DIAGNOSIS — K21.9 GASTROESOPHAGEAL REFLUX DISEASE, UNSPECIFIED WHETHER ESOPHAGITIS PRESENT: Primary | ICD-10-CM

## 2022-03-15 PROCEDURE — 99203 OFFICE O/P NEW LOW 30 MIN: CPT | Performed by: SURGERY

## 2022-03-15 NOTE — PROGRESS NOTES
Subjective   Damaso Leonard is a 61 y.o. male is being seen for consultation today at the request of Clare Quintero APRN    Damaso Leonard is a 61 y.o. male With GERD refractory to medical management.  He has classic heartburn symptoms and these are worse when lying supine at night.  He has diabetes and coronary artery disease.  Patient also reports refractory therapy given twice daily PPI therapy.  No previous work-up performed.    Heartburn        Past Medical History:   Diagnosis Date   • Anxiety    • Anxiety and depression    • Arthritis    • ASCVD (arteriosclerotic cardiovascular disease)    • CHF (congestive heart failure) ([facility])    • Colitis    • Coronary artery disease    • Disease of thyroid gland    • DM (diabetes mellitus) ([facility])    • Elevated cholesterol    • GERD (gastroesophageal reflux disease)    • History of EKG 04/15/2015    NORMAL   • History of transfusion    • HTN (hypertension)    • Hyperlipidemia    • Injury of back    • Low back pain    • Myocardial infarction ([facility])     about 13 years ago   • Requires supplemental oxygen     USES 2 L AT NIGHT   • Sleep apnea     wears oxygen at night    • Wears glasses    • Wears partial dentures        Family History   Problem Relation Age of Onset   • Heart attack Father    • Heart disease Father    • Hypertension Father    • Heart attack Sister    • Diabetes Sister    • Heart disease Sister    • Hypertension Sister    • Thyroid disease Sister    • Heart attack Brother    • Diabetes Brother    • Thyroid disease Brother    • Arthritis Daughter    • COPD Daughter    • Asthma Daughter    • Depression Daughter    • Heart disease Sister    • Hypertension Sister        Social History     Socioeconomic History   • Marital status:      Spouse name: ruben   • Number of children: 3   • Years of education: 12   Tobacco Use   • Smoking status: Never Smoker   • Smokeless tobacco: Never Used   Vaping Use   • Vaping Use: Never used   Substance  "and Sexual Activity   • Alcohol use: No   • Drug use: No   • Sexual activity: Defer       Past Surgical History:   Procedure Laterality Date   • CARDIAC CATHETERIZATION      reports 4 cardiac stents   • CARDIAC CATHETERIZATION N/A 12/11/2020    Procedure: Coronary angiography;  Surgeon: Noble Thompson MD;  Location:  [facility] CATH INVASIVE LOCATION;  Service: Cardiology;  Laterality: N/A;   • CARDIAC CATHETERIZATION N/A 3/2/2022    Procedure: Left Heart Cath;  Surgeon: Arron Riley MD;  Location:  [facility] CATH INVASIVE LOCATION;  Service: Cardiology;  Laterality: N/A;   • CARDIAC SURGERY      stenting   • CHOLECYSTECTOMY     • COLONOSCOPY  2007   • COLONOSCOPY N/A 5/30/2020    Procedure: COLONOSCOPY CPT CODE: 95784;  Surgeon: Anthony Garcia MD;  Location: [facility] OR;  Service: Gastroenterology;  Laterality: N/A;   • ENDOSCOPY N/A 5/30/2020    Procedure: ESOPHAGOGASTRODUODENOSCOPY WITH BIOPSY CPT CODE: 72478;  Surgeon: Anthony Garcia MD;  Location: [facility] OR;  Service: Gastroenterology;  Laterality: N/A;   • ENDOSCOPY N/A 7/19/2021    Procedure: ESOPHAGOGASTRODUODENOSCOPY WITH BIOPSY;  Surgeon: Anthony Garcia MD;  Location: [facility] OR;  Service: Gastroenterology;  Laterality: N/A;  dilated with balloon dilator to 20mm   • HERNIA REPAIR     • SHOULDER SURGERY     • TONSILLECTOMY     • TOTAL KNEE ARTHROPLASTY Right 1/27/2020    Procedure: TOTAL KNEE ARTHROPLASTY RIGHT;  Surgeon: Ortiz Barba MD;  Location: [facility] OR;  Service: Orthopedics   • TYMPANOPLASTY     • UPPER GASTROINTESTINAL ENDOSCOPY     • VASECTOMY         Review of Systems      /70   Pulse 76   Ht 177.8 cm (70\")   Wt 99.8 kg (220 lb)   BMI 31.57 kg/m²   Objective   Physical Exam          Assessment   Diagnoses and all orders for this visit:    1. Gastroesophageal reflux disease, unspecified whether esophagitis present (Primary)  -     FL Esophagram Complete Single Contrast  -     Case Request; " Standing  -     COVID PRE-OP / PRE-PROCEDURE SCREENING ORDER (NO ISOLATION) - Swab, Nasopharynx; Future  -     Case Request    Other orders  -     Follow Anesthesia Guidelines / Protocol; Future  -     Provide NPO Instructions to Patient; Future  -     Chlorhexidine Skin Prep; Future      Damaso Abraham Leonard is a 61 y.o. male with GERD refractory to medical management.  The patient does use NSAIDs but he does not smoke.  Body mass index 31.58.  The patient will undergo esophagram and EGD with Bravo probe placement.  He will follow-up after the procedure to determine if he would benefit from antireflux procedure.    Patient's Body mass index is 31.57 kg/m². indicating that he is obese (BMI >30). Obesity-related health conditions include the following: coronary heart disease, diabetes mellitus and GERD. Obesity is unchanged. BMI is is above average; BMI management plan is completed. We discussed portion control and increasing exercise..

## 2022-03-16 ENCOUNTER — TRANSCRIBE ORDERS (OUTPATIENT)
Dept: ADMINISTRATIVE | Facility: HOSPITAL | Age: 62
End: 2022-03-16

## 2022-03-16 ENCOUNTER — HOSPITAL ENCOUNTER (OUTPATIENT)
Dept: GENERAL RADIOLOGY | Facility: HOSPITAL | Age: 62
Discharge: HOME | End: 2022-03-16
Admitting: SURGERY

## 2022-03-16 DIAGNOSIS — N92.6 IRREGULAR MENSTRUATION: Primary | ICD-10-CM

## 2022-03-16 PROCEDURE — 74220 X-RAY XM ESOPHAGUS 1CNTRST: CPT | Performed by: RADIOLOGY

## 2022-03-16 PROCEDURE — 74220 X-RAY XM ESOPHAGUS 1CNTRST: CPT

## 2022-03-24 ENCOUNTER — OFFICE VISIT (OUTPATIENT)
Dept: GASTROENTEROLOGY | Facility: CLINIC | Age: 62
End: 2022-03-24

## 2022-03-24 VITALS
DIASTOLIC BLOOD PRESSURE: 83 MMHG | OXYGEN SATURATION: 98 % | HEIGHT: 70 IN | WEIGHT: 216 LBS | HEART RATE: 97 BPM | BODY MASS INDEX: 30.92 KG/M2 | SYSTOLIC BLOOD PRESSURE: 119 MMHG

## 2022-03-24 DIAGNOSIS — K59.04 CHRONIC IDIOPATHIC CONSTIPATION: Primary | ICD-10-CM

## 2022-03-24 DIAGNOSIS — K21.9 GASTROESOPHAGEAL REFLUX DISEASE, UNSPECIFIED WHETHER ESOPHAGITIS PRESENT: ICD-10-CM

## 2022-03-24 PROCEDURE — 99214 OFFICE O/P EST MOD 30 MIN: CPT | Performed by: PHYSICIAN ASSISTANT

## 2022-03-24 NOTE — PROGRESS NOTES
Chief Complaint   Patient presents with   • Constipation       Damaso Leonard is a 61 y.o. male who presents to the office today for follow up appointment for Constipation  .    HPI    The patient states that the Linzess 145 mcg samples helped him but once he ran out the Miralax did not help his constipation.  He met with Dr. Cavanaugh, general surgeon, and he has started the testing to see if patient is a candidate for a NISSEN fundoplication.  Patient states that he does not eat a lot of high fiber foods.       Review of Systems   Constitutional: Positive for fatigue and unexpected weight change. Negative for appetite change, chills and fever.   HENT: Negative for trouble swallowing.    Eyes: Negative.    Respiratory: Negative for cough, choking, chest tightness and shortness of breath.    Cardiovascular: Negative for chest pain.   Gastrointestinal: Positive for abdominal distention, abdominal pain, nausea and rectal pain. Negative for anal bleeding, blood in stool, constipation, diarrhea and vomiting.   Endocrine: Negative.    Genitourinary: Negative for difficulty urinating.   Musculoskeletal: Positive for back pain. Negative for neck pain.   Skin: Negative.    Neurological: Positive for light-headedness. Negative for dizziness and headaches.   Hematological: Does not bruise/bleed easily.   Psychiatric/Behavioral: Negative.        ACTIVE PROBLEMS:   Specialty Problems        Gastroenterology Problems    Gastroesophageal reflux disease without esophagitis        Colitis        Gastroesophageal reflux disease              PAST MEDICAL HISTORY:  Past Medical History:   Diagnosis Date   • Anxiety    • Anxiety and depression    • Arthritis    • ASCVD (arteriosclerotic cardiovascular disease)    • CHF (congestive heart failure) ([facility])    • Colitis    • Coronary artery disease    • Disease of thyroid gland    • DM (diabetes mellitus) ([facility])    • Elevated cholesterol    • GERD (gastroesophageal reflux disease)    •  History of EKG 04/15/2015    NORMAL   • History of transfusion    • HTN (hypertension)    • Hyperlipidemia    • Injury of back    • Low back pain    • Myocardial infarction (HCC)     about 13 years ago   • Requires supplemental oxygen     USES 2 L AT NIGHT   • Sleep apnea     wears oxygen at night    • Wears glasses    • Wears partial dentures        SURGICAL HISTORY:  Past Surgical History:   Procedure Laterality Date   • CARDIAC CATHETERIZATION      reports 4 cardiac stents   • CARDIAC CATHETERIZATION N/A 12/11/2020    Procedure: Coronary angiography;  Surgeon: Noble Thompson MD;  Location:  [facility];  Service: Cardiology;  Laterality: N/A;   • CARDIAC CATHETERIZATION N/A 3/2/2022    Procedure: Left Heart Cath;  Surgeon: Arron Riley MD;  Location:  [facility];  Service: Cardiology;  Laterality: N/A;   • CARDIAC SURGERY      stenting   • CHOLECYSTECTOMY     • COLONOSCOPY  2007   • COLONOSCOPY N/A 5/30/2020    Procedure: COLONOSCOPY CPT CODE: 14022;  Surgeon: Anthony Garcia MD;  Location: [facility] OR;  Service: Gastroenterology;  Laterality: N/A;   • ENDOSCOPY N/A 5/30/2020    Procedure: ESOPHAGOGASTRODUODENOSCOPY WITH BIOPSY CPT CODE: 83590;  Surgeon: Anthony Garcia MD;  Location:  [facility] OR;  Service: Gastroenterology;  Laterality: N/A;   • ENDOSCOPY N/A 7/19/2021    Procedure: ESOPHAGOGASTRODUODENOSCOPY WITH BIOPSY;  Surgeon: Anthony Garcia MD;  Location:  [facility] OR;  Service: Gastroenterology;  Laterality: N/A;  dilated with balloon dilator to 20mm   • HERNIA REPAIR     • SHOULDER SURGERY     • TONSILLECTOMY     • TOTAL KNEE ARTHROPLASTY Right 1/27/2020    Procedure: TOTAL KNEE ARTHROPLASTY RIGHT;  Surgeon: Ortiz Barba MD;  Location:  [facility] OR;  Service: Orthopedics   • TYMPANOPLASTY     • UPPER GASTROINTESTINAL ENDOSCOPY     • VASECTOMY         FAMILY HISTORY:  Family History   Problem Relation Age of Onset   • Heart  attack Father    • Heart disease Father    • Hypertension Father    • Heart attack Sister    • Diabetes Sister    • Heart disease Sister    • Hypertension Sister    • Thyroid disease Sister    • Heart attack Brother    • Diabetes Brother    • Thyroid disease Brother    • Arthritis Daughter    • COPD Daughter    • Asthma Daughter    • Depression Daughter    • Heart disease Sister    • Hypertension Sister        SOCIAL HISTORY:  Social History     Tobacco Use   • Smoking status: Never Smoker   • Smokeless tobacco: Never Used   Substance Use Topics   • Alcohol use: No       CURRENT MEDICATION:    Current Outpatient Medications:   •  amitriptyline (ELAVIL) 50 MG tablet, Take 2 tablets by mouth At Night As Needed for Sleep., Disp: 180 tablet, Rfl: 1  •  aspirin (SB Low Dose ASA EC) 81 MG EC tablet, Take 1 tablet by mouth Daily., Disp: 90 tablet, Rfl: 3  •  carvedilol (COREG) 25 MG tablet, Take 1 tablet by mouth 2 (Two) Times a Day With Meals., Disp: 180 tablet, Rfl: 3  •  citalopram (CeleXA) 40 MG tablet, Take 1 tablet by mouth Daily., Disp: 90 tablet, Rfl: 5  •  clonazePAM (KlonoPIN) 0.5 MG tablet, TAKE 1 TABLET BY MOUTH THREE TIMES DAILY AS NEEDED FOR ANXIETY, Disp: 90 tablet, Rfl: 0  •  clopidogrel (PLAVIX) 75 MG tablet, Take 1 tablet by mouth Daily., Disp: 90 tablet, Rfl: 3  •  cyanocobalamin 1000 MCG/ML injection, Inject 1 mL into the appropriate muscle as directed by prescriber Every 30 (Thirty) Days., Disp: 1 mL, Rfl: 5  •  Dexilant 60 MG capsule, Take 1 capsule by mouth once daily for 30 days, Disp: 30 capsule, Rfl: 5  •  dicyclomine (BENTYL) 10 MG capsule, Take 1 capsule by mouth 4 (Four) Times a Day Before Meals & at Bedtime., Disp: 120 capsule, Rfl: 5  •  diphenhydrAMINE (Benadryl Allergy) 25 mg capsule, Take 1 capsule by mouth Every 6 (Six) Hours As Needed for Itching., Disp: 90 capsule, Rfl: 3  •  docusate sodium 100 MG capsule, Take 1 capsule by mouth 2 (Two) Times a Day As Needed (constipation)., Disp: 60  each, Rfl: 5  •  exenatide er (Bydureon) 2 MG pen-injector injection, Inject 1 pen under the skin into the appropriate area as directed 1 (One) Time Per Week., Disp: 4 each, Rfl: 5  •  famotidine (PEPCID) 40 MG tablet, Take 40 mg by mouth every night at bedtime., Disp: , Rfl:   •  finasteride (PROSCAR) 5 MG tablet, Take 1 tablet by mouth Daily., Disp: 90 tablet, Rfl: 3  •  glucose blood test strip, Check blood sugar 3x times a day., Disp: 100 each, Rfl: 12  •  glucose monitor monitoring kit, 1 each 3 (Three) Times a Day As Needed (diabetes)., Disp: 1 each, Rfl: 0  •  isosorbide mononitrate (IMDUR) 60 MG 24 hr tablet, Take 1 tablet by mouth Daily., Disp: 90 tablet, Rfl: 3  •  Lancets (ONETOUCH ULTRASOFT) lancets, Check blood sugar 3 times daily, Disp: 100 each, Rfl: 12  •  linaclotide (LINZESS) 145 MCG capsule capsule, Take 1 tablet once daily on an empty stomach at least 30 minutes prior to the first meal of the day., Disp: 90 capsule, Rfl: 3  •  metFORMIN (GLUCOPHAGE) 500 MG tablet, Take 1 tablet by mouth 2 (Two) Times a Day With Meals., Disp: 180 tablet, Rfl: 3  •  naloxone (NARCAN) 4 MG/0.1ML nasal spray, 1 spray into the nostril(s) as directed by provider As Needed (overdose)., Disp: 1 each, Rfl: 0  •  nitroglycerin (NITROSTAT) 0.4 MG SL tablet, 1 under the tongue as needed for angina, may repeat q5mins for up three doses, Disp: 50 tablet, Rfl: 1  •  O2 (OXYGEN), Inhale 2 L/min Every Night., Disp: , Rfl:   •  ondansetron (ZOFRAN) 8 MG tablet, TAKE 1 TABLET BY MOUTH EVERY 8 HOURS AS NEEDED FOR NAUSEA, Disp: 20 tablet, Rfl: 0  •  polyethylene glycol (MIRALAX) 17 g packet, Take 17 g by mouth Daily., Disp: 1 each, Rfl: 5  •  simvastatin (ZOCOR) 40 MG tablet, TAKE 1 TABLET BY MOUTH ONCE DAILY AT NIGHT, Disp: 90 tablet, Rfl: 0  •  Thyroid (NP THYROID) 30 MG PO tablet, Take 1 tablet by mouth Daily., Disp: 30 tablet, Rfl: 5  •  traMADol (ULTRAM) 50 MG tablet, Take 1 tablet by mouth Every 8 (Eight) Hours As Needed for  "Moderate Pain  or Severe Pain ., Disp: 60 tablet, Rfl: 3  •  vitamin D (ERGOCALCIFEROL) 1.25 MG (05468 UT) capsule capsule, Take 1 capsule by mouth Every 7 (Seven) Days., Disp: 12 capsule, Rfl: 5    ALLERGIES:  Ranexa [ranolazine]    VISIT VITALS:  Blood Pressure 119/83   Pulse 97   Height 177.8 cm (70\")   Weight 98 kg (216 lb)   Oxygen Saturation 98%   Body Mass Index 30.99 kg/m²     Physical Exam  Constitutional:       General: He is not in acute distress.     Appearance: He is well-developed. He is obese. He is not diaphoretic.   HENT:      Head: Normocephalic and atraumatic.      Right Ear: External ear normal.      Left Ear: External ear normal.      Nose: Nose normal.      Mouth/Throat:      Pharynx: No oropharyngeal exudate.   Eyes:      General: No scleral icterus.        Right eye: No discharge.         Left eye: No discharge.      Conjunctiva/sclera: Conjunctivae normal.      Pupils: Pupils are equal, round, and reactive to light.   Neck:      Thyroid: No thyromegaly.      Vascular: No JVD.      Trachea: No tracheal deviation.   Cardiovascular:      Rate and Rhythm: Normal rate and regular rhythm.      Heart sounds: Normal heart sounds. No murmur heard.    No friction rub. No gallop.   Pulmonary:      Effort: Pulmonary effort is normal. No respiratory distress.      Breath sounds: Normal breath sounds. No stridor. No wheezing or rales.   Chest:      Chest wall: No tenderness.   Abdominal:      General: Bowel sounds are normal. There is no distension.      Palpations: Abdomen is soft. There is no mass.      Tenderness: There is abdominal tenderness (upper abdomen). There is no guarding or rebound.      Hernia: No hernia is present.   Genitourinary:     Rectum: Guaiac result negative.   Musculoskeletal:      Cervical back: Normal range of motion and neck supple.   Lymphadenopathy:      Cervical: No cervical adenopathy.   Skin:     General: Skin is warm and dry.      Coloration: Skin is not pale.      " Findings: No erythema or rash.   Neurological:      Mental Status: He is alert and oriented to person, place, and time.      Cranial Nerves: No cranial nerve deficit.      Motor: No abnormal muscle tone.      Coordination: Coordination normal.      Deep Tendon Reflexes: Reflexes are normal and symmetric. Reflexes normal.   Psychiatric:         Behavior: Behavior normal.         Thought Content: Thought content normal.         Judgment: Judgment normal.         Assessment/Plan      Diagnosis Plan   1. Chronic idiopathic constipation     2. Gastroesophageal reflux disease, unspecified whether esophagitis present       The patient will begin his prescription of Linzess 145 mcg for his constipation.  He was given a list of high fiber foods.    Return in about 4 weeks (around 4/21/2022) for Recheck.         Patient's Body mass index is 30.99 kg/m². indicating that he is obese (BMI >30). Obesity-related health conditions include the following: hypertension and GERD. Obesity is unchanged. BMI is is above average; BMI management plan is completed. We discussed portion control and increasing exercise..      ANNMARIE Okeefe

## 2022-03-28 NOTE — H&P
Patient Identification:  Name:  Damaso Leonard  Age:  61 y.o.  Sex:  male  :  1960  MRN:  [id number]   Visit Number:  [id number]  Primary Care Physician:  Clare Quintero APRN    Chief complaint:   Chest pain   Here for cardiac cath    History of presenting illness:    Mr. Leonard was last seen in clinic on 10/12/2021 by me.  At that visit he denied any symptoms and no changes were made to his medications. He has history of known CAD with multiple previous stents.  Previous cath at outside facility showed patent LAD and CX stents with negative FFR.     Today Mr. Leonard reports that he has had some episodes of chest pain.  He says sometimes it is a severe squeezing pain across his chest with associated shortness of breath and diaphoresis.  He reports that this chest pain occurs about every few days.  He states that many times it lasts just for 30 seconds to a couple of minutes, however sometimes it has lasted greater than half hour.  He reports that he is taken nitro tabs and this helps his chest pain.    ROS: All systems reviewed and negative except as mentioned above.     ---------------------------------------------------------------------------------------------------------------------   Past Medical History:   Diagnosis Date   • Anxiety    • Anxiety and depression    • Arthritis    • ASCVD (arteriosclerotic cardiovascular disease)    • CHF (congestive heart failure) ([facility])    • Colitis    • Coronary artery disease    • Disease of thyroid gland    • DM (diabetes mellitus) ([facility])    • Elevated cholesterol    • GERD (gastroesophageal reflux disease)    • History of EKG 04/15/2015    NORMAL   • History of transfusion    • HTN (hypertension)    • Hyperlipidemia    • Injury of back    • Low back pain    • Myocardial infarction (HCC)     about 13 years ago   • Requires supplemental oxygen     USES 2 L AT NIGHT   • Sleep apnea     wears oxygen at night    • Wears glasses    • Wears partial  dentures      Past Surgical History:   Procedure Laterality Date   • CARDIAC CATHETERIZATION      reports 4 cardiac stents   • CARDIAC CATHETERIZATION N/A 12/11/2020    Procedure: Coronary angiography;  Surgeon: Noble Thompson MD;  Location:  [facility];  Service: Cardiology;  Laterality: N/A;   • CARDIAC CATHETERIZATION N/A 3/2/2022    Procedure: Left Heart Cath;  Surgeon: Arron Riley MD;  [facility]:  [facility];  Service: Cardiology;  Laterality: N/A;   • CARDIAC SURGERY      stenting   • CHOLECYSTECTOMY     • COLONOSCOPY  2007   • COLONOSCOPY N/A 5/30/2020    Procedure: COLONOSCOPY CPT CODE: 97898;  Surgeon: Anthony Garcia MD;  Location:  [facility];  Service: Gastroenterology;  Laterality: N/A;   • ENDOSCOPY N/A 5/30/2020    Procedure: ESOPHAGOGASTRODUODENOSCOPY WITH BIOPSY CPT CODE: 90356;  Surgeon: Anthony Garcia MD;  Location:  [facility];  Service: Gastroenterology;  Laterality: N/A;   • ENDOSCOPY N/A 7/19/2021    Procedure: ESOPHAGOGASTRODUODENOSCOPY WITH BIOPSY;  Surgeon: Anthony Garcia MD;  Location:  [facility];  Service: Gastroenterology;  Laterality: N/A;  dilated with balloon dilator to 20mm   • HERNIA REPAIR     • SHOULDER SURGERY     • TONSILLECTOMY     • TOTAL KNEE ARTHROPLASTY Right 1/27/2020    Procedure: TOTAL KNEE ARTHROPLASTY RIGHT;  Surgeon: Ortiz Barba MD;  Location:  [facility] OR;  Service: Orthopedics   • TYMPANOPLASTY     • UPPER GASTROINTESTINAL ENDOSCOPY     • VASECTOMY       Family History   Problem Relation Age of Onset   • Heart attack Father    • Heart disease Father    • Hypertension Father    • Heart attack Sister    • Diabetes Sister    • Heart disease Sister    • Hypertension Sister    • Thyroid disease Sister    • Heart attack Brother    • Diabetes Brother    • Thyroid disease Brother    • Arthritis Daughter    • COPD Daughter    • Asthma Daughter    • Depression Daughter    • Heart disease  Sister    • Hypertension Sister      Social History     Socioeconomic History   • Marital status:      Spouse name: ruben   • Number of children: 3   • Years of education: 12   Tobacco Use   • Smoking status: Never Smoker   • Smokeless tobacco: Never Used   Vaping Use   • Vaping Use: Never used   Substance and Sexual Activity   • Alcohol use: No   • Drug use: No   • Sexual activity: Defer     ---------------------------------------------------------------------------------------------------------------------   Allergies:  Ranexa [ranolazine]  ---------------------------------------------------------------------------------------------------------------------   Prior to Admission Medications     Prescriptions Last Dose Informant Patient Reported? Taking?    amitriptyline (ELAVIL) 50 MG tablet 3/1/2022  No Yes    Take 2 tablets by mouth At Night As Needed for Sleep.    aspirin (SB Low Dose ASA EC) 81 MG EC tablet 3/1/2022  No Yes    Take 1 tablet by mouth Daily.    carvedilol (COREG) 25 MG tablet 3/1/2022  No Yes    Take 1 tablet by mouth 2 (Two) Times a Day With Meals.    citalopram (CeleXA) 40 MG tablet 3/1/2022  No Yes    Take 1 tablet by mouth Daily.    clonazePAM (KlonoPIN) 0.5 MG tablet 3/1/2022  No Yes    TAKE 1 TABLET BY MOUTH THREE TIMES DAILY AS NEEDED FOR ANXIETY    clopidogrel (PLAVIX) 75 MG tablet 3/1/2022  No Yes    Take 1 tablet by mouth Daily.    cyanocobalamin 1000 MCG/ML injection Past Week  No Yes    Inject 1 mL into the appropriate muscle as directed by prescriber Every 30 (Thirty) Days.    Dexilant 60 MG capsule 3/1/2022  No Yes    Take 1 capsule by mouth once daily for 30 days    dicyclomine (BENTYL) 10 MG capsule 3/1/2022  No Yes    Take 1 capsule by mouth 4 (Four) Times a Day Before Meals & at Bedtime.    diphenhydrAMINE (Benadryl Allergy) 25 mg capsule More than a month  No No    Take 1 capsule by mouth Every 6 (Six) Hours As Needed for Itching.    docusate sodium 100 MG capsule Past  Week  No Yes    Take 1 capsule by mouth 2 (Two) Times a Day As Needed (constipation).    exenatide er (Bydureon) 2 MG pen-injector injection Past Week  No Yes    Inject 1 pen under the skin into the appropriate area as directed 1 (One) Time Per Week.    famotidine (PEPCID) 40 MG tablet   Yes No    Take 40 mg by mouth every night at bedtime.    finasteride (PROSCAR) 5 MG tablet More than a month  No No    Take 1 tablet by mouth Daily.    glucose blood test strip 3/1/2022  No Yes    Check blood sugar 3x times a day.    glucose monitor monitoring kit 3/1/2022  No Yes    1 each 3 (Three) Times a Day As Needed (diabetes).    isosorbide mononitrate (IMDUR) 60 MG 24 hr tablet 3/1/2022  No Yes    Take 1 tablet by mouth Daily.    Lancets (ONETOUCH ULTRASOFT) lancets 3/1/2022  No Yes    Check blood sugar 3 times daily    linaclotide (LINZESS) 145 MCG capsule capsule Past Week  No Yes    Take 1 tablet once daily on an empty stomach at least 30 minutes prior to the first meal of the day.    metFORMIN (GLUCOPHAGE) 500 MG tablet 3/1/2022  No Yes    Take 1 tablet by mouth 2 (Two) Times a Day With Meals.    naloxone (NARCAN) 4 MG/0.1ML nasal spray Unknown  No No    1 spray into the nostril(s) as directed by provider As Needed (overdose).    nitroglycerin (NITROSTAT) 0.4 MG SL tablet Unknown  No No    1 under the tongue as needed for angina, may repeat q5mins for up three doses    O2 (OXYGEN) 3/1/2022 Self Yes Yes    Inhale 2 L/min Every Night.    ondansetron (ZOFRAN) 8 MG tablet More than a month  No No    TAKE 1 TABLET BY MOUTH EVERY 8 HOURS AS NEEDED FOR NAUSEA    polyethylene glycol (MIRALAX) 17 g packet Past Week  No Yes    Take 17 g by mouth Daily.    simvastatin (ZOCOR) 40 MG tablet 3/1/2022  No Yes    TAKE 1 TABLET BY MOUTH ONCE DAILY AT NIGHT    Thyroid (NP THYROID) 30 MG PO tablet 3/1/2022  No Yes    Take 1 tablet by mouth Daily.    traMADol (ULTRAM) 50 MG tablet More than a month  No No    Take 1 tablet by mouth Every 8  (Eight) Hours As Needed for Moderate Pain  or Severe Pain .    vitamin D (ERGOCALCIFEROL) 1.25 MG (22208 UT) capsule capsule Past Week  No Yes    Take 1 capsule by mouth Every 7 (Seven) Days.        Hospital Scheduled Meds:    No current facility-administered medications for this encounter.    ---------------------------------------------------------------------------------------------------------------------   Vital Signs:         03/02/22  0740   Weight: 97.5 kg (215 lb)     Body mass index is 30.85 kg/m².  ---------------------------------------------------------------------------------------------------------------------   Physical Exam:  Constitutional:  Well-developed and well-nourished.  No respiratory distress.      HENT:  Head: Normocephalic and atraumatic.  Mouth:  Moist mucous membranes.    Eyes:  Conjunctivae and EOM are normal.  Pupils are equal, round, and reactive to light.  No scleral icterus.  Neck:  Neck supple.  No JVD present.    Cardiovascular:  Normal rate, regular rhythm and normal heart sounds with no murmur.  Pulmonary/Chest:  No respiratory distress, no wheezes, no crackles, with normal breath sounds and good air movement.  Abdominal:  Soft.  Bowel sounds are normal.  No distension and no tenderness.   Musculoskeletal:  No edema, no tenderness, and no deformity.  No red or swollen joints anywhere.    Neurological:  Alert and oriented to person, place, and time.  No cranial nerve deficit.  No tongue deviation.  No facial droop.  No slurred speech.   Skin:  Skin is warm and dry.  No rash noted.  No pallor.   Psychiatric:  Normal mood and affect.  Behavior is normal.  Judgment and thought content normal.   Peripheral vascular:  No edema and strong pulses on all 4 extremities.  ---------------------------------------------------------------------------------------------------------------------  EKG:  MIGUEL SIEGEL  Telemetry:  Sinus rhythm  I have personally looked at both the EKG and the telemetry  strips.  ---------------------------------------------------------------------------------------------------------------------     Lab Results   Component Value Date    HGBA1C 6.43 (H) 04/26/2021     Lab Results   Component Value Date    TSH 4.660 (H) 04/26/2021     I have personally reviewed the radiology images and read the final radiology report.  ---------------------------------------------------------------------------------------------------------------------  Assessment and Plan:   Episodic chest pain with anginal features  Stable Essential Hypertension  Stable DM type 2    Plan  Elective cardiac catheterization today     Arron Riley MD  03/28/22  12:03 EDT        Director, Cardiac Cath Lab

## 2022-04-08 ENCOUNTER — TELEPHONE (OUTPATIENT)
Dept: SURGERY | Facility: CLINIC | Age: 62
End: 2022-04-08

## 2022-04-11 ENCOUNTER — LAB (OUTPATIENT)
Dept: LAB | Facility: HOSPITAL | Age: 62
End: 2022-04-11

## 2022-04-11 DIAGNOSIS — K21.9 GASTROESOPHAGEAL REFLUX DISEASE, UNSPECIFIED WHETHER ESOPHAGITIS PRESENT: ICD-10-CM

## 2022-04-11 LAB — SARS-COV-2 RNA PNL SPEC NAA+PROBE: NOT DETECTED

## 2022-04-11 PROCEDURE — C9803 HOPD COVID-19 SPEC COLLECT: HCPCS | Performed by: SURGERY

## 2022-04-11 PROCEDURE — U0004 COV-19 TEST NON-CDC HGH THRU: HCPCS | Performed by: SURGERY

## 2022-04-13 ENCOUNTER — ANESTHESIA EVENT (OUTPATIENT)
Dept: PERIOP | Facility: HOSPITAL | Age: 62
End: 2022-04-13

## 2022-04-13 ENCOUNTER — ANESTHESIA (OUTPATIENT)
Dept: PERIOP | Facility: HOSPITAL | Age: 62
End: 2022-04-13

## 2022-04-13 ENCOUNTER — HOSPITAL ENCOUNTER (OUTPATIENT)
Facility: HOSPITAL | Age: 62
Setting detail: HOSPITAL OUTPATIENT SURGERY
Discharge: HOME | End: 2022-04-13
Attending: SURGERY | Admitting: SURGERY

## 2022-04-13 VITALS
RESPIRATION RATE: 20 BRPM | BODY MASS INDEX: 30.92 KG/M2 | HEART RATE: 81 BPM | SYSTOLIC BLOOD PRESSURE: 114 MMHG | HEIGHT: 70 IN | TEMPERATURE: 97 F | WEIGHT: 216 LBS | OXYGEN SATURATION: 94 % | DIASTOLIC BLOOD PRESSURE: 78 MMHG

## 2022-04-13 DIAGNOSIS — K21.9 GASTROESOPHAGEAL REFLUX DISEASE, UNSPECIFIED WHETHER ESOPHAGITIS PRESENT: ICD-10-CM

## 2022-04-13 LAB — GLUCOSE BLDC GLUCOMTR-MCNC: 100 MG/DL (ref 70–130)

## 2022-04-13 PROCEDURE — 82962 GLUCOSE BLOOD TEST: CPT

## 2022-04-13 PROCEDURE — 25010000002 ONDANSETRON PER 1 MG: Performed by: NURSE ANESTHETIST, CERTIFIED REGISTERED

## 2022-04-13 PROCEDURE — 25010000002 MIDAZOLAM PER 1 MG: Performed by: NURSE ANESTHETIST, CERTIFIED REGISTERED

## 2022-04-13 PROCEDURE — 88305 TISSUE EXAM BY PATHOLOGIST: CPT | Performed by: SURGERY

## 2022-04-13 PROCEDURE — 25010000002 PROPOFOL 10 MG/ML EMULSION: Performed by: NURSE ANESTHETIST, CERTIFIED REGISTERED

## 2022-04-13 RX ORDER — SODIUM CHLORIDE 0.9 % (FLUSH) 0.9 %
10 SYRINGE (ML) INJECTION EVERY 12 HOURS SCHEDULED
Status: DISCONTINUED | OUTPATIENT
Start: 2022-04-13 | End: 2022-04-13 | Stop reason: HOSPADM

## 2022-04-13 RX ORDER — MIDAZOLAM HYDROCHLORIDE 1 MG/ML
1 INJECTION INTRAMUSCULAR; INTRAVENOUS
Status: DISCONTINUED | OUTPATIENT
Start: 2022-04-13 | End: 2022-04-13 | Stop reason: HOSPADM

## 2022-04-13 RX ORDER — OXYCODONE HYDROCHLORIDE AND ACETAMINOPHEN 5; 325 MG/1; MG/1
1 TABLET ORAL ONCE AS NEEDED
Status: DISCONTINUED | OUTPATIENT
Start: 2022-04-13 | End: 2022-04-13 | Stop reason: HOSPADM

## 2022-04-13 RX ORDER — DEXLANSOPRAZOLE 60 MG/1
60 CAPSULE, DELAYED RELEASE ORAL DAILY
Qty: 30 CAPSULE | Refills: 5
Start: 2022-04-16 | End: 2022-08-15

## 2022-04-13 RX ORDER — MIDAZOLAM HYDROCHLORIDE 1 MG/ML
INJECTION INTRAMUSCULAR; INTRAVENOUS AS NEEDED
Status: DISCONTINUED | OUTPATIENT
Start: 2022-04-13 | End: 2022-04-13 | Stop reason: SURG

## 2022-04-13 RX ORDER — SODIUM CHLORIDE, SODIUM LACTATE, POTASSIUM CHLORIDE, CALCIUM CHLORIDE 600; 310; 30; 20 MG/100ML; MG/100ML; MG/100ML; MG/100ML
100 INJECTION, SOLUTION INTRAVENOUS ONCE AS NEEDED
Status: DISCONTINUED | OUTPATIENT
Start: 2022-04-13 | End: 2022-04-13 | Stop reason: HOSPADM

## 2022-04-13 RX ORDER — FENTANYL CITRATE 50 UG/ML
50 INJECTION, SOLUTION INTRAMUSCULAR; INTRAVENOUS
Status: DISCONTINUED | OUTPATIENT
Start: 2022-04-13 | End: 2022-04-13 | Stop reason: HOSPADM

## 2022-04-13 RX ORDER — ONDANSETRON 2 MG/ML
4 INJECTION INTRAMUSCULAR; INTRAVENOUS AS NEEDED
Status: DISCONTINUED | OUTPATIENT
Start: 2022-04-13 | End: 2022-04-13 | Stop reason: HOSPADM

## 2022-04-13 RX ORDER — DROPERIDOL 2.5 MG/ML
0.62 INJECTION, SOLUTION INTRAMUSCULAR; INTRAVENOUS ONCE AS NEEDED
Status: DISCONTINUED | OUTPATIENT
Start: 2022-04-13 | End: 2022-04-13 | Stop reason: HOSPADM

## 2022-04-13 RX ORDER — PROPOFOL 10 MG/ML
VIAL (ML) INTRAVENOUS CONTINUOUS PRN
Status: DISCONTINUED | OUTPATIENT
Start: 2022-04-13 | End: 2022-04-13 | Stop reason: SURG

## 2022-04-13 RX ORDER — MEPERIDINE HYDROCHLORIDE 25 MG/ML
12.5 INJECTION INTRAMUSCULAR; INTRAVENOUS; SUBCUTANEOUS
Status: DISCONTINUED | OUTPATIENT
Start: 2022-04-13 | End: 2022-04-13 | Stop reason: HOSPADM

## 2022-04-13 RX ORDER — KETOROLAC TROMETHAMINE 30 MG/ML
30 INJECTION, SOLUTION INTRAMUSCULAR; INTRAVENOUS EVERY 6 HOURS PRN
Status: DISCONTINUED | OUTPATIENT
Start: 2022-04-13 | End: 2022-04-13 | Stop reason: HOSPADM

## 2022-04-13 RX ORDER — SODIUM CHLORIDE 0.9 % (FLUSH) 0.9 %
10 SYRINGE (ML) INJECTION AS NEEDED
Status: DISCONTINUED | OUTPATIENT
Start: 2022-04-13 | End: 2022-04-13 | Stop reason: HOSPADM

## 2022-04-13 RX ORDER — IPRATROPIUM BROMIDE AND ALBUTEROL SULFATE 2.5; .5 MG/3ML; MG/3ML
3 SOLUTION RESPIRATORY (INHALATION) ONCE AS NEEDED
Status: DISCONTINUED | OUTPATIENT
Start: 2022-04-13 | End: 2022-04-13 | Stop reason: HOSPADM

## 2022-04-13 RX ORDER — SODIUM CHLORIDE, SODIUM LACTATE, POTASSIUM CHLORIDE, CALCIUM CHLORIDE 600; 310; 30; 20 MG/100ML; MG/100ML; MG/100ML; MG/100ML
125 INJECTION, SOLUTION INTRAVENOUS ONCE
Status: COMPLETED | OUTPATIENT
Start: 2022-04-13 | End: 2022-04-13

## 2022-04-13 RX ADMIN — MIDAZOLAM 2 MG: 1 INJECTION INTRAMUSCULAR; INTRAVENOUS at 07:30

## 2022-04-13 RX ADMIN — PROPOFOL 150 MCG/KG/MIN: 10 INJECTION, EMULSION INTRAVENOUS at 07:33

## 2022-04-13 RX ADMIN — ONDANSETRON 4 MG: 2 INJECTION INTRAMUSCULAR; INTRAVENOUS at 08:09

## 2022-04-13 RX ADMIN — SODIUM CHLORIDE, POTASSIUM CHLORIDE, SODIUM LACTATE AND CALCIUM CHLORIDE: 600; 310; 30; 20 INJECTION, SOLUTION INTRAVENOUS at 07:30

## 2022-04-13 NOTE — ANESTHESIA POSTPROCEDURE EVALUATION
Patient: Damaso Leonard    Procedure Summary     Date: 04/13/22 Room / Location: [facility] OR [address] [place] [address] [facility] OR    Anesthesia Start: 0729 Anesthesia Stop: 0753    Procedure: ESOPHAGOGASTRODUODENOSCOPY AND BRAVO (N/A Esophagus) Diagnosis:       Gastroesophageal reflux disease, unspecified whether esophagitis present      (Gastroesophageal reflux disease, unspecified whether esophagitis present [K21.9])    Surgeons: Ulysses Cavanaugh MD Provider: Royal Garcia MD    Anesthesia Type: general ASA Status: 3          Anesthesia Type: general    Vitals  Vitals Value Taken Time   /78 04/13/22 0836   Temp 97 °F (36.1 °C) 04/13/22 0755   Pulse 81 04/13/22 0836   Resp 20 04/13/22 0836   SpO2 94 % 04/13/22 0836           Post Anesthesia Care and Evaluation    Patient location during evaluation: PHASE II  Patient participation: complete - patient participated  Level of consciousness: awake and alert  Pain score: 1  Pain management: adequate  Airway patency: patent  Anesthetic complications: No anesthetic complications  PONV Status: controlled  Cardiovascular status: acceptable  Respiratory status: acceptable  Hydration status: acceptable

## 2022-04-13 NOTE — ANESTHESIA PREPROCEDURE EVALUATION
Anesthesia Evaluation     Patient summary reviewed and Nursing notes reviewed   no history of anesthetic complications:  NPO Solid Status: > 8 hours  NPO Liquid Status: > 8 hours           Airway   Mallampati: II  TM distance: >3 FB  Neck ROM: full  Dental - normal exam     Pulmonary     breath sounds clear to auscultation  (+) home oxygen (2 liters at night), sleep apnea,   Cardiovascular   Exercise tolerance: poor (<4 METS)    Rhythm: regular  Rate: normal    (+) hypertension, past MI (2015 and 2021) , CAD, cardiac stents (4 stents) more than 12 months ago angina, CHF , PVD, hyperlipidemia,       Neuro/Psych  (+) numbness, psychiatric history,    GI/Hepatic/Renal/Endo    (+) obesity,  GERD,  diabetes mellitus, thyroid problem   (-) morbid obesity    Musculoskeletal     Abdominal   (+) obese,     Abdomen: soft.   Substance History      OB/GYN          Other   arthritis,                    Anesthesia Plan    ASA 3     general     intravenous induction     Anesthetic plan, all risks, benefits, and alternatives have been provided, discussed and informed consent has been obtained with: patient.  Use of blood products discussed with consented to blood products.   Plan discussed with CRNA.        CODE STATUS:

## 2022-04-15 LAB
LAB AP CASE REPORT: NORMAL
PATH REPORT.FINAL DX SPEC: NORMAL

## 2022-04-27 ENCOUNTER — OFFICE VISIT (OUTPATIENT)
Dept: SURGERY | Facility: CLINIC | Age: 62
End: 2022-04-27

## 2022-04-27 VITALS — BODY MASS INDEX: 30.92 KG/M2 | HEIGHT: 70 IN | WEIGHT: 216 LBS

## 2022-04-27 DIAGNOSIS — K21.9 GASTROESOPHAGEAL REFLUX DISEASE, UNSPECIFIED WHETHER ESOPHAGITIS PRESENT: Primary | ICD-10-CM

## 2022-04-27 DIAGNOSIS — K21.9 GASTROESOPHAGEAL REFLUX DISEASE WITHOUT ESOPHAGITIS: ICD-10-CM

## 2022-04-27 PROCEDURE — 99213 OFFICE O/P EST LOW 20 MIN: CPT | Performed by: SURGERY

## 2022-04-28 NOTE — PROGRESS NOTES
Subjective   Damaso Leonard is a 61 y.o. male  is here today for follow-up.         Damaso Leonard is a 61 y.o. male here for follow up after EGD with Bravo probe study.  The patient has a small recurrent hiatal hernia after what he reports a previous repair.  Bravo study showed mildly elevated DeMeester score and symptoms correlate with episodes of reflux reported by patient.        Assessment     Diagnoses and all orders for this visit:    1. Gastroesophageal reflux disease, unspecified whether esophagitis present (Primary)  -     CT Abdomen Pelvis With Contrast; Future    2. Gastroesophageal reflux disease without esophagitis      Damaso Leonard is a 61 y.o. male with recurrent hiatal hernia after previous repair done 20 to 30 years ago.  The patient will undergo imaging to assess the anatomy of his GE junction and follow-up after imaging to discuss further management.  Continue PPI therapy at this time.

## 2022-05-10 ENCOUNTER — HOSPITAL ENCOUNTER (OUTPATIENT)
Dept: CT IMAGING | Facility: HOSPITAL | Age: 62
Discharge: HOME | End: 2022-05-10
Admitting: SURGERY

## 2022-05-10 DIAGNOSIS — K21.9 GASTROESOPHAGEAL REFLUX DISEASE, UNSPECIFIED WHETHER ESOPHAGITIS PRESENT: ICD-10-CM

## 2022-05-10 LAB — CREAT BLDA-MCNC: 1.1 MG/DL (ref 0.6–1.3)

## 2022-05-10 PROCEDURE — 25010000002 IOPAMIDOL 61 % SOLUTION: Performed by: SURGERY

## 2022-05-10 PROCEDURE — 74177 CT ABD & PELVIS W/CONTRAST: CPT

## 2022-05-10 PROCEDURE — 74177 CT ABD & PELVIS W/CONTRAST: CPT | Performed by: RADIOLOGY

## 2022-05-10 PROCEDURE — 82565 ASSAY OF CREATININE: CPT

## 2022-05-10 RX ADMIN — IOPAMIDOL 87 ML: 612 INJECTION, SOLUTION INTRAVENOUS at 13:17

## 2022-05-11 ENCOUNTER — OFFICE VISIT (OUTPATIENT)
Dept: SURGERY | Facility: CLINIC | Age: 62
End: 2022-05-11

## 2022-05-11 VITALS — BODY MASS INDEX: 30.92 KG/M2 | HEIGHT: 70 IN | WEIGHT: 216 LBS

## 2022-05-11 DIAGNOSIS — K21.9 GASTROESOPHAGEAL REFLUX DISEASE WITHOUT ESOPHAGITIS: Primary | ICD-10-CM

## 2022-05-11 PROCEDURE — 99213 OFFICE O/P EST LOW 20 MIN: CPT | Performed by: SURGERY

## 2022-05-12 ENCOUNTER — TELEPHONE (OUTPATIENT)
Dept: SURGERY | Facility: CLINIC | Age: 62
End: 2022-05-12

## 2022-05-12 NOTE — PROGRESS NOTES
Subjective   Damaso Leonard is a 61 y.o. male  is here today for follow-up.         Damaso Leonard is a 61 y.o. male here for follow up after EGD with Bravo probe study.  The patient has a small recurrent hiatal hernia after what he reports a previous repair.  Bravo study showed mildly elevated DeMeester score and symptoms correlate with episodes of reflux reported by patient.  CT shows no evidence of previous fundoplication.  Esophagram did show some distal esophageal dysmotility and dilation.  Manometry will be performed.        Assessment     Diagnoses and all orders for this visit:    1. Gastroesophageal reflux disease without esophagitis (Primary)  -     Motility Study, Esophageal; Future      Damaso Leonard is a 61 y.o. male with recurrent hiatal hernia after previous repair done 20 to 30 years ago.  The patient will undergo imaging to assess the anatomy of his GE junction and follow-up after imaging to discuss further management.  Continue PPI therapy at this time.  Manometry to be performed and follow-up after manometry to discuss further management.

## 2022-05-12 NOTE — TELEPHONE ENCOUNTER
Gave patient appointment with dr. renae June 15th @ [phone] [facility] [address], patient voiced understanding,

## 2022-06-15 ENCOUNTER — TRANSCRIBE ORDERS (OUTPATIENT)
Dept: ADMINISTRATIVE | Facility: HOSPITAL | Age: 62
End: 2022-06-15

## 2022-06-15 DIAGNOSIS — K21.00 GASTROESOPHAGEAL REFLUX DISEASE WITH ESOPHAGITIS, UNSPECIFIED WHETHER HEMORRHAGE: Primary | ICD-10-CM

## 2022-06-28 ENCOUNTER — TRANSCRIBE ORDERS (OUTPATIENT)
Dept: LAB | Facility: HOSPITAL | Age: 62
End: 2022-06-28

## 2022-06-28 ENCOUNTER — LAB (OUTPATIENT)
Dept: LAB | Facility: HOSPITAL | Age: 62
End: 2022-06-28

## 2022-06-28 DIAGNOSIS — Z01.818 OTHER SPECIFIED PRE-OPERATIVE EXAMINATION: Primary | ICD-10-CM

## 2022-06-28 DIAGNOSIS — Z01.818 OTHER SPECIFIED PRE-OPERATIVE EXAMINATION: ICD-10-CM

## 2022-06-28 LAB — SARS-COV-2 RNA PNL SPEC NAA+PROBE: NOT DETECTED

## 2022-06-28 PROCEDURE — C9803 HOPD COVID-19 SPEC COLLECT: HCPCS

## 2022-06-28 PROCEDURE — U0004 COV-19 TEST NON-CDC HGH THRU: HCPCS

## 2022-06-30 ENCOUNTER — HOSPITAL ENCOUNTER (OUTPATIENT)
Facility: HOSPITAL | Age: 62
Setting detail: HOSPITAL OUTPATIENT SURGERY
Discharge: HOME | End: 2022-06-30
Attending: SURGERY | Admitting: SURGERY

## 2022-06-30 PROCEDURE — 91010 ESOPHAGUS MOTILITY STUDY: CPT | Performed by: SURGERY

## 2022-07-06 ENCOUNTER — TRANSCRIBE ORDERS (OUTPATIENT)
Dept: LAB | Facility: HOSPITAL | Age: 62
End: 2022-07-06

## 2022-07-06 DIAGNOSIS — Z01.818 OTHER SPECIFIED PRE-OPERATIVE EXAMINATION: Primary | ICD-10-CM

## 2022-07-06 DIAGNOSIS — I25.10 CORONARY ARTERY DISEASE INVOLVING NATIVE CORONARY ARTERY OF NATIVE HEART WITHOUT ANGINA PECTORIS: ICD-10-CM

## 2022-07-06 LAB — SARS-COV-2 RNA RESP QL NAA+PROBE: NOT DETECTED

## 2022-07-06 PROCEDURE — 87635 SARS-COV-2 COVID-19 AMP PRB: CPT | Performed by: NURSE PRACTITIONER

## 2022-07-27 ENCOUNTER — HOSPITAL ENCOUNTER (OUTPATIENT)
Dept: NUCLEAR MEDICINE | Facility: HOSPITAL | Age: 62
Discharge: HOME | End: 2022-07-27

## 2022-07-27 DIAGNOSIS — K21.00 GASTROESOPHAGEAL REFLUX DISEASE WITH ESOPHAGITIS, UNSPECIFIED WHETHER HEMORRHAGE: ICD-10-CM

## 2022-07-27 PROCEDURE — A9541 TC99M SULFUR COLLOID: HCPCS | Performed by: SURGERY

## 2022-07-27 PROCEDURE — 78264 GASTRIC EMPTYING IMG STUDY: CPT

## 2022-07-27 PROCEDURE — 0 TECHNETIUM SULFUR COLLOID: Performed by: SURGERY

## 2022-07-27 RX ADMIN — TECHNETIUM TC 99M SULFUR COLLOID 1 DOSE: KIT at 08:35

## 2022-08-10 DIAGNOSIS — K21.9 GASTROESOPHAGEAL REFLUX DISEASE, UNSPECIFIED WHETHER ESOPHAGITIS PRESENT: ICD-10-CM

## 2022-08-11 RX ORDER — CARVEDILOL 25 MG/1
25 TABLET ORAL 2 TIMES DAILY WITH MEALS
Qty: 180 TABLET | Refills: 3 | Status: SHIPPED | OUTPATIENT
Start: 2022-08-11

## 2022-08-15 RX ORDER — DEXLANSOPRAZOLE 60 MG/1
CAPSULE, DELAYED RELEASE ORAL
Qty: 30 CAPSULE | Refills: 5 | Status: SHIPPED | OUTPATIENT
Start: 2022-08-15 | End: 2023-03-31

## 2022-12-27 DIAGNOSIS — I25.118 CORONARY ARTERY DISEASE OF NATIVE ARTERY OF NATIVE HEART WITH STABLE ANGINA PECTORIS: Primary | Chronic | ICD-10-CM

## 2022-12-27 RX ORDER — ASPIRIN 81 MG/1
81 TABLET ORAL DAILY
Qty: 90 TABLET | Refills: 3 | Status: SHIPPED | OUTPATIENT
Start: 2022-12-27

## 2023-01-05 DIAGNOSIS — I25.118 CORONARY ARTERY DISEASE OF NATIVE ARTERY OF NATIVE HEART WITH STABLE ANGINA PECTORIS: Primary | Chronic | ICD-10-CM

## 2023-01-05 RX ORDER — CLOPIDOGREL BISULFATE 75 MG/1
75 TABLET ORAL DAILY
Qty: 90 TABLET | Refills: 3 | Status: SHIPPED | OUTPATIENT
Start: 2023-01-05 | End: 2023-01-05 | Stop reason: SDUPTHER

## 2023-01-05 RX ORDER — CLOPIDOGREL BISULFATE 75 MG/1
75 TABLET ORAL DAILY
Qty: 90 TABLET | Refills: 3 | Status: SHIPPED | OUTPATIENT
Start: 2023-01-05

## 2023-01-05 NOTE — TELEPHONE ENCOUNTER
Caller: Damaso Leonard    Relationship: Self    Best call back number: [phone]    Requested Prescriptions:   Requested Prescriptions     Pending Prescriptions Disp Refills   • clopidogrel (PLAVIX) 75 MG tablet 90 tablet 3     Sig: Take 1 tablet by mouth Daily.        Pharmacy where request should be sent: [facility] [address] [place] [address] [phone]  - [phone]     Additional details provided by patient: PT IS COMPLETELY OUT OF THIS MEDICATION. PT DOES NOT HAVE A VOICEMAIL BOX SET UP.    Does the patient have less than a 3 day supply:  [x] Yes  [] No    Would you like a call back once the refill request has been completed: [x] Yes [] No        Homero Rodriguez Rep   01/05/23 14:38 EST

## 2023-01-11 ENCOUNTER — OFFICE VISIT (OUTPATIENT)
Dept: CARDIOLOGY | Facility: CLINIC | Age: 63
End: 2023-01-11
Payer: MEDICARE

## 2023-01-11 VITALS
SYSTOLIC BLOOD PRESSURE: 119 MMHG | DIASTOLIC BLOOD PRESSURE: 79 MMHG | HEART RATE: 88 BPM | BODY MASS INDEX: 31.22 KG/M2 | WEIGHT: 217.6 LBS

## 2023-01-11 DIAGNOSIS — E78.00 HYPERCHOLESTEROLEMIA: Chronic | ICD-10-CM

## 2023-01-11 DIAGNOSIS — I10 ESSENTIAL HYPERTENSION: Chronic | ICD-10-CM

## 2023-01-11 DIAGNOSIS — I25.118 CORONARY ARTERY DISEASE OF NATIVE ARTERY OF NATIVE HEART WITH STABLE ANGINA PECTORIS: Chronic | ICD-10-CM

## 2023-01-11 DIAGNOSIS — I20.9 ANGINAL PAIN: Primary | ICD-10-CM

## 2023-01-11 PROCEDURE — 99214 OFFICE O/P EST MOD 30 MIN: CPT | Performed by: NURSE PRACTITIONER

## 2023-01-11 RX ORDER — FERROUS SULFATE 325(65) MG
1 TABLET ORAL DAILY
COMMUNITY
Start: 2022-12-28

## 2023-01-11 RX ORDER — PANTOPRAZOLE SODIUM 40 MG/1
40 TABLET, DELAYED RELEASE ORAL DAILY
COMMUNITY
Start: 2022-11-09

## 2023-01-11 NOTE — PROGRESS NOTES
Chief Complaint  Chest Pain (Patient experiences chest pain at random. Patient describes the pain lasting a few seconds of pressure. ), Shortness of Breath (Patient states he experiences soa with exertion and physical activity. Patient wears oxygen at night. ), and Med Management (Verbally reviewed medications with patient. Patient is tolerating medications well. )    Subjective          Damaso Leonard presents to [facility] for follow up.    History of Present Illness    Pt was last seen in clinic on on 2/14/2022.  Patient reported chest pain and a elective outpatient cath was scheduled.  On 3/2/2022 he underwent cardiac catheterization.  The proximal LAD to mid LAD was noted to be 30% stenosed, mid LAD to distal LAD 10% stenosis, proximal circumflex was 30% stenosed, and mid circumflex to distal circumflex was 60% stenosed.  It appeared that his anatomy had been unchanged since previous cath of 12/20/2020.    At today's visit Mr. Leonard states that he has been doing well he does report some fleeting chest pain lasting less than a minute and not associated with any other symptoms.  He denies any shortness of breath or dyspnea on exertion.  He does report occasional lightheadedness.    Objective     Vital Signs:   /79 (BP Location: Left arm, Patient Position: Sitting, Cuff Size: Large Adult)   Pulse 88   Wt 98.7 kg (217 lb 9.6 oz)   BMI 31.22 kg/m²       Physical Exam  Vitals reviewed.   Constitutional:       Appearance: Normal appearance. He is well-developed.   Cardiovascular:      Rate and Rhythm: Normal rate and regular rhythm.      Heart sounds: No murmur heard.    No friction rub. No gallop.   Pulmonary:      Effort: Pulmonary effort is normal. No respiratory distress.      Breath sounds: Normal breath sounds. No wheezing or rales.   Skin:     General: Skin is warm and dry.   Neurological:      Mental Status: He is alert and oriented to person, place, and time.    Psychiatric:         Mood and Affect: Mood normal.         Behavior: Behavior normal.          Result Review :            ECG 12 Lead    Date/Time: 1/13/2023 11:03 AM  Performed by: Meg Gonzalez APRN  Authorized by: Meg Gonzalez APRN   Comparison: compared with previous ECG from 2/14/2022  Similar to previous ECG  Comparison to previous ECG: Sinus rhythm 86 bpm  Rhythm: sinus rhythm  Rate: normal  BPM: 89  Comments:              Current Outpatient Medications   Medication Sig Dispense Refill   • amitriptyline (ELAVIL) 50 MG tablet Take 2 tablets by mouth At Night As Needed for Sleep. 180 tablet 1   • aspirin (SB Low Dose ASA EC) 81 MG EC tablet Take 1 tablet by mouth Daily. 90 tablet 3   • carvedilol (COREG) 25 MG tablet Take 1 tablet by mouth 2 (Two) Times a Day With Meals. 180 tablet 3   • citalopram (CeleXA) 40 MG tablet Take 1 tablet by mouth Daily. 90 tablet 5   • clonazePAM (KlonoPIN) 0.5 MG tablet TAKE 1 TABLET BY MOUTH THREE TIMES DAILY AS NEEDED FOR ANXIETY 90 tablet 0   • clopidogrel (PLAVIX) 75 MG tablet Take 1 tablet by mouth Daily. 90 tablet 3   • cyanocobalamin 1000 MCG/ML injection Inject 1 mL into the appropriate muscle as directed by prescriber Every 30 (Thirty) Days. 1 mL 5   • dexlansoprazole (DEXILANT) 60 MG capsule TAKE ONE CAPSULE BY MOUTH EVERY DAY 30 capsule 5   • dicyclomine (BENTYL) 10 MG capsule Take 1 capsule by mouth 4 (Four) Times a Day Before Meals & at Bedtime. 120 capsule 5   • diphenhydrAMINE (Benadryl Allergy) 25 mg capsule Take 1 capsule by mouth Every 6 (Six) Hours As Needed for Itching. 90 capsule 3   • docusate sodium 100 MG capsule Take 1 capsule by mouth 2 (Two) Times a Day As Needed (constipation). 60 each 5   • exenatide er (Bydureon) 2 MG pen-injector injection Inject 1 pen under the skin into the appropriate area as directed 1 (One) Time Per Week. 4 each 5   • finasteride (PROSCAR) 5 MG tablet Take 1 tablet by mouth Daily. 90 tablet 3   • glucose blood  test strip Check blood sugar 3x times a day. 100 each 12   • glucose monitor monitoring kit 1 each 3 (Three) Times a Day As Needed (diabetes). 1 each 0   • isosorbide mononitrate (IMDUR) 60 MG 24 hr tablet Take 1 tablet by mouth Daily. 90 tablet 3   • Lancets (ONETOUCH ULTRASOFT) lancets Check blood sugar 3 times daily 100 each 12   • linaclotide (LINZESS) 145 MCG capsule capsule Take 1 tablet once daily on an empty stomach at least 30 minutes prior to the first meal of the day. 90 capsule 3   • metFORMIN (GLUCOPHAGE) 500 MG tablet Take 1 tablet by mouth 2 (Two) Times a Day With Meals. 180 tablet 3   • naloxone (NARCAN) 4 MG/0.1ML nasal spray 1 spray into the nostril(s) as directed by provider As Needed (overdose). 1 each 0   • nitroglycerin (NITROSTAT) 0.4 MG SL tablet 1 under the tongue as needed for angina, may repeat q5mins for up three doses 50 tablet 1   • O2 (OXYGEN) Inhale 2 L/min Every Night.     • ondansetron (ZOFRAN) 8 MG tablet TAKE 1 TABLET BY MOUTH EVERY 8 HOURS AS NEEDED FOR NAUSEA 20 tablet 0   • pantoprazole (PROTONIX) 40 MG EC tablet Take 40 mg by mouth Daily.     • polyethylene glycol (MIRALAX) 17 g packet Take 17 g by mouth Daily. 1 each 5   • simvastatin (ZOCOR) 40 MG tablet TAKE 1 TABLET BY MOUTH ONCE DAILY AT NIGHT 90 tablet 0   • SV Iron 325 MG tablet Take 1 tablet by mouth Daily.     • Thyroid (NP THYROID) 30 MG PO tablet Take 1 tablet by mouth Daily. 30 tablet 5   • traMADol (ULTRAM) 50 MG tablet Take 1 tablet by mouth Every 8 (Eight) Hours As Needed for Moderate Pain  or Severe Pain . 60 tablet 3   • vitamin D (ERGOCALCIFEROL) 1.25 MG (55582 UT) capsule capsule Take 1 capsule by mouth Every 7 (Seven) Days. 12 capsule 5     No current facility-administered medications for this visit.            Assessment and Plan    Problem List Items Addressed This Visit        Cardiac and Vasculature    Coronary artery disease involving native coronary artery of native heart (Chronic)     Hypercholesterolemia (Chronic)    Essential hypertension (Chronic)    Anginal pain (HCC) - Primary    Relevant Orders    ECG 12 Lead           Follow Up     Medications were reviewed with the patient.    ASCVD is stable.  Patient is to continue aspirin, Plavix, carvedilol, and simvastatin.    Hypertension is controlled.    With regard to hypercholesterolemia, labs requested from PCP office.    EKG is stable from previous EKG of 2/14/2022.  Continue isosorbide mononitrate for angina.    Return in about 6 months (around 7/11/2023).    Patient was given instructions and counseling regarding his condition or for health maintenance advice. Please see specific information pulled into the AVS if appropriate.

## 2023-01-13 PROCEDURE — 93000 ELECTROCARDIOGRAM COMPLETE: CPT | Performed by: NURSE PRACTITIONER

## 2023-03-16 DIAGNOSIS — K21.9 GASTROESOPHAGEAL REFLUX DISEASE, UNSPECIFIED WHETHER ESOPHAGITIS PRESENT: ICD-10-CM

## 2023-03-16 RX ORDER — DEXLANSOPRAZOLE 60 MG/1
CAPSULE, DELAYED RELEASE ORAL
Qty: 30 CAPSULE | Refills: 5 | OUTPATIENT
Start: 2023-03-16

## 2023-03-31 DIAGNOSIS — K21.9 GASTROESOPHAGEAL REFLUX DISEASE, UNSPECIFIED WHETHER ESOPHAGITIS PRESENT: ICD-10-CM

## 2023-03-31 RX ORDER — DEXLANSOPRAZOLE 60 MG/1
CAPSULE, DELAYED RELEASE ORAL
Qty: 30 CAPSULE | Refills: 5 | Status: SHIPPED | OUTPATIENT
Start: 2023-03-31

## 2023-08-01 ENCOUNTER — OFFICE VISIT (OUTPATIENT)
Dept: CARDIOLOGY | Facility: CLINIC | Age: 63
End: 2023-08-01
Payer: MEDICARE

## 2023-08-01 VITALS
HEIGHT: 70 IN | BODY MASS INDEX: 26.34 KG/M2 | WEIGHT: 184 LBS | DIASTOLIC BLOOD PRESSURE: 68 MMHG | OXYGEN SATURATION: 96 % | SYSTOLIC BLOOD PRESSURE: 100 MMHG | HEART RATE: 78 BPM

## 2023-08-01 DIAGNOSIS — I10 ESSENTIAL HYPERTENSION: Primary | Chronic | ICD-10-CM

## 2023-08-01 DIAGNOSIS — I25.118 CORONARY ARTERY DISEASE OF NATIVE ARTERY OF NATIVE HEART WITH STABLE ANGINA PECTORIS: Chronic | ICD-10-CM

## 2023-08-01 DIAGNOSIS — E78.00 HYPERCHOLESTEROLEMIA: Chronic | ICD-10-CM

## 2023-08-01 PROCEDURE — 1160F RVW MEDS BY RX/DR IN RCRD: CPT | Performed by: NURSE PRACTITIONER

## 2023-08-01 PROCEDURE — 99214 OFFICE O/P EST MOD 30 MIN: CPT | Performed by: NURSE PRACTITIONER

## 2023-08-01 PROCEDURE — 3078F DIAST BP <80 MM HG: CPT | Performed by: NURSE PRACTITIONER

## 2023-08-01 PROCEDURE — 3074F SYST BP LT 130 MM HG: CPT | Performed by: NURSE PRACTITIONER

## 2023-08-01 PROCEDURE — 1159F MED LIST DOCD IN RCRD: CPT | Performed by: NURSE PRACTITIONER

## 2023-08-01 RX ORDER — ASPIRIN 81 MG/1
81 TABLET ORAL DAILY
Qty: 90 TABLET | Refills: 3 | Status: SHIPPED | OUTPATIENT
Start: 2023-08-01

## 2023-08-01 RX ORDER — SIMVASTATIN 40 MG
40 TABLET ORAL NIGHTLY
Qty: 90 TABLET | Refills: 3 | Status: SHIPPED | OUTPATIENT
Start: 2023-08-01

## 2023-08-01 RX ORDER — CLOPIDOGREL BISULFATE 75 MG/1
75 TABLET ORAL DAILY
Qty: 90 TABLET | Refills: 3 | Status: SHIPPED | OUTPATIENT
Start: 2023-08-01

## 2023-08-01 RX ORDER — ISOSORBIDE MONONITRATE 30 MG/1
30 TABLET, EXTENDED RELEASE ORAL
Qty: 90 TABLET | Refills: 3 | Status: SHIPPED | OUTPATIENT
Start: 2023-08-01

## 2023-08-01 RX ORDER — CARVEDILOL 25 MG/1
25 TABLET ORAL 2 TIMES DAILY WITH MEALS
Qty: 180 TABLET | Refills: 3 | Status: SHIPPED | OUTPATIENT
Start: 2023-08-01

## 2023-09-04 ENCOUNTER — HOSPITAL ENCOUNTER (EMERGENCY)
Facility: HOSPITAL | Age: 63
Discharge: HOME | End: 2023-09-04
Attending: STUDENT IN AN ORGANIZED HEALTH CARE EDUCATION/TRAINING PROGRAM | Admitting: STUDENT IN AN ORGANIZED HEALTH CARE EDUCATION/TRAINING PROGRAM
Payer: MEDICARE

## 2023-09-04 ENCOUNTER — APPOINTMENT (OUTPATIENT)
Dept: CT IMAGING | Facility: HOSPITAL | Age: 63
End: 2023-09-04
Payer: MEDICARE

## 2023-09-04 VITALS
BODY MASS INDEX: 26.63 KG/M2 | WEIGHT: 186 LBS | HEART RATE: 99 BPM | HEIGHT: 70 IN | DIASTOLIC BLOOD PRESSURE: 76 MMHG | SYSTOLIC BLOOD PRESSURE: 107 MMHG | RESPIRATION RATE: 17 BRPM | OXYGEN SATURATION: 98 % | TEMPERATURE: 97.4 F

## 2023-09-04 DIAGNOSIS — T14.8XXA HEMATOMA: Primary | ICD-10-CM

## 2023-09-04 PROCEDURE — 72192 CT PELVIS W/O DYE: CPT

## 2023-09-04 PROCEDURE — 72192 CT PELVIS W/O DYE: CPT | Performed by: RADIOLOGY

## 2023-09-04 PROCEDURE — 99284 EMERGENCY DEPT VISIT MOD MDM: CPT

## 2023-09-04 RX ORDER — HYDROCODONE BITARTRATE AND ACETAMINOPHEN 7.5; 325 MG/1; MG/1
1 TABLET ORAL EVERY 6 HOURS PRN
Qty: 12 TABLET | Refills: 0 | Status: SHIPPED | OUTPATIENT
Start: 2023-09-04

## 2023-09-04 RX ORDER — HYDROCODONE BITARTRATE AND ACETAMINOPHEN 7.5; 325 MG/1; MG/1
1 TABLET ORAL ONCE
Status: COMPLETED | OUTPATIENT
Start: 2023-09-04 | End: 2023-09-04

## 2023-09-04 RX ADMIN — HYDROCODONE BITARTRATE AND ACETAMINOPHEN 1 TABLET: 7.5; 325 TABLET ORAL at 23:46

## 2023-09-05 NOTE — ED PROVIDER NOTES
Subjective   History of Present Illness  62-year-old male presents to the ER with chief complaint of pain within his left buttocks.  History is positive for following about 2 weeks ago.  Patient had x-rays at [facility] and was discharged on methocarbamol.  Patient verbalized that pain has increased within his buttocks.  Patient is concerned of fracture.  Patient has bruising from the buttocks with pulling into the posterior lower extremity.      Review of Systems   Constitutional: Negative.  Negative for fever.   HENT: Negative.     Respiratory: Negative.     Cardiovascular: Negative.  Negative for chest pain.   Gastrointestinal: Negative.  Negative for abdominal pain.   Endocrine: Negative.    Genitourinary: Negative.  Negative for dysuria.   Musculoskeletal:  Positive for myalgias.   Skin:  Positive for color change.   Neurological: Negative.    Psychiatric/Behavioral: Negative.     All other systems reviewed and are negative.    Past Medical History:   Diagnosis Date    Anxiety     Anxiety and depression     Arthritis     ASCVD (arteriosclerotic cardiovascular disease)     CHF (congestive heart failure)     Colitis     Coronary artery disease     Disease of thyroid gland     DM (diabetes mellitus)     Elevated cholesterol     GERD (gastroesophageal reflux disease)     History of EKG 04/15/2015    NORMAL    History of transfusion     HTN (hypertension)     Hyperlipidemia     Injury of back     Low back pain     Myocardial infarction     about 13 years ago    Requires supplemental oxygen     USES 2 L AT NIGHT    Sleep apnea     wears oxygen at night     Wears glasses     Wears partial dentures        Allergies   Allergen Reactions    Ranexa [Ranolazine] GI Intolerance       Past Surgical History:   Procedure Laterality Date    BRAVO PROCEDURE N/A 4/13/2022    Procedure: ESOPHAGOGASTRODUODENOSCOPY AND KELLER;  Surgeon: Ulysses Cavanaugh MD;  Location: [facility];  Service: Gastroenterology;  Laterality:  N/A;  EG junction @ 36cm.  Bravo capsule placed @ 30cm.    CARDIAC CATHETERIZATION      reports 4 cardiac stents    CARDIAC CATHETERIZATION N/A 12/11/2020    Procedure: Coronary angiography;  Surgeon: Noble Thompson MD;  Location:  [facility];  Service: Cardiology;  Laterality: N/A;    CARDIAC CATHETERIZATION N/A 3/2/2022    Procedure: Left Heart Cath;  Surgeon: Arron Riley MD;  Location:  [facility];  Service: Cardiology;  Laterality: N/A;    CARDIAC SURGERY      stenting    CHOLECYSTECTOMY      COLONOSCOPY  2007    COLONOSCOPY N/A 5/30/2020    Procedure: COLONOSCOPY CPT CODE: 52522;  Surgeon: Anthony Garcia MD;  Location:  [facility];  Service: Gastroenterology;  Laterality: N/A;    ENDOSCOPY N/A 5/30/2020    Procedure: ESOPHAGOGASTRODUODENOSCOPY WITH BIOPSY CPT CODE: 37440;  Surgeon: Anthony Garcia MD;  Location:  [facility];  Service: Gastroenterology;  Laterality: N/A;    ENDOSCOPY N/A 7/19/2021    Procedure: ESOPHAGOGASTRODUODENOSCOPY WITH BIOPSY;  Surgeon: Anthony Garcia MD;  Location:  [facility];  Service: Gastroenterology;  Laterality: N/A;  dilated with balloon dilator to 20mm    ESOPHAGEAL MOTILITY STUDY N/A 6/30/2022    Procedure: ESOPHAGEAL MOTILITY STUDY;  Surgeon: Ortiz Solomon MD;  Location:  [facility];  Service: General;  Laterality: N/A;  Patient intubated via right nare. Tube secured @ 48 cm. Patient completed study successfully. No s/s of distress noted during or after procedure.    HERNIA REPAIR      SHOULDER SURGERY      TONSILLECTOMY      TOTAL KNEE ARTHROPLASTY Right 1/27/2020    Procedure: TOTAL KNEE ARTHROPLASTY RIGHT;  Surgeon: Ortiz Barba MD;  Location:  [facility];  Service: Orthopedics    TYMPANOPLASTY      UPPER GASTROINTESTINAL ENDOSCOPY      VASECTOMY         Family History   Problem Relation Age of Onset    Heart attack Father     Heart disease Father     Hypertension Father      Heart attack Sister     Diabetes Sister     Heart disease Sister     Hypertension Sister     Thyroid disease Sister     Heart attack Brother     Diabetes Brother     Thyroid disease Brother     Arthritis Daughter     COPD Daughter     Asthma Daughter     Depression Daughter     Heart disease Sister     Hypertension Sister        Social History     Socioeconomic History    Marital status:      Spouse name: ruben    Number of children: 3    Years of education: 12   Tobacco Use    Smoking status: Never    Smokeless tobacco: Never   Vaping Use    Vaping Use: Never used   Substance and Sexual Activity    Alcohol use: No    Drug use: No    Sexual activity: Defer           Objective   Physical Exam  Vitals and nursing note reviewed.   Constitutional:       General: He is not in acute distress.     Appearance: He is well-developed. He is not diaphoretic.   HENT:      Head: Normocephalic and atraumatic.      Right Ear: External ear normal.      Left Ear: External ear normal.      Nose: Nose normal.   Eyes:      Conjunctiva/sclera: Conjunctivae normal.   Neck:      Vascular: No JVD.      Trachea: No tracheal deviation.   Cardiovascular:      Rate and Rhythm: Normal rate.      Heart sounds: No murmur heard.  Pulmonary:      Effort: Pulmonary effort is normal. No respiratory distress.      Breath sounds: No wheezing.   Abdominal:      Palpations: Abdomen is soft.      Tenderness: There is no abdominal tenderness.   Musculoskeletal:         General: Tenderness present. No deformity.      Cervical back: Normal range of motion and neck supple.      Comments: Tenderness left buttocks   Skin:     General: Skin is warm and dry.      Coloration: Skin is not pale.      Findings: Bruising present. No erythema or rash.   Neurological:      Mental Status: He is alert and oriented to person, place, and time.      Cranial Nerves: No cranial nerve deficit.   Psychiatric:         Behavior: Behavior normal.         Thought Content:  Thought content normal.       Procedures           ED Course  ED Course as of 09/04/23 2337   Mon Sep 04, 2023   2319 CT pelvis rad interpreted:  1.  Moderate-sized hematoma within the left gluteus laurie muscle  estimated to measure 11.8 x 4.4 x 9.0 cm.  2.  No acute fracture of the pelvis.  3.  No acute fracture of the proximal right and left femur.  4.  Mild to moderate multilevel degenerative disc disease in the lower  lumbar spine with disc height reduction at the L3-4 and L4-5 levels.  5.  Small left inguinal hernia contains a short segment of the proximal  sigmoid colon.  6.  Mild sigmoid colon diverticulosis.  7.  Mild osteoarthritis at the right and left hip joint.  8.  No foreign body.   [RB]      ED Course User Index  [RB] Benjamin Chaudhary II, PA                                           Medical Decision Making  62-year-old with fall with bruising to left buttocks.  Patient does have pain with ambulation.    Problems Addressed:  Hematoma: acute illness or injury     Details: Large hematoma noted within the left buttocks.  Patient was started on pain control.  Told patient to go and continue his conservative treatments at home.  Given the size of this hematoma do.  This will take a while to heal.  Outpatient follow-up if needed.    Amount and/or Complexity of Data Reviewed  Radiology: ordered. Decision-making details documented in ED Course.    Risk  Prescription drug management.        Final diagnoses:   Hematoma       ED Disposition  ED Disposition       ED Disposition   Discharge    Condition   Stable    Comment   --               Clare Quintero, YOUNG  [address] [facility] [address] [phone]    Schedule an appointment as soon as possible for a visit            Medication List        New Prescriptions      HYDROcodone-acetaminophen 7.5-325 MG per tablet  Commonly known as: NORCO  Take 1 tablet by mouth Every 6 (Six) Hours As Needed for Moderate Pain.            Changed       clonazePAM 0.5 MG tablet  Commonly known as: KlonoPIN  TAKE 1 TABLET BY MOUTH THREE TIMES DAILY AS NEEDED FOR ANXIETY  What changed:   how much to take  how to take this  when to take this  additional instructions     linaclotide 145 MCG capsule capsule  Commonly known as: LINZESS  Take 1 tablet once daily on an empty stomach at least 30 minutes prior to the first meal of the day.  What changed:   when to take this  reasons to take this               Where to Get Your Medications        These medications were sent to [facility] [address] [place] [address] [phone] [facility] [phone] [address] [address]      Phone: [phone]   HYDROcodone-acetaminophen 7.5-325 MG per tablet            Benjamin Chaudhary II, PA  09/04/23 9322

## 2023-09-05 NOTE — ED NOTES
MEDICAL SCREENING:    Reason for Visit: posterior pelvic pain after a fall 2 weeks ago. Negative xrays at that time, but not getting any relief.    Patient initially seen in triage.  The patient was advised further evaluation and diagnostic testing will be needed, some of the treatment and testing will be initiated in the lobby in order to begin the process.  The patient will be returned to the waiting area for the time being and possibly be re-assessed by a subsequent ED provider.  The patient will be brought back to the treatment area in as timely manner as possible.       Narinder Posey, LASHON  09/04/23 5429

## 2023-10-22 DIAGNOSIS — K21.9 GASTROESOPHAGEAL REFLUX DISEASE, UNSPECIFIED WHETHER ESOPHAGITIS PRESENT: ICD-10-CM

## 2023-10-23 RX ORDER — DEXLANSOPRAZOLE 60 MG/1
CAPSULE, DELAYED RELEASE ORAL
Qty: 30 CAPSULE | Refills: 5 | Status: SHIPPED | OUTPATIENT
Start: 2023-10-23

## 2024-02-07 ENCOUNTER — OFFICE VISIT (OUTPATIENT)
Dept: SURGERY | Facility: CLINIC | Age: 64
End: 2024-02-07
Payer: MEDICARE

## 2024-02-07 VITALS — BODY MASS INDEX: 26.63 KG/M2 | HEIGHT: 70 IN | WEIGHT: 186 LBS

## 2024-02-07 DIAGNOSIS — K40.90 LEFT INGUINAL HERNIA: Primary | ICD-10-CM

## 2024-02-07 RX ORDER — SODIUM CHLORIDE 9 MG/ML
40 INJECTION, SOLUTION INTRAVENOUS AS NEEDED
OUTPATIENT
Start: 2024-02-07

## 2024-02-07 RX ORDER — SODIUM CHLORIDE 0.9 % (FLUSH) 0.9 %
10 SYRINGE (ML) INJECTION EVERY 12 HOURS SCHEDULED
OUTPATIENT
Start: 2024-02-07

## 2024-02-07 RX ORDER — SODIUM CHLORIDE 0.9 % (FLUSH) 0.9 %
10 SYRINGE (ML) INJECTION AS NEEDED
OUTPATIENT
Start: 2024-02-07

## 2024-02-07 NOTE — PROGRESS NOTES
Subjective   Damaso Leonard is a 63 y.o. male who presents today for Initial Evaluation    Chief Complaint:    Chief Complaint   Patient presents with    Hernia        History of Present Illness:    History of Present Illness Abraham is a 63-year-old male who presents for an evaluation for a left inguinal hernia.  He reports that he has been there for approximately the past 6 to 8 years.  He states that he does not recall developing the inguinal hernia.  He states that it has been causing him discomfort and he has noted an increase in swelling.  States that it hurts and burns at times.  He denies any issues with urination or having a bowel movement.  Patient does not smoke.  He does currently take Plavix as he has a history of coronary artery disease and stents.    The following portions of the patient's history were reviewed and updated as appropriate: allergies, current medications, past family history, past medical history, past social history, past surgical history and problem list.    Past Medical History:  Past Medical History:   Diagnosis Date    Anxiety     Anxiety and depression     Arthritis     ASCVD (arteriosclerotic cardiovascular disease)     CHF (congestive heart failure)     Colitis     Coronary artery disease     Disease of thyroid gland     DM (diabetes mellitus)     Elevated cholesterol     GERD (gastroesophageal reflux disease)     History of EKG 04/15/2015    NORMAL    History of transfusion     HTN (hypertension)     Hyperlipidemia     Injury of back     Low back pain     Myocardial infarction     about 13 years ago    Requires supplemental oxygen     USES 2 L AT NIGHT    Sleep apnea     wears oxygen at night     Wears glasses     Wears partial dentures        Social History:  Social History     Socioeconomic History    Marital status:      Spouse name: ruben    Number of children: 3    Years of education: 12   Tobacco Use    Smoking status: Never    Smokeless tobacco: Never   Vaping  Use    Vaping Use: Never used   Substance and Sexual Activity    Alcohol use: No    Drug use: No    Sexual activity: Defer       Family History:  Family History   Problem Relation Age of Onset    Heart attack Father     Heart disease Father     Hypertension Father     Heart attack Sister     Diabetes Sister     Heart disease Sister     Hypertension Sister     Thyroid disease Sister     Heart attack Brother     Diabetes Brother     Thyroid disease Brother     Arthritis Daughter     COPD Daughter     Asthma Daughter     Depression Daughter     Heart disease Sister     Hypertension Sister        Past Surgical History:  Past Surgical History:   Procedure Laterality Date    BRAVO PROCEDURE N/A 4/13/2022    Procedure: ESOPHAGOGASTRODUODENOSCOPY AND KELLER;  Surgeon: Ulysses Cavanaugh MD;  Location: [facility] OR;  Service: Gastroenterology;  Laterality: N/A;  EG junction @ 36cm.  Bravo capsule placed @ 30cm.    CARDIAC CATHETERIZATION      reports 4 cardiac stents    CARDIAC CATHETERIZATION N/A 12/11/2020    Procedure: Coronary angiography;  Surgeon: Noble Thompson MD;  Location: [facility] CATH INVASIVE LOCATION;  Service: Cardiology;  Laterality: N/A;    CARDIAC CATHETERIZATION N/A 3/2/2022    Procedure: Left Heart Cath;  Surgeon: Arron Riley MD;  Location: [facility] CATH INVASIVE LOCATION;  Service: Cardiology;  Laterality: N/A;    CARDIAC SURGERY      stenting    CHOLECYSTECTOMY      COLONOSCOPY  2007    COLONOSCOPY N/A 5/30/2020    Procedure: COLONOSCOPY CPT CODE: 91340;  Surgeon: Anthony Garcia MD;  Location: [facility];  Service: Gastroenterology;  Laterality: N/A;    ENDOSCOPY N/A 5/30/2020    Procedure: ESOPHAGOGASTRODUODENOSCOPY WITH BIOPSY CPT CODE: 59521;  Surgeon: Anthony Garcia MD;  Location: [facility];  Service: Gastroenterology;  Laterality: N/A;    ENDOSCOPY N/A 7/19/2021    Procedure: ESOPHAGOGASTRODUODENOSCOPY WITH BIOPSY;  Surgeon: Anthony Garcia MD;   Location:  [facility] OR;  Service: Gastroenterology;  Laterality: N/A;  dilated with balloon dilator to 20mm    ESOPHAGEAL MOTILITY STUDY N/A 6/30/2022    Procedure: ESOPHAGEAL MOTILITY STUDY;  Surgeon: Ortiz Solomon MD;  Location:  [facility];  Service: General;  Laterality: N/A;  Patient intubated via right nare. Tube secured @ 48 cm. Patient completed study successfully. No s/s of distress noted during or after procedure.    HERNIA REPAIR      SHOULDER SURGERY      TONSILLECTOMY      TOTAL KNEE ARTHROPLASTY Right 1/27/2020    Procedure: TOTAL KNEE ARTHROPLASTY RIGHT;  Surgeon: Ortiz Barba MD;  Location:  [facility];  Service: Orthopedics    TYMPANOPLASTY      UPPER GASTROINTESTINAL ENDOSCOPY      VASECTOMY         Problem List:  Patient Active Problem List   Diagnosis    Generalized anxiety disorder    Malignant hypertension with heart disease, without congestive heart failure    Major depressive disorder in partial remission    Type 2 diabetes mellitus with diabetic peripheral angiopathy without gangrene, without long-term current use of insulin    Coronary artery disease involving native coronary artery of native heart    Sleep apnea    Obstructive sleep apnea    Osteoarthritis involving multiple joints on both sides of body    Vitamin D deficiency    Vitamin B 12 deficiency    Hypercholesterolemia    Anginal pain    Lumbar back pain with radiculopathy affecting left lower extremity    Vitamin D deficiency disease    High risk medication use    Gastroesophageal reflux disease without esophagitis    Seasonal allergic rhinitis due to pollen    Benign non-nodular prostatic hyperplasia without lower urinary tract symptoms    Essential hypertension    Chronic pain of both knees    Colitis    Right hand weakness    ED (erectile dysfunction) of organic origin    Class 1 obesity due to excess calories with serious comorbidity and body mass index (BMI) of 30.0 to 30.9 in adult    Primary osteoarthritis of  both knees    Chronic combined systolic and diastolic heart failure    Status post total right knee replacement    DM (diabetes mellitus)    Hypothyroid    On home O2    Leukocytosis, likely reactive    Periumbilical abdominal pain    Change in bowel habits    Nausea    Gastroesophageal reflux disease    Unstable angina    Preoperative clearance    Constipation    Abnormal findings on esophagogastroduodenoscopy (EGD)       Allergy:   Allergies   Allergen Reactions    Ranexa [Ranolazine] GI Intolerance        Current Medications:   Current Outpatient Medications   Medication Sig Dispense Refill    amitriptyline (ELAVIL) 50 MG tablet Take 2 tablets by mouth At Night As Needed for Sleep. 180 tablet 1    aspirin 81 MG EC tablet Take 1 tablet by mouth Daily. 90 tablet 3    carvedilol (COREG) 25 MG tablet Take 1 tablet by mouth 2 (Two) Times a Day With Meals. 180 tablet 3    citalopram (CeleXA) 40 MG tablet Take 1 tablet by mouth Daily. 90 tablet 5    clonazePAM (KlonoPIN) 0.5 MG tablet TAKE 1 TABLET BY MOUTH THREE TIMES DAILY AS NEEDED FOR ANXIETY (Patient taking differently: Pt states takes 1tab daily.) 90 tablet 0    clopidogrel (PLAVIX) 75 MG tablet Take 1 tablet by mouth Daily. 90 tablet 3    cyanocobalamin 1000 MCG/ML injection Inject 1 mL into the appropriate muscle as directed by prescriber Every 30 (Thirty) Days. 1 mL 5    dexlansoprazole (DEXILANT) 60 MG capsule TAKE ONE CAPSULE BY MOUTH EVERY DAY 30 capsule 5    dicyclomine (BENTYL) 10 MG capsule Take 1 capsule by mouth 4 (Four) Times a Day Before Meals & at Bedtime. 120 capsule 5    diphenhydrAMINE (Benadryl Allergy) 25 mg capsule Take 1 capsule by mouth Every 6 (Six) Hours As Needed for Itching. 90 capsule 3    docusate sodium 100 MG capsule Take 1 capsule by mouth 2 (Two) Times a Day As Needed (constipation). 60 each 5    exenatide er (Bydureon) 2 MG pen-injector injection Inject 1 pen under the skin into the appropriate area as directed 1 (One) Time Per  Week. 4 each 5    finasteride (PROSCAR) 5 MG tablet Take 1 tablet by mouth Daily. 90 tablet 3    glucose blood test strip Check blood sugar 3x times a day. 100 each 12    glucose monitor monitoring kit 1 each 3 (Three) Times a Day As Needed (diabetes). 1 each 0    HYDROcodone-acetaminophen (NORCO) 7.5-325 MG per tablet Take 1 tablet by mouth Every 6 (Six) Hours As Needed for Moderate Pain. 12 tablet 0    isosorbide mononitrate (IMDUR) 30 MG 24 hr tablet Take 1 tablet by mouth Daily. 90 tablet 3    Lancets (ONETOUCH ULTRASOFT) lancets Check blood sugar 3 times daily 100 each 12    linaclotide (LINZESS) 145 MCG capsule capsule Take 1 tablet once daily on an empty stomach at least 30 minutes prior to the first meal of the day. (Patient taking differently: Daily As Needed. Take 1 tablet once daily on an empty stomach at least 30 minutes prior to the first meal of the day.) 90 capsule 3    metFORMIN (GLUCOPHAGE) 500 MG tablet Take 1 tablet by mouth 2 (Two) Times a Day With Meals. 180 tablet 3    naloxone (NARCAN) 4 MG/0.1ML nasal spray 1 spray into the nostril(s) as directed by provider As Needed (overdose). 1 each 0    nitroglycerin (NITROSTAT) 0.4 MG SL tablet 1 under the tongue as needed for angina, may repeat q5mins for up three doses 50 tablet 1    O2 (OXYGEN) Inhale 2 L/min Every Night.      ondansetron (ZOFRAN) 8 MG tablet TAKE 1 TABLET BY MOUTH EVERY 8 HOURS AS NEEDED FOR NAUSEA 20 tablet 0    polyethylene glycol (MIRALAX) 17 g packet Take 17 g by mouth Daily. 1 each 5    simvastatin (ZOCOR) 40 MG tablet Take 1 tablet by mouth Every Night. 90 tablet 3    SV Iron 325 MG tablet Take 1 tablet by mouth Daily.      Thyroid (NP THYROID) 30 MG PO tablet Take 1 tablet by mouth Daily. 30 tablet 5    traMADol (ULTRAM) 50 MG tablet Take 1 tablet by mouth Every 8 (Eight) Hours As Needed for Moderate Pain  or Severe Pain . 60 tablet 3    vitamin D (ERGOCALCIFEROL) 1.25 MG (11661 UT) capsule capsule Take 1 capsule by mouth  Every 7 (Seven) Days. 12 capsule 5     No current facility-administered medications for this visit.       Review of Systems:    Review of Systems   Constitutional:  Negative for activity change.   HENT:  Negative for congestion.    Eyes:  Negative for blurred vision.   Respiratory:  Negative for shortness of breath.    Cardiovascular:  Negative for chest pain.   Gastrointestinal:  Negative for abdominal pain.   Endocrine: Negative for cold intolerance.   Genitourinary:         Positive for groin pain   Musculoskeletal:  Negative for arthralgias.   Skin:  Negative for bruise.   Allergic/Immunologic: Negative for environmental allergies.   Neurological:  Negative for confusion.   Hematological:  Negative for adenopathy.   Psychiatric/Behavioral:  Negative for agitation.          Physical Exam:   Physical Exam  Constitutional:       Appearance: Normal appearance.   HENT:      Head: Normocephalic and atraumatic.      Right Ear: External ear normal.      Left Ear: External ear normal.   Eyes:      Conjunctiva/sclera: Conjunctivae normal.      Pupils: Pupils are equal, round, and reactive to light.   Cardiovascular:      Rate and Rhythm: Normal rate and regular rhythm.      Pulses: Normal pulses.      Heart sounds: Normal heart sounds.   Pulmonary:      Effort: Pulmonary effort is normal.      Breath sounds: Normal breath sounds.   Abdominal:      General: Abdomen is flat.      Palpations: Abdomen is soft.   Genitourinary:     Comments: Left inguinal hernia noted upon physical exam.  Reducible.  Musculoskeletal:         General: Normal range of motion.      Cervical back: Normal range of motion and neck supple.   Skin:     General: Skin is warm and dry.      Capillary Refill: Capillary refill takes less than 2 seconds.   Neurological:      General: No focal deficit present.      Mental Status: He is alert and oriented to person, place, and time.   Psychiatric:         Mood and Affect: Mood normal.         Behavior:  "Behavior normal.         Vitals:  Height 177.8 cm (70\"), weight 84.4 kg (186 lb).   Body mass index is 26.69 kg/m².     Imaging:   CT Pelvis Without Contrast  Narrative: Procedure: CT of the pelvis performed without IV contrast on 09/04/2023.  The examination was performed according to as low as reasonably  achievable dose protocol. Examination was performed with 3 mm axial  imaging and 3 mm sagittal and coronal reconstruction images.     HISTORY: Fall. Left buttock pain.     FINDINGS:     Intact pubic symphysis.  Intact SI joints.  Degenerative disc disease in the lower lumbar spine.  No acute fracture or dislocation.  Mild osteoarthritis at the right and left hip joint.  Left inguinal hernia contains fat and a short segment of the proximal  sigmoid colon.  Mild sigmoid colon diverticulosis.  Mild constipation involving the right colon.  Mild to moderate degenerative disc disease in the lower lumbar spine  with associated disc height reduction at the L3-4 and L4-5 levels.  Schmorl's node formation noted along the inferior endplate of L3 and  superior endplate of L4.  Mild endplate and facet hypertrophic changes at the mid and lower lumbar  spine levels contribute to mild bilateral neural foraminal stenoses.  Intact sacrum and coccyx segments.  Intact proximal right and left femur.     Prominent hematoma identified within the left gluteus laurie muscle.  The hematoma is estimated to measure 11.8 cm transverse by 4.4 cm AP and  the hematoma has a craniocaudal dimension of 9 cm.  No air in the soft tissues.  No foreign body.  Small right inguinal hernia contains only fat.  No acute process seen in the bladder and prostate gland.     Impression:    1.  Moderate-sized hematoma within the left gluteus laurie muscle  estimated to measure 11.8 x 4.4 x 9.0 cm.  2.  No acute fracture of the pelvis.  3.  No acute fracture of the proximal right and left femur.  4.  Mild to moderate multilevel degenerative disc disease in the " lower  lumbar spine with disc height reduction at the L3-4 and L4-5 levels.  5.  Small left inguinal hernia contains a short segment of the proximal  sigmoid colon.  6.  Mild sigmoid colon diverticulosis.  7.  Mild osteoarthritis at the right and left hip joint.  8.  No foreign body.     This report was finalized on 9/4/2023 10:13 PM by Luis Cunningham MD.           Assessment & Plan   Diagnoses and all orders for this visit:    1. Left inguinal hernia (Primary)  -     Case Request; Standing  -     sodium chloride 0.9 % flush 10 mL  -     sodium chloride 0.9 % flush 10 mL  -     sodium chloride 0.9 % infusion 40 mL  -     ceFAZolin (ANCEF) 2,000 mg in sodium chloride 0.9 % 100 mL IVPB  -     Case Request    Other orders  -     Follow Anesthesia Guidelines / Protocol; Future  -     Follow Anesthesia Guidelines / Protocol; Standing  -     Verify / Perform Chlorhexidine Skin Prep; Standing  -     Verify / Perform Chlorhexidine Skin Prep if Indicated (If Not Already Completed); Standing  -     Obtain Informed Consent; Future  -     Provide NPO Instructions to Patient; Future  -     Chlorhexidine Skin Prep; Future  -     Instructions on coughing, deep breathing, and incentive spirometry.; Standing  -     Insert Peripheral IV; Standing  -     Saline Lock & Maintain IV Access; Sejal Rosario is a 63-year-old male who presents for evaluation for left inguinal hernia repair.  He will undergo a left inguinal hernia repair with Dr. Cavanaugh once cardiac clearance has been obtained.  Patient verbalized understanding of prep instructions and procedure and wishes to proceed.  He verbalized understanding to continue taking blood thinning medications until notified by our office.    Visit Diagnoses:    ICD-10-CM ICD-9-CM   1. Left inguinal hernia  K40.90 550.90         MEDS ORDERED DURING VISIT:  No orders of the defined types were placed in this encounter.      Return for Follow-up postop.             This document has been  electronically signed by YOUNG Nolasco  February 7, 2024 16:06 EST    Please note that portions of this note were completed with a voice recognition program.

## 2024-02-12 DIAGNOSIS — I25.10 CORONARY ARTERY DISEASE INVOLVING NATIVE CORONARY ARTERY OF NATIVE HEART WITHOUT ANGINA PECTORIS: Primary | Chronic | ICD-10-CM

## 2024-02-15 ENCOUNTER — OFFICE VISIT (OUTPATIENT)
Dept: CARDIOLOGY | Facility: CLINIC | Age: 64
End: 2024-02-15
Payer: MEDICARE

## 2024-02-15 VITALS
SYSTOLIC BLOOD PRESSURE: 111 MMHG | DIASTOLIC BLOOD PRESSURE: 72 MMHG | HEART RATE: 67 BPM | OXYGEN SATURATION: 97 % | WEIGHT: 186 LBS | BODY MASS INDEX: 26.63 KG/M2 | HEIGHT: 70 IN

## 2024-02-15 DIAGNOSIS — I10 ESSENTIAL HYPERTENSION: Chronic | ICD-10-CM

## 2024-02-15 DIAGNOSIS — E78.00 HYPERCHOLESTEROLEMIA: Chronic | ICD-10-CM

## 2024-02-15 DIAGNOSIS — Z01.810 PRE-OPERATIVE CARDIOVASCULAR EXAMINATION: ICD-10-CM

## 2024-02-15 DIAGNOSIS — I25.10 CORONARY ARTERY DISEASE INVOLVING NATIVE CORONARY ARTERY OF NATIVE HEART WITHOUT ANGINA PECTORIS: Primary | Chronic | ICD-10-CM

## 2024-02-15 DIAGNOSIS — I50.42 CHRONIC COMBINED SYSTOLIC AND DIASTOLIC HEART FAILURE: Chronic | ICD-10-CM

## 2024-02-28 ENCOUNTER — HOSPITAL ENCOUNTER (OUTPATIENT)
Facility: HOSPITAL | Age: 64
Discharge: HOME | End: 2024-02-28
Admitting: NURSE PRACTITIONER
Payer: MEDICARE

## 2024-02-28 DIAGNOSIS — I50.42 CHRONIC COMBINED SYSTOLIC AND DIASTOLIC HEART FAILURE: Chronic | ICD-10-CM

## 2024-02-28 LAB
BH CV ECHO MEAS - ACS: 2 CM
BH CV ECHO MEAS - AO MAX PG: 3.8 MMHG
BH CV ECHO MEAS - AO MEAN PG: 2.4 MMHG
BH CV ECHO MEAS - AO V2 MAX: 97 CM/SEC
BH CV ECHO MEAS - AO V2 VTI: 22.4 CM
BH CV ECHO MEAS - EDV(CUBED): 128.8 ML
BH CV ECHO MEAS - EDV(MOD-SP4): 111 ML
BH CV ECHO MEAS - EF(MOD-SP4): 34.5 %
BH CV ECHO MEAS - ESV(CUBED): 28.4 ML
BH CV ECHO MEAS - ESV(MOD-SP4): 72.7 ML
BH CV ECHO MEAS - FS: 39.6 %
BH CV ECHO MEAS - IVS/LVPW: 1.24 CM
BH CV ECHO MEAS - IVSD: 1.25 CM
BH CV ECHO MEAS - LA DIMENSION: 2.45 CM
BH CV ECHO MEAS - LV DIASTOLIC VOL/BSA (35-75): 54.8 CM2
BH CV ECHO MEAS - LV MASS(C)D: 217.8 GRAMS
BH CV ECHO MEAS - LV SYSTOLIC VOL/BSA (12-30): 35.9 CM2
BH CV ECHO MEAS - LVIDD: 5.1 CM
BH CV ECHO MEAS - LVIDS: 3.1 CM
BH CV ECHO MEAS - LVPWD: 1 CM
BH CV ECHO MEAS - PA ACC TIME: 0.06 SEC
BH CV ECHO MEAS - SI(MOD-SP4): 18.9 ML/M2
BH CV ECHO MEAS - SV(MOD-SP4): 38.3 ML

## 2024-02-28 PROCEDURE — 93306 TTE W/DOPPLER COMPLETE: CPT | Performed by: INTERNAL MEDICINE

## 2024-02-28 PROCEDURE — 93306 TTE W/DOPPLER COMPLETE: CPT

## 2024-03-05 ENCOUNTER — TELEPHONE (OUTPATIENT)
Dept: CARDIOLOGY | Facility: CLINIC | Age: 64
End: 2024-03-05

## 2024-03-05 NOTE — TELEPHONE ENCOUNTER
"    Caller: Damaso Leonard \"Abraham\"    Relationship: Self    Best call back number: [phone]    What test was performed: ECHO RESULTS    When was the test performed: 02.15.24    Where was the test performed: [facility]    Additional notes: PT IS WONDERING ABOUT THE RESULTS OF HIS ECHO AND IF HE IS GOOD TO GO FOR HIS HERNIA SURGERY WITH DR. APARICIO.         "

## 2024-03-06 ENCOUNTER — TELEPHONE (OUTPATIENT)
Dept: CARDIOLOGY | Facility: CLINIC | Age: 64
End: 2024-03-06
Payer: MEDICARE

## 2024-03-06 NOTE — TELEPHONE ENCOUNTER
----- Message from YOUNG Macedo sent at 3/6/2024 11:21 AM EST -----  Please call patient about their normal result.    Echocardiogram shows normal LV function and no hemodynamically significant valvular disease.    Perioperative risk assessment can be completed and you would be at low to moderate risk of major adverse cardiac event.    Please find out what the procedure is and where he is having it so that this can be completed and sent      Thanks, Meg

## 2024-03-06 NOTE — TELEPHONE ENCOUNTER
Attempted to contact patient regarding he test results. Unable to get answer at either number listed in chart. Carly op risk assessment has been completed for his procedure with Dr Cavanaugh.

## 2024-03-06 NOTE — TELEPHONE ENCOUNTER
Ionk to the patients wife and she said she would have him call back with the date and place of hernia surgery.

## 2024-03-06 NOTE — TELEPHONE ENCOUNTER
Spoke to patient regarding normal echo results per Meg's request. Meg will also complete his lilliana operative risk assessment for Dr Cavanaugh.

## 2024-03-08 PROBLEM — K40.90 LEFT INGUINAL HERNIA: Status: ACTIVE | Noted: 2024-02-07

## 2024-03-22 ENCOUNTER — DOCUMENTATION (OUTPATIENT)
Dept: SURGERY | Facility: CLINIC | Age: 64
End: 2024-03-22
Payer: MEDICARE

## 2024-03-22 NOTE — PROGRESS NOTES
You are scheduled for surgery with Dr. Cavanaugh on 3/28/24 at 6:30am.   Do not eat/drink anything after midnight the night prior to surgery, and you must have a  present with you on day of surgery.  An appointment for pre-op has been scheduled for 3/26/24 at 9:15am. This is a visit with surgical nurses and the anesthesia team to draw blood work and review your medical history and current medications.  Arrive at outpatient surgery on the ground floor of the hospital both of these days. Outpatient surgery is located on the opposite side of the ER location. Follow the arrows for surgery on the [facility] signs posted along the [facility] hill. If you have any questions please call the office at [phone].        Phone kept ringing, unable to leave message. Will try again at a later time.

## 2024-03-25 NOTE — DISCHARGE INSTRUCTIONS
3/28/24  ARRIVAL TIME PER DR OFFICE    TAKE the following medications the morning of surgery:  All heart or blood pressure medications    HOLD all diabetic medications the morning of surgery as ordered by physician.    Please discontinue all blood thinners and anticoagulants (except aspirin) prior to surgery as per your surgeon and cardiologist instructions.  Aspirin may be continued up to the day prior to surgery.     CHLORHEXIDINE CLOTHS GIVEN WITH INSTRUCTIONS AND FORM TO RETURN TO HOSPITAL, IF APPLICABLE.    General Instructions:  Do not eat or drink after midnight: includes water, mints, or gum. You may brush your teeth.  Dental appliances that are removable must be taken out day of surgery.  Do not smoke, chew tobacco, or drink alcohol.  Bring medications in original bottles, any inhalers and if applicable your C-PAP/BI-PAP machine.  Bring any papers given to you in the doctor's office.  Wear clean comfortable clothes and socks.  Do not wear contact lenses or make-up. Bring a case for your glasses if applicable.  Bring crutches or walker if applicable.  Leave all other valuables and jewelry at home.    If you were given a blood bank ID arm band remember to bring it with you the day of surgery.    Preventing a Surgical Site Infection:  Shower the night before surgery (unless instructed other wise) using a fresh bar of anti-bacterial soap (such as Dial) and clean washcloth. Dry with a clean towel and dress in clean clothing.  For 2 to 3 days before surgery, avoid shaving with a razor near where you will have surgery because the razor can irritate skin and make it easier to develop an infection. Ask your surgeon if you will be receiving antibiotics prior to surgery.  Make sure you, your family, and all healthcare providers clear their hands with soap and water or an alcohol-based hand  before caring for you or your wound.  If at all possible, quit smoking as many days before surgery as you can.    Day of  surgery:  Upon arrival, a Pre-op nurse and Anesthesiologist will review your health history, obtain vital signs, and answer questions you may have. The only belongings needed at this time will be your home medications and if applicable your C-PAP/BI-PAP machine. If you are staying overnight your family can leave the rest of your belongings in the car and bring them to your room later. A Pre-op nurse will start an IV and you may receive medication in preparation for surgery, including something to help you relax. Your family will be able to see you in the Pre-op area. While you are in surgery your family should notify the waiting room  if they leave the waiting room area and provide a contact phone number.    Please be aware that surgery does come with discomfort. We want to make every effort to control your discomfort so please discuss any uncontrolled symptoms with your nurse. Your doctor will most likely have prescribed pain medications.    If you are going home after surgery you will receive individualized written care instructions before being discharged. A responsible adult must drive you to and from the hospital on the day of surgery and stay with you for 24 hours.    If you are staying overnight following surgery, you will be transported to your hospital room following the recovery period.  [facility] has all private rooms.    If you have any questions please call Pre-Admission Testing at [phone].  Deductibles and co-payments are collected on the day of service. Please be prepared to pay the required co-pay, deductible or deposit on the day of service as defined by your plan.    A RESPONSIBLE PERSON MUST REMAIN IN THE WAITING ROOM DURING YOUR PROCEDURE AND A RESPONSIBLE  MUST BE AVAILABLE UPON YOUR DISCHARGE.

## 2024-03-26 ENCOUNTER — PRE-ADMISSION TESTING (OUTPATIENT)
Dept: PREADMISSION TESTING | Facility: HOSPITAL | Age: 64
End: 2024-03-26
Payer: MEDICARE

## 2024-03-26 LAB
ANION GAP SERPL CALCULATED.3IONS-SCNC: 9.1 MMOL/L (ref 5–15)
BUN SERPL-MCNC: 13 MG/DL (ref 8–23)
BUN/CREAT SERPL: 15.9 (ref 7–25)
CALCIUM SPEC-SCNC: 8.5 MG/DL (ref 8.6–10.5)
CHLORIDE SERPL-SCNC: 103 MMOL/L (ref 98–107)
CO2 SERPL-SCNC: 25.9 MMOL/L (ref 22–29)
CREAT SERPL-MCNC: 0.82 MG/DL (ref 0.76–1.27)
DEPRECATED RDW RBC AUTO: 44.5 FL (ref 37–54)
EGFRCR SERPLBLD CKD-EPI 2021: 99.9 ML/MIN/1.73
ERYTHROCYTE [DISTWIDTH] IN BLOOD BY AUTOMATED COUNT: 13.9 % (ref 12.3–15.4)
GLUCOSE SERPL-MCNC: 208 MG/DL (ref 65–99)
HCT VFR BLD AUTO: 43.6 % (ref 37.5–51)
HGB BLD-MCNC: 14.1 G/DL (ref 13–17.7)
MCH RBC QN AUTO: 28.4 PG (ref 26.6–33)
MCHC RBC AUTO-ENTMCNC: 32.3 G/DL (ref 31.5–35.7)
MCV RBC AUTO: 87.9 FL (ref 79–97)
PLATELET # BLD AUTO: 321 10*3/MM3 (ref 140–450)
PMV BLD AUTO: 9.2 FL (ref 6–12)
POTASSIUM SERPL-SCNC: 3.8 MMOL/L (ref 3.5–5.2)
RBC # BLD AUTO: 4.96 10*6/MM3 (ref 4.14–5.8)
SODIUM SERPL-SCNC: 138 MMOL/L (ref 136–145)
WBC NRBC COR # BLD AUTO: 6.78 10*3/MM3 (ref 3.4–10.8)

## 2024-03-26 PROCEDURE — 85027 COMPLETE CBC AUTOMATED: CPT

## 2024-03-26 PROCEDURE — 80048 BASIC METABOLIC PNL TOTAL CA: CPT

## 2024-03-26 PROCEDURE — 36415 COLL VENOUS BLD VENIPUNCTURE: CPT

## 2024-03-28 ENCOUNTER — APPOINTMENT (OUTPATIENT)
Dept: GENERAL RADIOLOGY | Facility: HOSPITAL | Age: 64
End: 2024-03-28
Payer: MEDICARE

## 2024-03-28 ENCOUNTER — ANESTHESIA EVENT (OUTPATIENT)
Dept: PERIOP | Facility: HOSPITAL | Age: 64
End: 2024-03-28
Payer: MEDICARE

## 2024-03-28 ENCOUNTER — ANESTHESIA (OUTPATIENT)
Dept: PERIOP | Facility: HOSPITAL | Age: 64
End: 2024-03-28
Payer: MEDICARE

## 2024-03-28 ENCOUNTER — HOSPITAL ENCOUNTER (OUTPATIENT)
Facility: HOSPITAL | Age: 64
Setting detail: HOSPITAL OUTPATIENT SURGERY
Discharge: HOME | End: 2024-03-28
Attending: SURGERY | Admitting: SURGERY
Payer: MEDICARE

## 2024-03-28 VITALS
SYSTOLIC BLOOD PRESSURE: 122 MMHG | HEIGHT: 70 IN | HEART RATE: 68 BPM | DIASTOLIC BLOOD PRESSURE: 77 MMHG | RESPIRATION RATE: 16 BRPM | OXYGEN SATURATION: 96 % | WEIGHT: 186 LBS | TEMPERATURE: 97.8 F | BODY MASS INDEX: 26.63 KG/M2

## 2024-03-28 DIAGNOSIS — K40.90 LEFT INGUINAL HERNIA: ICD-10-CM

## 2024-03-28 LAB — GLUCOSE BLDC GLUCOMTR-MCNC: 126 MG/DL (ref 70–130)

## 2024-03-28 PROCEDURE — 25010000002 GLYCOPYRROLATE 0.4 MG/2ML SOLUTION: Performed by: NURSE ANESTHETIST, CERTIFIED REGISTERED

## 2024-03-28 PROCEDURE — 25010000002 FENTANYL CITRATE (PF) 50 MCG/ML SOLUTION: Performed by: NURSE ANESTHETIST, CERTIFIED REGISTERED

## 2024-03-28 PROCEDURE — S0260 H&P FOR SURGERY: HCPCS | Performed by: SURGERY

## 2024-03-28 PROCEDURE — 25010000002 HYDROMORPHONE 1 MG/ML SOLUTION: Performed by: NURSE ANESTHETIST, CERTIFIED REGISTERED

## 2024-03-28 PROCEDURE — 82948 REAGENT STRIP/BLOOD GLUCOSE: CPT

## 2024-03-28 PROCEDURE — 25010000002 PROPOFOL 200 MG/20ML EMULSION: Performed by: NURSE ANESTHETIST, CERTIFIED REGISTERED

## 2024-03-28 PROCEDURE — 25010000002 ONDANSETRON PER 1 MG: Performed by: NURSE ANESTHETIST, CERTIFIED REGISTERED

## 2024-03-28 PROCEDURE — 25010000002 KETOROLAC TROMETHAMINE PER 15 MG: Performed by: NURSE ANESTHETIST, CERTIFIED REGISTERED

## 2024-03-28 PROCEDURE — 25810000003 LACTATED RINGERS PER 1000 ML: Performed by: NURSE ANESTHETIST, CERTIFIED REGISTERED

## 2024-03-28 PROCEDURE — 49650 LAP ING HERNIA REPAIR INIT: CPT | Performed by: SURGERY

## 2024-03-28 PROCEDURE — C1781 MESH (IMPLANTABLE): HCPCS | Performed by: SURGERY

## 2024-03-28 PROCEDURE — 25010000002 BUPRENORPHINE PER 0.1 MG: Performed by: ANESTHESIOLOGY

## 2024-03-28 PROCEDURE — 25010000002 ROPIVACAINE PER 1 MG: Performed by: ANESTHESIOLOGY

## 2024-03-28 PROCEDURE — 25010000002 MIDAZOLAM PER 1 MG: Performed by: NURSE ANESTHETIST, CERTIFIED REGISTERED

## 2024-03-28 PROCEDURE — 25010000002 CEFAZOLIN PER 500 MG

## 2024-03-28 PROCEDURE — 25810000003 LACTATED RINGERS PER 1000 ML: Performed by: ANESTHESIOLOGY

## 2024-03-28 PROCEDURE — 25010000002 NEOSTIGMINE 10 MG/10ML SOLUTION: Performed by: NURSE ANESTHETIST, CERTIFIED REGISTERED

## 2024-03-28 PROCEDURE — 25010000002 DEXAMETHASONE PER 1 MG: Performed by: ANESTHESIOLOGY

## 2024-03-28 DEVICE — IMPLANTABLE DEVICE: Type: IMPLANTABLE DEVICE | Site: ABDOMEN | Status: FUNCTIONAL

## 2024-03-28 DEVICE — ABSORBABLE WOUND CLOSURE DEVICE
Type: IMPLANTABLE DEVICE | Site: ABDOMEN | Status: FUNCTIONAL
Brand: V-LOC 90

## 2024-03-28 RX ORDER — FAMOTIDINE 10 MG/ML
INJECTION, SOLUTION INTRAVENOUS AS NEEDED
Status: DISCONTINUED | OUTPATIENT
Start: 2024-03-28 | End: 2024-03-28 | Stop reason: SURG

## 2024-03-28 RX ORDER — TRAMADOL HYDROCHLORIDE 50 MG/1
50 TABLET ORAL EVERY 8 HOURS PRN
Qty: 12 TABLET | Refills: 0 | Status: SHIPPED | OUTPATIENT
Start: 2024-03-28

## 2024-03-28 RX ORDER — SODIUM CHLORIDE 9 MG/ML
40 INJECTION, SOLUTION INTRAVENOUS AS NEEDED
Status: DISCONTINUED | OUTPATIENT
Start: 2024-03-28 | End: 2024-03-28 | Stop reason: HOSPADM

## 2024-03-28 RX ORDER — GLYCOPYRROLATE 0.2 MG/ML
INJECTION INTRAMUSCULAR; INTRAVENOUS AS NEEDED
Status: DISCONTINUED | OUTPATIENT
Start: 2024-03-28 | End: 2024-03-28 | Stop reason: SURG

## 2024-03-28 RX ORDER — SODIUM CHLORIDE 0.9 % (FLUSH) 0.9 %
10 SYRINGE (ML) INJECTION EVERY 12 HOURS SCHEDULED
Status: DISCONTINUED | OUTPATIENT
Start: 2024-03-28 | End: 2024-03-28 | Stop reason: HOSPADM

## 2024-03-28 RX ORDER — MIDAZOLAM HYDROCHLORIDE 1 MG/ML
1 INJECTION INTRAMUSCULAR; INTRAVENOUS
Status: DISCONTINUED | OUTPATIENT
Start: 2024-03-28 | End: 2024-03-28 | Stop reason: HOSPADM

## 2024-03-28 RX ORDER — KETOROLAC TROMETHAMINE 30 MG/ML
INJECTION, SOLUTION INTRAMUSCULAR; INTRAVENOUS AS NEEDED
Status: DISCONTINUED | OUTPATIENT
Start: 2024-03-28 | End: 2024-03-28 | Stop reason: SURG

## 2024-03-28 RX ORDER — OXYCODONE HYDROCHLORIDE AND ACETAMINOPHEN 5; 325 MG/1; MG/1
1 TABLET ORAL ONCE AS NEEDED
Status: DISCONTINUED | OUTPATIENT
Start: 2024-03-28 | End: 2024-03-28 | Stop reason: HOSPADM

## 2024-03-28 RX ORDER — ONDANSETRON 2 MG/ML
INJECTION INTRAMUSCULAR; INTRAVENOUS AS NEEDED
Status: DISCONTINUED | OUTPATIENT
Start: 2024-03-28 | End: 2024-03-28 | Stop reason: SURG

## 2024-03-28 RX ORDER — IPRATROPIUM BROMIDE AND ALBUTEROL SULFATE 2.5; .5 MG/3ML; MG/3ML
3 SOLUTION RESPIRATORY (INHALATION) ONCE AS NEEDED
Status: DISCONTINUED | OUTPATIENT
Start: 2024-03-28 | End: 2024-03-28 | Stop reason: HOSPADM

## 2024-03-28 RX ORDER — ROPIVACAINE HYDROCHLORIDE 5 MG/ML
INJECTION, SOLUTION EPIDURAL; INFILTRATION; PERINEURAL
Status: COMPLETED | OUTPATIENT
Start: 2024-03-28 | End: 2024-03-28

## 2024-03-28 RX ORDER — SODIUM CHLORIDE, SODIUM LACTATE, POTASSIUM CHLORIDE, CALCIUM CHLORIDE 600; 310; 30; 20 MG/100ML; MG/100ML; MG/100ML; MG/100ML
INJECTION, SOLUTION INTRAVENOUS CONTINUOUS PRN
Status: DISCONTINUED | OUTPATIENT
Start: 2024-03-28 | End: 2024-03-28 | Stop reason: SURG

## 2024-03-28 RX ORDER — PROPOFOL 10 MG/ML
INJECTION, EMULSION INTRAVENOUS AS NEEDED
Status: DISCONTINUED | OUTPATIENT
Start: 2024-03-28 | End: 2024-03-28 | Stop reason: SURG

## 2024-03-28 RX ORDER — ONDANSETRON 2 MG/ML
4 INJECTION INTRAMUSCULAR; INTRAVENOUS AS NEEDED
Status: DISCONTINUED | OUTPATIENT
Start: 2024-03-28 | End: 2024-03-28 | Stop reason: HOSPADM

## 2024-03-28 RX ORDER — LIDOCAINE HYDROCHLORIDE 20 MG/ML
INJECTION, SOLUTION EPIDURAL; INFILTRATION; INTRACAUDAL; PERINEURAL AS NEEDED
Status: DISCONTINUED | OUTPATIENT
Start: 2024-03-28 | End: 2024-03-28 | Stop reason: SURG

## 2024-03-28 RX ORDER — ROCURONIUM BROMIDE 10 MG/ML
INJECTION, SOLUTION INTRAVENOUS AS NEEDED
Status: DISCONTINUED | OUTPATIENT
Start: 2024-03-28 | End: 2024-03-28 | Stop reason: SURG

## 2024-03-28 RX ORDER — MEPERIDINE HYDROCHLORIDE 25 MG/ML
12.5 INJECTION INTRAMUSCULAR; INTRAVENOUS; SUBCUTANEOUS
Status: DISCONTINUED | OUTPATIENT
Start: 2024-03-28 | End: 2024-03-28 | Stop reason: HOSPADM

## 2024-03-28 RX ORDER — FENTANYL CITRATE 50 UG/ML
INJECTION, SOLUTION INTRAMUSCULAR; INTRAVENOUS AS NEEDED
Status: DISCONTINUED | OUTPATIENT
Start: 2024-03-28 | End: 2024-03-28 | Stop reason: SURG

## 2024-03-28 RX ORDER — SODIUM CHLORIDE 0.9 % (FLUSH) 0.9 %
10 SYRINGE (ML) INJECTION AS NEEDED
Status: DISCONTINUED | OUTPATIENT
Start: 2024-03-28 | End: 2024-03-28 | Stop reason: HOSPADM

## 2024-03-28 RX ORDER — SODIUM CHLORIDE, SODIUM LACTATE, POTASSIUM CHLORIDE, CALCIUM CHLORIDE 600; 310; 30; 20 MG/100ML; MG/100ML; MG/100ML; MG/100ML
125 INJECTION, SOLUTION INTRAVENOUS ONCE
Status: COMPLETED | OUTPATIENT
Start: 2024-03-28 | End: 2024-03-28

## 2024-03-28 RX ORDER — MAGNESIUM HYDROXIDE 1200 MG/15ML
LIQUID ORAL AS NEEDED
Status: DISCONTINUED | OUTPATIENT
Start: 2024-03-28 | End: 2024-03-28 | Stop reason: HOSPADM

## 2024-03-28 RX ORDER — NEOSTIGMINE METHYLSULFATE 1 MG/ML
INJECTION, SOLUTION INTRAVENOUS AS NEEDED
Status: DISCONTINUED | OUTPATIENT
Start: 2024-03-28 | End: 2024-03-28 | Stop reason: SURG

## 2024-03-28 RX ORDER — ACETAMINOPHEN 325 MG/1
650 TABLET ORAL EVERY 4 HOURS PRN
Qty: 30 TABLET | Refills: 0 | Status: SHIPPED | OUTPATIENT
Start: 2024-03-28

## 2024-03-28 RX ORDER — MIDAZOLAM HYDROCHLORIDE 1 MG/ML
INJECTION INTRAMUSCULAR; INTRAVENOUS AS NEEDED
Status: DISCONTINUED | OUTPATIENT
Start: 2024-03-28 | End: 2024-03-28 | Stop reason: SURG

## 2024-03-28 RX ORDER — SODIUM CHLORIDE, SODIUM LACTATE, POTASSIUM CHLORIDE, CALCIUM CHLORIDE 600; 310; 30; 20 MG/100ML; MG/100ML; MG/100ML; MG/100ML
100 INJECTION, SOLUTION INTRAVENOUS ONCE AS NEEDED
Status: DISCONTINUED | OUTPATIENT
Start: 2024-03-28 | End: 2024-03-28 | Stop reason: HOSPADM

## 2024-03-28 RX ORDER — BUPRENORPHINE HYDROCHLORIDE 0.32 MG/ML
INJECTION INTRAMUSCULAR; INTRAVENOUS
Status: COMPLETED | OUTPATIENT
Start: 2024-03-28 | End: 2024-03-28

## 2024-03-28 RX ORDER — DEXAMETHASONE SODIUM PHOSPHATE 4 MG/ML
INJECTION, SOLUTION INTRA-ARTICULAR; INTRALESIONAL; INTRAMUSCULAR; INTRAVENOUS; SOFT TISSUE
Status: COMPLETED | OUTPATIENT
Start: 2024-03-28 | End: 2024-03-28

## 2024-03-28 RX ORDER — FENTANYL CITRATE 50 UG/ML
50 INJECTION, SOLUTION INTRAMUSCULAR; INTRAVENOUS
Status: DISCONTINUED | OUTPATIENT
Start: 2024-03-28 | End: 2024-03-28 | Stop reason: HOSPADM

## 2024-03-28 RX ORDER — IBUPROFEN 600 MG/1
600 TABLET ORAL EVERY 6 HOURS PRN
Qty: 30 TABLET | Refills: 0 | Status: SHIPPED | OUTPATIENT
Start: 2024-03-28

## 2024-03-28 RX ADMIN — KETOROLAC TROMETHAMINE 30 MG: 30 INJECTION, SOLUTION INTRAMUSCULAR at 07:37

## 2024-03-28 RX ADMIN — FENTANYL CITRATE 50 MCG: 50 INJECTION INTRAMUSCULAR; INTRAVENOUS at 07:28

## 2024-03-28 RX ADMIN — DEXAMETHASONE SODIUM PHOSPHATE 8 MG: 4 INJECTION, SOLUTION INTRA-ARTICULAR; INTRALESIONAL; INTRAMUSCULAR; INTRAVENOUS; SOFT TISSUE at 07:43

## 2024-03-28 RX ADMIN — SODIUM CHLORIDE, POTASSIUM CHLORIDE, SODIUM LACTATE AND CALCIUM CHLORIDE: 600; 310; 30; 20 INJECTION, SOLUTION INTRAVENOUS at 07:28

## 2024-03-28 RX ADMIN — ROPIVACAINE HYDROCHLORIDE 250 MG: 5 INJECTION, SOLUTION EPIDURAL; INFILTRATION; PERINEURAL at 07:43

## 2024-03-28 RX ADMIN — HYDROMORPHONE HYDROCHLORIDE 1 MG: 1 INJECTION, SOLUTION INTRAMUSCULAR; INTRAVENOUS; SUBCUTANEOUS at 11:00

## 2024-03-28 RX ADMIN — PROPOFOL 160 MG: 10 INJECTION, EMULSION INTRAVENOUS at 07:34

## 2024-03-28 RX ADMIN — NEOSTIGMINE METHYLSULFATE 3 MG: 0.5 INJECTION INTRAVENOUS at 09:09

## 2024-03-28 RX ADMIN — CEFAZOLIN 2 G: 2 INJECTION, POWDER, FOR SOLUTION INTRAMUSCULAR; INTRAVENOUS at 07:40

## 2024-03-28 RX ADMIN — FAMOTIDINE 20 MG: 10 INJECTION, SOLUTION INTRAVENOUS at 07:28

## 2024-03-28 RX ADMIN — MIDAZOLAM HYDROCHLORIDE 2 MG: 1 INJECTION, SOLUTION INTRAMUSCULAR; INTRAVENOUS at 07:28

## 2024-03-28 RX ADMIN — LIDOCAINE HYDROCHLORIDE 100 MG: 20 INJECTION, SOLUTION EPIDURAL; INFILTRATION; INTRACAUDAL; PERINEURAL at 07:34

## 2024-03-28 RX ADMIN — BUPRENORPHINE HYDROCHLORIDE 0.3 MG: 0.32 INJECTION INTRAMUSCULAR; INTRAVENOUS at 07:43

## 2024-03-28 RX ADMIN — FENTANYL CITRATE 50 MCG: 50 INJECTION INTRAMUSCULAR; INTRAVENOUS at 07:55

## 2024-03-28 RX ADMIN — SODIUM CHLORIDE, POTASSIUM CHLORIDE, SODIUM LACTATE AND CALCIUM CHLORIDE 125 ML/HR: 600; 310; 30; 20 INJECTION, SOLUTION INTRAVENOUS at 07:09

## 2024-03-28 RX ADMIN — ONDANSETRON 4 MG: 2 INJECTION INTRAMUSCULAR; INTRAVENOUS at 07:28

## 2024-03-28 RX ADMIN — ROCURONIUM BROMIDE 40 MG: 10 SOLUTION INTRAVENOUS at 07:34

## 2024-03-28 RX ADMIN — GLYCOPYRROLATE 0.4 MG: 0.2 INJECTION INTRAMUSCULAR; INTRAVENOUS at 09:09

## 2024-03-28 RX ADMIN — SODIUM CHLORIDE, POTASSIUM CHLORIDE, SODIUM LACTATE AND CALCIUM CHLORIDE: 600; 310; 30; 20 INJECTION, SOLUTION INTRAVENOUS at 08:32

## 2024-03-28 NOTE — ANESTHESIA PROCEDURE NOTES
Airway  Urgency: elective    Date/Time: 3/28/2024 7:35 AM  Airway not difficult    General Information and Staff    Patient location during procedure: OR  CRNA/CAA: Gisele Hartley CRNA    Indications and Patient Condition  Indications for airway management: airway protection    Preoxygenated: yes  MILS maintained throughout  Mask difficulty assessment: 1 - vent by mask    Final Airway Details  Final airway type: endotracheal airway      Successful airway: ETT  Cuffed: yes   Successful intubation technique: direct laryngoscopy  Facilitating devices/methods: intubating stylet  Endotracheal tube insertion site: oral  Blade: Supa  Blade size: 3  ETT size (mm): 7.5  Cormack-Lehane Classification: grade I - full view of glottis  Placement verified by: chest auscultation and capnometry   Measured from: lips  ETT/EBT  to lips (cm): 21  Number of attempts at approach: 1  Assessment: lips, teeth, and gum same as pre-op and atraumatic intubation

## 2024-03-28 NOTE — H&P
Subjective  Damaso Leonard is a 63 y.o. male who presents today for Initial Evaluation     Chief Complaint:        Chief Complaint   Patient presents with    Hernia         History of Present Illness:    History of Present Illness Abraham is a 63-year-old male who presents for an evaluation for a left inguinal hernia.  He reports that he has been there for approximately the past 6 to 8 years.  He states that he does not recall developing the inguinal hernia.  He states that it has been causing him discomfort and he has noted an increase in swelling.  States that it hurts and burns at times.  He denies any issues with urination or having a bowel movement.  Patient does not smoke.  He does currently take Plavix as he has a history of coronary artery disease and stents.     The following portions of the patient's history were reviewed and updated as appropriate: allergies, current medications, past family history, past medical history, past social history, past surgical history and problem list.     Past Medical History:  Medical History        Past Medical History:   Diagnosis Date    Anxiety      Anxiety and depression      Arthritis      ASCVD (arteriosclerotic cardiovascular disease)      CHF (congestive heart failure)      Colitis      Coronary artery disease      Disease of thyroid gland      DM (diabetes mellitus)      Elevated cholesterol      GERD (gastroesophageal reflux disease)      History of EKG 04/15/2015     NORMAL    History of transfusion      HTN (hypertension)      Hyperlipidemia      Injury of back      Low back pain      Myocardial infarction       about 13 years ago    Requires supplemental oxygen       USES 2 L AT NIGHT    Sleep apnea       wears oxygen at night     Wears glasses      Wears partial dentures              Social History:  Social History   Social History            Socioeconomic History    Marital status:        Spouse name: ruben    Number of children: 3    Years of  education: 12   Tobacco Use    Smoking status: Never    Smokeless tobacco: Never   Vaping Use    Vaping Use: Never used   Substance and Sexual Activity    Alcohol use: No    Drug use: No    Sexual activity: Defer            Family History:        Family History   Problem Relation Age of Onset    Heart attack Father      Heart disease Father      Hypertension Father      Heart attack Sister      Diabetes Sister      Heart disease Sister      Hypertension Sister      Thyroid disease Sister      Heart attack Brother      Diabetes Brother      Thyroid disease Brother      Arthritis Daughter      COPD Daughter      Asthma Daughter      Depression Daughter      Heart disease Sister      Hypertension Sister           Past Surgical History:  Surgical History         Past Surgical History:   Procedure Laterality Date    BRAVO PROCEDURE N/A 4/13/2022     Procedure: ESOPHAGOGASTRODUODENOSCOPY AND KELLER;  Surgeon: Ulysses Cavanaugh MD;  Location: [facility] OR;  Service: Gastroenterology;  Laterality: N/A;  EG junction @ 36cm.  Bravo capsule placed @ 30cm.    CARDIAC CATHETERIZATION         reports 4 cardiac stents    CARDIAC CATHETERIZATION N/A 12/11/2020     Procedure: Coronary angiography;  Surgeon: Noble Thompson MD;  Location:  [facility];  Service: Cardiology;  Laterality: N/A;    CARDIAC CATHETERIZATION N/A 3/2/2022     Procedure: Left Heart Cath;  Surgeon: Arron Riley MD;  Location:  [facility];  Service: Cardiology;  Laterality: N/A;    CARDIAC SURGERY         stenting    CHOLECYSTECTOMY        COLONOSCOPY   2007    COLONOSCOPY N/A 5/30/2020     Procedure: COLONOSCOPY CPT CODE: 86962;  Surgeon: Anthony Garcia MD;  Location: [facility] OR;  Service: Gastroenterology;  Laterality: N/A;    ENDOSCOPY N/A 5/30/2020     Procedure: ESOPHAGOGASTRODUODENOSCOPY WITH BIOPSY CPT CODE: 52486;  Surgeon: Anthony Garcia MD;  Location: [facility] OR;  Service:  Gastroenterology;  Laterality: N/A;    ENDOSCOPY N/A 7/19/2021     Procedure: ESOPHAGOGASTRODUODENOSCOPY WITH BIOPSY;  Surgeon: Anthony Garcia MD;  Location:  [facility] OR;  Service: Gastroenterology;  Laterality: N/A;  dilated with balloon dilator to 20mm    ESOPHAGEAL MOTILITY STUDY N/A 6/30/2022     Procedure: ESOPHAGEAL MOTILITY STUDY;  Surgeon: Ortiz Solomon MD;  Location:  [facility];  Service: General;  Laterality: N/A;  Patient intubated via right nare. Tube secured @ 48 cm. Patient completed study successfully. No s/s of distress noted during or after procedure.    HERNIA REPAIR        SHOULDER SURGERY        TONSILLECTOMY        TOTAL KNEE ARTHROPLASTY Right 1/27/2020     Procedure: TOTAL KNEE ARTHROPLASTY RIGHT;  Surgeon: Ortiz Barba MD;  Location:  [facility] OR;  Service: Orthopedics    TYMPANOPLASTY        UPPER GASTROINTESTINAL ENDOSCOPY        VASECTOMY                Problem List:  Problem List       Patient Active Problem List   Diagnosis    Generalized anxiety disorder    Malignant hypertension with heart disease, without congestive heart failure    Major depressive disorder in partial remission    Type 2 diabetes mellitus with diabetic peripheral angiopathy without gangrene, without long-term current use of insulin    Coronary artery disease involving native coronary artery of native heart    Sleep apnea    Obstructive sleep apnea    Osteoarthritis involving multiple joints on both sides of body    Vitamin D deficiency    Vitamin B 12 deficiency    Hypercholesterolemia    Anginal pain    Lumbar back pain with radiculopathy affecting left lower extremity    Vitamin D deficiency disease    High risk medication use    Gastroesophageal reflux disease without esophagitis    Seasonal allergic rhinitis due to pollen    Benign non-nodular prostatic hyperplasia without lower urinary tract symptoms    Essential hypertension    Chronic pain of both knees    Colitis    Right hand  weakness    ED (erectile dysfunction) of organic origin    Class 1 obesity due to excess calories with serious comorbidity and body mass index (BMI) of 30.0 to 30.9 in adult    Primary osteoarthritis of both knees    Chronic combined systolic and diastolic heart failure    Status post total right knee replacement    DM (diabetes mellitus)    Hypothyroid    On home O2    Leukocytosis, likely reactive    Periumbilical abdominal pain    Change in bowel habits    Nausea    Gastroesophageal reflux disease    Unstable angina    Preoperative clearance    Constipation    Abnormal findings on esophagogastroduodenoscopy (EGD)            Allergy:   Allergies        Allergies   Allergen Reactions    Ranexa [Ranolazine] GI Intolerance            Current Medications:   Current Medications          Current Outpatient Medications   Medication Sig Dispense Refill    amitriptyline (ELAVIL) 50 MG tablet Take 2 tablets by mouth At Night As Needed for Sleep. 180 tablet 1    aspirin 81 MG EC tablet Take 1 tablet by mouth Daily. 90 tablet 3    carvedilol (COREG) 25 MG tablet Take 1 tablet by mouth 2 (Two) Times a Day With Meals. 180 tablet 3    citalopram (CeleXA) 40 MG tablet Take 1 tablet by mouth Daily. 90 tablet 5    clonazePAM (KlonoPIN) 0.5 MG tablet TAKE 1 TABLET BY MOUTH THREE TIMES DAILY AS NEEDED FOR ANXIETY (Patient taking differently: Pt states takes 1tab daily.) 90 tablet 0    clopidogrel (PLAVIX) 75 MG tablet Take 1 tablet by mouth Daily. 90 tablet 3    cyanocobalamin 1000 MCG/ML injection Inject 1 mL into the appropriate muscle as directed by prescriber Every 30 (Thirty) Days. 1 mL 5    dexlansoprazole (DEXILANT) 60 MG capsule TAKE ONE CAPSULE BY MOUTH EVERY DAY 30 capsule 5    dicyclomine (BENTYL) 10 MG capsule Take 1 capsule by mouth 4 (Four) Times a Day Before Meals & at Bedtime. 120 capsule 5    diphenhydrAMINE (Benadryl Allergy) 25 mg capsule Take 1 capsule by mouth Every 6 (Six) Hours As Needed for Itching. 90 capsule  3    docusate sodium 100 MG capsule Take 1 capsule by mouth 2 (Two) Times a Day As Needed (constipation). 60 each 5    exenatide er (Bydureon) 2 MG pen-injector injection Inject 1 pen under the skin into the appropriate area as directed 1 (One) Time Per Week. 4 each 5    finasteride (PROSCAR) 5 MG tablet Take 1 tablet by mouth Daily. 90 tablet 3    glucose blood test strip Check blood sugar 3x times a day. 100 each 12    glucose monitor monitoring kit 1 each 3 (Three) Times a Day As Needed (diabetes). 1 each 0    HYDROcodone-acetaminophen (NORCO) 7.5-325 MG per tablet Take 1 tablet by mouth Every 6 (Six) Hours As Needed for Moderate Pain. 12 tablet 0    isosorbide mononitrate (IMDUR) 30 MG 24 hr tablet Take 1 tablet by mouth Daily. 90 tablet 3    Lancets (ONETOUCH ULTRASOFT) lancets Check blood sugar 3 times daily 100 each 12    linaclotide (LINZESS) 145 MCG capsule capsule Take 1 tablet once daily on an empty stomach at least 30 minutes prior to the first meal of the day. (Patient taking differently: Daily As Needed. Take 1 tablet once daily on an empty stomach at least 30 minutes prior to the first meal of the day.) 90 capsule 3    metFORMIN (GLUCOPHAGE) 500 MG tablet Take 1 tablet by mouth 2 (Two) Times a Day With Meals. 180 tablet 3    naloxone (NARCAN) 4 MG/0.1ML nasal spray 1 spray into the nostril(s) as directed by provider As Needed (overdose). 1 each 0    nitroglycerin (NITROSTAT) 0.4 MG SL tablet 1 under the tongue as needed for angina, may repeat q5mins for up three doses 50 tablet 1    O2 (OXYGEN) Inhale 2 L/min Every Night.        ondansetron (ZOFRAN) 8 MG tablet TAKE 1 TABLET BY MOUTH EVERY 8 HOURS AS NEEDED FOR NAUSEA 20 tablet 0    polyethylene glycol (MIRALAX) 17 g packet Take 17 g by mouth Daily. 1 each 5    simvastatin (ZOCOR) 40 MG tablet Take 1 tablet by mouth Every Night. 90 tablet 3    SV Iron 325 MG tablet Take 1 tablet by mouth Daily.        Thyroid (NP THYROID) 30 MG PO tablet Take 1 tablet  by mouth Daily. 30 tablet 5    traMADol (ULTRAM) 50 MG tablet Take 1 tablet by mouth Every 8 (Eight) Hours As Needed for Moderate Pain  or Severe Pain . 60 tablet 3    vitamin D (ERGOCALCIFEROL) 1.25 MG (76312 UT) capsule capsule Take 1 capsule by mouth Every 7 (Seven) Days. 12 capsule 5      No current facility-administered medications for this visit.            Review of Systems:    Review of Systems   Constitutional:  Negative for activity change.   HENT:  Negative for congestion.    Eyes:  Negative for blurred vision.   Respiratory:  Negative for shortness of breath.    Cardiovascular:  Negative for chest pain.   Gastrointestinal:  Negative for abdominal pain.   Endocrine: Negative for cold intolerance.   Genitourinary:         Positive for groin pain   Musculoskeletal:  Negative for arthralgias.   Skin:  Negative for bruise.   Allergic/Immunologic: Negative for environmental allergies.   Neurological:  Negative for confusion.   Hematological:  Negative for adenopathy.   Psychiatric/Behavioral:  Negative for agitation.             Physical Exam:   Physical Exam  Constitutional:       Appearance: Normal appearance.   HENT:      Head: Normocephalic and atraumatic.      Right Ear: External ear normal.      Left Ear: External ear normal.   Eyes:      Conjunctiva/sclera: Conjunctivae normal.      Pupils: Pupils are equal, round, and reactive to light.   Cardiovascular:      Rate and Rhythm: Normal rate and regular rhythm.      Pulses: Normal pulses.      Heart sounds: Normal heart sounds.   Pulmonary:      Effort: Pulmonary effort is normal.      Breath sounds: Normal breath sounds.   Abdominal:      General: Abdomen is flat.      Palpations: Abdomen is soft.   Genitourinary:     Comments: Left inguinal hernia noted upon physical exam.  Reducible.  Musculoskeletal:         General: Normal range of motion.      Cervical back: Normal range of motion and neck supple.   Skin:     General: Skin is warm and dry.       "Capillary Refill: Capillary refill takes less than 2 seconds.   Neurological:      General: No focal deficit present.      Mental Status: He is alert and oriented to person, place, and time.   Psychiatric:         Mood and Affect: Mood normal.         Behavior: Behavior normal.            Vitals:  Height 177.8 cm (70\"), weight 84.4 kg (186 lb).   Body mass index is 26.69 kg/m².      Imaging:   CT Pelvis Without Contrast  Narrative: Procedure: CT of the pelvis performed without IV contrast on 09/04/2023.  The examination was performed according to as low as reasonably  achievable dose protocol. Examination was performed with 3 mm axial  imaging and 3 mm sagittal and coronal reconstruction images.     HISTORY: Fall. Left buttock pain.     FINDINGS:     Intact pubic symphysis.  Intact SI joints.  Degenerative disc disease in the lower lumbar spine.  No acute fracture or dislocation.  Mild osteoarthritis at the right and left hip joint.  Left inguinal hernia contains fat and a short segment of the proximal  sigmoid colon.  Mild sigmoid colon diverticulosis.  Mild constipation involving the right colon.  Mild to moderate degenerative disc disease in the lower lumbar spine  with associated disc height reduction at the L3-4 and L4-5 levels.  Schmorl's node formation noted along the inferior endplate of L3 and  superior endplate of L4.  Mild endplate and facet hypertrophic changes at the mid and lower lumbar  spine levels contribute to mild bilateral neural foraminal stenoses.  Intact sacrum and coccyx segments.  Intact proximal right and left femur.     Prominent hematoma identified within the left gluteus laurie muscle.  The hematoma is estimated to measure 11.8 cm transverse by 4.4 cm AP and  the hematoma has a craniocaudal dimension of 9 cm.  No air in the soft tissues.  No foreign body.  Small right inguinal hernia contains only fat.  No acute process seen in the bladder and prostate gland.     Impression:    1.  " Moderate-sized hematoma within the left gluteus laurie muscle  estimated to measure 11.8 x 4.4 x 9.0 cm.  2.  No acute fracture of the pelvis.  3.  No acute fracture of the proximal right and left femur.  4.  Mild to moderate multilevel degenerative disc disease in the lower  lumbar spine with disc height reduction at the L3-4 and L4-5 levels.  5.  Small left inguinal hernia contains a short segment of the proximal  sigmoid colon.  6.  Mild sigmoid colon diverticulosis.  7.  Mild osteoarthritis at the right and left hip joint.  8.  No foreign body.     This report was finalized on 9/4/2023 10:13 PM by Luis Cunningham MD.                 Assessment & Plan  Diagnoses and all orders for this visit:     1. Left inguinal hernia (Primary)  -     Case Request; Standing  -     sodium chloride 0.9 % flush 10 mL  -     sodium chloride 0.9 % flush 10 mL  -     sodium chloride 0.9 % infusion 40 mL  -     ceFAZolin (ANCEF) 2,000 mg in sodium chloride 0.9 % 100 mL IVPB  -     Case Request     Other orders  -     Follow Anesthesia Guidelines / Protocol; Future  -     Follow Anesthesia Guidelines / Protocol; Standing  -     Verify / Perform Chlorhexidine Skin Prep; Standing  -     Verify / Perform Chlorhexidine Skin Prep if Indicated (If Not Already Completed); Standing  -     Obtain Informed Consent; Future  -     Provide NPO Instructions to Patient; Future  -     Chlorhexidine Skin Prep; Future  -     Instructions on coughing, deep breathing, and incentive spirometry.; Standing  -     Insert Peripheral IV; Standing  -     Saline Lock & Maintain IV Access; Standing        Abraham is a 63-year-old male who presents for evaluation for left inguinal hernia repair.  He will undergo a left inguinal hernia repair with Dr. Cavanaugh once cardiac clearance has been obtained.  Patient verbalized understanding of prep instructions and procedure and wishes to proceed.  He verbalized understanding to continue taking blood thinning medications until  notified by our office.

## 2024-03-28 NOTE — ANESTHESIA PREPROCEDURE EVALUATION
Anesthesia Evaluation     Patient summary reviewed and Nursing notes reviewed   no history of anesthetic complications:   NPO Solid Status: > 8 hours  NPO Liquid Status: > 8 hours           Airway   Mallampati: II  TM distance: >3 FB  Neck ROM: full  Dental    (+) edentulous    Pulmonary     breath sounds clear to auscultation  (+) ,home oxygen (2 liters at night), sleep apnea  Cardiovascular   Exercise tolerance: good (4-7 METS)    ECG reviewed  Patient on routine beta blocker and Beta blocker given within 24 hours of surgery  Rhythm: regular  Rate: normal    (+) hypertension, past MI (2006 aznd 2020) , CAD, angina, CHF , PVD, hyperlipidemia      Neuro/Psych  (+) numbness, psychiatric history Anxiety and Depression  GI/Hepatic/Renal/Endo    (+) obesity, morbid obesity, GERD, diabetes mellitus, thyroid problem hypothyroidism    Musculoskeletal     Abdominal     Abdomen: soft.   Substance History      OB/GYN          Other   arthritis,                 Anesthesia Plan    ASA 3     general with block     intravenous induction     Anesthetic plan, risks, benefits, and alternatives have been provided, discussed and informed consent has been obtained with: patient.  Pre-procedure education provided  Use of blood products discussed with  Consented to blood products.    Plan discussed with CRNA.    CODE STATUS:

## 2024-03-28 NOTE — ANESTHESIA PROCEDURE NOTES
"Peripheral Block      Patient reassessed immediately prior to procedure    Patient location during procedure: OR  Start time: 3/28/2024 7:41 AM  Stop time: 3/28/2024 7:43 AM  Reason for block: at surgeon's request and post-op pain management  Performed by  CRNA/CAA: Gisele Hartley CRNA  Preanesthetic Checklist  Completed: patient identified, IV checked, site marked, risks and benefits discussed, surgical consent, monitors and equipment checked, pre-op evaluation and timeout performed  Prep:  Pt Position: supine  Sterile barriers:cap, gloves, sterile barriers and mask  Prep: ChloraPrep  Patient monitoring: blood pressure monitoring, continuous pulse oximetry and EKG  Procedure    Nursing cardiac assessment comments yes: Sedation, GA, Spinal,Epidural   Performed under: general  Guidance:ultrasound guided    ULTRASOUND INTERPRETATION.  Using ultrasound guidance a 20 G (20g 4\" Stimuplex) gauge needle was placed in close proximity to the nerve, at which point, under ultrasound guidance anesthetic was injected in the area of the nerve and spread of the anesthesia was seen on ultrasound in close proximity thereto.  There were no abnormalities seen on ultrasound; a digital image was taken; and the patient tolerated the procedure with no complications. Images:still images obtained    Laterality:Bilateral  Block Type:TAP  Injection Technique:single-shot  Needle Type:short-bevel  Needle Gauge:20 G  Resistance on Injection: none    Medications Used: buprenorphine (BUPRENEX) injection - Peripheral Nerve, Transversus Abdominus Plane   0.3 mg - 3/28/2024 7:43:00 AM  dexamethasone (DECADRON) injection - Peripheral Nerve, Transversus Abdominus Plane   8 mg - 3/28/2024 7:43:00 AM  ropivacaine (NAROPIN) injection 0.5 % - Peripheral Nerve, Transversus Abdominus Plane   250 mg - 3/28/2024 7:43:00 AM      Medications  Comment:Block Injection:  Total volume divided equally between all 4 injection sites      Post Assessment  Injection " Assessment: negative aspiration for heme, incremental injection and no paresthesia on injection  Patient Tolerance:comfortable throughout block  Complications:no  Additional Notes  The pt was in the supine position under general anesthesia    Under Ultrasound guidance, a BBraun 4inch 360 degree needle was advanced with Normal Saline hydro dissection of tissue.  The Internal Oblique and Transversus Abdominus muscles where visualized.  At or before the aponeurosis of Internal Oblique, local anesthetic spread was visualized in the Transversus Abdominus Plane. Injection was made incrementally with aspiration every 5 mls.  There was no  intravascular injection,  injection pressure was normal, there was no neural injection, and the procedure was completed without difficulty. The same procedure was completed for left and right sided lateral tap blocks.    Under Ultrasound guidance, a Chandra 4inch 360 degree needle was advanced with Normal Saline hydro dissection of tissue.  The Rectus and Transversus Abdominus muscles where visualized.  The needle tip was placed between the Transversus Abdominus and rectus abdominus, local anesthetic spread was visualized in the Transversus Abdominus Plane. Injection was made incrementally with aspiration every 5 mls.  There was no  intravascular injection,  injection pressure was normal, there was no neural injection, and the procedure was completed without difficulty. The same procedure was completed for left and right sided subcostal tap blocks. Thank You.

## 2024-03-28 NOTE — ANESTHESIA POSTPROCEDURE EVALUATION
Patient: Damaso Leonard    Procedure Summary       Date: 03/28/24 Room / Location: [facility] OR  /  [facility] OR    Anesthesia Start: 0728 Anesthesia Stop: 0927    Procedure: INGUINAL HERNIA REPAIR LAPAROSCOPIC WITH DAVINCI ROBOT (Left: Abdomen) Diagnosis:       Left inguinal hernia      (Left inguinal hernia [K40.90])    Surgeons: Ulysses Cavanaugh MD Provider: Royal Garcia MD    Anesthesia Type: general with block ASA Status: 3            Anesthesia Type: general with block    Vitals  Vitals Value Taken Time   /82 03/28/24 0956   Temp 97 °F (36.1 °C) 03/28/24 0926   Pulse 62 03/28/24 0956   Resp 20 03/28/24 0956   SpO2 95 % 03/28/24 0956           Post Anesthesia Care and Evaluation    Patient location during evaluation: bedside  Patient participation: complete - patient participated  Level of consciousness: awake and alert  Pain score: 1  Pain management: adequate    Airway patency: patent  Anesthetic complications: No anesthetic complications  PONV Status: none  Cardiovascular status: acceptable  Respiratory status: acceptable  Hydration status: acceptable

## 2024-03-28 NOTE — OP NOTE
INGUINAL HERNIA REPAIR LAPAROSCOPIC WITH DAVINCI ROBOT  Procedure Note    Damaso Leonard  3/28/2024    Pre-op Diagnosis:   Left inguinal hernia [K40.90]    Post-op Diagnosis:     Post-Op Diagnosis Codes:     * Left inguinal hernia [K40.90]    Procedure(s):  LEFT INGUINAL HERNIA REPAIR LAPAROSCOPIC WITH DAVINCI ROBOT    Surgeon(s):  Ulysses Cavanaugh MD    Anesthesia: Choice    Staff:   Circulator: Tamra Nunez RN  Scrub Person: Madison Young  Assistant: Yoli Allison    Findings: Large left inguinal hernia with an indirect defect containing sigmoid colon    Operative Procedure: Patient was taken the operating suite and placed upon the operating table.  Bilateral sequential compression devices were applied and general endotracheal anesthesia was administered.  The abdomen was prepped and draped in usual sterile fashion and transversus block was performed by anesthesia.  Hadley catheter was placed.  Preoperative antibiotics were confirmed.  The abdomen was prepped and draped in usual sterile fashion and timeout procedure was performed.  And a stab incision at Christianson's point Veress needle was inserted and saline drop test confirmed entry of the peritoneal space.  Pneumoperitoneum was established.  An 8 mm optical trocar was placed at the Veress needle site and laparoscopic survey showed no evidence of injury from Veress needle placement.  There was no evidence of right inguinal hernia.  A large left inguinal hernia defect containing sigmoid colon was noted.  A supraumbilical 8 mm midline trocar was placed as well as a right subcostal 8 mm robotic trocar.  Under laparoscopic visualization a 0 Vicryl suture was placed at the supraumbilical fascial defect for closure completion of the case.  A large left inguinal 3D max mesh was inserted into the peritoneal space along with 2 2-0 V-Loc sutures and a 2-0 Vicryl suture.  The robot was docked to the patient and instruments were inserted.  I exited the sterile  field and entered the robotic console.  A peritoneal flap was created from the medial umbilical fold towards the anterior superior iliac spine laterally.  The flap was dissected inferiorly and the pubic tubercle was identified in the midline just across the midline.  Laterally space was created for mesh placement.  The sigmoid colon was reduced from the hernia defect but peritoneal attachments were dense to the sigmoid colon it was left in situ.  The peritoneal flap was dissected free from the cord structures and there were dense adhesions to the vas deferens that were taken down with careful dissection.  With the adhesions to the vas deferens taken down the cord structures were identified and isolated from the hernia sac which was reduced along with a large cord lipoma and an area of either calcified lymph node or fat necrosis within this lipoma.  The lipoma was resected along with the area of fat necrosis and the hernia was reduced posteriorly away from the cord structures to allow adequate space for mesh placement.  At this time the field was hemostatic and a large 3D max mesh was placed into the preperitoneal space and secured to Estiven's ligament at the pubic tubercle with a Vicryl suture.  There was adequate overlap of all mesh defects and the myopectineal orifice had been cleared with no evidence of femoral or direct defects.  With the mesh in appropriate position there is no evidence of under folding of the mesh with closure of the peritoneal flap and this was closed in a running fashion with 2-0 V-Loc suture.  There was a small peritoneal rent just anterior to the adhesions to the sigmoid colon which was closed with the Vicryl suture and a second 2-0 V-Loc suture.  Following reperitonealization of the mesh the field was hemostatic and all needles were removed by my assistant under direct visualization.  The cord lipoma and fat necrosis specimen were removed through the trocar and sent to pathology and all  sponge lap and needle counts were correct.  Pneumoperitoneum was evacuated after removal of trocars under direct visualization and undocking of the robot.  My assistant reapproximated the previously thrown supraumbilical fascial suture and all skin incisions were closed with Monocryl subcuticular suture and skin affix.  Hadley catheter was removed and the patient was awakened from anesthesia and taken to recovery.  Bilateral testes were confirmed in the scrotum.    Estimated Blood Loss: 10 mL    Specimens:     Inguinal mass  Cord lipoma           Drains: None    Grafts/Implants: Large left preperitoneal 3D max mesh    Complications: None           Ulysses Cavanaugh MD     Date: 3/28/2024  Time: 09:16 EDT

## 2024-04-01 LAB — REF LAB TEST METHOD: NORMAL

## 2024-04-16 ENCOUNTER — OFFICE VISIT (OUTPATIENT)
Dept: SURGERY | Facility: CLINIC | Age: 64
End: 2024-04-16
Payer: MEDICARE

## 2024-04-16 VITALS — HEIGHT: 70 IN | BODY MASS INDEX: 26.63 KG/M2 | WEIGHT: 186 LBS

## 2024-04-16 DIAGNOSIS — K40.90 LEFT INGUINAL HERNIA: Primary | ICD-10-CM

## 2024-04-16 PROCEDURE — 99024 POSTOP FOLLOW-UP VISIT: CPT | Performed by: SURGERY

## 2024-04-16 PROCEDURE — 1159F MED LIST DOCD IN RCRD: CPT | Performed by: SURGERY

## 2024-04-16 PROCEDURE — 1160F RVW MEDS BY RX/DR IN RCRD: CPT | Performed by: SURGERY

## 2024-04-16 NOTE — PROGRESS NOTES
Subjective   Damaso Leonard is a 63 y.o. male  is here today for follow-up.         Damaso Leonard is a 63 y.o. male here for follow up after left inguinal hernia repair.  The patient is doing well without complication or abnormality.  Nodule noted in the hernia contents was consistent with fat necrosis.    Physical Exam  Incisions well-healed, no evidence of left-sided hernia recurrence.            Assessment     Diagnoses and all orders for this visit:    1. Left inguinal hernia (Primary)      Damaso Leonard is a 63 y.o. male doing well after left inguinal hernia repair.  His incisions are healing well.  No evidence of complication or recurrence.  Follow-up as needed.

## 2024-09-10 ENCOUNTER — TRANSCRIBE ORDERS (OUTPATIENT)
Dept: ADMINISTRATIVE | Facility: HOSPITAL | Age: 64
End: 2024-09-10
Payer: MEDICARE

## 2024-09-10 DIAGNOSIS — R42 DIZZINESS AND GIDDINESS: Primary | ICD-10-CM

## 2024-09-18 ENCOUNTER — HOSPITAL ENCOUNTER (OUTPATIENT)
Dept: MRI IMAGING | Facility: HOSPITAL | Age: 64
Discharge: HOME | End: 2024-09-18
Admitting: NURSE PRACTITIONER
Payer: MEDICARE

## 2024-09-18 DIAGNOSIS — R42 DIZZINESS AND GIDDINESS: ICD-10-CM

## 2024-09-18 LAB — CREAT BLDA-MCNC: 0.9 MG/DL (ref 0.6–1.3)

## 2024-09-18 PROCEDURE — 0 GADOBENATE DIMEGLUMINE 529 MG/ML SOLUTION: Performed by: NURSE PRACTITIONER

## 2024-09-18 PROCEDURE — 82565 ASSAY OF CREATININE: CPT

## 2024-09-18 PROCEDURE — 70552 MRI BRAIN STEM W/DYE: CPT

## 2024-09-18 PROCEDURE — A9577 INJ MULTIHANCE: HCPCS | Performed by: NURSE PRACTITIONER

## 2024-09-18 RX ADMIN — GADOBENATE DIMEGLUMINE 16 ML: 529 INJECTION, SOLUTION INTRAVENOUS at 09:51

## 2024-10-04 DIAGNOSIS — I25.118 CORONARY ARTERY DISEASE OF NATIVE ARTERY OF NATIVE HEART WITH STABLE ANGINA PECTORIS: Chronic | ICD-10-CM

## 2024-10-04 RX ORDER — CLOPIDOGREL BISULFATE 75 MG/1
75 TABLET ORAL DAILY
Qty: 90 TABLET | Refills: 3 | Status: SHIPPED | OUTPATIENT
Start: 2024-10-04

## 2024-10-09 ENCOUNTER — OFFICE VISIT (OUTPATIENT)
Dept: CARDIOLOGY | Facility: CLINIC | Age: 64
End: 2024-10-09
Payer: MEDICARE

## 2024-10-09 VITALS
BODY MASS INDEX: 26.14 KG/M2 | HEIGHT: 70 IN | SYSTOLIC BLOOD PRESSURE: 110 MMHG | WEIGHT: 182.6 LBS | OXYGEN SATURATION: 95 % | HEART RATE: 65 BPM | DIASTOLIC BLOOD PRESSURE: 74 MMHG

## 2024-10-09 DIAGNOSIS — E78.00 HYPERCHOLESTEROLEMIA: Chronic | ICD-10-CM

## 2024-10-09 DIAGNOSIS — I10 ESSENTIAL HYPERTENSION: Chronic | ICD-10-CM

## 2024-10-09 DIAGNOSIS — I50.42 CHRONIC COMBINED SYSTOLIC AND DIASTOLIC HEART FAILURE: Chronic | ICD-10-CM

## 2024-10-09 DIAGNOSIS — I25.10 CORONARY ARTERY DISEASE INVOLVING NATIVE CORONARY ARTERY OF NATIVE HEART WITHOUT ANGINA PECTORIS: Primary | Chronic | ICD-10-CM

## 2024-10-09 PROBLEM — I50.32 CHRONIC DIASTOLIC HEART FAILURE: Chronic | Status: ACTIVE | Noted: 2019-12-10

## 2024-10-09 PROBLEM — I50.32 CHRONIC DIASTOLIC HEART FAILURE: Status: ACTIVE | Noted: 2019-12-10

## 2024-10-09 RX ORDER — ISOSORBIDE MONONITRATE 30 MG/1
30 TABLET, EXTENDED RELEASE ORAL
Qty: 90 TABLET | Refills: 2 | Status: SHIPPED | OUTPATIENT
Start: 2024-10-09

## 2024-10-09 RX ORDER — CLOPIDOGREL BISULFATE 75 MG/1
75 TABLET ORAL DAILY
Qty: 90 TABLET | Refills: 2 | Status: SHIPPED | OUTPATIENT
Start: 2024-10-09

## 2024-10-09 RX ORDER — CARVEDILOL 25 MG/1
25 TABLET ORAL 2 TIMES DAILY WITH MEALS
Qty: 180 TABLET | Refills: 2 | Status: SHIPPED | OUTPATIENT
Start: 2024-10-09

## 2024-10-09 RX ORDER — ASPIRIN 81 MG/1
81 TABLET ORAL DAILY
Qty: 90 TABLET | Refills: 3 | Status: SHIPPED | OUTPATIENT
Start: 2024-10-09

## 2024-10-09 RX ORDER — NITROGLYCERIN 0.4 MG/1
TABLET SUBLINGUAL
Qty: 30 TABLET | Refills: 1 | Status: SHIPPED | OUTPATIENT
Start: 2024-10-09

## 2024-10-09 RX ORDER — SIMVASTATIN 40 MG
40 TABLET ORAL NIGHTLY
Qty: 90 TABLET | Refills: 2 | Status: SHIPPED | OUTPATIENT
Start: 2024-10-09

## 2024-10-09 NOTE — ASSESSMENT & PLAN NOTE
Hypertension is stable and controlled  Continue current treatment regimen.  Plan: Medication compliance.  Maintain systolic pressure less than 130 and diastolic pressure less than 85.  Blood pressure will be reassessed in 6 months.

## 2024-10-09 NOTE — PROGRESS NOTES
"Chief Complaint  Follow-up (Denies CP, no cardiac symptoms.) and Med Management (Tolerating all current medications with no side effects.)    Subjective          Damaso Leonard presents to [facility] for follow up.    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  He was last seen in clinic on 2/15/2024.  He was doing well at that visit and he requested a perioperative risk assessment for hernia surgery.  Echocardiogram was ordered for surveillance of his systolic and diastolic heart failure.    At today's visit he denies any chest pain.  He does request, however nitroglycerin tablet refills.  He denies shortness of breath or dyspnea on exertion.  When he came into our office today he entered through the main hospital doors and took the stairs up to the second floor and down the hallway to our office without any shortness of breath.  He denies any bleeding issues.      Objective     Vital Signs:   /74 (BP Location: Right arm, Patient Position: Sitting, Cuff Size: Adult)   Pulse 65   Ht 177.8 cm (70\")   Wt 82.8 kg (182 lb 9.6 oz)   SpO2 95%   BMI 26.20 kg/m²       Physical Exam  Vitals reviewed.   Constitutional:       Appearance: Normal appearance. He is well-developed.   Cardiovascular:      Rate and Rhythm: Normal rate and regular rhythm.      Heart sounds: No murmur heard.     No friction rub. No gallop.   Pulmonary:      Effort: Pulmonary effort is normal. No respiratory distress.      Breath sounds: Normal breath sounds. No wheezing or rales.   Skin:     General: Skin is warm and dry.   Neurological:      Mental Status: He is alert and oriented to person, place, and time.   Psychiatric:         Mood and Affect: Mood normal.         Behavior: Behavior normal. "        Physical Exam      Result Review :     CMP          3/26/2024    09:25 9/18/2024    09:28   CMP   Glucose 208     BUN 13     Creatinine 0.82  0.90    EGFR 99.9     Sodium 138     Potassium 3.8     Chloride 103     Calcium 8.5     BUN/Creatinine Ratio 15.9     Anion Gap 9.1       CBC          3/26/2024    09:25   CBC   WBC 6.78    RBC 4.96    Hemoglobin 14.1    Hematocrit 43.6    MCV 87.9    MCH 28.4    MCHC 32.3    RDW 13.9    Platelets 321             ECG 12 Lead    Date/Time: 10/9/2024 1:21 PM  Performed by: Meg Gonzalez APRN    Authorized by: Meg Gonzalez APRN  Comparison: compared with previous ECG from 2/15/2024  Similar to previous ECG  Comparison to previous ECG: Sinus rhythm 65 bpm  Rhythm: sinus rhythm  Rate: normal  BPM: 75    Clinical impression: normal ECG  Comments: QTc 406           Most recent echocardiogram  Results for orders placed during the hospital encounter of 02/28/24    Adult Transthoracic Echo Complete W/ Cont if Necessary Per Protocol    Interpretation Summary    Normal left ventricular cavity size with mild concentric hypertrophy noted.    Left ventricular systolic function is normal. Left ventricular ejection fraction appears to be 56 - 60%.    Left ventricular diastolic function is consistent with (grade I) impaired relaxation.    No significant valvular heart disease noted.    No pericardial effusion detected.      Most recent Stress Test  Results for orders placed during the hospital encounter of 07/14/20    Stress Test With Myocardial Perfusion (1 Day)    Interpretation Summary  · Myocardial perfusion imaging indicates a small-sized infarct located in the mid to basal inferior, inferolateral wall with no significant ischemia noted, in the setting of Diaphragmatic attenuation and GI artifacts.  · Diaphragmatic attenuation and GI artifacts are present.  · Left ventricular ejection fraction is normal (Calculated EF = 63%).  · Findings consistent with a normal ECG stress  test.  · Impressions are consistent with an intermediate risk study.       Most recent Cardiac Cath  Results for orders placed during the hospital encounter of 03/02/22    Cardiac Catheterization/Vascular Study    Conclusion  · Prox LAD to Mid LAD lesion is 30% stenosed.  · Mid LAD to Dist LAD lesion is 10% stenosed.  · Prox Cx lesion is 30% stenosed.  · Mid Cx to Dist Cx lesion is 60% stenosed.  · Reviewed cath films from 12/20/20 and anatomy appears unchanged  · No new lesions or targets thus optimize medical management. I have added beta blocker  · Right radial approach with tR band.  · RCA is a very large vessel with mild disease  · EBL 20 ml      Current Outpatient Medications   Medication Sig Dispense Refill    acetaminophen (TYLENOL) 325 MG tablet Take 2 tablets by mouth Every 4 (Four) Hours As Needed for Mild Pain. 30 tablet 0    amitriptyline (ELAVIL) 50 MG tablet Take 2 tablets by mouth At Night As Needed for Sleep. 180 tablet 1    aspirin 81 MG EC tablet Take 1 tablet by mouth Daily. 90 tablet 3    carvedilol (COREG) 25 MG tablet Take 1 tablet by mouth 2 (Two) Times a Day With Meals. 180 tablet 2    citalopram (CeleXA) 40 MG tablet Take 1 tablet by mouth Daily. 90 tablet 5    clonazePAM (KlonoPIN) 0.5 MG tablet TAKE 1 TABLET BY MOUTH THREE TIMES DAILY AS NEEDED FOR ANXIETY (Patient taking differently: Pt states takes 1tab daily.) 90 tablet 0    clopidogrel (PLAVIX) 75 MG tablet Take 1 tablet by mouth Daily. 90 tablet 2    cyanocobalamin 1000 MCG/ML injection Inject 1 mL into the appropriate muscle as directed by prescriber Every 30 (Thirty) Days. 1 mL 5    dexlansoprazole (DEXILANT) 60 MG capsule TAKE ONE CAPSULE BY MOUTH EVERY DAY 30 capsule 5    diphenhydrAMINE (Benadryl Allergy) 25 mg capsule Take 1 capsule by mouth Every 6 (Six) Hours As Needed for Itching. 90 capsule 3    docusate sodium 100 MG capsule Take 1 capsule by mouth 2 (Two) Times a Day As Needed (constipation). 60 each 5    exenatide er  (Bydureon) 2 MG pen-injector injection Inject 1 pen under the skin into the appropriate area as directed 1 (One) Time Per Week. 4 each 5    finasteride (PROSCAR) 5 MG tablet Take 1 tablet by mouth Daily. 90 tablet 3    glucose blood test strip Check blood sugar 3x times a day. 100 each 12    glucose monitor monitoring kit 1 each 3 (Three) Times a Day As Needed (diabetes). 1 each 0    ibuprofen (ADVIL,MOTRIN) 600 MG tablet Take 1 tablet by mouth Every 6 (Six) Hours As Needed for Mild Pain. 30 tablet 0    isosorbide mononitrate (IMDUR) 30 MG 24 hr tablet Take 1 tablet by mouth Daily. 90 tablet 2    Lancets (ONETOUCH ULTRASOFT) lancets Check blood sugar 3 times daily 100 each 12    linaclotide (LINZESS) 145 MCG capsule capsule Take 1 tablet once daily on an empty stomach at least 30 minutes prior to the first meal of the day. (Patient taking differently: Daily As Needed. Take 1 tablet once daily on an empty stomach at least 30 minutes prior to the first meal of the day.) 90 capsule 3    metFORMIN (GLUCOPHAGE) 500 MG tablet Take 1 tablet by mouth 2 (Two) Times a Day With Meals. 180 tablet 3    naloxone (NARCAN) 4 MG/0.1ML nasal spray 1 spray into the nostril(s) as directed by provider As Needed (overdose). 1 each 0    nitroglycerin (NITROSTAT) 0.4 MG SL tablet 1 under the tongue as needed for angina, may repeat q5mins for up three doses 30 tablet 1    O2 (OXYGEN) Inhale 2 L/min Every Night.      simvastatin (ZOCOR) 40 MG tablet Take 1 tablet by mouth Every Night. 90 tablet 2    SV Iron 325 MG tablet Take 1 tablet by mouth Daily.      Thyroid (NP THYROID) 30 MG PO tablet Take 1 tablet by mouth Daily. 30 tablet 5    traMADol (ULTRAM) 50 MG tablet Take 1 tablet by mouth Every 8 (Eight) Hours As Needed for Moderate Pain. 12 tablet 0    vitamin D (ERGOCALCIFEROL) 1.25 MG (53302 UT) capsule capsule Take 1 capsule by mouth Every 7 (Seven) Days. 12 capsule 5     No current facility-administered medications for this visit.             Assessment and Plan    Problem List Items Addressed This Visit          Cardiac and Vasculature    Coronary artery disease involving native coronary artery of native heart - Primary (Chronic)    Overview     2010: Stent placement, exact details unknown  12/11/2020 [facility]: Proximal LAD is heavily calcified, distal LAD with 2 overlapping stents with no in-stent restenosis.  There is a less than 50% stenosis at the distal edge of the stent.  Mid LAD with eccentric 40 to 60% stenosis which is nonobstructive by FFR.  Ostial first diagonal has a less than 50% stenosis.  Ostial second diagonal has a less than 50% stenosis and in some views appears to be 70%.  However this vessel lumen diameter is too small for meaningful PCI.  LCx with a 50 to 70% in-stent restenosis.  RCA with luminal irregularities  3/2/2022 [facility]: Proximal LAD to mid LAD is 30% stenosed, mid LAD to distal LAD is 10% stenosed, proximal circumflex is 30% stenosed, mid circumflex comes flex to distal circumflex lesion is 60% stenosed.  Anatomy appears unchanged from 12/20/2020.         Current Assessment & Plan     Coronary Artery Disease (OPTIONAL): Coronary artery disease is stable.  Continue current treatment regimen.  Goals of care: Medication compliance, maintain healthy lifestyle and ability to complete ADLs  Cardiac status will be reassessed in 6 months.         Relevant Medications    aspirin 81 MG EC tablet    carvedilol (COREG) 25 MG tablet    clopidogrel (PLAVIX) 75 MG tablet    isosorbide mononitrate (IMDUR) 30 MG 24 hr tablet    nitroglycerin (NITROSTAT) 0.4 MG SL tablet    Other Relevant Orders    ECG 12 Lead    Hypercholesterolemia (Chronic)    Current Assessment & Plan      Lipid abnormalities are stable    Plan:  Continue same medication/s without change.      Requested copy of recent labs from PCP office    Patient Treatment Goals:   LDL goal is less than 55    Followup in 6 months.         Relevant Medications    simvastatin  (ZOCOR) 40 MG tablet    Essential hypertension (Chronic)    Current Assessment & Plan     Hypertension is stable and controlled  Continue current treatment regimen.  Plan: Medication compliance.  Maintain systolic pressure less than 130 and diastolic pressure less than 85.  Blood pressure will be reassessed in 6 months.         Relevant Medications    carvedilol (COREG) 25 MG tablet    Chronic diastolic heart failure (Chronic)    Overview     12/27/2018 [facility]: LVEF 60%, grade 1 diastolic dysfunction, no hemodynamically significant valvular abnormalities  12/11/2020 [facility]: LVEF 56 to 60%, hypokinetic basal inferior lateral wall, basal inferior septal wall, and basal inferior wall  2/28/2024 [facility]: LVEF 56 to 60%, grade 1 diastolic dysfunction, and no significant valvular heart disease.         Current Assessment & Plan     Congestive heart failure due to coronary artery disease (CAD).  Heart failure is stable.  NYHA Class I.  Continue current treatment regimen.  Heart failure will be reassessed in 6 months.  Pt problem list included systolic heart failure.  I have reviewed his chart and do not see that his LVEF has ever been noted to be less than 50%.  I have deleted systolic heart failure from his problem list.           Relevant Medications    carvedilol (COREG) 25 MG tablet    clopidogrel (PLAVIX) 75 MG tablet    isosorbide mononitrate (IMDUR) 30 MG 24 hr tablet    nitroglycerin (NITROSTAT) 0.4 MG SL tablet              Follow Up     Medications were reviewed with the patient.    Return in about 6 months (around 4/9/2025).    Patient was given instructions and counseling regarding his condition or for health maintenance advice. Please see specific information pulled into the AVS if appropriate.     Patient or patient representative verbalized consent for the use of Ambient Listening during the visit with  YOUNG Macedo for chart documentation. 10/9/2024  13:19 EDT

## 2024-10-09 NOTE — ASSESSMENT & PLAN NOTE
Congestive heart failure due to coronary artery disease (CAD).  Heart failure is stable.  NYHA Class I.  Continue current treatment regimen.  Heart failure will be reassessed in 6 months.  Pt problem list included systolic heart failure.  I have reviewed his chart and do not see that his LVEF has ever been noted to be less than 50%.  I have deleted systolic heart failure from his problem list.

## 2024-10-09 NOTE — ASSESSMENT & PLAN NOTE
Lipid abnormalities are stable    Plan:  Continue same medication/s without change.      Requested copy of recent labs from PCP office    Patient Treatment Goals:   LDL goal is less than 55    Followup in 6 months.

## 2024-10-09 NOTE — ASSESSMENT & PLAN NOTE
Coronary Artery Disease (OPTIONAL): Coronary artery disease is stable.  Continue current treatment regimen.  Goals of care: Medication compliance, maintain healthy lifestyle and ability to complete ADLs  Cardiac status will be reassessed in 6 months.

## 2024-11-05 DIAGNOSIS — K21.9 GASTROESOPHAGEAL REFLUX DISEASE, UNSPECIFIED WHETHER ESOPHAGITIS PRESENT: ICD-10-CM

## 2024-11-07 DIAGNOSIS — K21.9 GASTROESOPHAGEAL REFLUX DISEASE, UNSPECIFIED WHETHER ESOPHAGITIS PRESENT: ICD-10-CM

## 2024-11-07 RX ORDER — DEXLANSOPRAZOLE 60 MG/1
CAPSULE, DELAYED RELEASE ORAL
Qty: 30 CAPSULE | Refills: 5 | OUTPATIENT
Start: 2024-11-07

## 2024-11-08 ENCOUNTER — OFFICE VISIT (OUTPATIENT)
Dept: GASTROENTEROLOGY | Facility: CLINIC | Age: 64
End: 2024-11-08
Payer: MEDICARE

## 2024-11-08 VITALS
HEART RATE: 101 BPM | BODY MASS INDEX: 26.77 KG/M2 | WEIGHT: 187 LBS | SYSTOLIC BLOOD PRESSURE: 116 MMHG | DIASTOLIC BLOOD PRESSURE: 77 MMHG | HEIGHT: 70 IN

## 2024-11-08 DIAGNOSIS — K22.70 BARRETT'S ESOPHAGUS WITHOUT DYSPLASIA: Primary | ICD-10-CM

## 2024-11-08 DIAGNOSIS — Z90.49 S/P CHOLECYSTECTOMY: ICD-10-CM

## 2024-11-08 DIAGNOSIS — R13.19 ESOPHAGEAL DYSPHAGIA: ICD-10-CM

## 2024-11-08 DIAGNOSIS — K21.9 GASTROESOPHAGEAL REFLUX DISEASE, UNSPECIFIED WHETHER ESOPHAGITIS PRESENT: ICD-10-CM

## 2024-11-08 DIAGNOSIS — Z86.0100 PERSONAL HISTORY OF COLON POLYPS, UNSPECIFIED: ICD-10-CM

## 2024-11-08 RX ORDER — DEXLANSOPRAZOLE 60 MG/1
1 CAPSULE, DELAYED RELEASE ORAL DAILY
Qty: 30 CAPSULE | Refills: 11 | Status: SHIPPED | OUTPATIENT
Start: 2024-11-08

## 2024-11-08 RX ORDER — DEXLANSOPRAZOLE 60 MG/1
CAPSULE, DELAYED RELEASE ORAL
Qty: 30 CAPSULE | Refills: 5 | OUTPATIENT
Start: 2024-11-08

## 2024-11-08 RX ORDER — FAMOTIDINE 40 MG/1
40 TABLET, FILM COATED ORAL NIGHTLY PRN
Qty: 30 TABLET | Refills: 5 | Status: SHIPPED | OUTPATIENT
Start: 2024-11-08

## 2024-11-08 NOTE — H&P (VIEW-ONLY)
DATE OF CONSULTATION:  11/8/2024    REASON FOR REFERRAL: Refractory GERD, Thomas's esophagus    CHIEF COMPLAINT:  GERD, Thomas's esophagus    HISTORY OF PRESENT ILLNESS:   Damaso Leonard is a very pleasant 64 y.o. male who is being seen today  for evaluation and treatment of refractory GERD and Thomas's esophagus.  Patient is a very poor historian and was previously following with Nkechi Luu PA-C and was last seen in March 2022.  Please see previous notes for prior management.  In review of his records, he previously underwent EGD with Dr. Garcia on 7/19/2021.  He was found to have irregular Z-line at 37 cm.Biopsies of the distal esophagus revealed squamocolumnar junctional mucosa with chronic inflammation and features suggestive of reflux.  Biopsies also showed chronic gastritis with focal intestinal metaplasia, negative for dysplasia.  Focal intestinal metaplasia in the distal esophagus may represent Thomas's esophagus, depending on relationship to Z-line.  He was referred to general surgery, Dr. Cavanaugh for evaluation for possible Nissen fundoplication.  He underwent EGD with Bravo study in March 2022 and DeMeester score correlated with episodes of reflux and symptoms reported by patient.  He also has history of recurrent hiatal hernia.  Following this, he was referred to Dr. Ortiz Solomon for manometry.  Patient reports he did undergo repeat EGD at that time which confirmed Thomas's esophagus.  Report is currently unavailable to review.  As above, patient is a poor historian and cannot elaborate on what happened following this workup.  Ultimately, he has not had operative intervention for refractory GERD.  At present, he is taking Dexilant 60 mg p.o. each morning and Protonix 40 mg p.o. at bedtime.  He says this regimen is currently controlling GERD overall well.  He does have dysphagia with solids and pills.  He reports having esophageal dilation in the past.  Of note, he is s/p  cholecystectomy.  He denies nausea or vomiting.  His weight has been stable.  Patient also previously struggled with constipation and was taking Linzess.  However, constipation has improved and patient says he is no longer taking Linzess.  He currently reports having a daily BM with stool type IV on [place] stool scale.  He denies melena or rectal bleeding.  He is without family history of colon cancer.  He had colonoscopy with Dr. Garcia on 5/30/2020 which was unremarkable.  Repeat colonoscopy was recommended in 5 years due to personal history of colon polyps.  He has no other complaints today.    PAST MEDICAL HISTORY:  Past Medical History:   Diagnosis Date    Anxiety     Anxiety and depression     Arthritis     ASCVD (arteriosclerotic cardiovascular disease)     CHF (congestive heart failure)     Colitis     Coronary artery disease     Disease of thyroid gland     DM (diabetes mellitus)     Elevated cholesterol     GERD (gastroesophageal reflux disease)     Hernia of fascia     History of EKG 04/15/2015    NORMAL    History of transfusion     HTN (hypertension)     Hyperlipidemia     Injury of back     Low back pain     Myocardial infarction     about 13 years ago    Oxygen dependent     2L AT NIGHT    Requires supplemental oxygen     USES 2 L AT NIGHT    Sleep apnea     wears oxygen at night     Wears glasses     Wears partial dentures        PAST SURGICAL HISTORY:  Past Surgical History:   Procedure Laterality Date    BRAVO PROCEDURE N/A 4/13/2022    Procedure: ESOPHAGOGASTRODUODENOSCOPY AND KELLER;  Surgeon: Ulysses Cavanaugh MD;  Location: [facility] OR;  Service: Gastroenterology;  Laterality: N/A;  EG junction @ 36cm.  Bravo capsule placed @ 30cm.    CARDIAC CATHETERIZATION      reports 4 cardiac stents    CARDIAC CATHETERIZATION N/A 12/11/2020    Procedure: Coronary angiography;  Surgeon: Noble Thompson MD;  Location:  [facility] CATH INVASIVE LOCATION;  Service: Cardiology;  Laterality: N/A;    CARDIAC  CATHETERIZATION N/A 3/2/2022    Procedure: Left Heart Cath;  Surgeon: Arron iRley MD;  Location: [facility] CATH INVASIVE LOCATION;  Service: Cardiology;  Laterality: N/A;    CARDIAC SURGERY      stenting    CHOLECYSTECTOMY      COLONOSCOPY  2007    COLONOSCOPY N/A 5/30/2020    Procedure: COLONOSCOPY CPT CODE: 75512;  Surgeon: Anthony Garcia MD;  Location:  [facility] OR;  Service: Gastroenterology;  Laterality: N/A;    ENDOSCOPY N/A 5/30/2020    Procedure: ESOPHAGOGASTRODUODENOSCOPY WITH BIOPSY CPT CODE: 76556;  Surgeon: Anthony aGrcia MD;  Location: [facility] OR;  Service: Gastroenterology;  Laterality: N/A;    ENDOSCOPY N/A 7/19/2021    Procedure: ESOPHAGOGASTRODUODENOSCOPY WITH BIOPSY;  Surgeon: Anthony Garcia MD;  Location: [facility] OR;  Service: Gastroenterology;  Laterality: N/A;  dilated with balloon dilator to 20mm    ESOPHAGEAL MOTILITY STUDY N/A 6/30/2022    Procedure: ESOPHAGEAL MOTILITY STUDY;  Surgeon: Ortiz Solomon MD;  Location:  [facility];  Service: General;  Laterality: N/A;  Patient intubated via right nare. Tube secured @ 48 cm. Patient completed study successfully. No s/s of distress noted during or after procedure.    HERNIA REPAIR      INGUINAL HERNIA REPAIR Left 3/28/2024    Procedure: INGUINAL HERNIA REPAIR LAPAROSCOPIC WITH DAVINCI ROBOT;  Surgeon: Ulysses Cavanaugh MD;  Location:  [facility] OR;  Service: Robotics - DaVinci;  Laterality: Left;    SHOULDER SURGERY      TONSILLECTOMY      TOTAL KNEE ARTHROPLASTY Right 1/27/2020    Procedure: TOTAL KNEE ARTHROPLASTY RIGHT;  Surgeon: Ortiz Barba MD;  Location:  [facility] OR;  Service: Orthopedics    TYMPANOPLASTY      UPPER GASTROINTESTINAL ENDOSCOPY      VASECTOMY         FAMILY HISTORY:  Family History   Problem Relation Age of Onset    Heart attack Father     Heart disease Father     Hypertension Father     Heart attack Sister     Diabetes Sister     Heart disease Sister     Hypertension  Sister     Thyroid disease Sister     Heart attack Brother     Diabetes Brother     Thyroid disease Brother     Arthritis Daughter     COPD Daughter     Asthma Daughter     Depression Daughter     Heart disease Sister     Hypertension Sister        SOCIAL HISTORY:  Social History     Socioeconomic History    Marital status:      Spouse name: ruben    Number of children: 3    Years of education: 12   Tobacco Use    Smoking status: Never    Smokeless tobacco: Never   Vaping Use    Vaping status: Never Used   Substance and Sexual Activity    Alcohol use: No    Drug use: No    Sexual activity: Defer       MEDICATIONS:  The current medication list was reviewed in the EMR    Current Outpatient Medications:     acetaminophen (TYLENOL) 325 MG tablet, Take 2 tablets by mouth Every 4 (Four) Hours As Needed for Mild Pain., Disp: 30 tablet, Rfl: 0    amitriptyline (ELAVIL) 50 MG tablet, Take 2 tablets by mouth At Night As Needed for Sleep., Disp: 180 tablet, Rfl: 1    aspirin 81 MG EC tablet, Take 1 tablet by mouth Daily., Disp: 90 tablet, Rfl: 3    carvedilol (COREG) 25 MG tablet, Take 1 tablet by mouth 2 (Two) Times a Day With Meals., Disp: 180 tablet, Rfl: 2    citalopram (CeleXA) 40 MG tablet, Take 1 tablet by mouth Daily., Disp: 90 tablet, Rfl: 5    clonazePAM (KlonoPIN) 0.5 MG tablet, TAKE 1 TABLET BY MOUTH THREE TIMES DAILY AS NEEDED FOR ANXIETY (Patient taking differently: Pt states takes 1tab daily.), Disp: 90 tablet, Rfl: 0    clopidogrel (PLAVIX) 75 MG tablet, Take 1 tablet by mouth Daily., Disp: 90 tablet, Rfl: 2    cyanocobalamin 1000 MCG/ML injection, Inject 1 mL into the appropriate muscle as directed by prescriber Every 30 (Thirty) Days., Disp: 1 mL, Rfl: 5    dexlansoprazole (DEXILANT) 60 MG capsule, TAKE ONE CAPSULE BY MOUTH EVERY DAY, Disp: 30 capsule, Rfl: 5    diphenhydrAMINE (Benadryl Allergy) 25 mg capsule, Take 1 capsule by mouth Every 6 (Six) Hours As Needed for Itching., Disp: 90 capsule, Rfl:  3    docusate sodium 100 MG capsule, Take 1 capsule by mouth 2 (Two) Times a Day As Needed (constipation)., Disp: 60 each, Rfl: 5    exenatide er (Bydureon) 2 MG pen-injector injection, Inject 1 pen under the skin into the appropriate area as directed 1 (One) Time Per Week., Disp: 4 each, Rfl: 5    finasteride (PROSCAR) 5 MG tablet, Take 1 tablet by mouth Daily., Disp: 90 tablet, Rfl: 3    glucose blood test strip, Check blood sugar 3x times a day., Disp: 100 each, Rfl: 12    glucose monitor monitoring kit, 1 each 3 (Three) Times a Day As Needed (diabetes)., Disp: 1 each, Rfl: 0    ibuprofen (ADVIL,MOTRIN) 600 MG tablet, Take 1 tablet by mouth Every 6 (Six) Hours As Needed for Mild Pain., Disp: 30 tablet, Rfl: 0    isosorbide mononitrate (IMDUR) 30 MG 24 hr tablet, Take 1 tablet by mouth Daily., Disp: 90 tablet, Rfl: 2    Lancets (ONETOUCH ULTRASOFT) lancets, Check blood sugar 3 times daily, Disp: 100 each, Rfl: 12    linaclotide (LINZESS) 145 MCG capsule capsule, Take 1 tablet once daily on an empty stomach at least 30 minutes prior to the first meal of the day. (Patient taking differently: Daily As Needed. Take 1 tablet once daily on an empty stomach at least 30 minutes prior to the first meal of the day.), Disp: 90 capsule, Rfl: 3    metFORMIN (GLUCOPHAGE) 500 MG tablet, Take 1 tablet by mouth 2 (Two) Times a Day With Meals., Disp: 180 tablet, Rfl: 3    naloxone (NARCAN) 4 MG/0.1ML nasal spray, 1 spray into the nostril(s) as directed by provider As Needed (overdose)., Disp: 1 each, Rfl: 0    nitroglycerin (NITROSTAT) 0.4 MG SL tablet, 1 under the tongue as needed for angina, may repeat q5mins for up three doses, Disp: 30 tablet, Rfl: 1    O2 (OXYGEN), Inhale 2 L/min Every Night., Disp: , Rfl:     simvastatin (ZOCOR) 40 MG tablet, Take 1 tablet by mouth Every Night., Disp: 90 tablet, Rfl: 2    SV Iron 325 MG tablet, Take 1 tablet by mouth Daily., Disp: , Rfl:     Thyroid (NP THYROID) 30 MG PO tablet, Take 1 tablet  "by mouth Daily., Disp: 30 tablet, Rfl: 5    traMADol (ULTRAM) 50 MG tablet, Take 1 tablet by mouth Every 8 (Eight) Hours As Needed for Moderate Pain., Disp: 12 tablet, Rfl: 0    vitamin D (ERGOCALCIFEROL) 1.25 MG (49349 UT) capsule capsule, Take 1 capsule by mouth Every 7 (Seven) Days., Disp: 12 capsule, Rfl: 5    ALLERGIES:    Allergies   Allergen Reactions    Ranexa [Ranolazine] GI Intolerance       REVIEW OF SYSTEMS:    A comprehensive 14 point review of systems was performed.  Significant findings as mentioned above.  All other systems reviewed and are negative.        Physical Exam   Vital Signs: /77 (BP Location: Left arm, Patient Position: Sitting, Cuff Size: Large Adult)   Pulse 101   Ht 177.8 cm (70\")   Wt 84.8 kg (187 lb)   BMI 26.83 kg/m²     General: Well developed, well nourished, alert and oriented x 3, in no acute distress.   Head: ATNC   Eyes: PERRL, No evidence of conjunctivitis.   Nose: No nasal discharge.   Mouth: Oral mucosal membranes moist. No oral ulceration or hemorrhages.   Neck: Neck supple. No thyromegaly. No JVD.   Lungs: Clear in all fields to A&P without rales, rhonchi or wheezing.   Heart: Regular rate and rhythm. No murmurs, rubs, or gallops.   Abdomen: Soft. Bowel sounds are normoactive. Nontender with palpation.   Extremities: No cyanosis or edema.   Neurologic: Grossly non-focal exam    RECENT LABS:  Lab Results   Component Value Date    WBC 6.78 03/26/2024    HGB 14.1 03/26/2024    HCT 43.6 03/26/2024    MCV 87.9 03/26/2024    RDW 13.9 03/26/2024     03/26/2024    NEUTRORELPCT 52.8 04/26/2021    LYMPHORELPCT 28.0 04/26/2021    MONORELPCT 12.1 (H) 04/26/2021    EOSRELPCT 6.7 (H) 04/26/2021    BASORELPCT 0.3 04/26/2021    NEUTROABS 3.55 04/26/2021    LYMPHSABS 1.88 04/26/2021       Lab Results   Component Value Date     03/26/2024    K 3.8 03/26/2024    CO2 25.9 03/26/2024     03/26/2024    BUN 13 03/26/2024    CREATININE 0.90 09/18/2024    EGFRIFNONA 84 " 04/26/2021    EGFRIFAFRI 102 12/06/2019    GLUCOSE 208 (H) 03/26/2024    CALCIUM 8.5 (L) 03/26/2024    ALKPHOS 77 04/26/2021    AST 11 04/26/2021    ALT 9 04/26/2021    BILITOT 0.3 04/26/2021    ALBUMIN 4.10 04/26/2021    PROTEINTOT 6.7 04/26/2021    MG 2.2 12/12/2020     ASSESSMENT & PLAN:  Damaso Leonard is a very pleasant 64 y.o. male with    1.  Refractory GERD:  2.  Thomas's esophagus:  3.  S/p cholecystectomy:  4.  Dysphagia (solids/pills):    -Patient is currently taking Dexilant 60 mg p.o. each morning and Protonix 40 mg p.o. at bedtime.  Recommended patient continue Dexilant.  Will discontinue Protonix and start Pepcid 40 mg p.o. at bedtime.  -Recommended repeat EGD for surveillance of Thomas's esophagus and for further evaluation of GERD and dysphagia.  Patient is agreeable.  Will schedule.  -In the interim, we discussed important dietary modifications to help improve GERD symptoms.  -Patient will return to clinic following EGD.  If patient continues to have refractory GERD, would recommend follow-up with Dr. Ortiz Solomon for consideration of Nissen fundoplication (previously evaluated in 2022).     5.  Personal history of colon polyps:  -Patient had colonoscopy with Dr. Garcia on 5/30/2020 which was unremarkable.  Repeat colonoscopy was recommended in 5 years due to personal history of colon polyps.    The patient was in agreement with the plan and all questions were answered to his satisfaction.     Thank you so much for allowing us to participate in the care of Damaso Leonard . Please do not hesitate to contact us with any questions or concerns.             Electronically Signed by: YOUNG Bourne , November 8, 2024 09:00 EST       CC:   No ref. provider found  Clare Quintero APRN

## 2024-11-08 NOTE — PROGRESS NOTES
DATE OF CONSULTATION:  11/8/2024    REASON FOR REFERRAL: Refractory GERD, Thomas's esophagus    CHIEF COMPLAINT:  GERD, Thomas's esophagus    HISTORY OF PRESENT ILLNESS:   Damaso Leonard is a very pleasant 64 y.o. male who is being seen today  for evaluation and treatment of refractory GERD and Thomas's esophagus.  Patient is a very poor historian and was previously following with Nkechi Luu PA-C and was last seen in March 2022.  Please see previous notes for prior management.  In review of his records, he previously underwent EGD with Dr. Garcia on 7/19/2021.  He was found to have irregular Z-line at 37 cm.Biopsies of the distal esophagus revealed squamocolumnar junctional mucosa with chronic inflammation and features suggestive of reflux.  Biopsies also showed chronic gastritis with focal intestinal metaplasia, negative for dysplasia.  Focal intestinal metaplasia in the distal esophagus may represent Thomas's esophagus, depending on relationship to Z-line.  He was referred to general surgery, Dr. Cavanaugh for evaluation for possible Nissen fundoplication.  He underwent EGD with Bravo study in March 2022 and DeMeester score correlated with episodes of reflux and symptoms reported by patient.  He also has history of recurrent hiatal hernia.  Following this, he was referred to Dr. Ortiz Solomon for manometry.  Patient reports he did undergo repeat EGD at that time which confirmed Thomas's esophagus.  Report is currently unavailable to review.  As above, patient is a poor historian and cannot elaborate on what happened following this workup.  Ultimately, he has not had operative intervention for refractory GERD.  At present, he is taking Dexilant 60 mg p.o. each morning and Protonix 40 mg p.o. at bedtime.  He says this regimen is currently controlling GERD overall well.  He does have dysphagia with solids and pills.  He reports having esophageal dilation in the past.  Of note, he is s/p  cholecystectomy.  He denies nausea or vomiting.  His weight has been stable.  Patient also previously struggled with constipation and was taking Linzess.  However, constipation has improved and patient says he is no longer taking Linzess.  He currently reports having a daily BM with stool type IV on Kennan stool scale.  He denies melena or rectal bleeding.  He is without family history of colon cancer.  He had colonoscopy with Dr. Garcia on 5/30/2020 which was unremarkable.  Repeat colonoscopy was recommended in 5 years due to personal history of colon polyps.  He has no other complaints today.    PAST MEDICAL HISTORY:  Past Medical History:   Diagnosis Date    Anxiety     Anxiety and depression     Arthritis     ASCVD (arteriosclerotic cardiovascular disease)     CHF (congestive heart failure)     Colitis     Coronary artery disease     Disease of thyroid gland     DM (diabetes mellitus)     Elevated cholesterol     GERD (gastroesophageal reflux disease)     Hernia of fascia     History of EKG 04/15/2015    NORMAL    History of transfusion     HTN (hypertension)     Hyperlipidemia     Injury of back     Low back pain     Myocardial infarction     about 13 years ago    Oxygen dependent     2L AT NIGHT    Requires supplemental oxygen     USES 2 L AT NIGHT    Sleep apnea     wears oxygen at night     Wears glasses     Wears partial dentures        PAST SURGICAL HISTORY:  Past Surgical History:   Procedure Laterality Date    BRAVO PROCEDURE N/A 4/13/2022    Procedure: ESOPHAGOGASTRODUODENOSCOPY AND KELLER;  Surgeon: Ulysses Cavanaugh MD;  Location: [facility] OR;  Service: Gastroenterology;  Laterality: N/A;  EG junction @ 36cm.  Bravo capsule placed @ 30cm.    CARDIAC CATHETERIZATION      reports 4 cardiac stents    CARDIAC CATHETERIZATION N/A 12/11/2020    Procedure: Coronary angiography;  Surgeon: Noble Thompson MD;  Location:  [facility] CATH INVASIVE LOCATION;  Service: Cardiology;  Laterality: N/A;    CARDIAC  CATHETERIZATION N/A 3/2/2022    Procedure: Left Heart Cath;  Surgeon: Arron Riley MD;  Location: [facility] CATH INVASIVE LOCATION;  Service: Cardiology;  Laterality: N/A;    CARDIAC SURGERY      stenting    CHOLECYSTECTOMY      COLONOSCOPY  2007    COLONOSCOPY N/A 5/30/2020    Procedure: COLONOSCOPY CPT CODE: 11684;  Surgeon: Anthony Garcia MD;  Location:  [facility] OR;  Service: Gastroenterology;  Laterality: N/A;    ENDOSCOPY N/A 5/30/2020    Procedure: ESOPHAGOGASTRODUODENOSCOPY WITH BIOPSY CPT CODE: 72252;  Surgeon: Anthony Garcia MD;  Location: [facility] OR;  Service: Gastroenterology;  Laterality: N/A;    ENDOSCOPY N/A 7/19/2021    Procedure: ESOPHAGOGASTRODUODENOSCOPY WITH BIOPSY;  Surgeon: Anthony Garcia MD;  Location: [facility] OR;  Service: Gastroenterology;  Laterality: N/A;  dilated with balloon dilator to 20mm    ESOPHAGEAL MOTILITY STUDY N/A 6/30/2022    Procedure: ESOPHAGEAL MOTILITY STUDY;  Surgeon: Ortiz Solomon MD;  Location:  [facility];  Service: General;  Laterality: N/A;  Patient intubated via right nare. Tube secured @ 48 cm. Patient completed study successfully. No s/s of distress noted during or after procedure.    HERNIA REPAIR      INGUINAL HERNIA REPAIR Left 3/28/2024    Procedure: INGUINAL HERNIA REPAIR LAPAROSCOPIC WITH DAVINCI ROBOT;  Surgeon: Ulysses Cavanaugh MD;  Location:  [facility] OR;  Service: Robotics - DaVinci;  Laterality: Left;    SHOULDER SURGERY      TONSILLECTOMY      TOTAL KNEE ARTHROPLASTY Right 1/27/2020    Procedure: TOTAL KNEE ARTHROPLASTY RIGHT;  Surgeon: Ortiz Barba MD;  Location:  [facility] OR;  Service: Orthopedics    TYMPANOPLASTY      UPPER GASTROINTESTINAL ENDOSCOPY      VASECTOMY         FAMILY HISTORY:  Family History   Problem Relation Age of Onset    Heart attack Father     Heart disease Father     Hypertension Father     Heart attack Sister     Diabetes Sister     Heart disease Sister     Hypertension  Sister     Thyroid disease Sister     Heart attack Brother     Diabetes Brother     Thyroid disease Brother     Arthritis Daughter     COPD Daughter     Asthma Daughter     Depression Daughter     Heart disease Sister     Hypertension Sister        SOCIAL HISTORY:  Social History     Socioeconomic History    Marital status:      Spouse name: ruben    Number of children: 3    Years of education: 12   Tobacco Use    Smoking status: Never    Smokeless tobacco: Never   Vaping Use    Vaping status: Never Used   Substance and Sexual Activity    Alcohol use: No    Drug use: No    Sexual activity: Defer       MEDICATIONS:  The current medication list was reviewed in the EMR    Current Outpatient Medications:     acetaminophen (TYLENOL) 325 MG tablet, Take 2 tablets by mouth Every 4 (Four) Hours As Needed for Mild Pain., Disp: 30 tablet, Rfl: 0    amitriptyline (ELAVIL) 50 MG tablet, Take 2 tablets by mouth At Night As Needed for Sleep., Disp: 180 tablet, Rfl: 1    aspirin 81 MG EC tablet, Take 1 tablet by mouth Daily., Disp: 90 tablet, Rfl: 3    carvedilol (COREG) 25 MG tablet, Take 1 tablet by mouth 2 (Two) Times a Day With Meals., Disp: 180 tablet, Rfl: 2    citalopram (CeleXA) 40 MG tablet, Take 1 tablet by mouth Daily., Disp: 90 tablet, Rfl: 5    clonazePAM (KlonoPIN) 0.5 MG tablet, TAKE 1 TABLET BY MOUTH THREE TIMES DAILY AS NEEDED FOR ANXIETY (Patient taking differently: Pt states takes 1tab daily.), Disp: 90 tablet, Rfl: 0    clopidogrel (PLAVIX) 75 MG tablet, Take 1 tablet by mouth Daily., Disp: 90 tablet, Rfl: 2    cyanocobalamin 1000 MCG/ML injection, Inject 1 mL into the appropriate muscle as directed by prescriber Every 30 (Thirty) Days., Disp: 1 mL, Rfl: 5    dexlansoprazole (DEXILANT) 60 MG capsule, TAKE ONE CAPSULE BY MOUTH EVERY DAY, Disp: 30 capsule, Rfl: 5    diphenhydrAMINE (Benadryl Allergy) 25 mg capsule, Take 1 capsule by mouth Every 6 (Six) Hours As Needed for Itching., Disp: 90 capsule, Rfl:  3    docusate sodium 100 MG capsule, Take 1 capsule by mouth 2 (Two) Times a Day As Needed (constipation)., Disp: 60 each, Rfl: 5    exenatide er (Bydureon) 2 MG pen-injector injection, Inject 1 pen under the skin into the appropriate area as directed 1 (One) Time Per Week., Disp: 4 each, Rfl: 5    finasteride (PROSCAR) 5 MG tablet, Take 1 tablet by mouth Daily., Disp: 90 tablet, Rfl: 3    glucose blood test strip, Check blood sugar 3x times a day., Disp: 100 each, Rfl: 12    glucose monitor monitoring kit, 1 each 3 (Three) Times a Day As Needed (diabetes)., Disp: 1 each, Rfl: 0    ibuprofen (ADVIL,MOTRIN) 600 MG tablet, Take 1 tablet by mouth Every 6 (Six) Hours As Needed for Mild Pain., Disp: 30 tablet, Rfl: 0    isosorbide mononitrate (IMDUR) 30 MG 24 hr tablet, Take 1 tablet by mouth Daily., Disp: 90 tablet, Rfl: 2    Lancets (ONETOUCH ULTRASOFT) lancets, Check blood sugar 3 times daily, Disp: 100 each, Rfl: 12    linaclotide (LINZESS) 145 MCG capsule capsule, Take 1 tablet once daily on an empty stomach at least 30 minutes prior to the first meal of the day. (Patient taking differently: Daily As Needed. Take 1 tablet once daily on an empty stomach at least 30 minutes prior to the first meal of the day.), Disp: 90 capsule, Rfl: 3    metFORMIN (GLUCOPHAGE) 500 MG tablet, Take 1 tablet by mouth 2 (Two) Times a Day With Meals., Disp: 180 tablet, Rfl: 3    naloxone (NARCAN) 4 MG/0.1ML nasal spray, 1 spray into the nostril(s) as directed by provider As Needed (overdose)., Disp: 1 each, Rfl: 0    nitroglycerin (NITROSTAT) 0.4 MG SL tablet, 1 under the tongue as needed for angina, may repeat q5mins for up three doses, Disp: 30 tablet, Rfl: 1    O2 (OXYGEN), Inhale 2 L/min Every Night., Disp: , Rfl:     simvastatin (ZOCOR) 40 MG tablet, Take 1 tablet by mouth Every Night., Disp: 90 tablet, Rfl: 2    SV Iron 325 MG tablet, Take 1 tablet by mouth Daily., Disp: , Rfl:     Thyroid (NP THYROID) 30 MG PO tablet, Take 1 tablet  "by mouth Daily., Disp: 30 tablet, Rfl: 5    traMADol (ULTRAM) 50 MG tablet, Take 1 tablet by mouth Every 8 (Eight) Hours As Needed for Moderate Pain., Disp: 12 tablet, Rfl: 0    vitamin D (ERGOCALCIFEROL) 1.25 MG (85678 UT) capsule capsule, Take 1 capsule by mouth Every 7 (Seven) Days., Disp: 12 capsule, Rfl: 5    ALLERGIES:    Allergies   Allergen Reactions    Ranexa [Ranolazine] GI Intolerance       REVIEW OF SYSTEMS:    A comprehensive 14 point review of systems was performed.  Significant findings as mentioned above.  All other systems reviewed and are negative.        Physical Exam   Vital Signs: /77 (BP Location: Left arm, Patient Position: Sitting, Cuff Size: Large Adult)   Pulse 101   Ht 177.8 cm (70\")   Wt 84.8 kg (187 lb)   BMI 26.83 kg/m²     General: Well developed, well nourished, alert and oriented x 3, in no acute distress.   Head: ATNC   Eyes: PERRL, No evidence of conjunctivitis.   Nose: No nasal discharge.   Mouth: Oral mucosal membranes moist. No oral ulceration or hemorrhages.   Neck: Neck supple. No thyromegaly. No JVD.   Lungs: Clear in all fields to A&P without rales, rhonchi or wheezing.   Heart: Regular rate and rhythm. No murmurs, rubs, or gallops.   Abdomen: Soft. Bowel sounds are normoactive. Nontender with palpation.   Extremities: No cyanosis or edema.   Neurologic: Grossly non-focal exam    RECENT LABS:  Lab Results   Component Value Date    WBC 6.78 03/26/2024    HGB 14.1 03/26/2024    HCT 43.6 03/26/2024    MCV 87.9 03/26/2024    RDW 13.9 03/26/2024     03/26/2024    NEUTRORELPCT 52.8 04/26/2021    LYMPHORELPCT 28.0 04/26/2021    MONORELPCT 12.1 (H) 04/26/2021    EOSRELPCT 6.7 (H) 04/26/2021    BASORELPCT 0.3 04/26/2021    NEUTROABS 3.55 04/26/2021    LYMPHSABS 1.88 04/26/2021       Lab Results   Component Value Date     03/26/2024    K 3.8 03/26/2024    CO2 25.9 03/26/2024     03/26/2024    BUN 13 03/26/2024    CREATININE 0.90 09/18/2024    EGFRIFNONA 84 " 04/26/2021    EGFRIFAFRI 102 12/06/2019    GLUCOSE 208 (H) 03/26/2024    CALCIUM 8.5 (L) 03/26/2024    ALKPHOS 77 04/26/2021    AST 11 04/26/2021    ALT 9 04/26/2021    BILITOT 0.3 04/26/2021    ALBUMIN 4.10 04/26/2021    PROTEINTOT 6.7 04/26/2021    MG 2.2 12/12/2020     ASSESSMENT & PLAN:  Damaso Leonard is a very pleasant 64 y.o. male with    1.  Refractory GERD:  2.  Thomas's esophagus:  3.  S/p cholecystectomy:  4.  Dysphagia (solids/pills):    -Patient is currently taking Dexilant 60 mg p.o. each morning and Protonix 40 mg p.o. at bedtime.  Recommended patient continue Dexilant.  Will discontinue Protonix and start Pepcid 40 mg p.o. at bedtime.  -Recommended repeat EGD for surveillance of Thomas's esophagus and for further evaluation of GERD and dysphagia.  Patient is agreeable.  Will schedule.  -In the interim, we discussed important dietary modifications to help improve GERD symptoms.  -Patient will return to clinic following EGD.  If patient continues to have refractory GERD, would recommend follow-up with Dr. Ortiz Solomon for consideration of Nissen fundoplication (previously evaluated in 2022).     5.  Personal history of colon polyps:  -Patient had colonoscopy with Dr. Garcia on 5/30/2020 which was unremarkable.  Repeat colonoscopy was recommended in 5 years due to personal history of colon polyps.    The patient was in agreement with the plan and all questions were answered to his satisfaction.     Thank you so much for allowing us to participate in the care of Damaso Leonard . Please do not hesitate to contact us with any questions or concerns.             Electronically Signed by: YOUNG Bourne , November 8, 2024 09:00 EST       CC:   No ref. provider found  Clare Quintero APRN

## 2024-11-19 PROBLEM — K22.70 BARRETT'S ESOPHAGUS WITHOUT DYSPLASIA: Status: ACTIVE | Noted: 2024-11-08

## 2024-11-27 ENCOUNTER — TELEPHONE (OUTPATIENT)
Dept: GASTROENTEROLOGY | Facility: CLINIC | Age: 64
End: 2024-11-27
Payer: MEDICARE

## 2024-11-27 ENCOUNTER — ANESTHESIA (OUTPATIENT)
Dept: PERIOP | Facility: HOSPITAL | Age: 64
End: 2024-11-27
Payer: MEDICARE

## 2024-11-27 ENCOUNTER — HOSPITAL ENCOUNTER (OUTPATIENT)
Facility: HOSPITAL | Age: 64
Setting detail: HOSPITAL OUTPATIENT SURGERY
Discharge: HOME | End: 2024-11-27
Attending: INTERNAL MEDICINE | Admitting: INTERNAL MEDICINE
Payer: MEDICARE

## 2024-11-27 ENCOUNTER — ANESTHESIA EVENT (OUTPATIENT)
Dept: PERIOP | Facility: HOSPITAL | Age: 64
End: 2024-11-27
Payer: MEDICARE

## 2024-11-27 VITALS
HEART RATE: 73 BPM | SYSTOLIC BLOOD PRESSURE: 107 MMHG | DIASTOLIC BLOOD PRESSURE: 77 MMHG | TEMPERATURE: 98 F | RESPIRATION RATE: 16 BRPM | WEIGHT: 180 LBS | HEIGHT: 70 IN | BODY MASS INDEX: 25.77 KG/M2 | OXYGEN SATURATION: 94 %

## 2024-11-27 DIAGNOSIS — K22.70 BARRETT'S ESOPHAGUS WITHOUT DYSPLASIA: ICD-10-CM

## 2024-11-27 DIAGNOSIS — K21.9 GASTROESOPHAGEAL REFLUX DISEASE WITHOUT ESOPHAGITIS: Primary | ICD-10-CM

## 2024-11-27 DIAGNOSIS — K21.9 GASTROESOPHAGEAL REFLUX DISEASE, UNSPECIFIED WHETHER ESOPHAGITIS PRESENT: ICD-10-CM

## 2024-11-27 LAB — GLUCOSE BLDC GLUCOMTR-MCNC: 121 MG/DL (ref 70–130)

## 2024-11-27 PROCEDURE — 82948 REAGENT STRIP/BLOOD GLUCOSE: CPT

## 2024-11-27 PROCEDURE — 25010000002 PROPOFOL 200 MG/20ML EMULSION: Performed by: NURSE ANESTHETIST, CERTIFIED REGISTERED

## 2024-11-27 PROCEDURE — 43239 EGD BIOPSY SINGLE/MULTIPLE: CPT | Performed by: INTERNAL MEDICINE

## 2024-11-27 RX ORDER — SODIUM CHLORIDE 0.9 % (FLUSH) 0.9 %
10 SYRINGE (ML) INJECTION AS NEEDED
Status: DISCONTINUED | OUTPATIENT
Start: 2024-11-27 | End: 2024-11-27 | Stop reason: HOSPADM

## 2024-11-27 RX ORDER — MEPERIDINE HYDROCHLORIDE 25 MG/ML
12.5 INJECTION INTRAMUSCULAR; INTRAVENOUS; SUBCUTANEOUS
Status: DISCONTINUED | OUTPATIENT
Start: 2024-11-27 | End: 2024-11-27 | Stop reason: HOSPADM

## 2024-11-27 RX ORDER — PROPOFOL 10 MG/ML
INJECTION, EMULSION INTRAVENOUS AS NEEDED
Status: DISCONTINUED | OUTPATIENT
Start: 2024-11-27 | End: 2024-11-27 | Stop reason: SURG

## 2024-11-27 RX ORDER — SODIUM CHLORIDE 9 MG/ML
40 INJECTION, SOLUTION INTRAVENOUS AS NEEDED
Status: DISCONTINUED | OUTPATIENT
Start: 2024-11-27 | End: 2024-11-27 | Stop reason: HOSPADM

## 2024-11-27 RX ORDER — ONDANSETRON 2 MG/ML
4 INJECTION INTRAMUSCULAR; INTRAVENOUS AS NEEDED
Status: DISCONTINUED | OUTPATIENT
Start: 2024-11-27 | End: 2024-11-27 | Stop reason: HOSPADM

## 2024-11-27 RX ORDER — KETOROLAC TROMETHAMINE 30 MG/ML
30 INJECTION, SOLUTION INTRAMUSCULAR; INTRAVENOUS EVERY 6 HOURS PRN
Status: DISCONTINUED | OUTPATIENT
Start: 2024-11-27 | End: 2024-11-27 | Stop reason: HOSPADM

## 2024-11-27 RX ORDER — SODIUM CHLORIDE, SODIUM LACTATE, POTASSIUM CHLORIDE, CALCIUM CHLORIDE 600; 310; 30; 20 MG/100ML; MG/100ML; MG/100ML; MG/100ML
125 INJECTION, SOLUTION INTRAVENOUS ONCE
Status: DISCONTINUED | OUTPATIENT
Start: 2024-11-27 | End: 2024-11-27 | Stop reason: HOSPADM

## 2024-11-27 RX ORDER — OXYCODONE AND ACETAMINOPHEN 5; 325 MG/1; MG/1
1 TABLET ORAL ONCE AS NEEDED
Status: DISCONTINUED | OUTPATIENT
Start: 2024-11-27 | End: 2024-11-27 | Stop reason: HOSPADM

## 2024-11-27 RX ORDER — SODIUM CHLORIDE 0.9 % (FLUSH) 0.9 %
10 SYRINGE (ML) INJECTION EVERY 12 HOURS SCHEDULED
Status: DISCONTINUED | OUTPATIENT
Start: 2024-11-27 | End: 2024-11-27 | Stop reason: HOSPADM

## 2024-11-27 RX ORDER — FENTANYL CITRATE 50 UG/ML
50 INJECTION, SOLUTION INTRAMUSCULAR; INTRAVENOUS
Status: DISCONTINUED | OUTPATIENT
Start: 2024-11-27 | End: 2024-11-27 | Stop reason: HOSPADM

## 2024-11-27 RX ORDER — MIDAZOLAM HYDROCHLORIDE 1 MG/ML
1 INJECTION, SOLUTION INTRAMUSCULAR; INTRAVENOUS
Status: DISCONTINUED | OUTPATIENT
Start: 2024-11-27 | End: 2024-11-27 | Stop reason: HOSPADM

## 2024-11-27 RX ORDER — IPRATROPIUM BROMIDE AND ALBUTEROL SULFATE 2.5; .5 MG/3ML; MG/3ML
3 SOLUTION RESPIRATORY (INHALATION) ONCE AS NEEDED
Status: DISCONTINUED | OUTPATIENT
Start: 2024-11-27 | End: 2024-11-27 | Stop reason: HOSPADM

## 2024-11-27 RX ADMIN — PROPOFOL 50 MG: 10 INJECTION, EMULSION INTRAVENOUS at 11:36

## 2024-11-27 RX ADMIN — PROPOFOL 50 MG: 10 INJECTION, EMULSION INTRAVENOUS at 11:37

## 2024-11-27 RX ADMIN — PROPOFOL 100 MG: 10 INJECTION, EMULSION INTRAVENOUS at 11:34

## 2024-11-27 RX ADMIN — PROPOFOL 50 MG: 10 INJECTION, EMULSION INTRAVENOUS at 11:35

## 2024-11-27 RX ADMIN — PROPOFOL 50 MG: 10 INJECTION, EMULSION INTRAVENOUS at 11:39

## 2024-11-27 RX ADMIN — PROPOFOL 50 MG: 10 INJECTION, EMULSION INTRAVENOUS at 11:38

## 2024-11-27 NOTE — TELEPHONE ENCOUNTER
I faxed referral and records to Dr. Tomlinson in [place] for esophageal manometry with Dr. Tomlinson in [place] @ [phone], per Mica/Dr. An's request.

## 2024-11-27 NOTE — ANESTHESIA POSTPROCEDURE EVALUATION
Patient: Damaso Leonard    Procedure Summary       Date: 11/27/24 Room / Location: [facility] OR [address] [place] [address] [facility] OR    Anesthesia Start: 1128 Anesthesia Stop: 1140    Procedure: ESOPHAGOGASTRODUODENOSCOPY WITH BIOPSY (Esophagus) Diagnosis:       Gastroesophageal reflux disease, unspecified whether esophagitis present      Thomas's esophagus without dysplasia      (Gastroesophageal reflux disease, unspecified whether esophagitis present [K21.9])      (Thomas's esophagus without dysplasia [K22.70])    Surgeons: Ayanna Uriarte MD Provider: Tuan Armstrong MD    Anesthesia Type: general ASA Status: 3            Anesthesia Type: general    Vitals  Vitals Value Taken Time   /77 11/27/24 1212   Temp 98 °F (36.7 °C) 11/27/24 1142   Pulse 72 11/27/24 1203   Resp 16 11/27/24 1212   SpO2 94 % 11/27/24 1212   Vitals shown include unfiled device data.        Post Anesthesia Care and Evaluation    Patient location during evaluation: PHASE II  Patient participation: complete - patient participated  Level of consciousness: awake and alert  Pain score: 1  Pain management: adequate    Airway patency: patent  Anesthetic complications: No anesthetic complications  PONV Status: controlled  Cardiovascular status: acceptable  Respiratory status: acceptable  Hydration status: acceptable

## 2024-11-27 NOTE — ANESTHESIA PREPROCEDURE EVALUATION
Anesthesia Evaluation     Patient summary reviewed and Nursing notes reviewed   no history of anesthetic complications:   NPO Solid Status: > 8 hours  NPO Liquid Status: > 8 hours           Airway   Mallampati: II  TM distance: >3 FB  Neck ROM: full  Dental    (+) edentulous    Pulmonary     breath sounds clear to auscultation  (+) ,home oxygen (2 liters at night), sleep apnea  Cardiovascular   Exercise tolerance: good (4-7 METS)    ECG reviewed  Patient on routine beta blocker and Beta blocker given within 24 hours of surgery  Rhythm: regular  Rate: normal    (+) hypertension, past MI , CAD, angina, CHF , PVD, hyperlipidemia      Neuro/Psych  (+) numbness, psychiatric history Anxiety and Depression  GI/Hepatic/Renal/Endo    (+) obesity, morbid obesity, GERD, diabetes mellitus, thyroid problem hypothyroidism    Musculoskeletal     Abdominal     Abdomen: soft.   Substance History      OB/GYN          Other   arthritis,                 Anesthesia Plan    ASA 3     general     intravenous induction     Anesthetic plan, risks, benefits, and alternatives have been provided, discussed and informed consent has been obtained with: patient.  Pre-procedure education provided  Use of blood products discussed with  Consented to blood products.    Plan discussed with CRNA.    CODE STATUS:

## 2024-11-29 LAB — REF LAB TEST METHOD: NORMAL

## 2024-12-03 NOTE — PROGRESS NOTES
At the time of her recent upper endoscopy, biopsies were taken of the esophagus.  Biopsies revealed minimal chronic inflammation secondary to acid reflux.  Biopsies of the stomach were negative for H. pylori gastritis.  Biopsies of the small bowel were negative for celiac disease.  We will have you scheduled for manometry to evaluate the motility of the esophagus.  Please continue Dexilant.

## 2025-01-24 ENCOUNTER — OFFICE VISIT (OUTPATIENT)
Dept: CARDIOLOGY | Facility: CLINIC | Age: 65
End: 2025-01-24
Payer: MEDICARE

## 2025-01-24 VITALS
WEIGHT: 189 LBS | DIASTOLIC BLOOD PRESSURE: 75 MMHG | OXYGEN SATURATION: 98 % | HEIGHT: 70 IN | BODY MASS INDEX: 27.06 KG/M2 | SYSTOLIC BLOOD PRESSURE: 113 MMHG | HEART RATE: 85 BPM

## 2025-01-24 DIAGNOSIS — I25.10 CORONARY ARTERY DISEASE INVOLVING NATIVE CORONARY ARTERY OF NATIVE HEART WITHOUT ANGINA PECTORIS: Primary | Chronic | ICD-10-CM

## 2025-01-24 PROCEDURE — 1159F MED LIST DOCD IN RCRD: CPT | Performed by: NURSE PRACTITIONER

## 2025-01-24 PROCEDURE — 3074F SYST BP LT 130 MM HG: CPT | Performed by: NURSE PRACTITIONER

## 2025-01-24 PROCEDURE — 93000 ELECTROCARDIOGRAM COMPLETE: CPT | Performed by: NURSE PRACTITIONER

## 2025-01-24 PROCEDURE — 1160F RVW MEDS BY RX/DR IN RCRD: CPT | Performed by: NURSE PRACTITIONER

## 2025-01-24 PROCEDURE — 3078F DIAST BP <80 MM HG: CPT | Performed by: NURSE PRACTITIONER

## 2025-01-24 PROCEDURE — 99214 OFFICE O/P EST MOD 30 MIN: CPT | Performed by: NURSE PRACTITIONER

## 2025-01-24 NOTE — PROGRESS NOTES
"Chief Complaint  Follow-up (Denies chest pain or SOA, needs cardiac clearance for knee surgery )    Subjective          Damaso Leonard presents to [facility] CARDIOLOGY for follow up.    History of Present Illness  History of Present Illness  The patient is a 64-year-old male who follows in the clinic with coronary artery disease, hypertension, and dyslipidemia. He is here today for follow-up and also requires a perioperative risk assessment for knee surgery.    He was last seen in the clinic on 10/09/2024. At that visit, he was stable from a cardiac standpoint, and no changes were made to his medications. His medical history includes a myocardial infarction in 2020.      He is scheduled for a left knee replacement surgery, having undergone a similar procedure on his right knee 5 years prior.    He reports no current issues of chest pain or shortness of breath. He has not required the use of nitroglycerin since his last visit.     MEDICATIONS  aspirin, Plavix         Objective     Vital Signs:   /75 (BP Location: Left arm, Patient Position: Sitting, Cuff Size: Adult)   Pulse 85   Ht 177.8 cm (70\")   Wt 85.7 kg (189 lb)   SpO2 98%   BMI 27.12 kg/m²       Physical Exam  Vitals reviewed.   Constitutional:       Appearance: Normal appearance. He is well-developed.   Cardiovascular:      Rate and Rhythm: Normal rate and regular rhythm.      Heart sounds: No murmur heard.     No friction rub. No gallop.   Pulmonary:      Effort: Pulmonary effort is normal. No respiratory distress.      Breath sounds: Normal breath sounds. No wheezing or rales.   Skin:     General: Skin is warm and dry.   Neurological:      Mental Status: He is alert and oriented to person, place, and time.   Psychiatric:         Mood and Affect: Mood normal.         Behavior: Behavior normal.          Result Review :     CMP          3/26/2024    09:25 9/18/2024    09:28   CMP   Glucose 208     BUN 13     Creatinine 0.82  " 0.90    EGFR 99.9     Sodium 138     Potassium 3.8     Chloride 103     Calcium 8.5     BUN/Creatinine Ratio 15.9     Anion Gap 9.1       CBC          3/26/2024    09:25   CBC   WBC 6.78    RBC 4.96    Hemoglobin 14.1    Hematocrit 43.6    MCV 87.9    MCH 28.4    MCHC 32.3    RDW 13.9    Platelets 321             ECG 12 Lead    Date/Time: 1/24/2025 2:46 PM  Performed by: Meg Gonzalez APRN    Authorized by: Meg Gonzalez APRN  Comparison: compared with previous ECG from 10/9/2024  Similar to previous ECG  Rhythm: sinus rhythm  Rate: normal  BPM: 85  Comments: QTc 403           Most recent echocardiogram  Results for orders placed during the hospital encounter of 02/28/24    Adult Transthoracic Echo Complete W/ Cont if Necessary Per Protocol    Interpretation Summary    Normal left ventricular cavity size with mild concentric hypertrophy noted.    Left ventricular systolic function is normal. Left ventricular ejection fraction appears to be 56 - 60%.    Left ventricular diastolic function is consistent with (grade I) impaired relaxation.    No significant valvular heart disease noted.    No pericardial effusion detected.      Most recent Stress Test  Results for orders placed during the hospital encounter of 07/14/20    Stress Test With Myocardial Perfusion (1 Day)    Interpretation Summary  · Myocardial perfusion imaging indicates a small-sized infarct located in the mid to basal inferior, inferolateral wall with no significant ischemia noted, in the setting of Diaphragmatic attenuation and GI artifacts.  · Diaphragmatic attenuation and GI artifacts are present.  · Left ventricular ejection fraction is normal (Calculated EF = 63%).  · Findings consistent with a normal ECG stress test.  · Impressions are consistent with an intermediate risk study.       Most recent Cardiac Cath  Results for orders placed during the hospital encounter of 03/02/22    Cardiac Catheterization/Vascular Study    Conclusion  · Prox  LAD to Mid LAD lesion is 30% stenosed.  · Mid LAD to Dist LAD lesion is 10% stenosed.  · Prox Cx lesion is 30% stenosed.  · Mid Cx to Dist Cx lesion is 60% stenosed.  · Reviewed cath films from 12/20/20 and anatomy appears unchanged  · No new lesions or targets thus optimize medical management. I have added beta blocker  · Right radial approach with tR band.  · RCA is a very large vessel with mild disease  · EBL 20 ml      Most recent Coronary CT  No results found for this or any previous visit.         Current Outpatient Medications   Medication Sig Dispense Refill    acetaminophen (TYLENOL) 325 MG tablet Take 2 tablets by mouth Every 4 (Four) Hours As Needed for Mild Pain. 30 tablet 0    amitriptyline (ELAVIL) 50 MG tablet Take 2 tablets by mouth At Night As Needed for Sleep. 180 tablet 1    aspirin 81 MG EC tablet Take 1 tablet by mouth Daily. 90 tablet 3    carvedilol (COREG) 25 MG tablet Take 1 tablet by mouth 2 (Two) Times a Day With Meals. 180 tablet 2    citalopram (CeleXA) 40 MG tablet Take 1 tablet by mouth Daily. 90 tablet 5    clonazePAM (KlonoPIN) 0.5 MG tablet TAKE 1 TABLET BY MOUTH THREE TIMES DAILY AS NEEDED FOR ANXIETY (Patient taking differently: Pt states takes 1tab daily.) 90 tablet 0    cyanocobalamin 1000 MCG/ML injection Inject 1 mL into the appropriate muscle as directed by prescriber Every 30 (Thirty) Days. 1 mL 5    dexlansoprazole (DEXILANT) 60 MG capsule Take 1 capsule by mouth Daily. 30 capsule 11    diphenhydrAMINE (Benadryl Allergy) 25 mg capsule Take 1 capsule by mouth Every 6 (Six) Hours As Needed for Itching. 90 capsule 3    docusate sodium 100 MG capsule Take 1 capsule by mouth 2 (Two) Times a Day As Needed (constipation). 60 each 5    exenatide er (Bydureon) 2 MG pen-injector injection Inject 1 pen under the skin into the appropriate area as directed 1 (One) Time Per Week. 4 each 5    famotidine (Pepcid) 40 MG tablet Take 1 tablet by mouth At Night As Needed for Indigestion or  Heartburn. 30 tablet 5    finasteride (PROSCAR) 5 MG tablet Take 1 tablet by mouth Daily. 90 tablet 3    glucose blood test strip Check blood sugar 3x times a day. 100 each 12    glucose monitor monitoring kit 1 each 3 (Three) Times a Day As Needed (diabetes). 1 each 0    ibuprofen (ADVIL,MOTRIN) 600 MG tablet Take 1 tablet by mouth Every 6 (Six) Hours As Needed for Mild Pain. 30 tablet 0    isosorbide mononitrate (IMDUR) 30 MG 24 hr tablet Take 1 tablet by mouth Daily. 90 tablet 2    Lancets (ONETOUCH ULTRASOFT) lancets Check blood sugar 3 times daily 100 each 12    linaclotide (LINZESS) 145 MCG capsule capsule Take 1 tablet once daily on an empty stomach at least 30 minutes prior to the first meal of the day. (Patient taking differently: Daily As Needed. Take 1 tablet once daily on an empty stomach at least 30 minutes prior to the first meal of the day.) 90 capsule 3    metFORMIN (GLUCOPHAGE) 500 MG tablet Take 1 tablet by mouth 2 (Two) Times a Day With Meals. 180 tablet 3    naloxone (NARCAN) 4 MG/0.1ML nasal spray 1 spray into the nostril(s) as directed by provider As Needed (overdose). 1 each 0    nitroglycerin (NITROSTAT) 0.4 MG SL tablet 1 under the tongue as needed for angina, may repeat q5mins for up three doses 30 tablet 1    O2 (OXYGEN) Inhale 2 L/min Every Night.      simvastatin (ZOCOR) 40 MG tablet Take 1 tablet by mouth Every Night. 90 tablet 2    SV Iron 325 MG tablet Take 1 tablet by mouth Daily.      Thyroid (NP THYROID) 30 MG PO tablet Take 1 tablet by mouth Daily. 30 tablet 5    traMADol (ULTRAM) 50 MG tablet Take 1 tablet by mouth Every 8 (Eight) Hours As Needed for Moderate Pain. 12 tablet 0    vitamin D (ERGOCALCIFEROL) 1.25 MG (70224 UT) capsule capsule Take 1 capsule by mouth Every 7 (Seven) Days. 12 capsule 5     No current facility-administered medications for this visit.               Problem List Items Addressed This Visit          Cardiac and Vasculature    Coronary artery disease  involving native coronary artery of native heart - Primary (Chronic)    Overview     2010: Stent placement, exact details unknown  12/11/2020 [facility]: Proximal LAD is heavily calcified, distal LAD with 2 overlapping stents with no in-stent restenosis.  There is a less than 50% stenosis at the distal edge of the stent.  Mid LAD with eccentric 40 to 60% stenosis which is nonobstructive by FFR.  Ostial first diagonal has a less than 50% stenosis.  Ostial second diagonal has a less than 50% stenosis and in some views appears to be 70%.  However this vessel lumen diameter is too small for meaningful PCI.  LCx with a 50 to 70% in-stent restenosis.  RCA with luminal irregularities  3/2/2022 [facility]: Proximal LAD to mid LAD is 30% stenosed, mid LAD to distal LAD is 10% stenosed, proximal circumflex is 30% stenosed, mid circumflex comes flex to distal circumflex lesion is 60% stenosed.  Anatomy appears unchanged from 12/20/2020.         Relevant Orders    ECG 12 Lead       Assessment & Plan  1. Coronary Artery Disease.  He is currently stable from a cardiac standpoint. His EKG today is normal. He will continue taking aspirin 81 mg daily. Plavix will be discontinued as he has been on dual antiplatelet therapy for nearly 3 years, and current data supports stopping Plavix after this period if no underlying issues are present. He will call [facility]'s office to ensure his cholesterol panel is sent over for review.    2. Hypertension.  No changes were made to his hypertension management during the last visit. He should continue his current medication regimen.    3. Dyslipidemia.  He will call [facility]'s office to ensure his cholesterol panel is sent over for review. Maintaining cholesterol levels within the normal range is crucial.    4. Perioperative Risk Assessment.  He requires a perioperative risk assessment for upcoming knee surgery. Plavix will be discontinued 5 days before the procedure, and he will continue taking aspirin 81 mg  daily. This information will be communicated to Dr. Us's office, and they will contact him to schedule the surgery.    Follow-up  The patient will follow up on 04/09/2025.    PROCEDURE  The patient underwent a right knee replacement procedure 5 years ago.         Follow Up     Return in about 10 weeks (around 4/4/2025).    Patient was given instructions and counseling regarding his condition or for health maintenance advice. Please see specific information pulled into the AVS if appropriate.     Patient or patient representative verbalized consent for the use of Ambient Listening during the visit with  YOUNG Macedo for chart documentation. 1/24/2025  14:45 EST

## 2025-04-23 ENCOUNTER — OFFICE VISIT (OUTPATIENT)
Dept: CARDIOLOGY | Facility: CLINIC | Age: 65
End: 2025-04-23
Payer: MEDICARE

## 2025-04-23 VITALS
BODY MASS INDEX: 29.06 KG/M2 | SYSTOLIC BLOOD PRESSURE: 127 MMHG | HEIGHT: 70 IN | WEIGHT: 203 LBS | HEART RATE: 77 BPM | DIASTOLIC BLOOD PRESSURE: 84 MMHG | OXYGEN SATURATION: 98 %

## 2025-04-23 DIAGNOSIS — I25.10 CORONARY ARTERY DISEASE INVOLVING NATIVE CORONARY ARTERY OF NATIVE HEART WITHOUT ANGINA PECTORIS: Primary | Chronic | ICD-10-CM

## 2025-04-23 DIAGNOSIS — I10 ESSENTIAL HYPERTENSION: Chronic | ICD-10-CM

## 2025-04-23 DIAGNOSIS — E78.00 HYPERCHOLESTEROLEMIA: Chronic | ICD-10-CM

## 2025-04-23 RX ORDER — ICOSAPENT ETHYL 1 G/1
2 CAPSULE ORAL 2 TIMES DAILY WITH MEALS
Qty: 120 CAPSULE | Refills: 11 | Status: SHIPPED | OUTPATIENT
Start: 2025-04-23

## 2025-04-23 NOTE — ASSESSMENT & PLAN NOTE
Dyslipidemia is Controlled  Goals of care: Maintain LDL <55 and Maintain triglycerides <150  Plan: Continue current medications and add Vascepa  Follow up: in 6 months

## 2025-04-23 NOTE — PROGRESS NOTES
"Chief Complaint  Follow-up (Patient denies chest pain or SOA today, states swelling left leg )    Subjective          Damaso Leonard presents to [facility] CARDIOLOGY for follow up.    History of Present Illness  History of Present Illness  The patient is a 64-year-old male who follows up in the clinic for coronary artery disease, hypertension, dyslipidemia, and chronic diastolic heart failure. He was last seen in the clinic on 01/24/2025, during which he required a perioperative risk assessment for knee replacement surgery. He is here today for his usual follow-up.    He underwent successful knee replacement surgery performed by Dr. Us at [facility] and has been engaging in physical therapy postoperatively. He reports an improvement in his mobility and overall well-being. No chest pain, palpitations, or shortness of breath with increased activity are experienced. However, mild swelling in the left leg is noted.    Recent laboratory tests conducted on 04/11/2025 revealed elevated cholesterol levels, particularly triglycerides, with a value of 225. A high intake of sweets is admitted. A previous prescription of Vascepa effectively reduced his triglyceride levels.         Objective     Vital Signs:   /84 (BP Location: Left arm, Patient Position: Sitting, Cuff Size: Adult)   Pulse 77   Ht 177.8 cm (70\")   Wt 92.1 kg (203 lb)   SpO2 98%   BMI 29.13 kg/m²       Physical Exam  Vitals reviewed.   Constitutional:       Appearance: Normal appearance. He is well-developed.   Cardiovascular:      Rate and Rhythm: Normal rate and regular rhythm.      Heart sounds: No murmur heard.     No friction rub. No gallop.   Pulmonary:      Effort: Pulmonary effort is normal. No respiratory distress.      Breath sounds: Normal breath sounds. No wheezing or rales.   Skin:     General: Skin is warm and dry.   Neurological:      Mental Status: He is alert and oriented to person, place, and time. "   Psychiatric:         Mood and Affect: Mood normal.         Behavior: Behavior normal.        Physical Exam  Heart Rate: Normal  Heart: Normal  Lungs: Clear    Result Review :     CMP          9/18/2024    09:28   CMP   Creatinine 0.90                   Most recent echocardiogram  Results for orders placed during the hospital encounter of 02/28/24    Adult Transthoracic Echo Complete W/ Cont if Necessary Per Protocol    Interpretation Summary    Normal left ventricular cavity size with mild concentric hypertrophy noted.    Left ventricular systolic function is normal. Left ventricular ejection fraction appears to be 56 - 60%.    Left ventricular diastolic function is consistent with (grade I) impaired relaxation.    No significant valvular heart disease noted.    No pericardial effusion detected.      Most recent Stress Test  Results for orders placed during the hospital encounter of 07/14/20    Stress Test With Myocardial Perfusion (1 Day)    Interpretation Summary  · Myocardial perfusion imaging indicates a small-sized infarct located in the mid to basal inferior, inferolateral wall with no significant ischemia noted, in the setting of Diaphragmatic attenuation and GI artifacts.  · Diaphragmatic attenuation and GI artifacts are present.  · Left ventricular ejection fraction is normal (Calculated EF = 63%).  · Findings consistent with a normal ECG stress test.  · Impressions are consistent with an intermediate risk study.       Most recent Cardiac Cath  Results for orders placed during the hospital encounter of 03/02/22    Cardiac Catheterization/Vascular Study    Conclusion  · Prox LAD to Mid LAD lesion is 30% stenosed.  · Mid LAD to Dist LAD lesion is 10% stenosed.  · Prox Cx lesion is 30% stenosed.  · Mid Cx to Dist Cx lesion is 60% stenosed.  · Reviewed cath films from 12/20/20 and anatomy appears unchanged  · No new lesions or targets thus optimize medical management. I have added beta blocker  · Right radial  approach with tR band.  · RCA is a very large vessel with mild disease  · EBL 20 ml      Most recent Coronary CT  No results found for this or any previous visit.         Current Outpatient Medications   Medication Sig Dispense Refill    acetaminophen (TYLENOL) 325 MG tablet Take 2 tablets by mouth Every 4 (Four) Hours As Needed for Mild Pain. 30 tablet 0    amitriptyline (ELAVIL) 50 MG tablet Take 2 tablets by mouth At Night As Needed for Sleep. 180 tablet 1    aspirin 81 MG EC tablet Take 1 tablet by mouth Daily. 90 tablet 3    carvedilol (COREG) 25 MG tablet Take 1 tablet by mouth 2 (Two) Times a Day With Meals. 180 tablet 2    citalopram (CeleXA) 40 MG tablet Take 1 tablet by mouth Daily. 90 tablet 5    clonazePAM (KlonoPIN) 0.5 MG tablet TAKE 1 TABLET BY MOUTH THREE TIMES DAILY AS NEEDED FOR ANXIETY (Patient taking differently: Pt states takes 1tab daily.) 90 tablet 0    cyanocobalamin 1000 MCG/ML injection Inject 1 mL into the appropriate muscle as directed by prescriber Every 30 (Thirty) Days. 1 mL 5    dexlansoprazole (DEXILANT) 60 MG capsule Take 1 capsule by mouth Daily. 30 capsule 11    diphenhydrAMINE (Benadryl Allergy) 25 mg capsule Take 1 capsule by mouth Every 6 (Six) Hours As Needed for Itching. 90 capsule 3    docusate sodium 100 MG capsule Take 1 capsule by mouth 2 (Two) Times a Day As Needed (constipation). 60 each 5    exenatide er (Bydureon) 2 MG pen-injector injection Inject 1 pen under the skin into the appropriate area as directed 1 (One) Time Per Week. 4 each 5    famotidine (Pepcid) 40 MG tablet Take 1 tablet by mouth At Night As Needed for Indigestion or Heartburn. 30 tablet 5    finasteride (PROSCAR) 5 MG tablet Take 1 tablet by mouth Daily. 90 tablet 3    glucose blood test strip Check blood sugar 3x times a day. 100 each 12    glucose monitor monitoring kit 1 each 3 (Three) Times a Day As Needed (diabetes). 1 each 0    ibuprofen (ADVIL,MOTRIN) 600 MG tablet Take 1 tablet by mouth Every  6 (Six) Hours As Needed for Mild Pain. 30 tablet 0    isosorbide mononitrate (IMDUR) 30 MG 24 hr tablet Take 1 tablet by mouth Daily. 90 tablet 2    Lancets (ONETOUCH ULTRASOFT) lancets Check blood sugar 3 times daily 100 each 12    linaclotide (LINZESS) 145 MCG capsule capsule Take 1 tablet once daily on an empty stomach at least 30 minutes prior to the first meal of the day. (Patient taking differently: Daily As Needed. Take 1 tablet once daily on an empty stomach at least 30 minutes prior to the first meal of the day.) 90 capsule 3    metFORMIN (GLUCOPHAGE) 500 MG tablet Take 1 tablet by mouth 2 (Two) Times a Day With Meals. 180 tablet 3    naloxone (NARCAN) 4 MG/0.1ML nasal spray 1 spray into the nostril(s) as directed by provider As Needed (overdose). 1 each 0    nitroglycerin (NITROSTAT) 0.4 MG SL tablet 1 under the tongue as needed for angina, may repeat q5mins for up three doses 30 tablet 1    O2 (OXYGEN) Inhale 2 L/min Every Night.      simvastatin (ZOCOR) 40 MG tablet Take 1 tablet by mouth Every Night. 90 tablet 2    Thyroid (NP THYROID) 30 MG PO tablet Take 1 tablet by mouth Daily. 30 tablet 5    traMADol (ULTRAM) 50 MG tablet Take 1 tablet by mouth Every 8 (Eight) Hours As Needed for Moderate Pain. 12 tablet 0    vitamin D (ERGOCALCIFEROL) 1.25 MG (34297 UT) capsule capsule Take 1 capsule by mouth Every 7 (Seven) Days. 12 capsule 5    icosapent ethyl (Vascepa) 1 g capsule capsule Take 2 g by mouth 2 (Two) Times a Day With Meals. 120 capsule 11    SV Iron 325 MG tablet Take 1 tablet by mouth Daily. (Patient not taking: Reported on 4/23/2025)       No current facility-administered medications for this visit.               Problem List Items Addressed This Visit          Cardiac and Vasculature    Coronary artery disease involving native coronary artery of native heart - Primary (Chronic)    Overview   2010: Stent placement, exact details unknown  12/11/2020 [facility]: Proximal LAD is heavily calcified, distal  LAD with 2 overlapping stents with no in-stent restenosis.  There is a less than 50% stenosis at the distal edge of the stent.  Mid LAD with eccentric 40 to 60% stenosis which is nonobstructive by FFR.  Ostial first diagonal has a less than 50% stenosis.  Ostial second diagonal has a less than 50% stenosis and in some views appears to be 70%.  However this vessel lumen diameter is too small for meaningful PCI.  LCx with a 50 to 70% in-stent restenosis.  RCA with luminal irregularities  3/2/2022 [facility]: Proximal LAD to mid LAD is 30% stenosed, mid LAD to distal LAD is 10% stenosed, proximal circumflex is 30% stenosed, mid circumflex comes flex to distal circumflex lesion is 60% stenosed.  Anatomy appears unchanged from 12/20/2020.         Current Assessment & Plan   Coronary Artery Disease: Coronary artery disease is stable.  Goals of Care:Medication tolerance and compliance, control progression of disease, Lifestyle modification:  150 minutes of exercise weekly, and Lifestyle modification:  Dietary changes  Plan: Continue current treatment regimen.  Cardiac status will be reassessed in 6 months.           Hypercholesterolemia (Chronic)    Current Assessment & Plan   Dyslipidemia is Controlled  Goals of care: Maintain LDL <55 and Maintain triglycerides <150  Plan: Continue current medications and add Vascepa  Follow up: in 6 months               Relevant Medications    icosapent ethyl (Vascepa) 1 g capsule capsule    Essential hypertension (Chronic)    Current Assessment & Plan   Hypertension is Controlled  Goals of Care:Medication compliance and tolerance, Systolic blood pressure <130, and Diastolic blood pressure  <80  Plan:  Continue current medications  Follow up:in 6 months              Assessment & Plan  1. Dyslipidemia: Triglycerides 225     - Prescribe Vascepa, 2 tablets in the morning and 2 tablets in the evening with meals.  Continue simvastatin     - Advise reducing intake of sweets to lower triglyceride  levels     - Review lipid panel results from primary care provider     - Consider additional medication if LDL levels are elevated    2.  Hypertension    -  Controlled    -  Continue carvedilol    3.  Coronary artery disease    -  Stable    -  Continue aspirin, simvastatin, and carvedilol      Follow up in 6 months         Follow Up     Return in about 6 months (around 10/23/2025).    I have assumed longitudinal care for complex medical condition(s) that require long-term management involving monitoring and adjustments to medications.  Goals of care, history of important events, and historical results can be found with each problem.  Episodic Plan of Care can be found  in the order section.    Patient was given instructions and counseling regarding his condition or for health maintenance advice. Please see specific information pulled into the AVS if appropriate.     Patient or patient representative verbalized consent for the use of Ambient Listening during the visit with  YOUNG Macedo for chart documentation. 4/23/2025  08:31 EDT

## 2025-04-23 NOTE — PROGRESS NOTES
Chief Complaint  No chief complaint on file.    Subjective     {CC  Problem List  Visit Diagnosis   Encounters  Notes  Medications  Labs  Result Review Imaging  Media :23}     Damaso Leonard presents to [facility] for follow up.    History of Present Illness   okay  History of Present Illness           Objective     Vital Signs:   There were no vitals taken for this visit.      Physical Exam   Physical Exam      Result Review :{ Labs  Result Review  Imaging  Med Tab  Media :23}   {The following data was reviewed by (Optional):[id number]}  CMP          9/18/2024    09:28   CMP   Creatinine 0.90          {Data reviewed (Optional):24599:::1}         Most recent echocardiogram  Results for orders placed during the hospital encounter of 02/28/24    Adult Transthoracic Echo Complete W/ Cont if Necessary Per Protocol    Interpretation Summary  •  Normal left ventricular cavity size with mild concentric hypertrophy noted.  •  Left ventricular systolic function is normal. Left ventricular ejection fraction appears to be 56 - 60%.  •  Left ventricular diastolic function is consistent with (grade I) impaired relaxation.  •  No significant valvular heart disease noted.  •  No pericardial effusion detected.      Most recent Stress Test  Results for orders placed during the hospital encounter of 07/14/20    Stress Test With Myocardial Perfusion (1 Day)    Interpretation Summary  · Myocardial perfusion imaging indicates a small-sized infarct located in the mid to basal inferior, inferolateral wall with no significant ischemia noted, in the setting of Diaphragmatic attenuation and GI artifacts.  · Diaphragmatic attenuation and GI artifacts are present.  · Left ventricular ejection fraction is normal (Calculated EF = 63%).  · Findings consistent with a normal ECG stress test.  · Impressions are consistent with an intermediate risk study.       Most recent Cardiac Cath  Results for orders  placed during the hospital encounter of 03/02/22    Cardiac Catheterization/Vascular Study    Conclusion  · Prox LAD to Mid LAD lesion is 30% stenosed.  · Mid LAD to Dist LAD lesion is 10% stenosed.  · Prox Cx lesion is 30% stenosed.  · Mid Cx to Dist Cx lesion is 60% stenosed.  · Reviewed cath films from 12/20/20 and anatomy appears unchanged  · No new lesions or targets thus optimize medical management. I have added beta blocker  · Right radial approach with tR band.  · RCA is a very large vessel with mild disease  · EBL 20 ml      Most recent Coronary CT  No results found for this or any previous visit.         Current Outpatient Medications   Medication Sig Dispense Refill   • acetaminophen (TYLENOL) 325 MG tablet Take 2 tablets by mouth Every 4 (Four) Hours As Needed for Mild Pain. 30 tablet 0   • amitriptyline (ELAVIL) 50 MG tablet Take 2 tablets by mouth At Night As Needed for Sleep. 180 tablet 1   • aspirin 81 MG EC tablet Take 1 tablet by mouth Daily. 90 tablet 3   • carvedilol (COREG) 25 MG tablet Take 1 tablet by mouth 2 (Two) Times a Day With Meals. 180 tablet 2   • citalopram (CeleXA) 40 MG tablet Take 1 tablet by mouth Daily. 90 tablet 5   • clonazePAM (KlonoPIN) 0.5 MG tablet TAKE 1 TABLET BY MOUTH THREE TIMES DAILY AS NEEDED FOR ANXIETY (Patient taking differently: Pt states takes 1tab daily.) 90 tablet 0   • cyanocobalamin 1000 MCG/ML injection Inject 1 mL into the appropriate muscle as directed by prescriber Every 30 (Thirty) Days. 1 mL 5   • dexlansoprazole (DEXILANT) 60 MG capsule Take 1 capsule by mouth Daily. 30 capsule 11   • diphenhydrAMINE (Benadryl Allergy) 25 mg capsule Take 1 capsule by mouth Every 6 (Six) Hours As Needed for Itching. 90 capsule 3   • docusate sodium 100 MG capsule Take 1 capsule by mouth 2 (Two) Times a Day As Needed (constipation). 60 each 5   • exenatide er (Bydureon) 2 MG pen-injector injection Inject 1 pen under the skin into the appropriate area as directed 1 (One)  Time Per Week. 4 each 5   • famotidine (Pepcid) 40 MG tablet Take 1 tablet by mouth At Night As Needed for Indigestion or Heartburn. 30 tablet 5   • finasteride (PROSCAR) 5 MG tablet Take 1 tablet by mouth Daily. 90 tablet 3   • glucose blood test strip Check blood sugar 3x times a day. 100 each 12   • glucose monitor monitoring kit 1 each 3 (Three) Times a Day As Needed (diabetes). 1 each 0   • ibuprofen (ADVIL,MOTRIN) 600 MG tablet Take 1 tablet by mouth Every 6 (Six) Hours As Needed for Mild Pain. 30 tablet 0   • isosorbide mononitrate (IMDUR) 30 MG 24 hr tablet Take 1 tablet by mouth Daily. 90 tablet 2   • Lancets (ONETOUCH ULTRASOFT) lancets Check blood sugar 3 times daily 100 each 12   • linaclotide (LINZESS) 145 MCG capsule capsule Take 1 tablet once daily on an empty stomach at least 30 minutes prior to the first meal of the day. (Patient taking differently: Daily As Needed. Take 1 tablet once daily on an empty stomach at least 30 minutes prior to the first meal of the day.) 90 capsule 3   • metFORMIN (GLUCOPHAGE) 500 MG tablet Take 1 tablet by mouth 2 (Two) Times a Day With Meals. 180 tablet 3   • naloxone (NARCAN) 4 MG/0.1ML nasal spray 1 spray into the nostril(s) as directed by provider As Needed (overdose). 1 each 0   • nitroglycerin (NITROSTAT) 0.4 MG SL tablet 1 under the tongue as needed for angina, may repeat q5mins for up three doses 30 tablet 1   • O2 (OXYGEN) Inhale 2 L/min Every Night.     • simvastatin (ZOCOR) 40 MG tablet Take 1 tablet by mouth Every Night. 90 tablet 2   • SV Iron 325 MG tablet Take 1 tablet by mouth Daily.     • Thyroid (NP THYROID) 30 MG PO tablet Take 1 tablet by mouth Daily. 30 tablet 5   • traMADol (ULTRAM) 50 MG tablet Take 1 tablet by mouth Every 8 (Eight) Hours As Needed for Moderate Pain. 12 tablet 0   • vitamin D (ERGOCALCIFEROL) 1.25 MG (20624 UT) capsule capsule Take 1 capsule by mouth Every 7 (Seven) Days. 12 capsule 5     No current facility-administered  medications for this visit.            {CC Problem List  Visit Diagnosis  ROS  Review (Popup)  Health Maintenance  Quality  BestPractice  Medications  SmartSets  SnapShot Encounters  Media :23}   Problem List Items Addressed This Visit          Cardiac and Vasculature    Coronary artery disease involving native coronary artery of native heart - Primary (Chronic)    Overview   2010: Stent placement, exact details unknown  12/11/2020 [facility]: Proximal LAD is heavily calcified, distal LAD with 2 overlapping stents with no in-stent restenosis.  There is a less than 50% stenosis at the distal edge of the stent.  Mid LAD with eccentric 40 to 60% stenosis which is nonobstructive by FFR.  Ostial first diagonal has a less than 50% stenosis.  Ostial second diagonal has a less than 50% stenosis and in some views appears to be 70%.  However this vessel lumen diameter is too small for meaningful PCI.  LCx with a 50 to 70% in-stent restenosis.  RCA with luminal irregularities  3/2/2022 [facility]: Proximal LAD to mid LAD is 30% stenosed, mid LAD to distal LAD is 10% stenosed, proximal circumflex is 30% stenosed, mid circumflex comes flex to distal circumflex lesion is 60% stenosed.  Anatomy appears unchanged from 12/20/2020.         Hypercholesterolemia (Chronic)    Essential hypertension (Chronic)       Assessment & Plan         {Time Spent (Optional):93780}  Follow Up {Instructions Charge Capture  Follow-up Communications :23}    No follow-ups on file.    Patient was given instructions and counseling regarding his condition or for health maintenance advice. Please see specific information pulled into the AVS if appropriate.     {[employer] Provider Statement:[id number]}

## 2025-04-23 NOTE — ASSESSMENT & PLAN NOTE
Coronary Artery Disease: Coronary artery disease is stable.  Goals of Care:Medication tolerance and compliance, control progression of disease, Lifestyle modification:  150 minutes of exercise weekly, and Lifestyle modification:  Dietary changes  Plan: Continue current treatment regimen.  Cardiac status will be reassessed in 6 months.

## 2025-06-16 DIAGNOSIS — K21.9 GASTROESOPHAGEAL REFLUX DISEASE, UNSPECIFIED WHETHER ESOPHAGITIS PRESENT: ICD-10-CM

## 2025-06-16 DIAGNOSIS — K22.70 BARRETT'S ESOPHAGUS WITHOUT DYSPLASIA: ICD-10-CM

## 2025-06-16 RX ORDER — FAMOTIDINE 40 MG/1
TABLET, FILM COATED ORAL
Qty: 30 TABLET | Refills: 5 | Status: SHIPPED | OUTPATIENT
Start: 2025-06-16

## (undated) DEVICE — SPNG GZ WOVN 4X4IN 12PLY 10/BX STRL

## (undated) DEVICE — PREMIUM WET SKIN PREP TRAY: Brand: MEDLINE INDUSTRIES, INC.

## (undated) DEVICE — PUMP PAIN AUTOFUSER AUTO SELCT NOBOLUS 1TO14ML/HR 550ML DISP

## (undated) DEVICE — CANNULA SEAL

## (undated) DEVICE — TR BAND RADIAL ARTERY COMPRESSION DEVICE: Brand: TR BAND

## (undated) DEVICE — SKIN AFFIX SURG ADHESIVE 72/CS 0.55ML: Brand: MEDLINE

## (undated) DEVICE — SYR LUERLOK 30CC

## (undated) DEVICE — GUIDE CATHETER: Brand: MACH1™

## (undated) DEVICE — ESOPHAGEAL BALLOON DILATATION CATHETER: Brand: CRE FIXED WIRE

## (undated) DEVICE — GLIDESHEATH SLENDER STAINLESS STEEL KIT: Brand: GLIDESHEATH SLENDER

## (undated) DEVICE — SUT VIC 0/0 UR6 27IN DYED J603H

## (undated) DEVICE — Device: Brand: DEFENDO AIR/WATER/SUCTION AND BIOPSY VALVE

## (undated) DEVICE — 3M™ WARMING BLANKET, UPPER BODY, 10 PER CASE, 42268: Brand: BAIR HUGGER™

## (undated) DEVICE — NEEDLE, QUINCKE 22GX3.5": Brand: MEDLINE INDUSTRIES, INC.

## (undated) DEVICE — VIOLET BRAIDED (POLYGLACTIN 910), SYNTHETIC ABSORBABLE SUTURE: Brand: COATED VICRYL

## (undated) DEVICE — UNDERCAST PADDING: Brand: DEROYAL

## (undated) DEVICE — COVER,MAYO STAND,STERILE: Brand: MEDLINE

## (undated) DEVICE — GLV SURG SIGNATURE TOUCH PF LTX 8 STRL BX/50

## (undated) DEVICE — GW INQW FIX/CORE PTFE J/3MM .035 260CM

## (undated) DEVICE — RADIFOCUS OPTITORQUE ANGIOGRAPHIC CATHETER: Brand: OPTITORQUE

## (undated) DEVICE — MARKER,SKIN,WI/RULER AND LABELS: Brand: MEDLINE

## (undated) DEVICE — CVR HNDL LT SURG ACCSSRY BLU STRL

## (undated) DEVICE — TOTAL TRAY, 16FR 10ML SIL FOLEY, URN: Brand: MEDLINE

## (undated) DEVICE — GLV SURG PREMIERPRO MIC LTX PF SZ7.5 BRN

## (undated) DEVICE — CATH F6 ST JL 3.5 100CM: Brand: SUPERTORQUE

## (undated) DEVICE — GLV SURG PREMIERPRO MIC LTX PF SZ8.5 BRN

## (undated) DEVICE — STERILE PVP: Brand: MEDLINE INDUSTRIES, INC.

## (undated) DEVICE — RUNWAY RADL W/TOP PAD

## (undated) DEVICE — LARGE NEEDLE DRIVER: Brand: ENDOWRIST

## (undated) DEVICE — SINGLE PORT MANIFOLD: Brand: NEPTUNE 2

## (undated) DEVICE — SUT MNCRYL PLS ANTIB UD 4/0 PS2 18IN

## (undated) DEVICE — DRSNG SURESITE WNDW 4X4.5

## (undated) DEVICE — 3 BONE CEMENT MIXER: Brand: MIXEVAC

## (undated) DEVICE — SKIN PREP TRAY W/CHG: Brand: MEDLINE INDUSTRIES, INC.

## (undated) DEVICE — MTS LHK BAPTIST CORBIN: Brand: NAMIC

## (undated) DEVICE — FENESTRATED BIPOLAR FORCEPS: Brand: ENDOWRIST

## (undated) DEVICE — BNDG ELAS W/CLIP 6IN 10YD LF STRL

## (undated) DEVICE — GW EMR FIX EXCHG J STD .035 3MM 260CM

## (undated) DEVICE — INFLATION DEVICE KIT: Brand: ENCORE™ 26 ADVANTAGE KIT

## (undated) DEVICE — SYR LL 3CC

## (undated) DEVICE — STRYKER PERFORMANCE SERIES SAGITTAL BLADE: Brand: STRYKER PERFORMANCE SERIES

## (undated) DEVICE — NAVVUS CATHETER, P/N 701481-002: Brand: ACIST NAVVUS® CATHETER

## (undated) DEVICE — PK SOL VISC ESOPH 80ML

## (undated) DEVICE — CONN Y IRR DISP 1P/U

## (undated) DEVICE — NDL HYPO ECLPS SFTY 18G 1 1/2IN

## (undated) DEVICE — CATH F5 INF JL 4 100CM: Brand: INFINITI

## (undated) DEVICE — BLADELESS OBTURATOR: Brand: WECK VISTA

## (undated) DEVICE — 40595 XL TRENDELENBURG POSITIONING KIT: Brand: 40595 XL TRENDELENBURG POSITIONING KIT

## (undated) DEVICE — FRCP BX RADJAW4 NDL 2.8 240CM LG OG BX40

## (undated) DEVICE — 1010 S-DRAPE TOWEL DRAPE 10/BX: Brand: STERI-DRAPE™

## (undated) DEVICE — PAD GRND REM POLYHESIVE A/ DISP

## (undated) DEVICE — SUT MONOCRYL PLS ANTIB UND 3/0  PS1 27IN

## (undated) DEVICE — GOWN,REINF,POLY,ECL,PP SLV,XL: Brand: MEDLINE

## (undated) DEVICE — TUBING, SUCTION, 1/4" X 20', STRAIGHT: Brand: MEDLINE INDUSTRIES, INC.

## (undated) DEVICE — GW INQWIRE FC PTFE STD J/1.5 .035 260

## (undated) DEVICE — CATH F6 ST JR 4 100CM: Brand: SUPERTORQUE

## (undated) DEVICE — 2, DISPOSABLE SUCTION/IRRIGATOR WITH DISPOSABLE TIP: Brand: STRYKEFLOW

## (undated) DEVICE — PAD, DEFIB, ADULT, RADIOTRANS, ZOLL: Brand: MEDLINE

## (undated) DEVICE — PK CATH CARD 70

## (undated) DEVICE — Device

## (undated) DEVICE — MONOPOLAR CURVED SCISSORS: Brand: ENDOWRIST

## (undated) DEVICE — RUNTHROUGH NS EXTRA FLOPPY PTCA GUIDEWIRE: Brand: RUNTHROUGH

## (undated) DEVICE — THE BITE BLOCK MAXI, LATEX FREE STRAP IS USED TO PROTECT THE ENDOSCOPE INSERTION TUBE FROM BEING BITTEN BY THE PATIENT.

## (undated) DEVICE — ANTIBACTERIAL UNDYED BRAIDED (POLYGLACTIN 910), SYNTHETIC ABSORBABLE SUTURE: Brand: COATED VICRYL

## (undated) DEVICE — ARM DRAPE

## (undated) DEVICE — TIP COVER ACCESSORY

## (undated) DEVICE — PK LAP GEN 70

## (undated) DEVICE — ST EXT IV SMARTSITE 2VLV SP M LL 5ML IV1

## (undated) DEVICE — GLV SURG TRIUMPH ORTHO W/ALOE PF LTX 8 STRL

## (undated) DEVICE — PIN HOLD TEMP NOHEAD FLUT 1/8X3.5IN

## (undated) DEVICE — HDRST POSTIN FM CRDL TRACH SLOT NONCOMRESS 9X8X4IN

## (undated) DEVICE — ENDOGATOR AUXILIARY WATER JET CONNECTOR: Brand: ENDOGATOR

## (undated) DEVICE — SUT VIC 1 CTX 36IN OBGYN VCP977H

## (undated) DEVICE — CVR DISP HUG U VAC STEEP TREND

## (undated) DEVICE — TB SXN FRAZIER 10F STRL

## (undated) DEVICE — DRAPE, RADIAL, STERILE: Brand: MEDLINE

## (undated) DEVICE — UNDERGLV SURG BIOGEL INDICAT PF 8 GRN

## (undated) DEVICE — 12CC CONTROL SYRINGE – FR/TR/RA W/RESERVOIR: Brand: CONTROL SYRINGE

## (undated) DEVICE — SUCTION CANISTER, 1500CC, RIGID: Brand: DEROYAL

## (undated) DEVICE — ADULT DISPOSABLE SINGLE-PATIENT USE PULSE OXIMETER SENSOR: Brand: NONIN

## (undated) DEVICE — CVR PROB ULTRASND GLS STRL

## (undated) DEVICE — PK KN TOTL 10

## (undated) DEVICE — CAPS TRANSMTR ENDOSC PH MONTR BRAVO

## (undated) DEVICE — INSUFFLATION NEEDLE TO ESTABLISH PNEUMOPERITONEUM.: Brand: INSUFFLATION NEEDLE

## (undated) DEVICE — DRSNG SURG AQUACEL AG 9X25CM